# Patient Record
Sex: FEMALE | Race: BLACK OR AFRICAN AMERICAN | NOT HISPANIC OR LATINO | Employment: OTHER | URBAN - METROPOLITAN AREA
[De-identification: names, ages, dates, MRNs, and addresses within clinical notes are randomized per-mention and may not be internally consistent; named-entity substitution may affect disease eponyms.]

---

## 2020-08-24 ENCOUNTER — TELEPHONE (OUTPATIENT)
Dept: NEUROLOGY | Facility: CLINIC | Age: 45
End: 2020-08-24

## 2020-08-24 NOTE — TELEPHONE ENCOUNTER
I called pt to discuss an earlier visit in headache clinic. The only other opening would be a video visit this Thursday 8/27. She said she will keep her in person visit next week on 9/3. She feels waiting the extra week will be okay. She was given a prescription to try from ED. She will see if this helps.     Camelia COUGHLIN

## 2020-08-24 NOTE — TELEPHONE ENCOUNTER
[facility] Call Center    Phone Message    May a detailed message be left on voicemail: no     Reason for Call: Other: Pt is being referred by a Dr. Vallejo at [facility] for Headaches. Pt was discharged today. First available was 9/3/20 which was scheduled for right now, but protocols ask for a 1 week f/u. Please call Pt back.     Action Taken: Message routed to:  [facility]    Travel Screening: Not Applicable

## 2020-09-03 ENCOUNTER — OFFICE VISIT (OUTPATIENT)
Dept: NEUROLOGY | Facility: CLINIC | Age: 45
End: 2020-09-03
Payer: OTHER GOVERNMENT

## 2020-09-03 VITALS
SYSTOLIC BLOOD PRESSURE: 123 MMHG | RESPIRATION RATE: 16 BRPM | HEART RATE: 97 BPM | DIASTOLIC BLOOD PRESSURE: 82 MMHG | OXYGEN SATURATION: 99 %

## 2020-09-03 DIAGNOSIS — G44.86 CERVICOGENIC HEADACHE: ICD-10-CM

## 2020-09-03 DIAGNOSIS — G44.85 STABBING HEADACHE: ICD-10-CM

## 2020-09-03 DIAGNOSIS — R51.9 ACUTE INTRACTABLE HEADACHE, UNSPECIFIED HEADACHE TYPE: Primary | ICD-10-CM

## 2020-09-03 DIAGNOSIS — Z09 HOSPITAL DISCHARGE FOLLOW-UP: ICD-10-CM

## 2020-09-03 PROBLEM — E61.1 IRON DEFICIENCY: Status: ACTIVE | Noted: 2017-04-25

## 2020-09-03 PROBLEM — R06.09 DYSPNEA ON EXERTION: Status: ACTIVE | Noted: 2018-02-26

## 2020-09-03 PROBLEM — Z00.00 ROUTINE PHYSICAL EXAMINATION: Status: ACTIVE | Noted: 2017-04-25

## 2020-09-03 PROBLEM — E55.9 VITAMIN D DEFICIENCY: Status: ACTIVE | Noted: 2017-04-25

## 2020-09-03 PROBLEM — N92.1 MENORRHAGIA WITH IRREGULAR CYCLE: Status: ACTIVE | Noted: 2017-04-25

## 2020-09-03 RX ORDER — CETIRIZINE HYDROCHLORIDE 10 MG/1
10 TABLET ORAL DAILY
COMMUNITY
End: 2020-09-21

## 2020-09-03 ASSESSMENT — PAIN SCALES - GENERAL: PAINLEVEL: NO PAIN (0)

## 2020-09-03 NOTE — NURSING NOTE
Chief Complaint   Patient presents with     Hospital F/U     Headache     [facility] EMERGENCY DEPARTMENT F/U        Dequan Mullins, EMT

## 2020-09-03 NOTE — LETTER
9/3/2020      RE: Tori Steele  [address] [address]     Dear Colleague,    Thank you for referring your patient, Tori Steele, to the [facility] NEUROLOGY at [facility]. Please see a copy of my visit note below.    Re: Tori Steele      MRN# [id number]  YOB: 1975  Date of Visit: 9/3/2020    OUTPATIENT NEUROLOGY VISIT NOTE    Chief Complaint:  ED visit follow-up for headache    History of Present Illness  Tori Steele is a 44-year-old female presents to the [facility] headache clinic today for ED visit follow-up and additional headache evaluation.  ED visit 8/24/2020 and notes reviewed.  Patient was given prescription for prednisone and ibuprofen and was recommended icing of the area and referred to headache clinic.  Accompanied by her .   History of migraine headaches. No family history of headaches.   Usual migraine headaches when close to the cycle. More to the front and sharp pain and lasting a day and comes in short sharp pain. Pain 9/10 in intensity. Patient reports that usually takes ibuprofen. Denies light or noise sensitivity. No vision loss. Frequency once per month.   New symptoms -more than 24 hours and usually   Woke up at 2 am with a sharp pain left occipital area and a few seconds stab and stayed a few seconds and reoccurrence in 5 minutes and at least 20-30 attacks in 12 hours. No eye lacrimation, no eye redness, no nasal congestion. No warning. No other symptoms. No nausea or vomiting.    Patient reports that prednisone worked for a couple of days and than headache again but lesser intensity and frequency of attacks. Patient reported had a headache last night about 8:30 pm and took 2 ibuprofen and helped.   Last night stabbing changed in the location frontal area and lasting a few seconds.   Not on birth control  No clotting disorders.   Patient reports history of black out at the age of 21 and was evaluated and  presumed negative  Heavy menstrual cycle  Takes vitamins with Iron    Denies history of head or neck trauma, dizziness, vertigo, loss of consciousness, seizure, double vision, blurred vision, hearing difficulty, speech or swallowing difficulty, weakness or numbness in face, arms or legs, urinary or bowel incontinence, coordination problems or gait difficulty, fever or chills.  No chest pain or palpitation    Neurodiagnostic Testing  CT head none  MRI brain none    Past Medical History reviewed and verified with the patient  Sinus tachycardia  Past Surgical History reviewed and verified with the patient  Dilation and current age of uterus  Family History reviewed and verified with the patient  Social History     Tobacco Use     Smoking status: Not on file   Substance Use Topics     Alcohol use: Not on file    reviewed and verified with the patient   No Known Allergies     Current Outpatient Medications   Medication Sig Dispense Refill     cetirizine (ZYRTEC) 10 MG tablet Take 10 mg by mouth daily     reviewed and verified with the patient    Review of Systems:  A 12-point ROS including constitutional, eyes, ENT, respiratory, cardiovascular, gastroenterology, genitourinary, integumentary, musculoskeletal, neurology, hematology and psychiatric were all reviewed with the patient and completed at the [facility] nary and as mentioned in the HPI.     General Exam:   /82   Pulse 97   Resp 16   SpO2 99%   GEN: Awake, NAD; good eye contact, responses appropriately , healthy appearing. No pain now. Speech is fluent without dysarthriaMentation appears normal, affect normal/bright, judgement and insight intact, normal speech and appearance well-groomed.  HEENT: Head atraumatic/Normocephalic. Scalp normal. Pupils equally round, 4 mm, reactive to light and accommodation, sclera and conjunctiva normal. Fundoscopic examination reveals normal vessels no papilledema.   Neck: Easily moveable without  resistance  Heart: S1/S2 appreciated, tachycardiano m/r/g, no carotid bruits  Lungs:Lungs are clear to auscultation bilaterally, no wheezes or crackles.   Neurological Examination:  The patient is alert and oriented times four. Has good attention and concentration. Immediate and short term recall is 3/3. Speech is fluent without dysarthria.   Cranial nerves:  CN I deferred.   CN II: Intact and full visual fields to confrontation bilaterally.  CN III, IV, VI: EOM intact. There is no nystagmus. Has conjugated gaze. Intact direct and consensual pupillary light reflexes.   CN V: Intact and symmetrical to facial sensation in the V1 through V3 bilaterally.   CN VII: Intact and symmetrical eyebrow and lid raise and eyelid closure, smiles and frown.   CN VIII: Intact to finger rub bilaterally.   CN IX and X: The palates elevates symmetrical. The uvula is midline.   CN XII: The tongue protrudes midline with no atrophy or fasciculations.   Motor exam: The patient has a normal bulk and tone throughout. There is no atrophy, fasciculations, clonus, or abnormal movements appreciated.   Strength Exam:  5/5 strength at shoulder abduction, elbow flexion or extension, wrist flexion or extension, finger abduction, , hip flexion and extension, knee flexion and extension, and dorsiflexion and plantarflexion bilaterally.   Sensation is intact to light touch and pinprick throughout.  Reflexes are 2+ and symmetrical at biceps, triceps, brachioradialis, patellar, and Achilles.   Negative Babinski with downgoing toes bilaterally.   Coordination reveals finger-nose-finger with normal speed and accuracy.   Station and gait is normal. There is no ataxia. Can walk on the toes, heels, and tandem walk without difficulty. Has no drift and a negative Romberg. Azul's negative        Assessment and Plan:  Acute onset of paroxysmal stabbing pain in the left occipital area different from previous episodes. Was not associated with activity but  woke patient up at night.   Differentials are vascular, thrombosis, maligancy, cervicogenic  Plan:  Brain MRI/ MRA, MRV,   Follow up after-virtual visit       I discussed all my recommendation with Tori Steele. The patient verbalizes understanding and comfortable with the plan. The patient has our clinic phone number to call with any questions or concerns. All of the patient's questions were answered from the best of my current knowledge.     Thank you for letting me be a part of the treatment team for Tori Steele    Time spent with pt answering questions, discussing findings, counseling and coordinating care was more than 50% the appointment time, 50  minutes.     Again, thank you for allowing me to participate in the care of your patient.  Sincerely,    GENIE Mcnair, CNP  [facility]

## 2020-09-03 NOTE — PROGRESS NOTES
Re: Tori Steele      MRN# [id number]  YOB: 1975  Date of Visit: 9/3/2020    OUTPATIENT NEUROLOGY VISIT NOTE    Chief Complaint:  ED visit follow-up for headache    History of Present Illness  Tori Steele is a 44-year-old female presents to the [facility] headache clinic today for ED visit follow-up and additional headache evaluation.  ED visit 8/24/2020 and notes reviewed.  Patient was given prescription for prednisone and ibuprofen and was recommended icing of the area and referred to headache clinic.  Accompanied by her .   History of migraine headaches. No family history of headaches.   Usual migraine headaches when close to the cycle. More to the front and sharp pain and lasting a day and comes in short sharp pain. Pain 9/10 in intensity. Patient reports that usually takes ibuprofen. Denies light or noise sensitivity. No vision loss. Frequency once per month.   New symptoms -more than 24 hours and usually   Woke up at 2 am with a sharp pain left occipital area and a few seconds stab and stayed a few seconds and reoccurrence in 5 minutes and at least 20-30 attacks in 12 hours. No eye lacrimation, no eye redness, no nasal congestion. No warning. No other symptoms. No nausea or vomiting.    Patient reports that prednisone worked for a couple of days and than headache again but lesser intensity and frequency of attacks. Patient reported had a headache last night about 8:30 pm and took 2 ibuprofen and helped.   Last night stabbing changed in the location frontal area and lasting a few seconds.   Not on birth control  No clotting disorders.   Patient reports history of black out at the age of 21 and was evaluated and presumed negative  Heavy menstrual cycle  Takes vitamins with Iron    Denies history of head or neck trauma, dizziness, vertigo, loss of consciousness, seizure, double vision, blurred vision, hearing difficulty, speech or swallowing difficulty, weakness or numbness in face,  arms or legs, urinary or bowel incontinence, coordination problems or gait difficulty, fever or chills.  No chest pain or palpitation    Neurodiagnostic Testing  CT head none  MRI brain none      Past Medical History reviewed and verified with the patient  Sinus tachycardia  Past Surgical History reviewed and verified with the patient  Dilation and current age of uterus  Family History reviewed and verified with the patient  Social History     Tobacco Use     Smoking status: Not on file   Substance Use Topics     Alcohol use: Not on file    reviewed and verified with the patient   No Known Allergies     Current Outpatient Medications   Medication Sig Dispense Refill     cetirizine (ZYRTEC) 10 MG tablet Take 10 mg by mouth daily     reviewed and verified with the patient    Review of Systems:  A 12-point ROS including constitutional, eyes, ENT, respiratory, cardiovascular, gastroenterology, genitourinary, integumentary, musculoskeletal, neurology, hematology and psychiatric were all reviewed with the patient and completed at the [facility] and as mentioned in the HPI.     General Exam:   /82   Pulse 97   Resp 16   SpO2 99%   GEN: Awake, NAD; good eye contact, responses appropriately , healthy appearing. No pain now. Speech is fluent without dysarthriaMentation appears normal, affect normal/bright, judgement and insight intact, normal speech and appearance well-groomed.  HEENT: Head atraumatic/Normocephalic. Scalp normal. Pupils equally round, 4 mm, reactive to light and accommodation, sclera and conjunctiva normal. Fundoscopic examination reveals normal vessels no papilledema.   Neck: Easily moveable without resistance  Heart: S1/S2 appreciated, tachycardiano m/r/g, no carotid bruits  Lungs:Lungs are clear to auscultation bilaterally, no wheezes or crackles.   Neurological Examination:  The patient is alert and oriented times four. Has good attention and concentration. Immediate and  short term recall is 3/3. Speech is fluent without dysarthria.   Cranial nerves:  CN I deferred.   CN II: Intact and full visual fields to confrontation bilaterally.  CN III, IV, VI: EOM intact. There is no nystagmus. Has conjugated gaze. Intact direct and consensual pupillary light reflexes.   CN V: Intact and symmetrical to facial sensation in the V1 through V3 bilaterally.   CN VII: Intact and symmetrical eyebrow and lid raise and eyelid closure, smiles and frown.   CN VIII: Intact to finger rub bilaterally.   CN IX and X: The palates elevates symmetrical. The uvula is midline.   CN XII: The tongue protrudes midline with no atrophy or fasciculations.   Motor exam: The patient has a normal bulk and tone throughout. There is no atrophy, fasciculations, clonus, or abnormal movements appreciated.   Strength Exam:  5/5 strength at shoulder abduction, elbow flexion or extension, wrist flexion or extension, finger abduction, , hip flexion and extension, knee flexion and extension, and dorsiflexion and plantarflexion bilaterally.   Sensation is intact to light touch and pinprick throughout.  Reflexes are 2+ and symmetrical at biceps, triceps, brachioradialis, patellar, and Achilles.   Negative Babinski with downgoing toes bilaterally.   Coordination reveals finger-nose-finger with normal speed and accuracy.   Station and gait is normal. There is no ataxia. Can walk on the toes, heels, and tandem walk without difficulty. Has no drift and a negative Romberg. Azul's negative          Assessment and Plan:  Acute onset of paroxysmal stabbing pain in the left occipital area different from previous episodes. Was not associated with activity but woke patient up at night.   Differentials are vascular, thrombosis, maligancy, cervicogenic  Plan:  Brain MRI/ MRA, MRV,   Follow up after-virtual visit       I discussed all my recommendation with Tori Steele. The patient verbalizes understanding and comfortable with the  plan. The patient has our clinic phone number to call with any questions or concerns. All of the patient's questions were answered from the best of my current knowledge.     Thank you for letting me be a part of the treatment team for Tori Steele      Time spent with pt answering questions, discussing findings, counseling and coordinating care was more than 50% the appointment time, 50  minutes.         GENIE Mcnair, CNP  [facility]

## 2020-09-03 NOTE — LETTER
9/3/2020       RE: Tori Steele  [address] [address]     Dear Colleague,    Thank you for referring your patient, Tori Steele, to the [facility] NEUROLOGY at [facility]. Please see a copy of my visit note below.    Re: Tori Steele      MRN# [id number]  YOB: 1975  Date of Visit: 9/3/2020    OUTPATIENT NEUROLOGY VISIT NOTE    Chief Complaint:  ED visit follow-up for headache    History of Present Illness  Tori Steele is a 44-year-old female presents to the [facility] headache clinic today for ED visit follow-up and additional headache evaluation.  ED visit 8/24/2020 and notes reviewed.  Patient was given prescription for prednisone and ibuprofen and was recommended icing of the area and referred to headache clinic.  Accompanied by her .   History of migraine headaches. No family history of headaches.   Usual migraine headaches when close to the cycle. More to the front and sharp pain and lasting a day and comes in short sharp pain. Pain 9/10 in intensity. Patient reports that usually takes ibuprofen. Denies light or noise sensitivity. No vision loss. Frequency once per month.   New symptoms -more than 24 hours and usually   Woke up at 2 am with a sharp pain left occipital area and a few seconds stab and stayed a few seconds and reoccurrence in 5 minutes and at least 20-30 attacks in 12 hours. No eye lacrimation, no eye redness, no nasal congestion. No warning. No other symptoms. No nausea or vomiting.    Patient reports that prednisone worked for a couple of days and than headache again but lesser intensity and frequency of attacks. Patient reported had a headache last night about 8:30 pm and took 2 ibuprofen and helped.   Last night stabbing changed in the location frontal area and lasting a few seconds.   Not on birth control  No clotting disorders.   Patient reports history of black out at the age of 21 and was evaluated  and presumed negative  Heavy menstrual cycle  Takes vitamins with Iron    Denies history of head or neck trauma, dizziness, vertigo, loss of consciousness, seizure, double vision, blurred vision, hearing difficulty, speech or swallowing difficulty, weakness or numbness in face, arms or legs, urinary or bowel incontinence, coordination problems or gait difficulty, fever or chills.  No chest pain or palpitation    Neurodiagnostic Testing  CT head none  MRI brain none      Past Medical History reviewed and verified with the patient  Sinus tachycardia  Past Surgical History reviewed and verified with the patient  Dilation and current age of uterus  Family History reviewed and verified with the patient  Social History     Tobacco Use     Smoking status: Not on file   Substance Use Topics     Alcohol use: Not on file    reviewed and verified with the patient   No Known Allergies     Current Outpatient Medications   Medication Sig Dispense Refill     cetirizine (ZYRTEC) 10 MG tablet Take 10 mg by mouth daily     reviewed and verified with the patient    Review of Systems:  A 12-point ROS including constitutional, eyes, ENT, respiratory, cardiovascular, gastroenterology, genitourinary, integumentary, musculoskeletal, neurology, hematology and psychiatric were all reviewed with the patient and completed at the [facility] Question nary and as mentioned in the HPI.     General Exam:   /82   Pulse 97   Resp 16   SpO2 99%   GEN: Awake, NAD; good eye contact, responses appropriately , healthy appearing. No pain now. Speech is fluent without dysarthriaMentation appears normal, affect normal/bright, judgement and insight intact, normal speech and appearance well-groomed.  HEENT: Head atraumatic/Normocephalic. Scalp normal. Pupils equally round, 4 mm, reactive to light and accommodation, sclera and conjunctiva normal. Fundoscopic examination reveals normal vessels no papilledema.   Neck: Easily moveable without  resistance  Heart: S1/S2 appreciated, tachycardiano m/r/g, no carotid bruits  Lungs:Lungs are clear to auscultation bilaterally, no wheezes or crackles.   Neurological Examination:  The patient is alert and oriented times four. Has good attention and concentration. Immediate and short term recall is 3/3. Speech is fluent without dysarthria.   Cranial nerves:  CN I deferred.   CN II: Intact and full visual fields to confrontation bilaterally.  CN III, IV, VI: EOM intact. There is no nystagmus. Has conjugated gaze. Intact direct and consensual pupillary light reflexes.   CN V: Intact and symmetrical to facial sensation in the V1 through V3 bilaterally.   CN VII: Intact and symmetrical eyebrow and lid raise and eyelid closure, smiles and frown.   CN VIII: Intact to finger rub bilaterally.   CN IX and X: The palates elevates symmetrical. The uvula is midline.   CN XII: The tongue protrudes midline with no atrophy or fasciculations.   Motor exam: The patient has a normal bulk and tone throughout. There is no atrophy, fasciculations, clonus, or abnormal movements appreciated.   Strength Exam:  5/5 strength at shoulder abduction, elbow flexion or extension, wrist flexion or extension, finger abduction, , hip flexion and extension, knee flexion and extension, and dorsiflexion and plantarflexion bilaterally.   Sensation is intact to light touch and pinprick throughout.  Reflexes are 2+ and symmetrical at biceps, triceps, brachioradialis, patellar, and Achilles.   Negative Babinski with downgoing toes bilaterally.   Coordination reveals finger-nose-finger with normal speed and accuracy.   Station and gait is normal. There is no ataxia. Can walk on the toes, heels, and tandem walk without difficulty. Has no drift and a negative Romberg. Azul's negative          Assessment and Plan:  Acute onset of paroxysmal stabbing pain in the left occipital area different from previous episodes. Was not associated with activity but  woke patient up at night.   Differentials are vascular, thrombosis, maligancy, cervicogenic  Plan:  Brain MRI/ MRA, MRV,   Follow up after-virtual visit       I discussed all my recommendation with Tori Steele. The patient verbalizes understanding and comfortable with the plan. The patient has our clinic phone number to call with any questions or concerns. All of the patient's questions were answered from the best of my current knowledge.     Thank you for letting me be a part of the treatment team for Tori Steele      Time spent with pt answering questions, discussing findings, counseling and coordinating care was more than 50% the appointment time, 50  minutes.         GENIE Mcnair, CNP  [facility]      Again, thank you for allowing me to participate in the care of your patient.      Sincerely,    GENIE Foreman CNP

## 2020-09-18 ENCOUNTER — HOSPITAL ENCOUNTER (OUTPATIENT)
Dept: MRI IMAGING | Facility: CLINIC | Age: 45
End: 2020-09-18
Attending: NURSE PRACTITIONER
Payer: OTHER GOVERNMENT

## 2020-09-18 DIAGNOSIS — G44.85 STABBING HEADACHE: ICD-10-CM

## 2020-09-18 PROCEDURE — 70544 MR ANGIOGRAPHY HEAD W/O DYE: CPT

## 2020-09-18 PROCEDURE — 70546 MR ANGIOGRAPH HEAD W/O&W/DYE: CPT

## 2020-09-18 PROCEDURE — 25500064 ZZH RX 255 OP 636: Performed by: NURSE PRACTITIONER

## 2020-09-18 PROCEDURE — A9585 GADOBUTROL INJECTION: HCPCS | Performed by: NURSE PRACTITIONER

## 2020-09-18 PROCEDURE — 70553 MRI BRAIN STEM W/O & W/DYE: CPT

## 2020-09-18 RX ORDER — GADOBUTROL 604.72 MG/ML
7.5 INJECTION INTRAVENOUS ONCE
Status: COMPLETED | OUTPATIENT
Start: 2020-09-18 | End: 2020-09-18

## 2020-09-18 RX ADMIN — GADOBUTROL 6.8 ML: 604.72 INJECTION INTRAVENOUS at 14:50

## 2020-09-21 ENCOUNTER — VIRTUAL VISIT (OUTPATIENT)
Dept: NEUROLOGY | Facility: CLINIC | Age: 45
End: 2020-09-21
Payer: OTHER GOVERNMENT

## 2020-09-21 DIAGNOSIS — R51.9 FRONTAL HEADACHE: ICD-10-CM

## 2020-09-21 DIAGNOSIS — J34.1 MUCOUS RETENTION CYST OF MAXILLARY SINUS: Primary | ICD-10-CM

## 2020-09-21 NOTE — PROGRESS NOTES
"Tori Steele is a 45 year old female who is being evaluated via a billable video visit.      The patient has been notified of following:     \"This video visit will be conducted via a call between you and your physician/provider. We have found that certain health care needs can be provided without the need for an in-person physical exam.  This service lets us provide the care you need with a video conversation.  If a prescription is necessary we can send it directly to your pharmacy.  If lab work is needed we can place an order for that and you can then stop by our lab to have the test done at a later time.    Video visits are billed at different rates depending on your insurance coverage.  Please reach out to your insurance provider with any questions.    If during the course of the call the physician/provider feels a video visit is not appropriate, you will not be charged for this service.\"    Patient has given verbal consent for Video visit?YES  How would you like to obtain your AVS? MyChart        Video-Visit Details    Type of service:  Video Visit    Video Start Time: 1:57 PM    Video End Time:2:54 PM      Originating Location (pt. Location): Home    Distant Location (provider location):  [facility] NEUROLOGY     Platform used for Video Visit: JAMES Luciano    Reason for visit:  Headache and images follow-up. This visit was conducted via synchronous video visit due to the current COVID-19 crisis to reduce patient risk.  Verbal consent was obtained.     Interval history:  This is a 44-year-old female presents to the [facility] headache clinic today for ED visit follow-up and additional headache evaluation.  ED visit 8/24/2020 and notes reviewed.  Patient was given prescription for prednisone and ibuprofen and was recommended icing of the area and referred to headache clinic.  History of migraine headaches with usual migraine headache pattern close to her menstrual cycle.  Typical migraine frequency " once per month.  Patient reported that she developed new symptoms that lasted more than 24 hours.  Reports that she woke up at 2 AM with a sharp pain in the left occipital area with a few seconds stop with recurrence in 5 minutes at least 2030 attacks in 12 hours.  Note autonomic features were reported.  No visual loss.  No nausea or vomiting.  No weakness in arms or legs or facial weakness.  Patient went to the ER and was given prednisone.  During her initial visit at [facility] headache clinic it was recommended further work-up with imaging.    Reviewed brain MRI, brain MRI and brain MRV images with patient today.  Brain MRV dominant right-sided venous drainage no dural venous sinus thrombosis  Head MRA demonstrated no aneurysmal stenosis of the major intracranial arteries, no evidence of vasospasm, there was a finding of a 1 mm infundibulum in the origin of a small inferiorly directed right M2 branch.  A curbside review of brain MRA images by Dr Jeffers from neurosurgery -no further  follow up needed.   Brain MRI contrast-enhanced images demonstrated no abnormal intracranial enhancement.  Minimal leukoaraiosis in the upper limits of normal for age.  No mass lesion, mass effect or midline shift or any restricted diffusion.  Per images review the risks  right maxiallary sinus mucosal thickening and possible cyst.   Symptoms -patient reports continuous pressure over right maxillary sinus and has been waking up with swelling and pressure feeling. Some days are worse than other. Onset in July 2020  Patient reports a little drip from right nose and reports that never a runny nose for a long time. Reports that nasal spray offers some relief. Reports a nicholas roller helps.   Left occipital area pain has stopped and no intensity since than. Left occipital pain lasting for 2 days and woke patient up on 8/24/2020 and went to the ED.   Patient reports that headaches feeling tightness every day but not as intense and straight  across.   Patient reports that she is able to sleep with tightness but soreness.   No light or noise sensitivity. Tightness feeling about 6/10 on numeric pain scale. Reports that she is able to do think but able to push thru. No debilitating and able to do daily things.   Feels like always delt with headaches but never stopped    Patient reports that Claritin and ibuprofen seem helpful and every other day.     Right maxillary sinus retention cyst reported and fullness feeling and swelling under the eyes since July which made patient to pay more attention.     Tension vs migraine. Finding of maxillary sinus mucus retention cyst possibly symptomatic.  Patient reports pressure feeling and swelling over the right maxillary sinus since July 2020     Headache treatment plan discussed with the patient:  Sinuses and pressure feeling over right maxillary area since July and findings of right maxillary cyst -recommended a referral to ENT  Headache log for frequency and severity and ibuprofen use  Stay hydrated  Stress reduction, guided meditation  Vitamin B2 200-400 mg daily OTC  Call with any headache worsening or new symptoms or re-occurrence of occipital pain  Possible rebound headaches from ibuprofen use. Limit use to no more than 10-12 days per month of ibuprofen  If headaches were not improving -may consider a preventative treatment.   May try naproxen 2 tablets every 12 hours as needed. Limit use to no more than 14 days per month  Follow up virtually  in 1-2 months     Possible Infundibulum findings incidental.  Jana review with neurosurgery Dr Jeffers and trina amezcua follow up needed    PMH, allergies and current prescription medications reviewed    10 point ROS of systems including Constitutional, Eyes, Respiratory, Cardiovascular, Gastroenterology, Genitourinary, Integumentary, Muscularskeletal, Psychiatric were reviewed and no concerns reported today unless as mentioned in the interval  history    Exam  GENERAL: Healthy, alert and no distress,   EYES: Eyes grossly normal to inspection.    RESP: No audible wheeze, cough, or visible cyanosis.    SKIN: Visible skin clear. No significant rash, abnormal pigmentation or lesions.  NEURO: Cranial nerves grossly intact.  Mentation and speech appropriate for age.  PSYCH: Mentation appears normal, affect normal/bright, judgement and insight intact, normal speech and appearance well-groomed.      A/P:  See interval history for our discussion and plan    I discussed all my recommendations with Tori Steele who verbalizes understanding and comfortable with the plan.  All of patient's questions were answered from the best of my knowledge.  Patient is in agreement with the plan.     I spent a total of 50 minutes for telemedicine consult with the patient during today s virtual meeting. Over 50% of this time was spent counseling the patient and/or coordinating care    GENIE Swartz [facility]

## 2020-09-21 NOTE — PATIENT INSTRUCTIONS
Plan:  Sinuses and pressure feeling over right maxillary area since July and findings of right maxillary cyst -recommended a referral to ENT  Headache log for frequency and severity and ibuprofen use  Stay hydrated  Stress reduction, guided meditation  Vitamin B2 200-400 mg daily OTC  Call with any headache worsening or new symptoms or re-occurrence of occipital pain  Possible rebound headaches from ibuprofen use. Limit use to no more than 10-12 days per month of ibuprofen  If headaches were not improving -may consider a preventative treatment.   May try naproxen 2 tablets every 12 hours as needed. Limit use to no more than 14 days per month  Follow up virtually  in 1-2 months

## 2020-09-21 NOTE — LETTER
"9/21/2020     RE: Tori Steele  [address] [address]     Dear Colleague,    Thank you for referring your patient, Tori Steele, to the [facility] NEUROLOGY at [facility]. Please see a copy of my visit note below.    Tori Steele is a 45 year old female who is being evaluated via a billable video visit.      The patient has been notified of following:     \"This video visit will be conducted via a call between you and your physician/provider. We have found that certain health care needs can be provided without the need for an in-person physical exam.  This service lets us provide the care you need with a video conversation.  If a prescription is necessary we can send it directly to your pharmacy.  If lab work is needed we can place an order for that and you can then stop by our lab to have the test done at a later time.    Video visits are billed at different rates depending on your insurance coverage.  Please reach out to your insurance provider with any questions.    If during the course of the call the physician/provider feels a video visit is not appropriate, you will not be charged for this service.\"    Patient has given verbal consent for Video visit?YES  How would you like to obtain your AVS? MyChart        Video-Visit Details    Type of service:  Video Visit    Video Start Time: 1:57 PM    Video End Time:2:54 PM      Originating Location (pt. Location): Home    Distant Location (provider location):  [facility] NEUROLOGY     Platform used for Video Visit: JAMES Luciano    Reason for visit:  Headache and images follow-up. This visit was conducted via synchronous video visit due to the current COVID-19 crisis to reduce patient risk.  Verbal consent was obtained.     Interval history:  This is a 44-year-old female presents to the [facility] headache clinic today for ED visit follow-up and additional headache evaluation.  ED visit 8/24/2020 and " notes reviewed.  Patient was given prescription for prednisone and ibuprofen and was recommended icing of the area and referred to headache clinic.  History of migraine headaches with usual migraine headache pattern close to her menstrual cycle.  Typical migraine frequency once per month.  Patient reported that she developed new symptoms that lasted more than 24 hours.  Reports that she woke up at 2 AM with a sharp pain in the left occipital area with a few seconds stop with recurrence in 5 minutes at least 2030 attacks in 12 hours.  Note autonomic features were reported.  No visual loss.  No nausea or vomiting.  No weakness in arms or legs or facial weakness.  Patient went to the ER and was given prednisone.  During her initial visit at [facility] headache clinic it was recommended further work-up with imaging.    Reviewed brain MRI, brain MRI and brain MRV images with patient today.  Brain MRV dominant right-sided venous drainage no dural venous sinus thrombosis  Head MRA demonstrated no aneurysmal stenosis of the major intracranial arteries, no evidence of vasospasm, there was a finding of a 1 mm infundibulum in the origin of a small inferiorly directed right M2 branch.  A curbside review of brain MRA images by Dr Jeffers from neurosurgery -no further  follow up needed.   Brain MRI contrast-enhanced images demonstrated no abnormal intracranial enhancement.  Minimal leukoaraiosis in the upper limits of normal for age.  No mass lesion, mass effect or midline shift or any restricted diffusion.  Per images review the risks  right maxiallary sinus mucosal thickening and possible cyst.   Symptoms -patient reports continuous pressure over right maxillary sinus and has been waking up with swelling and pressure feeling. Some days are worse than other. Onset in July 2020  Patient reports a little drip from right nose and reports that never a runny nose for a long time. Reports that nasal spray offers some relief. Reports  a nicholas roller helps.   Left occipital area pain has stopped and no intensity since than. Left occipital pain lasting for 2 days and woke patient up on 8/24/2020 and went to the ED.   Patient reports that headaches feeling tightness every day but not as intense and straight across.   Patient reports that she is able to sleep with tightness but soreness.   No light or noise sensitivity. Tightness feeling about 6/10 on numeric pain scale. Reports that she is able to do think but able to push thru. No debilitating and able to do daily things.   Feels like always delt with headaches but never stopped    Patient reports that Claritin and ibuprofen seem helpful and every other day.     Right maxillary sinus retention cyst reported and fullness feeling and swelling under the eyes since July which made patient to pay more attention.     Tension vs migraine. Finding of maxillary sinus mucus retention cyst possibly symptomatic.  Patient reports pressure feeling and swelling over the right maxillary sinus since July 2020     Headache treatment plan discussed with the patient:  Sinuses and pressure feeling over right maxillary area since July and findings of right maxillary cyst -recommended a referral to ENT  Headache log for frequency and severity and ibuprofen use  Stay hydrated  Stress reduction, guided meditation  Vitamin B2 200-400 mg daily OTC  Call with any headache worsening or new symptoms or re-occurrence of occipital pain  Possible rebound headaches from ibuprofen use. Limit use to no more than 10-12 days per month of ibuprofen  If headaches were not improving -may consider a preventative treatment.   May try naproxen 2 tablets every 12 hours as needed. Limit use to no more than 14 days per month  Follow up virtually  in 1-2 months     Possible Infundibulum findings incidental.  Jana review with neurosurgery Dr Jeffers and trina amezcua follow up needed    PMH, allergies and current prescription medications  reviewed    10 point ROS of systems including Constitutional, Eyes, Respiratory, Cardiovascular, Gastroenterology, Genitourinary, Integumentary, Muscularskeletal, Psychiatric were reviewed and no concerns reported today unless as mentioned in the interval history    Exam  GENERAL: Healthy, alert and no distress,   EYES: Eyes grossly normal to inspection.    RESP: No audible wheeze, cough, or visible cyanosis.    SKIN: Visible skin clear. No significant rash, abnormal pigmentation or lesions.  NEURO: Cranial nerves grossly intact.  Mentation and speech appropriate for age.  PSYCH: Mentation appears normal, affect normal/bright, judgement and insight intact, normal speech and appearance well-groomed.      A/P:  See interval history for our discussion and plan    I discussed all my recommendations with Tori Steele who verbalizes understanding and comfortable with the plan.  All of patient's questions were answered from the best of my knowledge.  Patient is in agreement with the plan.     I spent a total of 50 minutes for telemedicine consult with the patient during today s virtual meeting. Over 50% of this time was spent counseling the patient and/or coordinating care    GENIE Swartz [facility] Headache [facility]

## 2020-09-22 ENCOUNTER — TELEPHONE (OUTPATIENT)
Dept: OTOLARYNGOLOGY | Facility: CLINIC | Age: 45
End: 2020-09-22

## 2020-09-22 NOTE — TELEPHONE ENCOUNTER
FUTURE VISIT INFORMATION      FUTURE VISIT INFORMATION:    Date: 10/2/2020    Time: 10AM    Location: [facility]  REFERRAL INFORMATION:    Referring provider:  Ally Sapp APRN CNP     Referring providers clinic:  [facility] Neurology     Reason for visit/diagnosis  Referred by Negro Sapp. Dx: Right Maxillary Sinus Possible mucus retention cyst +symptoms over right maxillary sinus. Appt per pt.    RECORDS REQUESTED FROM:       Clinic name Comments Records Status Imaging Status   [facility]  9/21/2020 note and referral from Ally Sapp APRN CNP  Epic    Imaging 9/18/2020 MR Brain  [facility] PACS

## 2020-09-22 NOTE — TELEPHONE ENCOUNTER
Called patient to schedule:    Appointment Type - [facility] NEW RHINOLOGY  Provider - Dr. Acevedo  Appointment Notes - Right maxillary sinus possible mucus retention cyst+symptoms over right maxillary sinus, ref'd by Ally Sapp CNP (Neuro)    LVM with scheduling instructions and the ENT call center number. Patient is welcome to schedule in next available slot of provider. Please waitlist if sooner availability is desired.

## 2020-10-01 DIAGNOSIS — J32.0 RIGHT MAXILLARY SINUSITIS: Primary | ICD-10-CM

## 2020-10-02 ENCOUNTER — PRE VISIT (OUTPATIENT)
Dept: OTOLARYNGOLOGY | Facility: CLINIC | Age: 45
End: 2020-10-02

## 2020-10-02 ENCOUNTER — OFFICE VISIT (OUTPATIENT)
Dept: OTOLARYNGOLOGY | Facility: CLINIC | Age: 45
End: 2020-10-02
Payer: OTHER GOVERNMENT

## 2020-10-02 VITALS
OXYGEN SATURATION: 97 % | BODY MASS INDEX: 28.53 KG/M2 | HEIGHT: 63 IN | TEMPERATURE: 98.3 F | HEART RATE: 103 BPM | WEIGHT: 161 LBS

## 2020-10-02 DIAGNOSIS — J34.1 CYST OF MAXILLARY SINUS: ICD-10-CM

## 2020-10-02 DIAGNOSIS — G47.30 SLEEP-DISORDERED BREATHING: Primary | ICD-10-CM

## 2020-10-02 DIAGNOSIS — G50.1 ATYPICAL FACIAL PAIN: ICD-10-CM

## 2020-10-02 DIAGNOSIS — J32.0 RIGHT MAXILLARY SINUSITIS: ICD-10-CM

## 2020-10-02 PROCEDURE — 99244 OFF/OP CNSLTJ NEW/EST MOD 40: CPT | Performed by: OTOLARYNGOLOGY

## 2020-10-02 ASSESSMENT — MIFFLIN-ST. JEOR: SCORE: 1354.42

## 2020-10-02 ASSESSMENT — PAIN SCALES - GENERAL: PAINLEVEL: NO PAIN (0)

## 2020-10-02 NOTE — LETTER
10/2/2020       RE: Tori Steele  [address] [address]     Dear Colleague,    Thank you for referring your patient, Tori Steele, to the [facility] [place] [facility]. Please see a copy of my visit note below.        [facility]  New Patient Visit      Encounter date: October 2, 2020    Referring Provider:   Ally Sapp, APRN CNP  [address] [id number], [place] [address]    Chief Complaint: Headaches, right maxillary sinus cyst    History of Present Illness: Tori Steele is a 45-year-old woman who I am seeing as a consult from Ally Sapp for right maxillary sinus cyst.  The patient has a longstanding issue with headaches localized to the right frontal, retro-orbital, and right cheek area.  She was undergoing a work-up with Ally which included an MRI that incidentally showed the presence of a right maxillary sinus retention cyst.  She is referred to me to discuss management.  She admits to some pressure over the right maxillary sinus.  She denies recurrent sinus infections.  She does have intermittent rhinitis, nasal drainage that resolves with topical nasal medication.  She reports a history of possible allergic rhinitis, however no known skin testing was performed today.  There is no prior history of sinus or nasal surgery.  She also does admit to headaches throughout the day.  She states that she does snore frequently and frequently is awoken when she is snoring and gasps for breaths.       Review of systems: A 14-point review of systems has been conducted and was negative for any notable symptoms, except as dictated in the history of present illness.     PMH: none significant  PSH: no prior sinonasal surgery    FH: noncontributory    Social History     Socioeconomic History     Marital status:      Spouse name: Not on file     Number of children: Not on  "file     Years of education: Not on file     Highest education level: Not on file   Occupational History     Not on file   Social Needs     Financial resource strain: Not on file     Food insecurity     Worry: Not on file     Inability: Not on file     Transportation needs     Medical: Not on file     Non-medical: Not on file   Tobacco Use     Smoking status: Not on file   Substance and Sexual Activity     Alcohol use: Not on file     Drug use: Not on file     Sexual activity: Not on file   Lifestyle     Physical activity     Days per week: Not on file     Minutes per session: Not on file     Stress: Not on file   Relationships     Social connections     Talks on phone: Not on file     Gets together: Not on file     Attends Hindu service: Not on file     Active member of club or organization: Not on file     Attends meetings of clubs or organizations: Not on file     Relationship status: Not on file     Intimate partner violence     Fear of current or ex partner: Not on file     Emotionally abused: Not on file     Physically abused: Not on file     Forced sexual activity: Not on file   Other Topics Concern     Not on file   Social History Narrative     Not on file        Physical Exam:  Vital signs: Pulse 103   Temp 98.3  F (36.8  C)   Ht 1.6 m (5' 3\")   Wt 73 kg (161 lb)   SpO2 97%   BMI 28.52 kg/m     General Appearance: No acute distress, appropriate demeanor, conversant  Eyes: moist conjunctivae; EOMI; pupils symmetric; visual acuity grossly intact; no proptosis  Head: normocephalic; overall symmetric appearance without deformity  Face: overall symmetric without deformity; HB I/VI  Ears: Normal appearance of external ear; external meatus normal in appearance; TMs intact without perforation bilaterally;   Nose: No external deformity; septum reliatvely midline; inferior turbinates without significant hypertrophy  Oral Cavity/oropharynx: Normal appearance of mucosa; tongue midline; no mass or lesions; " oropharynx without obvious mucosal abnormality  Neck: no palpable lymphadenopathy; thyroid without palpable nodules  Lungs: symmetric chest rise; no wheezing  CV: Good distal perfusion; normal heart rate  Extremities: No deformity  Neurologic Exam: Cranial nerves II-XII are grossly intact; no focal deficit      Laboratory Review:  n/a    Imaging Review:  MRI brain without and with contrast  MRA of the head without contrast  MRV of the head without and with contrast     Provided History:  Cerebral aneurysm, SAH, cerebral vasospasm eval;  Stabbing headache.  ICD-10: Stabbing headache     Comparison:  None available.       Technique:   Brain MRI:  Axial diffusion, FLAIR, T2-weighted, susceptibility, and  coronal T1-weighted images were obtained without intravenous contrast.  Following intravenous gadolinium-based contrast administration, axial  and coronal T1-weighted images were obtained.    Head MRA: 3D time-of-flight MRA of the [place] was performed  without intravenous contrast.  Head MRV: Noncontrast 2-D time-of-flight MRV of the brain was  performed. After the administration of intravenous contrast, 3-D  volumetric images of the brain were obtained.  Three-dimensional reconstructions of the head MRA and MRV were  created, which were reviewed by the radiologist.     Dose: 6.8 mL Gadavist IV     Findings:   Brain MRI: No restricted diffusion. No mass lesion, mass effect, or  midline shift. Minimal leukoaraiosis, at the upper limits of normal  for age. There is no intracranial hemorrhage on susceptibility images.  Ventricles are proportionate to the cerebral sulci.     Contrast-enhanced images of the brain demonstrate no abnormal  intracranial enhancement.     Head MRA demonstrates no aneurysm or stenosis of the major  intracranial arteries. 1 mm infundibulum at the origin of a small  inferiorly directed right M2 branch. Diminutive bilateral posterior  communicating arteries.     Brain MRV: Dominant  right-sided venous drainage. No dural venous sinus  thrombosis. Incidental arachnoid granulation at the lateral aspect of  the left transverse sinus.     Right maxillary sinus mucus retention cyst. Mastoid air cells are  relatively clear.                                                                      Impression:  1. No acute intracranial pathology.  2. Head MRA demonstrates no aneurysm or stenosis of the major  intracranial arteries. No evidence of vasospasm.  3. Head MRV demonstrates no dural venous sinus thrombosis.     BERNADETTE REESE MD    Pathology Review:  n/a    Assessment/Medical Decision Making/Plan:  Right maxillary sinus cyst   sleep disordered breathing  Chronic rhinitis  Atypical facial pain    Tori as a likely headache phenomenon or syndrome.  I reviewed her MRI with her and her  today which demonstrated the presence of a right maxillary sinus retention cyst.  In my opinion, this is not the cause underlying her pressure headaches.  These are often incidental findings on imaging obtained for other indications.  I do not recommend any surgery or further medical management for this maxillary retention cyst.  We did, however, discuss sleep disordered breathing and/or sleep apnea as a potential cause for her headaches, since she endorses a history of snoring with self awakening with reported apneic events.  She is interested in pursuing a sleep medicine referral for possible sleep study.  For her rhinitis, she is to use topical nasal medications including Flonase and/or nasal saline.  She may follow-up with me as needed.    David Acevedo MD    [facility]  Rhinology  Endoscopic Skull Base Surgery  [facility]  Department of Otolaryngology - Head & Neck Surgery

## 2020-10-02 NOTE — PROGRESS NOTES
[facility]                   New Patient Visit      Encounter date: October 2, 2020    Referring Provider:   Ally Sapp, APRN CNP  [id number]  [place] [address]    Chief Complaint: Headaches, right maxillary sinus cyst    History of Present Illness: Tori Steele is a 45-year-old woman who I am seeing as a consult from Ally Sapp for right maxillary sinus cyst.  The patient has a longstanding issue with headaches localized to the right frontal, retro-orbital, and right cheek area.  She was undergoing a work-up with Ally which included an MRI that incidentally showed the presence of a right maxillary sinus retention cyst.  She is referred to me to discuss management.  She admits to some pressure over the right maxillary sinus.  She denies recurrent sinus infections.  She does have intermittent rhinitis, nasal drainage that resolves with topical nasal medication.  She reports a history of possible allergic rhinitis, however no known skin testing was performed today.  There is no prior history of sinus or nasal surgery.  She also does admit to headaches throughout the day.  She states that she does snore frequently and frequently is awoken when she is snoring and gasps for breaths.       Review of systems: A 14-point review of systems has been conducted and was negative for any notable symptoms, except as dictated in the history of present illness.     PMH: none significant  PSH: no prior sinonasal surgery    FH: noncontributory    Social History     Socioeconomic History     Marital status:      Spouse name: Not on file     Number of children: Not on file     Years of education: Not on file     Highest education level: Not on file   Occupational History     Not on file   Social Needs     Financial resource strain: Not on file     Food insecurity     Worry: Not on file     Inability: Not on file     Transportation needs     Medical:  "Not on file     Non-medical: Not on file   Tobacco Use     Smoking status: Not on file   Substance and Sexual Activity     Alcohol use: Not on file     Drug use: Not on file     Sexual activity: Not on file   Lifestyle     Physical activity     Days per week: Not on file     Minutes per session: Not on file     Stress: Not on file   Relationships     Social connections     Talks on phone: Not on file     Gets together: Not on file     Attends [employer] service: Not on file     Active member of club or organization: Not on file     Attends meetings of clubs or organizations: Not on file     Relationship status: Not on file     Intimate partner violence     Fear of current or ex partner: Not on file     Emotionally abused: Not on file     Physically abused: Not on file     Forced sexual activity: Not on file   Other Topics Concern     Not on file   Social History Narrative     Not on file        Physical Exam:  Vital signs: Pulse 103   Temp 98.3  F (36.8  C)   Ht 1.6 m (5' 3\")   Wt 73 kg (161 lb)   SpO2 97%   BMI 28.52 kg/m     General Appearance: No acute distress, appropriate demeanor, conversant  Eyes: moist conjunctivae; EOMI; pupils symmetric; visual acuity grossly intact; no proptosis  Head: normocephalic; overall symmetric appearance without deformity  Face: overall symmetric without deformity; HB I/VI  Ears: Normal appearance of external ear; external meatus normal in appearance; TMs intact without perforation bilaterally;   Nose: No external deformity; septum reliatvely midline; inferior turbinates without significant hypertrophy  Oral Cavity/oropharynx: Normal appearance of mucosa; tongue midline; no mass or lesions; oropharynx without obvious mucosal abnormality  Neck: no palpable lymphadenopathy; thyroid without palpable nodules  Lungs: symmetric chest rise; no wheezing  CV: Good distal perfusion; normal heart rate  Extremities: No deformity  Neurologic Exam: Cranial nerves II-XII are grossly intact; " no focal deficit      Laboratory Review:  n/a    Imaging Review:  MRI brain without and with contrast  MRA of the head without contrast  MRV of the head without and with contrast     Provided History:  Cerebral aneurysm, SAH, cerebral vasospasm eval;  Stabbing headache.  ICD-10: Stabbing headache     Comparison:  None available.       Technique:   Brain MRI:  Axial diffusion, FLAIR, T2-weighted, susceptibility, and  coronal T1-weighted images were obtained without intravenous contrast.  Following intravenous gadolinium-based contrast administration, axial  and coronal T1-weighted images were obtained.    Head MRA: 3D time-of-flight MRA of the [place] was performed  without intravenous contrast.  Head MRV: Noncontrast 2-D time-of-flight MRV of the brain was  performed. After the administration of intravenous contrast, 3-D  volumetric images of the brain were obtained.  Three-dimensional reconstructions of the head MRA and MRV were  created, which were reviewed by the radiologist.     Dose: 6.8 mL Gadavist IV     Findings:   Brain MRI: No restricted diffusion. No mass lesion, mass effect, or  midline shift. Minimal leukoaraiosis, at the upper limits of normal  for age. There is no intracranial hemorrhage on susceptibility images.  Ventricles are proportionate to the cerebral sulci.     Contrast-enhanced images of the brain demonstrate no abnormal  intracranial enhancement.     Head MRA demonstrates no aneurysm or stenosis of the major  intracranial arteries. 1 mm infundibulum at the origin of a small  inferiorly directed right M2 branch. Diminutive bilateral posterior  communicating arteries.     Brain MRV: Dominant right-sided venous drainage. No dural venous sinus  thrombosis. Incidental arachnoid granulation at the lateral aspect of  the left transverse sinus.     Right maxillary sinus mucus retention cyst. Mastoid air cells are  relatively clear.                                                                       Impression:  1. No acute intracranial pathology.  2. Head MRA demonstrates no aneurysm or stenosis of the major  intracranial arteries. No evidence of vasospasm.  3. Head MRV demonstrates no dural venous sinus thrombosis.     BERNADETTE REESE MD    Pathology Review:  n/a    Assessment/Medical Decision Making/Plan:  Right maxillary sinus cyst   sleep disordered breathing  Chronic rhinitis  Atypical facial pain    Tori as a likely headache phenomenon or syndrome.  I reviewed her MRI with her and her  today which demonstrated the presence of a right maxillary sinus retention cyst.  In my opinion, this is not the cause underlying her pressure headaches.  These are often incidental findings on imaging obtained for other indications.  I do not recommend any surgery or further medical management for this maxillary retention cyst.  We did, however, discuss sleep disordered breathing and/or sleep apnea as a potential cause for her headaches, since she endorses a history of snoring with self awakening with reported apneic events.  She is interested in pursuing a sleep medicine referral for possible sleep study.  For her rhinitis, she is to use topical nasal medications including Flonase and/or nasal saline.  She may follow-up with me as needed.    David Acevedo MD    [facility]  Rhinology  Endoscopic Skull Base Surgery  [facility]  Department of Otolaryngology - Head & Neck Surgery

## 2020-10-02 NOTE — PATIENT INSTRUCTIONS
You were seen in the ENT clinic today with Dr. Acevedo    Recommendations for you:    -Sleep medicine referral          Please call our clinic for any questions, concerns, and/or worsening symptoms.      Clinic #[phone]       Option 1 for scheduling.    Thank you for allowing us to be apart of your care!    Julisa VALADEZ RNCC    If you need to reach me my direct line is: [phone]

## 2020-10-02 NOTE — NURSING NOTE
"Chief Complaint   Patient presents with     Consult     New rhinology       Pulse 103, temperature 98.3  F (36.8  C), height 1.6 m (5' 3\"), weight 73 kg (161 lb), SpO2 97 %.    Argelia Laura, EMT    "

## 2020-10-06 VITALS — HEIGHT: 63 IN | BODY MASS INDEX: 28.35 KG/M2 | WEIGHT: 160 LBS

## 2020-10-06 ASSESSMENT — MIFFLIN-ST. JEOR: SCORE: 1349.89

## 2020-10-06 NOTE — PATIENT INSTRUCTIONS
Your BMI is Body mass index is 28.34 kg/m .  Weight management is a personal decision.  If you are interested in exploring weight loss strategies, the following discussion covers the approaches that may be successful. Body mass index (BMI) is one way to tell whether you are at a healthy weight, overweight, or obese. It measures your weight in relation to your height.  A BMI of 18.5 to 24.9 is in the healthy range. A person with a BMI of 25 to 29.9 is considered overweight, and someone with a BMI of 30 or greater is considered obese. More than two-thirds of American adults are considered overweight or obese.  Being overweight or obese increases the risk for further weight gain. Excess weight may lead to heart disease and diabetes.  Creating and following plans for healthy eating and physical activity may help you improve your health.  Weight control is part of healthy lifestyle and includes exercise, emotional health, and healthy eating habits. Careful eating habits lifelong are the mainstay of weight control. Though there are significant health benefits from weight loss, long-term weight loss with diet alone may be very difficult to achieve- studies show long-term success with dietary management in less than 10% of people. Attaining a healthy weight may be especially difficult to achieve in those with severe obesity. In some cases, medications, devices and surgical management might be considered.  What can you do?  If you are overweight or obese and are interested in methods for weight loss, you should discuss this with your provider.     Consider reducing daily calorie intake by 500 calories.     Keep a food journal.     Avoiding skipping meals, consider cutting portions instead.    Diet combined with exercise helps maintain muscle while optimizing fat loss. Strength training is particularly important for building and maintaining muscle mass. Exercise helps reduce stress, increase energy, and improves fitness.  Increasing exercise without diet control, however, may not burn enough calories to loose weight.       Start walking three days a week 10-20 minutes at a time    Work towards walking thirty minutes five days a week     Eventually, increase the speed of your walking for 1-2 minutes at time    In addition, we recommend that you review healthy lifestyles and methods for weight loss available through the National Institutes of Health patient information sites:  http://win.niddk.nih.gov/publications/index.htm    And look into health and wellness programs that may be available through your health insurance provider, employer, local community center, or mikey club.    Weight management plan: Discussed healthy diet and exercise guidelines  Drive Safe... Drive Alive     Sleep health profoundly affects your health, mood, and your safety. 33% of the population (one in three of us) is not getting enough sleep and many have a sleep disorder. Not getting enough sleep or having an untreated / undertreated sleep condition may make us sleepy without even knowing it. In fact, our driving could be dramatically impaired due to our sleep health. As your provider, here are some things I would like you to know about driving:     Here are some warning signs for impairment and dangerous drowsy driving:              -Having been awake more than 16 hours               -Looking tired               -Eyelid drooping              -Head nodding (it could be too late at this point)              -Driving for more than 30 minutes     Some things you could do to make the driving safer if you are experiencing some drowsiness:              -Stop driving and rest              -Call for transportation              -Make sure your sleep disorder is adequately treated     Some things that have been shown NOT to work when experiencing drowsiness while driving:              -Turning on the radio              -Opening windows              -Eating any   distracting  /  entertaining  foods (e.g., sunflower seeds, candy, or any other)              -Talking on the phone      Your decision may not only impact your life, but also the life of others. Please, remember to drive safe for yourself and all of us.    Duarte Riddle MD

## 2020-10-06 NOTE — PROGRESS NOTES
"Tori Steele is a 45 year old female who is being evaluated via a billable video visit.      The patient has been notified of following:     \"This video visit will be conducted via a call between you and your physician/provider. We have found that certain health care needs can be provided without the need for an in-person physical exam.  This service lets us provide the care you need with a video conversation.  If a prescription is necessary we can send it directly to your pharmacy.  If lab work is needed we can place an order for that and you can then stop by our lab to have the test done at a later time.    Video visits are billed at different rates depending on your insurance coverage.  Please reach out to your insurance provider with any questions.    If during the course of the call the physician/provider feels a video visit is not appropriate, you will not be charged for this service.\"    Patient has given verbal consent for Video visit? Yes  How would you like to obtain your AVS? MyChart  If you are dropped from the video visit, the video invite should be resent to: Text to cell phone: [phone]   Will anyone else be joining your video visit? No        Video-Visit Details    Type of service:  Video Visit    Video Start Time: 10:05  Video End Time: 10:30    Additional 15 minutes on phone due to connectivity issues.     Originating Location (pt. Location): Home    Distant Location (provider location):  [facility]     Platform used for Video Visit: Dharmesh Riddle MD        "

## 2020-10-07 ENCOUNTER — VIRTUAL VISIT (OUTPATIENT)
Dept: SLEEP MEDICINE | Facility: CLINIC | Age: 45
End: 2020-10-07
Attending: OTOLARYNGOLOGY
Payer: OTHER GOVERNMENT

## 2020-10-07 DIAGNOSIS — G47.30 SLEEP-DISORDERED BREATHING: ICD-10-CM

## 2020-10-07 DIAGNOSIS — R29.818 SUSPECTED SLEEP APNEA: Primary | ICD-10-CM

## 2020-10-07 PROCEDURE — 99204 OFFICE O/P NEW MOD 45 MIN: CPT | Mod: 95 | Performed by: INTERNAL MEDICINE

## 2020-10-08 NOTE — PROGRESS NOTES
SLEEP MEDICINE VIRTUAL CLINIC NOTE   [facility]  Tori Steele 45 year old female  : 1975  MRN: [id number]      PRIMARY CARE PROVIDER: Kat Mcgee    Visit Date:   10/07/2020      REASON FOR VISIT:  Evaluation of sleep-related breathing disorders and difficulty initiating and maintaining sleep.      HISTORY OF PRESENT ILLNESS:  The patient is a very pleasant 45-year-old female with history of intractable headaches, vitamin D deficiency, iron deficiency, who is being evaluated for difficulty initiating and maintaining sleep, as well as concern for sleep-related breathing disorders.      The patient reports she has been experiencing significant headaches for a number of years; however, over the last 2 months, the frequency has increased quite significantly.  She was recently evaluated in the [facility] from where she was suggested to see ENT given a sinus cyst.  ENT referred her to our clinic for evaluation of sleep-related breathing disorders.      She describes her current routine as going to bed around 11:15 p.m.  It takes her about 45 minutes to fall asleep.  Prior to that, she usually used to go to bed around 10:00 p.m., but would not be able to fall asleep until 11:15, so that is why she pushed her bedtime forward.  When she falls asleep, she reports waking up once or twice through the night, either to use the bathroom  and it is generally easy for her to fall asleep.  However, about once a week she wakes up in the middle of the night and finds it difficult to return to sleep.  She reports ruminating thoughts as the primary reason for her inability to fall asleep or return to sleep when she wakes up in the middle of the night.  She finally wakes up around 7:15-7:30, either spontaneously or because her children wake her up.  She does not feel rested upon awakening.  She has significant sleep inertia.  She reports taking a nap about 3-4 times a week, lasting  for about 45 minutes or so.  This is usually around 2:00 p.m.  Her [place] Sleepiness Score is 7/24 with no chances of falling asleep while driving.  She follows the same schedule most days of the week.  She considers herself a morning person.      She denies any features of motor restlessness suggestive of restless legs syndrome.  She denies any frequent dreams, nightmares or dream enactment behavior.      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      She reports snoring, which is loud in intensity.  She has snort arousals, but no witnessed apneas.  She prefers to sleep on her side.  She has occasional GERD.  She does not wake up with a dry mouth often.  She does not have any difficulty breathing through her nose.      She denies any features of hypocretin deficiency including cataplexy, sleep paralysis or hypnagogic or hypnopompic hallucinations.      SOCIAL HISTORY:  The patient lives at home with her  and 2 children 11:00 and 13 years old.  She is a stay-at-home mom.  She denies smoking.  Denies any significant alcohol use.      FAMILY HISTORY:  She reports that her mother snores and also has hypothyroidism.  Her maternal grandmother had breast cancer.      PAST SURGICAL HISTORY:  None.      PAST MEDICAL HISTORY:  Vitamin D deficiency, iron deficiency, intractable headaches.      MEDICATIONS:  Multivitamins.      REVIEW OF SYSTEMS:  A complete 14-point review of systems was performed and found negative except as noted above.      PHYSICAL EXAMINATION:   GENERAL:  Weight 160 pounds, height 5 feet 3 inches.   HEENT:  Mallampati class IV airway with a large tongue and prominent peripheral scalloping.  No malocclusion noted.   Constitutional - looks well, in no apparent distress  Eyes - no redness or discharge  Respiratory -breathing appears comfortable.  No cough.  Skin - No appreciable discoloration or lesions (very limited exam)  Neurological  - No apparent tremors. Speech fluent and articlate  Psychiatric - no signs of delirium or anxiety     Exam limited to that easily identified on a virtual visit. The rest of a comprehensive physical examination is deferred due to PHE (public health emergency) video visit restrictions.       ASSESSMENT AND PLAN:   1.  Sleep disordered breathing.  The patient has high to intermediate pretest probability for sleep-related breathing disorders.  We reviewed in detail the pathophysiology of HUE as well as its various treatment options.  Given comorbid insomnia, the patient is not a good candidate for home sleep apnea test.  We will perform an in-lab polysomnography.  The patient will return to clinic after the PSG is completed to review the results and discuss treatment options.     2.  Sleep onset and maintenance insomnia, most likely etiology is psychophysiological insomnia.  We discussed the techniques of stimulus control and improving her sleep hygiene.  The patient will practice these and if the symptoms do not improve over next 1-2 months, we will consider referral to Sleep Psychology for cognitive behavior therapy for insomnia.      The patient was advised not to drive or operate heavy machinery if drowsy or sleepy.  She was counseled regarding the importance of maintaining a healthy weight.      I spent a total of 25 minutes with Tori LEXIS Steele during today's virtual sleep clinic virtual visit. Additional 10 minutes were spent in preparation for consult and care coordination.     Duarte Riddle MD   of Medicine  Pulmonary, Critical Care and Sleep Medicine  [facility]  Pager: [phone]                    D: 10/07/2020   T: 10/08/2020   MT:       Name:     TORI STEELE   MRN:      [id number]        Account:      [id number]   :      1975           Visit Date:   10/07/2020      Document: [id number]

## 2020-10-25 ENCOUNTER — THERAPY VISIT (OUTPATIENT)
Dept: SLEEP MEDICINE | Facility: CLINIC | Age: 45
End: 2020-10-25
Payer: OTHER GOVERNMENT

## 2020-10-25 DIAGNOSIS — R29.818 SUSPECTED SLEEP APNEA: ICD-10-CM

## 2020-10-25 PROCEDURE — 95810 POLYSOM 6/> YRS 4/> PARAM: CPT | Performed by: INTERNAL MEDICINE

## 2020-10-30 NOTE — PROCEDURES
" SLEEP STUDY INTERPRETATION  DIAGNOSTIC POLYSOMNOGRAPHY REPORT      Patient: TORI RILEY  YOB: 1975  Study Date: 10/25/2020  MRN: [id number]  Referring Provider: -  Ordering Provider: -    Indications for Polysomnography: The patient is a 45 year old Female who is 5' 3\" and weighs 160.0 lbs. Her BMI is 28.3, Stone sleepiness scale 7 and neck circumference is 33 cm. Relevant medical history includes intractable headaches, vitamin D deficiency, iron deficiency. A diagnostic polysomnogram was performed to evaluate for sleep apnea.    Polysomnogram Data: A full night polysomnogram recorded the standard physiologic parameters including EEG, EOG, EMG, ECG, nasal and oral airflow. Respiratory parameters of chest and abdominal movements were recorded with respiratory inductance plethysmography. Oxygen saturation was recorded by pulse oximetry. Hypopnea scoring rule used: 1B 4%.    Sleep Architecture: All sleep stages noted. Sleep efficiency was normal.   The total recording time of the polysomnogram was 464.0 minutes. The total sleep time was 420.5 minutes. Sleep latency was normal at 15.5 minutes without the use of a sleep aid. REM latency was 119.0 minutes. Arousal index was normal at 28.3 arousals per hour. Sleep efficiency was increased at 90.6%. Wake after sleep onset was 28.0 minutes. The patient spent 5.9% of total sleep time in Stage N1, 69.4% in Stage N2, 14.6% in Stage N3, and 10.0% in REM. Time in REM supine was 26.5 minutes.    Respiration: No sleep related breathing disorders noted (AHI 0.3, SpO2 sloan 90%). Moderately loud snoring noted intermittently through the night.     Events ? The polysomnogram revealed a presence of 0 obstructive, 1 central, and 0 mixed apneas resulting in an apnea index of 0.1 events per hour. There were 1 obstructive hypopneas and 0 central hypopneas resulting in an obstructive hypopnea index of 0.1 and central hypopnea index of 0 events per hour. The combined " apnea/hypopnea index was 0.3 events per hour (central apnea/hypopnea index was 0.1 events per hour). The REM AHI was 2.9 events per hour. The supine AHI was 0.3 events per hour. The RERA index was 0.3 events per hour.  The RDI was 0.6 events per hour.    Snoring - was reported as moderately loud and intermittent.    Respiratory rate and pattern - was notable for normal respiratory rate and pattern.    Sustained Sleep Associated Hypoventilation - Transcutaneous carbon dioxide monitoring was not used, however significant hypoventilation was not suggested by oximetry.    Sleep Associated Hypoxemia - (Greater than 5 minutes O2 sat at or below 88%) was not present. Baseline oxygen saturation was 97.6%. Lowest oxygen saturation was 90.4%. Time spent less than or equal to 88% was 0 minutes. Time spent less than or equal to 89% was 0 minutes.    Movement Activity: No movement abnormalities noted.     Periodic Limb Activity - There were 0 PLMs during the entire study.     REM EMG Activity - Excessive transient/sustained muscle activity was not present.    Nocturnal Behavior - Abnormal sleep related behaviors were not noted during/arising out of NREM / REM sleep.     Bruxism - None apparent.    Cardiac Summary: No cardiac abnormalities noted.   The average pulse rate was 86.1 bpm. The minimum pulse rate was 73.1 bpm while the maximum pulse rate was 118.2 bpm.  Arrhythmias were not noted.      Assessment:     No sleep related breathing disorders noted (AHI 0.3, SpO2 sloan 90%). Moderately loud snoring noted intermittently through the night.    All sleep stages noted. Sleep efficiency was normal.    No movement abnormalities noted.    No cardiac abnormalities noted.    Recommendations:    Advice regarding the risks of drowsy driving.    Suggest optimizing sleep schedule and avoiding sleep deprivation.      Diagnostic Codes:   Unspecified Sleep Disturbance G47.9    Electronically Signed By: Duarte Riddle MD (10/30/20)            Range(%) Time in range (min)   0.0 - 89.0 -   0.0 - 88.0 -         Stage Min(mm Hg) Max(mm Hg)   Wake - -   NREM(1+2+3) - -   REM - -       Range(mmHg) Time in range (min)   55.0 - 100.0 -   Excluded data <20.0 & >65.0 464.0

## 2020-11-02 LAB — SLPCOMP: NORMAL

## 2020-11-18 ENCOUNTER — VIRTUAL VISIT (OUTPATIENT)
Dept: SLEEP MEDICINE | Facility: CLINIC | Age: 45
End: 2020-11-18
Payer: OTHER GOVERNMENT

## 2020-11-18 VITALS — WEIGHT: 160 LBS | HEIGHT: 63 IN | BODY MASS INDEX: 28.35 KG/M2

## 2020-11-18 DIAGNOSIS — F51.04 PSYCHOPHYSIOLOGICAL INSOMNIA: Primary | ICD-10-CM

## 2020-11-18 PROCEDURE — 99214 OFFICE O/P EST MOD 30 MIN: CPT | Mod: 95 | Performed by: INTERNAL MEDICINE

## 2020-11-18 RX ORDER — HYDROXYZINE HYDROCHLORIDE 25 MG/1
TABLET, FILM COATED ORAL
COMMUNITY
Start: 2020-10-16 | End: 2022-01-28

## 2020-11-18 ASSESSMENT — MIFFLIN-ST. JEOR: SCORE: 1349.89

## 2020-11-18 NOTE — PROGRESS NOTES
"Tori Steele is a 45 year old female who is being evaluated via a billable video visit.      The patient has been notified of following:     \"This video visit will be conducted via a call between you and your physician/provider. We have found that certain health care needs can be provided without the need for an in-person physical exam.  This service lets us provide the care you need with a video conversation.  If a prescription is necessary we can send it directly to your pharmacy.  If lab work is needed we can place an order for that and you can then stop by our lab to have the test done at a later time.    Video visits are billed at different rates depending on your insurance coverage.  Please reach out to your insurance provider with any questions.    If during the course of the call the physician/provider feels a video visit is not appropriate, you will not be charged for this service.\"    Patient has given verbal consent for Video visit? Yes  How would you like to obtain your AVS? Mail a copy  If you are dropped from the video visit, the video invite should be resent to: Text to cell phone: [phone]  Will anyone else be joining your video visit? No      Yareli Rowell MA on 11/18/2020 at 11:03 AM      Video-Visit Details    Type of service:  Video Visit    Video Start Time: 12:31 PM  Video End Time: 12:45 PM    Originating Location (pt. Location): Home    Distant Location (provider location):  [facility]     Platform used for Video Visit: Dharmesh Riddle MD        "

## 2020-11-18 NOTE — PATIENT INSTRUCTIONS
Your BMI is Body mass index is 28.34 kg/m .  Weight management is a personal decision.  If you are interested in exploring weight loss strategies, the following discussion covers the approaches that may be successful. Body mass index (BMI) is one way to tell whether you are at a healthy weight, overweight, or obese. It measures your weight in relation to your height.  A BMI of 18.5 to 24.9 is in the healthy range. A person with a BMI of 25 to 29.9 is considered overweight, and someone with a BMI of 30 or greater is considered obese. More than two-thirds of American adults are considered overweight or obese.  Being overweight or obese increases the risk for further weight gain. Excess weight may lead to heart disease and diabetes.  Creating and following plans for healthy eating and physical activity may help you improve your health.  Weight control is part of healthy lifestyle and includes exercise, emotional health, and healthy eating habits. Careful eating habits lifelong are the mainstay of weight control. Though there are significant health benefits from weight loss, long-term weight loss with diet alone may be very difficult to achieve- studies show long-term success with dietary management in less than 10% of people. Attaining a healthy weight may be especially difficult to achieve in those with severe obesity. In some cases, medications, devices and surgical management might be considered.  What can you do?  If you are overweight or obese and are interested in methods for weight loss, you should discuss this with your provider.     Consider reducing daily calorie intake by 500 calories.     Keep a food journal.     Avoiding skipping meals, consider cutting portions instead.    Diet combined with exercise helps maintain muscle while optimizing fat loss. Strength training is particularly important for building and maintaining muscle mass. Exercise helps reduce stress, increase energy, and improves fitness.  Increasing exercise without diet control, however, may not burn enough calories to loose weight.       Start walking three days a week 10-20 minutes at a time    Work towards walking thirty minutes five days a week     Eventually, increase the speed of your walking for 1-2 minutes at time    In addition, we recommend that you review healthy lifestyles and methods for weight loss available through the National Institutes of Health patient information sites:  http://win.niddk.nih.gov/publications/index.htm    And look into health and wellness programs that may be available through your health insurance provider, employer, local community center, or mikey club.    Weight management plan: Patient was referred to their PCP to discuss a diet and exercise plan.    Drive Safe... Drive Alive     Sleep health profoundly affects your health, mood, and your safety. 33% of the population (one in three of us) is not getting enough sleep and many have a sleep disorder. Not getting enough sleep or having an untreated / undertreated sleep condition may make us sleepy without even knowing it. In fact, our driving could be dramatically impaired due to our sleep health. As your provider, here are some things I would like you to know about driving:     Here are some warning signs for impairment and dangerous drowsy driving:              -Having been awake more than 16 hours               -Looking tired               -Eyelid drooping              -Head nodding (it could be too late at this point)              -Driving for more than 30 minutes     Some things you could do to make the driving safer if you are experiencing some drowsiness:              -Stop driving and rest              -Call for transportation              -Make sure your sleep disorder is adequately treated     Some things that have been shown NOT to work when experiencing drowsiness while driving:              -Turning on the radio              -Opening windows               -Eating any  distracting  /  entertaining  foods (e.g., sunflower seeds, candy, or any other)              -Talking on the phone      Your decision may not only impact your life, but also the life of others. Please, remember to drive safe for yourself and all of us.    Duarte Riddle MD

## 2020-11-18 NOTE — PROGRESS NOTES
SLEEP MEDICINE VIRTUAL CLINIC NOTE   [facility]  Tori Steele 45 year old female  : 1975  MRN: [id number]      PRIMARY CARE PROVIDER: Kat Mcgee    Visit Date:   2020      REASON FOR VISIT:  Followup of recent overnight polysomnography.      HISTORY OF PRESENT ILLNESS:  The patient is a very pleasant 45-year-old female with history of intractable headaches, vitamin D deficiency, iron deficiency, who was initially seen in our clinic approximately 1 month ago for evaluation of difficulty initiating and maintaining sleep, as well as concern for sleep-related breathing disorder.  Today's virtual visit is to review the results of the PSG.      The patient reports that since our last clinic visit where she was suggested to practice stimulus control and improve her sleep hygiene, she has noticed significant improvement in her sleep initiation insomnia.  She reports going to bed between 10:15 and 10:30 p.m.  It takes her just a few minutes to fall asleep.  She reports waking up once at night, usually between 4:30 and 5:00 a.m. to use the bathroom and it is easy for her to return to sleep.  She wakes up spontaneously around 7:00 a.m.  She has noticed significant improvement in her nonrefreshing sleep.  She denies any excessive daytime sleepiness.  She also reports taking hydroxyzine, which has been prescribed as an antihistamine agent for conjunctival allergies.      Besides this, no significant changes have occurred to her sleep or health in general since she was last seen in our clinic.      ASSESSMENT AND PLAN:  The patient's polysomnography was reviewed with her.  This does not show any sleep-related breathing disorder.  Her apnea-hypopnea index was 0.3 events per hour with sloan oxygen saturation of 90%.  Moderately loud snoring was noted intermittently through the night, particularly in a supine position.  Her respiratory disturbance index was 0.6 events per  hour.  No movement or cardiac abnormalities were noted.  Her sleep efficiency was 90.6% with total sleep time of 420 minutes.  Sleep onset latency was 15 minutes.  All stages of sleep were noted.      Overall, the patient does not have any sleep-related breathing disorder.  She was quite reassured to hear the results.  As far as sleep onset and maintenance insomnia are concerned, these appear to be responding well to improvement in sleep hygiene and stimulus control.  The patient was suggested to continue practicing that in case the symptoms return.  We may consider referring her to Sleep Psychology for cognitive behavior therapy for insomnia.      The patient was suggested to continue to maintain a healthy weight and avoid driving if drowsy or sleepy.  She will return to clinic on an as-needed basis.         I spent a total of 14 minutes with Tori LEXIS Ivette during today's virtual sleep clinic virtual visit. Additional 10 minutes were spent in preparation for consult and care coordination.     Duarte Riddle MD   of Medicine  Pulmonary, Critical Care and Sleep Medicine  [facility]                      D: 2020   T: 2020   MT: JENIFER      Name:     TORI RILEY   MRN:      [id number]        Account:      [id number]   :      1975           Visit Date:   2020      Document: [id number]

## 2021-01-04 ENCOUNTER — HEALTH MAINTENANCE LETTER (OUTPATIENT)
Age: 46
End: 2021-01-04

## 2021-10-11 ENCOUNTER — HEALTH MAINTENANCE LETTER (OUTPATIENT)
Age: 46
End: 2021-10-11

## 2022-01-17 ENCOUNTER — TRANSFERRED RECORDS (OUTPATIENT)
Dept: HEALTH INFORMATION MANAGEMENT | Facility: CLINIC | Age: 47
End: 2022-01-17
Payer: OTHER GOVERNMENT

## 2022-01-24 ENCOUNTER — ANCILLARY PROCEDURE (OUTPATIENT)
Dept: RADIOLOGY | Facility: CLINIC | Age: 47
End: 2022-01-24
Payer: OTHER GOVERNMENT

## 2022-01-26 NOTE — TELEPHONE ENCOUNTER
RECORDS STATUS - BREAST    RECORDS REQUESTED FROM: [facility],    DATE REQUESTED:    NOTES DETAILS STATUS   OFFICE NOTE from referring provider CE - [facility] Russ   OFFICE NOTE from medical oncologist CE - [facility] 22   OFFICE NOTE from surgeon Scheduled  Dr. Kim Layton/ -    OFFICE NOTE from radiation oncologist     DISCHARGE SUMMARY from hospital     DISCHARGE REPORT from the ER     OPERATIVE REPORT     MEDICATION LIST     CLINICAL TRIAL TREATMENTS TO DATE     LABS     REQUEST BLOCKS FOR ALL BREAST CANCER PTS     PATHOLOGY REPORTS  (Tissue diagnosis, Stage, ER/KY percentage positive and intensity of staining, HER2 IHC, FISH, and all biopsies from breast and any distant metastasis)                 [facility], Report in CE, slides requested   [employer] Compare Trackin 22: [id number]   GENONOMIC TESTING     TYPE:   (Next Generation Sequencing, including Foundation One testing, and Oncotype score)     IMAGING (NEED IMAGES & REPORT)     CT SCANS     MRI PACS 22: [facility], Report in CE - [facility]   MAMMO PACS 22, 22: [facility]   ULTRASOUND PACS 22, 22: [facility]   PET     BONE SCAN     BRAIN MRI       Action    Action Taken 22  Spoke w/ pt - pt advised they had not been to [place] [facility]. Pt has an MRI scheduled today @ [facility] / [place] @ 8pm & a consultation w/ plastic surgeon, Dr. Kim Layton @ [facility]  & Genetic Counseling @ [facility]. Pt authorized CE pulling of their HP recs.  11:37 AM    22  Spoke glynn/ Brooke @ [facility] - MRI pushed/resolved    Spoke glynn/ Kehinde @ [facility] - Imaging resolved to PACS  11:16 AM

## 2022-01-28 ENCOUNTER — PATIENT OUTREACH (OUTPATIENT)
Dept: ONCOLOGY | Facility: CLINIC | Age: 47
End: 2022-01-28
Payer: OTHER GOVERNMENT

## 2022-01-28 ENCOUNTER — ONCOLOGY VISIT (OUTPATIENT)
Dept: ONCOLOGY | Facility: CLINIC | Age: 47
End: 2022-01-28
Attending: INTERNAL MEDICINE
Payer: OTHER GOVERNMENT

## 2022-01-28 ENCOUNTER — PRE VISIT (OUTPATIENT)
Dept: ONCOLOGY | Facility: CLINIC | Age: 47
End: 2022-01-28
Payer: OTHER GOVERNMENT

## 2022-01-28 VITALS
RESPIRATION RATE: 16 BRPM | DIASTOLIC BLOOD PRESSURE: 84 MMHG | WEIGHT: 150 LBS | HEART RATE: 106 BPM | TEMPERATURE: 98.4 F | BODY MASS INDEX: 25.61 KG/M2 | SYSTOLIC BLOOD PRESSURE: 138 MMHG | OXYGEN SATURATION: 100 % | HEIGHT: 64 IN

## 2022-01-28 DIAGNOSIS — Z17.0 MALIGNANT NEOPLASM OF CENTRAL PORTION OF RIGHT BREAST IN FEMALE, ESTROGEN RECEPTOR POSITIVE (H): Primary | ICD-10-CM

## 2022-01-28 DIAGNOSIS — C50.111 MALIGNANT NEOPLASM OF CENTRAL PORTION OF RIGHT BREAST IN FEMALE, ESTROGEN RECEPTOR POSITIVE (H): Primary | ICD-10-CM

## 2022-01-28 PROBLEM — C50.911 INVASIVE DUCTAL CARCINOMA OF RIGHT BREAST (H): Status: ACTIVE | Noted: 2022-01-28

## 2022-01-28 PROCEDURE — 99204 OFFICE O/P NEW MOD 45 MIN: CPT | Mod: GC | Performed by: INTERNAL MEDICINE

## 2022-01-28 PROCEDURE — G0463 HOSPITAL OUTPT CLINIC VISIT: HCPCS

## 2022-01-28 RX ORDER — MULTIVIT-MIN/FOLIC ACID/BIOTIN 400-400MCG
1 CAPSULE ORAL DAILY
COMMUNITY
Start: 2021-08-31 | End: 2022-02-21

## 2022-01-28 RX ORDER — FERROUS SULFATE 325(65) MG
325 TABLET ORAL
COMMUNITY
Start: 2022-01-24 | End: 2022-06-01

## 2022-01-28 ASSESSMENT — PAIN SCALES - GENERAL: PAINLEVEL: NO PAIN (0)

## 2022-01-28 ASSESSMENT — MIFFLIN-ST. JEOR: SCORE: 1308.78

## 2022-01-28 NOTE — PROGRESS NOTES
Met with patient and her  and gave business cards for Dr Lockett and writer to contact the Breast Center for assistance. Verified patient using our MyChart and not to use for symptom management, but to call Nurse Triage and is aware how to reach any time. Gave support brochures for [employer], [employer], [employer] and [employer]. Gave Social Workers telephone number for support as well.  Answered all patient and 's questions and verbalized understanding. Ebony Lucio RN, BSN.

## 2022-01-28 NOTE — PROGRESS NOTES
Mammaprint/Blueprint order faxed to [phone] on specimen [id number] as Dr Lockett recommended. Ebony Lucio RN, BSN Breast Center Nurse Coordinator

## 2022-01-28 NOTE — PROGRESS NOTES
Medical Oncology Consultation Note    Name: Tori Steele MRN: [id number]   : 1975   Date of Service: 2022       Reason for consultation: newly diagnosed right invasive ductal carcinoma of the upper-inner quadrant, [place] Grade 3, ER+/AL+/HER2-, cT2N0, clinical stage IIA.      History of Present Illness     Ms. Steele is a 46 year old pre-menopausal  female who is newly diagnosed with right invasive ductal carcinoma.     She had a normal screening mammogram in 2018 when she was 42. The interpretation was limited by the fact that her breasts were heterogeneously dense, which may obscure small masses. She had a bilateral screening mammogram with tomosynthesis on 22, which showed a 12:00 position mass at mid depth with associated calcifications. There was no suspicious finding in her left breast. Subsequent right diagnostic mammogram on 22 described an irregular mass, up to 3.2 cm in size, at 12:00 position, 1 cm from the nipple. Ultrasound on the same day demonstrated a corresponding irregular hypoechoic mass with internal vascularity. The right axilla was not assessed.     Biopsy taken from the mass came back as invasive ductal carcinoma, [place] grade 3, ER+/AL+/HER2- by IHC, Ki-57 of 25%. There was associated DCIS, nuclear grade 3, and suspicion of LVSI. She established care with Dr. Russ of Surgical Oncology at [facility]. She underwent axillary ultrasound on 22 and bilateral breast MRI on 22, which did not yield any additional findings.     On MRI, her biopsy-proven cancer measured 4.1 x 3.3 x 4.6 cm spanning from 12:00 to 1:00 position. These findings were consistent with cT2N0, clinical stage IIA. A referral was made for her to see us and discuss her systemic treatment options.    She presents today with her . She is doing well and has no pressing symptoms or concerns.    ROS: Denies fatigue, headache, visual, hearing or taste  "changes, dry mouth, mouth sores, trouble swallowing, N/V/D, constipation, abdominal pain, change in urination, chest pain, SOB, cough, numbness and tingling, edema, night sweats, fever, chills.     Past Medical History:     Otherwise negative    Past Surgical History:     None    Gynecological History:  , both vaginal deliveries. First full-term delivery at age 31.   Used oral contraceptive in the past, on and off for five years. No history of HRT.  Menarche at the age of 21.  She had FSH in 2021, which came back 33.8; she has regular menstruation. She has fibroids.    Medications:    Vitamin supplements    Allergies:    NKMA    Social History:  Tobacco: never  Alcohol: occasional  Employment: she is not working outside the home, she previously worked as a speech therapist  She has teenaged children: son and a daughter, she lives with her  in [place], [place]. They had previously lived in [place] but moved here for her 's job. They are from [place]. She is friends with a colleague's (Lm Bravo) daughter.    Family History:    Maternal grandmother was diagnosed with breast cancer at the age of 70; otherwise negative    Review of Systems   A 10-point review of systems was performed. Pertinent findings are noted in the HPI.    Physical Exam   ECOG Status: 0    VITALS: /84   Pulse 106   Temp 98.4  F (36.9  C) (Oral)   Resp 16   Ht 1.615 m (5' 3.58\")   Wt 68 kg (150 lb)   LMP 2022 (Exact Date)   SpO2 100%   Breastfeeding No   BMI 26.09 kg/m    General: Alert, oriented, Anxious appearing  Skin: No rashes or lesions on exposed skin  HEENT: NCAT, EOMI  Breasts: Deferred  Heart: WWP  Lungs: Breathing comfortably on RA  Neuro: Cranial nerves grossly normal; moving all extremities spontaneously without limitation; gaits normal      Imaging/Path/Labs   Imaging: reviewed    Path: reviewed    Labs: no laboratory studies; she had blood draw today for genetic testing for germline mutations; " and a request for MammaPrint was submitted    PROBLEMS:  1. Right sided IIA ER+ breast cancer.     Assessment    Ms. Steele is a 46 year old pre-menopausal female with newly diagnosed right invasive ductal carcinoma of the upper-inner quadrant, [place] Grade 3, ER+/DC+/HER2-, cT2N0, clinical stage IIA. She is in good health.     Plan   We reviewed the natural history of her disease, and discussed in general terms the different components of breast cancer care, including chemotherapy, surgery, radiation, and hormonal therapy.We discussed the benefits of neoadjuvant chemotherapy, if necessary.    We discussed the role of gene expression profiling to determine the benefit of chemotherapy plus hormone therapy versus hormone therapy alone.  We discussed our recommendation for her to undergo MammaPrint testing to determine the benefit in terms of oncological outcomes she may derive from neoadjuvant chemotherapy. We also discussed the added benefit of neoadjuvant chemotherapy in shrinking her disease with hope to convert her surgery to lumpectomy.    If she is interested, we can certainly enroll her on I SPY-2 clinical trial aiming to determine the best systemic agent to use for individual patient in the neoadjuvant settings. We told her that hopefully she would come back as a low risk MP tumor. If that's the case, she would be eligible for the neoadjuvant endocrine therapy arm of I-SPY2.    Ms. Steele shared with us her priority is to optimize her cancer treatment. However, she would like to take some time to think over her options while waiting for her MammaPrint result. We will see her for follow up for more detailed discussion with that information in hand.    Finally, we also explained the reason for blood work collection to conduct screening for germline mutation. This would not affect her care, but would guide the screening efforts in her loved ones in the future.    The patient was seen and discussed with my  attending, Dr. Lockett.    PLAN:  1. She will consider screening for I-SPY2.  2. If she agrees to be screened, we will get the screening labs.  3. MammaPrint request sent.  4. RTC in 3 weeks to discuss options.    Kinjal Ambriz MD  Resident, PGY-4  Department of Radiation Oncology  [facility]  Pager: [phone]    I've seen and examined the patient with Dr. Ambriz. I agree with his assessment and have edited this document.

## 2022-01-28 NOTE — NURSING NOTE
"Oncology Rooming Note    January 28, 2022 2:11 PM   Tori Steele is a 46 year old female who presents for:    Chief Complaint   Patient presents with     Oncology Clinic Visit     New; Breast Ca     Initial Vitals: /84   Pulse 106   Temp 98.4  F (36.9  C) (Oral)   Resp 16   Ht 1.615 m (5' 3.58\")   Wt 68 kg (150 lb)   LMP 01/23/2022 (Exact Date)   SpO2 100%   Breastfeeding No   BMI 26.09 kg/m   Estimated body mass index is 26.09 kg/m  as calculated from the following:    Height as of this encounter: 1.615 m (5' 3.58\").    Weight as of this encounter: 68 kg (150 lb). Body surface area is 1.75 meters squared.  No Pain (0) Comment: Data Unavailable   Patient's last menstrual period was 01/23/2022 (exact date).  Allergies reviewed: Yes  Medications reviewed: Yes    Medications: Medication refills not needed today.  Pharmacy name entered into [employer]: [id number] - [place], [place] - [address] AT [facility] [address]  [facility]    Clinical concerns: New pt consult.       Clarissa Mireles CMA              "

## 2022-01-28 NOTE — LETTER
2022         RE: Tori Steele  [address] [facility] [facility] [address]        Dear Colleague,    Thank you for referring your patient, Tori Steele, to the [facility]. Please see a copy of my visit note below.    Medical Oncology Consultation Note    Name: Tori Steele MRN: [id number]   : 1975   Date of Service: 2022       Reason for consultation: newly diagnosed right invasive ductal carcinoma of the upper-inner quadrant, [place] Grade 3, ER+/NJ+/HER2-, cT2N0, clinical stage IIA.      History of Present Illness     Ms. Steele is a 46 year old pre-menopausal  female who is newly diagnosed with right invasive ductal carcinoma.     She had a normal screening mammogram in 2018 when she was 42. The interpretation was limited by the fact that her breasts were heterogeneously dense, which may obscure small masses. She had a bilateral screening mammogram with tomosynthesis on 22, which showed a 12:00 position mass at mid depth with associated calcifications. There was no suspicious finding in her left breast. Subsequent right diagnostic mammogram on 22 described an irregular mass, up to 3.2 cm in size, at 12:00 position, 1 cm from the nipple. Ultrasound on the same day demonstrated a corresponding irregular hypoechoic mass with internal vascularity. The right axilla was not assessed.     Biopsy taken from the mass came back as invasive ductal carcinoma, Byron grade 3, ER+/NJ+/HER2- by IHC, Ki-57 of 25%. There was associated DCIS, nuclear grade 3, and suspicion of LVSI. She established care with Dr. Russ of Surgical Oncology at [facility]. She underwent axillary ultrasound on 22 and bilateral breast MRI on 22, which did not yield any additional findings.     On MRI, her biopsy-proven cancer measured 4.1 x 3.3 x 4.6 cm spanning from 12:00 to 1:00 position. These findings were consistent with cT2N0, clinical  "stage IIA. A referral was made for her to see us and discuss her systemic treatment options.    She presents today with her . She is doing well and has no pressing symptoms or concerns.    ROS: Denies fatigue, headache, visual, hearing or taste changes, dry mouth, mouth sores, trouble swallowing, N/V/D, constipation, abdominal pain, change in urination, chest pain, SOB, cough, numbness and tingling, edema, night sweats, fever, chills.     Past Medical History:     Otherwise negative    Past Surgical History:     None    Gynecological History:  , both vaginal deliveries. First full-term delivery at age 31.   Used oral contraceptive in the past, on and off for five years. No history of HRT.  Menarche at the age of 21.  She had FSH in 2021, which came back 33.8; she has regular menstruation. She has fibroids.    Medications:    Vitamin supplements    Allergies:    NKMA    Social History:  Tobacco: never  Alcohol: occasional  Employment: she is not working outside the home, she previously worked as a speech therapist  She has teenaged children: son and a daughter, she lives with her  in [place], [place]. They had previously lived in [place] but moved here for her 's job. They are from [place]. She is friends with a colleague's (Lm Bravo) daughter.    Family History:    Maternal grandmother was diagnosed with breast cancer at the age of 70; otherwise negative    Review of Systems   A 10-point review of systems was performed. Pertinent findings are noted in the HPI.    Physical Exam   ECOG Status: 0    VITALS: /84   Pulse 106   Temp 98.4  F (36.9  C) (Oral)   Resp 16   Ht 1.615 m (5' 3.58\")   Wt 68 kg (150 lb)   LMP 2022 (Exact Date)   SpO2 100%   Breastfeeding No   BMI 26.09 kg/m    General: Alert, oriented, Anxious appearing  Skin: No rashes or lesions on exposed skin  HEENT: NCAT, EOMI  Breasts: Deferred  Heart: WWP  Lungs: Breathing comfortably on RA  Neuro: Cranial " nerves grossly normal; moving all extremities spontaneously without limitation; gaits normal      Imaging/Path/Labs   Imaging: reviewed    Path: reviewed    Labs: no laboratory studies; she had blood draw today for genetic testing for germline mutations; and a request for MammaPrint was submitted    PROBLEMS:  1. Right sided IIA ER+ breast cancer.     Assessment    Ms. Steele is a 46 year old pre-menopausal female with newly diagnosed right invasive ductal carcinoma of the upper-inner quadrant, [place] Grade 3, ER+/AR+/HER2-, cT2N0, clinical stage IIA. She is in good health.     Plan   We reviewed the natural history of her disease, and discussed in general terms the different components of breast cancer care, including chemotherapy, surgery, radiation, and hormonal therapy.We discussed the benefits of neoadjuvant chemotherapy, if necessary.    We discussed the role of gene expression profiling to determine the benefit of chemotherapy plus hormone therapy versus hormone therapy alone.  We discussed our recommendation for her to undergo MammaPrint testing to determine the benefit in terms of oncological outcomes she may derive from neoadjuvant chemotherapy. We also discussed the added benefit of neoadjuvant chemotherapy in shrinking her disease with hope to convert her surgery to lumpectomy.    If she is interested, we can certainly enroll her on I SPY-2 clinical trial aiming to determine the best systemic agent to use for individual patient in the neoadjuvant settings. We told her that hopefully she would come back as a low risk MP tumor. If that's the case, she would be eligible for the neoadjuvant endocrine therapy arm of I-SPY2.    Ms. Steele shared with us her priority is to optimize her cancer treatment. However, she would like to take some time to think over her options while waiting for her MammaPrint result. We will see her for follow up for more detailed discussion with that information in  hand.    Finally, we also explained the reason for blood work collection to conduct screening for germline mutation. This would not affect her care, but would guide the screening efforts in her loved ones in the future.    The patient was seen and discussed with my attending, Dr. Lockett.    PLAN:  1. She will consider screening for I-SPY2.  2. If she agrees to be screened, we will get the screening labs.  3. MammaPrint request sent.  4. RTC in 3 weeks to discuss options.    Kinjal Ambriz MD  Resident, PGY-4  Department of Radiation Oncology  [facility]  Pager: [phone]    I've seen and examined the patient with Dr. Ambriz. I agree with his assessment and have edited this document.          Again, thank you for allowing me to participate in the care of your patient.      Sincerely,    Keegan Lockett MD

## 2022-01-30 ENCOUNTER — HEALTH MAINTENANCE LETTER (OUTPATIENT)
Age: 47
End: 2022-01-30

## 2022-01-31 ENCOUNTER — RESEARCH ENCOUNTER (OUTPATIENT)
Dept: ONCOLOGY | Facility: CLINIC | Age: 47
End: 2022-01-31
Payer: OTHER GOVERNMENT

## 2022-01-31 DIAGNOSIS — Z00.6 EXAMINATION OF PARTICIPANT OR CONTROL IN CLINICAL RESEARCH: Primary | ICD-10-CM

## 2022-01-31 DIAGNOSIS — Z17.0 MALIGNANT NEOPLASM OF CENTRAL PORTION OF RIGHT BREAST IN FEMALE, ESTROGEN RECEPTOR POSITIVE (H): ICD-10-CM

## 2022-01-31 DIAGNOSIS — C50.111 MALIGNANT NEOPLASM OF CENTRAL PORTION OF RIGHT BREAST IN FEMALE, ESTROGEN RECEPTOR POSITIVE (H): ICD-10-CM

## 2022-02-01 ENCOUNTER — RESEARCH ENCOUNTER (OUTPATIENT)
Dept: ONCOLOGY | Facility: CLINIC | Age: 47
End: 2022-02-01

## 2022-02-01 NOTE — NURSING NOTE
[id number]: Informed Consent Note     The consent form, including purpose, risks and benefits, was reviewed with Tori Steele, and all questions were answered before she signed the consent form. The patient understands that the study involves an active treatment phase as well as a post-treatment follow up phase.     Present during the discussion was Tori and myself. A copy of the signed form was provided to the patient. No procedures specific to this study were performed prior to the patient signing the consent form.    Consent Version Date: 10/07/2021  Consent obtained by: Bertha Augustin    Date: 2/01/22  HIPAA authorization signed?: yes  HIPAA authorization version date: 11/04/19    Race and ethnicity identification note     I discussed with Tori Steele that the National [employer] (NCI) has asked that information on race and ethnicity be obtained from NCI-sponsored studies' participants whenever possible and that the purpose for this request is to assist the NCI in evaluating whether persons of all races and ethnicity are given the opportunity to participate in new cancer treatment options. It was explained that the information will be kept in a confidential file in the [facility] Clinical Trials Office and will be sent to the NCI in a way in which she cannot be identified. It was also explained that providing this information is voluntary.    Tori Steele provided the following responses:    Ethnicity: non-hispanix  Race: Black or -american   Country of birth: [place]  Country of residence: [place]        Bertha Augustin RN, MS  Research   Phone: [phone]  Pager: [phone]        Form 505.00.01 (Version 3)     Effective date: 19JUN2020     Next Review Date: 19JUN2022

## 2022-02-03 ENCOUNTER — ANCILLARY PROCEDURE (OUTPATIENT)
Dept: MRI IMAGING | Facility: CLINIC | Age: 47
End: 2022-02-03
Attending: INTERNAL MEDICINE
Payer: OTHER GOVERNMENT

## 2022-02-03 ENCOUNTER — LAB (OUTPATIENT)
Dept: LAB | Facility: CLINIC | Age: 47
End: 2022-02-03
Payer: OTHER GOVERNMENT

## 2022-02-03 DIAGNOSIS — Z17.0 MALIGNANT NEOPLASM OF CENTRAL PORTION OF RIGHT BREAST IN FEMALE, ESTROGEN RECEPTOR POSITIVE (H): ICD-10-CM

## 2022-02-03 DIAGNOSIS — C50.111 MALIGNANT NEOPLASM OF CENTRAL PORTION OF RIGHT BREAST IN FEMALE, ESTROGEN RECEPTOR POSITIVE (H): ICD-10-CM

## 2022-02-03 DIAGNOSIS — Z00.6 EXAMINATION OF PARTICIPANT OR CONTROL IN CLINICAL RESEARCH: ICD-10-CM

## 2022-02-03 DIAGNOSIS — C50.911 INVASIVE DUCTAL CARCINOMA OF BREAST, FEMALE, RIGHT (H): Primary | ICD-10-CM

## 2022-02-03 PROCEDURE — 88321 CONSLTJ&REPRT SLD PREP ELSWR: CPT | Performed by: PATHOLOGY

## 2022-02-03 RX ORDER — GADOBUTROL 604.72 MG/ML
7.5 INJECTION INTRAVENOUS ONCE
Status: COMPLETED | OUTPATIENT
Start: 2022-02-03 | End: 2022-02-03

## 2022-02-03 RX ADMIN — GADOBUTROL 6.5 ML: 604.72 INJECTION INTRAVENOUS at 12:46

## 2022-02-04 ENCOUNTER — ANCILLARY PROCEDURE (OUTPATIENT)
Dept: CT IMAGING | Facility: CLINIC | Age: 47
End: 2022-02-04
Attending: INTERNAL MEDICINE
Payer: OTHER GOVERNMENT

## 2022-02-04 ENCOUNTER — LAB (OUTPATIENT)
Dept: LAB | Facility: CLINIC | Age: 47
End: 2022-02-04
Attending: INTERNAL MEDICINE
Payer: OTHER GOVERNMENT

## 2022-02-04 DIAGNOSIS — C50.111 MALIGNANT NEOPLASM OF CENTRAL PORTION OF RIGHT BREAST IN FEMALE, ESTROGEN RECEPTOR POSITIVE (H): ICD-10-CM

## 2022-02-04 DIAGNOSIS — Z17.0 MALIGNANT NEOPLASM OF CENTRAL PORTION OF RIGHT BREAST IN FEMALE, ESTROGEN RECEPTOR POSITIVE (H): ICD-10-CM

## 2022-02-04 DIAGNOSIS — Z00.6 EXAMINATION OF PARTICIPANT OR CONTROL IN CLINICAL RESEARCH: ICD-10-CM

## 2022-02-04 LAB
ALBUMIN SERPL-MCNC: 3.3 G/DL (ref 3.4–5)
ALBUMIN UR-MCNC: 30 MG/DL
ALP SERPL-CCNC: 69 U/L (ref 40–150)
ALT SERPL W P-5'-P-CCNC: 17 U/L (ref 0–50)
ANION GAP SERPL CALCULATED.3IONS-SCNC: 7 MMOL/L (ref 3–14)
APPEARANCE UR: ABNORMAL
APTT PPP: 31 SECONDS (ref 22–38)
AST SERPL W P-5'-P-CCNC: 13 U/L (ref 0–45)
ATRIAL RATE - MUSE: 93 BPM
BASOPHILS # BLD AUTO: 0 10E3/UL (ref 0–0.2)
BASOPHILS NFR BLD AUTO: 0 %
BILIRUB SERPL-MCNC: 0.6 MG/DL (ref 0.2–1.3)
BILIRUB UR QL STRIP: NEGATIVE
BUN SERPL-MCNC: 3 MG/DL (ref 7–30)
CALCIUM SERPL-MCNC: 8.6 MG/DL (ref 8.5–10.1)
CHLORIDE BLD-SCNC: 109 MMOL/L (ref 94–109)
CO2 SERPL-SCNC: 25 MMOL/L (ref 20–32)
COLOR UR AUTO: YELLOW
CREAT SERPL-MCNC: 0.7 MG/DL (ref 0.52–1.04)
DIASTOLIC BLOOD PRESSURE - MUSE: NORMAL MMHG
EOSINOPHIL # BLD AUTO: 0.2 10E3/UL (ref 0–0.7)
EOSINOPHIL NFR BLD AUTO: 4 %
ERYTHROCYTE [DISTWIDTH] IN BLOOD BY AUTOMATED COUNT: 13.3 % (ref 10–15)
GFR SERPL CREATININE-BSD FRML MDRD: >90 ML/MIN/1.73M2
GGT SERPL-CCNC: 12 U/L (ref 0–40)
GLUCOSE BLD-MCNC: 104 MG/DL (ref 70–99)
GLUCOSE UR STRIP-MCNC: NEGATIVE MG/DL
HBA1C MFR BLD: 5.6 % (ref 0–5.6)
HCG SERPL QL: NEGATIVE
HCT VFR BLD AUTO: 39.2 % (ref 35–47)
HGB BLD-MCNC: 12.5 G/DL (ref 11.7–15.7)
HGB UR QL STRIP: ABNORMAL
IMM GRANULOCYTES # BLD: 0 10E3/UL
IMM GRANULOCYTES NFR BLD: 0 %
INR PPP: 1.02 (ref 0.85–1.15)
INTERPRETATION ECG - MUSE: NORMAL
KETONES UR STRIP-MCNC: NEGATIVE MG/DL
LEUKOCYTE ESTERASE UR QL STRIP: ABNORMAL
LYMPHOCYTES # BLD AUTO: 1 10E3/UL (ref 0.8–5.3)
LYMPHOCYTES NFR BLD AUTO: 19 %
MAGNESIUM SERPL-MCNC: 1.8 MG/DL (ref 1.6–2.3)
MCH RBC QN AUTO: 29.1 PG (ref 26.5–33)
MCHC RBC AUTO-ENTMCNC: 31.9 G/DL (ref 31.5–36.5)
MCV RBC AUTO: 91 FL (ref 78–100)
MONOCYTES # BLD AUTO: 0.4 10E3/UL (ref 0–1.3)
MONOCYTES NFR BLD AUTO: 7 %
MUCOUS THREADS #/AREA URNS LPF: PRESENT /LPF
NEUTROPHILS # BLD AUTO: 3.7 10E3/UL (ref 1.6–8.3)
NEUTROPHILS NFR BLD AUTO: 70 %
NITRATE UR QL: NEGATIVE
NRBC # BLD AUTO: 0 10E3/UL
NRBC BLD AUTO-RTO: 0 /100
P AXIS - MUSE: 70 DEGREES
PH UR STRIP: 5 [PH] (ref 5–7)
PLATELET # BLD AUTO: 227 10E3/UL (ref 150–450)
POTASSIUM BLD-SCNC: 3.6 MMOL/L (ref 3.4–5.3)
PR INTERVAL - MUSE: 122 MS
PROT SERPL-MCNC: 7.5 G/DL (ref 6.8–8.8)
QRS DURATION - MUSE: 74 MS
QT - MUSE: 356 MS
QTC - MUSE: 442 MS
R AXIS - MUSE: 39 DEGREES
RBC # BLD AUTO: 4.3 10E6/UL (ref 3.8–5.2)
RBC URINE: 14 /HPF
SODIUM SERPL-SCNC: 141 MMOL/L (ref 133–144)
SP GR UR STRIP: 1.02 (ref 1–1.03)
SQUAMOUS EPITHELIAL: 6 /HPF
SYSTOLIC BLOOD PRESSURE - MUSE: NORMAL MMHG
T AXIS - MUSE: 39 DEGREES
T4 FREE SERPL-MCNC: 1.16 NG/DL (ref 0.76–1.46)
TSH SERPL DL<=0.005 MIU/L-ACNC: 1.96 MU/L (ref 0.4–4)
UROBILINOGEN UR STRIP-MCNC: NORMAL MG/DL
VENTRICULAR RATE- MUSE: 93 BPM
WBC # BLD AUTO: 5.4 10E3/UL (ref 4–11)
WBC URINE: 13 /HPF

## 2022-02-04 PROCEDURE — 83735 ASSAY OF MAGNESIUM: CPT

## 2022-02-04 PROCEDURE — 84703 CHORIONIC GONADOTROPIN ASSAY: CPT

## 2022-02-04 PROCEDURE — 82977 ASSAY OF GGT: CPT

## 2022-02-04 PROCEDURE — 85025 COMPLETE CBC W/AUTO DIFF WBC: CPT

## 2022-02-04 PROCEDURE — 80053 COMPREHEN METABOLIC PANEL: CPT

## 2022-02-04 PROCEDURE — 36415 COLL VENOUS BLD VENIPUNCTURE: CPT

## 2022-02-04 PROCEDURE — 83036 HEMOGLOBIN GLYCOSYLATED A1C: CPT

## 2022-02-04 PROCEDURE — 84439 ASSAY OF FREE THYROXINE: CPT

## 2022-02-04 PROCEDURE — 71260 CT THORAX DX C+: CPT | Mod: GC | Performed by: RADIOLOGY

## 2022-02-04 PROCEDURE — 74177 CT ABD & PELVIS W/CONTRAST: CPT | Mod: GC | Performed by: RADIOLOGY

## 2022-02-04 PROCEDURE — 93010 ELECTROCARDIOGRAM REPORT: CPT | Performed by: INTERNAL MEDICINE

## 2022-02-04 PROCEDURE — 85610 PROTHROMBIN TIME: CPT

## 2022-02-04 PROCEDURE — 85730 THROMBOPLASTIN TIME PARTIAL: CPT

## 2022-02-04 PROCEDURE — 84443 ASSAY THYROID STIM HORMONE: CPT

## 2022-02-04 PROCEDURE — 81001 URINALYSIS AUTO W/SCOPE: CPT

## 2022-02-04 RX ORDER — IOPAMIDOL 755 MG/ML
92 INJECTION, SOLUTION INTRAVASCULAR ONCE
Status: COMPLETED | OUTPATIENT
Start: 2022-02-04 | End: 2022-02-04

## 2022-02-04 RX ADMIN — IOPAMIDOL 92 ML: 755 INJECTION, SOLUTION INTRAVASCULAR at 06:58

## 2022-02-04 NOTE — NURSING NOTE
Chief Complaint   Patient presents with     Blood Draw     Labs drawn via PIV by RN in lab.        Deborah Fong RN

## 2022-02-07 ENCOUNTER — LAB (OUTPATIENT)
Dept: LAB | Facility: CLINIC | Age: 47
End: 2022-02-07
Attending: INTERNAL MEDICINE
Payer: OTHER GOVERNMENT

## 2022-02-07 ENCOUNTER — ANCILLARY PROCEDURE (OUTPATIENT)
Dept: MAMMOGRAPHY | Facility: CLINIC | Age: 47
End: 2022-02-07
Attending: INTERNAL MEDICINE
Payer: OTHER GOVERNMENT

## 2022-02-07 ENCOUNTER — HOSPITAL ENCOUNTER (OUTPATIENT)
Dept: CARDIOLOGY | Facility: CLINIC | Age: 47
Discharge: HOME | End: 2022-02-07
Attending: INTERNAL MEDICINE | Admitting: INTERNAL MEDICINE
Payer: OTHER GOVERNMENT

## 2022-02-07 ENCOUNTER — PATIENT OUTREACH (OUTPATIENT)
Dept: ONCOLOGY | Facility: CLINIC | Age: 47
End: 2022-02-07

## 2022-02-07 DIAGNOSIS — Z85.3 HISTORY OF BREAST CANCER IN FEMALE: ICD-10-CM

## 2022-02-07 DIAGNOSIS — Z00.6 EXAMINATION OF PARTICIPANT OR CONTROL IN CLINICAL RESEARCH: Primary | ICD-10-CM

## 2022-02-07 DIAGNOSIS — Z17.0 MALIGNANT NEOPLASM OF CENTRAL PORTION OF RIGHT BREAST IN FEMALE, ESTROGEN RECEPTOR POSITIVE (H): ICD-10-CM

## 2022-02-07 DIAGNOSIS — Z00.6 EXAMINATION OF PARTICIPANT OR CONTROL IN CLINICAL RESEARCH: ICD-10-CM

## 2022-02-07 DIAGNOSIS — C50.111 MALIGNANT NEOPLASM OF CENTRAL PORTION OF RIGHT BREAST IN FEMALE, ESTROGEN RECEPTOR POSITIVE (H): ICD-10-CM

## 2022-02-07 LAB
BI-PLANE LVEF ECHO: NORMAL
LVEF ECHO: NORMAL
MAGNESIUM SERPL-MCNC: 1.7 MG/DL (ref 1.8–2.6)

## 2022-02-07 PROCEDURE — 36415 COLL VENOUS BLD VENIPUNCTURE: CPT | Performed by: PATHOLOGY

## 2022-02-07 PROCEDURE — 99000 SPECIMEN HANDLING OFFICE-LAB: CPT | Performed by: PATHOLOGY

## 2022-02-07 PROCEDURE — 83735 ASSAY OF MAGNESIUM: CPT | Mod: 90 | Performed by: PATHOLOGY

## 2022-02-07 PROCEDURE — 93306 TTE W/DOPPLER COMPLETE: CPT

## 2022-02-07 PROCEDURE — 93306 TTE W/DOPPLER COMPLETE: CPT | Mod: 26 | Performed by: INTERNAL MEDICINE

## 2022-02-07 PROCEDURE — 19083 BX BREAST 1ST LESION US IMAG: CPT | Mod: RT | Performed by: STUDENT IN AN ORGANIZED HEALTH CARE EDUCATION/TRAINING PROGRAM

## 2022-02-07 NOTE — PROGRESS NOTES
Spoke to patient with high Mammaprint and port placement. Will order EMLA when port appointment is made. Will schedule time for chemotherapy after randomized in ISPY2.  Message sent to scheduling to get port placement scheduled after Pettit's Day as they family plans. Answered all patient's questions and verbalized understanding. Ebony Lucio RN, BSN.

## 2022-02-08 DIAGNOSIS — Z11.59 ENCOUNTER FOR SCREENING FOR OTHER VIRAL DISEASES: Primary | ICD-10-CM

## 2022-02-10 LAB
PATH REPORT.COMMENTS IMP SPEC: ABNORMAL
PATH REPORT.COMMENTS IMP SPEC: YES
PATH REPORT.FINAL DX SPEC: ABNORMAL
PATH REPORT.GROSS SPEC: ABNORMAL
PATH REPORT.MICROSCOPIC SPEC OTHER STN: ABNORMAL
PATH REPORT.RELEVANT HX SPEC: ABNORMAL
PATH REPORT.RELEVANT HX SPEC: ABNORMAL
PATH REPORT.SITE OF ORIGIN SPEC: ABNORMAL

## 2022-02-11 ENCOUNTER — APPOINTMENT (OUTPATIENT)
Dept: ONCOLOGY | Facility: CLINIC | Age: 47
End: 2022-02-11
Payer: OTHER GOVERNMENT

## 2022-02-11 DIAGNOSIS — Z17.0 MALIGNANT NEOPLASM OF CENTRAL PORTION OF RIGHT BREAST IN FEMALE, ESTROGEN RECEPTOR POSITIVE (H): Primary | ICD-10-CM

## 2022-02-11 DIAGNOSIS — C50.111 MALIGNANT NEOPLASM OF CENTRAL PORTION OF RIGHT BREAST IN FEMALE, ESTROGEN RECEPTOR POSITIVE (H): Primary | ICD-10-CM

## 2022-02-11 RX ORDER — LIDOCAINE/PRILOCAINE 2.5 %-2.5%
CREAM (GRAM) TOPICAL
Qty: 30 G | Refills: 1 | Status: SHIPPED | OUTPATIENT
Start: 2022-02-11 | End: 2023-04-03

## 2022-02-15 ENCOUNTER — LAB (OUTPATIENT)
Dept: LAB | Facility: CLINIC | Age: 47
End: 2022-02-15
Attending: INTERNAL MEDICINE
Payer: OTHER GOVERNMENT

## 2022-02-15 DIAGNOSIS — Z11.59 ENCOUNTER FOR SCREENING FOR OTHER VIRAL DISEASES: ICD-10-CM

## 2022-02-15 LAB — SARS-COV-2 RNA RESP QL NAA+PROBE: NEGATIVE

## 2022-02-15 PROCEDURE — U0005 INFEC AGEN DETEC AMPLI PROBE: HCPCS | Mod: 90 | Performed by: PATHOLOGY

## 2022-02-15 PROCEDURE — 99000 SPECIMEN HANDLING OFFICE-LAB: CPT | Performed by: PATHOLOGY

## 2022-02-15 PROCEDURE — U0003 INFECTIOUS AGENT DETECTION BY NUCLEIC ACID (DNA OR RNA); SEVERE ACUTE RESPIRATORY SYNDROME CORONAVIRUS 2 (SARS-COV-2) (CORONAVIRUS DISEASE [COVID-19]), AMPLIFIED PROBE TECHNIQUE, MAKING USE OF HIGH THROUGHPUT TECHNOLOGIES AS DESCRIBED BY CMS-2020-01-R: HCPCS | Mod: 90 | Performed by: PATHOLOGY

## 2022-02-17 ENCOUNTER — ANESTHESIA EVENT (OUTPATIENT)
Dept: SURGERY | Facility: AMBULATORY SURGERY CENTER | Age: 47
End: 2022-02-17
Payer: OTHER GOVERNMENT

## 2022-02-17 RX ORDER — FENTANYL CITRATE 50 UG/ML
25 INJECTION, SOLUTION INTRAMUSCULAR; INTRAVENOUS
Status: CANCELLED | OUTPATIENT
Start: 2022-02-17

## 2022-02-18 ENCOUNTER — PATIENT OUTREACH (OUTPATIENT)
Dept: ONCOLOGY | Facility: CLINIC | Age: 47
End: 2022-02-18

## 2022-02-18 ENCOUNTER — HOSPITAL ENCOUNTER (OUTPATIENT)
Facility: AMBULATORY SURGERY CENTER | Age: 47
End: 2022-02-18
Attending: PHYSICIAN ASSISTANT
Payer: OTHER GOVERNMENT

## 2022-02-18 ENCOUNTER — ONCOLOGY VISIT (OUTPATIENT)
Dept: ONCOLOGY | Facility: CLINIC | Age: 47
End: 2022-02-18
Attending: INTERNAL MEDICINE
Payer: OTHER GOVERNMENT

## 2022-02-18 ENCOUNTER — LAB (OUTPATIENT)
Dept: LAB | Facility: CLINIC | Age: 47
End: 2022-02-18
Attending: INTERNAL MEDICINE
Payer: OTHER GOVERNMENT

## 2022-02-18 ENCOUNTER — ANESTHESIA (OUTPATIENT)
Dept: SURGERY | Facility: AMBULATORY SURGERY CENTER | Age: 47
End: 2022-02-18
Payer: OTHER GOVERNMENT

## 2022-02-18 ENCOUNTER — ANCILLARY PROCEDURE (OUTPATIENT)
Dept: RADIOLOGY | Facility: AMBULATORY SURGERY CENTER | Age: 47
End: 2022-02-18
Attending: INTERNAL MEDICINE
Payer: OTHER GOVERNMENT

## 2022-02-18 VITALS
DIASTOLIC BLOOD PRESSURE: 78 MMHG | SYSTOLIC BLOOD PRESSURE: 125 MMHG | WEIGHT: 150 LBS | BODY MASS INDEX: 25.61 KG/M2 | HEIGHT: 64 IN | OXYGEN SATURATION: 100 % | RESPIRATION RATE: 14 BRPM | HEART RATE: 96 BPM | TEMPERATURE: 98.2 F

## 2022-02-18 VITALS
BODY MASS INDEX: 26.09 KG/M2 | TEMPERATURE: 98.4 F | OXYGEN SATURATION: 98 % | DIASTOLIC BLOOD PRESSURE: 69 MMHG | WEIGHT: 150 LBS | HEART RATE: 80 BPM | RESPIRATION RATE: 16 BRPM | SYSTOLIC BLOOD PRESSURE: 117 MMHG

## 2022-02-18 DIAGNOSIS — C50.911 INVASIVE DUCTAL CARCINOMA OF RIGHT BREAST (H): Primary | ICD-10-CM

## 2022-02-18 DIAGNOSIS — Z17.0 MALIGNANT NEOPLASM OF CENTRAL PORTION OF RIGHT BREAST IN FEMALE, ESTROGEN RECEPTOR POSITIVE (H): Primary | ICD-10-CM

## 2022-02-18 DIAGNOSIS — C50.111 MALIGNANT NEOPLASM OF CENTRAL PORTION OF RIGHT BREAST IN FEMALE, ESTROGEN RECEPTOR POSITIVE (H): Primary | ICD-10-CM

## 2022-02-18 DIAGNOSIS — C50.911 INVASIVE DUCTAL CARCINOMA OF RIGHT BREAST (H): ICD-10-CM

## 2022-02-18 DIAGNOSIS — Z17.0 MALIGNANT NEOPLASM OF CENTRAL PORTION OF RIGHT BREAST IN FEMALE, ESTROGEN RECEPTOR POSITIVE (H): ICD-10-CM

## 2022-02-18 DIAGNOSIS — C50.111 MALIGNANT NEOPLASM OF CENTRAL PORTION OF RIGHT BREAST IN FEMALE, ESTROGEN RECEPTOR POSITIVE (H): ICD-10-CM

## 2022-02-18 LAB
ALBUMIN SERPL-MCNC: 3.4 G/DL (ref 3.4–5)
ALBUMIN UR-MCNC: NEGATIVE MG/DL
ALP SERPL-CCNC: 73 U/L (ref 40–150)
ALT SERPL W P-5'-P-CCNC: 17 U/L (ref 0–50)
ANION GAP SERPL CALCULATED.3IONS-SCNC: 3 MMOL/L (ref 3–14)
APPEARANCE UR: CLEAR
AST SERPL W P-5'-P-CCNC: 16 U/L (ref 0–45)
BASOPHILS # BLD AUTO: 0 10E3/UL (ref 0–0.2)
BASOPHILS NFR BLD AUTO: 0 %
BILIRUB SERPL-MCNC: 0.4 MG/DL (ref 0.2–1.3)
BILIRUB UR QL STRIP: NEGATIVE
BUN SERPL-MCNC: 6 MG/DL (ref 7–30)
CALCIUM SERPL-MCNC: 8.8 MG/DL (ref 8.5–10.1)
CHLORIDE BLD-SCNC: 108 MMOL/L (ref 94–109)
CO2 SERPL-SCNC: 27 MMOL/L (ref 20–32)
COLOR UR AUTO: YELLOW
CREAT SERPL-MCNC: 0.66 MG/DL (ref 0.52–1.04)
EOSINOPHIL # BLD AUTO: 0.1 10E3/UL (ref 0–0.7)
EOSINOPHIL NFR BLD AUTO: 3 %
ERYTHROCYTE [DISTWIDTH] IN BLOOD BY AUTOMATED COUNT: 12.7 % (ref 10–15)
GFR SERPL CREATININE-BSD FRML MDRD: >90 ML/MIN/1.73M2
GLUCOSE BLD-MCNC: 111 MG/DL (ref 70–99)
GLUCOSE UR STRIP-MCNC: NEGATIVE MG/DL
HCG UR QL: NEGATIVE
HCT VFR BLD AUTO: 37.7 % (ref 35–47)
HGB BLD-MCNC: 11.6 G/DL (ref 11.7–15.7)
HGB UR QL STRIP: ABNORMAL
IMM GRANULOCYTES # BLD: 0 10E3/UL
IMM GRANULOCYTES NFR BLD: 0 %
INTERNAL QC OK POCT: NORMAL
KETONES UR STRIP-MCNC: NEGATIVE MG/DL
LEUKOCYTE ESTERASE UR QL STRIP: NEGATIVE
LYMPHOCYTES # BLD AUTO: 0.9 10E3/UL (ref 0.8–5.3)
LYMPHOCYTES NFR BLD AUTO: 19 %
MCH RBC QN AUTO: 28.4 PG (ref 26.5–33)
MCHC RBC AUTO-ENTMCNC: 30.8 G/DL (ref 31.5–36.5)
MCV RBC AUTO: 92 FL (ref 78–100)
MONOCYTES # BLD AUTO: 0.3 10E3/UL (ref 0–1.3)
MONOCYTES NFR BLD AUTO: 6 %
MUCOUS THREADS #/AREA URNS LPF: PRESENT /LPF
NEUTROPHILS # BLD AUTO: 3.2 10E3/UL (ref 1.6–8.3)
NEUTROPHILS NFR BLD AUTO: 72 %
NITRATE UR QL: NEGATIVE
NRBC # BLD AUTO: 0 10E3/UL
NRBC BLD AUTO-RTO: 0 /100
PH UR STRIP: 7 [PH] (ref 5–7)
PLATELET # BLD AUTO: 249 10E3/UL (ref 150–450)
POCT KIT EXPIRATION DATE: NORMAL
POCT KIT LOT NUMBER: NORMAL
POTASSIUM BLD-SCNC: 4 MMOL/L (ref 3.4–5.3)
PROT SERPL-MCNC: 7.6 G/DL (ref 6.8–8.8)
RBC # BLD AUTO: 4.09 10E6/UL (ref 3.8–5.2)
RBC URINE: 2 /HPF
SODIUM SERPL-SCNC: 138 MMOL/L (ref 133–144)
SP GR UR STRIP: 1.01 (ref 1–1.03)
SQUAMOUS EPITHELIAL: <1 /HPF
T4 FREE SERPL-MCNC: 1.01 NG/DL (ref 0.76–1.46)
TSH SERPL DL<=0.005 MIU/L-ACNC: 1.61 MU/L (ref 0.4–4)
UROBILINOGEN UR STRIP-MCNC: NORMAL MG/DL
WBC # BLD AUTO: 4.5 10E3/UL (ref 4–11)
WBC URINE: 0 /HPF

## 2022-02-18 PROCEDURE — 36561 INSERT TUNNELED CV CATH: CPT

## 2022-02-18 PROCEDURE — 77001 FLUOROGUIDE FOR VEIN DEVICE: CPT | Mod: 26 | Performed by: PHYSICIAN ASSISTANT

## 2022-02-18 PROCEDURE — 36561 INSERT TUNNELED CV CATH: CPT | Performed by: PHYSICIAN ASSISTANT

## 2022-02-18 PROCEDURE — 80053 COMPREHEN METABOLIC PANEL: CPT | Performed by: INTERNAL MEDICINE

## 2022-02-18 PROCEDURE — 84443 ASSAY THYROID STIM HORMONE: CPT | Performed by: INTERNAL MEDICINE

## 2022-02-18 PROCEDURE — 76937 US GUIDE VASCULAR ACCESS: CPT | Mod: 26 | Performed by: PHYSICIAN ASSISTANT

## 2022-02-18 PROCEDURE — 85025 COMPLETE CBC W/AUTO DIFF WBC: CPT | Performed by: INTERNAL MEDICINE

## 2022-02-18 PROCEDURE — 99214 OFFICE O/P EST MOD 30 MIN: CPT | Performed by: INTERNAL MEDICINE

## 2022-02-18 PROCEDURE — 84439 ASSAY OF FREE THYROXINE: CPT | Performed by: INTERNAL MEDICINE

## 2022-02-18 PROCEDURE — 36415 COLL VENOUS BLD VENIPUNCTURE: CPT | Performed by: INTERNAL MEDICINE

## 2022-02-18 PROCEDURE — G0463 HOSPITAL OUTPT CLINIC VISIT: HCPCS

## 2022-02-18 PROCEDURE — 81025 URINE PREGNANCY TEST: CPT | Performed by: PATHOLOGY

## 2022-02-18 PROCEDURE — 81001 URINALYSIS AUTO W/SCOPE: CPT | Performed by: INTERNAL MEDICINE

## 2022-02-18 DEVICE — CATH PORT POWERPORT CLEARVUE SLIM 8FR 5618000
Type: IMPLANTABLE DEVICE | Site: CHEST | Status: NON-FUNCTIONAL
Removed: 2023-04-11

## 2022-02-18 RX ORDER — HYDROMORPHONE HYDROCHLORIDE 1 MG/ML
0.2 INJECTION, SOLUTION INTRAMUSCULAR; INTRAVENOUS; SUBCUTANEOUS EVERY 5 MIN PRN
Status: DISCONTINUED | OUTPATIENT
Start: 2022-02-18 | End: 2022-02-19 | Stop reason: HOSPADM

## 2022-02-18 RX ORDER — HEPARIN SODIUM (PORCINE) LOCK FLUSH IV SOLN 100 UNIT/ML 100 UNIT/ML
5 SOLUTION INTRAVENOUS
Status: CANCELLED | OUTPATIENT
Start: 2022-02-21

## 2022-02-18 RX ORDER — HEPARIN SODIUM,PORCINE 10 UNIT/ML
5-10 VIAL (ML) INTRAVENOUS EVERY 24 HOURS
Status: CANCELLED | OUTPATIENT
Start: 2022-02-18

## 2022-02-18 RX ORDER — LIDOCAINE 40 MG/G
CREAM TOPICAL
Status: DISCONTINUED | OUTPATIENT
Start: 2022-02-18 | End: 2022-02-19 | Stop reason: HOSPADM

## 2022-02-18 RX ORDER — DEXAMETHASONE SODIUM PHOSPHATE 10 MG/ML
10 INJECTION, SOLUTION INTRAMUSCULAR; INTRAVENOUS
Status: CANCELLED
Start: 2022-02-21

## 2022-02-18 RX ORDER — METHYLPREDNISOLONE SODIUM SUCCINATE 125 MG/2ML
125 INJECTION, POWDER, LYOPHILIZED, FOR SOLUTION INTRAMUSCULAR; INTRAVENOUS
Status: CANCELLED
Start: 2022-02-21

## 2022-02-18 RX ORDER — HEPARIN SODIUM (PORCINE) LOCK FLUSH IV SOLN 100 UNIT/ML 100 UNIT/ML
5-10 SOLUTION INTRAVENOUS
Status: CANCELLED | OUTPATIENT
Start: 2022-02-18

## 2022-02-18 RX ORDER — ALBUTEROL SULFATE 90 UG/1
1-2 AEROSOL, METERED RESPIRATORY (INHALATION)
Status: CANCELLED
Start: 2022-02-21

## 2022-02-18 RX ORDER — LIDOCAINE HYDROCHLORIDE 20 MG/ML
INJECTION, SOLUTION INFILTRATION; PERINEURAL PRN
Status: DISCONTINUED | OUTPATIENT
Start: 2022-02-18 | End: 2022-02-18

## 2022-02-18 RX ORDER — FENTANYL CITRATE 50 UG/ML
25 INJECTION, SOLUTION INTRAMUSCULAR; INTRAVENOUS EVERY 5 MIN PRN
Status: DISCONTINUED | OUTPATIENT
Start: 2022-02-18 | End: 2022-02-19 | Stop reason: HOSPADM

## 2022-02-18 RX ORDER — ACETAMINOPHEN 325 MG/1
975 TABLET ORAL ONCE
Status: COMPLETED | OUTPATIENT
Start: 2022-02-18 | End: 2022-02-18

## 2022-02-18 RX ORDER — MEPERIDINE HYDROCHLORIDE 25 MG/ML
25 INJECTION INTRAMUSCULAR; INTRAVENOUS; SUBCUTANEOUS EVERY 30 MIN PRN
Status: CANCELLED | OUTPATIENT
Start: 2022-02-21

## 2022-02-18 RX ORDER — PROPOFOL 10 MG/ML
INJECTION, EMULSION INTRAVENOUS CONTINUOUS PRN
Status: DISCONTINUED | OUTPATIENT
Start: 2022-02-18 | End: 2022-02-18

## 2022-02-18 RX ORDER — ALBUTEROL SULFATE 0.83 MG/ML
2.5 SOLUTION RESPIRATORY (INHALATION)
Status: CANCELLED | OUTPATIENT
Start: 2022-02-21

## 2022-02-18 RX ORDER — DIPHENHYDRAMINE HYDROCHLORIDE 50 MG/ML
50 INJECTION INTRAMUSCULAR; INTRAVENOUS
Status: CANCELLED
Start: 2022-02-21

## 2022-02-18 RX ORDER — LORAZEPAM 2 MG/ML
0.5 INJECTION INTRAMUSCULAR EVERY 4 HOURS PRN
Status: CANCELLED | OUTPATIENT
Start: 2022-02-21

## 2022-02-18 RX ORDER — EPINEPHRINE 1 MG/ML
0.3 INJECTION, SOLUTION INTRAMUSCULAR; SUBCUTANEOUS EVERY 5 MIN PRN
Status: CANCELLED | OUTPATIENT
Start: 2022-02-21

## 2022-02-18 RX ORDER — ONDANSETRON 2 MG/ML
4 INJECTION INTRAMUSCULAR; INTRAVENOUS EVERY 30 MIN PRN
Status: DISCONTINUED | OUTPATIENT
Start: 2022-02-18 | End: 2022-02-19 | Stop reason: HOSPADM

## 2022-02-18 RX ORDER — ONDANSETRON 4 MG/1
4 TABLET, ORALLY DISINTEGRATING ORAL EVERY 30 MIN PRN
Status: DISCONTINUED | OUTPATIENT
Start: 2022-02-18 | End: 2022-02-19 | Stop reason: HOSPADM

## 2022-02-18 RX ORDER — SODIUM CHLORIDE, SODIUM LACTATE, POTASSIUM CHLORIDE, CALCIUM CHLORIDE 600; 310; 30; 20 MG/100ML; MG/100ML; MG/100ML; MG/100ML
INJECTION, SOLUTION INTRAVENOUS CONTINUOUS
Status: DISCONTINUED | OUTPATIENT
Start: 2022-02-18 | End: 2022-02-19 | Stop reason: HOSPADM

## 2022-02-18 RX ORDER — OXYCODONE HYDROCHLORIDE 5 MG/1
5 TABLET ORAL EVERY 4 HOURS PRN
Status: DISCONTINUED | OUTPATIENT
Start: 2022-02-18 | End: 2022-02-19 | Stop reason: HOSPADM

## 2022-02-18 RX ORDER — HEPARIN SODIUM,PORCINE 10 UNIT/ML
5-10 VIAL (ML) INTRAVENOUS
Status: CANCELLED | OUTPATIENT
Start: 2022-02-18

## 2022-02-18 RX ORDER — NALOXONE HYDROCHLORIDE 0.4 MG/ML
0.2 INJECTION, SOLUTION INTRAMUSCULAR; INTRAVENOUS; SUBCUTANEOUS
Status: CANCELLED | OUTPATIENT
Start: 2022-02-21

## 2022-02-18 RX ORDER — HEPARIN SODIUM,PORCINE 10 UNIT/ML
5 VIAL (ML) INTRAVENOUS
Status: CANCELLED | OUTPATIENT
Start: 2022-02-21

## 2022-02-18 RX ORDER — MEPERIDINE HYDROCHLORIDE 25 MG/ML
12.5 INJECTION INTRAMUSCULAR; INTRAVENOUS; SUBCUTANEOUS
Status: DISCONTINUED | OUTPATIENT
Start: 2022-02-18 | End: 2022-02-19 | Stop reason: HOSPADM

## 2022-02-18 RX ORDER — CEFAZOLIN SODIUM 2 G/50ML
2 SOLUTION INTRAVENOUS
Status: COMPLETED | OUTPATIENT
Start: 2022-02-18 | End: 2022-02-18

## 2022-02-18 RX ADMIN — LIDOCAINE HYDROCHLORIDE 50 MG: 20 INJECTION, SOLUTION INFILTRATION; PERINEURAL at 07:07

## 2022-02-18 RX ADMIN — CEFAZOLIN SODIUM 2 G: 2 SOLUTION INTRAVENOUS at 07:09

## 2022-02-18 RX ADMIN — SODIUM CHLORIDE, SODIUM LACTATE, POTASSIUM CHLORIDE, CALCIUM CHLORIDE: 600; 310; 30; 20 INJECTION, SOLUTION INTRAVENOUS at 06:36

## 2022-02-18 RX ADMIN — PROPOFOL 250 MCG/KG/MIN: 10 INJECTION, EMULSION INTRAVENOUS at 07:07

## 2022-02-18 RX ADMIN — ACETAMINOPHEN 975 MG: 325 TABLET ORAL at 06:35

## 2022-02-18 ASSESSMENT — PAIN SCALES - GENERAL: PAINLEVEL: NO PAIN (0)

## 2022-02-18 NOTE — NURSING NOTE
"Oncology Rooming Note    February 18, 2022 10:35 AM   Tori Steele is a 46 year old female who presents for:    Chief Complaint   Patient presents with     Blood Draw     Labs drawn via  by RN in lab. LOU lindsey.     Oncology Clinic Visit     Breast Cancer     Initial Vitals: /69   Pulse 80   Temp 98.4  F (36.9  C) (Oral)   Resp 16   Wt 68 kg (150 lb)   LMP 01/23/2022 (Exact Date)   SpO2 98%   BMI 26.09 kg/m   Estimated body mass index is 26.09 kg/m  as calculated from the following:    Height as of an earlier encounter on 2/18/22: 1.615 m (5' 3.58\").    Weight as of this encounter: 68 kg (150 lb). Body surface area is 1.75 meters squared.  No Pain (0) Comment: Data Unavailable   Patient's last menstrual period was 01/23/2022 (exact date).  Allergies reviewed: Yes  Medications reviewed: Yes    Medications: Medication refills not needed today.  Pharm[facility] into [id number] [address] AT [facility]    Clinical concerns: Wondering about the timeline of things        Wendy Hannon LPN            "

## 2022-02-18 NOTE — PROCEDURES
Interventional Radiology Brief Post Procedure Note    Procedure: IR Chest Port Placement    Proceduralist: Elliott Ramirez PA-C    Assistant: None    Time Out: Prior to the start of the procedure and with procedural staff participation, I verbally confirmed the patient s identity using two indicators, relevant allergies, that the procedure was appropriate and matched the consent or emergent situation, and that the correct equipment/implants were available. Immediately prior to starting the procedure I conducted the Time Out with the procedural staff and re-confirmed the patient s name, procedure, and site/side. (The Joint Commission universal protocol was followed.)  Yes        Sedation: Monitored Anesthesia Care (MAC) administered and documented by Anesthesia Care Provider    Findings: Completed image-guided placement of 8 Georgian 25 cm single lumen power-injectable central venous port via left IJ. Aspirates and flushes freely, heparin locked and is ready for immediate use.    Estimated Blood Loss: Minimal    Fluoroscopy Time:  0.6 minute(s)    SPECIMENS: None    Complications: 1. None     Condition: Stable    Plan: Follow-up per primary team.     Comments: See dictated procedure note for full details.    Elliott Ramirez PA-C

## 2022-02-18 NOTE — LETTER
2/18/2022         RE: Tori Steele  [address] [facility] [address]        Dear Colleague,    Thank you for referring your patient, Tori Steele, to the [facility]. Please see a copy of my visit note below.    DIAGNOSIS: Right-sided 5.5 cm ER positive ME positive HER-2 negative breast cancer with associated DCIS.  Her MammaPrint came back as high risk.  She consented for the I-SPY clinical trial and has been randomized to the oral paclitaxel, Encequidar, carboplatin and dostarlimab arm.     INTERVAL HISTORY: Ms. Steele returns after a several week interval. She has completed her screening for I-SPY. The tests showed.    MammaPrint - High Risk (-0.329). This score places her in the MP2 (ultra high risk) category. Blueprint was luminal B.     MRI-2/3/2022 This test showed that she had a 5.5 cm right-sided lesion which runs from 1230 anteriorly at 130 posteriorly.  It has the appearance of a large confluent non-mass-like enhancement.    CT - 2/4/2022 -there was no evidence of metastatic disease.  She has a 1.5 cm lesion in the left hepatic dome consistent with benign hemangioma.  She also has a very enlarged multifibroid uterus.    Laboratory work was largely unremarkable.  She did have a magnesium of 1.8 which is the lower limits of normal for our lab.  Her echocardiogram and EKGs were essentially unremarkable.    She has had extensive discussions about participation I spy.  Today we discussed the idea that patients are adaptively randomized.  It is uncertain why she received the regimen that she has been assigned but hopefully there could be some activity in her specific breast cancer subgroup.    ROS is otherwise unremarkable.    PHYSICAL EXAM: /69   Pulse 80   Temp 98.4  F (36.9  C) (Oral)   Resp 16   Wt 68 kg (150 lb)   LMP 01/23/2022 (Exact Date)   SpO2 98%   BMI 26.09 kg/m      GENERAL: She looked well, she is in no distress. She was not examined in  detail today.    PROBLEMS.    1. Right sided ER+, WV+, HER2-negative, MammaPrint high risk T=5.5cm, N=0 breast cancer  2. Premenopausal    IMPRESSION: The patient, her , and I discussed the work-up.  She understands that the MammaPrint classification puts her in a high risk category.  While we did not discuss the MammaPrint classification in any detail she is in the MammaPrint 2 (ulttra high risk) category.  Hopefully checkpoint inhibitors could be of value to her in this setting.    We also discussed the evaluations that will occur at 6 and 12 weeks.  The possibility that she could go to surgery after 12 weeks and skip the anthracycline regimen was discussed.    At this point she has not had a detailed discussion of surgery or radiation therapy.  We will do this.  I also do not see as of yet that she has had germline evaluation for BRCA evaluation.  We will see if this got scheduled.    PLAN:  1. Scheduled for chemotherapy next week  2. Refer to surgery (Kinjal or Alise) and radiation (Ventura).  3. Will check on genetic counseling appointment  4. Appointments made for first 12 weeks.  5. Contact us sooner as necessary.          Again, thank you for allowing me to participate in the care of your patient.      Sincerely,    Keegan Lockett MD

## 2022-02-18 NOTE — PROGRESS NOTES
DIAGNOSIS: Right-sided 5.5 cm ER positive DC positive HER-2 negative breast cancer with associated DCIS.  Her MammaPrint came back as high risk.  She consented for the I-SPY clinical trial and has been randomized to the oral paclitaxel, Encequidar, carboplatin and dostarlimab arm.     INTERVAL HISTORY: Ms. Steele returns after a several week interval. She has completed her screening for I-SPY. The tests showed.    MammaPrint - High Risk (-0.329). This score places her in the MP2 (ultra high risk) category. Blueprint was luminal B.     MRI-2/3/2022 This test showed that she had a 5.5 cm right-sided lesion which runs from 1230 anteriorly at 130 posteriorly.  It has the appearance of a large confluent non-mass-like enhancement.    CT - 2/4/2022 -there was no evidence of metastatic disease.  She has a 1.5 cm lesion in the left hepatic dome consistent with benign hemangioma.  She also has a very enlarged multifibroid uterus.    Laboratory work was largely unremarkable.  She did have a magnesium of 1.8 which is the lower limits of normal for our lab.  Her echocardiogram and EKGs were essentially unremarkable.    She has had extensive discussions about participation I spy.  Today we discussed the idea that patients are adaptively randomized.  It is uncertain why she received the regimen that she has been assigned but hopefully there could be some activity in her specific breast cancer subgroup.    ROS is otherwise unremarkable.    PHYSICAL EXAM: /69   Pulse 80   Temp 98.4  F (36.9  C) (Oral)   Resp 16   Wt 68 kg (150 lb)   LMP 01/23/2022 (Exact Date)   SpO2 98%   BMI 26.09 kg/m      GENERAL: She looked well, she is in no distress. She was not examined in detail today.    PROBLEMS.    1. Right sided ER+, DC+, HER2-negative, MammaPrint high risk T=5.5cm, N=0 breast cancer  2. Premenopausal    IMPRESSION: The patient, her , and I discussed the work-up.  She understands that the MammaPrint classification  puts her in a high risk category.  While we did not discuss the MammaPrint classification in any detail she is in the MammaPrint 2 (ulttra high risk) category.  Hopefully checkpoint inhibitors could be of value to her in this setting.    We also discussed the evaluations that will occur at 6 and 12 weeks.  The possibility that she could go to surgery after 12 weeks and skip the anthracycline regimen was discussed.    At this point she has not had a detailed discussion of surgery or radiation therapy.  We will do this.  I also do not see as of yet that she has had germline evaluation for BRCA evaluation.  We will see if this got scheduled.    PLAN:  1. Scheduled for chemotherapy next week  2. Refer to surgery (Kinjal or Alise) and radiation (Ventura).  3. Will check on genetic counseling appointment  4. Appointments made for first 12 weeks.  5. Contact us sooner as necessary.    Keegan Lockett MD

## 2022-02-18 NOTE — NURSING NOTE
Chief Complaint   Patient presents with     Blood Draw     Labs drawn via  by RN in lab. VS césar.     Young Moralez RN

## 2022-02-18 NOTE — PROGRESS NOTES
New Patient Oncology Nurse Navigator Note     Referring provider: Dr. Keegan Lockett     Referring Clinic/Organization: [facility]     Referred to (specialty:) Cancer Surgery     Requested provider (if applicable): Dr. Olivia Carrero or Dr. Gurpreet Layton     Date Referral Received: February 18, 2022     Evaluation for:  Breast cancer     Clinical History (per Nurse review of records provided):    Please see Dr. Lockett notes for details.   I-SPY    Records Location: See Bookmarked material     Records Needed:   Breast imaging for past 5 years  CT Chest 2/21/2018      Telephoned and left voice message for Tori requesting call back to assist in scheduling consult for cancer surgery as ordered by Dr. Lockett.  Hope to coordinate a visit with her currently scheduled appointments at .  Dr. Layton also has clinic in [place] and may be an option (in person or by video)  for Tori as she lives in [place].

## 2022-02-18 NOTE — ANESTHESIA POSTPROCEDURE EVALUATION
Patient: Tori Steele    Procedure: Procedure(s):  INSERTION, VASCULAR ACCESS PORT       Diagnosis:Malignant neoplasm of central portion of right breast in female, estrogen receptor positive (H) [C50.111, Z17.0]  Diagnosis Additional Information: No value filed.    Anesthesia Type:  MAC    Note:  Disposition: Outpatient   Postop Pain Control: Uneventful            Sign Out: Well controlled pain   PONV: No   Neuro/Psych: Uneventful            Sign Out: Acceptable/Baseline neuro status   Airway/Respiratory: Uneventful            Sign Out: Acceptable/Baseline resp. status   CV/Hemodynamics: Uneventful            Sign Out: Acceptable CV status; No obvious hypovolemia; No obvious fluid overload   Other NRE: NONE   DID A NON-ROUTINE EVENT OCCUR? No           Last vitals:  Vitals Value Taken Time   /78 02/18/22 0830   Temp 36.8  C (98.2  F) 02/18/22 0830   Pulse     Resp 14 02/18/22 0830   SpO2 100 % 02/18/22 0830       Electronically Signed By: Jesus Ac MD  February 18, 2022  9:11 AM

## 2022-02-18 NOTE — ANESTHESIA CARE TRANSFER NOTE
Patient: Tori Steele    Procedure: Procedure(s):  INSERTION, VASCULAR ACCESS PORT       Diagnosis: Malignant neoplasm of central portion of right breast in female, estrogen receptor positive (H) [C50.111, Z17.0]  Diagnosis Additional Information: No value filed.    Anesthesia Type:   No value filed.     Note:    Oropharynx: spontaneously breathing  Level of Consciousness: awake  Oxygen Supplementation: room air    Independent Airway: airway patency satisfactory and stable  Dentition: dentition unchanged  Vital Signs Stable: post-procedure vital signs reviewed and stable  Report to RN Given: handoff report given  Patient transferred to: Phase II  Comments: Resps easy and regular. Report to Phase II RN. See Phase II record for vital signs.   Handoff Report: Identifed the Patient, Identified the Reponsible Provider, Reviewed the pertinent medical history, Discussed the surgical course, Reviewed Intra-OP anesthesia mangement and issues during anesthesia, Set expectations for post-procedure period and Allowed opportunity for questions and acknowledgement of understanding      Vitals:  Vitals Value Taken Time   BP     Temp     Pulse     Resp     SpO2         Electronically Signed By: GENIE CERRATO CRNA  February 18, 2022  7:57 AM

## 2022-02-18 NOTE — DISCHARGE INSTRUCTIONS
A collaboration between [facility] and [facility]  Experts in minimally invasive, targeted treatments performed using imaging guidance    Venous Access Device,  Port Catheter or Tunneled or Non-Tunneled Central Line Placement    Today you had a procedure today to install a venous access device; either a tunneled central vein catheter or a subcutaneous port catheter.    After you go home:  - Drink plenty of fluids.  Generally 6-8 (8 ounce) glasses a day is recommended.  - Resume your regular diet unless otherwise ordered by a medical provider.  - Keep any applied tape/gauze dressings clean and dry.  Change tape/gauze dressings if they get wet or soiled.  - You may shower the following day after procedure, however cover and protect from moisture any tape/gauze dressings.  You may let water hit and run over dried skin glue, but do not scrub.  Pat the area dry after showering.  - Port placement incisions are closed with absorbable suture, meaning they do not need to be removed at a later date, and a topical skin adhesive (skin glue).  This glue will wear off in 7-14 days.  Do not remove before this time.  If 14 days have passed and residual glue is present, you may gently remove it.  - Do not apply gels, lotions, or ointments to the glue site for the first 10 days as this may cause the glue to prematurely soften and fail.  - Do not perform strenuous activities or lift greater than 10 pounds for the next three days.  - If there is bleeding or oozing from the procedure site, apply firm pressure to the area for 5-10 minutes.  If the bleeding continues seek medical advice at the numbers below.  - Mild procedure site discomfort can be treated with an ice pack and over-the-counter pain relievers.        For 24 hours after any sedation used:  - Relax and take it easy.  No strenuous activities.  - Do not drive or operate machines at home or at work.  - No alcohol  consumption.  - Do not make any important or legal decisions.    Call our Interventional Radiology (IR) service if:  - If you start bleeding from the procedure site.  If you do start to bleed from the site, lie down and hold some pressure on the site.  Our radiology provider can help you decide if you need to return to the hospital.  - If you have new or worsening pain related to the procedure.  - If you have concerning swelling at the procedure site.  - If you develop persistent nausea or vomiting.  - If you develop hives or a rash or any unexplained itching.  - If you have a fever of greater than 100.5  F and chills in the first 5 days after procedure.  - Any other concerns related to your procedure.      [facility]  Interventional Radiology (IR)  [address]  [address] [facility] [place] [address]    Contact Number:  [phone]  (IR control desk)  - Monday - Friday 8:00 am - 4:30 pm    After hours for urgent concerns:  [phone]  - After 4:30 pm Monday - Friday, Weekends and Holidays.   - Ask for Interventional Radiology on-call.  Someone is available 24 hours a day.  - [facility] toll free number:  [phone]        [facility]  Home Care Following Anesthesia  For 24 hours after surgery:  1. Get plenty of rest.  A responsible adult must stay with you for at least 24 hours after you leave the surgery center.  2. Do not drive or use heavy equipment.  If you have weakness or tingling, don't drive or use heavy equipment until this feeling goes away.   3. Do not drink alcohol.   4. Avoid strenuous or risky activities.  Ask for help when climbing stairs.  5. You may feel lightheaded.  IF so, sit for a few minutes before standing.  Have someone help you get up.   6. If you have nausea (feel sick to your stomach): Drink only clear liquids such as apple juice, ginger ale, broth or 7-Up.  Rest may also help.  Be sure to drink enough  fluids.  Move to a regular diet as you feel able.   7. You may have a slight fever.  Call the doctor if your fever is over 100 F (37.7 C) (taken under the tongue) or lasts longer than 24 hours.  8. You may have a dry mouth, a sore throat, muscle aches or trouble sleeping. These should go away after 24 hours.  9. Do not make important or legal decisions.   10. It is recommended to avoid smoking.               Tips for taking pain medications  To get the best pain relief possible, remember these points:    Take pain medications as directed, before pain becomes severe.    Pain medication can upset your stomach: taking it with food may help.    Constipation is a common side effect of pain medication. Drink plenty of  fluids.    Eat foods high in fiber. Take a stool softener if recommended by your doctor or pharmacist.    Do not drink alcohol, drive or operate machinery while taking pain medications.    Ask about other ways to control pain, such as with heat, ice or relaxation.    Tylenol/Acetaminophen Consumption  To help encourage the safe use of acetaminophen, the makers of TYLENOL  have lowered the maximum daily dose for single-ingredient Extra Strength TYLENOL  (acetaminophen) products sold in the [place] from 8 pills per day (4,000 mg) to 6 pills per day (3,000 mg). The dosing interval has also changed from 2 pills every 4-6 hours to 2 pills every 6 hours.    If you feel your pain relief is insufficient, you may take Tylenol/Acetaminophen in addition to your narcotic pain medication.     Be careful not to exceed 3,000 mg of Tylenol/Acetaminophen in a 24 hour period from all sources.    If you are taking extra strength Tylenol/acetaminophen (500 mg), the maximum dose is 6 tablets in 24 hours.    If you are taking regular strength acetaminophen (325 mg), the maximum dose is 9 tablets in 24 hours.    Call a doctor for any of the followin. Signs of infection (fever, growing tenderness at the surgery site, a large  amount of drainage or bleeding, severe pain, foul-smelling drainage, redness, swelling).  2. It has been over 8 to 10 hours since surgery and you are still not able to urinate (pass water).  3. Headache for over 24 hours.  4. Numbness, tingling or weakness the day after surgery (if you had spinal anesthesia).  5. Signs of Covid-19 infection (temperature over 100 degrees, shortness of breath, cough, loss of taste/smell, generalized body aches, persistent headache, chills, sore throat, nausea/vomiting/diarrhea)  Your doctor is:                         Or dial [phone] and ask for the resident on call for:  Interventional Radiology  For emergency care, call the:  [facility]:  [phone] (TTY for hearing impaired: [phone])

## 2022-02-21 ENCOUNTER — PATIENT OUTREACH (OUTPATIENT)
Dept: ONCOLOGY | Facility: CLINIC | Age: 47
End: 2022-02-21
Payer: OTHER GOVERNMENT

## 2022-02-21 ENCOUNTER — RESEARCH ENCOUNTER (OUTPATIENT)
Dept: ONCOLOGY | Facility: CLINIC | Age: 47
End: 2022-02-21
Payer: OTHER GOVERNMENT

## 2022-02-21 ENCOUNTER — INFUSION THERAPY VISIT (OUTPATIENT)
Dept: ONCOLOGY | Facility: CLINIC | Age: 47
End: 2022-02-21
Attending: INTERNAL MEDICINE
Payer: OTHER GOVERNMENT

## 2022-02-21 VITALS
TEMPERATURE: 98.2 F | RESPIRATION RATE: 16 BRPM | OXYGEN SATURATION: 100 % | DIASTOLIC BLOOD PRESSURE: 78 MMHG | HEART RATE: 96 BPM | SYSTOLIC BLOOD PRESSURE: 119 MMHG

## 2022-02-21 DIAGNOSIS — C50.911 INVASIVE DUCTAL CARCINOMA OF RIGHT BREAST (H): ICD-10-CM

## 2022-02-21 DIAGNOSIS — C50.911 INVASIVE DUCTAL CARCINOMA OF RIGHT BREAST (H): Primary | ICD-10-CM

## 2022-02-21 PROCEDURE — 250N000011 HC RX IP 250 OP 636: Performed by: INTERNAL MEDICINE

## 2022-02-21 PROCEDURE — 96413 CHEMO IV INFUSION 1 HR: CPT

## 2022-02-21 RX ORDER — ONDANSETRON 8 MG/1
8 TABLET, FILM COATED ORAL EVERY 8 HOURS PRN
Qty: 30 TABLET | Refills: 0 | Status: SHIPPED | OUTPATIENT
Start: 2022-02-21 | End: 2022-04-11

## 2022-02-21 RX ORDER — HEPARIN SODIUM (PORCINE) LOCK FLUSH IV SOLN 100 UNIT/ML 100 UNIT/ML
5 SOLUTION INTRAVENOUS
Status: DISCONTINUED | OUTPATIENT
Start: 2022-02-21 | End: 2022-02-21 | Stop reason: HOSPADM

## 2022-02-21 RX ORDER — ONDANSETRON 8 MG/1
8 TABLET, FILM COATED ORAL EVERY 8 HOURS PRN
Qty: 3 TABLET | Refills: 0 | Status: SHIPPED | OUTPATIENT
Start: 2022-02-21 | End: 2022-02-21

## 2022-02-21 RX ORDER — PROCHLORPERAZINE MALEATE 10 MG
10 TABLET ORAL EVERY 6 HOURS PRN
Qty: 30 TABLET | Refills: 2 | Status: SHIPPED | OUTPATIENT
Start: 2022-02-21 | End: 2022-07-08

## 2022-02-21 RX ADMIN — DEXTROSE MONOHYDRATE 250 ML: 50 INJECTION, SOLUTION INTRAVENOUS at 11:33

## 2022-02-21 RX ADMIN — Medication 5 ML: at 12:42

## 2022-02-21 NOTE — PROGRESS NOTES
Spoke to patient and her  with when to call with temperature >100.4, drink two to three quarts of water a day and walk daily. Gave telephone number the Nurse Triage telephone number and when to call.  Answered all patient's questions and verbalized understanding. Ebony Lucio RN, BSN.

## 2022-02-21 NOTE — PATIENT INSTRUCTIONS
[facility] Triage and after hours / weekends / holidays:  [phone]    Please call the triage or after hours line if you experience a temperature greater than or equal to 100.4, shaking chills, have uncontrolled nausea, vomiting and/or diarrhea, dizziness, shortness of breath, chest pain, bleeding, unexplained bruising, or if you have any other new/concerning symptoms, questions or concerns.      If you are having any concerning symptoms or wish to speak to a provider before your next infusion visit, please call your care coordinator or triage to notify them so we can adequately serve you.     If you need a refill on a narcotic prescription or other medication, please call before your infusion appointment.                 February 2022 Sunday Monday Tuesday Wednesday Thursday Friday Saturday             1     2     3    LAB MAIL IN  11:00 AM   (15 min.)   UU LAB MAIL IN   [facility]    MR BREAST BILATERAL WWO  11:30 AM   (90 min.)   [id number]   [facility] 4    LAB PERIPHERAL   6:15 AM   (15 min.)    [facility] LAB DRAW   [facility]    CT CHEST/ABDOMEN/PELVIS W   6:30 AM   (20 min.)   [id number]   [facility] [place]    ECG   7:40 AM   (15 min.)    EKG LAB   [facility] 5       6     7    ECHO COMPLETE   8:00 AM   (60 min.)   [id number]   [facility]    LAB   9:30 AM   (15 min.)    LAB   [facility]    US BREAST BX CORE NEEDLE RIGHT  10:00 AM   (60 min.)   [email or web address]   [facility] [place] [facility] [place] 8     9     10     11    TUMOR CONFERENCE   7:00 AM   (10 min.)   BREAST TUMOR CONFERENCE   [facility] Tumor Conference Virtual Scheduling 12       13     14     15    PRE-PROCEDURE COVID PCR   9:15 AM   (15 min.)    COVID LAB   [facility] 16      17     18    IR CHEST PORT PLACEMENT >5 YRS   5:30 AM   (60 min.)   [id number]   [facility] [place]    Outpatient Visit   5:39 AM   [facility]    INSERTION, VASCULAR ACCESS PORT   7:00 AM   Elliott Ramirez PA-C [facility] OR    LAB PERIPHERAL  10:00 AM   (15 min.)    [facility] LAB DRAW   [facility]    RETURN  10:45 AM   (30 min.)   Keegan Lockett MD   [facility] 19       20     21    ONC INFUSION 2 HR (120 MIN)  10:00 AM   (120 min.)    ONC INFUSION NURSE   [facility] 22     23     24     25     26       27     28    LAB PERIPHERAL  11:30 AM   (15 min.)   [facility] LAB DRAW   [facility]    RETURN  12:00 PM   (45 min.)   Milka Mota PA-C   [facility]                                      March 2022 Sunday Monday Tuesday Wednesday Thursday Friday Saturday             1     2     3     4     5       6     7    LAB PERIPHERAL   7:00 AM   (15 min.)    [facility] LAB DRAW   [facility]    RETURN   7:15 AM   (45 min.)   Milka Mota PA-C   [facility] 8    MR BREAST BILATERAL WWO  11:00 AM   (90 min.)   [id number]   [facility] 9     10     11    RETURN  10:15 AM   (30 min.)   Keegan Lockett MD   [facility] 12       13     14     15    LAB CENTRAL   9:00 AM   (15 min.)   [facility] DRAW   [facility]    ONC INFUSION 2 HR (120 MIN)   9:30 AM   (60 min.)    ONC INFUSION NURSE   [facility] 16     17     18     19       20     21    LAB CENTRAL   8:45 AM   (15 min.)    [facility] LAB DRAW   [facility]    RETURN   9:15 AM   (45 min.)   Milka Mota PA-C   [facility] 22     23      24     25     26       27     28    LAB CENTRAL   9:45 AM   (15 min.)    [facility] LAB DRAW   [facility]    RETURN  10:15 AM   (45 min.)   Milka Mota PA-C   [facility] 29     30     31                            Results for TORI RILEY (MRN [id number]) as of 2/21/2022 12:21   Ref. Range 2/18/2022 09:20   Sodium Latest Ref Range: 133 - 144 mmol/L 138   Potassium Latest Ref Range: 3.4 - 5.3 mmol/L 4.0   Chloride Latest Ref Range: 94 - 109 mmol/L 108   Carbon Dioxide Latest Ref Range: 20 - 32 mmol/L 27   Urea Nitrogen Latest Ref Range: 7 - 30 mg/dL 6 (L)   Creatinine Latest Ref Range: 0.52 - 1.04 mg/dL 0.66   GFR Estimate Latest Ref Range: >60 mL/min/1.73m2 >90   Calcium Latest Ref Range: 8.5 - 10.1 mg/dL 8.8   Anion Gap Latest Ref Range: 3 - 14 mmol/L 3   Albumin Latest Ref Range: 3.4 - 5.0 g/dL 3.4   Protein Total Latest Ref Range: 6.8 - 8.8 g/dL 7.6   Bilirubin Total Latest Ref Range: 0.2 - 1.3 mg/dL 0.4   Alkaline Phosphatase Latest Ref Range: 40 - 150 U/L 73   ALT Latest Ref Range: 0 - 50 U/L 17   AST Latest Ref Range: 0 - 45 U/L 16   T4 Free Latest Ref Range: 0.76 - 1.46 ng/dL 1.01   TSH Latest Ref Range: 0.40 - 4.00 mU/L 1.61   Glucose Latest Ref Range: 70 - 99 mg/dL 111 (H)   WBC Latest Ref Range: 4.0 - 11.0 10e3/uL 4.5   Hemoglobin Latest Ref Range: 11.7 - 15.7 g/dL 11.6 (L)   Hematocrit Latest Ref Range: 35.0 - 47.0 % 37.7   Platelet Count Latest Ref Range: 150 - 450 10e3/uL 249   RBC Count Latest Ref Range: 3.80 - 5.20 10e6/uL 4.09   MCV Latest Ref Range: 78 - 100 fL 92   MCH Latest Ref Range: 26.5 - 33.0 pg 28.4   MCHC Latest Ref Range: 31.5 - 36.5 g/dL 30.8 (L)   RDW Latest Ref Range: 10.0 - 15.0 % 12.7   % Neutrophils Latest Units: % 72   % Lymphocytes Latest Units: % 19   % Monocytes Latest Units: % 6   % Eosinophils Latest Units: % 3   % Basophils Latest Units: % 0   Absolute Basophils Latest Ref Range: 0.0 - 0.2 10e3/uL 0.0    Absolute Eosinophils Latest Ref Range: 0.0 - 0.7 10e3/uL 0.1   Absolute Immature Granulocytes Latest Ref Range: <=0.4 10e3/uL 0.0   Absolute Lymphocytes Latest Ref Range: 0.8 - 5.3 10e3/uL 0.9   Absolute Monocytes Latest Ref Range: 0.0 - 1.3 10e3/uL 0.3   % Immature Granulocytes Latest Units: % 0   Absolute Neutrophils Latest Ref Range: 1.6 - 8.3 10e3/uL 3.2   Absolute NRBCs Latest Units: 10e3/uL 0.0   NRBCs per 100 WBC Latest Ref Range: <1 /100 0

## 2022-02-21 NOTE — PROGRESS NOTES
Infusion Nursing Note:  Tori Steele presents today for Cycle 1 Day 1 Dostarlimab (IDS #3903).    Patient seen by provider today: No; saw Dr. Lockett 2/18    Note: Patient new to oncology infusion room and is receiving Dostarliamb for the first time. Pt oriented to infusion room, including chair, nutrition station and call light. New patient teaching done previously by MATTHEW Cabrales and Roman Santoyo RN. Pt received new pt folder prior to coming to infusion. Writer reinforced chemotherapy teaching/side effects and schedule.     Pt instructed to call care coordinator, triage (or MD on call if after hours/weekends) with chills/temp >=100.5, questions/concerns. Pt stated understanding of plan.     Patient presents to the infusion center today feeling nervous and anxious regarding new diagnosis and starting treatment. She denies any pain, fevers, chills, chest pain or shortness of breath.    Roman Santoyo RN ok to proceed with tx today, no need to repeat labs from 2/18. Monitor patient for 30 minutes post dostarlimab infusion for the first dose. No premeds needed.    Patient took PO Enceqidar at 1110.   Dostarlimab infused from 1137 to 1209; 106mL total volume infused via pump.  Patient took PO Paclitaxel at 1225.    Intravenous Access:  Implanted Port.    Treatment Conditions:  Results for TORI STEELE (MRN [id number]) as of 2/21/2022 10:26   Ref. Range 2/18/2022 09:20   Sodium Latest Ref Range: 133 - 144 mmol/L 138   Potassium Latest Ref Range: 3.4 - 5.3 mmol/L 4.0   Chloride Latest Ref Range: 94 - 109 mmol/L 108   Carbon Dioxide Latest Ref Range: 20 - 32 mmol/L 27   Urea Nitrogen Latest Ref Range: 7 - 30 mg/dL 6 (L)   Creatinine Latest Ref Range: 0.52 - 1.04 mg/dL 0.66   GFR Estimate Latest Ref Range: >60 mL/min/1.73m2 >90   Calcium Latest Ref Range: 8.5 - 10.1 mg/dL 8.8   Anion Gap Latest Ref Range: 3 - 14 mmol/L 3   Albumin Latest Ref Range: 3.4 - 5.0 g/dL 3.4   Protein Total Latest  Ref Range: 6.8 - 8.8 g/dL 7.6   Bilirubin Total Latest Ref Range: 0.2 - 1.3 mg/dL 0.4   Alkaline Phosphatase Latest Ref Range: 40 - 150 U/L 73   ALT Latest Ref Range: 0 - 50 U/L 17   AST Latest Ref Range: 0 - 45 U/L 16   T4 Free Latest Ref Range: 0.76 - 1.46 ng/dL 1.01   TSH Latest Ref Range: 0.40 - 4.00 mU/L 1.61   Glucose Latest Ref Range: 70 - 99 mg/dL 111 (H)   WBC Latest Ref Range: 4.0 - 11.0 10e3/uL 4.5   Hemoglobin Latest Ref Range: 11.7 - 15.7 g/dL 11.6 (L)   Hematocrit Latest Ref Range: 35.0 - 47.0 % 37.7   Platelet Count Latest Ref Range: 150 - 450 10e3/uL 249   RBC Count Latest Ref Range: 3.80 - 5.20 10e6/uL 4.09   MCV Latest Ref Range: 78 - 100 fL 92   MCH Latest Ref Range: 26.5 - 33.0 pg 28.4   MCHC Latest Ref Range: 31.5 - 36.5 g/dL 30.8 (L)   RDW Latest Ref Range: 10.0 - 15.0 % 12.7   % Neutrophils Latest Units: % 72   % Lymphocytes Latest Units: % 19   % Monocytes Latest Units: % 6   % Eosinophils Latest Units: % 3   % Basophils Latest Units: % 0   Absolute Basophils Latest Ref Range: 0.0 - 0.2 10e3/uL 0.0   Absolute Eosinophils Latest Ref Range: 0.0 - 0.7 10e3/uL 0.1   Absolute Immature Granulocytes Latest Ref Range: <=0.4 10e3/uL 0.0   Absolute Lymphocytes Latest Ref Range: 0.8 - 5.3 10e3/uL 0.9   Absolute Monocytes Latest Ref Range: 0.0 - 1.3 10e3/uL 0.3   % Immature Granulocytes Latest Units: % 0   Absolute Neutrophils Latest Ref Range: 1.6 - 8.3 10e3/uL 3.2   Absolute NRBCs Latest Units: 10e3/uL 0.0   NRBCs per 100 WBC Latest Ref Range: <1 /100 0   Results for TORI RILEY (MRN [id number]) as of 2/21/2022 10:26   Ref. Range 2/18/2022 09:26   Color Urine Latest Ref Range: Colorless, Straw, Light Yellow, Yellow  Yellow   Appearance Urine Latest Ref Range: Clear  Clear   Glucose Urine Latest Ref Range: Negative mg/dL Negative   Bilirubin Urine Latest Ref Range: Negative  Negative   Ketones Urine Latest Ref Range: Negative mg/dL Negative   Specific Gravity Urine Latest Ref Range: 1.003 - 1.035   1.013   pH Urine Latest Ref Range: 5.0 - 7.0  7.0   Protein Albumin Urine Latest Ref Range: Negative mg/dL Negative   Urobilinogen mg/dL Latest Ref Range: Normal, 2.0 mg/dL Normal   Nitrite Urine Latest Ref Range: Negative  Negative   Blood Urine Latest Ref Range: Negative  Small (A)   Leukocyte Esterase Urine Latest Ref Range: Negative  Negative   WBC Urine Latest Ref Range: <=5 /HPF 0   RBC Urine Latest Ref Range: <=2 /HPF 2   Squamous Epithelial /HPF Urine Latest Ref Range: <=1 /HPF <1   Mucus Urine Latest Ref Range: None Seen /LPF Present (A)     Results reviewed, labs MET treatment parameters, ok to proceed with treatment.    Post Infusion Assessment:  Patient tolerated infusion without incident.  Patient observed for 30 minutes post Dostarlimab per MATTHEW Watkins.  Blood return noted pre and post infusion.  No evidence of extravasations.  Access discontinued per protocol.     Discharge Plan:   Prescription refills given for Zofran and Compazine.  Discharge instructions reviewed with: Patient.  Patient and/or family verbalized understanding of discharge instructions and all questions answered.  Copy of AVS reviewed with patient and/or family.  Patient will return 2/28 for next appointment.  AVS to patient via [employer].  Patient will return 2/28 for next appointment.   Patient discharged in stable condition accompanied by: self.  Departure Mode: Ambulatory.      Sarina Tran RN

## 2022-02-21 NOTE — NURSING NOTE
[id number]: Study Visit Note   Subject name: Tori Steele     Visit: C1D1    Did the study visit occur within the appropriate window allowed by the protocol? yes    Since the last study visit, She has been doing well. She starts C1D1 of PO Paclitaxel/Encequdar/IV Dostarlimab. Patient was educated on Oral Taxol and Encequidar, including timing, low-fat meal, and fasting. New diary was given to Tori and we went through it together.     She had labs and a provider visit with Dr. Lockett on Friday, 2/18 to discuss treatment plan, and study drugs.     Touched base with jeffery Branch RN, regarding order of administration, as well as 30-min observation post Dostarlimab.      I have personally interviewed Tori Steele and reviewed her medical record for adverse events and concomitant medications and these have been recorded on the corresponding logs in Tori Steele's research file.     Tori Steele was given the opportunity to ask any trial related questions. Please see provider progress note for physical exam and other clinical information. Labs were reviewed - any significant lab values were addressed and reviewed.      [id number]: Medication Count/IDS Note      Tori Steele is enrolled on the trial number listed above. The patient presented for her C1D1 day visit.     IDS number: [id number]    Drug name: PO PACLITAXEL  Number of boxes dispensed: 1  Lot number: [id number]  Number of pills dispensed: 36    Drug name: PO ENCEQUIDAR    Number of boxes dispensed: 1  Lot number: [id number]  Number of pills dispensed: 3     Bertha Augustin RN, MS  Research   Phone: [phone]  Pager: [phone]

## 2022-02-24 NOTE — PROGRESS NOTES
Department of Radiation Oncology  [address] [address]  [phone]       Consultation Note    Name: Tori Steele MRN: [id number]   : 1975   Date of Service: Mar 2, 2022 Referring: Dr. Lockett      Reason for consultation: Discussion of radiation treatment options for management of stage IIB, cT3 N0 M0 invasive ductal carcinoma of the right breast, [place] Grade 3, ER+/IA+/HER2-    History of Present Illness   Ms. Steele is a  46 year old premenopausal female with a recent diagnosis of locally advanced RIGHT breast cancer. She was referred from medical oncology for discussion of radiation treatment options.    She had a bilateral screening mammogram with tomosynthesis on 22, which showed a 12:00 position mass at mid depth with associated calcifications, a change from previous mammogram in 2018. There was no suspicious finding in her left breast.A subsequent right diagnostic mammogram on 22 showed a persistent irregular mass. A complementary ultrasound was demonstrated a corresponding irregular hypoechoic mass with internal vascularity located at 12 o'clock position, 1 cm from the nipple measuring 2.5 x 1.5 x 1.7 cm in transverse and longitudinal views, and up to 3.2 cm in greatest oblique dimension (BI-RADS 5).    Ultrasound core guided biopsy was obtained on the same day with pathology revealing ([id number]) invasive ductal carcinoma, [place] grade 3, with associated DCIS, nuclear grade 3, cribriform and solid types with comedonecrosis. There was suspicious angiolymphatic invasion. Invasive component was ER positive (83%), IA positive (79%), HER-2 negative (IHC 1+), Ki-67 was 25%. The pathology was reviewed here at [facility] on 2/3/2022. Pathology ([id number]) was consistent with the primary reading except for a slightly lower Ki-67 proliferative index of 15%.    Ms. Steele established care with Dr. Russ of  Surgical Oncology at [facility]. Exam revealed a mobile mass about 3 cm in size. She underwent right axillary ultrasound on 1/24/22 that showed no evidence of malignancy. She then had a bilateral breast MRI on 1/26/2022 that showed the biopsy-proven cancer as primarily a non-mass enhancement measuring 4.1 x 3.3 x 4.6 cm centered at 12-1:00 middle depth. There were 2 adjacent low axillary/intramammary lymph nodes in the upper outer quadrant at 10 o'clock position posterior depth measuring 5 mm and 7 mm with no suspicious features. Incidentally seen was partially images exophytic mass arising from the right hepatic lobe. Additional imaging was recommended.     She was seen by Dr. Lockett at [facility] on 1/28/2022 at which time gene expression profiling and potential enrollment into I SPY-2 clinical trial was discussed. MammaPrint was sent and came back as ultra high risk (index: -0.329), blueprint was luminal B.    I-SPY MRI was obtained on 2/3/2022 which redemonstrated the non-mass enhancement area measuring 5.5 cm extending from 12:30 anteriorly to 1:30 posteriorly. No suspicious lesions in the left breast or bilateral axillary lymph nodes were detected.  CT Chest/Abdomen/Pelvis on 2/4/2022 showed no evidence of distant metastasis.  There was a stable 1.5 cm lesion in the left hepatic dome with imaging features most suggestive of a benign hemangioma, stable since 2019.    She ultimately decided to participate in I SPY-2 study.  She was randomized to Dostarlimab arm.  The patient was referred to us for discussion of radiation treatment options.  The patient is going to meet with Dr. Layton at surgical oncology (3/4/2022) and genetic counseling (4/5/2022) for further discussion.    On interview today, she was accompanied by her .  She was extremely anxious and could not hold her tears multiple times during our conversation. She stated that she has been tolerating her treatment well.  She  reported occasional nausea for which she takes Zofran 8 mg and dano daily with good control.  She also reported occasional episodes of diarrhea that are manageable without medication.    Past Medical History:  Past Medical History:   Diagnosis Date     Sinus tachycardia        Past Surgical History:  Past Surgical History:   Procedure Laterality Date     DILATION AND CURETTAGE       INSERT PORT VASCULAR ACCESS Left 2022    Procedure: INSERTION, VASCULAR ACCESS PORT;  Surgeon: Elliott Ramirez PA-C;  Location: [facility] OR     IR CHEST PORT PLACEMENT > 5 YRS OF AGE  2022     Chemotherapy History:  None     Radiation History:  None     Pregnant: No  Implanted Cardiac Devices: No    Medications:  Current Outpatient Medications   Medication     ferrous sulfate (FEROSUL) 325 (65 Fe) MG tablet     lidocaine-prilocaine (EMLA) 2.5-2.5 % external cream     ondansetron (ZOFRAN) 8 MG tablet     prochlorperazine (COMPAZINE) 10 MG tablet     riboflavin (VITAMIN  B2) 25 MG TABS     study - encequidar (IDS# 3903) 15 MG tablet     study - paclitaxel (IDS# 3903) 30 MG capsule     No current facility-administered medications for this visit.     Allergies:  NKA    Gynecological History:  , both vaginal deliveries. First full-term pregnancy at age 31.   Breast feeding x 6 months   Used oral contraceptive in the past, on and off for five years. No history of HRT.  Menarche at the age of 14.  She had FSH in 2021, which came back 33.8; she has regular menstruation. She has fibroids.     Social History:  Tobacco: Never  Alcohol: Yes, 1 drink per week.    with 2 children, age 14 (daughter) and age 12 (son). Lives in [place]. Originally from [place].   Homemaker, previously worked as a speech therapist.     Family History:  Maternal grandmother: breast cancer diagnosed in her 70s  No family history of ovarian cancer       Review of Systems   A 10-point review of systems was performed. Pertinent findings are noted  in the HPI.    Physical Exam   ECOG Status: 1    Vitals:  /61   Pulse 98   Wt 68.6 kg (151 lb 3.2 oz)   SpO2 100%   BMI 26.30 kg/m      PHYSICAL EXAMINATION:    Gen: Alert, in NAD  Eyes: EOMI, sclera anicteric, PERRL  HENT      Head: NC/AT     Ears: No external auricular lesions     Nose/sinus: No rhinorrhea or epistaxis     Pulm: Breathing comfortably on room air, no audible wheezes or ronchi  CV: Well-perfused, no cyanosis  Breast:  Deferred     Imaging/Path/Labs   Imaging: Reviewed      Path: Reviewed    Labs: Reviewed    Assessment and Plan     Ms. Steele is a 46 year old premenopausal female with stage IIB, cT3 N0 M0 invasive ductal carcinoma of the UIQ of the right breast, [place] Grade 3, ER+/OR+/HER2-. MammaPrint is ultra high risk (index: -0.329). She is currently enrolled into I-SPY 2 trial (Dostarlimab arm) and just began treatment.     She is going to meet with Dr. Layton this Friday for discussion of her surgical options (mastectomy versus lumpectomy). Assuming her genetic testing does not reveal any deleterious mutation, she may be a candidate for lumpectomy if she demonstrates a good response to neoadjuvant therapy. If a lumpectomy is performed, adjuvant radiation therapy to the right breast will be recommended to reduce local recurrence. The addition of the andrae field will depend on the pathology of her sentinel lymph node(s).      If she ends up with a mastectomy, the indication for radiation will depend on the size of the residual tumor, the margin status, and more importantly the status of her sentinel lymph node. If there is any tumor in the nodes, or evidence of previous involvement, PMRT will likely be recommended. If a unilateral mastectomy is performed, she is interested in reconstruction. I told her that given no initial andrae involvement, if she desires immediate reconstruction (hence a relatively smaller chance of requiring radiation therapy), it could be an reasonable  option.    We discussed broadly the rationale, logistics, alternatives and potential acute and chronic radiation related side effects. We will follow her progress, and review her surgical pathology. Depending on the type of surgery and pathologic finding, we will arrangement her follow up visit.     The patient and her  had many questions during our conversation that were answered to their satisfaction and verbalized understanding.     The patient was seen and discussed with staff, Dr. Whitehead.     Manuel Manning MD  PGY-5 Resident, Radiation Oncology  [facility]  Phone:[phone]  Pager:[phone]    I saw and examined the patient with the resident.  I have reviewed and edited the resident's note and agree with the plan of care.      I reviewed patient's chart, internal/external medical records, imaging studies (including actual images), labs and pathology reports.  I interviewed and counseled the patient face to face.      80 minutes were spent on the date of the encounter doing chart review, history and exam, documentation and further activities as noted above.           Katina Whitehead MD

## 2022-02-28 ENCOUNTER — RESEARCH ENCOUNTER (OUTPATIENT)
Dept: ONCOLOGY | Facility: CLINIC | Age: 47
End: 2022-02-28

## 2022-02-28 ENCOUNTER — LAB (OUTPATIENT)
Dept: LAB | Facility: CLINIC | Age: 47
End: 2022-02-28
Attending: PHYSICIAN ASSISTANT
Payer: OTHER GOVERNMENT

## 2022-02-28 ENCOUNTER — ONCOLOGY VISIT (OUTPATIENT)
Dept: ONCOLOGY | Facility: CLINIC | Age: 47
End: 2022-02-28
Attending: PHYSICIAN ASSISTANT
Payer: OTHER GOVERNMENT

## 2022-02-28 VITALS
BODY MASS INDEX: 26.59 KG/M2 | TEMPERATURE: 98 F | DIASTOLIC BLOOD PRESSURE: 77 MMHG | OXYGEN SATURATION: 100 % | WEIGHT: 152.9 LBS | SYSTOLIC BLOOD PRESSURE: 126 MMHG | RESPIRATION RATE: 16 BRPM | HEART RATE: 98 BPM

## 2022-02-28 DIAGNOSIS — D50.0 IRON DEFICIENCY ANEMIA DUE TO CHRONIC BLOOD LOSS: ICD-10-CM

## 2022-02-28 DIAGNOSIS — C50.911 INVASIVE DUCTAL CARCINOMA OF RIGHT BREAST (H): Primary | ICD-10-CM

## 2022-02-28 LAB
ALBUMIN SERPL-MCNC: 3.3 G/DL (ref 3.4–5)
ALP SERPL-CCNC: 76 U/L (ref 40–150)
ALT SERPL W P-5'-P-CCNC: 16 U/L (ref 0–50)
ANION GAP SERPL CALCULATED.3IONS-SCNC: 7 MMOL/L (ref 3–14)
AST SERPL W P-5'-P-CCNC: 14 U/L (ref 0–45)
BASOPHILS # BLD AUTO: 0 10E3/UL (ref 0–0.2)
BASOPHILS NFR BLD AUTO: 0 %
BILIRUB SERPL-MCNC: 0.6 MG/DL (ref 0.2–1.3)
BUN SERPL-MCNC: 9 MG/DL (ref 7–30)
CALCIUM SERPL-MCNC: 8.9 MG/DL (ref 8.5–10.1)
CHLORIDE BLD-SCNC: 106 MMOL/L (ref 94–109)
CO2 SERPL-SCNC: 27 MMOL/L (ref 20–32)
CREAT SERPL-MCNC: 0.64 MG/DL (ref 0.52–1.04)
EOSINOPHIL # BLD AUTO: 0.2 10E3/UL (ref 0–0.7)
EOSINOPHIL NFR BLD AUTO: 3 %
ERYTHROCYTE [DISTWIDTH] IN BLOOD BY AUTOMATED COUNT: 12.7 % (ref 10–15)
FERRITIN SERPL-MCNC: 14 NG/ML (ref 12–150)
GFR SERPL CREATININE-BSD FRML MDRD: >90 ML/MIN/1.73M2
GLUCOSE BLD-MCNC: 134 MG/DL (ref 70–99)
HCT VFR BLD AUTO: 34.4 % (ref 35–47)
HGB BLD-MCNC: 11 G/DL (ref 11.7–15.7)
IMM GRANULOCYTES # BLD: 0 10E3/UL
IMM GRANULOCYTES NFR BLD: 0 %
IRON SATN MFR SERPL: 10 % (ref 15–46)
IRON SERPL-MCNC: 39 UG/DL (ref 35–180)
LYMPHOCYTES # BLD AUTO: 0.9 10E3/UL (ref 0.8–5.3)
LYMPHOCYTES NFR BLD AUTO: 17 %
MCH RBC QN AUTO: 29 PG (ref 26.5–33)
MCHC RBC AUTO-ENTMCNC: 32 G/DL (ref 31.5–36.5)
MCV RBC AUTO: 91 FL (ref 78–100)
MONOCYTES # BLD AUTO: 0.2 10E3/UL (ref 0–1.3)
MONOCYTES NFR BLD AUTO: 4 %
NEUTROPHILS # BLD AUTO: 4 10E3/UL (ref 1.6–8.3)
NEUTROPHILS NFR BLD AUTO: 76 %
NRBC # BLD AUTO: 0 10E3/UL
NRBC BLD AUTO-RTO: 0 /100
PLATELET # BLD AUTO: 220 10E3/UL (ref 150–450)
POTASSIUM BLD-SCNC: 4.1 MMOL/L (ref 3.4–5.3)
PROT SERPL-MCNC: 7.5 G/DL (ref 6.8–8.8)
RBC # BLD AUTO: 3.79 10E6/UL (ref 3.8–5.2)
SODIUM SERPL-SCNC: 140 MMOL/L (ref 133–144)
TIBC SERPL-MCNC: 380 UG/DL (ref 240–430)
WBC # BLD AUTO: 5.4 10E3/UL (ref 4–11)

## 2022-02-28 PROCEDURE — 250N000011 HC RX IP 250 OP 636: Performed by: PHYSICIAN ASSISTANT

## 2022-02-28 PROCEDURE — 82728 ASSAY OF FERRITIN: CPT

## 2022-02-28 PROCEDURE — G0463 HOSPITAL OUTPT CLINIC VISIT: HCPCS

## 2022-02-28 PROCEDURE — 36591 DRAW BLOOD OFF VENOUS DEVICE: CPT | Performed by: PHYSICIAN ASSISTANT

## 2022-02-28 PROCEDURE — 99214 OFFICE O/P EST MOD 30 MIN: CPT | Performed by: PHYSICIAN ASSISTANT

## 2022-02-28 PROCEDURE — 83550 IRON BINDING TEST: CPT

## 2022-02-28 PROCEDURE — 80053 COMPREHEN METABOLIC PANEL: CPT | Performed by: PHYSICIAN ASSISTANT

## 2022-02-28 PROCEDURE — 85025 COMPLETE CBC W/AUTO DIFF WBC: CPT | Performed by: PHYSICIAN ASSISTANT

## 2022-02-28 RX ORDER — DEXAMETHASONE SODIUM PHOSPHATE 10 MG/ML
10 INJECTION, SOLUTION INTRAMUSCULAR; INTRAVENOUS
Status: CANCELLED
Start: 2022-02-28

## 2022-02-28 RX ORDER — ALBUTEROL SULFATE 0.83 MG/ML
2.5 SOLUTION RESPIRATORY (INHALATION)
Status: CANCELLED | OUTPATIENT
Start: 2022-02-28

## 2022-02-28 RX ORDER — EPINEPHRINE 1 MG/ML
0.3 INJECTION, SOLUTION INTRAMUSCULAR; SUBCUTANEOUS EVERY 5 MIN PRN
Status: CANCELLED | OUTPATIENT
Start: 2022-02-28

## 2022-02-28 RX ORDER — HEPARIN SODIUM,PORCINE 10 UNIT/ML
5 VIAL (ML) INTRAVENOUS
Status: CANCELLED | OUTPATIENT
Start: 2022-02-28

## 2022-02-28 RX ORDER — DIPHENHYDRAMINE HYDROCHLORIDE 50 MG/ML
50 INJECTION INTRAMUSCULAR; INTRAVENOUS
Status: CANCELLED
Start: 2022-02-28

## 2022-02-28 RX ORDER — LORAZEPAM 2 MG/ML
0.5 INJECTION INTRAMUSCULAR EVERY 4 HOURS PRN
Status: CANCELLED | OUTPATIENT
Start: 2022-02-28

## 2022-02-28 RX ORDER — METHYLPREDNISOLONE SODIUM SUCCINATE 125 MG/2ML
125 INJECTION, POWDER, LYOPHILIZED, FOR SOLUTION INTRAMUSCULAR; INTRAVENOUS
Status: CANCELLED
Start: 2022-02-28

## 2022-02-28 RX ORDER — ALBUTEROL SULFATE 90 UG/1
1-2 AEROSOL, METERED RESPIRATORY (INHALATION)
Status: CANCELLED
Start: 2022-02-28

## 2022-02-28 RX ORDER — HEPARIN SODIUM (PORCINE) LOCK FLUSH IV SOLN 100 UNIT/ML 100 UNIT/ML
5 SOLUTION INTRAVENOUS
Status: CANCELLED | OUTPATIENT
Start: 2022-02-28

## 2022-02-28 RX ORDER — NALOXONE HYDROCHLORIDE 0.4 MG/ML
0.2 INJECTION, SOLUTION INTRAMUSCULAR; INTRAVENOUS; SUBCUTANEOUS
Status: CANCELLED | OUTPATIENT
Start: 2022-02-28

## 2022-02-28 RX ORDER — HEPARIN SODIUM (PORCINE) LOCK FLUSH IV SOLN 100 UNIT/ML 100 UNIT/ML
500 SOLUTION INTRAVENOUS ONCE
Status: COMPLETED | OUTPATIENT
Start: 2022-02-28 | End: 2022-02-28

## 2022-02-28 RX ORDER — MEPERIDINE HYDROCHLORIDE 25 MG/ML
25 INJECTION INTRAMUSCULAR; INTRAVENOUS; SUBCUTANEOUS EVERY 30 MIN PRN
Status: CANCELLED | OUTPATIENT
Start: 2022-02-28

## 2022-02-28 RX ADMIN — Medication 500 UNITS: at 11:52

## 2022-02-28 ASSESSMENT — PAIN SCALES - GENERAL: PAINLEVEL: NO PAIN (0)

## 2022-02-28 NOTE — NURSING NOTE
"Chief Complaint   Patient presents with     Port Draw     Labs drawn via port by RN. Vitals taken.     Port accessed with 20G 3/4\" flat needle by RN, labs collected, line flushed with saline and heparin.  Vitals taken. Pt checked in for appointment(s).    Jamila Meyer, RN    "

## 2022-02-28 NOTE — NURSING NOTE
[id number]: Study Visit Note   Subject name: Tori Steele     Visit: C2D1    Did the study visit occur within the appropriate window allowed by the protocol? yes    Since the last study visit, She has been feeling generally well. She noted some fatigue starting Tuesday, as well as vaginal dryness starting Tuesday as well. Janette will be sending her home with some ideas of lubricants to use. Tori reported having some dry skin starting Monday of last week.      I have personally interviewed Tori Steele and reviewed her medical record for adverse events and concomitant medications and these have been recorded on the corresponding logs in Tori Steele's research file.     Tori Steele was given the opportunity to ask any trial related questions.  Please see provider progress note for physical exam and other clinical information. Labs were reviewed - any significant lab values were addressed and reviewed.      [id number]: Medication Count/IDS Note      Tori Steele is enrolled on the trial number listed above. The patient presented for her C2D1 day visit.   IDS number: 3903  Drug name: PO PACLITAXEL  Number of boxes returned: 1  Lot number: [id number]  Number of pills returned: 0      Number of boxes dispensed: 1  Lot number:[id number]  Number of pills dispensed: 36    Drug name: PO ENCEQUIDAR  Number of boxes returned: 1  Lot number: [id number]  Number of pills returned: 0      Number of boxes dispensed: 1  Lot number:[id number]  Number of pills dispensed: 3      Drug diary returned? yes    Are there any discrepancies between the amount of medication the patient was instructed to take and the amount recorded as taken in the patient s drug diary?  No    Are there any discrepancies between the amount of medication the patient has recorded as taken in the drug diary and the amount that would be expected to be returned based on the amount recorded as taken?  no       Bertha Augustin RN, MS  Research Study  Coordinator  Phone: [phone]  Pager: [phone]      Form 504.00.01 (Version 1)     Effective date: 01JUL2018     Next Review Date: 01JUL2020

## 2022-02-28 NOTE — LETTER
2/28/2022         RE: Tori Steele  [address] [address]           [facility]  [address] [address]  Phone: [phone]  Fax: [phone]                      Oncology/Hematology Visit Note  Feb 28, 2022    Reason for Visit: follow up of breast cancer    History of Present Illness: Tori Steele is a 46 year old female with breat cancer. Right-sided 5.5 cm ER positive MI positive HER-2 negative breast cancer with associated DCIS.  Her MammaPrint came back as high risk.  She consented for the I-SPY clinical trial and has been randomized to the oral paclitaxel, Encequidar and dostarlimab arm.     She started 2/21/22.     Interval History:  Tori is here with her  today for week 2 of treatment.    She tolerated her first week fairly well.  She did have some fatigue after her first infusion of immunotherapy.  She took Zofran daily on days 1 2 and 3 of her oral Taxol.  She had an episode of constipation on day 6 that resolved with fluids and diet.  She needed to nap a couple times this last week due to fatigue which is unusual for her.    She did try to get in a couple of walks specifically on Saturday and felt more fatigued on Sunday.    No fever sweats or chills.  No chest or breathing concerns.  No neuropathy.  Skin is dry.    She is noticed vaginal dryness as well.    Review of Systems:  Patient denies any of the following except if noted above: fevers, chills, difficulty with energy, vision or hearing changes, chest pain, dyspnea, abdominal pain, nausea, vomiting, diarrhea, constipation, urinary concerns, headaches, numbness, tingling, issues with sleep or mood. He/She also denies lumps, bumps, rashes or skin lesions, bleeding or bruising issues.    Current Outpatient Medications   Medication Sig Dispense Refill     ferrous sulfate (FEROSUL) 325 (65 Fe) MG tablet Take 325 mg by mouth       lidocaine-prilocaine (EMLA)  2.5-2.5 % external cream Apply to port site one hour prior to access may start using six days after port placement. 30 g 1     ondansetron (ZOFRAN) 8 MG tablet Take 1 tablet (8 mg) by mouth every 8 hours as needed for nausea 30 tablet 0     prochlorperazine (COMPAZINE) 10 MG tablet Take 1 tablet (10 mg) by mouth every 6 hours as needed (Nausea/Vomiting) 30 tablet 2     riboflavin (VITAMIN  B2) 25 MG TABS          Physical Examination: ECOG 1  General: The patient is a pleasant female in no acute distress.  /77 (BP Location: Right arm)   Pulse 98   Temp 98  F (36.7  C) (Oral)   Resp 16   Wt 69.4 kg (152 lb 14.4 oz)   SpO2 100%   BMI 26.59 kg/m    Wt Readings from Last 10 Encounters:   02/28/22 69.4 kg (152 lb 14.4 oz)   02/18/22 68 kg (150 lb)   02/18/22 68 kg (150 lb)   01/28/22 68 kg (150 lb)   11/18/20 72.6 kg (160 lb)   10/06/20 72.6 kg (160 lb)   10/02/20 73 kg (161 lb)     HEENT: EOMI, PERRL. Sclerae are anicteric. Oral mucosa is pink and moist with no lesions or thrush.   Lymph: Neck is supple with no lymphadenopathy in the cervical or supraclavicular areas.   Heart: Regular rate and rhythm.   Lungs: Clear to auscultation bilaterally.   Abdomen: Bowel sounds present, soft, nontender with no palpable hepatosplenomegaly or masses.   Extremities: No lower extremity edema noted bilaterally.   Neuro: Cranial nerves II through XII are grossly intact.  Skin: No rashes, petechiae, or bruising noted on exposed skin.  Breast: Right breast shows a mass at the 12 o'clock position 5 cm horizontally and 6 cm vertically    Laboratory Data:  Most Recent 3 CBC's:  Recent Labs   Lab Test 02/28/22  1201 02/18/22  0920 02/04/22  0634   WBC 5.4 4.5 5.4   HGB 11.0* 11.6* 12.5   MCV 91 92 91    249 227    Most Recent 3 BMP's:  Recent Labs   Lab Test 02/18/22  0920 02/04/22  0634 04/08/19  2129    141 139   POTASSIUM 4.0 3.6 3.6   CHLORIDE 108 109 106   CO2 27 25 26   BUN 6* 3* 10   CR 0.66 0.70 0.74    ANIONGAP 3 7 7   JENNIE 8.8 8.6 8.8   * 104* 126*    Most Recent 2 LFT's:  Recent Labs   Lab Test 02/18/22  0920 02/04/22  0634   AST 16 13   ALT 17 17   ALKPHOS 73 69   BILITOTAL 0.4 0.6    Most Recent TSH and T4:  Recent Labs   Lab Test 02/18/22  0920   TSH 1.61   T4 1.01     I reviewed the above labs today.      Assessment and Plan:  1. Right sided ER+, [place]+, HER2-negative, MammaPrint high risk T=5.5cm, N=0 breast cancer.  Tori tolerated her first week with mild nausea and fatigue.  Her labs are appropriate today to move forward with week 2.  We discussed the big picture plan today including the chemotherapy with oral paclitaxel, Encequidar and dostarlimab x12 weeks followed by AC.      She is meeting with the surgical team in the upcoming weeks to discuss surgery.  She is also scheduled to see genetics.  After surgery she may need radiation and then she will need adjuvant hormonal therapy.  We briefly discussed this timeline today.    2.  Anemia.  Patient's hemoglobin was 11.6 the day of starting treatment.  She has a history of iron deficiency anemia.  I did check her iron stores today which are low.  She states she has not been taking her oral iron but will resume this.    3.  Nausea patient responded well to oral Zofran on days 1 through 3 in addition to dano tabs.  She has a history of heartburn thus did take Tums multiple times but may need to get started on famotidine or omeprazole depending upon how she does with the upcoming weeks.    4.  Patient had noticed some vaginal dryness and states she had been told she was in perimenopause.  She was given some ideas for topical relief through MyChart.    5.  Coping well good supportive  discussed this today.    Janette Mota PA-C  [facility]  [address], [place] [id number] [phone]    40 minutes spent on the date of the encounter doing chart review, review of outside records, review of test results, interpretation  of tests, patient visit and documentation

## 2022-02-28 NOTE — NURSING NOTE
"Oncology Rooming Note    February 28, 2022 12:06 PM   Tori Steele is a 46 year old female who presents for:    Chief Complaint   Patient presents with     Port Draw     Labs drawn via port by RN. Vitals taken.     Oncology Clinic Visit     rtn for breast cancer     Initial Vitals: /77 (BP Location: Right arm)   Pulse 98   Temp 98  F (36.7  C) (Oral)   Resp 16   Wt 69.4 kg (152 lb 14.4 oz)   SpO2 100%   BMI 26.59 kg/m   Estimated body mass index is 26.59 kg/m  as calculated from the following:    Height as of 2/18/22: 1.615 m (5' 3.58\").    Weight as of this encounter: 69.4 kg (152 lb 14.4 oz). Body surface area is 1.76 meters squared.  No Pain (0) Comment: Data Unavailable   No LMP recorded.  Allergies reviewed: Yes  Medications reviewed: Yes    Medications: Medication refills not needed today.  Pharmacy name entered into [employer]: [id number] [address] AT [facility] [address]    Clinical concerns: none       Isamar Zhang, EMT            "

## 2022-02-28 NOTE — PROGRESS NOTES
[facility]  [address] [place] [address]  Phone: [phone]  Fax: [phone]                      Oncology/Hematology Visit Note  Feb 28, 2022    Reason for Visit: follow up of breast cancer    History of Present Illness: Tori Steele is a 46 year old female with breat cancer. Right-sided 5.5 cm ER positive HI positive HER-2 negative breast cancer with associated DCIS.  Her MammaPrint came back as high risk.  She consented for the I-SPY clinical trial and has been randomized to the oral paclitaxel, Encequidar and dostarlimab arm.     She started 2/21/22.     Interval History:  Tori is here with her  today for week 2 of treatment.    She tolerated her first week fairly well.  She did have some fatigue after her first infusion of immunotherapy.  She took Zofran daily on days 1 2 and 3 of her oral Taxol.  She had an episode of constipation on day 6 that resolved with fluids and diet.  She needed to nap a couple times this last week due to fatigue which is unusual for her.    She did try to get in a couple of walks specifically on Saturday and felt more fatigued on Sunday.    No fever sweats or chills.  No chest or breathing concerns.  No neuropathy.  Skin is dry.    She is noticed vaginal dryness as well.    Review of Systems:  Patient denies any of the following except if noted above: fevers, chills, difficulty with energy, vision or hearing changes, chest pain, dyspnea, abdominal pain, nausea, vomiting, diarrhea, constipation, urinary concerns, headaches, numbness, tingling, issues with sleep or mood. He/She also denies lumps, bumps, rashes or skin lesions, bleeding or bruising issues.    Current Outpatient Medications   Medication Sig Dispense Refill     ferrous sulfate (FEROSUL) 325 (65 Fe) MG tablet Take 325 mg by mouth       lidocaine-prilocaine (EMLA) 2.5-2.5 % external cream Apply to port site one hour prior to access may start using six  days after port placement. 30 g 1     ondansetron (ZOFRAN) 8 MG tablet Take 1 tablet (8 mg) by mouth every 8 hours as needed for nausea 30 tablet 0     prochlorperazine (COMPAZINE) 10 MG tablet Take 1 tablet (10 mg) by mouth every 6 hours as needed (Nausea/Vomiting) 30 tablet 2     riboflavin (VITAMIN  B2) 25 MG TABS          Physical Examination: ECOG 1  General: The patient is a pleasant female in no acute distress.  /77 (BP Location: Right arm)   Pulse 98   Temp 98  F (36.7  C) (Oral)   Resp 16   Wt 69.4 kg (152 lb 14.4 oz)   SpO2 100%   BMI 26.59 kg/m    Wt Readings from Last 10 Encounters:   02/28/22 69.4 kg (152 lb 14.4 oz)   02/18/22 68 kg (150 lb)   02/18/22 68 kg (150 lb)   01/28/22 68 kg (150 lb)   11/18/20 72.6 kg (160 lb)   10/06/20 72.6 kg (160 lb)   10/02/20 73 kg (161 lb)     HEENT: EOMI, PERRL. Sclerae are anicteric. Oral mucosa is pink and moist with no lesions or thrush.   Lymph: Neck is supple with no lymphadenopathy in the cervical or supraclavicular areas.   Heart: Regular rate and rhythm.   Lungs: Clear to auscultation bilaterally.   Abdomen: Bowel sounds present, soft, nontender with no palpable hepatosplenomegaly or masses.   Extremities: No lower extremity edema noted bilaterally.   Neuro: Cranial nerves II through XII are grossly intact.  Skin: No rashes, petechiae, or bruising noted on exposed skin.  Breast: Right breast shows a mass at the 12 o'clock position 5 cm horizontally and 6 cm vertically    Laboratory Data:  Most Recent 3 CBC's:  Recent Labs   Lab Test 02/28/22  1201 02/18/22  0920 02/04/22  0634   WBC 5.4 4.5 5.4   HGB 11.0* 11.6* 12.5   MCV 91 92 91    249 227    Most Recent 3 BMP's:  Recent Labs   Lab Test 02/18/22  0920 02/04/22  0634 04/08/19  2129    141 139   POTASSIUM 4.0 3.6 3.6   CHLORIDE 108 109 106   CO2 27 25 26   BUN 6* 3* 10   CR 0.66 0.70 0.74   ANIONGAP 3 7 7   JENNIE 8.8 8.6 8.8   * 104* 126*    Most Recent 2 LFT's:  Recent Labs    Lab Test 02/18/22  0920 02/04/22  0634   AST 16 13   ALT 17 17   ALKPHOS 73 69   BILITOTAL 0.4 0.6    Most Recent TSH and T4:  Recent Labs   Lab Test 02/18/22  0920   TSH 1.61   T4 1.01     I reviewed the above labs today.      Assessment and Plan:  1. Right sided ER+, [place]+, HER2-negative, MammaPrint high risk T=5.5cm, N=0 breast cancer.  Tori tolerated her first week with mild nausea and fatigue.  Her labs are appropriate today to move forward with week 2.  We discussed the big picture plan today including the chemotherapy with oral paclitaxel, Encequidar and dostarlimab x12 weeks followed by AC.      She is meeting with the surgical team in the upcoming weeks to discuss surgery.  She is also scheduled to see genetics.  After surgery she may need radiation and then she will need adjuvant hormonal therapy.  We briefly discussed this timeline today.    2.  Anemia.  Patient's hemoglobin was 11.6 the day of starting treatment.  She has a history of iron deficiency anemia.  I did check her iron stores today which are low.  She states she has not been taking her oral iron but will resume this.    3.  Nausea patient responded well to oral Zofran on days 1 through 3 in addition to dano tabs.  She has a history of heartburn thus did take Tums multiple times but may need to get started on famotidine or omeprazole depending upon how she does with the upcoming weeks.    4.  Patient had noticed some vaginal dryness and states she had been told she was in perimenopause.  She was given some ideas for topical relief through MyChart.    5.  Coping well good supportive  discussed this today.    Janette Mota PA-C  [facility]  [address], [place] [id number] [phone]    40 minutes spent on the date of the encounter doing chart review, review of outside records, review of test results, interpretation of tests, patient visit and documentation

## 2022-03-02 ENCOUNTER — OFFICE VISIT (OUTPATIENT)
Dept: RADIATION ONCOLOGY | Facility: CLINIC | Age: 47
End: 2022-03-02
Attending: INTERNAL MEDICINE
Payer: OTHER GOVERNMENT

## 2022-03-02 VITALS
WEIGHT: 151.2 LBS | DIASTOLIC BLOOD PRESSURE: 61 MMHG | SYSTOLIC BLOOD PRESSURE: 125 MMHG | BODY MASS INDEX: 26.3 KG/M2 | OXYGEN SATURATION: 100 % | HEART RATE: 98 BPM

## 2022-03-02 DIAGNOSIS — Z17.0 MALIGNANT NEOPLASM OF CENTRAL PORTION OF RIGHT BREAST IN FEMALE, ESTROGEN RECEPTOR POSITIVE (H): ICD-10-CM

## 2022-03-02 DIAGNOSIS — C50.111 MALIGNANT NEOPLASM OF CENTRAL PORTION OF RIGHT BREAST IN FEMALE, ESTROGEN RECEPTOR POSITIVE (H): ICD-10-CM

## 2022-03-02 PROCEDURE — G0463 HOSPITAL OUTPT CLINIC VISIT: HCPCS | Performed by: RADIOLOGY

## 2022-03-02 ASSESSMENT — ENCOUNTER SYMPTOMS
FALLS: 0
WEIGHT LOSS: 0
HEADACHES: 0
HEMATURIA: 0
NAUSEA: 1
BLOOD IN STOOL: 0
DIAPHORESIS: 0
FREQUENCY: 0
BACK PAIN: 0
CONSTIPATION: 0
FEVER: 0
DIZZINESS: 0
SORE THROAT: 0
COUGH: 0
DIARRHEA: 1
TINGLING: 1
DEPRESSION: 0
CHILLS: 0
SHORTNESS OF BREATH: 0
VOMITING: 0
BLURRED VISION: 0
INSOMNIA: 0
NERVOUS/ANXIOUS: 1
EYE PAIN: 0
DOUBLE VISION: 0
NECK PAIN: 0
SEIZURES: 0
BRUISES/BLEEDS EASILY: 0

## 2022-03-02 NOTE — LETTER
3/2/2022         RE: Tori Steele  [address] [facility] [place] [address]        Dear Colleague,    Thank you for referring your patient, Tori Steele, to the [facility] RADIATION ONCOLOGY. Please see a copy of my visit note below.       Department of Radiation Oncology  [facility]  [address] [place] [address]  [phone]       Consultation Note    Name: Tori Steele MRN: [id number]   : 1975   Date of Service: Mar 2, 2022 Referring: Dr. Lockett      Reason for consultation: Discussion of radiation treatment options for management of stage IIB, cT3 N0 M0 invasive ductal carcinoma of the right breast, [place] Grade 3, ER+/NM+/HER2-    History of Present Illness   Ms. Steele is a  46 year old premenopausal female with a recent diagnosis of locally advanced RIGHT breast cancer. She was referred from medical oncology for discussion of radiation treatment options.    She had a bilateral screening mammogram with tomosynthesis on 22, which showed a 12:00 position mass at mid depth with associated calcifications, a change from previous mammogram in 2018. There was no suspicious finding in her left breast.A subsequent right diagnostic mammogram on 22 showed a persistent irregular mass. A complementary ultrasound was demonstrated a corresponding irregular hypoechoic mass with internal vascularity located at 12 o'clock position, 1 cm from the nipple measuring 2.5 x 1.5 x 1.7 cm in transverse and longitudinal views, and up to 3.2 cm in greatest oblique dimension (BI-RADS 5).    Ultrasound core guided biopsy was obtained on the same day with pathology revealing ([id number]) invasive ductal carcinoma, [place] grade 3, with associated DCIS, nuclear grade 3, cribriform and solid types with comedonecrosis. There was suspicious angiolymphatic invasion. Invasive component was ER positive (83%), NM positive (79%), HER-2 negative (IHC 1+), Ki-67  was 25%. The pathology was reviewed here at [facility] on 2/3/2022. Pathology ([id number]) was consistent with the primary reading except for a slightly lower Ki-67 proliferative index of 15%.    Ms. Steele established care with Dr. Russ of Surgical Oncology at [facility]. Exam revealed a mobile mass about 3 cm in size. She underwent right axillary ultrasound on 1/24/22 that showed no evidence of malignancy. She then had a bilateral breast MRI on 1/26/2022 that showed the biopsy-proven cancer as primarily a non-mass enhancement measuring 4.1 x 3.3 x 4.6 cm centered at 12-1:00 middle depth. There were 2 adjacent low axillary/intramammary lymph nodes in the upper outer quadrant at 10 o'clock position posterior depth measuring 5 mm and 7 mm with no suspicious features. Incidentally seen was partially images exophytic mass arising from the right hepatic lobe. Additional imaging was recommended.     She was seen by Dr. Lockett at [facility] on 1/28/2022 at which time gene expression profiling and potential enrollment into I SPY-2 clinical trial was discussed. MammaPrint was sent and came back as ultra high risk (index: -0.329), blueprint was luminal B.    I-SPY MRI was obtained on 2/3/2022 which redemonstrated the non-mass enhancement area measuring 5.5 cm extending from 12:30 anteriorly to 1:30 posteriorly. No suspicious lesions in the left breast or bilateral axillary lymph nodes were detected.  CT Chest/Abdomen/Pelvis on 2/4/2022 showed no evidence of distant metastasis.  There was a stable 1.5 cm lesion in the left hepatic dome with imaging features most suggestive of a benign hemangioma, stable since 2019.    She ultimately decided to participate in I SPY-2 study.  She was randomized to Dostarlimab arm.  The patient was referred to us for discussion of radiation treatment options.  The patient is going to meet with Dr. Layton at surgical oncology (3/4/2022) and genetic counseling  (2022) for further discussion.    On interview today, she was accompanied by her .  She was extremely anxious and could not hold her tears multiple times during our conversation. She stated that she has been tolerating her treatment well.  She reported occasional nausea for which she takes Zofran 8 mg and dano daily with good control.  She also reported occasional episodes of diarrhea that are manageable without medication.    Past Medical History:  Past Medical History:   Diagnosis Date     Sinus tachycardia        Past Surgical History:  Past Surgical History:   Procedure Laterality Date     DILATION AND CURETTAGE       INSERT PORT VASCULAR ACCESS Left 2022    Procedure: INSERTION, VASCULAR ACCESS PORT;  Surgeon: Elliott Ramirez PA-C;  Location: [facility] OR     IR CHEST PORT PLACEMENT > 5 YRS OF AGE  2022     Chemotherapy History:  None     Radiation History:  None     Pregnant: No  Implanted Cardiac Devices: No    Medications:  Current Outpatient Medications   Medication     ferrous sulfate (FEROSUL) 325 (65 Fe) MG tablet     lidocaine-prilocaine (EMLA) 2.5-2.5 % external cream     ondansetron (ZOFRAN) 8 MG tablet     prochlorperazine (COMPAZINE) 10 MG tablet     riboflavin (VITAMIN  B2) 25 MG TABS     study - encequidar (IDS# 3903) 15 MG tablet     study - paclitaxel (IDS# 3903) 30 MG capsule     No current facility-administered medications for this visit.     Allergies:  NKA    Gynecological History:  , both vaginal deliveries. First full-term pregnancy at age 31.   Breast feeding x 6 months   Used oral contraceptive in the past, on and off for five years. No history of HRT.  Menarche at the age of 14.  She had FSH in 2021, which came back 33.8; she has regular menstruation. She has fibroids.     Social History:  Tobacco: Never  Alcohol: Yes, 1 drink per week.    with 2 children, age 14 (daughter) and age 12 (son). Lives in [place]. Originally from [place].   Homemaker,  previously worked as a speech therapist.     Family History:  Maternal grandmother: breast cancer diagnosed in her 70s  No family history of ovarian cancer       Review of Systems   A 10-point review of systems was performed. Pertinent findings are noted in the HPI.    Physical Exam   ECOG Status: 1    Vitals:  /61   Pulse 98   Wt 68.6 kg (151 lb 3.2 oz)   SpO2 100%   BMI 26.30 kg/m      PHYSICAL EXAMINATION:    Gen: Alert, in NAD  Eyes: EOMI, sclera anicteric, PERRL  HENT      Head: NC/AT     Ears: No external auricular lesions     Nose/sinus: No rhinorrhea or epistaxis     Pulm: Breathing comfortably on room air, no audible wheezes or ronchi  CV: Well-perfused, no cyanosis  Breast:  Deferred     Imaging/Path/Labs   Imaging: Reviewed      Path: Reviewed    Labs: Reviewed    Assessment and Plan     Ms. Steele is a 46 year old premenopausal female with stage IIB, cT3 N0 M0 invasive ductal carcinoma of the UIQ of the right breast, [place] Grade 3, ER+/MS+/HER2-. MammaPrint is ultra high risk (index: -0.329). She is currently enrolled into I-SPY 2 trial (Dostarlimab arm) and just began treatment.     She is going to meet with Dr. Layton this Friday for discussion of her surgical options (mastectomy versus lumpectomy). Assuming her genetic testing does not reveal any deleterious mutation, she may be a candidate for lumpectomy if she demonstrates a good response to neoadjuvant therapy. If a lumpectomy is performed, adjuvant radiation therapy to the right breast will be recommended to reduce local recurrence. The addition of the andrae field will depend on the pathology of her sentinel lymph node(s).      If she ends up with a mastectomy, the indication for radiation will depend on the size of the residual tumor, the margin status, and more importantly the status of her sentinel lymph node. If there is any tumor in the nodes, or evidence of previous involvement, PMRT will likely be recommended. If a unilateral  mastectomy is performed, she is interested in reconstruction. I told her that given no initial andrae involvement, if she desires immediate reconstruction (hence a relatively smaller chance of requiring radiation therapy), it could be an reasonable option.    We discussed broadly the rationale, logistics, alternatives and potential acute and chronic radiation related side effects. We will follow her progress, and review her surgical pathology. Depending on the type of surgery and pathologic finding, we will arrangement her follow up visit.     The patient and her  had many questions during our conversation that were answered to their satisfaction and verbalized understanding.     The patient was seen and discussed with staff, Dr. Whitehead.     Manuel Manning MD  PGY-5 Resident, Radiation Oncology  [facility]  Phone:[phone]  Pager:[phone]    I saw and examined the patient with the resident.  I have reviewed and edited the resident's note and agree with the plan of care.      I reviewed patient's chart, internal/external medical records, imaging studies (including actual images), labs and pathology reports.  I interviewed and counseled the patient face to face.      80 minutes were spent on the date of the encounter doing chart review, history and exam, documentation and further activities as noted above.           Katina Whitehead MD          HPI  INITIAL PATIENT ASSESSMENT    Diagnosis: Breast cancer    Prior radiation therapy: None    Prior chemotherapy: I SPY trial, oral paclitaxol, Encequidar and dostarlimab arm started.     Prior hormonal therapy:Used oral contraceptive in the past, on and off for five years. No history of HR.     Pain Eval:  Denies    Psychosocial  Living arrangements: Family  Fall Risk: independent   referral needs: Not needed    Advanced Directive: No  Implantable Cardiac Device? No    Onset of menarche: age 14  LMP: No LMP recorded.  Onset of  menopause: perimenopause  Abnormal vaginal bleeding/discharge: No  Are you pregnant? No, pregnancy test pre chemo  Reproductive note: 2 children    Nurse face-to-face time: Level 4:  15 min face to face time    Review of Systems   Constitutional: Positive for malaise/fatigue (r/t chemo). Negative for chills, diaphoresis, fever and weight loss.   HENT: Negative for ear pain, nosebleeds and sore throat.    Eyes: Negative for blurred vision, double vision and pain.   Respiratory: Negative for cough and shortness of breath.    Cardiovascular: Negative for chest pain and leg swelling.   Gastrointestinal: Positive for diarrhea (controlled) and nausea (coverd with meds and dano). Negative for blood in stool, constipation and vomiting.   Genitourinary: Negative for frequency, hematuria and urgency.   Musculoskeletal: Negative for back pain, falls, joint pain and neck pain.   Skin: Negative for rash.   Neurological: Positive for tingling (Bilateral hands and feet, spiratic in the last week. ). Negative for dizziness, seizures and headaches.   Endo/Heme/Allergies: Does not bruise/bleed easily.   Psychiatric/Behavioral: Negative for depression. The patient is nervous/anxious (with appointments). The patient does not have insomnia.              Again, thank you for allowing me to participate in the care of your patient.      Sincerely,    Katina Whitehead MD

## 2022-03-02 NOTE — PROGRESS NOTES
HPI  INITIAL PATIENT ASSESSMENT    Diagnosis: Breast cancer    Prior radiation therapy: None    Prior chemotherapy: I SPY trial, oral paclitaxol, Encequidar and dostarlimab arm started.     Prior hormonal therapy:Used oral contraceptive in the past, on and off for five years. No history of HR.     Pain Eval:  Denies    Psychosocial  Living arrangements: Family  Fall Risk: independent   referral needs: Not needed    Advanced Directive: No  Implantable Cardiac Device? No    Onset of menarche: age 14  LMP: No LMP recorded.  Onset of menopause: perimenopause  Abnormal vaginal bleeding/discharge: No  Are you pregnant? No, pregnancy test pre chemo  Reproductive note: 2 children    Nurse face-to-face time: Level 4:  15 min face to face time    Review of Systems   Constitutional: Positive for malaise/fatigue (r/t chemo). Negative for chills, diaphoresis, fever and weight loss.   HENT: Negative for ear pain, nosebleeds and sore throat.    Eyes: Negative for blurred vision, double vision and pain.   Respiratory: Negative for cough and shortness of breath.    Cardiovascular: Negative for chest pain and leg swelling.   Gastrointestinal: Positive for diarrhea (controlled) and nausea (coverd with meds and dano). Negative for blood in stool, constipation and vomiting.   Genitourinary: Negative for frequency, hematuria and urgency.   Musculoskeletal: Negative for back pain, falls, joint pain and neck pain.   Skin: Negative for rash.   Neurological: Positive for tingling (Bilateral hands and feet, spiratic in the last week. ). Negative for dizziness, seizures and headaches.   Endo/Heme/Allergies: Does not bruise/bleed easily.   Psychiatric/Behavioral: Negative for depression. The patient is nervous/anxious (with appointments). The patient does not have insomnia.

## 2022-03-02 NOTE — TELEPHONE ENCOUNTER
RECORDS STATUS - BREAST    [facility]   DATE REQUESTED: 3/2   NOTES DETAILS STATUS   OFFICE NOTE from referring provider [facility] Dr. Keegan Lockett   OFFICE NOTE from medical oncologist [facility] 2/18/22   OFFICE NOTE from surgeon [facility] 1/27/22   MEDICATION LIST [facility]    CLINICAL TRIAL TREATMENTS TO DATE [facility] 2/21/22   LABS     PATHOLOGY REPORTS  (Tissue diagnosis, Stage, ER/AL percentage positive and intensity of staining, HER2 IHC, FISH, and all biopsies from breast and any distant metastasis)                 [facility] 2/3/22: Path Consult   IMAGING (NEED IMAGES & REPORT)     CT SCANS PACS 2/7/22: [facility]   MRI PACS 2/3/22: [facility]    1/26/22: [facility]   MAMMO PACS 1/17/22 - 7/24/18: [facility]   ULTRASOUND PACS 2/7/22: [facility]    1/24/22 - 1/17/22: [facility]   BRAIN MRI PACS 9/18/20: [facility]

## 2022-03-04 ENCOUNTER — ONCOLOGY VISIT (OUTPATIENT)
Dept: ONCOLOGY | Facility: CLINIC | Age: 47
End: 2022-03-04
Attending: SURGERY
Payer: OTHER GOVERNMENT

## 2022-03-04 ENCOUNTER — PRE VISIT (OUTPATIENT)
Dept: ONCOLOGY | Facility: CLINIC | Age: 47
End: 2022-03-04
Payer: OTHER GOVERNMENT

## 2022-03-04 VITALS
HEIGHT: 64 IN | HEART RATE: 124 BPM | WEIGHT: 151 LBS | DIASTOLIC BLOOD PRESSURE: 63 MMHG | BODY MASS INDEX: 25.78 KG/M2 | OXYGEN SATURATION: 100 % | SYSTOLIC BLOOD PRESSURE: 117 MMHG

## 2022-03-04 DIAGNOSIS — C50.111 MALIGNANT NEOPLASM OF CENTRAL PORTION OF RIGHT BREAST IN FEMALE, ESTROGEN RECEPTOR POSITIVE (H): ICD-10-CM

## 2022-03-04 DIAGNOSIS — Z17.0 MALIGNANT NEOPLASM OF CENTRAL PORTION OF RIGHT BREAST IN FEMALE, ESTROGEN RECEPTOR POSITIVE (H): ICD-10-CM

## 2022-03-04 PROCEDURE — G0463 HOSPITAL OUTPT CLINIC VISIT: HCPCS

## 2022-03-04 PROCEDURE — 99204 OFFICE O/P NEW MOD 45 MIN: CPT | Performed by: SURGERY

## 2022-03-04 ASSESSMENT — PAIN SCALES - GENERAL: PAINLEVEL: NO PAIN (0)

## 2022-03-04 NOTE — LETTER
3/4/2022         RE: Tori Steele  [address] [facility] [address]        Dear Colleague,    Thank you for referring your patient, Tori Steele, to the [facility] BREAST [facility]. Please see a copy of my visit note below.    HISTORY OF PRESENT ILLNESS:  Tori Steele is a 46-year-old woman I was asked to see at the request of Dr. Kehinde Lockett for evaluation of a new right-sided breast cancer.  She underwent a screening mammogram, which revealed an abnormality in her right breast.  She eventually underwent a breast MRI, which demonstrated a 5.5 cm abnormality in her right breast.  A needle biopsy demonstrated a grade 3 invasive ductal cancer, estrogen-receptor positive, progesterone-receptor positive, HER-2 negative.  She had a MammaPrint test, which was high risk.  Her MRI showed no evidence of lymphadenopathy.  She had a CT scan of the chest, abdomen and pelvis, which was negative.  She is enrolled in the I-SPY 2 trial.  She has a genetic counseling appointment in April.  She has had no breast problems in the past.    PAST MEDICAL HISTORY:  Significant for no major medical problems.    FAMILY HISTORY:  Significant for a maternal grandmother with breast cancer.    PHYSICAL EXAMINATION:  She is a well-appearing woman in no apparent distress.  She has a port in her left chest.  She has otherwise no left breast masses or lymphadenopathy.  She does have a palpable mass in the right breast at the 12 o'clock position.  She has no lymphadenopathy.    IMPRESSION:  T2/T3N0M0 breast cancer.    PLAN:  We talked about the various treatment options, including a mastectomy with or without reconstruction versus a lumpectomy with radiation therapy.  I did recommend a sentinel lymph node biopsy.  She is going to get the chemotherapy and endocrine therapy. If she has a good response from neoadjuvant chemotherapy, then she could be a candidate for breast-conserving surgery.  I will see her later on  during her chemotherapy.    Total time 50 minutes, which included in-person visit, reviewing her imaging and coordinating her care.        Again, thank you for allowing me to participate in the care of your patient.      Sincerely,    Gurpreet Layton MD

## 2022-03-04 NOTE — PROGRESS NOTES
HISTORY OF PRESENT ILLNESS:  Tori Steele is a 46-year-old woman I was asked to see at the request of Dr. Kehinde Lockett for evaluation of a new right-sided breast cancer.  She underwent a screening mammogram, which revealed an abnormality in her right breast.  She eventually underwent a breast MRI, which demonstrated a 5.5 cm abnormality in her right breast.  A needle biopsy demonstrated a grade 3 invasive ductal cancer, estrogen-receptor positive, progesterone-receptor positive, HER-2 negative.  She had a MammaPrint test, which was high risk.  Her MRI showed no evidence of lymphadenopathy.  She had a CT scan of the chest, abdomen and pelvis, which was negative.  She is enrolled in the I-SPY 2 trial.  She has a genetic counseling appointment in April.  She has had no breast problems in the past.    PAST MEDICAL HISTORY:  Significant for no major medical problems.    FAMILY HISTORY:  Significant for a maternal grandmother with breast cancer.    PHYSICAL EXAMINATION:  She is a well-appearing woman in no apparent distress.  She has a port in her left chest.  She has otherwise no left breast masses or lymphadenopathy.  She does have a palpable mass in the right breast at the 12 o'clock position.  She has no lymphadenopathy.    IMPRESSION:  T2/T3N0M0 breast cancer.    PLAN:  We talked about the various treatment options, including a mastectomy with or without reconstruction versus a lumpectomy with radiation therapy.  I did recommend a sentinel lymph node biopsy.  She is going to get the chemotherapy and endocrine therapy. If she has a good response from neoadjuvant chemotherapy, then she could be a candidate for breast-conserving surgery.  I will see her later on during her chemotherapy.    Total time 50 minutes, which included in-person visit, reviewing her imaging and coordinating her care.

## 2022-03-04 NOTE — NURSING NOTE
"Oncology Rooming Note    March 4, 2022 9:35 AM   Tori Steele is a 46 year old female who presents for:    Chief Complaint   Patient presents with     Oncology Clinic Visit     breast cancer     Initial Vitals: /63   Pulse (!) 124   Ht 1.626 m (5' 4\")   Wt 68.5 kg (151 lb)   SpO2 100%   BMI 25.92 kg/m   Estimated body mass index is 25.92 kg/m  as calculated from the following:    Height as of this encounter: 1.626 m (5' 4\").    Weight as of this encounter: 68.5 kg (151 lb). Body surface area is 1.76 meters squared.  No Pain (0) Comment: Data Unavailable   No LMP recorded.  Allergies reviewed: Yes  Medications reviewed: Yes    Medications: Medication refills not needed today.  Pharmacy name entered into aWhere: [id number] - [place], [place] - [address] AT [facility]    Clinical concerns: none       Jojo Baca CMA            "

## 2022-03-07 ENCOUNTER — ONCOLOGY VISIT (OUTPATIENT)
Dept: ONCOLOGY | Facility: CLINIC | Age: 47
End: 2022-03-07
Attending: PHYSICIAN ASSISTANT
Payer: OTHER GOVERNMENT

## 2022-03-07 ENCOUNTER — LAB (OUTPATIENT)
Dept: LAB | Facility: CLINIC | Age: 47
End: 2022-03-07
Attending: PHYSICIAN ASSISTANT
Payer: OTHER GOVERNMENT

## 2022-03-07 ENCOUNTER — RESEARCH ENCOUNTER (OUTPATIENT)
Dept: ONCOLOGY | Facility: CLINIC | Age: 47
End: 2022-03-07

## 2022-03-07 VITALS
HEART RATE: 104 BPM | SYSTOLIC BLOOD PRESSURE: 116 MMHG | DIASTOLIC BLOOD PRESSURE: 74 MMHG | TEMPERATURE: 98.2 F | WEIGHT: 152.1 LBS | BODY MASS INDEX: 26.11 KG/M2 | OXYGEN SATURATION: 100 % | RESPIRATION RATE: 16 BRPM

## 2022-03-07 DIAGNOSIS — C50.911 INVASIVE DUCTAL CARCINOMA OF RIGHT BREAST (H): Primary | ICD-10-CM

## 2022-03-07 LAB
ALBUMIN SERPL-MCNC: 3.3 G/DL (ref 3.4–5)
ALP SERPL-CCNC: 71 U/L (ref 40–150)
ALT SERPL W P-5'-P-CCNC: 22 U/L (ref 0–50)
ANION GAP SERPL CALCULATED.3IONS-SCNC: 8 MMOL/L (ref 3–14)
AST SERPL W P-5'-P-CCNC: 14 U/L (ref 0–45)
BASOPHILS # BLD AUTO: 0 10E3/UL (ref 0–0.2)
BASOPHILS NFR BLD AUTO: 1 %
BILIRUB SERPL-MCNC: 0.6 MG/DL (ref 0.2–1.3)
BUN SERPL-MCNC: 8 MG/DL (ref 7–30)
CALCIUM SERPL-MCNC: 8.3 MG/DL (ref 8.5–10.1)
CHLORIDE BLD-SCNC: 107 MMOL/L (ref 94–109)
CO2 SERPL-SCNC: 26 MMOL/L (ref 20–32)
CREAT SERPL-MCNC: 0.62 MG/DL (ref 0.52–1.04)
EOSINOPHIL # BLD AUTO: 0.3 10E3/UL (ref 0–0.7)
EOSINOPHIL NFR BLD AUTO: 6 %
ERYTHROCYTE [DISTWIDTH] IN BLOOD BY AUTOMATED COUNT: 12.7 % (ref 10–15)
GFR SERPL CREATININE-BSD FRML MDRD: >90 ML/MIN/1.73M2
GLUCOSE BLD-MCNC: 125 MG/DL (ref 70–99)
HCT VFR BLD AUTO: 36.7 % (ref 35–47)
HGB BLD-MCNC: 11.6 G/DL (ref 11.7–15.7)
IMM GRANULOCYTES # BLD: 0 10E3/UL
IMM GRANULOCYTES NFR BLD: 0 %
LYMPHOCYTES # BLD AUTO: 0.9 10E3/UL (ref 0.8–5.3)
LYMPHOCYTES NFR BLD AUTO: 21 %
MCH RBC QN AUTO: 29.1 PG (ref 26.5–33)
MCHC RBC AUTO-ENTMCNC: 31.6 G/DL (ref 31.5–36.5)
MCV RBC AUTO: 92 FL (ref 78–100)
MONOCYTES # BLD AUTO: 0.2 10E3/UL (ref 0–1.3)
MONOCYTES NFR BLD AUTO: 4 %
NEUTROPHILS # BLD AUTO: 3 10E3/UL (ref 1.6–8.3)
NEUTROPHILS NFR BLD AUTO: 68 %
NRBC # BLD AUTO: 0 10E3/UL
NRBC BLD AUTO-RTO: 0 /100
PLATELET # BLD AUTO: 262 10E3/UL (ref 150–450)
POTASSIUM BLD-SCNC: 3.9 MMOL/L (ref 3.4–5.3)
PROT SERPL-MCNC: 7.5 G/DL (ref 6.8–8.8)
RBC # BLD AUTO: 3.99 10E6/UL (ref 3.8–5.2)
SODIUM SERPL-SCNC: 141 MMOL/L (ref 133–144)
WBC # BLD AUTO: 4.3 10E3/UL (ref 4–11)

## 2022-03-07 PROCEDURE — G0463 HOSPITAL OUTPT CLINIC VISIT: HCPCS

## 2022-03-07 PROCEDURE — 99215 OFFICE O/P EST HI 40 MIN: CPT | Performed by: PHYSICIAN ASSISTANT

## 2022-03-07 PROCEDURE — 80053 COMPREHEN METABOLIC PANEL: CPT | Performed by: PHYSICIAN ASSISTANT

## 2022-03-07 PROCEDURE — 85025 COMPLETE CBC W/AUTO DIFF WBC: CPT | Performed by: PHYSICIAN ASSISTANT

## 2022-03-07 PROCEDURE — 36415 COLL VENOUS BLD VENIPUNCTURE: CPT

## 2022-03-07 RX ORDER — HEPARIN SODIUM (PORCINE) LOCK FLUSH IV SOLN 100 UNIT/ML 100 UNIT/ML
5 SOLUTION INTRAVENOUS
Status: CANCELLED | OUTPATIENT
Start: 2022-03-07

## 2022-03-07 RX ORDER — HEPARIN SODIUM (PORCINE) LOCK FLUSH IV SOLN 100 UNIT/ML 100 UNIT/ML
5 SOLUTION INTRAVENOUS DAILY PRN
Status: DISCONTINUED | OUTPATIENT
Start: 2022-03-07 | End: 2022-03-07 | Stop reason: HOSPADM

## 2022-03-07 RX ORDER — MEPERIDINE HYDROCHLORIDE 25 MG/ML
25 INJECTION INTRAMUSCULAR; INTRAVENOUS; SUBCUTANEOUS EVERY 30 MIN PRN
Status: CANCELLED | OUTPATIENT
Start: 2022-03-07

## 2022-03-07 RX ORDER — LORAZEPAM 2 MG/ML
0.5 INJECTION INTRAMUSCULAR EVERY 4 HOURS PRN
Status: CANCELLED | OUTPATIENT
Start: 2022-03-07

## 2022-03-07 RX ORDER — NALOXONE HYDROCHLORIDE 0.4 MG/ML
0.2 INJECTION, SOLUTION INTRAMUSCULAR; INTRAVENOUS; SUBCUTANEOUS
Status: CANCELLED | OUTPATIENT
Start: 2022-03-07

## 2022-03-07 RX ORDER — METHYLPREDNISOLONE SODIUM SUCCINATE 125 MG/2ML
125 INJECTION, POWDER, LYOPHILIZED, FOR SOLUTION INTRAMUSCULAR; INTRAVENOUS
Status: CANCELLED
Start: 2022-03-07

## 2022-03-07 RX ORDER — HEPARIN SODIUM,PORCINE 10 UNIT/ML
5 VIAL (ML) INTRAVENOUS
Status: CANCELLED | OUTPATIENT
Start: 2022-03-07

## 2022-03-07 RX ORDER — ALBUTEROL SULFATE 90 UG/1
1-2 AEROSOL, METERED RESPIRATORY (INHALATION)
Status: CANCELLED
Start: 2022-03-07

## 2022-03-07 RX ORDER — ALBUTEROL SULFATE 0.83 MG/ML
2.5 SOLUTION RESPIRATORY (INHALATION)
Status: CANCELLED | OUTPATIENT
Start: 2022-03-07

## 2022-03-07 RX ORDER — EPINEPHRINE 1 MG/ML
0.3 INJECTION, SOLUTION INTRAMUSCULAR; SUBCUTANEOUS EVERY 5 MIN PRN
Status: CANCELLED | OUTPATIENT
Start: 2022-03-07

## 2022-03-07 RX ORDER — DEXAMETHASONE SODIUM PHOSPHATE 10 MG/ML
10 INJECTION, SOLUTION INTRAMUSCULAR; INTRAVENOUS
Status: CANCELLED
Start: 2022-03-07

## 2022-03-07 RX ORDER — DIPHENHYDRAMINE HYDROCHLORIDE 50 MG/ML
50 INJECTION INTRAMUSCULAR; INTRAVENOUS
Status: CANCELLED
Start: 2022-03-07

## 2022-03-07 ASSESSMENT — PAIN SCALES - GENERAL: PAINLEVEL: NO PAIN (0)

## 2022-03-07 NOTE — PROGRESS NOTES
[facility]INIC  [address] [facility] [address]  Phone: [phone]  Fax: [phone]                      Oncology/Hematology Visit Note  Mar 7, 2022    Reason for Visit: follow up of breast cancer    History of Present Illness: Tori Steele is a 46 year old female with breat cancer. Right-sided 5.5 cm ER positive AL positive HER-2 negative breast cancer with associated DCIS.  Her MammaPrint came back as high risk.  She consented for the I-SPY clinical trial and has been randomized to the oral paclitaxel, Encequidar and dostarlimab arm.     She started 2/21/22.     Interval History:  Tori is here alone for week 3 of treatment.  She had a busy week last week meeting with Dr Layton and Dr Whitehead- she felt in good hands with both, however learned she was Mamma print high- hadn't heard that before and that gave her anxiety.    She had loose stools last week - would develop abdominal cramps followed by a watery-mushy stool.  Eating a bland diet.  Had only crackers on Saturday.  No nausea, taking Zofran once in the AM days 1-3.  She had ~3 stools a day and this was fairly consistent the entire week.  She was unsure if her anxiety made this worse.     Tori did not have fatigue this week, she was glad about that.  She denies mucositis, gerd, major nausea.    No fever sweats or chills.  No chest or breathing concerns.  No neuropathy.  Skin is dry.    She is noticed vaginal dryness as well last week, not so noticeable this week.    Review of Systems:  Patient denies any of the following except if noted above: fevers, chills, difficulty with energy, vision or hearing changes, chest pain, dyspnea, abdominal pain, nausea, vomiting, diarrhea, constipation, urinary concerns, headaches, numbness, tingling, issues with sleep or mood. He/She also denies lumps, bumps, rashes or skin lesions, bleeding or bruising issues.    Current Outpatient Medications   Medication Sig Dispense  Refill     ferrous sulfate (FEROSUL) 325 (65 Fe) MG tablet Take 325 mg by mouth       lidocaine-prilocaine (EMLA) 2.5-2.5 % external cream Apply to port site one hour prior to access may start using six days after port placement. 30 g 1     ondansetron (ZOFRAN) 8 MG tablet Take 1 tablet (8 mg) by mouth every 8 hours as needed for nausea 30 tablet 0     prochlorperazine (COMPAZINE) 10 MG tablet Take 1 tablet (10 mg) by mouth every 6 hours as needed (Nausea/Vomiting) 30 tablet 2     riboflavin (VITAMIN  B2) 25 MG TABS          Physical Examination: ECOG 1  General: The patient is a pleasant female in no acute distress.  /74   Pulse 104   Temp 98.2  F (36.8  C) (Oral)   Resp 16   Wt 69 kg (152 lb 1.6 oz)   SpO2 100%   BMI 26.11 kg/m    Wt Readings from Last 10 Encounters:   03/07/22 69 kg (152 lb 1.6 oz)   03/04/22 68.5 kg (151 lb)   03/02/22 68.6 kg (151 lb 3.2 oz)   02/28/22 69.4 kg (152 lb 14.4 oz)   02/18/22 68 kg (150 lb)   02/18/22 68 kg (150 lb)   01/28/22 68 kg (150 lb)   11/18/20 72.6 kg (160 lb)   10/06/20 72.6 kg (160 lb)   10/02/20 73 kg (161 lb)     HEENT: EOMI, PERRL. Sclerae are anicteric. Oral mucosa is pink and moist with no lesions or thrush.   Lymph: Neck is supple with no lymphadenopathy in the cervical or supraclavicular areas.   Heart: Regular rate and rhythm.   Lungs: Clear to auscultation bilaterally.   Abdomen: Bowel sounds present, soft, nontender with no palpable hepatosplenomegaly or masses.   Extremities: No lower extremity edema noted bilaterally.   Neuro: Cranial nerves II through XII are grossly intact.  Skin: No rashes, petechiae, or bruising noted on exposed skin.  Breast: Right breast shows a mass at the 12 o'clock position (Baseline: 5 cm horizontally and 6 cm vertically) today improved 4.5 cm H x 5 cm V    Laboratory Data:  Most Recent 3 CBC's:  Recent Labs   Lab Test 03/07/22  0724 02/28/22  1201 02/18/22  0920   WBC 4.3 5.4 4.5   HGB 11.6* 11.0* 11.6*   MCV 92 91 92     220 249    Most Recent 3 BMP's:  Recent Labs   Lab Test 02/28/22  1201 02/18/22  0920 02/04/22  0634    138 141   POTASSIUM 4.1 4.0 3.6   CHLORIDE 106 108 109   CO2 27 27 25   BUN 9 6* 3*   CR 0.64 0.66 0.70   ANIONGAP 7 3 7   JENNIE 8.9 8.8 8.6   * 111* 104*    Most Recent 2 LFT's:  Recent Labs   Lab Test 02/28/22  1201 02/18/22  0920   AST 14 16   ALT 16 17   ALKPHOS 76 73   BILITOTAL 0.6 0.4    Most Recent TSH and T4:  Recent Labs   Lab Test 02/18/22  0920   TSH 1.61   T4 1.01     I reviewed the above labs today.      Assessment and Plan:  1. Right sided ER+, WA+, HER2-negative, MammaPrint high risk T=5.5cm, N=0 breast cancer.  Tori tolerated her second week with less fatigue and controlled nausea, but daily ~3 episodes of diarrhea.  Her labs are appropriate today to move forward with week 3.  We discussed the big picture plan today including the chemotherapy with oral paclitaxel, Encequidar and dostarlimab x12 weeks followed by AC.  At this time she is mentally preparing for a mastectomy, but would consider a lumpectomy if optimal tumor shrinkage. She met with Dr Layton and Dr Whitehead.      She is meeting with the surgical team in the upcoming weeks to discuss surgery.  She is also scheduled to see genetics in April.  After surgery she may need radiation and then she will need adjuvant hormonal therapy.  Tori had not previously heard that she was mammaprint high.  We discussed this today as well.     Breast tumor smaller today on exam.  She will have a MRI tomorrow and see Dr Lockett on Friday.     2.  Anemia.  Patient's hemoglobin was 11.6 the day of starting treatment.  She has a history of iron deficiency anemia.  Iron stores are low.  Will have her take oral iron M, W and F each week.      3.  GI:   Nausea patient responded well to oral Zofran on days 1 through 3 in addition to dano tabs.  Tums prn.  Diarrhea, discussed adding in soluble fiber or probiotics and can try small amounts of  imodium.  [place] diet has helped as well.     4.  Patient had noticed some vaginal dryness and states she had been told she was in perimenopause. Less of an issue this week    5.  Coping well good supportive .  Seeing a therapist as well. Discussed coping strategies with taking oral pills daily and hair loss.     Janette Mota PA-C  [facility]  [address] [place] [address]  [phone]    40 minutes spent on the date of the encounter doing chart review, review of outside records, review of test results, interpretation of tests, patient visit and documentation

## 2022-03-07 NOTE — LETTER
3/7/2022         RE: Tori Steele  [address] [address]           [facility]  [address] [facility] [address]  Phone: [phone]  Fax: [phone]                      Oncology/Hematology Visit Note  Mar 7, 2022    Reason for Visit: follow up of breast cancer    History of Present Illness: Tori Steele is a 46 year old female with breat cancer. Right-sided 5.5 cm ER positive [place] positive HER-2 negative breast cancer with associated DCIS.  Her MammaPrint came back as high risk.  She consented for the I-SPY clinical trial and has been randomized to the oral paclitaxel, Encequidar and dostarlimab arm.     She started 2/21/22.     Interval History:  Tori is here alone for week 3 of treatment.  She had a busy week last week meeting with Dr Layton and Dr Whitehead- she felt in good hands with both, however learned she was Mamma print high- hadn't heard that before and that gave her anxiety.    She had loose stools last week - would develop abdominal cramps followed by a watery-mushy stool.  Eating a bland diet.  Had only crackers on Saturday.  No nausea, taking Zofran once in the AM days 1-3.  She had ~3 stools a day and this was fairly consistent the entire week.  She was unsure if her anxiety made this worse.     Tori did not have fatigue this week, she was glad about that.  She denies mucositis, gerd, major nausea.    No fever sweats or chills.  No chest or breathing concerns.  No neuropathy.  Skin is dry.    She is noticed vaginal dryness as well last week, not so noticeable this week.    Review of Systems:  Patient denies any of the following except if noted above: fevers, chills, difficulty with energy, vision or hearing changes, chest pain, dyspnea, abdominal pain, nausea, vomiting, diarrhea, constipation, urinary concerns, headaches, numbness, tingling, issues with sleep or mood. He/She also denies lumps, bumps, rashes or skin lesions,  bleeding or bruising issues.    Current Outpatient Medications   Medication Sig Dispense Refill     ferrous sulfate (FEROSUL) 325 (65 Fe) MG tablet Take 325 mg by mouth       lidocaine-prilocaine (EMLA) 2.5-2.5 % external cream Apply to port site one hour prior to access may start using six days after port placement. 30 g 1     ondansetron (ZOFRAN) 8 MG tablet Take 1 tablet (8 mg) by mouth every 8 hours as needed for nausea 30 tablet 0     prochlorperazine (COMPAZINE) 10 MG tablet Take 1 tablet (10 mg) by mouth every 6 hours as needed (Nausea/Vomiting) 30 tablet 2     riboflavin (VITAMIN  B2) 25 MG TABS          Physical Examination: ECOG 1  General: The patient is a pleasant female in no acute distress.  /74   Pulse 104   Temp 98.2  F (36.8  C) (Oral)   Resp 16   Wt 69 kg (152 lb 1.6 oz)   SpO2 100%   BMI 26.11 kg/m    Wt Readings from Last 10 Encounters:   03/07/22 69 kg (152 lb 1.6 oz)   03/04/22 68.5 kg (151 lb)   03/02/22 68.6 kg (151 lb 3.2 oz)   02/28/22 69.4 kg (152 lb 14.4 oz)   02/18/22 68 kg (150 lb)   02/18/22 68 kg (150 lb)   01/28/22 68 kg (150 lb)   11/18/20 72.6 kg (160 lb)   10/06/20 72.6 kg (160 lb)   10/02/20 73 kg (161 lb)     HEENT: EOMI, PERRL. Sclerae are anicteric. Oral mucosa is pink and moist with no lesions or thrush.   Lymph: Neck is supple with no lymphadenopathy in the cervical or supraclavicular areas.   Heart: Regular rate and rhythm.   Lungs: Clear to auscultation bilaterally.   Abdomen: Bowel sounds present, soft, nontender with no palpable hepatosplenomegaly or masses.   Extremities: No lower extremity edema noted bilaterally.   Neuro: Cranial nerves II through XII are grossly intact.  Skin: No rashes, petechiae, or bruising noted on exposed skin.  Breast: Right breast shows a mass at the 12 o'clock position (Baseline: 5 cm horizontally and 6 cm vertically) today improved 4.5 cm H x 5 cm V    Laboratory Data:  Most Recent 3 CBC's:  Recent Labs   Lab Test 03/07/22  0724  02/28/22  1201 02/18/22  0920   WBC 4.3 5.4 4.5   HGB 11.6* 11.0* 11.6*   MCV 92 91 92    220 249    Most Recent 3 BMP's:  Recent Labs   Lab Test 02/28/22  1201 02/18/22  0920 02/04/22  0634    138 141   POTASSIUM 4.1 4.0 3.6   CHLORIDE 106 108 109   CO2 27 27 25   BUN 9 6* 3*   CR 0.64 0.66 0.70   ANIONGAP 7 3 7   JENNIE 8.9 8.8 8.6   * 111* 104*    Most Recent 2 LFT's:  Recent Labs   Lab Test 02/28/22  1201 02/18/22  0920   AST 14 16   ALT 16 17   ALKPHOS 76 73   BILITOTAL 0.6 0.4    Most Recent TSH and T4:  Recent Labs   Lab Test 02/18/22  0920   TSH 1.61   T4 1.01     I reviewed the above labs today.      Assessment and Plan:  1. Right sided ER+, WI+, HER2-negative, MammaPrint high risk T=5.5cm, N=0 breast cancer.  Tori tolerated her second week with less fatigue and controlled nausea, but daily ~3 episodes of diarrhea.  Her labs are appropriate today to move forward with week 3.  We discussed the big picture plan today including the chemotherapy with oral paclitaxel, Encequidar and dostarlimab x12 weeks followed by AC.  At this time she is mentally preparing for a mastectomy, but would consider a lumpectomy if optimal tumor shrinkage. She met with Dr Layton and Dr Whitehead.      She is meeting with the surgical team in the upcoming weeks to discuss surgery.  She is also scheduled to see genetics in April.  After surgery she may need radiation and then she will need adjuvant hormonal therapy.  Tori had not previously heard that she was mammaprint high.  We discussed this today as well.     Breast tumor smaller today on exam.  She will have a MRI tomorrow and see Dr Lockett on Friday.     2.  Anemia.  Patient's hemoglobin was 11.6 the day of starting treatment.  She has a history of iron deficiency anemia.  Iron stores are low.  Will have her take oral iron M, W and F each week.      3.  GI:   Nausea patient responded well to oral Zofran on days 1 through 3 in addition to dano tabs.  Tums  prn.  Diarrhea, discussed adding in soluble fiber or probiotics and can try small amounts of imodium.  [place] diet has helped as well.     4.  Patient had noticed some vaginal dryness and states she had been told she was in perimenopause. Less of an issue this week    5.  Coping well good supportive .  Seeing a therapist as well. Discussed coping strategies with taking oral pills daily and hair loss.     40 minutes spent on the date of the encounter doing chart review, review of outside records, review of test results, interpretation of tests, patient visit and documentation       Milka Mota PA-C

## 2022-03-07 NOTE — NURSING NOTE
"Oncology Rooming Note    March 7, 2022 7:34 AM   Tori Steele is a 46 year old female who presents for:    Chief Complaint   Patient presents with     Blood Draw     Labs drawn via vpt by RN in lab. VS taken     Oncology Clinic Visit     BREAST CANCER     Initial Vitals: /74   Pulse 104   Temp 98.2  F (36.8  C) (Oral)   Resp 16   Wt 69 kg (152 lb 1.6 oz)   SpO2 100%   BMI 26.11 kg/m   Estimated body mass index is 26.11 kg/m  as calculated from the following:    Height as of 3/4/22: 1.626 m (5' 4\").    Weight as of this encounter: 69 kg (152 lb 1.6 oz). Body surface area is 1.77 meters squared.  No Pain (0) Comment: Data Unavailable   No LMP recorded.  Allergies reviewed: Yes  Medications reviewed: Yes    Medications: Medication refills not needed today.  Pharmacy name entered into [employer]: [id number] [id number] [facility] [address] [facility]    Clinical concerns: Needs refill of study drug.      Nadiya Osei CMA            "

## 2022-03-07 NOTE — NURSING NOTE
[id number]: Study Visit Note   Subject name: Tori Steele     Visit: C3D1    Did the study visit occur within the appropriate window allowed by the protocol? yes      Since the last study visit, She has been doing well. She has been experiencing some loose stool and some taste changes. Advised by provider to reduce iron pills to MWF schedule and continue with zofran. Continues to have some vaginal dryness.  Patient expressed no other concerns.     I have personally interviewed Tori Steele and reviewed her medical record for adverse events and concomitant medications and these have been recorded on the corresponding logs in Tori Steele's research file.     Tori Steele was given the opportunity to ask any trial related questions.  Please see provider progress note for physical exam and other clinical information. Labs were reviewed - any significant lab values were addressed and reviewed.      [id number]: Medication Count/IDS Note      Tori Steele is enrolled on the trial number listed above. The patient presented for her C3D1 day visit.   IDS number: [id number]  Drug name: PO PACLITAXEL  Number of boxes returned: 1  Lot number: [id number]  Number of pills returned: 0      Number of boxes dispensed: 1  Lot number:[id number]  Number of pills dispensed: 36    Drug name: PO ENCEQUIDAR  Number of boxes returned: 1  Lot number: [id number]  Number of pills returned: 0      Number of boxes dispensed: 1  Lot number:[id number]  Number of pills dispensed: 3        Drug diary returned? yes    Are there any discrepancies between the amount of medication the patient was instructed to take and the amount recorded as taken in the patient s drug diary?  no    Are there any discrepancies between the amount of medication the patient has recorded as taken in the drug diary and the amount that would be expected to be returned based on the amount recorded as taken?  no    Tori Campbell, RN, BSN  Clinical Research Coordinator    P: [phone]  F: [phone]

## 2022-03-07 NOTE — NURSING NOTE
Chief Complaint   Patient presents with     Blood Draw     Labs drawn via vpt by RN in lab. VS taken     Labs collected from venipuncture by RN (per pt preference). Vitals taken. Checked in for next appointment.      Kimberly Jeffrey RN

## 2022-03-08 ENCOUNTER — ANCILLARY PROCEDURE (OUTPATIENT)
Dept: MRI IMAGING | Facility: CLINIC | Age: 47
End: 2022-03-08
Attending: INTERNAL MEDICINE
Payer: OTHER GOVERNMENT

## 2022-03-08 DIAGNOSIS — Z17.0 MALIGNANT NEOPLASM OF CENTRAL PORTION OF RIGHT BREAST IN FEMALE, ESTROGEN RECEPTOR POSITIVE (H): ICD-10-CM

## 2022-03-08 DIAGNOSIS — C50.111 MALIGNANT NEOPLASM OF CENTRAL PORTION OF RIGHT BREAST IN FEMALE, ESTROGEN RECEPTOR POSITIVE (H): ICD-10-CM

## 2022-03-08 RX ORDER — GADOBUTROL 604.72 MG/ML
7.5 INJECTION INTRAVENOUS ONCE
Status: COMPLETED | OUTPATIENT
Start: 2022-03-08 | End: 2022-03-08

## 2022-03-08 RX ADMIN — GADOBUTROL 6.9 ML: 604.72 INJECTION INTRAVENOUS at 11:44

## 2022-03-11 ENCOUNTER — ONCOLOGY VISIT (OUTPATIENT)
Dept: ONCOLOGY | Facility: CLINIC | Age: 47
End: 2022-03-11
Attending: INTERNAL MEDICINE
Payer: OTHER GOVERNMENT

## 2022-03-11 VITALS
DIASTOLIC BLOOD PRESSURE: 71 MMHG | TEMPERATURE: 98.9 F | WEIGHT: 148 LBS | OXYGEN SATURATION: 98 % | HEART RATE: 103 BPM | SYSTOLIC BLOOD PRESSURE: 116 MMHG | BODY MASS INDEX: 25.4 KG/M2

## 2022-03-11 DIAGNOSIS — C50.911 INVASIVE DUCTAL CARCINOMA OF RIGHT BREAST (H): Primary | ICD-10-CM

## 2022-03-11 PROCEDURE — 99214 OFFICE O/P EST MOD 30 MIN: CPT | Performed by: INTERNAL MEDICINE

## 2022-03-11 PROCEDURE — G0463 HOSPITAL OUTPT CLINIC VISIT: HCPCS

## 2022-03-11 RX ORDER — DIPHENHYDRAMINE HYDROCHLORIDE 50 MG/ML
50 INJECTION INTRAMUSCULAR; INTRAVENOUS
Status: CANCELLED
Start: 2022-03-14

## 2022-03-11 RX ORDER — HEPARIN SODIUM,PORCINE 10 UNIT/ML
5 VIAL (ML) INTRAVENOUS
Status: CANCELLED | OUTPATIENT
Start: 2022-03-14

## 2022-03-11 RX ORDER — HEPARIN SODIUM (PORCINE) LOCK FLUSH IV SOLN 100 UNIT/ML 100 UNIT/ML
5 SOLUTION INTRAVENOUS
Status: CANCELLED | OUTPATIENT
Start: 2022-03-14

## 2022-03-11 RX ORDER — ALBUTEROL SULFATE 90 UG/1
1-2 AEROSOL, METERED RESPIRATORY (INHALATION)
Status: CANCELLED
Start: 2022-03-14

## 2022-03-11 RX ORDER — ALBUTEROL SULFATE 0.83 MG/ML
2.5 SOLUTION RESPIRATORY (INHALATION)
Status: CANCELLED | OUTPATIENT
Start: 2022-03-14

## 2022-03-11 RX ORDER — NALOXONE HYDROCHLORIDE 0.4 MG/ML
0.2 INJECTION, SOLUTION INTRAMUSCULAR; INTRAVENOUS; SUBCUTANEOUS
Status: CANCELLED | OUTPATIENT
Start: 2022-03-14

## 2022-03-11 RX ORDER — PALONOSETRON 0.05 MG/ML
0.25 INJECTION, SOLUTION INTRAVENOUS ONCE
Status: CANCELLED
Start: 2022-03-14

## 2022-03-11 RX ORDER — METHYLPREDNISOLONE SODIUM SUCCINATE 125 MG/2ML
125 INJECTION, POWDER, LYOPHILIZED, FOR SOLUTION INTRAMUSCULAR; INTRAVENOUS
Status: CANCELLED
Start: 2022-03-14

## 2022-03-11 RX ORDER — LORAZEPAM 2 MG/ML
0.5 INJECTION INTRAMUSCULAR EVERY 4 HOURS PRN
Status: CANCELLED | OUTPATIENT
Start: 2022-03-14

## 2022-03-11 RX ORDER — EPINEPHRINE 1 MG/ML
0.3 INJECTION, SOLUTION INTRAMUSCULAR; SUBCUTANEOUS EVERY 5 MIN PRN
Status: CANCELLED | OUTPATIENT
Start: 2022-03-14

## 2022-03-11 RX ORDER — MEPERIDINE HYDROCHLORIDE 25 MG/ML
25 INJECTION INTRAMUSCULAR; INTRAVENOUS; SUBCUTANEOUS EVERY 30 MIN PRN
Status: CANCELLED | OUTPATIENT
Start: 2022-03-14

## 2022-03-11 RX ORDER — DEXAMETHASONE SODIUM PHOSPHATE 10 MG/ML
10 INJECTION, SOLUTION INTRAMUSCULAR; INTRAVENOUS
Status: CANCELLED
Start: 2022-03-14

## 2022-03-11 ASSESSMENT — PAIN SCALES - GENERAL: PAINLEVEL: NO PAIN (0)

## 2022-03-11 NOTE — PROGRESS NOTES
[facility]  [address] [facility] [address]  Phone: [phone]  Fax: [phone]                      Oncology/Hematology Visit Note  Mar 11, 2022    Reason for Visit: follow up of breast cancer    History of Present Illness: oTri Steele is a 46 year old female with breat cancer. Right-sided 5.5 cm ER positive [place] positive HER-2 negative breast cancer with associated DCIS.  Her MammaPrint came back as high risk.  She consented for the I-SPY clinical trial and has been randomized to the oral paclitaxel, Encequidar and dostarlimab arm.     She started 2/21/22. She has completed the first 3 weeks.    Interval History:  Tori is here with her  between week 3 and 4.  She has been tolerating her therapy fairly well.  As noted Ms. Mota addressed the issue of diarrhea.  She is taking her iron but only 3 times a week now and its better.    However she does have nausea.  She states it occasionally wakes her up from night and it takes 25 minutes for it to pass.  We discussed the fact that her insurance company would not cover Aloxi and this may be better for her..  While she is getting some relief from the Zofran.  We should see if we can get her Aloxi.    Otherwise she has actually felt fairly well.  She is not eating but she is trying to keep up her nutrition.  She says that nothing is particularly appealing and addition the taste is changed and notes a metallic taste in her mouth.    Today we went over her MRI.  The MRI shows that the distance is roughly the same however we went over the 3D aspects of it I think it is a little bit smaller.  This is an early MRI but certainly we need to get additional images at later time points and I am not concerned about her progress so far.      Plan as noted in Ms. Mota's dictation she had concerns that she was MammaPrint high.  I explained to her what that exactly meant in terms of her need for  chemotherapy.    Review of Systems:  Patient denies any of the following except if noted above: fevers, chills, difficulty with energy, vision or hearing changes, chest pain, dyspnea, abdominal pain, nausea, vomiting, diarrhea, constipation, urinary concerns, headaches, numbness, tingling, issues with sleep or mood. He/She also denies lumps, bumps, rashes or skin lesions, bleeding or bruising issues.    Current Outpatient Medications   Medication Sig Dispense Refill     ferrous sulfate (FEROSUL) 325 (65 Fe) MG tablet Take 325 mg by mouth       lidocaine-prilocaine (EMLA) 2.5-2.5 % external cream Apply to port site one hour prior to access may start using six days after port placement. 30 g 1     ondansetron (ZOFRAN) 8 MG tablet Take 1 tablet (8 mg) by mouth every 8 hours as needed for nausea 30 tablet 0     prochlorperazine (COMPAZINE) 10 MG tablet Take 1 tablet (10 mg) by mouth every 6 hours as needed (Nausea/Vomiting) 30 tablet 2     riboflavin (VITAMIN  B2) 25 MG TABS          Physical Examination: ECOG 1  General: The patient is a pleasant female in no acute distress.  /71   Pulse 103   Temp 98.9  F (37.2  C) (Oral)   Wt 67.1 kg (148 lb)   SpO2 98%   BMI 25.40 kg/m    Wt Readings from Last 10 Encounters:   03/11/22 67.1 kg (148 lb)   03/07/22 69 kg (152 lb 1.6 oz)   03/04/22 68.5 kg (151 lb)   03/02/22 68.6 kg (151 lb 3.2 oz)   02/28/22 69.4 kg (152 lb 14.4 oz)   02/18/22 68 kg (150 lb)   02/18/22 68 kg (150 lb)   01/28/22 68 kg (150 lb)   11/18/20 72.6 kg (160 lb)   10/06/20 72.6 kg (160 lb)     GENERAL: She looks well today. She was not examined in detail. As noted by Ms. Mota, she felt her breast lesion was clinically improved.     Laboratory Data:    Labs from last week show CBC and CMP are stable. She is iron deficient and is taking iron.    Problems    1. Right sided ER+, VT+, HER2-negative, MammaPrint high risk T=5.5cm, N=0 breast cancer. Randomized to oral  paclitaxel/encequidar/dostarlimab.  2. Premenopausal  3. Iron deficiency    Impression: The patient is tolerating her therapy very well.  We went over her imaging and went over the 2-dimensional versus three-dimensional changes.  I think it is changing in 3 dimensions.  She does have some lingering nausea.  As mentioned her Aloxi was denied by her insurance company.  However Zofran is not adequate and we will see if we can get the Aloxi covered.  In addition we talked a little bit about Pepcid and the nighttime nausea.  Even though she does not have any heartburn this could be what is occurring with her nighttime nausea.    Plan:  1. Continue therapy  2. See if we can switch anti-nausea to Aloxi  3. Try night time Pepcid  4. Contact us sooner as necessary  5. Patient has appointment with genetic counselor, but has panel drawn at [facility] already. I will explain to the counselor.    Keegan Lockett MD

## 2022-03-11 NOTE — NURSING NOTE
"Oncology Rooming Note    March 11, 2022 10:28 AM   Tori Steele is a 46 year old female who presents for:    Chief Complaint   Patient presents with     Oncology Clinic Visit     rtn for breast cancer     Initial Vitals: /71   Pulse 103   Temp 98.9  F (37.2  C) (Oral)   Wt 67.1 kg (148 lb)   SpO2 98%   BMI 25.40 kg/m   Estimated body mass index is 25.4 kg/m  as calculated from the following:    Height as of 3/4/22: 1.626 m (5' 4\").    Weight as of this encounter: 67.1 kg (148 lb). Body surface area is 1.74 meters squared.  No Pain (0) Comment: Data Unavailable   No LMP recorded.  Allergies reviewed: Yes  Medications reviewed: Yes    Medications: Medication refills not needed today.  Pharmacy name entered into [facility]: [facility] #[id number] [place], [place] - [address] AT [facility] [address]    Clinical concerns: none       Isamar Zhang, EMT            "

## 2022-03-11 NOTE — LETTER
3/11/2022         RE: Tori Steele  [address] [facility] [address]        Dear Colleague,    Thank you for referring your patient, Tori Steele, to the [facility]. Please see a copy of my visit note below.         [facility]  [address] [address]  Phone: [phone]  Fax: [phone]                      Oncology/Hematology Visit Note  Mar 11, 2022    Reason for Visit: follow up of breast cancer    History of Present Illness: Tori Steele is a 46 year old female with breat cancer. Right-sided 5.5 cm ER positive [place] positive HER-2 negative breast cancer with associated DCIS.  Her MammaPrint came back as high risk.  She consented for the I-SPY clinical trial and has been randomized to the oral paclitaxel, Encequidar and dostarlimab arm.     She started 2/21/22. She has completed the first 3 weeks.    Interval History:  Tori is here with her  between week 3 and 4.  She has been tolerating her therapy fairly well.  As noted Ms. Mota addressed the issue of diarrhea.  She is taking her iron but only 3 times a week now and its better.    However she does have nausea.  She states it occasionally wakes her up from night and it takes 25 minutes for it to pass.  We discussed the fact that her insurance company would not cover Aloxi and this may be better for her..  While she is getting some relief from the Zofran.  We should see if we can get her Aloxi.    Otherwise she has actually felt fairly well.  She is not eating but she is trying to keep up her nutrition.  She says that nothing is particularly appealing and addition the taste is changed and notes a metallic taste in her mouth.    Today we went over her MRI.  The MRI shows that the distance is roughly the same however we went over the 3D aspects of it I think it is a little bit smaller.  This is an early MRI but certainly we need to get additional  images at later time points and I am not concerned about her progress so far.      Plan as noted in Ms. Mota's dictation she had concerns that she was MammaPrint high.  I explained to her what that exactly meant in terms of her need for chemotherapy.    Review of Systems:  Patient denies any of the following except if noted above: fevers, chills, difficulty with energy, vision or hearing changes, chest pain, dyspnea, abdominal pain, nausea, vomiting, diarrhea, constipation, urinary concerns, headaches, numbness, tingling, issues with sleep or mood. He/She also denies lumps, bumps, rashes or skin lesions, bleeding or bruising issues.    Current Outpatient Medications   Medication Sig Dispense Refill     ferrous sulfate (FEROSUL) 325 (65 Fe) MG tablet Take 325 mg by mouth       lidocaine-prilocaine (EMLA) 2.5-2.5 % external cream Apply to port site one hour prior to access may start using six days after port placement. 30 g 1     ondansetron (ZOFRAN) 8 MG tablet Take 1 tablet (8 mg) by mouth every 8 hours as needed for nausea 30 tablet 0     prochlorperazine (COMPAZINE) 10 MG tablet Take 1 tablet (10 mg) by mouth every 6 hours as needed (Nausea/Vomiting) 30 tablet 2     riboflavin (VITAMIN  B2) 25 MG TABS          Physical Examination: ECOG 1  General: The patient is a pleasant female in no acute distress.  /71   Pulse 103   Temp 98.9  F (37.2  C) (Oral)   Wt 67.1 kg (148 lb)   SpO2 98%   BMI 25.40 kg/m    Wt Readings from Last 10 Encounters:   03/11/22 67.1 kg (148 lb)   03/07/22 69 kg (152 lb 1.6 oz)   03/04/22 68.5 kg (151 lb)   03/02/22 68.6 kg (151 lb 3.2 oz)   02/28/22 69.4 kg (152 lb 14.4 oz)   02/18/22 68 kg (150 lb)   02/18/22 68 kg (150 lb)   01/28/22 68 kg (150 lb)   11/18/20 72.6 kg (160 lb)   10/06/20 72.6 kg (160 lb)     GENERAL: She looks well today. She was not examined in detail. As noted by Ms. Svetlana, she felt her breast lesion was clinically improved.     Laboratory Data:    Labs  from last week show CBC and CMP are stable. She is iron deficient and is taking iron.    Problems    1. Right sided ER+, [place]+, HER2-negative, MammaPrint high risk T=5.5cm, N=0 breast cancer. Randomized to oral paclitaxel/encequidar/dostarlimab.  2. Premenopausal  3. Iron deficiency    Impression: The patient is tolerating her therapy very well.  We went over her imaging and went over the 2-dimensional versus three-dimensional changes.  I think it is changing in 3 dimensions.  She does have some lingering nausea.  As mentioned her Aloxi was denied by her insurance company.  However Zofran is not adequate and we will see if we can get the Aloxi covered.  In addition we talked a little bit about Pepcid and the nighttime nausea.  Even though she does not have any heartburn this could be what is occurring with her nighttime nausea.    Plan:  1. Continue therapy  2. See if we can switch anti-nausea to Aloxi  3. Try night time Pepcid  4. Contact us sooner as necessary  5. Patient has appointment with genetic counselor, but has panel drawn at [facility] already. I will explain to the counselor.        Again, thank you for allowing me to participate in the care of your patient.      Sincerely,    Keegan Lockett MD

## 2022-03-15 ENCOUNTER — RESEARCH ENCOUNTER (OUTPATIENT)
Dept: ONCOLOGY | Facility: CLINIC | Age: 47
End: 2022-03-15

## 2022-03-15 ENCOUNTER — INFUSION THERAPY VISIT (OUTPATIENT)
Dept: ONCOLOGY | Facility: CLINIC | Age: 47
End: 2022-03-15
Attending: PHYSICIAN ASSISTANT
Payer: OTHER GOVERNMENT

## 2022-03-15 ENCOUNTER — LAB (OUTPATIENT)
Dept: LAB | Facility: CLINIC | Age: 47
End: 2022-03-15
Attending: INTERNAL MEDICINE
Payer: OTHER GOVERNMENT

## 2022-03-15 VITALS
DIASTOLIC BLOOD PRESSURE: 81 MMHG | WEIGHT: 150.5 LBS | HEART RATE: 97 BPM | RESPIRATION RATE: 16 BRPM | TEMPERATURE: 98 F | OXYGEN SATURATION: 99 % | BODY MASS INDEX: 25.83 KG/M2 | SYSTOLIC BLOOD PRESSURE: 118 MMHG

## 2022-03-15 DIAGNOSIS — C50.911 INVASIVE DUCTAL CARCINOMA OF RIGHT BREAST (H): Primary | ICD-10-CM

## 2022-03-15 LAB
ALBUMIN SERPL-MCNC: 3.2 G/DL (ref 3.4–5)
ALBUMIN UR-MCNC: NEGATIVE MG/DL
ALP SERPL-CCNC: 72 U/L (ref 40–150)
ALT SERPL W P-5'-P-CCNC: 16 U/L (ref 0–50)
ANION GAP SERPL CALCULATED.3IONS-SCNC: 5 MMOL/L (ref 3–14)
APPEARANCE UR: ABNORMAL
AST SERPL W P-5'-P-CCNC: 14 U/L (ref 0–45)
BASOPHILS # BLD AUTO: 0 10E3/UL (ref 0–0.2)
BASOPHILS NFR BLD AUTO: 1 %
BILIRUB SERPL-MCNC: 0.6 MG/DL (ref 0.2–1.3)
BILIRUB UR QL STRIP: NEGATIVE
BUN SERPL-MCNC: 10 MG/DL (ref 7–30)
CALCIUM SERPL-MCNC: 8.7 MG/DL (ref 8.5–10.1)
CHLORIDE BLD-SCNC: 110 MMOL/L (ref 94–109)
CO2 SERPL-SCNC: 28 MMOL/L (ref 20–32)
COLOR UR AUTO: YELLOW
CREAT SERPL-MCNC: 0.61 MG/DL (ref 0.52–1.04)
EOSINOPHIL # BLD AUTO: 0.1 10E3/UL (ref 0–0.7)
EOSINOPHIL NFR BLD AUTO: 2 %
ERYTHROCYTE [DISTWIDTH] IN BLOOD BY AUTOMATED COUNT: 13.1 % (ref 10–15)
GFR SERPL CREATININE-BSD FRML MDRD: >90 ML/MIN/1.73M2
GLUCOSE BLD-MCNC: 100 MG/DL (ref 70–99)
GLUCOSE UR STRIP-MCNC: NEGATIVE MG/DL
HCT VFR BLD AUTO: 34.8 % (ref 35–47)
HGB BLD-MCNC: 11.1 G/DL (ref 11.7–15.7)
HGB UR QL STRIP: ABNORMAL
IMM GRANULOCYTES # BLD: 0 10E3/UL
IMM GRANULOCYTES NFR BLD: 0 %
KETONES UR STRIP-MCNC: 15 MG/DL
LEUKOCYTE ESTERASE UR QL STRIP: ABNORMAL
LYMPHOCYTES # BLD AUTO: 0.7 10E3/UL (ref 0.8–5.3)
LYMPHOCYTES NFR BLD AUTO: 20 %
MCH RBC QN AUTO: 29.2 PG (ref 26.5–33)
MCHC RBC AUTO-ENTMCNC: 31.9 G/DL (ref 31.5–36.5)
MCV RBC AUTO: 92 FL (ref 78–100)
MONOCYTES # BLD AUTO: 0.2 10E3/UL (ref 0–1.3)
MONOCYTES NFR BLD AUTO: 6 %
MUCOUS THREADS #/AREA URNS LPF: PRESENT /LPF
NEUTROPHILS # BLD AUTO: 2.5 10E3/UL (ref 1.6–8.3)
NEUTROPHILS NFR BLD AUTO: 71 %
NITRATE UR QL: NEGATIVE
NRBC # BLD AUTO: 0 10E3/UL
NRBC BLD AUTO-RTO: 0 /100
PH UR STRIP: 5 [PH] (ref 5–7)
PLATELET # BLD AUTO: 280 10E3/UL (ref 150–450)
POTASSIUM BLD-SCNC: 3.5 MMOL/L (ref 3.4–5.3)
PROT SERPL-MCNC: 7.2 G/DL (ref 6.8–8.8)
RBC # BLD AUTO: 3.8 10E6/UL (ref 3.8–5.2)
RBC URINE: 6 /HPF
SODIUM SERPL-SCNC: 143 MMOL/L (ref 133–144)
SP GR UR STRIP: 1.02 (ref 1–1.03)
SQUAMOUS EPITHELIAL: 4 /HPF
UROBILINOGEN UR STRIP-MCNC: NORMAL MG/DL
WBC # BLD AUTO: 3.6 10E3/UL (ref 4–11)
WBC URINE: 5 /HPF

## 2022-03-15 PROCEDURE — 80053 COMPREHEN METABOLIC PANEL: CPT | Performed by: INTERNAL MEDICINE

## 2022-03-15 PROCEDURE — 96365 THER/PROPH/DIAG IV INF INIT: CPT

## 2022-03-15 PROCEDURE — 250N000011 HC RX IP 250 OP 636: Performed by: INTERNAL MEDICINE

## 2022-03-15 PROCEDURE — 81001 URINALYSIS AUTO W/SCOPE: CPT | Performed by: INTERNAL MEDICINE

## 2022-03-15 PROCEDURE — 96361 HYDRATE IV INFUSION ADD-ON: CPT

## 2022-03-15 PROCEDURE — 85004 AUTOMATED DIFF WBC COUNT: CPT | Performed by: INTERNAL MEDICINE

## 2022-03-15 PROCEDURE — 258N000003 HC RX IP 258 OP 636: Performed by: INTERNAL MEDICINE

## 2022-03-15 PROCEDURE — 250N000011 HC RX IP 250 OP 636: Performed by: PHYSICIAN ASSISTANT

## 2022-03-15 RX ORDER — HEPARIN SODIUM (PORCINE) LOCK FLUSH IV SOLN 100 UNIT/ML 100 UNIT/ML
500 SOLUTION INTRAVENOUS ONCE
Status: COMPLETED | OUTPATIENT
Start: 2022-03-15 | End: 2022-03-15

## 2022-03-15 RX ADMIN — SODIUM CHLORIDE, PRESERVATIVE FREE 500 UNITS: 5 INJECTION INTRAVENOUS at 12:57

## 2022-03-15 RX ADMIN — SODIUM CHLORIDE 150 MG: 9 INJECTION, SOLUTION INTRAVENOUS at 12:29

## 2022-03-15 RX ADMIN — SODIUM CHLORIDE 1000 ML: 9 INJECTION, SOLUTION INTRAVENOUS at 11:12

## 2022-03-15 RX ADMIN — Medication 500 UNITS: at 09:25

## 2022-03-15 ASSESSMENT — PAIN SCALES - GENERAL: PAINLEVEL: NO PAIN (0)

## 2022-03-15 NOTE — PATIENT INSTRUCTIONS
[place] Triage and after hours / weekends / holidays:  [phone]    Please call the triage or after hours line if you experience a temperature greater than or equal to 100.4, shaking chills, have uncontrolled nausea, vomiting and/or diarrhea, dizziness, shortness of breath, chest pain, bleeding, unexplained bruising, or if you have any other new/concerning symptoms, questions or concerns.      If you are having any concerning symptoms or wish to speak to a provider before your next infusion visit, please call your care coordinator or triage to notify them so we can adequately serve you.     If you need a refill on a narcotic prescription or other medication, please call before your infusion appointment.           Please call us anytime with increased nausea or diarrhea.  Especially if you are having mor tan 4 episodes a day

## 2022-03-15 NOTE — NURSING NOTE
[id number]: Study Visit Note   Subject name: Tori Steele     Visit: C4D1    Did the study visit occur within the appropriate window allowed by the protocol? yes    Since the last study visit, She has been struggling with diarrhea. Since she saw Dr. Lockett on Friday, her diarrhea over the weekend increased significantly. Her baseline is normally every other day but has been having loose, watery stools approximately 5-6x per day. She wasn't able to leave the house this weekend due to her diarrhea and only had some relief with 4mg of imodium on Saturday night and Sunday morning. She did have some diarrhea today before coming into the clinic.     AE's: Gr II Diarrhea, start 3/11/22    It was decided that we should hold Dostarlimab this week and wait until her diarrhea has come down to a grade I before giving it to her again. No change to the PO Encequidar/Taxol.     I have personally interviewed Tori Steele and reviewed her medical record for adverse events and concomitant medications and these have been recorded on the corresponding logs in Tori Steele's research file.     Tori Steele was given the opportunity to ask any trial related questions.  Please see provider progress note for physical exam and other clinical information. Labs were reviewed - any significant lab values were addressed and reviewed.      [id number]: Medication Count/IDS Note      Tori Steele is enrolled on the trial number listed above. The patient presented for her C4D1 day visit.   IDS number: [id number]  Drug name: PO PACLITAXEL  Number of boxes returned: 1  Lot number: [id number]  Number of pills returned: 0      Number of boxes dispensed: 1  Lot number:[id number]  Number of pills dispensed: 36    Drug name: PO ENCEQUIDAR  Number of boxes returned: 1  Lot number: [id number]  Number of pills returned: 0      Number of boxes dispensed: 1  Lot number: [id number]  Number of pills dispensed: 3      Drug diary returned? yes    Are there  any discrepancies between the amount of medication the patient was instructed to take and the amount recorded as taken in the patient s drug diary?  no    Are there any discrepancies between the amount of medication the patient has recorded as taken in the drug diary and the amount that would be expected to be returned based on the amount recorded as taken?  no       Bertha Augustin RN, MS  Research   Phone: [phone]  Pager: [phone]      Form 504.00.01 (Version 1)     Effective date: 01JUL2018     Next Review Date: 01JUL2020

## 2022-03-15 NOTE — PROGRESS NOTES
"Infusion Nursing Note:  Tori Steele presents today for Cycle 4 Study-oral taxol and encequidar/IV study- dostarlimab.    Patient seen and examined in clinic on 3/11/22 by Dr Lockett    Note: Tori has been having quite a bit of nausea and diarrhea.    Diarrhea started last Monday on 3/7.  She spoke to Janette Mota that day.  Per Janette's note pt can start probiotics and use a \"small amount of imodium\"  After she spoke to Janette she started eating yogurt.  As the week went on the diarrhea increased Thursday, 3/10- Saturday, 3/12 the diarrhea was uncontrollable.  Everytime she sat down to urinate she had diarrhea and was unable to leave her house.  She had started taking zofran around the clock on Friday and therefore attributed the diarrhea to using the zofran.  This RN explained that zofran usually causes constipation and that diarrhea may be related to the study drugs. On Saturday and Sunday Tori took a \"couple of imodium\" throughout the day.  She had relief from Diarrhea on Sunday and on that day she stopped taking zofran.  On Monday am she had a normal stool but the diarrhea returned Monday evening.  At this time it was again constant.  She had more diarrhea this am and took two imodium pills which she states she needed to take just to be able to get out the door.   Pt denies N/V today    Rosmery Augustin research RN was called and notified af the above information.  She came to infusion to see pt.  She then spoke to Dr Lockett.  Plan to hold the IV study-dostarlimab today secondary to diarrhea.  However, Rosmery will proceed with oral study drugs today.  These drugs were delivered to pt while she was sitting in infusion today    Dr Lockett would like to give 1L NS and IV emend while in infusion.  Emend infused without incident    Intravenous Access:  Implanted Port.    Treatment Conditions:  Component      Latest Ref Rng & Units 3/15/2022   WBC      4.0 - 11.0 10e3/uL 3.6 (L)   RBC Count      3.80 - 5.20 10e6/uL 3.80 "   Hemoglobin      11.7 - 15.7 g/dL 11.1 (L)   Hematocrit      35.0 - 47.0 % 34.8 (L)   MCV      78 - 100 fL 92   MCH      26.5 - 33.0 pg 29.2   MCHC      31.5 - 36.5 g/dL 31.9   RDW      10.0 - 15.0 % 13.1   Platelet Count      150 - 450 10e3/uL 280   % Neutrophils      % 71   % Lymphocytes      % 20   % Monocytes      % 6   % Eosinophils      % 2   % Basophils      % 1   % Immature Granulocytes      % 0   NRBCs per 100 WBC      <1 /100 0   Absolute Neutrophils      1.6 - 8.3 10e3/uL 2.5   Absolute Lymphocytes      0.8 - 5.3 10e3/uL 0.7 (L)   Absolute Monocytes      0.0 - 1.3 10e3/uL 0.2   Absolute Eosinophils      0.0 - 0.7 10e3/uL 0.1   Absolute Basophils      0.0 - 0.2 10e3/uL 0.0   Absolute Immature Granulocytes      <=0.4 10e3/uL 0.0   Absolute NRBCs      10e3/uL 0.0   Sodium      133 - 144 mmol/L 143   Potassium      3.4 - 5.3 mmol/L 3.5   Chloride      94 - 109 mmol/L 110 (H)   Carbon Dioxide      20 - 32 mmol/L 28   Anion Gap      3 - 14 mmol/L 5   Urea Nitrogen      7 - 30 mg/dL 10   Creatinine      0.52 - 1.04 mg/dL 0.61   Calcium      8.5 - 10.1 mg/dL 8.7   Glucose      70 - 99 mg/dL 100 (H)   Alkaline Phosphatase      40 - 150 U/L 72   AST      0 - 45 U/L 14   ALT      0 - 50 U/L 16   Protein Total      6.8 - 8.8 g/dL 7.2   Albumin      3.4 - 5.0 g/dL 3.2 (L)   Bilirubin Total      0.2 - 1.3 mg/dL 0.6   GFR Estimate      >60 mL/min/1.73m2 >90         Post Infusion Assessment:  Patient tolerated infusion without incident.       Discharge Plan:   Patient declined prescription refills.  Copy of AVS reviewed with patient and/or family.  Patient will return 3/21/22 to see Janette in clinic.     Zee Portillo RN                       In addition to the H&P , patient tells me that in MAY HE HAD PENILE IMPLANT AND WAS TOLD AT THAT TIME THAT HE HAS S  some leaky valve and he needs to be followed up. He has an ex smoker and quit smoking 20 years ago: He denies any other pulmonary issues   Patient is a 62y old  Male who presents with a chief complaint of sob (17 Jun 2017 12:38)      HPI:  62 yr old  Male with PMH  of severe AS, moderate MR, sp recent penile implant awoke this am with chest pain and SOB. CP sharp, substernal ,  no radiation associated with SOB. Also with few days  of worsening dyspnea on exertion. No fever , HA, dizziness or any other associated symptoms (17 Jun 2017 12:38)      ?FOLLOWING PRESENT  [x ] Hx of PE/DVT, [x ] Hx COPD, [ x] Hx of Asthma, [x ] Hx of Hospitalization, [x ]  Hx of BiPAP/CPAP use, [x ] Hx of SHAZIA    Allergies    No Known Allergies    Intolerances        PAST MEDICAL & SURGICAL HISTORY:  Left bundle branch block (LBBB)  DM (diabetes mellitus)  HTN (hypertension)  History of penile implant      FAMILY HISTORY:  No pertinent family history in first degree relatives      Social History: [  ex] TOBACCO : quit 20 year ago                  [ x ] ETOH                                 [ x ] IVDA/DRUGS    REVIEW OF SYSTEMS      General:	x    Skin/Breast:x  	  Ophthalmologic:x  	  ENMT:	x    Respiratory and Thorax: sob  	  Cardiovascular:	x    Gastrointestinal:	x    Genitourinary:	x    Musculoskeletal:	x    Neurological:	x    Psychiatric:	x    Hematology/Lymphatics:	x    Endocrine:	x  x  Allergic/Immunologic:	x    MEDICATIONS  (STANDING):  insulin lispro (HumaLOG) corrective regimen sliding scale  SubCutaneous three times a day before meals  insulin lispro (HumaLOG) corrective regimen sliding scale  SubCutaneous at bedtime  dextrose 5%. 1000milliLiter(s) IV Continuous <Continuous>  dextrose 50% Injectable 12.5Gram(s) IV Push once  dextrose 50% Injectable 25Gram(s) IV Push once  dextrose 50% Injectable 25Gram(s) IV Push once  ibuprofen  Tablet 600milliGRAM(s) Oral every 8 hours  aspirin enteric coated 81milliGRAM(s) Oral daily  heparin  Injectable 5000Unit(s) SubCutaneous every 8 hours  ATENolol  Tablet 50milliGRAM(s) Oral two times a day    MEDICATIONS  (PRN):  dextrose Gel 1Dose(s) Oral once PRN Blood Glucose LESS THAN 70 milliGRAM(s)/deciliter  glucagon  Injectable 1milliGRAM(s) IntraMuscular once PRN Glucose LESS THAN 70 milligrams/deciliter       Vital Signs Last 24 Hrs  T(C): 36.6, Max: 36.8 (06-17 @ 19:54)  T(F): 97.9, Max: 98.3 (06-17 @ 19:54)  HR: 69 (66 - 78)  BP: 119/78 (118/61 - 147/71)  BP(mean): --  RR: 18 (18 - 20)  SpO2: 95% (95% - 99%)        I&O's Summary    I & Os for current day (as of 18 Jun 2017 14:59)  =============================================  IN: 600 ml / OUT: 0 ml / NET: 600 ml      Physical Exam:   GENERAL: NAD, well-groomed, well-developed  HEENT: ALTON/   Atraumatic, Normocephalic  ENMT: No tonsillar erythema, exudates, or enlargement; Moist mucous membranes, Good dentition, No lesions  NECK: Supple, No JVD, Normal thyroid  CHEST/LUNG: Clear to percussion bilaterally; No rales, rhonchi, wheezing, or rubs  CVS: Regular rate and rhythm; No murmurs, rubs, or gallops  GI: : Soft, Nontender, Nondistended; Bowel sounds present  NERVOUS SYSTEM:  Alert & Oriented X3, Good concentration; Motor Strength 5/5 B/L upper and lower extremities; DTRs 2+ intact and symmetric  EXTREMITIES:  2+ Peripheral Pulses, No clubbing, cyanosis, or edema  LYMPH: No lymphadenopathy noted  SKIN: No rashes or lesions  ENDOCRINOLOGY: No Thyromegaly  PSYCH: Appropriate    Labs:    CARDIAC MARKERS ( 18 Jun 2017 02:26 )  x     / <0.01 ng/mL / 49 U/L / x     / 1.5 ng/mL  CARDIAC MARKERS ( 17 Jun 2017 17:55 )  x     / <0.01 ng/mL / x     / x     / x      CARDIAC MARKERS ( 17 Jun 2017 04:37 )  x     / <0.01 ng/mL / x     / x     / x                                11.6   5.2   )-----------( 172      ( 18 Jun 2017 04:11 )             35.3                         12.3   7.4   )-----------( 193      ( 17 Jun 2017 04:37 )             36.4     06-18    142  |  104  |  15  ----------------------------<  111<H>  4.1   |  25  |  0.91  06-17    139  |  101  |  17  ----------------------------<  123<H>  4.8   |  23  |  1.10    Ca    9.1      18 Jun 2017 02:26  Ca    9.6      17 Jun 2017 04:37    TPro  7.8  /  Alb  4.4  /  TBili  0.2  /  DBili  x   /  AST  39  /  ALT  43  /  AlkPhos  48  06-17    CAPILLARY BLOOD GLUCOSE  117 (18 Jun 2017 11:58)  112 (18 Jun 2017 08:07)  145 (17 Jun 2017 21:15)  170 (17 Jun 2017 18:01)    LIVER FUNCTIONS - ( 17 Jun 2017 04:37 )  Alb: 4.4 g/dL / Pro: 7.8 g/dL / ALK PHOS: 48 U/L / ALT: 43 U/L RC / AST: 39 U/L / GGT: x           PT/INR - ( 17 Jun 2017 04:37 )   PT: 11.7 sec;   INR: 1.07 ratio         PTT - ( 17 Jun 2017 04:37 )  PTT:29.8 sec    D DImer    Cultures:                           Studies  Chest X-RAY  CT SCAN Chest   EXAM:  CHEST SINGLE AP OR PA                            PROCEDURE DATE:  06/17/2017            INTERPRETATION:  EXAMINATION: CHEST SINGLE AP OR PA    CLINICAL INDICATION: Chest Pain    TECHNIQUE: Single frontal view of the chest was obtained.    COMPARISON: None.    FINDINGS:     The cardiac silhouette is magnified on this frontal view. The aorta is   uncoiled. There is no pneumothorax. There are symmetric biapical   opacities which likely represent scarring. There are bilateral reticular   opacities, most pronounced in the lower lobes, nonspecific.    IMPRESSION:   Nonspecific reticular opacities, most pronounced in the lower lobes.  CT Abdomen  Venous Dopplers: LE:   Others

## 2022-03-21 ENCOUNTER — RESEARCH ENCOUNTER (OUTPATIENT)
Dept: ONCOLOGY | Facility: CLINIC | Age: 47
End: 2022-03-21

## 2022-03-21 ENCOUNTER — LAB (OUTPATIENT)
Dept: LAB | Facility: CLINIC | Age: 47
End: 2022-03-21
Attending: PHYSICIAN ASSISTANT
Payer: OTHER GOVERNMENT

## 2022-03-21 ENCOUNTER — ONCOLOGY VISIT (OUTPATIENT)
Dept: ONCOLOGY | Facility: CLINIC | Age: 47
End: 2022-03-21
Attending: PHYSICIAN ASSISTANT
Payer: OTHER GOVERNMENT

## 2022-03-21 VITALS
OXYGEN SATURATION: 98 % | TEMPERATURE: 97.6 F | BODY MASS INDEX: 26.02 KG/M2 | DIASTOLIC BLOOD PRESSURE: 80 MMHG | HEART RATE: 99 BPM | WEIGHT: 151.6 LBS | RESPIRATION RATE: 16 BRPM | SYSTOLIC BLOOD PRESSURE: 127 MMHG

## 2022-03-21 DIAGNOSIS — C50.911 INVASIVE DUCTAL CARCINOMA OF BREAST, FEMALE, RIGHT (H): ICD-10-CM

## 2022-03-21 DIAGNOSIS — C50.911 INVASIVE DUCTAL CARCINOMA OF RIGHT BREAST (H): Primary | ICD-10-CM

## 2022-03-21 DIAGNOSIS — R19.7 DIARRHEA, UNSPECIFIED TYPE: ICD-10-CM

## 2022-03-21 LAB
ALBUMIN SERPL-MCNC: 3.3 G/DL (ref 3.4–5)
ALP SERPL-CCNC: 79 U/L (ref 40–150)
ALT SERPL W P-5'-P-CCNC: 27 U/L (ref 0–50)
ANION GAP SERPL CALCULATED.3IONS-SCNC: 6 MMOL/L (ref 3–14)
AST SERPL W P-5'-P-CCNC: 18 U/L (ref 0–45)
BASOPHILS # BLD AUTO: 0 10E3/UL (ref 0–0.2)
BASOPHILS NFR BLD AUTO: 1 %
BILIRUB SERPL-MCNC: 0.3 MG/DL (ref 0.2–1.3)
BUN SERPL-MCNC: 9 MG/DL (ref 7–30)
CALCIUM SERPL-MCNC: 8.5 MG/DL (ref 8.5–10.1)
CHLORIDE BLD-SCNC: 108 MMOL/L (ref 94–109)
CO2 SERPL-SCNC: 28 MMOL/L (ref 20–32)
CREAT SERPL-MCNC: 0.63 MG/DL (ref 0.52–1.04)
EOSINOPHIL # BLD AUTO: 0.1 10E3/UL (ref 0–0.7)
EOSINOPHIL NFR BLD AUTO: 2 %
ERYTHROCYTE [DISTWIDTH] IN BLOOD BY AUTOMATED COUNT: 12.9 % (ref 10–15)
GFR SERPL CREATININE-BSD FRML MDRD: >90 ML/MIN/1.73M2
GLUCOSE BLD-MCNC: 92 MG/DL (ref 70–99)
HCT VFR BLD AUTO: 36.3 % (ref 35–47)
HGB BLD-MCNC: 11.4 G/DL (ref 11.7–15.7)
IMM GRANULOCYTES # BLD: 0 10E3/UL
IMM GRANULOCYTES NFR BLD: 0 %
LYMPHOCYTES # BLD AUTO: 0.9 10E3/UL (ref 0.8–5.3)
LYMPHOCYTES NFR BLD AUTO: 22 %
MCH RBC QN AUTO: 29.1 PG (ref 26.5–33)
MCHC RBC AUTO-ENTMCNC: 31.4 G/DL (ref 31.5–36.5)
MCV RBC AUTO: 93 FL (ref 78–100)
MONOCYTES # BLD AUTO: 0.4 10E3/UL (ref 0–1.3)
MONOCYTES NFR BLD AUTO: 11 %
NEUTROPHILS # BLD AUTO: 2.6 10E3/UL (ref 1.6–8.3)
NEUTROPHILS NFR BLD AUTO: 64 %
NRBC # BLD AUTO: 0 10E3/UL
NRBC BLD AUTO-RTO: 0 /100
PLATELET # BLD AUTO: 250 10E3/UL (ref 150–450)
POTASSIUM BLD-SCNC: 3.8 MMOL/L (ref 3.4–5.3)
PROT SERPL-MCNC: 7.4 G/DL (ref 6.8–8.8)
RBC # BLD AUTO: 3.92 10E6/UL (ref 3.8–5.2)
SODIUM SERPL-SCNC: 142 MMOL/L (ref 133–144)
WBC # BLD AUTO: 4 10E3/UL (ref 4–11)

## 2022-03-21 PROCEDURE — G0463 HOSPITAL OUTPT CLINIC VISIT: HCPCS

## 2022-03-21 PROCEDURE — 85014 HEMATOCRIT: CPT | Performed by: PHYSICIAN ASSISTANT

## 2022-03-21 PROCEDURE — 36415 COLL VENOUS BLD VENIPUNCTURE: CPT

## 2022-03-21 PROCEDURE — 99214 OFFICE O/P EST MOD 30 MIN: CPT | Performed by: PHYSICIAN ASSISTANT

## 2022-03-21 PROCEDURE — 80053 COMPREHEN METABOLIC PANEL: CPT | Performed by: PHYSICIAN ASSISTANT

## 2022-03-21 RX ORDER — NALOXONE HYDROCHLORIDE 0.4 MG/ML
0.2 INJECTION, SOLUTION INTRAMUSCULAR; INTRAVENOUS; SUBCUTANEOUS
Status: CANCELLED | OUTPATIENT
Start: 2022-03-21

## 2022-03-21 RX ORDER — ALBUTEROL SULFATE 90 UG/1
1-2 AEROSOL, METERED RESPIRATORY (INHALATION)
Status: CANCELLED
Start: 2022-03-21

## 2022-03-21 RX ORDER — HEPARIN SODIUM,PORCINE 10 UNIT/ML
5 VIAL (ML) INTRAVENOUS
Status: CANCELLED | OUTPATIENT
Start: 2022-03-21

## 2022-03-21 RX ORDER — HEPARIN SODIUM (PORCINE) LOCK FLUSH IV SOLN 100 UNIT/ML 100 UNIT/ML
5 SOLUTION INTRAVENOUS
Status: CANCELLED | OUTPATIENT
Start: 2022-03-21

## 2022-03-21 RX ORDER — ALBUTEROL SULFATE 0.83 MG/ML
2.5 SOLUTION RESPIRATORY (INHALATION)
Status: CANCELLED | OUTPATIENT
Start: 2022-03-21

## 2022-03-21 RX ORDER — MEPERIDINE HYDROCHLORIDE 25 MG/ML
25 INJECTION INTRAMUSCULAR; INTRAVENOUS; SUBCUTANEOUS EVERY 30 MIN PRN
Status: CANCELLED | OUTPATIENT
Start: 2022-03-21

## 2022-03-21 RX ORDER — DIPHENHYDRAMINE HYDROCHLORIDE 50 MG/ML
50 INJECTION INTRAMUSCULAR; INTRAVENOUS
Status: CANCELLED
Start: 2022-03-21

## 2022-03-21 RX ORDER — EPINEPHRINE 1 MG/ML
0.3 INJECTION, SOLUTION INTRAMUSCULAR; SUBCUTANEOUS EVERY 5 MIN PRN
Status: CANCELLED | OUTPATIENT
Start: 2022-03-21

## 2022-03-21 RX ORDER — DEXAMETHASONE SODIUM PHOSPHATE 10 MG/ML
10 INJECTION, SOLUTION INTRAMUSCULAR; INTRAVENOUS
Status: CANCELLED
Start: 2022-03-21

## 2022-03-21 RX ORDER — LORAZEPAM 2 MG/ML
0.5 INJECTION INTRAMUSCULAR EVERY 4 HOURS PRN
Status: CANCELLED | OUTPATIENT
Start: 2022-03-21

## 2022-03-21 RX ORDER — METHYLPREDNISOLONE SODIUM SUCCINATE 125 MG/2ML
125 INJECTION, POWDER, LYOPHILIZED, FOR SOLUTION INTRAMUSCULAR; INTRAVENOUS
Status: CANCELLED
Start: 2022-03-21

## 2022-03-21 ASSESSMENT — PAIN SCALES - GENERAL: PAINLEVEL: NO PAIN (0)

## 2022-03-21 NOTE — NURSING NOTE
[id number]: Study Visit Note   Subject name: Tori Steele     Visit: C5D1    Did the study visit occur within the appropriate window allowed by the protocol? yes    Since the last study visit, She has been doing much better. Her diarrhea has improved significantly and even had a normal BM yesterday. She does complain of dry eyes to which she has been utilizing some Refresh eye drops, and notes that her fatigue has increased but taking naps has helped and this has not limited her ADL's.     There was discussion on whether we would give her the C4 Dostarlimab this week or wait another week. After discussion with SHANE and Dr. Lockett, it was agreed that we would skip the C4 dose and wait until C7 for her next dose of immunotherapy.     I have personally interviewed Tori Steele and reviewed her medical record for adverse events and concomitant medications and these have been recorded on the corresponding logs in Tori Steele's research file.     Tori Steele was given the opportunity to ask any trial related questions.  Please see provider progress note for physical exam and other clinical information. Labs were reviewed - any significant lab values were addressed and reviewed.    Bertha Augustin RN, MS  Research   Phone: [phone]  Pager: [phone]        [id number]: Medication Count/IDS Note      Tori Steele is enrolled on the trial number listed above. The patient presented for her C5D1 day visit.   IDS number: [id number]  Drug name: PO PACLITAXEL  Number of boxes returned: 1  Lot number: [id number]  Number of pills returned: 0      Number of boxes dispensed: 1  Lot number:[id number]  Number of pills dispensed: 36    Drug name: PO ENCEQUIDAR  Number of boxes returned: 1  Lot number: [id number]  Number of pills returned: 0      Number of boxes dispensed: 1  Lot number:[id number]  Number of pills dispensed: 3      Drug diary returned? yes    Are there any discrepancies between the amount  of medication the patient was instructed to take and the amount recorded as taken in the patient s drug diary?  No    Are there any discrepancies between the amount of medication the patient has recorded as taken in the drug diary and the amount that would be expected to be returned based on the amount recorded as taken?  no      Bertha Augustin RN, MS  Research   Phone: [phone]  Pager: [phone]      Form 504.00.01 (Version 1)     Effective date: 01JUL2018     Next Review Date: 01JUL2020

## 2022-03-21 NOTE — LETTER
3/21/2022         RE: Tori Steele  [address] [facility] [address]        Oncology/Hematology Visit Note  Mar 21, 2022    Reason for Visit: follow up of breast cancer    History of Present Illness: Tori Steele is a 46 year old female with breat cancer. Right-sided 5.5 cm ER positive [place] positive HER-2 negative breast cancer with associated DCIS.  Her MammaPrint came back as high risk.  She consented for the I-SPY clinical trial and has been randomized to the oral paclitaxel, Encequidar and dostarlimab arm.     She started 2/21/22.     Interval History:  Tori is here today for week 5 of treatment with her .  Last week her IV immunotherapy dostarlimab was held due to diarrhea.    Tori states she had been having some mushy stools throughout the week and then over the weekend developed watery stools.  She was taking Imodium 1-2 a day which seemed to be keeping things in control, however when she came in for infusion on Tuesday she ended up having over 20 watery stools that day.  She took multiple Imodium's was given IV fluids and eventually the stools ceased that evening.  She had 1 watery stool Wednesday morning took more Imodium and then had no bowel movements Thursday Friday or Saturday of the weekend.  Yesterday she had a formed soft stool and has not taken Imodium since last Wednesday.    Continues to have a bland diet.  Took Pepcid x1 last week for GERD.  Takes Zofran 1-2 times daily during her oral Taxol days.  Diarrhea    Fatigue- napping 1 hour a day.  She feels her fatigue has been steadily getting worse each week on treatment.  Otherwise able to do all of her ADLs.  Went out to dinner stayed in a hotel last week while kids were on spring break.    Mood is in a good place.    No fever sweats or chills.  No chest or breathing concerns.  No neuropathy.  Skin is dry.  Eyes are dry using eyedrops.        Current Outpatient Medications   Medication Sig Dispense Refill     ferrous  sulfate (FEROSUL) 325 (65 Fe) MG tablet Take 325 mg by mouth       lidocaine-prilocaine (EMLA) 2.5-2.5 % external cream Apply to port site one hour prior to access may start using six days after port placement. 30 g 1     ondansetron (ZOFRAN) 8 MG tablet Take 1 tablet (8 mg) by mouth every 8 hours as needed for nausea 30 tablet 0     prochlorperazine (COMPAZINE) 10 MG tablet Take 1 tablet (10 mg) by mouth every 6 hours as needed (Nausea/Vomiting) 30 tablet 2     riboflavin (VITAMIN  B2) 25 MG TABS          Physical Examination: ECOG 1  General: The patient is a pleasant female in no acute distress.  /80   Pulse 99   Temp 97.6  F (36.4  C) (Oral)   Resp 16   Wt 68.8 kg (151 lb 9.6 oz)   SpO2 98%   BMI 26.02 kg/m    Wt Readings from Last 10 Encounters:   03/21/22 68.8 kg (151 lb 9.6 oz)   03/15/22 68.3 kg (150 lb 8 oz)   03/11/22 67.1 kg (148 lb)   03/07/22 69 kg (152 lb 1.6 oz)   03/04/22 68.5 kg (151 lb)   03/02/22 68.6 kg (151 lb 3.2 oz)   02/28/22 69.4 kg (152 lb 14.4 oz)   02/18/22 68 kg (150 lb)   02/18/22 68 kg (150 lb)   01/28/22 68 kg (150 lb)     HEENT: EOMI, PERRL. Sclerae are anicteric. Oral mucosa is pink and moist with no lesions or thrush.   Lymph: Neck is supple with no lymphadenopathy in the cervical or supraclavicular areas.   Heart: Regular rate and rhythm.   Lungs: Clear to auscultation bilaterally.   Abdomen: Bowel sounds present, soft, nontender with no palpable hepatosplenomegaly or masses.   Extremities: No lower extremity edema noted bilaterally.   Neuro: Cranial nerves II through XII are grossly intact.  Skin: No rashes, petechiae, or bruising noted on exposed skin.  Breast: Right breast shows a mass at the 12 o'clock position (Baseline: 5 cm horizontally and 6 cm vertically) today improved 4.0 cm H x 4.6 cm V    Laboratory Data:  Most Recent 3 CBC's:  Recent Labs   Lab Test 03/21/22  0904 03/15/22  0932 03/07/22  0724   WBC 4.0 3.6* 4.3   HGB 11.4* 11.1* 11.6*   MCV 93 92 92     280 262    Most Recent 3 BMP's:  Recent Labs   Lab Test 03/21/22  0904 03/15/22  0932 03/07/22  0724    143 141   POTASSIUM 3.8 3.5 3.9   CHLORIDE 108 110* 107   CO2 28 28 26   BUN 9 10 8   CR 0.63 0.61 0.62   ANIONGAP 6 5 8   JENNIE 8.5 8.7 8.3*   GLC 92 100* 125*    Most Recent 2 LFT's:  Recent Labs   Lab Test 03/21/22  0904 03/15/22  0932   AST 18 14   ALT 27 16   ALKPHOS 79 72   BILITOTAL 0.3 0.6    Most Recent TSH and T4:  Recent Labs   Lab Test 02/18/22  0920   TSH 1.61   T4 1.01     I reviewed the above labs today.      Assessment and Plan:  1. Right sided ER+, WV+, HER2-negative, MammaPrint high risk T=5.5cm, N=0 breast cancer.  Tori tolerated her 4th week with less fatigue with a major flare and diarrhea.  Initially it was a grade 1 and controlled with diarrhea however on 3/15 she had grade 2 diarrhea at least 20 stools a day.  It subsided next day or 2 and she has no longer requiring Imodium.  I would say she was back to baseline as of 3/20/2022.    Given she has just recovered from her acute diarrhea last week I would continue to hold the immunotherapy.  We will reach out to SHANE regarding restart.  Could consider on 3/28 or just wait for week 7.  Her labs are appropriate today to move forward with week 5 of oral Taxol.    Her plan includes chemotherapy with oral paclitaxel, Encequidar and dostarlimab x12 weeks followed by AC.  At this time she is mentally preparing for a mastectomy, but would consider a lumpectomy if optimal tumor shrinkage. She met with Dr Layton and Dr Whitehead.      She is also scheduled to see genetics in April.  After surgery she may need radiation and then she will need adjuvant hormonal therapy.     Breast tumor smaller today on exam.  Her week 4 MRI showed some improvement    2.  Anemia.  Patient's hemoglobin was 11.6 the day of starting treatment.  She has a history of iron deficiency anemia.  Iron stores are low.  Will have her take oral iron M, W and F each week.       3.  GI:   Nausea patient responded well to oral Zofran on days 1 through 3 in addition to dano tabs.  Tums prn.  Diarrhea, induced by Taxol on days 1-4, grade 1, controlled with Imodium  Diarrhea, induced by immunotherapy escalated last week 3/15 having over 20 stools.  Improved with holding immunotherapy, Imodium, time.  Back at baseline today.    4.  Patient had noticed some vaginal dryness and states she had been told she was in perimenopause. Less of an issue this week    5.  Coping well good supportive .  Seeing a therapist as well. Discussed coping strategies with taking oral pills daily.  She said some of the mindfulness techniques we discussed last week has been very helpful regarding taking her oral treatment.    35 minutes spent on the date of the encounter doing chart review, review of outside records, review of test results, interpretation of tests, patient visit and documentation          Milka Mota PA-C

## 2022-03-21 NOTE — PROGRESS NOTES
[facility]  [address] [facility] [address]  Phone: [phone]  Fax: [phone]                      Oncology/Hematology Visit Note  Mar 21, 2022    Reason for Visit: follow up of breast cancer    History of Present Illness: Tori Steele is a 46 year old female with breat cancer. Right-sided 5.5 cm ER positive CT positive HER-2 negative breast cancer with associated DCIS.  Her MammaPrint came back as high risk.  She consented for the I-SPY clinical trial and has been randomized to the oral paclitaxel, Encequidar and dostarlimab arm.     She started 2/21/22.     Interval History:  Tori is here today for week 5 of treatment with her .  Last week her IV immunotherapy dostarlimab was held due to diarrhea.    Tori states she had been having some mushy stools throughout the week and then over the weekend developed watery stools.  She was taking Imodium 1-2 a day which seemed to be keeping things in control, however when she came in for infusion on Tuesday she ended up having over 20 watery stools that day.  She took multiple Imodium's was given IV fluids and eventually the stools ceased that evening.  She had 1 watery stool Wednesday morning took more Imodium and then had no bowel movements Thursday Friday or Saturday of the weekend.  Yesterday she had a formed soft stool and has not taken Imodium since last Wednesday.    Continues to have a bland diet.  Took Pepcid x1 last week for GERD.  Takes Zofran 1-2 times daily during her oral Taxol days.  Diarrhea    Fatigue- napping 1 hour a day.  She feels her fatigue has been steadily getting worse each week on treatment.  Otherwise able to do all of her ADLs.  Went out to dinner stayed in a hotel last week while kids were on spring break.    Mood is in a good place.    No fever sweats or chills.  No chest or breathing concerns.  No neuropathy.  Skin is dry.  Eyes are dry using eyedrops.        Current  Outpatient Medications   Medication Sig Dispense Refill     ferrous sulfate (FEROSUL) 325 (65 Fe) MG tablet Take 325 mg by mouth       lidocaine-prilocaine (EMLA) 2.5-2.5 % external cream Apply to port site one hour prior to access may start using six days after port placement. 30 g 1     ondansetron (ZOFRAN) 8 MG tablet Take 1 tablet (8 mg) by mouth every 8 hours as needed for nausea 30 tablet 0     prochlorperazine (COMPAZINE) 10 MG tablet Take 1 tablet (10 mg) by mouth every 6 hours as needed (Nausea/Vomiting) 30 tablet 2     riboflavin (VITAMIN  B2) 25 MG TABS          Physical Examination: ECOG 1  General: The patient is a pleasant female in no acute distress.  /80   Pulse 99   Temp 97.6  F (36.4  C) (Oral)   Resp 16   Wt 68.8 kg (151 lb 9.6 oz)   SpO2 98%   BMI 26.02 kg/m    Wt Readings from Last 10 Encounters:   03/21/22 68.8 kg (151 lb 9.6 oz)   03/15/22 68.3 kg (150 lb 8 oz)   03/11/22 67.1 kg (148 lb)   03/07/22 69 kg (152 lb 1.6 oz)   03/04/22 68.5 kg (151 lb)   03/02/22 68.6 kg (151 lb 3.2 oz)   02/28/22 69.4 kg (152 lb 14.4 oz)   02/18/22 68 kg (150 lb)   02/18/22 68 kg (150 lb)   01/28/22 68 kg (150 lb)     HEENT: EOMI, PERRL. Sclerae are anicteric. Oral mucosa is pink and moist with no lesions or thrush.   Lymph: Neck is supple with no lymphadenopathy in the cervical or supraclavicular areas.   Heart: Regular rate and rhythm.   Lungs: Clear to auscultation bilaterally.   Abdomen: Bowel sounds present, soft, nontender with no palpable hepatosplenomegaly or masses.   Extremities: No lower extremity edema noted bilaterally.   Neuro: Cranial nerves II through XII are grossly intact.  Skin: No rashes, petechiae, or bruising noted on exposed skin.  Breast: Right breast shows a mass at the 12 o'clock position (Baseline: 5 cm horizontally and 6 cm vertically) today improved 4.0 cm H x 4.6 cm V    Laboratory Data:  Most Recent 3 CBC's:  Recent Labs   Lab Test 03/21/22  0904 03/15/22  0932  03/07/22  0724   WBC 4.0 3.6* 4.3   HGB 11.4* 11.1* 11.6*   MCV 93 92 92    280 262    Most Recent 3 BMP's:  Recent Labs   Lab Test 03/21/22  0904 03/15/22  0932 03/07/22  0724    143 141   POTASSIUM 3.8 3.5 3.9   CHLORIDE 108 110* 107   CO2 28 28 26   BUN 9 10 8   CR 0.63 0.61 0.62   ANIONGAP 6 5 8   JENNIE 8.5 8.7 8.3*   GLC 92 100* 125*    Most Recent 2 LFT's:  Recent Labs   Lab Test 03/21/22  0904 03/15/22  0932   AST 18 14   ALT 27 16   ALKPHOS 79 72   BILITOTAL 0.3 0.6    Most Recent TSH and T4:  Recent Labs   Lab Test 02/18/22  0920   TSH 1.61   T4 1.01     I reviewed the above labs today.      Assessment and Plan:  1. Right sided ER+, [place]+, HER2-negative, MammaPrint high risk T=5.5cm, N=0 breast cancer.  Tori tolerated her 4th week with less fatigue with a major flare and diarrhea.  Initially it was a grade 1 and controlled with diarrhea however on 3/15 she had grade 2 diarrhea at least 20 stools a day.  It subsided next day or 2 and she has no longer requiring Imodium.  I would say she was back to baseline as of 3/20/2022.    Given she has just recovered from her acute diarrhea last week I would continue to hold the immunotherapy.  We will reach out to RINA regarding restart.  Could consider on 3/28 or just wait for week 7.  Her labs are appropriate today to move forward with week 5 of oral Taxol.    Her plan includes chemotherapy with oral paclitaxel, Encequidar and dostarlimab x12 weeks followed by AC.  At this time she is mentally preparing for a mastectomy, but would consider a lumpectomy if optimal tumor shrinkage. She met with Dr Layton and Dr Whitehead.      She is also scheduled to see genetics in April.  After surgery she may need radiation and then she will need adjuvant hormonal therapy.     Breast tumor smaller today on exam.  Her week 4 MRI showed some improvement    2.  Anemia.  Patient's hemoglobin was 11.6 the day of starting treatment.  She has a history of iron deficiency anemia.   Iron stores are low.  Will have her take oral iron M, W and F each week.      3.  GI:   Nausea patient responded well to oral Zofran on days 1 through 3 in addition to dano tabs.  Tums prn.  Diarrhea, induced by Taxol on days 1-4, grade 1, controlled with Imodium  Diarrhea, induced by immunotherapy escalated last week 3/15 having over 20 stools.  Improved with holding immunotherapy, Imodium, time.  Back at baseline today.    4.  Patient had noticed some vaginal dryness and states she had been told she was in perimenopause. Less of an issue this week    5.  Coping well good supportive .  Seeing a therapist as well. Discussed coping strategies with taking oral pills daily.  She said some of the mindfulness techniques we discussed last week has been very helpful regarding taking her oral treatment.    Janette Mota PA-C  [facility]  [address] [place] [address]  [phone]    35 minutes spent on the date of the encounter doing chart review, review of outside records, review of test results, interpretation of tests, patient visit and documentation

## 2022-03-21 NOTE — NURSING NOTE
Chief Complaint   Patient presents with     Blood Draw     Labs drawn via  by RN in lab.  VS taken       Labs collected from venipuncture by RN. Vitals taken. Checked in for appointment(s).    Laura Lockhart RN

## 2022-03-21 NOTE — NURSING NOTE
"Oncology Rooming Note    March 21, 2022 9:17 AM   Tori Steele is a 46 year old female who presents for:    Chief Complaint   Patient presents with     Blood Draw     Labs drawn via  by RN in lab.  VS taken     Oncology Clinic Visit     Breast Cancer     Initial Vitals: /80   Pulse 99   Temp 97.6  F (36.4  C) (Oral)   Resp 16   Wt 68.8 kg (151 lb 9.6 oz)   SpO2 98%   BMI 26.02 kg/m   Estimated body mass index is 26.02 kg/m  as calculated from the following:    Height as of 3/4/22: 1.626 m (5' 4\").    Weight as of this encounter: 68.8 kg (151 lb 9.6 oz). Body surface area is 1.76 meters squared.  No Pain (0) Comment: Data Unavailable   No LMP recorded.  Allergies reviewed: Yes  Medications reviewed: Yes    Medications: MEDICATION REFILLS NEEDED TODAY. Provider was notified.I-SPY needs refill    Pharmacy name entered into Interactive Advisory Software: [id number] [place] [id number] [address] [facility]    Clinical concerns:    none      Lindsay Ceballos            "

## 2022-03-28 ENCOUNTER — RESEARCH ENCOUNTER (OUTPATIENT)
Dept: ONCOLOGY | Facility: CLINIC | Age: 47
End: 2022-03-28

## 2022-03-28 ENCOUNTER — LAB (OUTPATIENT)
Dept: LAB | Facility: CLINIC | Age: 47
End: 2022-03-28
Attending: PHYSICIAN ASSISTANT
Payer: OTHER GOVERNMENT

## 2022-03-28 ENCOUNTER — ANCILLARY PROCEDURE (OUTPATIENT)
Dept: MRI IMAGING | Facility: CLINIC | Age: 47
End: 2022-03-28
Attending: PHYSICIAN ASSISTANT

## 2022-03-28 ENCOUNTER — ONCOLOGY VISIT (OUTPATIENT)
Dept: ONCOLOGY | Facility: CLINIC | Age: 47
End: 2022-03-28
Attending: PHYSICIAN ASSISTANT
Payer: OTHER GOVERNMENT

## 2022-03-28 VITALS
HEART RATE: 101 BPM | TEMPERATURE: 98.1 F | OXYGEN SATURATION: 100 % | SYSTOLIC BLOOD PRESSURE: 128 MMHG | BODY MASS INDEX: 25.95 KG/M2 | RESPIRATION RATE: 16 BRPM | DIASTOLIC BLOOD PRESSURE: 74 MMHG | WEIGHT: 151.2 LBS

## 2022-03-28 DIAGNOSIS — D50.0 IRON DEFICIENCY ANEMIA DUE TO CHRONIC BLOOD LOSS: ICD-10-CM

## 2022-03-28 DIAGNOSIS — C50.911 INVASIVE DUCTAL CARCINOMA OF RIGHT BREAST (H): Primary | ICD-10-CM

## 2022-03-28 DIAGNOSIS — C50.911 INVASIVE DUCTAL CARCINOMA OF BREAST, FEMALE, RIGHT (H): ICD-10-CM

## 2022-03-28 LAB
ALBUMIN SERPL-MCNC: 3.6 G/DL (ref 3.4–5)
ALP SERPL-CCNC: 81 U/L (ref 40–150)
ALT SERPL W P-5'-P-CCNC: 25 U/L (ref 0–50)
ANION GAP SERPL CALCULATED.3IONS-SCNC: 5 MMOL/L (ref 3–14)
AST SERPL W P-5'-P-CCNC: 18 U/L (ref 0–45)
BASOPHILS # BLD AUTO: 0 10E3/UL (ref 0–0.2)
BASOPHILS NFR BLD AUTO: 1 %
BILIRUB SERPL-MCNC: 0.4 MG/DL (ref 0.2–1.3)
BUN SERPL-MCNC: 9 MG/DL (ref 7–30)
CALCIUM SERPL-MCNC: 8.9 MG/DL (ref 8.5–10.1)
CHLORIDE BLD-SCNC: 109 MMOL/L (ref 94–109)
CO2 SERPL-SCNC: 29 MMOL/L (ref 20–32)
CREAT SERPL-MCNC: 0.61 MG/DL (ref 0.52–1.04)
EOSINOPHIL # BLD AUTO: 0.2 10E3/UL (ref 0–0.7)
EOSINOPHIL NFR BLD AUTO: 3 %
ERYTHROCYTE [DISTWIDTH] IN BLOOD BY AUTOMATED COUNT: 13.1 % (ref 10–15)
GFR SERPL CREATININE-BSD FRML MDRD: >90 ML/MIN/1.73M2
GLUCOSE BLD-MCNC: 75 MG/DL (ref 70–99)
HCT VFR BLD AUTO: 40.4 % (ref 35–47)
HGB BLD-MCNC: 12.6 G/DL (ref 11.7–15.7)
IMM GRANULOCYTES # BLD: 0.1 10E3/UL
IMM GRANULOCYTES NFR BLD: 1 %
LYMPHOCYTES # BLD AUTO: 1 10E3/UL (ref 0.8–5.3)
LYMPHOCYTES NFR BLD AUTO: 16 %
MCH RBC QN AUTO: 29.3 PG (ref 26.5–33)
MCHC RBC AUTO-ENTMCNC: 31.2 G/DL (ref 31.5–36.5)
MCV RBC AUTO: 94 FL (ref 78–100)
MONOCYTES # BLD AUTO: 0.4 10E3/UL (ref 0–1.3)
MONOCYTES NFR BLD AUTO: 7 %
NEUTROPHILS # BLD AUTO: 4.6 10E3/UL (ref 1.6–8.3)
NEUTROPHILS NFR BLD AUTO: 72 %
NRBC # BLD AUTO: 0 10E3/UL
NRBC BLD AUTO-RTO: 0 /100
PLATELET # BLD AUTO: 259 10E3/UL (ref 150–450)
POTASSIUM BLD-SCNC: 3.7 MMOL/L (ref 3.4–5.3)
PROT SERPL-MCNC: 7.9 G/DL (ref 6.8–8.8)
RBC # BLD AUTO: 4.3 10E6/UL (ref 3.8–5.2)
SODIUM SERPL-SCNC: 143 MMOL/L (ref 133–144)
WBC # BLD AUTO: 6.3 10E3/UL (ref 4–11)

## 2022-03-28 PROCEDURE — 99214 OFFICE O/P EST MOD 30 MIN: CPT | Performed by: PHYSICIAN ASSISTANT

## 2022-03-28 PROCEDURE — G0463 HOSPITAL OUTPT CLINIC VISIT: HCPCS

## 2022-03-28 PROCEDURE — 80053 COMPREHEN METABOLIC PANEL: CPT | Performed by: PHYSICIAN ASSISTANT

## 2022-03-28 PROCEDURE — 85004 AUTOMATED DIFF WBC COUNT: CPT | Performed by: PHYSICIAN ASSISTANT

## 2022-03-28 PROCEDURE — 36415 COLL VENOUS BLD VENIPUNCTURE: CPT

## 2022-03-28 RX ORDER — ALBUTEROL SULFATE 90 UG/1
1-2 AEROSOL, METERED RESPIRATORY (INHALATION)
Status: CANCELLED
Start: 2022-03-28

## 2022-03-28 RX ORDER — HEPARIN SODIUM,PORCINE 10 UNIT/ML
5 VIAL (ML) INTRAVENOUS
Status: CANCELLED | OUTPATIENT
Start: 2022-03-28

## 2022-03-28 RX ORDER — EPINEPHRINE 1 MG/ML
0.3 INJECTION, SOLUTION INTRAMUSCULAR; SUBCUTANEOUS EVERY 5 MIN PRN
Status: CANCELLED | OUTPATIENT
Start: 2022-03-28

## 2022-03-28 RX ORDER — DIPHENHYDRAMINE HYDROCHLORIDE 50 MG/ML
50 INJECTION INTRAMUSCULAR; INTRAVENOUS
Status: CANCELLED
Start: 2022-03-28

## 2022-03-28 RX ORDER — ALBUTEROL SULFATE 0.83 MG/ML
2.5 SOLUTION RESPIRATORY (INHALATION)
Status: CANCELLED | OUTPATIENT
Start: 2022-03-28

## 2022-03-28 RX ORDER — LORAZEPAM 2 MG/ML
0.5 INJECTION INTRAMUSCULAR EVERY 4 HOURS PRN
Status: CANCELLED | OUTPATIENT
Start: 2022-03-28

## 2022-03-28 RX ORDER — GADOBUTROL 604.72 MG/ML
7.5 INJECTION INTRAVENOUS ONCE
Status: COMPLETED | OUTPATIENT
Start: 2022-03-28 | End: 2022-03-28

## 2022-03-28 RX ORDER — HEPARIN SODIUM (PORCINE) LOCK FLUSH IV SOLN 100 UNIT/ML 100 UNIT/ML
500 SOLUTION INTRAVENOUS ONCE
Status: DISCONTINUED | OUTPATIENT
Start: 2022-03-28 | End: 2022-03-28 | Stop reason: HOSPADM

## 2022-03-28 RX ORDER — DEXAMETHASONE SODIUM PHOSPHATE 10 MG/ML
10 INJECTION, SOLUTION INTRAMUSCULAR; INTRAVENOUS
Status: CANCELLED
Start: 2022-03-28

## 2022-03-28 RX ORDER — NALOXONE HYDROCHLORIDE 0.4 MG/ML
0.2 INJECTION, SOLUTION INTRAMUSCULAR; INTRAVENOUS; SUBCUTANEOUS
Status: CANCELLED | OUTPATIENT
Start: 2022-03-28

## 2022-03-28 RX ORDER — METHYLPREDNISOLONE SODIUM SUCCINATE 125 MG/2ML
125 INJECTION, POWDER, LYOPHILIZED, FOR SOLUTION INTRAMUSCULAR; INTRAVENOUS
Status: CANCELLED
Start: 2022-03-28

## 2022-03-28 RX ORDER — HEPARIN SODIUM (PORCINE) LOCK FLUSH IV SOLN 100 UNIT/ML 100 UNIT/ML
5 SOLUTION INTRAVENOUS
Status: CANCELLED | OUTPATIENT
Start: 2022-03-28

## 2022-03-28 RX ORDER — MEPERIDINE HYDROCHLORIDE 25 MG/ML
25 INJECTION INTRAMUSCULAR; INTRAVENOUS; SUBCUTANEOUS EVERY 30 MIN PRN
Status: CANCELLED | OUTPATIENT
Start: 2022-03-28

## 2022-03-28 RX ADMIN — GADOBUTROL 7.5 ML: 604.72 INJECTION INTRAVENOUS at 13:27

## 2022-03-28 ASSESSMENT — PAIN SCALES - GENERAL: PAINLEVEL: NO PAIN (0)

## 2022-03-28 NOTE — NURSING NOTE
Chief Complaint   Patient presents with     Blood Draw     Labs drawn via  by RN. Vitals taken.     Labs collected from venipuncture by RN. Vitals taken. Checked in for appointment(s).    Jamila Meyer RN

## 2022-03-28 NOTE — NURSING NOTE
[id number]: Study Visit Note   Subject name: Tori Steele     Visit: C6D1    Did the study visit occur within the appropriate window allowed by the protocol? yes    Since the last study visit, She has been doing okay. Tori has been experiencing more fatigue, requiring a nap just about every day, or going to bed early. The fatigue does not limit her ADL's. Tori did start to notice some hair loss las Wednesday, 3/23. Last, she is still experiencing some dry eyes. Utilizing lubricating drops    I have personally interviewed Tori Steele and reviewed her medical record for adverse events and concomitant medications and these have been recorded on the corresponding logs in Tori Steele's research file.     Tori Steele was given the opportunity to ask any trial related questions.  Please see provider progress note for physical exam and other clinical information. Labs were reviewed - any significant lab values were addressed and reviewed.    Bertha Augustin RN      [id number]: Medication Count/IDS Note      Tori Steele is enrolled on the trial number listed above. The patient presented for her C6D1 day visit.   IDS number: [id number]  Drug name: PO PACLITAXEL  Number of boxes returned: 1  Lot number:   Yellow: [id number]  Red: [id number]  Number of pills returned: 0      Number of boxes dispensed: 1  Lot number:   Yellow: [id number]  Red: [id number]  Number of pills dispensed: 36    Drug name: PO ENCEQUIDAR  Number of boxes returned: 1  Lot number: [id number]  Number of pills returned: 0      Number of boxes dispensed: 1  Lot number:[id number]  Number of pills dispensed: 3      Drug diary returned? yes    Are there any discrepancies between the amount of medication the patient was instructed to take and the amount recorded as taken in the patient s drug diary?  No    Are there any discrepancies between the amount of medication the patient has recorded as taken in the drug diary and the amount that would be  expected to be returned based on the amount recorded as taken?  no       Betrha Augustin RN, MS  Research   Phone: [phone]  Pager: [phone]      Form 504.00.01 (Version 1)     Effective date: 01JUL2018     Next Review Date: 01JUL2020

## 2022-03-28 NOTE — LETTER
3/28/2022         RE: Tori Steele  [address] [facility] [address] [facility] [address]           [facility]  [address] [facility] [address]  Phone: [phone]  Fax: [phone]                      Oncology/Hematology Visit Note  Mar 28, 2022    Reason for Visit: follow up of breast cancer    History of Present Illness: Tori Steele is a 46 year old female with breat cancer. Right-sided 5.5 cm ER positive [place] positive HER-2 negative breast cancer with associated DCIS.  Her MammaPrint came back as high risk.  She consented for the I-SPY clinical trial and has been randomized to the oral paclitaxel, Encequidar and dostarlimab arm.     She started 2/21/22.     Interval History:  Tori is here today for week 6 of treatment with her . 3/15/22, her IV immunotherapy dostarlimab was held due to diarrhea that recovered with Imodium and time.    Tori states this last week she has had formed stools and no diarrhea.  She does have some reduction in her taste but is still eating routinely.      Continues to have a bland diet.  Took Pepcid x1 last week for GERD.  Takes Zofran 1-2 times daily during her oral Taxol days.    Fatigue- napping 1 hour a day or going to bed early.  She feels her fatigue has been steadily getting worse each week on treatment, but with her napping or going to bed early, she is feeling functional and able to do all of her ADLs.      Hair had more thinning last week, she went to get a shorter hair cut.     Mood is in a good place. Good support.     No fever sweats or chills.  No chest or breathing concerns.  No neuropathy.  Skin is dry, no rash.  Eyes are dry using eyedrops.  Nose had dried blood in it, no active bleeding.        Current Outpatient Medications   Medication Sig Dispense Refill     ferrous sulfate (FEROSUL) 325 (65 Fe) MG tablet Take 325 mg by mouth       lidocaine-prilocaine (EMLA) 2.5-2.5 % external cream Apply to port site one  hour prior to access may start using six days after port placement. 30 g 1     ondansetron (ZOFRAN) 8 MG tablet Take 1 tablet (8 mg) by mouth every 8 hours as needed for nausea 30 tablet 0     prochlorperazine (COMPAZINE) 10 MG tablet Take 1 tablet (10 mg) by mouth every 6 hours as needed (Nausea/Vomiting) 30 tablet 2     riboflavin (VITAMIN  B2) 25 MG TABS          Physical Examination: ECOG 1  General: The patient is a pleasant female in no acute distress.  /74 (BP Location: Right arm)   Pulse 101   Temp 98.1  F (36.7  C) (Oral)   Resp 16   Wt 68.6 kg (151 lb 3.2 oz)   SpO2 100%   BMI 25.95 kg/m    Wt Readings from Last 10 Encounters:   03/28/22 68.6 kg (151 lb 3.2 oz)   03/21/22 68.8 kg (151 lb 9.6 oz)   03/15/22 68.3 kg (150 lb 8 oz)   03/11/22 67.1 kg (148 lb)   03/07/22 69 kg (152 lb 1.6 oz)   03/04/22 68.5 kg (151 lb)   03/02/22 68.6 kg (151 lb 3.2 oz)   02/28/22 69.4 kg (152 lb 14.4 oz)   02/18/22 68 kg (150 lb)   02/18/22 68 kg (150 lb)     HEENT: EOMI, PERRL. Sclerae are anicteric. Oral mucosa is pink and moist with no lesions or thrush.   Lymph: Neck is supple with no lymphadenopathy in the cervical or supraclavicular areas.   Heart: Regular rate and rhythm.   Lungs: Clear to auscultation bilaterally.   Abdomen: Bowel sounds present, soft, nontender with no palpable hepatosplenomegaly or masses.   Extremities: No lower extremity edema noted bilaterally.   Neuro: Cranial nerves II through XII are grossly intact.  Skin: No rashes, petechiae, or bruising noted on exposed skin.  Breast: Right breast shows a mass at the 12 o'clock position (Baseline: 5 cm horizontally and 6 cm vertically) today improved 3.5 cm H x 4.0 cm V    Laboratory Data:  Most Recent 3 CBC's:  Recent Labs   Lab Test 03/28/22  1020 03/21/22  0904 03/15/22  0932   WBC 6.3 4.0 3.6*   HGB 12.6 11.4* 11.1*   MCV 94 93 92    250 280    Most Recent 3 BMP's:  Recent Labs   Lab Test 03/28/22  1020 03/21/22  0904 03/15/22  0932  03/07/22  0724    142 143 141   POTASSIUM 3.7 3.8 3.5 3.9   CHLORIDE 109 108 110* 107   CO2  --  28 28 26   BUN  --  9 10 8   CR  --  0.63 0.61 0.62   ANIONGAP  --  6 5 8   JENNIE  --  8.5 8.7 8.3*   GLC  --  92 100* 125*    Most Recent 2 LFT's:  Recent Labs   Lab Test 03/21/22  0904 03/15/22  0932   AST 18 14   ALT 27 16   ALKPHOS 79 72   BILITOTAL 0.3 0.6    Most Recent TSH and T4:  Recent Labs   Lab Test 02/18/22  0920   TSH 1.61   T4 1.01     I reviewed the above labs today.    Assessment and Plan:  1. Right sided ER+, WV+, HER2-negative, MammaPrint high risk T=5.5cm, N=0 breast cancer.  Tori tolerated her 4th week with less fatigue with a major flare and diarrhea.  Initially it was a grade 1 and controlled with diarrhea however on 3/15 she had grade 2 diarrhea at least 20 stools a day.  It subsided next day or 2 and she has no longer requiring Imodium. She was back to baseline as of 3/20/2022.    We skipped her immunotherapy week 4 and will plan to resume week 7.  Her labs are appropriate today to move forward with week 6 of oral Taxol.  She is scheduled for a MRI today and will see Dr Lockett Friday to review.      Her plan includes chemotherapy with oral paclitaxel, Encequidar and dostarlimab x12 weeks followed by AC.  At this time she is mentally preparing for a mastectomy, but would consider a lumpectomy if optimal tumor shrinkage. She met with Dr Layton and Dr Whitehead.      She is also scheduled to see genetics in April.  After surgery she may need radiation and then she will need adjuvant hormonal therapy.     Breast tumor smaller today on exam.      2.  Anemia.  Patient's hemoglobin was 11.6 the day of starting treatment.  She has a history of iron deficiency anemia.  Iron stores are low.  Will have her take oral iron M, W and F each week.  Hgb improving.     3.  GI:   Nausea patient responded well to oral Zofran on days 1 through 3 in addition to dano tabs.  Tums prn.  Diarrhea, induced by  immunotherapy escalated last week 3/15 having over 20 stools.  Improved with holding immunotherapy, Imodium, time.  Back at baseline today.    4.  Patient had noticed some vaginal dryness and states she had been told she was in perimenopause. Less of an issue this week    5.  Coping well good supportive .  Seeing a therapist as well.     30 minutes spent on the date of the encounter doing chart review, review of outside records, review of test results, interpretation of tests, patient visit and documentation       Milka Mota PA-C

## 2022-03-28 NOTE — NURSING NOTE
"Oncology Rooming Note    March 28, 2022 10:29 AM   Tori Steele is a 46 year old female who presents for:    Chief Complaint   Patient presents with     Blood Draw     Labs drawn via  by RN. Vitals taken.     Oncology Clinic Visit     Rtn Breast CA     Initial Vitals: /74 (BP Location: Right arm)   Pulse 101   Temp 98.1  F (36.7  C) (Oral)   Resp 16   Wt 68.6 kg (151 lb 3.2 oz)   SpO2 100%   BMI 25.95 kg/m   Estimated body mass index is 25.95 kg/m  as calculated from the following:    Height as of 3/4/22: 1.626 m (5' 4\").    Weight as of this encounter: 68.6 kg (151 lb 3.2 oz). Body surface area is 1.76 meters squared.  No Pain (0) Comment: Data Unavailable   No LMP recorded.  Allergies reviewed: Yes  Medications reviewed: Yes    Medications: Medication refills not needed today.  Pharmacy name entered into [facility]: [id number] [address] AT [facility] [address]    Clinical concerns: None       Milagro Iverson            "

## 2022-03-28 NOTE — PROGRESS NOTES
[facility]  [address] [place] [address]  Phone: [phone]  Fax: [phone]                      Oncology/Hematology Visit Note  Mar 28, 2022    Reason for Visit: follow up of breast cancer    History of Present Illness: Tori Steele is a 46 year old female with breat cancer. Right-sided 5.5 cm ER positive [place] positive HER-2 negative breast cancer with associated DCIS.  Her MammaPrint came back as high risk.  She consented for the I-SPY clinical trial and has been randomized to the oral paclitaxel, Encequidar and dostarlimab arm.     She started 2/21/22.     Interval History:  Tori is here today for week 6 of treatment with her . 3/15/22, her IV immunotherapy dostarlimab was held due to diarrhea that recovered with Imodium and time.    Tori states this last week she has had formed stools and no diarrhea.  She does have some reduction in her taste but is still eating routinely.      Continues to have a bland diet.  Took Pepcid x1 last week for GERD.  Takes Zofran 1-2 times daily during her oral Taxol days.    Fatigue- napping 1 hour a day or going to bed early.  She feels her fatigue has been steadily getting worse each week on treatment, but with her napping or going to bed early, she is feeling functional and able to do all of her ADLs.      Hair had more thinning last week, she went to get a shorter hair cut.     Mood is in a good place. Good support.     No fever sweats or chills.  No chest or breathing concerns.  No neuropathy.  Skin is dry, no rash.  Eyes are dry using eyedrops.  Nose had dried blood in it, no active bleeding.        Current Outpatient Medications   Medication Sig Dispense Refill     ferrous sulfate (FEROSUL) 325 (65 Fe) MG tablet Take 325 mg by mouth       lidocaine-prilocaine (EMLA) 2.5-2.5 % external cream Apply to port site one hour prior to access may start using six days after port placement. 30 g 1     ondansetron  (ZOFRAN) 8 MG tablet Take 1 tablet (8 mg) by mouth every 8 hours as needed for nausea 30 tablet 0     prochlorperazine (COMPAZINE) 10 MG tablet Take 1 tablet (10 mg) by mouth every 6 hours as needed (Nausea/Vomiting) 30 tablet 2     riboflavin (VITAMIN  B2) 25 MG TABS          Physical Examination: ECOG 1  General: The patient is a pleasant female in no acute distress.  /74 (BP Location: Right arm)   Pulse 101   Temp 98.1  F (36.7  C) (Oral)   Resp 16   Wt 68.6 kg (151 lb 3.2 oz)   SpO2 100%   BMI 25.95 kg/m    Wt Readings from Last 10 Encounters:   03/28/22 68.6 kg (151 lb 3.2 oz)   03/21/22 68.8 kg (151 lb 9.6 oz)   03/15/22 68.3 kg (150 lb 8 oz)   03/11/22 67.1 kg (148 lb)   03/07/22 69 kg (152 lb 1.6 oz)   03/04/22 68.5 kg (151 lb)   03/02/22 68.6 kg (151 lb 3.2 oz)   02/28/22 69.4 kg (152 lb 14.4 oz)   02/18/22 68 kg (150 lb)   02/18/22 68 kg (150 lb)     HEENT: EOMI, PERRL. Sclerae are anicteric. Oral mucosa is pink and moist with no lesions or thrush.   Lymph: Neck is supple with no lymphadenopathy in the cervical or supraclavicular areas.   Heart: Regular rate and rhythm.   Lungs: Clear to auscultation bilaterally.   Abdomen: Bowel sounds present, soft, nontender with no palpable hepatosplenomegaly or masses.   Extremities: No lower extremity edema noted bilaterally.   Neuro: Cranial nerves II through XII are grossly intact.  Skin: No rashes, petechiae, or bruising noted on exposed skin.  Breast: Right breast shows a mass at the 12 o'clock position (Baseline: 5 cm horizontally and 6 cm vertically) today improved 3.5 cm H x 4.0 cm V    Laboratory Data:  Most Recent 3 CBC's:  Recent Labs   Lab Test 03/28/22  1020 03/21/22  0904 03/15/22  0932   WBC 6.3 4.0 3.6*   HGB 12.6 11.4* 11.1*   MCV 94 93 92    250 280    Most Recent 3 BMP's:  Recent Labs   Lab Test 03/28/22  1020 03/21/22  0904 03/15/22  0932 03/07/22  0724    142 143 141   POTASSIUM 3.7 3.8 3.5 3.9   CHLORIDE 109 108 110* 107    CO2  --  28 28 26   BUN  --  9 10 8   CR  --  0.63 0.61 0.62   ANIONGAP  --  6 5 8   JENNIE  --  8.5 8.7 8.3*   GLC  --  92 100* 125*    Most Recent 2 LFT's:  Recent Labs   Lab Test 03/21/22  0904 03/15/22  0932   AST 18 14   ALT 27 16   ALKPHOS 79 72   BILITOTAL 0.3 0.6    Most Recent TSH and T4:  Recent Labs   Lab Test 02/18/22  0920   TSH 1.61   T4 1.01     I reviewed the above labs today.      Assessment and Plan:  1. Right sided ER+, PA+, HER2-negative, MammaPrint high risk T=5.5cm, N=0 breast cancer.  Tori tolerated her 4th week with less fatigue with a major flare and diarrhea.  Initially it was a grade 1 and controlled with diarrhea however on 3/15 she had grade 2 diarrhea at least 20 stools a day.  It subsided next day or 2 and she has no longer requiring Imodium. She was back to baseline as of 3/20/2022.    We skipped her immunotherapy week 4 and will plan to resume week 7.  Her labs are appropriate today to move forward with week 6 of oral Taxol.  She is scheduled for a MRI today and will see Dr Lockett Friday to review.      Her plan includes chemotherapy with oral paclitaxel, Encequidar and dostarlimab x12 weeks followed by AC.  At this time she is mentally preparing for a mastectomy, but would consider a lumpectomy if optimal tumor shrinkage. She met with Dr Layton and Dr Whitehead.      She is also scheduled to see genetics in April.  After surgery she may need radiation and then she will need adjuvant hormonal therapy.     Breast tumor smaller today on exam.      2.  Anemia.  Patient's hemoglobin was 11.6 the day of starting treatment.  She has a history of iron deficiency anemia.  Iron stores are low.  Will have her take oral iron M, W and F each week.  Hgb improving.     3.  GI:   Nausea patient responded well to oral Zofran on days 1 through 3 in addition to dano tabs.  Tums prn.  Diarrhea, induced by immunotherapy escalated last week 3/15 having over 20 stools.  Improved with holding immunotherapy,  Imodium, time.  Back at baseline today.    4.  Patient had noticed some vaginal dryness and states she had been told she was in perimenopause. Less of an issue this week    5.  Coping well good supportive .  Seeing a therapist as well.     Janette Mota PA-C  [facility]  [address] [place], [place] [address] [phone]    30 minutes spent on the date of the encounter doing chart review, review of outside records, review of test results, interpretation of tests, patient visit and documentation

## 2022-04-01 ENCOUNTER — ONCOLOGY VISIT (OUTPATIENT)
Dept: ONCOLOGY | Facility: CLINIC | Age: 47
End: 2022-04-01
Attending: INTERNAL MEDICINE
Payer: OTHER GOVERNMENT

## 2022-04-01 VITALS
RESPIRATION RATE: 18 BRPM | WEIGHT: 151 LBS | BODY MASS INDEX: 25.92 KG/M2 | SYSTOLIC BLOOD PRESSURE: 129 MMHG | HEART RATE: 103 BPM | DIASTOLIC BLOOD PRESSURE: 67 MMHG | OXYGEN SATURATION: 100 % | TEMPERATURE: 98.4 F

## 2022-04-01 DIAGNOSIS — L65.9 ALOPECIA: ICD-10-CM

## 2022-04-01 DIAGNOSIS — C50.911 INVASIVE DUCTAL CARCINOMA OF RIGHT BREAST (H): Primary | ICD-10-CM

## 2022-04-01 PROCEDURE — 99215 OFFICE O/P EST HI 40 MIN: CPT | Performed by: INTERNAL MEDICINE

## 2022-04-01 PROCEDURE — G0463 HOSPITAL OUTPT CLINIC VISIT: HCPCS

## 2022-04-01 RX ORDER — EPINEPHRINE 1 MG/ML
0.3 INJECTION, SOLUTION INTRAMUSCULAR; SUBCUTANEOUS EVERY 5 MIN PRN
Status: CANCELLED | OUTPATIENT
Start: 2022-04-04

## 2022-04-01 RX ORDER — METHYLPREDNISOLONE SODIUM SUCCINATE 125 MG/2ML
125 INJECTION, POWDER, LYOPHILIZED, FOR SOLUTION INTRAMUSCULAR; INTRAVENOUS
Status: CANCELLED
Start: 2022-04-04

## 2022-04-01 RX ORDER — HEPARIN SODIUM,PORCINE 10 UNIT/ML
5 VIAL (ML) INTRAVENOUS
Status: CANCELLED | OUTPATIENT
Start: 2022-04-04

## 2022-04-01 RX ORDER — ALBUTEROL SULFATE 90 UG/1
1-2 AEROSOL, METERED RESPIRATORY (INHALATION)
Status: CANCELLED
Start: 2022-04-04

## 2022-04-01 RX ORDER — MEPERIDINE HYDROCHLORIDE 25 MG/ML
25 INJECTION INTRAMUSCULAR; INTRAVENOUS; SUBCUTANEOUS EVERY 30 MIN PRN
Status: CANCELLED | OUTPATIENT
Start: 2022-04-04

## 2022-04-01 RX ORDER — LORAZEPAM 2 MG/ML
0.5 INJECTION INTRAMUSCULAR EVERY 4 HOURS PRN
Status: CANCELLED | OUTPATIENT
Start: 2022-04-04

## 2022-04-01 RX ORDER — HEPARIN SODIUM (PORCINE) LOCK FLUSH IV SOLN 100 UNIT/ML 100 UNIT/ML
5 SOLUTION INTRAVENOUS
Status: CANCELLED | OUTPATIENT
Start: 2022-04-04

## 2022-04-01 RX ORDER — NALOXONE HYDROCHLORIDE 0.4 MG/ML
0.2 INJECTION, SOLUTION INTRAMUSCULAR; INTRAVENOUS; SUBCUTANEOUS
Status: CANCELLED | OUTPATIENT
Start: 2022-04-04

## 2022-04-01 RX ORDER — ALBUTEROL SULFATE 0.83 MG/ML
2.5 SOLUTION RESPIRATORY (INHALATION)
Status: CANCELLED | OUTPATIENT
Start: 2022-04-04

## 2022-04-01 RX ORDER — DEXAMETHASONE SODIUM PHOSPHATE 10 MG/ML
10 INJECTION, SOLUTION INTRAMUSCULAR; INTRAVENOUS
Status: CANCELLED
Start: 2022-04-04

## 2022-04-01 RX ORDER — DIPHENHYDRAMINE HYDROCHLORIDE 50 MG/ML
50 INJECTION INTRAMUSCULAR; INTRAVENOUS
Status: CANCELLED
Start: 2022-04-04

## 2022-04-01 ASSESSMENT — PAIN SCALES - GENERAL: PAINLEVEL: NO PAIN (0)

## 2022-04-01 NOTE — LETTER
"  4/1/2022     RE: Tori Steele  [address] [facility] [address]    Dear Colleague,    Thank you for referring your patient, Troi Steele, to the [facility] CANCER [facility]. Please see a copy of my visit note below.         [facility]  [address] [facility] [address]  Phone: [phone]  Fax: [phone]                      Oncology/Hematology Visit Note  Apr 1, 2022    Reason for Visit: follow up of breast cancer    History of Present Illness: Tori Steele is a 46 year old female with breat cancer. Right-sided 5.5 cm ER positive [place] positive HER-2 negative breast cancer with associated DCIS.  Her MammaPrint came back as high risk.  She consented for the I-SPY clinical trial and has been randomized to the oral paclitaxel, Encequidar and dostarlimab arm.     She started 2/21/22. She has completed the first 6 weeks.    Interval History:  Tori is here with her  between week 6 and 7.  As noted in Ms. Mota's charting the patient had significant diarrhea most likely due to her dostarlimab.  Because of this it was held on week 4 of her regimen.  She gradually recovered and as of her last cycle she had not had diarrhea.  In retrospect she is wondering if perhaps her oral paclitaxel and Encequidar are responsible for some level of her diarrhea.  She thinks is a little bit worse after she takes the pills.  She does have a little bit of nausea with her oral regimen but that is reasonably well controlled with her Zofran.    Today we went over her 6-week MRI.  Unfortunately she has had some response but it is not optimal and the recommendation from her \"return the results\" letter were to consider adding another drug.  Today we talked about adding carboplatin to the regimen.  We discussed changing her oral paclitaxel and Encequidar to IV paclitaxel.  For now we decided we will keep her oral regimen the same and add carboplatin.  If she " has recurrent diarrhea and/or nausea we will switch her to IV paclitaxel.  She is concerned about hair loss into her that is a side effect she would like to minimize.  We went over the fact that carboplatin with accelerated hair loss but we will try to maintain as much hair as possible by continuing her oral regimen.    She and her  asked several reasonable questions today.  In particular whether or not this would allow surgery at 12 weeks.  I explained to her how the MRI at 6 weeks predicts anticipated pathologic complete response at 12 points.  We went over her MRI and showed that there has been some level of response but by the algorithm used by RL ornelas predicts that she would have residual disease at 12 weeks.  She understands that the imaging response suggest that she is having a slower response than anticipated.    Otherwise she is doing reasonably well.  As mentioned she has nausea that is well controlled by the Zofran.  She has an appetite that is okay.  She is concerned about hair loss and she has cut some of her hair as it is beginning to come out.      Review of Systems:  Patient denies any of the following except if noted above: fevers, chills, difficulty with energy, vision or hearing changes, chest pain, dyspnea, abdominal pain, nausea, vomiting, diarrhea, constipation, urinary concerns, headaches, numbness, tingling, issues with sleep or mood. He/She also denies lumps, bumps, rashes or skin lesions, bleeding or bruising issues.    Current Outpatient Medications   Medication Sig Dispense Refill     ferrous sulfate (FEROSUL) 325 (65 Fe) MG tablet Take 325 mg by mouth       lidocaine-prilocaine (EMLA) 2.5-2.5 % external cream Apply to port site one hour prior to access may start using six days after port placement. 30 g 1     ondansetron (ZOFRAN) 8 MG tablet Take 1 tablet (8 mg) by mouth every 8 hours as needed for nausea 30 tablet 0     prochlorperazine (COMPAZINE) 10 MG tablet Take 1 tablet (10  mg) by mouth every 6 hours as needed (Nausea/Vomiting) 30 tablet 2     riboflavin (VITAMIN  B2) 25 MG TABS          Physical Examination: ECOG 1  General: The patient is a pleasant female in no acute distress.  /67   Pulse 103   Temp 98.4  F (36.9  C) (Oral)   Resp 18   Wt 68.5 kg (151 lb)   SpO2 100%   BMI 25.92 kg/m    Wt Readings from Last 10 Encounters:   04/01/22 68.5 kg (151 lb)   03/28/22 68.6 kg (151 lb 3.2 oz)   03/21/22 68.8 kg (151 lb 9.6 oz)   03/15/22 68.3 kg (150 lb 8 oz)   03/11/22 67.1 kg (148 lb)   03/07/22 69 kg (152 lb 1.6 oz)   03/04/22 68.5 kg (151 lb)   03/02/22 68.6 kg (151 lb 3.2 oz)   02/28/22 69.4 kg (152 lb 14.4 oz)   02/18/22 68 kg (150 lb)     GENERAL: She looks well today. She was not examined in detail.     Laboratory Data:    Labs from 3/28 are OK with H/H up to 12.6/40.      Problems    1. Right sided ER+, ID+, HER2-negative, MammaPrint high risk T=5.5cm, N=0 breast cancer. Randomized to oral paclitaxel/encequidar/dostarlimab.  2. Premenopausal  3. Iron deficiency    Impression: The patient and her  were disappointed by the response thus far.  I share their disappointment but I told her that hopefully adding other drugs might improve her response.  They had hoped to be done with therapy 12 weeks but I explained the idea that if she does not have an evident pathologic complete response at 12 weeks she will then go 1 to receive doxorubicin and cyclophosphamide that would take 8 weeks.  Hopefully she will have an improved response by adding carboplatin as identified below.    Plan:  1. Continue therapy with Encequidar,oral paclitaxel.  2. Re institute the dostarlimab.  3. Add carboplatin  4. If patient has significant diarrhea or nausea, we will switch her to IV paclitaxel.  5. Patient given Return of Results letter and we discussed this in detail.     Keegan Lockett MD    60 minutes spent today in chart review, patient visit, and documentation with [place] order  revision.    Again, thank you for allowing me to participate in the care of your patient.      Sincerely,    Keegan Lockett MD

## 2022-04-01 NOTE — PROGRESS NOTES
"[facility] CAN[facility]ER CLINIC  [address] [facility] [address]  Phone: [phone]  Fax: [phone]                      Oncology/Hematology Visit Note  Apr 1, 2022    Reason for Visit: follow up of breast cancer    History of Present Illness: Tori Steele is a 46 year old female with breat cancer. Right-sided 5.5 cm ER positive [place] positive HER-2 negative breast cancer with associated DCIS.  Her MammaPrint came back as high risk.  She consented for the I-SPY clinical trial and has been randomized to the oral paclitaxel, Encequidar and dostarlimab arm.     She started 2/21/22. She has completed the first 6 weeks.    Interval History:  Tori is here with her  between week 6 and 7.  As noted in Ms. Mota's charting the patient had significant diarrhea most likely due to her dostarlimab.  Because of this it was held on week 4 of her regimen.  She gradually recovered and as of her last cycle she had not had diarrhea.  In retrospect she is wondering if perhaps her oral paclitaxel and Encequidar are responsible for some level of her diarrhea.  She thinks is a little bit worse after she takes the pills.  She does have a little bit of nausea with her oral regimen but that is reasonably well controlled with her Zofran.    Today we went over her 6-week MRI.  Unfortunately she has had some response but it is not optimal and the recommendation from her \"return the results\" letter were to consider adding another drug.  Today we talked about adding carboplatin to the regimen.  We discussed changing her oral paclitaxel and Encequidar to IV paclitaxel.  For now we decided we will keep her oral regimen the same and add carboplatin.  If she has recurrent diarrhea and/or nausea we will switch her to IV paclitaxel.  She is concerned about hair loss into her that is a side effect she would like to minimize.  We went over the fact that carboplatin with accelerated hair loss but we will " try to maintain as much hair as possible by continuing her oral regimen.    She and her  asked several reasonable questions today.  In particular whether or not this would allow surgery at 12 weeks.  I explained to her how the MRI at 6 weeks predicts anticipated pathologic complete response at 12 points.  We went over her MRI and showed that there has been some level of response but by the algorithm used by I johnson predicts that she would have residual disease at 12 weeks.  She understands that the imaging response suggest that she is having a slower response than anticipated.    Otherwise she is doing reasonably well.  As mentioned she has nausea that is well controlled by the Zofran.  She has an appetite that is okay.  She is concerned about hair loss and she has cut some of her hair as it is beginning to come out.      Review of Systems:  Patient denies any of the following except if noted above: fevers, chills, difficulty with energy, vision or hearing changes, chest pain, dyspnea, abdominal pain, nausea, vomiting, diarrhea, constipation, urinary concerns, headaches, numbness, tingling, issues with sleep or mood. He/She also denies lumps, bumps, rashes or skin lesions, bleeding or bruising issues.    Current Outpatient Medications   Medication Sig Dispense Refill     ferrous sulfate (FEROSUL) 325 (65 Fe) MG tablet Take 325 mg by mouth       lidocaine-prilocaine (EMLA) 2.5-2.5 % external cream Apply to port site one hour prior to access may start using six days after port placement. 30 g 1     ondansetron (ZOFRAN) 8 MG tablet Take 1 tablet (8 mg) by mouth every 8 hours as needed for nausea 30 tablet 0     prochlorperazine (COMPAZINE) 10 MG tablet Take 1 tablet (10 mg) by mouth every 6 hours as needed (Nausea/Vomiting) 30 tablet 2     riboflavin (VITAMIN  B2) 25 MG TABS          Physical Examination: ECOG 1  General: The patient is a pleasant female in no acute distress.  /67   Pulse 103   Temp 98.4   F (36.9  C) (Oral)   Resp 18   Wt 68.5 kg (151 lb)   SpO2 100%   BMI 25.92 kg/m    Wt Readings from Last 10 Encounters:   04/01/22 68.5 kg (151 lb)   03/28/22 68.6 kg (151 lb 3.2 oz)   03/21/22 68.8 kg (151 lb 9.6 oz)   03/15/22 68.3 kg (150 lb 8 oz)   03/11/22 67.1 kg (148 lb)   03/07/22 69 kg (152 lb 1.6 oz)   03/04/22 68.5 kg (151 lb)   03/02/22 68.6 kg (151 lb 3.2 oz)   02/28/22 69.4 kg (152 lb 14.4 oz)   02/18/22 68 kg (150 lb)     GENERAL: She looks well today. She was not examined in detail.     Laboratory Data:    Labs from 3/28 are OK with H/H up to 12.6/40.      Problems    1. Right sided ER+, MS+, HER2-negative, MammaPrint high risk T=5.5cm, N=0 breast cancer. Randomized to oral paclitaxel/encequidar/dostarlimab.  2. Premenopausal  3. Iron deficiency    Impression: The patient and her  were disappointed by the response thus far.  I share their disappointment but I told her that hopefully adding other drugs might improve her response.  They had hoped to be done with therapy 12 weeks but I explained the idea that if she does not have an evident pathologic complete response at 12 weeks she will then go 1 to receive doxorubicin and cyclophosphamide that would take 8 weeks.  Hopefully she will have an improved response by adding carboplatin as identified below.    Plan:  1. Continue therapy with Encequidar,oral paclitaxel.  2. Re institute the dostarlimab.  3. Add carboplatin  4. If patient has significant diarrhea or nausea, we will switch her to IV paclitaxel.  5. Patient given Return of Results letter and we discussed this in detail.     Keegan Lockett MD    60 minutes spent today in chart review, patient visit, and documentation with [employer] order revision.

## 2022-04-01 NOTE — NURSING NOTE
"Oncology Rooming Note    April 1, 2022 10:36 AM   Tori Steele is a 46 year old female who presents for:    Chief Complaint   Patient presents with     Oncology Clinic Visit     Breast Cancer     Initial Vitals: /67   Pulse 103   Temp 98.4  F (36.9  C) (Oral)   Resp 18   Wt 68.5 kg (151 lb)   SpO2 100%   BMI 25.92 kg/m   Estimated body mass index is 25.92 kg/m  as calculated from the following:    Height as of 3/4/22: 1.626 m (5' 4\").    Weight as of this encounter: 68.5 kg (151 lb). Body surface area is 1.76 meters squared.  No Pain (0) Comment: Data Unavailable   No LMP recorded.  Allergies reviewed: Yes  Medications reviewed: Yes    Medications: Medication refills not needed today.  Pharmacy name entered into [facility]: [facility] #[id number] [place], [place] - [address] AT [facility] [address] [place]    Clinical concerns: None       Wendy Hannon LPN            "

## 2022-04-04 ENCOUNTER — INFUSION THERAPY VISIT (OUTPATIENT)
Dept: ONCOLOGY | Facility: CLINIC | Age: 47
End: 2022-04-04
Attending: INTERNAL MEDICINE
Payer: OTHER GOVERNMENT

## 2022-04-04 ENCOUNTER — RESEARCH ENCOUNTER (OUTPATIENT)
Dept: ONCOLOGY | Facility: CLINIC | Age: 47
End: 2022-04-04

## 2022-04-04 ENCOUNTER — LAB (OUTPATIENT)
Dept: LAB | Facility: CLINIC | Age: 47
End: 2022-04-04
Attending: INTERNAL MEDICINE
Payer: OTHER GOVERNMENT

## 2022-04-04 VITALS
OXYGEN SATURATION: 100 % | HEART RATE: 104 BPM | BODY MASS INDEX: 25.64 KG/M2 | WEIGHT: 149.4 LBS | SYSTOLIC BLOOD PRESSURE: 119 MMHG | TEMPERATURE: 98.5 F | RESPIRATION RATE: 16 BRPM | DIASTOLIC BLOOD PRESSURE: 76 MMHG

## 2022-04-04 DIAGNOSIS — C50.911 INVASIVE DUCTAL CARCINOMA OF RIGHT BREAST (H): Primary | ICD-10-CM

## 2022-04-04 LAB
ALBUMIN SERPL-MCNC: 3.3 G/DL (ref 3.4–5)
ALBUMIN UR-MCNC: 30 MG/DL
ALP SERPL-CCNC: 76 U/L (ref 40–150)
ALT SERPL W P-5'-P-CCNC: 25 U/L (ref 0–50)
ANION GAP SERPL CALCULATED.3IONS-SCNC: 6 MMOL/L (ref 3–14)
APPEARANCE UR: ABNORMAL
AST SERPL W P-5'-P-CCNC: 18 U/L (ref 0–45)
BASOPHILS # BLD AUTO: 0 10E3/UL (ref 0–0.2)
BASOPHILS NFR BLD AUTO: 0 %
BILIRUB SERPL-MCNC: 0.5 MG/DL (ref 0.2–1.3)
BILIRUB UR QL STRIP: NEGATIVE
BUN SERPL-MCNC: 8 MG/DL (ref 7–30)
CALCIUM SERPL-MCNC: 8.3 MG/DL (ref 8.5–10.1)
CHLORIDE BLD-SCNC: 111 MMOL/L (ref 94–109)
CO2 SERPL-SCNC: 25 MMOL/L (ref 20–32)
COLOR UR AUTO: YELLOW
CREAT SERPL-MCNC: 0.53 MG/DL (ref 0.52–1.04)
EOSINOPHIL # BLD AUTO: 0.3 10E3/UL (ref 0–0.7)
EOSINOPHIL NFR BLD AUTO: 6 %
ERYTHROCYTE [DISTWIDTH] IN BLOOD BY AUTOMATED COUNT: 12.9 % (ref 10–15)
GFR SERPL CREATININE-BSD FRML MDRD: >90 ML/MIN/1.73M2
GLUCOSE BLD-MCNC: 122 MG/DL (ref 70–99)
GLUCOSE UR STRIP-MCNC: NEGATIVE MG/DL
HCT VFR BLD AUTO: 36.4 % (ref 35–47)
HGB BLD-MCNC: 11.5 G/DL (ref 11.7–15.7)
HGB UR QL STRIP: ABNORMAL
IMM GRANULOCYTES # BLD: 0 10E3/UL
IMM GRANULOCYTES NFR BLD: 0 %
KETONES UR STRIP-MCNC: NEGATIVE MG/DL
LEUKOCYTE ESTERASE UR QL STRIP: ABNORMAL
LYMPHOCYTES # BLD AUTO: 0.8 10E3/UL (ref 0.8–5.3)
LYMPHOCYTES NFR BLD AUTO: 17 %
MCH RBC QN AUTO: 28.9 PG (ref 26.5–33)
MCHC RBC AUTO-ENTMCNC: 31.6 G/DL (ref 31.5–36.5)
MCV RBC AUTO: 92 FL (ref 78–100)
MONOCYTES # BLD AUTO: 0.2 10E3/UL (ref 0–1.3)
MONOCYTES NFR BLD AUTO: 5 %
MUCOUS THREADS #/AREA URNS LPF: PRESENT /LPF
NEUTROPHILS # BLD AUTO: 3.4 10E3/UL (ref 1.6–8.3)
NEUTROPHILS NFR BLD AUTO: 72 %
NITRATE UR QL: NEGATIVE
NRBC # BLD AUTO: 0 10E3/UL
NRBC BLD AUTO-RTO: 0 /100
PH UR STRIP: 5 [PH] (ref 5–7)
PLATELET # BLD AUTO: 191 10E3/UL (ref 150–450)
POTASSIUM BLD-SCNC: 3.9 MMOL/L (ref 3.4–5.3)
PROT SERPL-MCNC: 7.3 G/DL (ref 6.8–8.8)
RBC # BLD AUTO: 3.98 10E6/UL (ref 3.8–5.2)
RBC URINE: 14 /HPF
SODIUM SERPL-SCNC: 142 MMOL/L (ref 133–144)
SP GR UR STRIP: 1.03 (ref 1–1.03)
SQUAMOUS EPITHELIAL: 7 /HPF
T4 FREE SERPL-MCNC: 0.98 NG/DL (ref 0.76–1.46)
TSH SERPL DL<=0.005 MIU/L-ACNC: 1.69 MU/L (ref 0.4–4)
UROBILINOGEN UR STRIP-MCNC: 2 MG/DL
WBC # BLD AUTO: 4.8 10E3/UL (ref 4–11)
WBC URINE: 17 /HPF

## 2022-04-04 PROCEDURE — 81001 URINALYSIS AUTO W/SCOPE: CPT | Performed by: INTERNAL MEDICINE

## 2022-04-04 PROCEDURE — 250N000011 HC RX IP 250 OP 636: Performed by: INTERNAL MEDICINE

## 2022-04-04 PROCEDURE — 96417 CHEMO IV INFUS EACH ADDL SEQ: CPT

## 2022-04-04 PROCEDURE — 96413 CHEMO IV INFUSION 1 HR: CPT

## 2022-04-04 PROCEDURE — 80053 COMPREHEN METABOLIC PANEL: CPT | Performed by: INTERNAL MEDICINE

## 2022-04-04 PROCEDURE — 85025 COMPLETE CBC W/AUTO DIFF WBC: CPT | Performed by: INTERNAL MEDICINE

## 2022-04-04 PROCEDURE — 96367 TX/PROPH/DG ADDL SEQ IV INF: CPT

## 2022-04-04 PROCEDURE — 84439 ASSAY OF FREE THYROXINE: CPT | Performed by: INTERNAL MEDICINE

## 2022-04-04 PROCEDURE — 258N000003 HC RX IP 258 OP 636: Performed by: INTERNAL MEDICINE

## 2022-04-04 PROCEDURE — 84443 ASSAY THYROID STIM HORMONE: CPT | Performed by: INTERNAL MEDICINE

## 2022-04-04 RX ORDER — HEPARIN SODIUM (PORCINE) LOCK FLUSH IV SOLN 100 UNIT/ML 100 UNIT/ML
5 SOLUTION INTRAVENOUS
Status: DISCONTINUED | OUTPATIENT
Start: 2022-04-04 | End: 2022-04-04 | Stop reason: HOSPADM

## 2022-04-04 RX ADMIN — CARBOPLATIN 225 MG: 10 INJECTION, SOLUTION INTRAVENOUS at 14:41

## 2022-04-04 RX ADMIN — DEXTROSE MONOHYDRATE 250 ML: 5 INJECTION, SOLUTION INTRAVENOUS at 11:21

## 2022-04-04 RX ADMIN — Medication 5 ML: at 15:19

## 2022-04-04 RX ADMIN — SODIUM CHLORIDE 150 MG: 9 INJECTION, SOLUTION INTRAVENOUS at 11:59

## 2022-04-04 ASSESSMENT — PAIN SCALES - GENERAL: PAINLEVEL: NO PAIN (0)

## 2022-04-04 NOTE — PROGRESS NOTES
Infusion Nursing Note:  Tori Steele presents today for cycle 7 day 1 study oral encequidar (IDS# 3903), IV study dostarlimab (IDS# 3903), study oral taxol (IDS# 3903), IV carboplatin.    Patient seen by provider today: patient saw Dr. Lockett on 4/1/22   present during visit today: Not Applicable.    Note:  Patient is feeling well today. She denies any changes since she saw Dr. Lockett on 4/1/22, including fevers, chills, SOB, chest pain, nausea, diarrhea, and pain.    Carboplatin is new for the patient today. Patient confirms she has received written education. Reviewed education with patient including side effects and treatment schedule.    Per Rosmery research RN: ok to proceed today.    Study oral encequidar taken at 1248.  Study dostarlimab start time: 1249.  Study dostarlimab end time: 1322. Finished late due to overfill.  Patient started taking study oral taxol at 1352.  Carboplatin administered after patient completed taking oral taxol.    Intravenous Access:  Implanted Port.    Treatment Conditions:  Lab Results   Component Value Date    HGB 11.5 (L) 04/04/2022    WBC 4.8 04/04/2022    ANEUTAUTO 3.4 04/04/2022     04/04/2022      Lab Results   Component Value Date     04/04/2022    POTASSIUM 3.9 04/04/2022    MAG 1.7 (L) 02/07/2022    CR 0.53 04/04/2022    JENNIE 8.3 (L) 04/04/2022    BILITOTAL 0.5 04/04/2022    ALBUMIN 3.3 (L) 04/04/2022    ALT 25 04/04/2022    AST 18 04/04/2022     Results reviewed, labs MET treatment parameters, ok to proceed with treatment.      Post Infusion Assessment:  Patient tolerated infusion without incident.  Blood return noted pre and post infusion.  Site patent and intact, free from redness, edema or discomfort.  No evidence of extravasations.  Access discontinued per protocol.       Discharge Plan:   Patient declined prescription refills.  Discharge instructions reviewed with: Patient.  Patient and/or family verbalized understanding of discharge instructions  and all questions answered.  AVS to patient via [facility] [employer].  Patient will return 4/11 for next appointment. Patient on wait-list for infusion appointment on 4/11, patient aware she will be notified of infusion time later in the week.  Patient discharged in stable condition accompanied by: self.  Departure Mode: Ambulatory.      Angelita Morris RN

## 2022-04-04 NOTE — PROGRESS NOTES
Video-Visit Details    Type of service: Video Visit    Video Start Time: 2:45  Video End Time: 3:03    Originating Location (pt. Location): Home    Distant Location (provider location): [facility] CANCER [facility], Cancer Risk Management Program    Platform used for Video Visit: Dharmesh    4/5/2022    Referring Provider: Keegan Lockett MD    Presenting Information:   I met with Tori Steele today for her video genetic counseling visit through the Cancer Risk Management Program to discuss her personal history of breast cancer and negative genetic testing and family history of breast and esophageal cancer. She is here today to review this history and cancer screening recommendations.    Personal History:  Tori is a 46 year old female. She was diagnosed with invasive ductal carcinoma of the right breast (ER/NM+, HER2-) at age 46. She is currently enrolled in the I-SPY clinical trial. After completion, treatment may include surgery, radiation, and adjuvant hormonal therapy.     Tori met with genetic counseling at [facility] on 1/27/2022. [facility]'s Breast Cancer STAT panel (9 genes) with reflex to the Breast and Gynecologic Cancers Panel (21 genes) was ordered. No mutations were found in the 21 genes tested. She had a telephone genetic counseling appointment on 2/7/2022 to review these negative results.     In her hormonal-based medical history, she had her first menstrual period at age 14, her first child at age 31, and is premenopausal. Tori has her ovaries, fallopian tubes and uterus in place, and reports no ovarian cancer screening to date. Tori does report a history of uterine fibroids and heavy bleeding. An endometrial biopsy in November 2021 was normal. She reports no history of hormone replacement therapy. Tori reports oral contraceptive use for approximately 5 years. Tori has not had a colonoscopy. Tori denies any history of dermatological exams. She does not  regularly do any other cancer screening at this time. Tori reported no history of smoking.    Family History: (Please see scanned pedigree for detailed family history information)    Maternal grandmother was diagnosed with breast cancer in her 70's. She passed away at age 99 from COVID-19.     Maternal grandfather was diagnosed with esophageal cancer in his 60's. He passed away at age 70 from cancer. It was reported that he was a smoker.     There is no reported family history of cancer on her paternal side of the family. However, Tori had limited information about extended relatives.    Her maternal and paternal ethnicity is Black/. There is no known Ashkenazi Religion ancestry on either side of her family. There is no reported consanguinity.    Discussion:   We reviewed Tori's previous genetic testing results.   The [facility] Breast Cancer STAT panel (9 genes) with reflex to the Breast and Gynecologic Cancers Panel (21 genes) was ordered by [facility] on 1/27/2022. This testing was done because of Tori's personal and family history of breast cancer.   Tori is negative for mutations in the SUE, BARD1, BRCA1, BRCA2, BRIP1, CDH1, CHEK2, DICER1, EPCAM, MLH1, MSH2, MSH6, NF1, PALB2, PMS2, PTEN, RAD51C, RAD51D, SMARCA4, STK11, and TP53 genes. No mutations were found in any of the 21 genes analyzed. This test involved sequencing and deletion/duplication analysis of all genes with the exceptions of EPCAM (deletions/duplications only).  We discussed several different interpretations of this negative test result.    One explanation may be that there is a different gene or combination of genes and environment that are associated with the cancers in this family.  It is possible that her relatives diagnosed with cancer did have a mutation and she did not inherit it.    We discussed screening recommendations based on this negative test result, it is important for Tori and her  relatives to refer back to the family history for appropriate cancer screening.   Tori should continue to follow all breast cancer treatment and future follow-up recommendations as made by her oncology team.   Tori s close female relatives remain at increased risk for breast cancer given their family history. Breast cancer screening is generally recommended to begin approximately 10 years younger than the earliest age of breast cancer diagnosis in the family, or at age 40, whichever comes first. In this family, screening may begin at age 34. Breast screening options should be discussed with an individual's primary care provider and a genetic counselor, to determine at what age to begin screening, what screening is appropriate, and if additional screening (such as breast MRI) is necessary based on personal/family history factors.   Other population cancer screening options, such as those recommended by the American Cancer Society and the National Comprehensive Cancer Network (NCCN), are also appropriate for Tori and her family. These screening recommendations may change if there are changes to Tori's personal and/or family history of cancer.   Final screening recommendations should be made by each individual's primary care provider.  We reviewed the autosomal dominant inheritance. We discussed that Tori did not pass on an identifiable mutation in these genes to her children based on this test result.  Mutations in these genes do not skip generations.    We do not have an explanation for Tori's breast cancer. While no genetic changes were identified, Tori may still be at risk for certain cancers due to family history, environmental factors, or other genetic causes not identified by this test.  Because of that, it is important that she continue with cancer screening based on her personal and family history as discussed above.  Genetic testing is rapidly advancing, and new cancer susceptibility  genes will most likely be identified in the future.  Therefore, I encouraged Tori to contact me regularly or if there are changes in her personal or family history.  This may change how we assess her cancer risk, screening, and the testing we would offer.    Plan:  1) No additional genetic testing was ordered for Tori today.   2) Information regarding recommended screening, based upon the family history, was reviewed for Tori.  3) Tori plans to follow-up with Dr. Lockett.  4) Tori will contact me regularly and/or if the family or personal history changes. My contact information was provided.     Tori is 46 year old and is being evaluated via a billable video visit.    Time spent on video: 17 minutes    Marilee Allison MS, [facility]  Genetic Counseling Intern  (P): [phone]    Attestation: Patient seen, evaluated and discussed with the Genetic Counseling Intern. I have verified the content of the note, which accurately reflects my assessment of the patient and the plan of care.     Supervising Genetic Counselor  Dorina Jarvis MS, [facility]  Genetic Counselor  Ph: [phone]

## 2022-04-04 NOTE — PATIENT INSTRUCTIONS
[facility] Triage and after hours / weekends / holidays:  [phone]    Please call the triage or after hours line if you experience a temperature greater than or equal to 100.5, shaking chills, have uncontrolled nausea, vomiting and/or diarrhea, dizziness, shortness of breath, chest pain, bleeding, unexplained bruising, or if you have any other new/concerning symptoms, questions or concerns.      If you are having any concerning symptoms or wish to speak to a provider before your next infusion visit, please call your care coordinator or triage to notify them so we can adequately serve you.     If you need a refill on a narcotic prescription or other medication, please call before your infusion appointment.

## 2022-04-04 NOTE — NURSING NOTE
"[id number]: Study Visit Note   Subject name: Tori Steele     Visit: C7D1    Did the study visit occur within the appropriate window allowed by the protocol? yes    Since the last study visit, She has been doing okay. Her W6 breast MRI demonstrated a low response, and her RoR letter advised the addition of another drug. Dr. Lockett discussed the addition of Carboplatin to future cycles for the remaining 6 weeks.     Carboplatin was added to her future cycles, and will continue to receive Dostarlimab. Because of this addition, [facility] will consider her as an administrative \"non-PCR.\"     As far as side effects go, she had a good week overall. She had minimal nausea and fatigue and diarrhea has been much better (grade 1).       I have personally interviewed Tori Steele and reviewed her medical record for adverse events and concomitant medications and these have been recorded on the corresponding logs in Tori Steele's research file.     Tori Steele was given the opportunity to ask any trial related questions.  Please see provider progress note for physical exam and other clinical information. Labs were reviewed - any significant lab values were addressed and reviewed.      [id number]: Medication Count/IDS Note      Tori Steele is enrolled on the trial number listed above. The patient presented for her C7D1 day visit.   IDS number: 3903  Drug name: PO PACLITAXEL  Number of boxes returned: 1  Lot number:   Yellow: [id number]  Red: [id number]  Number of pills returned: 0      Number of boxes dispensed: 1  Lot number:  Yellow: [id number]  Red: [id number]  Number of pills dispensed: 36    Drug name: PO ENCEQUIDAR  Number of boxes returned: 1  Lot number: [id number]  Number of pills returned: 0      Number of boxes dispensed: 1  Lot number: [id number]  Number of pills dispensed: 3      Drug diary returned? yes    Are there any discrepancies between the amount of medication the patient was instructed to take and " the amount recorded as taken in the patient s drug diary?  No    Are there any discrepancies between the amount of medication the patient has recorded as taken in the drug diary and the amount that would be expected to be returned based on the amount recorded as taken?  no       Bertha Augustin RN, MS  Research   Phone: [phone]  Pager: [phone]      Form 504.00.01 (Version 1)     Effective date: 01JUL2018     Next Review Date: 01JUL2020

## 2022-04-05 ENCOUNTER — VIRTUAL VISIT (OUTPATIENT)
Dept: ONCOLOGY | Facility: CLINIC | Age: 47
End: 2022-04-05
Attending: INTERNAL MEDICINE
Payer: OTHER GOVERNMENT

## 2022-04-05 DIAGNOSIS — C50.111 MALIGNANT NEOPLASM OF CENTRAL PORTION OF RIGHT BREAST IN FEMALE, ESTROGEN RECEPTOR POSITIVE (H): Primary | ICD-10-CM

## 2022-04-05 DIAGNOSIS — Z80.0 FAMILY HISTORY OF ESOPHAGEAL CANCER: ICD-10-CM

## 2022-04-05 DIAGNOSIS — Z80.3 FAMILY HISTORY OF MALIGNANT NEOPLASM OF BREAST: ICD-10-CM

## 2022-04-05 DIAGNOSIS — Z17.0 MALIGNANT NEOPLASM OF CENTRAL PORTION OF RIGHT BREAST IN FEMALE, ESTROGEN RECEPTOR POSITIVE (H): Primary | ICD-10-CM

## 2022-04-05 PROCEDURE — 96040 HC GENETIC COUNSELING, EACH 30 MINUTES: CPT | Mod: GT | Performed by: GENETIC COUNSELOR, MS

## 2022-04-05 NOTE — PROGRESS NOTES
Tori is a 46 year old who is being evaluated via a billable video visit.      How would you like to obtain your AVS? MyChart  If the video visit is dropped, the invitation should be resent by: Text to cell phone: [phone]  Will anyone else be joining your video visit? No      Video Start Time: 2:45pm    Video-Visit Details    Type of service:  Video Visit    Video End Time:3:03pm    Originating Location (pt. Location): Home    Distant Location (provider location):  [facility] CANCER [facility]     Platform used for Video Visit: Modustri

## 2022-04-05 NOTE — LETTER
Cancer Risk Management  Program Locations    [facility] Cancer [facility]  [facility] Cancer Clinic  [facility] Cancer [facility] Cancer [facility]  Mailing Address  Cancer Risk Management Program  [address]  [place] [address]    New patient appointments  [phone]  April 5, 2022    Tori Steele  [address] [address]    Dear Tori,    It was a pleasure meeting with you on 4/5/2022. Here is a copy of the progress note from your recent genetic counseling visit to the Cancer Risk Management Program. If you have any additional questions, please feel free to call.    Referring Provider: Keegan Lockett MD    Presenting Information:   I met with Toribright Steele today for her video genetic counseling visit through the Cancer Risk Management Program to discuss her personal history of breast cancer and negative genetic testing and family history of breast and esophageal cancer. She is here today to review this history and cancer screening recommendations.    Personal History:  Tori is a 46 year old female. She was diagnosed with invasive ductal carcinoma of the right breast (ER/MO+, HER2-) at age 46. She is currently enrolled in the I-SPY clinical trial. After completion, treatment may include surgery, radiation, and adjuvant hormonal therapy.     Tori met with genetic counseling at [facility] on 1/27/2022. [facility]'s Breast Cancer STAT panel (9 genes) with reflex to the Breast and Gynecologic Cancers Panel (21 genes) was ordered. No mutations were found in the 21 genes tested. She had a telephone genetic counseling appointment on 2/7/2022 to review these negative results.     In her hormonal-based medical history, she had her first menstrual period at age 14, her first child at age 31, and is premenopausal. Tori has her ovaries, fallopian  tubes and uterus in place, and reports no ovarian cancer screening to date. Tori does report a history of uterine fibroids and heavy bleeding. An endometrial biopsy in November 2021 was normal. She reports no history of hormone replacement therapy. Tori reports oral contraceptive use for approximately 5 years. Tori has not had a colonoscopy. Tori denies any history of dermatological exams. She does not regularly do any other cancer screening at this time. Tori reported no history of smoking.    Family History: (Please see scanned pedigree for detailed family history information)    Maternal grandmother was diagnosed with breast cancer in her 70's. She passed away at age 99 from COVID-19.     Maternal grandfather was diagnosed with esophageal cancer in his 60's. He passed away at age 70 from cancer. It was reported that he was a smoker.     There is no reported family history of cancer on her paternal side of the family. However, Tori had limited information about extended relatives.    Her maternal and paternal ethnicity is Black/. There is no known Ashkenazi Anabaptist ancestry on either side of her family. There is no reported consanguinity.    Discussion:   We reviewed Tori's previous genetic testing results.   The [facility] Breast Cancer STAT panel (9 genes) with reflex to the Breast and Gynecologic Cancers Panel (21 genes) was ordered by [facility] on 1/27/2022. This testing was done because of Tori's personal and family history of breast cancer.   Tori is negative for mutations in the SUE, BARD1, BRCA1, BRCA2, BRIP1, CDH1, CHEK2, DICER1, EPCAM, MLH1, MSH2, MSH6, NF1, PALB2, PMS2, PTEN, RAD51C, RAD51D, SMARCA4, STK11, and TP53 genes. No mutations were found in any of the 21 genes analyzed. This test involved sequencing and deletion/duplication analysis of all genes with the exceptions of EPCAM (deletions/duplications only).  We discussed several  different interpretations of this negative test result.    One explanation may be that there is a different gene or combination of genes and environment that are associated with the cancers in this family.  It is possible that her relatives diagnosed with cancer did have a mutation and she did not inherit it.    We discussed screening recommendations based on this negative test result, it is important for Tori and her relatives to refer back to the family history for appropriate cancer screening.   Tori should continue to follow all breast cancer treatment and future follow-up recommendations as made by her oncology team.   Tori s close female relatives remain at increased risk for breast cancer given their family history. Breast cancer screening is generally recommended to begin approximately 10 years younger than the earliest age of breast cancer diagnosis in the family, or at age 40, whichever comes first. In this family, screening may begin at age 34. Breast screening options should be discussed with an individual's primary care provider and a genetic counselor, to determine at what age to begin screening, what screening is appropriate, and if additional screening (such as breast MRI) is necessary based on personal/family history factors.   Other population cancer screening options, such as those recommended by the American Cancer Society and the National Comprehensive Cancer Network (NCCN), are also appropriate for Tori and her family. These screening recommendations may change if there are changes to Tori's personal and/or family history of cancer.   Final screening recommendations should be made by each individual's primary care provider.  We reviewed the autosomal dominant inheritance. We discussed that Tori did not pass on an identifiable mutation in these genes to her children based on this test result.  Mutations in these genes do not skip generations.    We do not have an explanation  for Tori's breast cancer. While no genetic changes were identified, Tori may still be at risk for certain cancers due to family history, environmental factors, or other genetic causes not identified by this test.  Because of that, it is important that she continue with cancer screening based on her personal and family history as discussed above.  Genetic testing is rapidly advancing, and new cancer susceptibility genes will most likely be identified in the future.  Therefore, I encouraged Tori to contact me regularly or if there are changes in her personal or family history.  This may change how we assess her cancer risk, screening, and the testing we would offer.    Plan:  1) No additional genetic testing was ordered for Tori today.   2) Information regarding recommended screening, based upon the family history, was reviewed for Tori.  3) Tori plans to follow-up with Dr. Lockett.  4) Tori will contact me regularly and/or if the family or personal history changes. My contact information was provided.     Tori is 46 year old and is being evaluated via a billable video visit.    Time spent on video: 17 minutes    Marilee Allison MS, [facility] [place]  Genetic Counseling Intern  (P): [phone]    Attestation: Patient seen, evaluated and discussed with the Genetic Counseling Intern. I have verified the content of the note, which accurately reflects my assessment of the patient and the plan of care.     Supervising Genetic Counselor  Dorina Jarvis MS, [facility]  Genetic Counselor  Ph: [phone]

## 2022-04-11 ENCOUNTER — VIRTUAL VISIT (OUTPATIENT)
Dept: ONCOLOGY | Facility: CLINIC | Age: 47
End: 2022-04-11
Attending: PHYSICIAN ASSISTANT
Payer: OTHER GOVERNMENT

## 2022-04-11 ENCOUNTER — APPOINTMENT (OUTPATIENT)
Dept: LAB | Facility: CLINIC | Age: 47
End: 2022-04-11
Attending: PHYSICIAN ASSISTANT
Payer: OTHER GOVERNMENT

## 2022-04-11 ENCOUNTER — RESEARCH ENCOUNTER (OUTPATIENT)
Dept: ONCOLOGY | Facility: CLINIC | Age: 47
End: 2022-04-11

## 2022-04-11 VITALS
HEART RATE: 114 BPM | WEIGHT: 151.7 LBS | DIASTOLIC BLOOD PRESSURE: 79 MMHG | OXYGEN SATURATION: 98 % | BODY MASS INDEX: 26.04 KG/M2 | TEMPERATURE: 98.3 F | SYSTOLIC BLOOD PRESSURE: 117 MMHG | RESPIRATION RATE: 16 BRPM

## 2022-04-11 DIAGNOSIS — T45.1X5A CHEMOTHERAPY-INDUCED NEUTROPENIA (H): ICD-10-CM

## 2022-04-11 DIAGNOSIS — C50.911 INVASIVE DUCTAL CARCINOMA OF RIGHT BREAST (H): Primary | ICD-10-CM

## 2022-04-11 DIAGNOSIS — D70.1 CHEMOTHERAPY-INDUCED NEUTROPENIA (H): ICD-10-CM

## 2022-04-11 PROBLEM — D70.9 NEUTROPENIA (H): Status: ACTIVE | Noted: 2022-04-11

## 2022-04-11 LAB
ALBUMIN SERPL-MCNC: 3.3 G/DL (ref 3.4–5)
ALP SERPL-CCNC: 79 U/L (ref 40–150)
ALT SERPL W P-5'-P-CCNC: 23 U/L (ref 0–50)
ANION GAP SERPL CALCULATED.3IONS-SCNC: 7 MMOL/L (ref 3–14)
AST SERPL W P-5'-P-CCNC: 16 U/L (ref 0–45)
BASOPHILS # BLD MANUAL: 0 10E3/UL (ref 0–0.2)
BASOPHILS NFR BLD MANUAL: 0 %
BILIRUB SERPL-MCNC: 0.6 MG/DL (ref 0.2–1.3)
BUN SERPL-MCNC: 8 MG/DL (ref 7–30)
CALCIUM SERPL-MCNC: 9 MG/DL (ref 8.5–10.1)
CHLORIDE BLD-SCNC: 106 MMOL/L (ref 94–109)
CO2 SERPL-SCNC: 27 MMOL/L (ref 20–32)
CREAT SERPL-MCNC: 0.62 MG/DL (ref 0.52–1.04)
EOSINOPHIL # BLD MANUAL: 0.1 10E3/UL (ref 0–0.7)
EOSINOPHIL NFR BLD MANUAL: 6 %
ERYTHROCYTE [DISTWIDTH] IN BLOOD BY AUTOMATED COUNT: 12.7 % (ref 10–15)
GFR SERPL CREATININE-BSD FRML MDRD: >90 ML/MIN/1.73M2
GLUCOSE BLD-MCNC: 128 MG/DL (ref 70–99)
HCT VFR BLD AUTO: 34.9 % (ref 35–47)
HGB BLD-MCNC: 11.2 G/DL (ref 11.7–15.7)
LYMPHOCYTES # BLD MANUAL: 0.7 10E3/UL (ref 0.8–5.3)
LYMPHOCYTES NFR BLD MANUAL: 34 %
MCH RBC QN AUTO: 29.1 PG (ref 26.5–33)
MCHC RBC AUTO-ENTMCNC: 32.1 G/DL (ref 31.5–36.5)
MCV RBC AUTO: 91 FL (ref 78–100)
MONOCYTES # BLD MANUAL: 0 10E3/UL (ref 0–1.3)
MONOCYTES NFR BLD MANUAL: 1 %
NEUTROPHILS # BLD MANUAL: 1.2 10E3/UL (ref 1.6–8.3)
NEUTROPHILS NFR BLD MANUAL: 59 %
PLAT MORPH BLD: ABNORMAL
PLATELET # BLD AUTO: 220 10E3/UL (ref 150–450)
POTASSIUM BLD-SCNC: 3.7 MMOL/L (ref 3.4–5.3)
PROT SERPL-MCNC: 7.4 G/DL (ref 6.8–8.8)
RBC # BLD AUTO: 3.85 10E6/UL (ref 3.8–5.2)
RBC MORPH BLD: ABNORMAL
SODIUM SERPL-SCNC: 140 MMOL/L (ref 133–144)
WBC # BLD AUTO: 2 10E3/UL (ref 4–11)

## 2022-04-11 PROCEDURE — 99214 OFFICE O/P EST MOD 30 MIN: CPT | Mod: 95 | Performed by: PHYSICIAN ASSISTANT

## 2022-04-11 PROCEDURE — 258N000003 HC RX IP 258 OP 636: Performed by: PHYSICIAN ASSISTANT

## 2022-04-11 PROCEDURE — 85027 COMPLETE CBC AUTOMATED: CPT | Performed by: PHYSICIAN ASSISTANT

## 2022-04-11 PROCEDURE — 250N000011 HC RX IP 250 OP 636: Performed by: PHYSICIAN ASSISTANT

## 2022-04-11 PROCEDURE — 96367 TX/PROPH/DG ADDL SEQ IV INF: CPT

## 2022-04-11 PROCEDURE — 80053 COMPREHEN METABOLIC PANEL: CPT | Performed by: PHYSICIAN ASSISTANT

## 2022-04-11 PROCEDURE — 250N000011 HC RX IP 250 OP 636: Mod: GT | Performed by: PHYSICIAN ASSISTANT

## 2022-04-11 PROCEDURE — 96413 CHEMO IV INFUSION 1 HR: CPT

## 2022-04-11 RX ORDER — HEPARIN SODIUM (PORCINE) LOCK FLUSH IV SOLN 100 UNIT/ML 100 UNIT/ML
5 SOLUTION INTRAVENOUS
Status: CANCELLED | OUTPATIENT
Start: 2022-04-11

## 2022-04-11 RX ORDER — ALBUTEROL SULFATE 90 UG/1
1-2 AEROSOL, METERED RESPIRATORY (INHALATION)
Status: CANCELLED
Start: 2022-04-11

## 2022-04-11 RX ORDER — EPINEPHRINE 1 MG/ML
0.3 INJECTION, SOLUTION INTRAMUSCULAR; SUBCUTANEOUS EVERY 5 MIN PRN
Status: CANCELLED | OUTPATIENT
Start: 2022-04-11

## 2022-04-11 RX ORDER — ALBUTEROL SULFATE 0.83 MG/ML
2.5 SOLUTION RESPIRATORY (INHALATION)
Status: CANCELLED | OUTPATIENT
Start: 2022-04-11

## 2022-04-11 RX ORDER — ONDANSETRON 8 MG/1
8 TABLET, FILM COATED ORAL EVERY 8 HOURS PRN
Qty: 3 TABLET | Refills: 0 | Status: SHIPPED | OUTPATIENT
Start: 2022-04-11 | End: 2022-05-02

## 2022-04-11 RX ORDER — DIPHENHYDRAMINE HYDROCHLORIDE 50 MG/ML
50 INJECTION INTRAMUSCULAR; INTRAVENOUS
Status: CANCELLED
Start: 2022-04-11

## 2022-04-11 RX ORDER — METHYLPREDNISOLONE SODIUM SUCCINATE 125 MG/2ML
125 INJECTION, POWDER, LYOPHILIZED, FOR SOLUTION INTRAMUSCULAR; INTRAVENOUS
Status: CANCELLED
Start: 2022-04-11

## 2022-04-11 RX ORDER — HEPARIN SODIUM (PORCINE) LOCK FLUSH IV SOLN 100 UNIT/ML 100 UNIT/ML
500 SOLUTION INTRAVENOUS ONCE
Status: COMPLETED | OUTPATIENT
Start: 2022-04-11 | End: 2022-04-11

## 2022-04-11 RX ORDER — ONDANSETRON 8 MG/1
8 TABLET, FILM COATED ORAL EVERY 8 HOURS PRN
Qty: 30 TABLET | Refills: 0 | Status: SHIPPED | OUTPATIENT
Start: 2022-04-11 | End: 2022-06-01

## 2022-04-11 RX ORDER — HEPARIN SODIUM,PORCINE 10 UNIT/ML
5 VIAL (ML) INTRAVENOUS
Status: CANCELLED | OUTPATIENT
Start: 2022-04-11

## 2022-04-11 RX ORDER — NALOXONE HYDROCHLORIDE 0.4 MG/ML
0.2 INJECTION, SOLUTION INTRAMUSCULAR; INTRAVENOUS; SUBCUTANEOUS
Status: CANCELLED | OUTPATIENT
Start: 2022-04-11

## 2022-04-11 RX ORDER — LORAZEPAM 2 MG/ML
0.5 INJECTION INTRAMUSCULAR EVERY 4 HOURS PRN
Status: CANCELLED | OUTPATIENT
Start: 2022-04-11

## 2022-04-11 RX ORDER — MEPERIDINE HYDROCHLORIDE 25 MG/ML
25 INJECTION INTRAMUSCULAR; INTRAVENOUS; SUBCUTANEOUS EVERY 30 MIN PRN
Status: CANCELLED | OUTPATIENT
Start: 2022-04-11

## 2022-04-11 RX ORDER — HEPARIN SODIUM (PORCINE) LOCK FLUSH IV SOLN 100 UNIT/ML 100 UNIT/ML
5 SOLUTION INTRAVENOUS
Status: DISCONTINUED | OUTPATIENT
Start: 2022-04-11 | End: 2022-04-11 | Stop reason: HOSPADM

## 2022-04-11 RX ORDER — DEXAMETHASONE SODIUM PHOSPHATE 10 MG/ML
10 INJECTION, SOLUTION INTRAMUSCULAR; INTRAVENOUS
Status: CANCELLED
Start: 2022-04-11

## 2022-04-11 RX ADMIN — SODIUM CHLORIDE 150 MG: 9 INJECTION, SOLUTION INTRAVENOUS at 13:52

## 2022-04-11 RX ADMIN — Medication 500 UNITS: at 11:19

## 2022-04-11 RX ADMIN — Medication 5 ML: at 15:14

## 2022-04-11 RX ADMIN — SODIUM CHLORIDE 250 ML: 9 INJECTION, SOLUTION INTRAVENOUS at 13:19

## 2022-04-11 ASSESSMENT — PAIN SCALES - GENERAL: PAINLEVEL: NO PAIN (0)

## 2022-04-11 NOTE — PROGRESS NOTES
Infusion Nursing Note:  Tori Steele presents today for Cycle 8 Day 1 Study-Encequidar (IDS 3903), Study-Paclitaxel (IDS 3903), Study-Carboplatin (IDS 3903)   Patient seen by provider today: Yes: JESSY Maria    Note:   Study Encequidar taken at 1300  Study Paclitaxel taken at 1400  Pt completely finished taking Paclitaxel prior to starting Carboplatin.    Carboplatin Start: 1444  Carboplatin Stop: 1514    Pt fasted for two hours post oral Paclitaxel per orders.    Due to ANC being low at 1.2 today, Janette would like pt to get labs and possible Neupogen on Friday 4/15. This was requested for scheduling to make, but not done at time of discharge. Pt aware to call tomorrow if she does not see this appt.     Intravenous Access:  Implanted Port.    Treatment Conditions:  Lab Results   Component Value Date    HGB 11.2 (L) 04/11/2022    WBC 2.0 (L) 04/11/2022    ANEU 1.2 (L) 04/11/2022    ANEUTAUTO 3.4 04/04/2022     04/11/2022      Lab Results   Component Value Date     04/11/2022    POTASSIUM 3.7 04/11/2022    MAG 1.7 (L) 02/07/2022    CR 0.62 04/11/2022    JENNIE 9.0 04/11/2022    BILITOTAL 0.6 04/11/2022    ALBUMIN 3.3 (L) 04/11/2022    ALT 23 04/11/2022    AST 16 04/11/2022     Results reviewed, labs MET treatment parameters, ok to proceed with treatment.    Post Infusion Assessment:  Patient tolerated infusion without incident.  Blood return noted pre and post infusion.  Site patent and intact, free from redness, edema or discomfort.  No evidence of extravasations. Access discontinued per protocol.     Discharge Plan:   Prescription refills given for Zofran.  AVS to patient via Fididel[employer].  Patient will return 4/19 for next appointment.   Patient discharged in stable condition accompanied by: self.  Departure Mode: Ambulatory.    Kat Alas RN

## 2022-04-11 NOTE — PROGRESS NOTES
Tori is a 46 year old who is being evaluated via a billable video visit.      How would you like to obtain your AVS? MyChart  If the video visit is dropped, the invitation should be resent by: Text to cell phone: [phone]  Will anyone else be joining your video visit? No   Yes, spouse Summer will be there with her for the video visit.  Megan Escalonapafloyd     Video Start Time: 11:35  Video-Visit Details    Type of service:  Video Visit    Video End Time:12:05    Originating Location (pt. Location):[facility] [place]    Distant Location (provider location):  [facility]     Platform used for Video Visit: Instant Information    [facility]  [address] [address]  Phone: [phone]  Fax: [phone]                        Oncology/Hematology Visit Note  Apr 11, 2022       Reason for Visit: follow up of breast cancer     History of Present Illness: Tori Steele is a 46 year old female with breat cancer. Right-sided 5.5 cm ER positive MO positive HER-2 negative breast cancer with associated DCIS.  Her MammaPrint came back as high risk.  She consented for the I-SPY clinical trial and has been randomized to the oral paclitaxel, Encequidar and dostarlimab arm.      2/21/22 started trial with oral paclitaxel, Encequidar and dostarlimab.    3/15/22  her IV immunotherapy dostarlimab was held due to diarrhea that recovered with Imodium and time  4/4/22   Carbo was added in weekly due in hopes for more significant reduction in cancer     Interval History:  Tori is here today for week 8 of treatment with her . Last week she started weekly Carbo in addition to her oral taxol/encequidor.  She noticed her hair starting falling out more significantly.      She had more nausea - right after the Carbo and for about 3 days.  She put herself on every 8 hour Zofran and had good control, without emesis.      She did have some loose stools on 4/4 given she was  started back on dostarlimib, but did not persist, no further diarrhea and no need for imodium.     Tori was having taste changes that are now constant- things are tasting like metal and she is eating smaller snacks every couple hours instead of meals.  Picking healthy options, weight is stable. Hydrating well.          Fatigue- napping 1 hour a day or going to bed early.  Had one day last week on Thursday of more moderate fatigue, then had a good weekend.      Had a difficult time coping with adding in new chemo, we discussed this today.      No fever sweats or chills.  No chest or breathing concerns.  No neuropathy.  Skin is dry, no rash.  Eyes are dry using eyedrops.  Nose had dried blood in it, no active bleeding.          Current Outpatient Prescriptions          Current Outpatient Medications   Medication Sig Dispense Refill     ferrous sulfate (FEROSUL) 325 (65 Fe) MG tablet Take 325 mg by mouth         lidocaine-prilocaine (EMLA) 2.5-2.5 % external cream Apply to port site one hour prior to access may start using six days after port placement. 30 g 1     ondansetron (ZOFRAN) 8 MG tablet Take 1 tablet (8 mg) by mouth every 8 hours as needed for nausea 30 tablet 0     prochlorperazine (COMPAZINE) 10 MG tablet Take 1 tablet (10 mg) by mouth every 6 hours as needed (Nausea/Vomiting) 30 tablet 2     riboflavin (VITAMIN  B2) 25 MG TABS                  Physical Examination: ECOG 1  /79   Pulse 114   Temp 98.3  F (36.8  C) (Oral)   Resp 16   Wt 68.8 kg (151 lb 11.2 oz)   SpO2 98%   BMI 26.04 kg/m    Wt Readings from Last 4 Encounters:   04/11/22 68.8 kg (151 lb 11.2 oz)   04/04/22 67.8 kg (149 lb 6.4 oz)   04/01/22 68.5 kg (151 lb)   03/28/22 68.6 kg (151 lb 3.2 oz)     Video physical exam  General: Patient appears well in no acute distress.   Skin: No visualized rash or lesions on visualized skin  Eyes: EOMI, no erythema, sclera icterus or discharge noted  Resp: Appears to be breathing comfortably  without accessory muscle usage, speaking in full sentences, no cough  MSK: Appears to have normal range of motion based on visualized movements  Neurologic: No apparent tremors, facial movements symmetric  Psych: affect bright alert and oriented    The rest of a comprehensive physical examination is deferred due to PHE (public health emergency) video restrictions      Laboratory Data:   03/28/22 10:20 04/04/22 10:18 04/11/22 11:18   WBC 6.3 4.8 2.0 (L)   Hemoglobin 12.6 11.5 (L) 11.2 (L)   Hematocrit 40.4 36.4 34.9 (L)   Platelet Count 259 191 220   RBC Count 4.30 3.98 3.85   MCV 94 92 91   MCH 29.3 28.9 29.1   MCHC 31.2 (L) 31.6 32.1   RDW 13.1 12.9 12.7   % Neutrophils 72 72 59   % Lymphocytes 16 17 34   % Monocytes 7 5 1   % Eosinophils 3 6 6   % Basophils 1 0 0   Absolute Basophils 0.0 0.0    Absolute Basophils   0.0   Absolute Neutrophil   1.2 (L)        04/11/22 11:18   Sodium 140   Potassium 3.7   Chloride 106   Carbon Dioxide 27   Urea Nitrogen 8   Creatinine 0.62   GFR Estimate >90 [1]   Calcium 9.0   Anion Gap 7   Albumin 3.3 (L)   Protein Total 7.4   Bilirubin Total 0.6   Alkaline Phosphatase 79   ALT 23   AST 16     I reviewed the above labs today.        Assessment and Plan:  1. Right sided ER+, AK+, HER2-negative, MammaPrint high risk T=5.5cm, N=0 breast cancer.  Tori had her 3/15/22 Dostarlimab held due to diarrhea, resumed 4/4.  Her 6 week MRI showed improvement but with suggestion of adding in an additional agent.  Carbo added in weekly starting 4/4 . Fatigue, nausea and leukopenia noted.     ANC 1.2 today --ok to proceed with chemo, but can consider g-csf and will bring her back on 4/15 for a CBC and possible neupogen.      Her plan includes chemotherapy now includes weekly Carbo (added in 4/4),  oral paclitaxel, Encequidar and IV dostarlimab (q3w) x12 weeks followed by AC.  At this time she is mentally preparing for a mastectomy, but would consider a lumpectomy if optimal tumor shrinkage. She  met with Dr Layton and Dr Whitehead.       She is also scheduled to see genetics in April.  After surgery she may need radiation and then she will need adjuvant hormonal therapy.      Continue weekly follow up     2.  Anemia.  Patient's hemoglobin was 11.6 the day of starting treatment.  She has a history of iron deficiency anemia.  Iron stores are low.  Will have her take oral iron M, W and F each week.  Hgb improved and now will likely reduce with carbo.       3.  GI:   Nausea getting weekly Emend.  Will add in oral Zofran q8 hours days 1-4, then taper off on weekend  Diarrhea, induced by immunotherapy escalated last week 3/15 having over 20 stools.  Improved with holding immunotherapy, Imodium, time.  Small flare after 4/4 immunotherapy, but at baseline today. Not needing imodium     4.  Coping well good supportive .  Seeing a therapist as well.      Janette Mota PA-C  [facility]  [address], [place] [address] [phone]     35 minutes spent on the date of the encounter doing chart review, review of outside records, review of test results, interpretation of tests, patient visit and documentation

## 2022-04-11 NOTE — NURSING NOTE
[id number]: Study Visit Note   Subject name: Tori Steele     Visit: C8D1    Did the study visit occur within the appropriate window allowed by the protocol? yes    Since the last study visit, She has been doing fair. She reports struggling with nausea since her last infusion. In addition, she has noticed that her taste perception has changed and food doesn't sound appetizing.   She also reports that she has experienced quite a bit of fatigue over the last week.   Tori has also noticed changes in her nail beds as well.      I have personally interviewed Tori Steele and reviewed her medical record for adverse events and concomitant medications and these have been recorded on the corresponding logs in Tori Steele's research file.     Tori Steele was given the opportunity to ask any trial related questions.  Please see provider progress note for physical exam and other clinical information. Labs were reviewed - any significant lab values were addressed and reviewed.    CHARISMA Babb, RN  CRC-RN,   Pager: [phone]        [id number]: Medication Count/IDS Note      Tori Steele is enrolled on the trial number listed above. The patient presented for her C8D1 day visit.   IDS number: 3903  Drug name: Paclitaxel  Number of boxes returned: 1  Lot number(s): [id number]  Number of pills returned: 0      Number of boxes dispensed:1  Lot number(s):  Number of pills dispensed: 36    Drug diary returned? yes    Are there any discrepancies between the amount of medication the patient was instructed to take and the amount recorded as taken in the patient s drug diary?  No    Are there any discrepancies between the amount of medication the patient has recorded as taken in the drug diary and the amount that would be expected to be returned based on the amount recorded as taken?  no      Miriam Lantigua RN    Form 504.00.01 (Version 1)     Effective date: 01JUL2018     Next Review Date:  01JUL2020            [id number]: Medication Count/IDS Note      Tori Steele is enrolled on the trial number listed above. The patient presented for her C8D1 day visit.   IDS number: [id number]  Drug name: Encequidar  Number of boxes returned: 1  Lot number(s): [id number]  Number of pills returned: 0      Number of boxes dispensed:1  Lot number(s):  Number of pills dispensed: 3    Drug diary returned? yes    Are there any discrepancies between the amount of medication the patient was instructed to take and the amount recorded as taken in the patient s drug diary?  No    Are there any discrepancies between the amount of medication the patient has recorded as taken in the drug diary and the amount that would be expected to be returned based on the amount recorded as taken?  no    Miriam Lantigua RN    Form 504.00.01 (Version 1)     Effective date: 01JUL2018     Next Review Date: 01JUL2020

## 2022-04-11 NOTE — NURSING NOTE
"Chief Complaint   Patient presents with     RECHECK     Port Draw     Labs drawn via port by RN in lab.  VS taken       Port accessed with 20 gauge 3/4\" Power needle by RN, labs collected, line flushed with saline and heparin.  Vitals taken. Pt checked in for appointment(s).    Laura Lockhart RN    "

## 2022-04-13 ENCOUNTER — VIRTUAL VISIT (OUTPATIENT)
Dept: PALLIATIVE CARE | Facility: CLINIC | Age: 47
End: 2022-04-13
Attending: INTERNAL MEDICINE
Payer: OTHER GOVERNMENT

## 2022-04-13 DIAGNOSIS — F43.21 ADJUSTMENT DISORDER WITH DEPRESSED MOOD: Primary | ICD-10-CM

## 2022-04-13 DIAGNOSIS — C50.911 INVASIVE DUCTAL CARCINOMA OF RIGHT BREAST (H): ICD-10-CM

## 2022-04-13 DIAGNOSIS — L65.9 ALOPECIA: ICD-10-CM

## 2022-04-13 PROCEDURE — 90834 PSYTX W PT 45 MINUTES: CPT | Mod: 95 | Performed by: SOCIAL WORKER

## 2022-04-13 NOTE — LETTER
4/13/2022       RE: Tori Steele  [address] [address]     Dear Colleague,    Thank you [facility] your [facility] Steele[facility]. Please see a copy of my visit note below.    Palliative Care - Counseling Services      Initial Assessment    Referred by: Dr. Keegan Lockett    Presenting Issues: Coping with illness    Preferred Name: Tori    Contributions to this assessment include: patient, medical chart    Assessment:   Tori Steele is a 46 year old female with breast cancer, seen today for psychotherapy, initial assessment via video visit. In addition they meet the criteria for Adjustment Disorder with Depressed Mood. They present with symptoms and behaviors including feeling sad, tearful, hopeless and a lack of pleasure in activities previously enjoyed. These symptoms became present after receiving serious diagnosis and appear to be a heightened response to this stressor. This has resulted in significant impairment to their social functioning.    It is medically necessary that this client participate in outpatient psychotherapy services at this time. If their current mental health symptoms go untreated it is likely that depressed mood will persist. My recommendations for services for this client include CBT and interpersonal therapy. The client's prognosis from a mental health perspective is excellent.    Coping: Tori expressed she has sought therapy to support her in coping with recent diagnosis. She is aware of the grief/loss she is experiencing in light of cancer diagnosis and starting treatment. She has excellent support in her , mom and friends. 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 Treatment has also changed how she spends time  "with her children. She expressed she is not sad all the time but struggles with the \"little reminders\" of having cancer, for example seeing herself in the mirror as she lost her hair, not being able to be close to her son when he has a cold.     She also is focused on supporting her children. Her daughter goes to therapy for anxiety. Provided age/developmentally appropriate information regarding supporting children through cancer diagnosis of parent and validated her approach.    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 Being treated with oral chemotherapy. Several weeks in to treatment, another chemo was added which is harsher, caused her to lose her hair and have a lower white count. She struggles with the addition of another medication and worries that the treatment won't work.     Symptom burden: Fatigue     Medications:       " Any current concerns? no      Taking as prescribed currently? yes     Advance Care Planning:       Has Health Care Directive: no        Health Care Agent(s): No health care directive:  Surrogate  health care decision maker is per legal succession       Has POLST: no       Provided education regarding ACP: no     Mental Health History:      History of depression or anxiety: no        Past treatment: no       Other mental health diagnosis or symptoms: no        Family history of mental health concerns: none reported    Current symptoms:      Anhedonia: no      Hopeless or down mood: yes      Sleep problems (too much or too little): no      Fatigue: yes      Appetite (too much or too little): no      Excessively negative self perception: no      Trouble concentrating: yes      Motor slowness: no      Current and/or recent thoughts of suicide: no      Nervous, on edge: yes      Excessive/difficult to manage worry: no           Recent areas of worry/fear/concern:       Tense, hard to relax: no      Feeling restless, hard to sit still: no      Irritable: no      Feeling of dread/ afraid that something awful may happen: no        Duration of symptoms: since diagnosis      Impacts on daily life: depressed mood    Trauma screen:       Nightmares: no      Intrusive thoughts: no      Avoidance of triggers: no      Feeling constantly on guard, watchful, or easily startled: no      Feeling numb/detached from others, activities, or surroundings: no    Suicide screen:      Past thoughts of suicide: no      Past attempts: no      Protective factors:       Safety plan:     Safety Screen:      Concern of being harmed or controlled by others: no      Concern of harm in future: no      Worried will harm someone else in future: no    CD History:       Active alcohol use: no Amount per week:       Current concerns about alcohol or drug use: no      Past concerns and/or CD treatment: no      Intervention: Initial palliative care  counseling/clinical social work evaluation was conducted. Palliative care counseling interventions available were discussed, including counseling related to serious illness, behavioral interventions for symptom management, consultation regarding goals of care/health care directive/POLST, and other interventions specific to the patient's situation or concerns.     Resource needs/Referrals: None    Follow-up: 4/20    DSM5 Diagnoses:   Adjustment Disorders  309.0 (F43.21) With depressed mood    Jessica Goldberg, LICSW   Palliative Care    Ph: [phone]     Tori is a 46 year old who is being evaluated via a billable video visit.      How would you like to obtain your AVS? MyChart  If the video visit is dropped, the invitation should be resent by: Text to cell phone: [phone]   Will anyone else be joining your video visit? No    Video Start Time: 9:57 am  Video-Visit Details    Type of service:  Video Visit    Video End Time:10:44 am    Originating Location (pt. Location): Home    Distant Location (provider location):  [facility]     Platform used for Video Visit: Dharmesh Garcia        Again, thank you for allowing me to participate in the care of your patient.      Sincerely,    Jessica P Goldberg, LICSW

## 2022-04-13 NOTE — PROGRESS NOTES
"Palliative Care - Counseling Services      Initial Assessment    Referred by: Dr. Keegan Lockett    Presenting Issues: Coping with illness    Preferred Name: Tori    Contributions to this assessment include: patient, medical chart    Assessment:   Tori Steele is a 46 year old female with breast cancer, seen today for psychotherapy, initial assessment via video visit. In addition they meet the criteria for Adjustment Disorder with Depressed Mood. They present with symptoms and behaviors including feeling sad, tearful, hopeless and a lack of pleasure in activities previously enjoyed. These symptoms became present after receiving serious diagnosis and appear to be a heightened response to this stressor. This has resulted in significant impairment to their social functioning.    It is medically necessary that this client participate in outpatient psychotherapy services at this time. If their current mental health symptoms go untreated it is likely that depressed mood will persist. My recommendations for services for this client include CBT and interpersonal therapy. The client's prognosis from a mental health perspective is excellent.    Coping: Tori expressed she has sought therapy to support her in coping with recent diagnosis. She is aware of the grief/loss she is experiencing in light of cancer diagnosis and starting treatment. She has excellent support in her , mom and friends. 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 Treatment has also changed how she spends time with her children. She expressed she is not sad all the time but struggles with the \"little reminders\" of having cancer, for example seeing herself in the mirror as she lost her hair, not being able to be close to her son when he has a cold.     She also is focused on supporting her children. Her daughter " goes to therapy for anxiety. Provided age/developmentally appropriate information regarding supporting children through cancer diagnosis of parent and validated her approach.    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 Being treated with oral chemotherapy. Several weeks in to treatment, another chemo was added which is harsher, caused her to lose her hair and have a lower white count. She struggles with the addition of another medication and worries that the treatment won't work.     Symptom burden: Fatigue     Medications:       Any current concerns? no      Taking as prescribed currently? yes     Advance Care Planning:       Has Health Care Directive: no        Health Care Agent(s): No health care directive:  Surrogate  health care decision maker is per legal succession       Has POLST: no       Provided education regarding ACP: no      Mental Health History:      History of depression or anxiety: no        Past treatment: no       Other mental health diagnosis or symptoms: no        Family history of mental health concerns: none reported    Current symptoms:      Anhedonia: no      Hopeless or down mood: yes      Sleep problems (too much or too little): no      Fatigue: yes      Appetite (too much or too little): no      Excessively negative self perception: no      Trouble concentrating: yes      Motor slowness: no      Current and/or recent thoughts of suicide: no      Nervous, on edge: yes      Excessive/difficult to manage worry: no           Recent areas of worry/fear/concern:       Tense, hard to relax: no      Feeling restless, hard to sit still: no      Irritable: no      Feeling of dread/ afraid that something awful may happen: no        Duration of symptoms: since diagnosis      Impacts on daily life: depressed mood    Trauma screen:       Nightmares: no      Intrusive thoughts: no      Avoidance of triggers: no      Feeling constantly on guard, watchful, or easily startled: no      Feeling numb/detached from others, activities, or surroundings: no    Suicide screen:      Past thoughts of suicide: no      Past attempts: no      Protective factors:       Safety plan:     Safety Screen:      Concern of being harmed or controlled by others: no      Concern of harm in future: no      Worried will harm someone else in future: no    CD History:       Active alcohol use: no Amount per week:       Current concerns about alcohol or drug use: no      Past concerns and/or CD treatment: no      Intervention: Initial palliative care counseling/clinical social work evaluation was conducted. Palliative care counseling interventions available were discussed, including counseling related to serious illness, behavioral interventions for symptom management, consultation regarding goals of care/health care directive/POLST, and other interventions  specific to the patient's situation or concerns.     Resource needs/Referrals: None    Follow-up: 4/20    DSM5 Diagnoses:   Adjustment Disorders  309.0 (F43.21) With depressed mood    Jessica Goldberg, [facility]   Palliative Care    Ph: [phone]     Tori is a 46 year old who is being evaluated via a billable video visit.      How would you like to obtain your AVS? MyChart  If the video visit is dropped, the invitation should be resent by: Text to cell phone: [phone]   Will anyone else be joining your video visit? No    Video Start Time: 9:57 am  Video-Visit Details    Type of service:  Video Visit    Video End Time:10:44 am    Originating Location (pt. Location): Home    Distant Location (provider location):  [facility]     Platform used for Video Visit: Dharmesh Garcia

## 2022-04-13 NOTE — LETTER
Date:May 13, 2022      Provider requested that no letter be sent. Do not send.       [facility]

## 2022-04-13 NOTE — NURSING NOTE
Patient confirms medications and allergies are accurate via patients echeck in completion, and or denies any changes since last reviewed/verified.     Daniel Garcia, Virtual Facilitator

## 2022-04-15 ENCOUNTER — INFUSION THERAPY VISIT (OUTPATIENT)
Dept: ONCOLOGY | Facility: CLINIC | Age: 47
End: 2022-04-15
Attending: PHYSICIAN ASSISTANT
Payer: OTHER GOVERNMENT

## 2022-04-15 ENCOUNTER — APPOINTMENT (OUTPATIENT)
Dept: LAB | Facility: CLINIC | Age: 47
End: 2022-04-15
Attending: PHYSICIAN ASSISTANT
Payer: OTHER GOVERNMENT

## 2022-04-15 VITALS
SYSTOLIC BLOOD PRESSURE: 134 MMHG | BODY MASS INDEX: 25.37 KG/M2 | WEIGHT: 147.8 LBS | DIASTOLIC BLOOD PRESSURE: 67 MMHG | RESPIRATION RATE: 16 BRPM | HEART RATE: 117 BPM | TEMPERATURE: 99.2 F | OXYGEN SATURATION: 92 %

## 2022-04-15 DIAGNOSIS — C50.911 INVASIVE DUCTAL CARCINOMA OF RIGHT BREAST (H): Primary | ICD-10-CM

## 2022-04-15 DIAGNOSIS — C50.911 INVASIVE DUCTAL CARCINOMA OF RIGHT BREAST (H): ICD-10-CM

## 2022-04-15 LAB
BASOPHILS # BLD AUTO: 0 10E3/UL (ref 0–0.2)
BASOPHILS NFR BLD AUTO: 1 %
EOSINOPHIL # BLD AUTO: 0 10E3/UL (ref 0–0.7)
EOSINOPHIL NFR BLD AUTO: 2 %
ERYTHROCYTE [DISTWIDTH] IN BLOOD BY AUTOMATED COUNT: 12.9 % (ref 10–15)
HCT VFR BLD AUTO: 37.8 % (ref 35–47)
HGB BLD-MCNC: 11.9 G/DL (ref 11.7–15.7)
IMM GRANULOCYTES # BLD: 0 10E3/UL
IMM GRANULOCYTES NFR BLD: 0 %
LYMPHOCYTES # BLD AUTO: 0.7 10E3/UL (ref 0.8–5.3)
LYMPHOCYTES NFR BLD AUTO: 32 %
MCH RBC QN AUTO: 28.8 PG (ref 26.5–33)
MCHC RBC AUTO-ENTMCNC: 31.5 G/DL (ref 31.5–36.5)
MCV RBC AUTO: 92 FL (ref 78–100)
MONOCYTES # BLD AUTO: 0.2 10E3/UL (ref 0–1.3)
MONOCYTES NFR BLD AUTO: 9 %
NEUTROPHILS # BLD AUTO: 1.2 10E3/UL (ref 1.6–8.3)
NEUTROPHILS NFR BLD AUTO: 56 %
NRBC # BLD AUTO: 0 10E3/UL
NRBC BLD AUTO-RTO: 0 /100
PLATELET # BLD AUTO: 279 10E3/UL (ref 150–450)
RBC # BLD AUTO: 4.13 10E6/UL (ref 3.8–5.2)
WBC # BLD AUTO: 2 10E3/UL (ref 4–11)

## 2022-04-15 PROCEDURE — 85025 COMPLETE CBC W/AUTO DIFF WBC: CPT

## 2022-04-15 PROCEDURE — 36591 DRAW BLOOD OFF VENOUS DEVICE: CPT

## 2022-04-15 PROCEDURE — 36415 COLL VENOUS BLD VENIPUNCTURE: CPT

## 2022-04-15 RX ORDER — EPINEPHRINE 1 MG/ML
0.3 INJECTION, SOLUTION INTRAMUSCULAR; SUBCUTANEOUS EVERY 5 MIN PRN
Status: CANCELLED | OUTPATIENT
Start: 2022-04-18

## 2022-04-15 RX ORDER — DEXAMETHASONE SODIUM PHOSPHATE 10 MG/ML
10 INJECTION, SOLUTION INTRAMUSCULAR; INTRAVENOUS
Status: CANCELLED
Start: 2022-04-18

## 2022-04-15 RX ORDER — LORAZEPAM 2 MG/ML
0.5 INJECTION INTRAMUSCULAR EVERY 4 HOURS PRN
Status: CANCELLED | OUTPATIENT
Start: 2022-04-18

## 2022-04-15 RX ORDER — MEPERIDINE HYDROCHLORIDE 25 MG/ML
25 INJECTION INTRAMUSCULAR; INTRAVENOUS; SUBCUTANEOUS EVERY 30 MIN PRN
Status: CANCELLED | OUTPATIENT
Start: 2022-04-18

## 2022-04-15 RX ORDER — HEPARIN SODIUM,PORCINE 10 UNIT/ML
5 VIAL (ML) INTRAVENOUS
Status: CANCELLED | OUTPATIENT
Start: 2022-04-18

## 2022-04-15 RX ORDER — ALBUTEROL SULFATE 0.83 MG/ML
2.5 SOLUTION RESPIRATORY (INHALATION)
Status: CANCELLED | OUTPATIENT
Start: 2022-04-18

## 2022-04-15 RX ORDER — HEPARIN SODIUM (PORCINE) LOCK FLUSH IV SOLN 100 UNIT/ML 100 UNIT/ML
5 SOLUTION INTRAVENOUS
Status: CANCELLED | OUTPATIENT
Start: 2022-04-18

## 2022-04-15 RX ORDER — ALBUTEROL SULFATE 90 UG/1
1-2 AEROSOL, METERED RESPIRATORY (INHALATION)
Status: CANCELLED
Start: 2022-04-18

## 2022-04-15 RX ORDER — NALOXONE HYDROCHLORIDE 0.4 MG/ML
0.2 INJECTION, SOLUTION INTRAMUSCULAR; INTRAVENOUS; SUBCUTANEOUS
Status: CANCELLED | OUTPATIENT
Start: 2022-04-18

## 2022-04-15 RX ORDER — DIPHENHYDRAMINE HYDROCHLORIDE 50 MG/ML
50 INJECTION INTRAMUSCULAR; INTRAVENOUS
Status: CANCELLED
Start: 2022-04-18

## 2022-04-15 RX ORDER — METHYLPREDNISOLONE SODIUM SUCCINATE 125 MG/2ML
125 INJECTION, POWDER, LYOPHILIZED, FOR SOLUTION INTRAMUSCULAR; INTRAVENOUS
Status: CANCELLED
Start: 2022-04-18

## 2022-04-15 ASSESSMENT — PAIN SCALES - GENERAL: PAINLEVEL: NO PAIN (0)

## 2022-04-15 NOTE — PROGRESS NOTES
Infusion Nursing Note:  Tori Steele presents today for possible Neupogen injection - did not meet parameters.    Patient seen by provider today: No   present during visit today: Not Applicable.    Note: Pt assessed upon arrival to infusion suite. Denies fever, chills, cough, SOB or other signs/symptoms of infection. No other issues or concerns.      Treatment Conditions:  Lab Results   Component Value Date    HGB 11.9 04/15/2022    WBC 2.0 (L) 04/15/2022    ANEU 1.2 (L) 04/11/2022    ANEUTAUTO 1.2 (L) 04/15/2022     04/15/2022      Results reviewed, labs did NOT meet treatment parameters: ANC 1.2 today.    Discharge Plan:   Patient declined prescription refills.  AVS to patient via [facility].  Patient will return 4/19/22 for next appointment.   Patient discharged in stable condition accompanied by: self.  Departure Mode: Ambulatory.      Kirsten John RN

## 2022-04-19 ENCOUNTER — VIRTUAL VISIT (OUTPATIENT)
Dept: ONCOLOGY | Facility: CLINIC | Age: 47
End: 2022-04-19
Attending: PHYSICIAN ASSISTANT
Payer: OTHER GOVERNMENT

## 2022-04-19 ENCOUNTER — RESEARCH ENCOUNTER (OUTPATIENT)
Dept: ONCOLOGY | Facility: CLINIC | Age: 47
End: 2022-04-19

## 2022-04-19 ENCOUNTER — LAB (OUTPATIENT)
Dept: LAB | Facility: CLINIC | Age: 47
End: 2022-04-19
Attending: PHYSICIAN ASSISTANT
Payer: OTHER GOVERNMENT

## 2022-04-19 VITALS
DIASTOLIC BLOOD PRESSURE: 75 MMHG | HEART RATE: 110 BPM | SYSTOLIC BLOOD PRESSURE: 130 MMHG | WEIGHT: 151.4 LBS | OXYGEN SATURATION: 96 % | BODY MASS INDEX: 25.99 KG/M2 | TEMPERATURE: 98.8 F | RESPIRATION RATE: 16 BRPM

## 2022-04-19 DIAGNOSIS — C50.911 INVASIVE DUCTAL CARCINOMA OF RIGHT BREAST (H): Primary | ICD-10-CM

## 2022-04-19 DIAGNOSIS — R19.7 DIARRHEA, UNSPECIFIED TYPE: ICD-10-CM

## 2022-04-19 LAB
ALBUMIN SERPL-MCNC: 3.1 G/DL (ref 3.4–5)
ALP SERPL-CCNC: 79 U/L (ref 40–150)
ALT SERPL W P-5'-P-CCNC: 23 U/L (ref 0–50)
ANION GAP SERPL CALCULATED.3IONS-SCNC: 7 MMOL/L (ref 3–14)
AST SERPL W P-5'-P-CCNC: 15 U/L (ref 0–45)
BASOPHILS # BLD AUTO: 0 10E3/UL (ref 0–0.2)
BASOPHILS NFR BLD AUTO: 1 %
BILIRUB SERPL-MCNC: 0.2 MG/DL (ref 0.2–1.3)
BUN SERPL-MCNC: 9 MG/DL (ref 7–30)
CALCIUM SERPL-MCNC: 8.8 MG/DL (ref 8.5–10.1)
CHLORIDE BLD-SCNC: 109 MMOL/L (ref 94–109)
CO2 SERPL-SCNC: 26 MMOL/L (ref 20–32)
CREAT SERPL-MCNC: 0.55 MG/DL (ref 0.52–1.04)
EOSINOPHIL # BLD AUTO: 0 10E3/UL (ref 0–0.7)
EOSINOPHIL NFR BLD AUTO: 1 %
ERYTHROCYTE [DISTWIDTH] IN BLOOD BY AUTOMATED COUNT: 13.5 % (ref 10–15)
GFR SERPL CREATININE-BSD FRML MDRD: >90 ML/MIN/1.73M2
GLUCOSE BLD-MCNC: 102 MG/DL (ref 70–99)
HCT VFR BLD AUTO: 34 % (ref 35–47)
HGB BLD-MCNC: 10.8 G/DL (ref 11.7–15.7)
IMM GRANULOCYTES # BLD: 0 10E3/UL
IMM GRANULOCYTES NFR BLD: 0 %
LYMPHOCYTES # BLD AUTO: 0.6 10E3/UL (ref 0.8–5.3)
LYMPHOCYTES NFR BLD AUTO: 20 %
MCH RBC QN AUTO: 29.1 PG (ref 26.5–33)
MCHC RBC AUTO-ENTMCNC: 31.8 G/DL (ref 31.5–36.5)
MCV RBC AUTO: 92 FL (ref 78–100)
MONOCYTES # BLD AUTO: 0.4 10E3/UL (ref 0–1.3)
MONOCYTES NFR BLD AUTO: 11 %
NEUTROPHILS # BLD AUTO: 2.2 10E3/UL (ref 1.6–8.3)
NEUTROPHILS NFR BLD AUTO: 67 %
NRBC # BLD AUTO: 0 10E3/UL
NRBC BLD AUTO-RTO: 0 /100
PLATELET # BLD AUTO: 277 10E3/UL (ref 150–450)
POTASSIUM BLD-SCNC: 3.8 MMOL/L (ref 3.4–5.3)
PROT SERPL-MCNC: 7.1 G/DL (ref 6.8–8.8)
RBC # BLD AUTO: 3.71 10E6/UL (ref 3.8–5.2)
SODIUM SERPL-SCNC: 142 MMOL/L (ref 133–144)
WBC # BLD AUTO: 3.2 10E3/UL (ref 4–11)

## 2022-04-19 PROCEDURE — 96367 TX/PROPH/DG ADDL SEQ IV INF: CPT

## 2022-04-19 PROCEDURE — 250N000011 HC RX IP 250 OP 636: Performed by: INTERNAL MEDICINE

## 2022-04-19 PROCEDURE — 80053 COMPREHEN METABOLIC PANEL: CPT | Performed by: INTERNAL MEDICINE

## 2022-04-19 PROCEDURE — 96413 CHEMO IV INFUSION 1 HR: CPT

## 2022-04-19 PROCEDURE — 99214 OFFICE O/P EST MOD 30 MIN: CPT | Mod: 95 | Performed by: PHYSICIAN ASSISTANT

## 2022-04-19 PROCEDURE — 250N000011 HC RX IP 250 OP 636: Performed by: PHYSICIAN ASSISTANT

## 2022-04-19 PROCEDURE — 85025 COMPLETE CBC W/AUTO DIFF WBC: CPT | Performed by: INTERNAL MEDICINE

## 2022-04-19 PROCEDURE — 258N000003 HC RX IP 258 OP 636: Performed by: INTERNAL MEDICINE

## 2022-04-19 RX ORDER — HEPARIN SODIUM (PORCINE) LOCK FLUSH IV SOLN 100 UNIT/ML 100 UNIT/ML
5 SOLUTION INTRAVENOUS
Status: DISCONTINUED | OUTPATIENT
Start: 2022-04-19 | End: 2022-04-19 | Stop reason: HOSPADM

## 2022-04-19 RX ORDER — HEPARIN SODIUM (PORCINE) LOCK FLUSH IV SOLN 100 UNIT/ML 100 UNIT/ML
500 SOLUTION INTRAVENOUS ONCE
Status: COMPLETED | OUTPATIENT
Start: 2022-04-19 | End: 2022-04-19

## 2022-04-19 RX ORDER — ONDANSETRON 8 MG/1
8 TABLET, FILM COATED ORAL EVERY 8 HOURS PRN
Qty: 3 TABLET | Refills: 0 | Status: SHIPPED | OUTPATIENT
Start: 2022-04-19 | End: 2022-05-02

## 2022-04-19 RX ADMIN — Medication 500 UNITS: at 09:48

## 2022-04-19 RX ADMIN — Medication 5 ML: at 13:45

## 2022-04-19 RX ADMIN — SODIUM CHLORIDE 250 ML: 9 INJECTION, SOLUTION INTRAVENOUS at 12:02

## 2022-04-19 RX ADMIN — SODIUM CHLORIDE 150 MG: 9 INJECTION, SOLUTION INTRAVENOUS at 12:02

## 2022-04-19 ASSESSMENT — PAIN SCALES - GENERAL: PAINLEVEL: NO PAIN (0)

## 2022-04-19 NOTE — NURSING NOTE
[id number]: Informed Consent Note     The consent form, including purpose, risks and benefits, was reviewed with Tori Steele, and all questions were answered before she signed the consent form. The patient understands that the study involves an active treatment phase as well as a post-treatment follow up phase.     Present during the discussion was Tori. A copy of the signed form was provided to the patient. No procedures specific to this study were performed prior to the patient signing the consent form.    Consent Version Date: 03/01/22  Consent obtained by: Bertha Augustin    Date: 4/19/22    Bertha Augustin RN    Form 503.03.01 (Version 2)     Effective date: 01AUG2018     Next Review Date: 01AUG2020 [id number]: Study Visit Note   Subject name: Tori Steele     Visit: C9D1    Did the study visit occur within the appropriate window allowed by the protocol? yes    Since the last study visit, She has been doing well. Tori had less fatigue this week with some residual nausea. Hot flashes are becoming more frequent. She has some intermittent nausea and has lost all taste. She is eating and drinking okay though and reports some intermittent diarrhea still.      I have personally interviewed Tori Steele and reviewed her medical record for adverse events and concomitant medications and these have been recorded on the corresponding logs in Tori Steele's research file.     Tori Steele was given the opportunity to ask any trial related questions.  Please see provider progress note for physical exam and other clinical information. Labs were reviewed - any significant lab values were addressed and reviewed.    Bertha Augustin RN      [id number]: Medication Count/IDS Note      Tori Steele is enrolled on the trial number listed above. The patient presented for her C9D1 day visit.   IDS number: 3903  Drug name: PO PACLITAXEL  Number of boxes returned: 1  Lot number:    Red:[id number]  Yellow: [id number]  Number of pills returned: 0      Number of boxes dispensed: 1  Lot number:  Red:[id number]  Yellow: [id number]  Number of pills dispensed: 36    Drug name: PO ENCEQUIDAR  Number of boxes returned: 1  Lot number: [id number]  Number of pills returned: 0      Number of boxes dispensed: 1  Lot number:[id number]  Number of pills dispensed: 3      Drug diary returned? yes    Are there any discrepancies between the amount of medication the patient was instructed to take and the amount recorded as taken in the patient s drug diary?  No    Are there any discrepancies between the amount of medication the patient has recorded as taken in the drug diary and the amount that would be expected to be returned based on the amount recorded as taken?  no       Bertha Augustin RN, MS  Research   Phone: [phone]  Pager: [phone]      Form 504.00.01 (Version 1)     Effective date: 01JUL2018     Next Review Date: 01JUL2020

## 2022-04-19 NOTE — NURSING NOTE
"Chief Complaint   Patient presents with     Chemotherapy     Cycle 9 Day 1 study Carboplatin (IDS # 3903).       Port Draw     Labs drawn via port by RN. Vitals taken.     Port accessed with 20G 3/4\" flat needle by RN, labs collected, line flushed with saline and heparin.  Vitals taken. Pt checked in for appointment(s).    Jamila Pineda RN    "

## 2022-04-19 NOTE — PROGRESS NOTES
Tori is a 46 year old who is being evaluated via a billable video visit.      How would you like to obtain your AVS? MyChart  If the video visit is dropped, the invitation should be resent by: Send to e-mail at: [email or web address]  Will anyone else be joining your video visit? No      Ratna Adams VF    Video Start Time:10:05  Video-Visit Details    Type of service:  Video Visit    Video End Time:10:32    [place] [facility] [place] location[place]  [facility]     Platform used for Video Visit: [facility]    Oncology/Hematology Visit Note  Apr 19, 2022          Reason for Visit: follow up of breast cancer     History of Present Illness: Tori Steele is a 46 year old female with breat cancer. Right-sided 5.5 cm ER positive [place] positive HER-2 negative breast cancer with associated DCIS.  Her MammaPrint came back as high risk.  She consented for the I-SPY clinical trial and has been randomized to the oral paclitaxel, Encequidar and dostarlimab arm.      2/21/22 started trial with oral paclitaxel, Encequidar and dostarlimab.    3/15/22  her IV immunotherapy dostarlimab was held due to diarrhea that recovered with Imodium and time  4/4/22   Carbo was added in weekly due in hopes for more significant reduction in cancer     Interval History:  Tori is here today for week 9 of treatment. This was her second weekly Carbo in addition to her oral taxol/encequidor.  In comparison to last week- this week went much smoother.  She had much less fatigue compared to getting the dostarlimab.  She actually had a really nice Easter, had normal energy levels and her mom was here, which she really enjoyed.     She has noticed more hair loss with the addition of the carbo.  More dry mouth.  Darkness on a few nails.  No neuropathy or painful nails.      She is using Zofran for MISA and had good control, without emesis.       She continues to have looser stools  on her Taxol days,  no further diarrhea and no need for imodium.     Tori was having taste changes that are now constant- things are tasting like metal and she is eating smaller snacks every couple hours instead of meals.  Picking healthy options, weight is stable. Working on better hydration.          Had a difficult time coping with adding in new chemo, however feeling much better emotionally after our discussion last week.      No fever sweats or chills.  No chest or breathing concerns. Skin is dry, no rash.  Eyes are dry using eyedrops.  Nose had dried blood in it, no active bleeding.          Current Outpatient Prescriptions               Current Outpatient Medications   Medication Sig Dispense Refill     ferrous sulfate (FEROSUL) 325 (65 Fe) MG tablet Take 325 mg by mouth         lidocaine-prilocaine (EMLA) 2.5-2.5 % external cream Apply to port site one hour prior to access may start using six days after port placement. 30 g 1     ondansetron (ZOFRAN) 8 MG tablet Take 1 tablet (8 mg) by mouth every 8 hours as needed for nausea 30 tablet 0     prochlorperazine (COMPAZINE) 10 MG tablet Take 1 tablet (10 mg) by mouth every 6 hours as needed (Nausea/Vomiting) 30 tablet 2     riboflavin (VITAMIN  B2) 25 MG TABS                  Physical Examination: ECOG 1  Vitals 4/19/2022   Systolic 130   Diastolic 75   Pulse 110   Respiration 16   Temp 98.8   O2 96   Pain Score 0   Weight 151 lb 6.4 oz       Wt Readings from Last 4 Encounters:   04/15/22 67 kg (147 lb 12.8 oz)   04/11/22 68.8 kg (151 lb 11.2 oz)   04/04/22 67.8 kg (149 lb 6.4 oz)   04/01/22 68.5 kg (151 lb)       Video physical exam  General: Patient appears well in no acute distress.   Skin: No visualized rash or lesions on visualized skin  Eyes: EOMI, no erythema, sclera icterus or discharge noted  Resp: Appears to be breathing comfortably without accessory muscle usage, speaking in full sentences, no cough  MSK: Appears to have normal range of motion  based on visualized movements  Neurologic: No apparent tremors, facial movements symmetric  Psych: affect bright alert and oriented     The rest of a comprehensive physical examination is deferred due to PHE (public health emergency) video restrictions        Laboratory Data:   04/15/22 12:49 04/19/22 09:57   WBC 2.0 (L) 3.2 (L)   Hemoglobin 11.9 10.8 (L)   Hematocrit 37.8 34.0 (L)   Platelet Count 279 277   RBC Count 4.13 3.71 (L)   MCV 92 92   MCH 28.8 29.1   MCHC 31.5 31.8   RDW 12.9 13.5   % Neutrophils 56 67   % Lymphocytes 32 20   % Monocytes 9 11   % Eosinophils 2 1   % Basophils 1 1   Absolute Basophils 0.0 0.0   Absolute Eosinophils 0.0 0.0   Absolute Immature Granulocytes 0.0 0.0   Absolute Lymphocytes 0.7 (L) 0.6 (L)   Absolute Monocytes 0.2 0.4   % Immature Granulocytes 0 0   Absolute Neutrophils 1.2 (L) 2.2      04/19/22 09:57   Sodium 142   Potassium 3.8   Chloride 109   Carbon Dioxide 26   Urea Nitrogen 9   Creatinine 0.55   GFR Estimate >90 [1]   Calcium 8.8   Anion Gap 7   Albumin 3.1 (L)   Protein Total 7.1   Bilirubin Total 0.2   Alkaline Phosphatase 79   ALT 23   AST 15        I reviewed the above labs today.        Assessment and Plan:  1. Right sided ER+, TX+, HER2-negative, MammaPrint high risk T=5.5cm, N=0 breast cancer.  Tori had her 3/15/22 Dostarlimab held due to diarrhea, resumed 4/4.  Her 6 week MRI showed improvement but with suggestion of adding in an additional agent.  Carbo added in weekly starting 4/4 . Fatigue, nausea and leukopenia noted.      ANC 2.2 today, great news will proceed with Week 9.  No need for extra labs this week.  Fatigue much better this week and nausea well controlled.       Her plan includes chemotherapy now includes weekly Carbo (added in 4/4) with IV Emend as pre-medications,  oral paclitaxel, Encequidar and IV dostarlimab (q3w) x12 weeks followed by AC.  At this time she is mentally preparing for a mastectomy, but would consider a lumpectomy if possible.  She met with Dr Layton and Dr Whitehead.       She is also scheduled to see genetics in April.  After surgery she may need radiation and then she will need adjuvant hormonal therapy.      Continue weekly follow up     2.  Anemia.  Patient's hemoglobin was 11.6 the day of starting treatment.  She has a history of iron deficiency anemia.  Iron stores are low.  Will have her take oral iron M, W and F each week.  Hgb improved and now reducing with carbo.       3.  GI:   Nausea getting weekly Emend.  Will add in oral Zofran q8 hours days 1-4, then taper off on weekend  Diarrhea, induced by immunotherapy escalated last week 3/15 having over 20 stools.  Improved with holding immunotherapy, Imodium, time.  Small flare after 4/4 immunotherapy, but at baseline today. Not needing imodium     4.  Coping well good supportive .  Seeing a therapist as well.      Janette Mota PA-C  [facility]  [address] [place] [address]  [phone]     35 minutes spent on the date of the encounter doing chart review, review of outside records, review of test results, interpretation of tests, patient visit and documentation

## 2022-04-19 NOTE — PROGRESS NOTES
Infusion Nursing Note:  Tori Steele presents today for Cycle 9 Day 1 study Encequidar- IDS #3903, study Paclitaxel- IDS #3903 and study Carboplatin- IDS # 3903 (dose #3).    Patient seen by provider today: Yes: Janette Mota PA-C    Note: Patient presents to the infusion center today after her provider appt.     Bertha Augustin Research RN ok to proceed with tx today.     Patient took PO Enceqidar at 1122.   Patient starting taking PO Paclitaxel at 1123 and took the last pill at 1310 due to nausea.  Carboplatin infused from 1314 to 1342; under 30 minutes due to under filled bag; 283 mL total bag volume infused via pump.    Intravenous Access:  Implanted Port.    Treatment Conditions:   Latest Reference Range & Units 04/19/22 09:57   Sodium 133 - 144 mmol/L 142   Potassium 3.4 - 5.3 mmol/L 3.8   Chloride 94 - 109 mmol/L 109   Carbon Dioxide 20 - 32 mmol/L 26   Urea Nitrogen 7 - 30 mg/dL 9   Creatinine 0.52 - 1.04 mg/dL 0.55   GFR Estimate >60 mL/min/1.73m2 >90 [1]   Calcium 8.5 - 10.1 mg/dL 8.8   Anion Gap 3 - 14 mmol/L 7   Albumin 3.4 - 5.0 g/dL 3.1 (L)   Protein Total 6.8 - 8.8 g/dL 7.1   Bilirubin Total 0.2 - 1.3 mg/dL 0.2   Alkaline Phosphatase 40 - 150 U/L 79   ALT 0 - 50 U/L 23   AST 0 - 45 U/L 15   Glucose 70 - 99 mg/dL 102 (H)   WBC 4.0 - 11.0 10e3/uL 3.2 (L)   Hemoglobin 11.7 - 15.7 g/dL 10.8 (L)   Hematocrit 35.0 - 47.0 % 34.0 (L)   Platelet Count 150 - 450 10e3/uL 277   RBC Count 3.80 - 5.20 10e6/uL 3.71 (L)   MCV 78 - 100 fL 92   MCH 26.5 - 33.0 pg 29.1   MCHC 31.5 - 36.5 g/dL 31.8   RDW 10.0 - 15.0 % 13.5   % Neutrophils % 67   % Lymphocytes % 20   % Monocytes % 11   % Eosinophils % 1   % Basophils % 1   Absolute Basophils 0.0 - 0.2 10e3/uL 0.0   Absolute Eosinophils 0.0 - 0.7 10e3/uL 0.0   Absolute Immature Granulocytes <=0.4 10e3/uL 0.0   Absolute Lymphocytes 0.8 - 5.3 10e3/uL 0.6 (L)   Absolute Monocytes 0.0 - 1.3 10e3/uL 0.4   % Immature Granulocytes % 0   Absolute Neutrophils 1.6 - 8.3  10e3/uL 2.2   Absolute NRBCs 10e3/uL 0.0   NRBCs per 100 WBC <1 /100 0     Results reviewed, labs MET treatment parameters, ok to proceed with treatment.    Post Infusion Assessment:  Patient tolerated infusion without incident.  Blood return noted pre and post infusion.  No evidence of extravasations.  Access discontinued per protocol.     Discharge Plan:   Patient declined prescription refills.  Discharge instructions reviewed with: Patient.  Patient and/or family verbalized understanding of discharge instructions and all questions answered.  AVS to patient via [employer].  Patient will return 4/25 for next appointment.   Patient discharged in stable condition accompanied by: self.  Departure Mode: Ambulatory.      Sarina Tran RN

## 2022-04-19 NOTE — PATIENT INSTRUCTIONS
[facility] Triage and after hours / weekends / holidays:  [phone]    Please call the triage or after hours line if you experience a temperature greater than or equal to 100.4, shaking chills, have uncontrolled nausea, vomiting and/or diarrhea, dizziness, shortness of breath, chest pain, bleeding, unexplained bruising, or if you have any other new/concerning symptoms, questions or concerns.      If you are having any concerning symptoms or wish to speak to a provider before your next infusion visit, please call your care coordinator or triage to notify them so we can adequately serve you.     If you need a refill on a narcotic prescription or other medication, please call before your infusion appointment.                April 2022 Sunday Monday Tuesday Wednesday Thursday Friday Saturday                            1    RETURN  10:15 AM   (30 min.)   Keegan Lockett MD   [facility] 2       3     4    LAB CENTRAL   9:15 AM   (15 min.)   [facility] LAB DRAW   [facility]    ONC INFUSION 2 HR (120 MIN)  10:00 AM   (120 min.)   [facility]ION NURSE   [facility] 5    VIDEO VISIT NEW   2:30 PM   (75 min.)   Elana Jarvis GC   [facility] 6     7     8     9       10     11    LAB CENTRAL  10:45 AM   (15 min.)   [facility]   [facility]    VIDEO VISIT RETURN  11:15 AM   (45 min.)   Milka Mota PA-C   [facility]    ONC INFUSION 2 HR (120 MIN)  12:30 PM   (120 min.)   [facility] ON[facility] INFUSION NURSE   [facility] 12     13    VIDEO VISIT NEW   9:45 AM   (60 min.)   Goldberg, Jessica P, LICSW   [facility] 14     15    LAB CENTRAL  12:30 PM   (15 min.)   [facility]RAW   [facility]    ONC INFUSION 0.5 HR (30 MIN)   1:00 PM   (30 min.)   [facility] ONC INFUSION  NURSE   [facility] 16       17     18     19    LAB CENTRAL   9:00 AM   (15 min.)   [facility] DRAW   [facility]    VIDEO VISIT RETURN   9:45 AM   (45 min.)   Milka Mota PA-C   [facility]    ONC INFUSION 2 HR (120 MIN)  11:00 AM   (120 min.)    ONC INFUSION NURSE   [facility] 20     21     22     23       24     25    LAB CENTRAL  10:30 AM   (15 min.)    [facility] DRAW   [facility]    RETURN  11:15 AM   (45 min.)   Leela Hills PA-C   [facility]    ONC INFUSION 2 HR (120 MIN)   1:00 PM   (120 min.)    ONC INFUSION NURSE   [facility] 26     27     28     29     30                   May 2022      Luis F Monday Tuesday Wednesday Thursday Friday Saturday   1     2    LAB CENTRAL   7:45 AM   (15 min.)   [facility] DRAW   [facility]    RETURN   8:15 AM   (45 min.)   Milka Mota PA-C   [facility]    ONC INFUSION 2 HR (120 MIN)  10:00 AM   (120 min.)    ONC INFUSION NURSE   [facility] 3     4     5     6     7       8     9    LAB CENTRAL   7:45 AM   (15 min.)   [facility]RAW   [facility]    RETURN   8:15 AM   (45 min.)   Milka Mota PA-C   [facility]    ONC INFUSION 2 HR (120 MIN)  10:00 AM   (120 min.)    ONC INFUSION NURSE   [facility]    MR BREAST BILATERAL WWO   1:30 PM   (60 min.)   [id number]   [facility] 10    US BREAST BX CORE NEEDLE RIGHT   9:30 AM   (60 min.)   [id number]   [facility] 11     12     13    RETURN   9:45 AM   (30 min.)   Keegan Lockett MD   [facility] 14       15     16      17    LAB CENTRAL   8:15 AM   (15 min.)   [facility] DRAW   [facility]    ONC INFUSION 2 HR (120 MIN)   9:00 AM   (120 min.)    ONC INFUSION NURSE   [facility] 18     19     20     21       22     23     24     25     26     27     28       29     30     31    LAB CENTRAL   9:15 AM   (15 min.)   [facility]RAW   [facility]    VIDEO VISIT RETURN   9:45 AM   (45 min.)   Milka Mota PA-C   [facility]    ONC INFUSION 2 HR (120 MIN)  11:00 AM   (120 min.)    ONC INFUSION NURSE   [facility]                                        Recent Results (from the past 24 hour(s))   Comprehensive metabolic panel    Collection Time: 04/19/22  9:57 AM   Result Value Ref Range    Sodium 142 133 - 144 mmol/L    Potassium 3.8 3.4 - 5.3 mmol/L    Chloride 109 94 - 109 mmol/L    Carbon Dioxide (CO2) 26 20 - 32 mmol/L    Anion Gap 7 3 - 14 mmol/L    Urea Nitrogen 9 7 - 30 mg/dL    Creatinine 0.55 0.52 - 1.04 mg/dL    Calcium 8.8 8.5 - 10.1 mg/dL    Glucose 102 (H) 70 - 99 mg/dL    Alkaline Phosphatase 79 40 - 150 U/L    AST 15 0 - 45 U/L    ALT 23 0 - 50 U/L    Protein Total 7.1 6.8 - 8.8 g/dL    Albumin 3.1 (L) 3.4 - 5.0 g/dL    Bilirubin Total 0.2 0.2 - 1.3 mg/dL    GFR Estimate >90 >60 mL/min/1.73m2   CBC with platelets and differential    Collection Time: 04/19/22  9:57 AM   Result Value Ref Range    WBC Count 3.2 (L) 4.0 - 11.0 10e3/uL    RBC Count 3.71 (L) 3.80 - 5.20 10e6/uL    Hemoglobin 10.8 (L) 11.7 - 15.7 g/dL    Hematocrit 34.0 (L) 35.0 - 47.0 %    MCV 92 78 - 100 fL    MCH 29.1 26.5 - 33.0 pg    MCHC 31.8 31.5 - 36.5 g/dL    RDW 13.5 10.0 - 15.0 %    Platelet Count 277 150 - 450 10e3/uL    % Neutrophils 67 %    % Lymphocytes 20 %    % Monocytes 11 %    % Eosinophils 1 %    % Basophils 1 %    % Immature Granulocytes 0 %    NRBCs per 100 WBC 0 <1 /100    Absolute  Neutrophils 2.2 1.6 - 8.3 10e3/uL    Absolute Lymphocytes 0.6 (L) 0.8 - 5.3 10e3/uL    Absolute Monocytes 0.4 0.0 - 1.3 10e3/uL    Absolute Eosinophils 0.0 0.0 - 0.7 10e3/uL    Absolute Basophils 0.0 0.0 - 0.2 10e3/uL    Absolute Immature Granulocytes 0.0 <=0.4 10e3/uL    Absolute NRBCs 0.0 10e3/uL

## 2022-04-20 ENCOUNTER — VIRTUAL VISIT (OUTPATIENT)
Dept: PALLIATIVE CARE | Facility: CLINIC | Age: 47
End: 2022-04-20
Attending: INTERNAL MEDICINE
Payer: OTHER GOVERNMENT

## 2022-04-20 DIAGNOSIS — F43.21 ADJUSTMENT DISORDER WITH DEPRESSED MOOD: Primary | ICD-10-CM

## 2022-04-20 PROCEDURE — 90834 PSYTX W PT 45 MINUTES: CPT | Mod: 95 | Performed by: SOCIAL WORKER

## 2022-04-20 NOTE — PROGRESS NOTES
Palliative Care - Counseling Services    Return Appointment    Tori Steele is a 46 year old year old female with breast cancer, seen today via video visit for a return psychotherapy appointment to address adjustment disorder with depressed mood as it relates to coping with illness and treatment.    Mental Status Exam:(List all that apply)      Appearance: Appropriate      Eye Contact: Good       Orientation: Yes, x4      Mood: Depressed      Affect: Appropriate      Thought Content: Clear         Thought Form: Logical      Psychomotor Behavior: Normal    Visit themes: Coping with the unknown    Adjustment to Illness: Feeling exhausted. Learning to manage the changing nature of cancer treatment and lack of control over schedule. Had an infusion yesterday instead of Monday. This has made her week different and she worries that she won't be feeling better for the weekend. She is doing more activities at home, rather than going out to avoid exposure to illness.    Mental Health (thoughts, feelings, actions, coping, and symptoms):   Explored coping around lack of control, unknown and uncertainty of cancer treatment. 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 Her upcoming MRI is scheduled to be around her kids' birthday. Worries about what she will miss out on with kids or what changes might stick with them. (example of son scared of getting too close when he was sick). We reinforced hat her children have been resilient through covid, can do hard things and will shift to a new normal.  She also worries that doing a stronger chemo might make her look more sick, which she anticipates will be difficult for her and she won't be able to stay as positive.     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 Finds it difficult at times to accept help. Notices she sometimes downplays how sick or tired she is feeling. Expressed that if she says it out  "loud, it means she is actually sick. Validated and normalized her experience and emotions while reframing that noticing and taking care of her symptoms is her main job right now. Tori shared she attended a virtual meet up of [facility] women with breast cancer. During the meeting some survivors said \"things don't go back to normal\". This created more anticipatory stress of what she might have to also deal with. Refocused that she is working towards long-term goal of getting better and the \"new normal\" may be temporary.     Body-Mind Skills Education: Discussed use of guided meditation when helpful for sleep    Relationships: Excellent support from her family.    End of Life and Advance Care Planning:     Main therapeutic interventions provided this session include:  Provide psychoeducation, Provide behavioral intervention training and Facilitate processing of thoughts and feelings    Plan:  Will return for psychotherapy with palliative care focus 5/4    Treatment Plan    Client's Name: Tori Steele   YOB: 1975     Date: 04/20/22     Initial DSM5 Diagnoses:   Adjustment Disorders  309.0 (F43.21) With depressed mood      Will review plan within 90 days    Referral / Collaboration:  Referral to another professional/service is not indicated at this time.    Anticipated number of sessions to complete episode of care:  10, will continue to assess         Treatment Goal(s)  Date Goal Dates  Reviewed Status   April 20, 2022    Goal 1:  Client will effectively address adjustment disorder.      New   April 20, 2022    Objective #1A (Client Action)  Patient will Communicate effectively with children in family re serious illness.    Intervention(s)  Therapist will Provide psychoeducation .    New   April 20, 2022    Objective #1B  Patient will Communicate effectively with family/friends re needs.    Intervention(s)  Therapist will Provide psychoeducation and Facilitate processing of thoughts and feelings.    " New   April 20, 2022    Objective #1C  Patient will Identify effective coping strategies.    Intervention(s)  Therapist will Facilitate processing of thoughts and feelings.    New       Date Goal Dates  Reviewed Status   April 20, 2022    Goal 2:  Client will Experience reduction in depressed mood.    New   April 20, 2022    Objective #2A (Client Action)  Patient will Increase understanding of loss and grief.    Intervention(s) (Therapist Action)  Therapist will Provide psychoeducation and Facilitate processing of thoughts and feelings.    New   April 20, 2022    Objective #2B  Patient will Develop strategies for coping with grief/loss.    Intervention(s)  Therapist will Provide psychoeducation and Facilitate processing of thoughts and feelings.    New   April 20, 2022      Objective #2C  Patient will Participate in activities that provide comfort and/or pleasure.    Intervention(s)  Therapist will Facilitate processing of thoughts and feelings.    New       Client has contributed regarding goals and concerns, but has not reviewed the treatment plan. Plan to review at future session.    Jessica Goldberg, [facility]  Palliative Care    Ph: [phone]     Tori is a 46 year old who is being evaluated via a billable video visit.      How would you like to obtain your AVS? MyChart  If the video visit is dropped, the invitation should be resent by: Text to cell phone: [phone]   Will anyone else be joining your video visit? No    Video Start Time: 10 am  Video-Visit Details    Type of service:  Video Visit    Video End Time:10:38 am    Originating Location (pt. Location): Home    Distant Location (provider location):  [facility]     Platform used for Video Visit: Dharmesh Garcia

## 2022-04-20 NOTE — LETTER
4/20/2022       RE: Tori Steele  [address] [address]     Dear Colleague,    Thank you for referring your [facility] Steele[facility]. Please see a copy of my visit note below.    Palliative Care - Counseling Services    Return Appointment    Tori tSeele is a 46 year old year old female with breast cancer, seen today via video visit for a return psychotherapy appointment to address adjustment disorder with depressed mood as it relates to coping with illness and treatment.    Mental Status Exam:(List all that apply)      Appearance: Appropriate      Eye Contact: Good       Orientation: Yes, x4      Mood: Depressed      Affect: Appropriate      Thought Content: Clear         Thought Form: Logical      Psychomotor Behavior: Normal    Visit themes: Coping with the unknown    Adjustment to Illness: Feeling exhausted. Learning to manage the changing nature of cancer treatment and lack of control over schedule. Had an infusion yesterday instead of Monday. This has made her week different and she worries that she won't be feeling better for the weekend. She is doing more activities at home, rather than going out to avoid exposure to illness.    Mental Health (thoughts, feelings, actions, coping, and symptoms):   Explored coping around lack of control, unknown and uncertainty of cancer treatment. 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 Her upcoming MRI is scheduled to be around her kids' birthday. Worries about what she will miss out on with kids or what changes might stick with them. (example of son scared of getting too close when he was sick). We reinforced hat her children have been resilient through covid, can do hard things and will shift to a new normal.  She also worries that doing a stronger chemo might make her look more sick,  "which she anticipates will be difficult for her and she won't be able to stay as positive.     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 Finds it difficult at times to accept help. Notices she sometimes downplays how sick or tired she is feeling. Expressed that if she says it out loud, it means she is actually sick. Validated and normalized her experience and emotions while reframing that noticing and taking care of her symptoms is her main job right now. Tori shared she attended a virtual meet up of [facility] women with breast cancer. During the meeting some survivors said \"things don't go back to normal\". This created more anticipatory stress of what she might have to also deal with. Refocused that she is working towards long-term goal of getting better and the \"new normal\" may be temporary.     Body-Mind Skills Education: Discussed use of guided meditation when helpful for sleep    Relationships: Excellent support from her family.    End of Life and Advance Care Planning:     Main therapeutic interventions provided this session include:  Provide psychoeducation, Provide behavioral intervention training and Facilitate processing of thoughts and feelings    Plan:  Will return for psychotherapy with palliative care focus 5/4    Treatment Plan    Client's Name: Tori Steele   YOB: 1975     Date: 04/20/22     Initial DSM5 Diagnoses:   Adjustment Disorders  309.0 (F43.21) With depressed mood      Will review plan within 90 days    Referral / Collaboration:  Referral to another professional/service is not indicated at this time.    Anticipated number of sessions to complete episode of care:  10, will continue to assess         Treatment Goal(s)  Date Goal Dates  Reviewed Status   April 20, 2022    Goal 1:  Client will effectively address adjustment disorder.      New   April 20, 2022    Objective #1A (Client Action)  Patient will Communicate effectively with children in family re " serious illness.    Intervention(s)  Therapist will Provide psychoeducation .    New   April 20, 2022    Objective #1B  Patient will Communicate effectively with family/friends re needs.    Intervention(s)  Therapist will Provide psychoeducation and Facilitate processing of thoughts and feelings.    New   April 20, 2022    Objective #1C  Patient will Identify effective coping strategies.    Intervention(s)  Therapist will Facilitate processing of thoughts and feelings.    New       Date Goal Dates  Reviewed Status   April 20, 2022    Goal 2:  Client will Experience reduction in depressed mood.    New   April 20, 2022    Objective #2A (Client Action)  Patient will Increase understanding of loss and grief.    Intervention(s) (Therapist Action)  Therapist will Provide psychoeducation and Facilitate processing of thoughts and feelings.    New   April 20, 2022    Objective #2B  Patient will Develop strategies for coping with grief/loss.    Intervention(s)  Therapist will Provide psychoeducation and Facilitate processing of thoughts and feelings.    New   April 20, 2022      Objective #2C  Patient will Participate in activities that provide comfort and/or pleasure.    Intervention(s)  Therapist will Facilitate processing of thoughts and feelings.    New       Client has contributed regarding goals and concerns, but has not reviewed the treatment plan. Plan to review at future session.    Jessica Goldberg, [facility]  Palliative Care    Ph: [phone]     Tori is a 46 year old who is being evaluated via a billable video visit.      How would you like to obtain your AVS? MyChart  If the video visit is dropped, the invitation should be resent by: Text to cell phone: [phone]   Will anyone else be joining your video visit? No    Video Start Time: 10 am  Video-Visit Details    Type of service:  Video Visit    Video End Time:10:38 am    Originating Location (pt. Location): Home    Distant Location (provider  location):  [facility]     Platform used for Video Visit: Dharmesh Garcia        Again, thank you for allowing me to participate in the care of your patient.      Sincerely,    Jessica P Goldberg, ISASW

## 2022-04-20 NOTE — LETTER
Date:May 18, 2022      Provider requested that no letter be sent. Do not send.       [facility]

## 2022-04-25 ENCOUNTER — ONCOLOGY VISIT (OUTPATIENT)
Dept: ONCOLOGY | Facility: CLINIC | Age: 47
End: 2022-04-25
Attending: PHYSICIAN ASSISTANT
Payer: OTHER GOVERNMENT

## 2022-04-25 ENCOUNTER — LAB (OUTPATIENT)
Dept: LAB | Facility: CLINIC | Age: 47
End: 2022-04-25
Attending: PHYSICIAN ASSISTANT
Payer: OTHER GOVERNMENT

## 2022-04-25 VITALS
DIASTOLIC BLOOD PRESSURE: 75 MMHG | TEMPERATURE: 98.4 F | RESPIRATION RATE: 16 BRPM | OXYGEN SATURATION: 100 % | WEIGHT: 149.2 LBS | SYSTOLIC BLOOD PRESSURE: 123 MMHG | BODY MASS INDEX: 25.61 KG/M2 | HEART RATE: 114 BPM

## 2022-04-25 DIAGNOSIS — R05.9 COUGH: ICD-10-CM

## 2022-04-25 DIAGNOSIS — C50.911 INVASIVE DUCTAL CARCINOMA OF RIGHT BREAST (H): Primary | ICD-10-CM

## 2022-04-25 LAB
ALBUMIN SERPL-MCNC: 3.3 G/DL (ref 3.4–5)
ALBUMIN UR-MCNC: NEGATIVE MG/DL
ALP SERPL-CCNC: 86 U/L (ref 40–150)
ALT SERPL W P-5'-P-CCNC: 21 U/L (ref 0–50)
ANION GAP SERPL CALCULATED.3IONS-SCNC: 8 MMOL/L (ref 3–14)
APPEARANCE UR: CLEAR
AST SERPL W P-5'-P-CCNC: 15 U/L (ref 0–45)
BASOPHILS # BLD AUTO: 0 10E3/UL (ref 0–0.2)
BASOPHILS NFR BLD AUTO: 0 %
BILIRUB SERPL-MCNC: 0.4 MG/DL (ref 0.2–1.3)
BILIRUB UR QL STRIP: NEGATIVE
BUN SERPL-MCNC: 9 MG/DL (ref 7–30)
CALCIUM SERPL-MCNC: 9.1 MG/DL (ref 8.5–10.1)
CHLORIDE BLD-SCNC: 104 MMOL/L (ref 94–109)
CO2 SERPL-SCNC: 28 MMOL/L (ref 20–32)
COLOR UR AUTO: YELLOW
CREAT SERPL-MCNC: 0.64 MG/DL (ref 0.52–1.04)
EOSINOPHIL # BLD AUTO: 0 10E3/UL (ref 0–0.7)
EOSINOPHIL NFR BLD AUTO: 1 %
ERYTHROCYTE [DISTWIDTH] IN BLOOD BY AUTOMATED COUNT: 13.9 % (ref 10–15)
GFR SERPL CREATININE-BSD FRML MDRD: >90 ML/MIN/1.73M2
GLUCOSE BLD-MCNC: 127 MG/DL (ref 70–99)
GLUCOSE UR STRIP-MCNC: NEGATIVE MG/DL
HCT VFR BLD AUTO: 34.9 % (ref 35–47)
HGB BLD-MCNC: 11.3 G/DL (ref 11.7–15.7)
HGB UR QL STRIP: ABNORMAL
IMM GRANULOCYTES # BLD: 0 10E3/UL
IMM GRANULOCYTES NFR BLD: 0 %
KETONES UR STRIP-MCNC: NEGATIVE MG/DL
LEUKOCYTE ESTERASE UR QL STRIP: ABNORMAL
LYMPHOCYTES # BLD AUTO: 1 10E3/UL (ref 0.8–5.3)
LYMPHOCYTES NFR BLD AUTO: 22 %
MCH RBC QN AUTO: 29.7 PG (ref 26.5–33)
MCHC RBC AUTO-ENTMCNC: 32.4 G/DL (ref 31.5–36.5)
MCV RBC AUTO: 92 FL (ref 78–100)
MONOCYTES # BLD AUTO: 0.3 10E3/UL (ref 0–1.3)
MONOCYTES NFR BLD AUTO: 6 %
NEUTROPHILS # BLD AUTO: 3.1 10E3/UL (ref 1.6–8.3)
NEUTROPHILS NFR BLD AUTO: 71 %
NITRATE UR QL: NEGATIVE
NRBC # BLD AUTO: 0 10E3/UL
NRBC BLD AUTO-RTO: 0 /100
PH UR STRIP: 6 [PH] (ref 5–7)
PLATELET # BLD AUTO: 293 10E3/UL (ref 150–450)
POTASSIUM BLD-SCNC: 3.6 MMOL/L (ref 3.4–5.3)
PROT SERPL-MCNC: 7.4 G/DL (ref 6.8–8.8)
RBC # BLD AUTO: 3.8 10E6/UL (ref 3.8–5.2)
RBC URINE: 2 /HPF
SODIUM SERPL-SCNC: 140 MMOL/L (ref 133–144)
SP GR UR STRIP: 1.01 (ref 1–1.03)
SQUAMOUS EPITHELIAL: 2 /HPF
UROBILINOGEN UR STRIP-MCNC: NORMAL MG/DL
WBC # BLD AUTO: 4.5 10E3/UL (ref 4–11)
WBC URINE: 2 /HPF

## 2022-04-25 PROCEDURE — 96367 TX/PROPH/DG ADDL SEQ IV INF: CPT

## 2022-04-25 PROCEDURE — 96417 CHEMO IV INFUS EACH ADDL SEQ: CPT

## 2022-04-25 PROCEDURE — 258N000003 HC RX IP 258 OP 636: Performed by: PHYSICIAN ASSISTANT

## 2022-04-25 PROCEDURE — 96413 CHEMO IV INFUSION 1 HR: CPT

## 2022-04-25 PROCEDURE — 250N000011 HC RX IP 250 OP 636: Performed by: PHYSICIAN ASSISTANT

## 2022-04-25 PROCEDURE — G0463 HOSPITAL OUTPT CLINIC VISIT: HCPCS

## 2022-04-25 PROCEDURE — 99214 OFFICE O/P EST MOD 30 MIN: CPT | Performed by: PHYSICIAN ASSISTANT

## 2022-04-25 PROCEDURE — U0005 INFEC AGEN DETEC AMPLI PROBE: HCPCS

## 2022-04-25 PROCEDURE — 81001 URINALYSIS AUTO W/SCOPE: CPT | Performed by: PHYSICIAN ASSISTANT

## 2022-04-25 PROCEDURE — 85025 COMPLETE CBC W/AUTO DIFF WBC: CPT | Performed by: PHYSICIAN ASSISTANT

## 2022-04-25 PROCEDURE — 80053 COMPREHEN METABOLIC PANEL: CPT | Performed by: PHYSICIAN ASSISTANT

## 2022-04-25 RX ORDER — HEPARIN SODIUM (PORCINE) LOCK FLUSH IV SOLN 100 UNIT/ML 100 UNIT/ML
5 SOLUTION INTRAVENOUS
Status: DISCONTINUED | OUTPATIENT
Start: 2022-04-25 | End: 2022-04-25 | Stop reason: HOSPADM

## 2022-04-25 RX ORDER — DIPHENHYDRAMINE HYDROCHLORIDE 50 MG/ML
50 INJECTION INTRAMUSCULAR; INTRAVENOUS
Status: CANCELLED
Start: 2022-04-25

## 2022-04-25 RX ORDER — MEPERIDINE HYDROCHLORIDE 25 MG/ML
25 INJECTION INTRAMUSCULAR; INTRAVENOUS; SUBCUTANEOUS EVERY 30 MIN PRN
Status: CANCELLED | OUTPATIENT
Start: 2022-04-25

## 2022-04-25 RX ORDER — ALBUTEROL SULFATE 90 UG/1
1-2 AEROSOL, METERED RESPIRATORY (INHALATION)
Status: CANCELLED
Start: 2022-04-25

## 2022-04-25 RX ORDER — HEPARIN SODIUM (PORCINE) LOCK FLUSH IV SOLN 100 UNIT/ML 100 UNIT/ML
5 SOLUTION INTRAVENOUS
Status: CANCELLED | OUTPATIENT
Start: 2022-04-25

## 2022-04-25 RX ORDER — DEXAMETHASONE SODIUM PHOSPHATE 10 MG/ML
10 INJECTION, SOLUTION INTRAMUSCULAR; INTRAVENOUS
Status: CANCELLED
Start: 2022-04-25

## 2022-04-25 RX ORDER — NALOXONE HYDROCHLORIDE 0.4 MG/ML
0.2 INJECTION, SOLUTION INTRAMUSCULAR; INTRAVENOUS; SUBCUTANEOUS
Status: CANCELLED | OUTPATIENT
Start: 2022-04-25

## 2022-04-25 RX ORDER — ALBUTEROL SULFATE 0.83 MG/ML
2.5 SOLUTION RESPIRATORY (INHALATION)
Status: CANCELLED | OUTPATIENT
Start: 2022-04-25

## 2022-04-25 RX ORDER — EPINEPHRINE 1 MG/ML
0.3 INJECTION, SOLUTION INTRAMUSCULAR; SUBCUTANEOUS EVERY 5 MIN PRN
Status: CANCELLED | OUTPATIENT
Start: 2022-04-25

## 2022-04-25 RX ORDER — HEPARIN SODIUM,PORCINE 10 UNIT/ML
5 VIAL (ML) INTRAVENOUS
Status: CANCELLED | OUTPATIENT
Start: 2022-04-25

## 2022-04-25 RX ORDER — METHYLPREDNISOLONE SODIUM SUCCINATE 125 MG/2ML
125 INJECTION, POWDER, LYOPHILIZED, FOR SOLUTION INTRAMUSCULAR; INTRAVENOUS
Status: CANCELLED
Start: 2022-04-25

## 2022-04-25 RX ORDER — ONDANSETRON 8 MG/1
8 TABLET, FILM COATED ORAL EVERY 8 HOURS PRN
Qty: 3 TABLET | Refills: 0 | Status: SHIPPED | OUTPATIENT
Start: 2022-04-25 | End: 2022-05-02

## 2022-04-25 RX ORDER — LORAZEPAM 2 MG/ML
0.5 INJECTION INTRAMUSCULAR EVERY 4 HOURS PRN
Status: CANCELLED | OUTPATIENT
Start: 2022-04-25

## 2022-04-25 RX ADMIN — DEXTROSE MONOHYDRATE 250 ML: 50 INJECTION, SOLUTION INTRAVENOUS at 13:17

## 2022-04-25 RX ADMIN — SODIUM CHLORIDE 150 MG: 9 INJECTION, SOLUTION INTRAVENOUS at 13:17

## 2022-04-25 RX ADMIN — Medication 5 ML: at 16:00

## 2022-04-25 RX ADMIN — Medication 5 ML: at 11:25

## 2022-04-25 RX ADMIN — SODIUM CHLORIDE 250 ML: 9 INJECTION, SOLUTION INTRAVENOUS at 15:15

## 2022-04-25 ASSESSMENT — PAIN SCALES - GENERAL: PAINLEVEL: NO PAIN (0)

## 2022-04-25 NOTE — NURSING NOTE
"Oncology Rooming Note    April 25, 2022 11:31 AM   Tori Steele is a 46 year old female who presents for:    Chief Complaint   Patient presents with     Port Draw     Port accessed, labs drawn, heparin locked, vitals taken, checked into next appt     Oncology Clinic Visit     Rtn for invasive ductal carcinoma of right breast     Initial Vitals: /75 (BP Location: Left arm, Patient Position: Sitting, Cuff Size: Adult Regular)   Pulse 114   Temp 98.4  F (36.9  C) (Oral)   Resp 16   Wt 67.7 kg (149 lb 3.2 oz)   SpO2 100%   BMI 25.61 kg/m   Estimated body mass index is 25.61 kg/m  as calculated from the following:    Height as of 3/4/22: 1.626 m (5' 4\").    Weight as of this encounter: 67.7 kg (149 lb 3.2 oz). Body surface area is 1.75 meters squared.  No Pain (0) Comment: Data Unavailable   No LMP recorded.  Allergies reviewed: Yes  Medications reviewed: Yes    Medications: Medication refills not needed today.  Pharmacy name entered into [facility]: [id number] [place], [place] - [address] AT [facility]    Clinical concerns: Pt has a cough that started about a week ago, has not improved or worsened. Has occurred on and off over that period with no known triggers. Would like to ask about that and see if anything can or should be done. PA was notified.      Isamar Zhang, EMT            "

## 2022-04-25 NOTE — PROGRESS NOTES
Oncology/Hematology Visit Note  Apr 19, 2022           Reason for Visit: follow up of breast cancer     History of Present Illness: Tori Steele is a 46 year old female with breat cancer. Right-sided 5.5 cm ER positive IN positive HER-2 negative breast cancer with associated DCIS.  Her MammaPrint came back as high risk.  She consented for the I-SPY clinical trial and has been randomized to the oral paclitaxel, Encequidar and dostarlimab arm.      2/21/22 started trial with oral paclitaxel, Encequidar and dostarlimab.    3/15/22  her IV immunotherapy dostarlimab was held due to diarrhea that recovered with Imodium and time  4/4/22   Carbo was added in weekly due in hopes for more significant reduction in cancer     Interval History:  Tori is here today for week 10 of treatment.  Seen in person with her .  With the addition of the carboplatin, she does feel that she is more tired.  This lasted for several days, and then she starts to feel more like herself.  She does need to take more naps and levels first 3 days.  She also has diarrhea for a few days when she takes her oral medications, and she takes Imodium as needed.  She does note a dry cough over the last week.  It she also has a runny nose.  No shortness of breath or wheezing.  No chest pain.  No palpitations. No swelling or pain in her legs.  She has not had any fevers, body aches, chills.  The cough has not been getting progressively worse over the week.  No known sick contacts.  No history of asthma.  She does note that she has seasonal allergies.    She does not feel any pain over her mass, does not examine it regularly.    No headaches or double or blurry vision.    In terms of nausea, she continues to use the zofran which she says helps. She did have one episode of vomiting with this cycle of chemo. Her weight has been stable     No numbness or tingling of fingers or toes     Patient seen in conjunction with study RN    ROS:  Patient  denies the following: fevers, body aches, chills, headaches, vision changes, dizziness, chest pain, shortness of breath, nausea, vomiting, diarrhea, constipation, abdominal pain, rashes, bruising/bleeding, mouth sores, swelling or pain in the legs.        Current Outpatient Prescriptions               Current Outpatient Medications   Medication Sig Dispense Refill     ferrous sulfate (FEROSUL) 325 (65 Fe) MG tablet Take 325 mg by mouth         lidocaine-prilocaine (EMLA) 2.5-2.5 % external cream Apply to port site one hour prior to access may start using six days after port placement. 30 g 1     ondansetron (ZOFRAN) 8 MG tablet Take 1 tablet (8 mg) by mouth every 8 hours as needed for nausea 30 tablet 0     prochlorperazine (COMPAZINE) 10 MG tablet Take 1 tablet (10 mg) by mouth every 6 hours as needed (Nausea/Vomiting) 30 tablet 2     riboflavin (VITAMIN  B2) 25 MG TABS                  Physical Examination: ECOG 1  Vitals 2022   Systolic 130   Diastolic 75   Pulse 110   Respiration 16   Temp 98.8   O2 96   Pain Score 0   Weight 151 lb 6.4 oz             Wt Readings from Last 4 Encounters:   04/15/22 67 kg (147 lb 12.8 oz)   22 68.8 kg (151 lb 11.2 oz)   22 67.8 kg (149 lb 6.4 oz)   22 68.5 kg (151 lb)         Video physical exam  /75 (BP Location: Left arm, Patient Position: Sitting, Cuff Size: Adult Regular)   Pulse 114   Temp 98.4  F (36.9  C) (Oral)   Resp 16   Wt 67.7 kg (149 lb 3.2 oz)   SpO2 100%   BMI 25.61 kg/m      Wt Readings from Last 4 Encounters:   22 67.7 kg (149 lb 3.2 oz)   22 68.7 kg (151 lb 6.4 oz)   04/15/22 67 kg (147 lb 12.8 oz)   22 68.8 kg (151 lb 11.2 oz)         ECO  General: Resting comfortably in no acute distress  Head: Normocephalic, atraumatic   EENT: No scleral icteris, EOMS intact. Mucous membranes are moist, no mucositis or oral lesions  Lymph: No palpable cervical, supraclavicular, or axillary lymphadenopathy   Heart:  Regular rate and rhythm, no murmurs  Lung: Normal respiratory effort. Clear to auscultation bilaterally. No wheezes, rhonchi, or crackles. No coughing throughout entire visit   Abdomen: +Bowel Sounds, soft nontender to palpation. No rebound, guarding or rigidity. No palpable masses  Neuro: AAO x3. Gait is steady. Moving all extremities   Extremities: No lower extremity edema  Skin: Warm, dry, intact. No rashes, no suspicious lesions. No petechia or bruising noted  Breast: Right breast, 12 o'clock, mass is 3.5cm vertically x 3.5cm horizontally. No overlying skin changes.        Laboratory Data:  Most Recent 3 CBC's:Recent Labs   Lab Test 04/25/22  1125 04/19/22  0957 04/15/22  1249   WBC 4.5 3.2* 2.0*   HGB 11.3* 10.8* 11.9   MCV 92 92 92    277 279    Most Recent 3 BMP's:  Recent Labs   Lab Test 04/25/22  1125 04/19/22  0957 04/11/22  1118    142 140   POTASSIUM 3.6 3.8 3.7   CHLORIDE 104 109 106   CO2 28 26 27   BUN 9 9 8   CR 0.64 0.55 0.62   ANIONGAP 8 7 7   JENNIE 9.1 8.8 9.0   * 102* 128*    Most Recent 2 LFT's:  Recent Labs   Lab Test 04/25/22  1125 04/19/22  0957   AST 15 15   ALT 21 23   ALKPHOS 86 79   BILITOTAL 0.4 0.2      I reviewed the above labs today.          Assessment and Plan:  1. Right sided ER+, MD+, HER2-negative, MammaPrint high risk T=5.5cm, N=0 breast cancer.  Tori had her 3/15/22 Dostarlimab held due to diarrhea, resumed 4/4.  Her 6 week MRI showed improvement but with suggestion of adding in an additional agent.  Carbo added in weekly starting 4/4 . Fatigue, nausea and leukopenia noted.        Labs and exam are appropriate to proceed with week 10 today. This will include dostralimab today.     Her plan includes chemotherapy now includes weekly Carbo (added in 4/4) with IV Emend as pre-medications,  oral paclitaxel, Encequidar and IV dostarlimab (q3w) x12 weeks followed by AC.  At this time she is mentally preparing for a mastectomy, but would consider a lumpectomy if  possible. She met with Dr Layton and Dr Whitehead.       She is also scheduled to see genetics in April.  After surgery she may need radiation and then she will need adjuvant hormonal therapy.      Continue weekly follow up     2.  Anemia.  Patient's hemoglobin was 11.6 the day of starting treatment.  She has a history of iron deficiency anemia.  Iron stores are low.  Will have her take oral iron M, W and F each week.  Hgb had improved but then reduced again with carbo. Today 11.3, continue to monitor      3.  GI:   Nausea getting weekly Emend.  Continue oral Zofran q8 hours days 1-4, then taper off on weekend    Diarrhea, induced by immunotherapy escalated last week 3/15 having over 20 stools.  Improved with holding immunotherapy, Imodium, time.  Small flare after 4/4 immunotherapy. Stable today. Will monitor closely with dostarlimab today      4.  Coping well good supportive .  Seeing a therapist as well.    5. Cough  - Dry occasional cough with associated runny nose. Lungs CTA, normal oxygen sats, normal WBC, no concerning findings. Will add COVID swab today   - Continue to monitor       Patient will call in the interim with any questions or concerns. They voice understanding and agree with the above plan.       Leela Hills PA-C

## 2022-04-25 NOTE — PROGRESS NOTES
Infusion Nursing Note:  Tori Steele presents today for Cycle 10 Day 1 study encequidar, study paclitaxel, study dostarlimab and study carboplatin (IDS #3903).  Patient seen by provider today: Yes: JESSY Medina   present during visit today: Not Applicable.    Note: Pt presents to infusion feeling well. She has a dry cough today - denies SOB, fevers, or other signs of infection. COVID swab sent per Leela Hills. She otherwise offers no new concerns following her provider appointment today.    Per Rosmery, Research RN, administration order as follows:    Oral encequidor taken at: 1311  Followed by 1 hour fast.    Dostarlimab start time: 1411  Dostarlimab end time: 1442    Oral paclitaxel taken at: 1412  Finished taking paclitaxel tablets at: 1459.  Followed by 2 hour fast.    Carboplatin start time: 1515  Carboplatin end time: 1545      Intravenous Access:  Implanted Port.    Treatment Conditions:  Lab Results   Component Value Date    HGB 11.3 (L) 04/25/2022    WBC 4.5 04/25/2022    ANEU 1.2 (L) 04/11/2022    ANEUTAUTO 3.1 04/25/2022     04/25/2022      Lab Results   Component Value Date     04/25/2022    POTASSIUM 3.6 04/25/2022    MAG 1.7 (L) 02/07/2022    CR 0.64 04/25/2022    JENNIE 9.1 04/25/2022    BILITOTAL 0.4 04/25/2022    ALBUMIN 3.3 (L) 04/25/2022    ALT 21 04/25/2022    AST 15 04/25/2022     Results reviewed, labs MET treatment parameters, ok to proceed with treatment.      Post Infusion Assessment:  Patient tolerated infusion without incident.  Blood return noted pre and post infusion.  Site patent and intact, free from redness, edema or discomfort.  No evidence of extravasations.  Access discontinued per protocol.       Discharge Plan:   Prescription refills given for Zofran.  Discharge instructions reviewed with: Patient.  Patient and/or family verbalized understanding of discharge instructions and all questions answered.  AVS to patient via [facility].  Patient will return  5/2/22 for next appointment.   Patient discharged in stable condition accompanied by: self.  Departure Mode: Ambulatory.      Haydee Carrasquillo RN

## 2022-04-25 NOTE — NURSING NOTE
Chief Complaint   Patient presents with     Port Draw     Port accessed, labs drawn, heparin locked, vitals taken, checked into next appt     There were no vitals taken for this visit.  Fam Hardin RN on 4/25/2022 at 11:15 AM

## 2022-04-26 LAB — SARS-COV-2 RNA RESP QL NAA+PROBE: NEGATIVE

## 2022-05-02 ENCOUNTER — LAB (OUTPATIENT)
Dept: LAB | Facility: CLINIC | Age: 47
End: 2022-05-02
Attending: PHYSICIAN ASSISTANT
Payer: OTHER GOVERNMENT

## 2022-05-02 ENCOUNTER — RESEARCH ENCOUNTER (OUTPATIENT)
Dept: ONCOLOGY | Facility: CLINIC | Age: 47
End: 2022-05-02

## 2022-05-02 ENCOUNTER — INFUSION THERAPY VISIT (OUTPATIENT)
Dept: ONCOLOGY | Facility: CLINIC | Age: 47
End: 2022-05-02
Attending: PHYSICIAN ASSISTANT
Payer: OTHER GOVERNMENT

## 2022-05-02 VITALS
BODY MASS INDEX: 25.34 KG/M2 | WEIGHT: 147.6 LBS | DIASTOLIC BLOOD PRESSURE: 74 MMHG | RESPIRATION RATE: 16 BRPM | TEMPERATURE: 98.4 F | SYSTOLIC BLOOD PRESSURE: 127 MMHG | OXYGEN SATURATION: 98 % | HEART RATE: 110 BPM

## 2022-05-02 DIAGNOSIS — C50.911 INVASIVE DUCTAL CARCINOMA OF RIGHT BREAST (H): Primary | ICD-10-CM

## 2022-05-02 LAB
ALBUMIN SERPL-MCNC: 3.3 G/DL (ref 3.4–5)
ALP SERPL-CCNC: 78 U/L (ref 40–150)
ALT SERPL W P-5'-P-CCNC: 20 U/L (ref 0–50)
ANION GAP SERPL CALCULATED.3IONS-SCNC: 9 MMOL/L (ref 3–14)
AST SERPL W P-5'-P-CCNC: 17 U/L (ref 0–45)
BASOPHILS # BLD AUTO: 0 10E3/UL (ref 0–0.2)
BASOPHILS NFR BLD AUTO: 1 %
BILIRUB SERPL-MCNC: 0.4 MG/DL (ref 0.2–1.3)
BUN SERPL-MCNC: 8 MG/DL (ref 7–30)
CALCIUM SERPL-MCNC: 9.2 MG/DL (ref 8.5–10.1)
CHLORIDE BLD-SCNC: 106 MMOL/L (ref 94–109)
CO2 SERPL-SCNC: 26 MMOL/L (ref 20–32)
CREAT SERPL-MCNC: 0.61 MG/DL (ref 0.52–1.04)
EOSINOPHIL # BLD AUTO: 0.1 10E3/UL (ref 0–0.7)
EOSINOPHIL NFR BLD AUTO: 1 %
ERYTHROCYTE [DISTWIDTH] IN BLOOD BY AUTOMATED COUNT: 14.1 % (ref 10–15)
GFR SERPL CREATININE-BSD FRML MDRD: >90 ML/MIN/1.73M2
GLUCOSE BLD-MCNC: 103 MG/DL (ref 70–99)
HCT VFR BLD AUTO: 35.1 % (ref 35–47)
HGB BLD-MCNC: 11.3 G/DL (ref 11.7–15.7)
IMM GRANULOCYTES # BLD: 0 10E3/UL
IMM GRANULOCYTES NFR BLD: 0 %
LYMPHOCYTES # BLD AUTO: 0.8 10E3/UL (ref 0.8–5.3)
LYMPHOCYTES NFR BLD AUTO: 21 %
MCH RBC QN AUTO: 30 PG (ref 26.5–33)
MCHC RBC AUTO-ENTMCNC: 32.2 G/DL (ref 31.5–36.5)
MCV RBC AUTO: 93 FL (ref 78–100)
MONOCYTES # BLD AUTO: 0.2 10E3/UL (ref 0–1.3)
MONOCYTES NFR BLD AUTO: 6 %
NEUTROPHILS # BLD AUTO: 2.7 10E3/UL (ref 1.6–8.3)
NEUTROPHILS NFR BLD AUTO: 71 %
NRBC # BLD AUTO: 0 10E3/UL
NRBC BLD AUTO-RTO: 0 /100
PLATELET # BLD AUTO: 242 10E3/UL (ref 150–450)
POTASSIUM BLD-SCNC: 3.3 MMOL/L (ref 3.4–5.3)
PROT SERPL-MCNC: 7.2 G/DL (ref 6.8–8.8)
RBC # BLD AUTO: 3.77 10E6/UL (ref 3.8–5.2)
SODIUM SERPL-SCNC: 141 MMOL/L (ref 133–144)
WBC # BLD AUTO: 3.8 10E3/UL (ref 4–11)

## 2022-05-02 PROCEDURE — 258N000003 HC RX IP 258 OP 636: Mod: 59 | Performed by: PHYSICIAN ASSISTANT

## 2022-05-02 PROCEDURE — 250N000013 HC RX MED GY IP 250 OP 250 PS 637: Performed by: PHYSICIAN ASSISTANT

## 2022-05-02 PROCEDURE — 96367 TX/PROPH/DG ADDL SEQ IV INF: CPT

## 2022-05-02 PROCEDURE — 250N000011 HC RX IP 250 OP 636: Performed by: PHYSICIAN ASSISTANT

## 2022-05-02 PROCEDURE — G0463 HOSPITAL OUTPT CLINIC VISIT: HCPCS

## 2022-05-02 PROCEDURE — 80053 COMPREHEN METABOLIC PANEL: CPT | Performed by: PHYSICIAN ASSISTANT

## 2022-05-02 PROCEDURE — 85004 AUTOMATED DIFF WBC COUNT: CPT | Performed by: PHYSICIAN ASSISTANT

## 2022-05-02 PROCEDURE — 99214 OFFICE O/P EST MOD 30 MIN: CPT | Performed by: PHYSICIAN ASSISTANT

## 2022-05-02 PROCEDURE — 96413 CHEMO IV INFUSION 1 HR: CPT

## 2022-05-02 RX ORDER — HEPARIN SODIUM,PORCINE 10 UNIT/ML
5 VIAL (ML) INTRAVENOUS
Status: CANCELLED | OUTPATIENT
Start: 2022-05-02

## 2022-05-02 RX ORDER — ALBUTEROL SULFATE 90 UG/1
1-2 AEROSOL, METERED RESPIRATORY (INHALATION)
Status: CANCELLED
Start: 2022-05-02

## 2022-05-02 RX ORDER — NALOXONE HYDROCHLORIDE 0.4 MG/ML
0.2 INJECTION, SOLUTION INTRAMUSCULAR; INTRAVENOUS; SUBCUTANEOUS
Status: CANCELLED | OUTPATIENT
Start: 2022-05-02

## 2022-05-02 RX ORDER — ALBUTEROL SULFATE 0.83 MG/ML
2.5 SOLUTION RESPIRATORY (INHALATION)
Status: CANCELLED | OUTPATIENT
Start: 2022-05-02

## 2022-05-02 RX ORDER — DIPHENHYDRAMINE HYDROCHLORIDE 50 MG/ML
50 INJECTION INTRAMUSCULAR; INTRAVENOUS
Status: CANCELLED
Start: 2022-05-02

## 2022-05-02 RX ORDER — DEXAMETHASONE SODIUM PHOSPHATE 10 MG/ML
10 INJECTION, SOLUTION INTRAMUSCULAR; INTRAVENOUS
Status: CANCELLED
Start: 2022-05-02

## 2022-05-02 RX ORDER — LORAZEPAM 2 MG/ML
0.5 INJECTION INTRAMUSCULAR EVERY 4 HOURS PRN
Status: CANCELLED | OUTPATIENT
Start: 2022-05-02

## 2022-05-02 RX ORDER — POTASSIUM CHLORIDE 1500 MG/1
40 TABLET, EXTENDED RELEASE ORAL ONCE
Status: COMPLETED | OUTPATIENT
Start: 2022-05-02 | End: 2022-05-02

## 2022-05-02 RX ORDER — METHYLPREDNISOLONE SODIUM SUCCINATE 125 MG/2ML
125 INJECTION, POWDER, LYOPHILIZED, FOR SOLUTION INTRAMUSCULAR; INTRAVENOUS
Status: CANCELLED
Start: 2022-05-02

## 2022-05-02 RX ORDER — ONDANSETRON 8 MG/1
8 TABLET, FILM COATED ORAL EVERY 8 HOURS PRN
Qty: 3 TABLET | Refills: 0 | Status: SHIPPED | OUTPATIENT
Start: 2022-05-02 | End: 2022-06-01

## 2022-05-02 RX ORDER — HEPARIN SODIUM (PORCINE) LOCK FLUSH IV SOLN 100 UNIT/ML 100 UNIT/ML
5 SOLUTION INTRAVENOUS
Status: CANCELLED | OUTPATIENT
Start: 2022-05-02

## 2022-05-02 RX ORDER — EPINEPHRINE 1 MG/ML
0.3 INJECTION, SOLUTION INTRAMUSCULAR; SUBCUTANEOUS EVERY 5 MIN PRN
Status: CANCELLED | OUTPATIENT
Start: 2022-05-02

## 2022-05-02 RX ORDER — HEPARIN SODIUM (PORCINE) LOCK FLUSH IV SOLN 100 UNIT/ML 100 UNIT/ML
5 SOLUTION INTRAVENOUS
Status: DISCONTINUED | OUTPATIENT
Start: 2022-05-02 | End: 2022-05-02 | Stop reason: HOSPADM

## 2022-05-02 RX ORDER — MEPERIDINE HYDROCHLORIDE 25 MG/ML
25 INJECTION INTRAMUSCULAR; INTRAVENOUS; SUBCUTANEOUS EVERY 30 MIN PRN
Status: CANCELLED | OUTPATIENT
Start: 2022-05-02

## 2022-05-02 RX ORDER — HEPARIN SODIUM (PORCINE) LOCK FLUSH IV SOLN 100 UNIT/ML 100 UNIT/ML
500 SOLUTION INTRAVENOUS ONCE
Status: COMPLETED | OUTPATIENT
Start: 2022-05-02 | End: 2022-05-02

## 2022-05-02 RX ADMIN — Medication 500 UNITS: at 08:12

## 2022-05-02 RX ADMIN — Medication 5 ML: at 12:21

## 2022-05-02 RX ADMIN — SODIUM CHLORIDE 150 MG: 9 INJECTION, SOLUTION INTRAVENOUS at 10:49

## 2022-05-02 RX ADMIN — SODIUM CHLORIDE 250 ML: 9 INJECTION, SOLUTION INTRAVENOUS at 10:48

## 2022-05-02 RX ADMIN — POTASSIUM CHLORIDE 40 MEQ: 20 TABLET, EXTENDED RELEASE ORAL at 10:51

## 2022-05-02 ASSESSMENT — PAIN SCALES - GENERAL: PAINLEVEL: NO PAIN (0)

## 2022-05-02 NOTE — NURSING NOTE
"Chief Complaint   Patient presents with     Oncology Clinic Visit     Invasive ductal carcinoma of right breast      Port Draw     Labs drawn via port by RN. Vitals taken.     Port accessed with 20G 3/4\" flat needle by RN, labs collected, line flushed with saline and heparin.  Vitals taken. Pt checked in for appointment(s).    Jamila Pineda RN    "

## 2022-05-02 NOTE — NURSING NOTE
[id number]: Study Visit Note   Subject name: Tori Steele     Visit: C11D1    Did the study visit occur within the appropriate window allowed by the protocol? yes    Since the last study visit, She has been doing well. She still has a lingering cough, COVID negative. She still continues to have taste changes (grade 1), nail changes (Grade 1), fatigue (grade 1), diarrhea intermittent.     I have personally interviewed Tori Steele and reviewed her medical record for adverse events and concomitant medications and these have been recorded on the corresponding logs in Tori Steele's research file.     Tori Steele was given the opportunity to ask any trial related questions.  Please see provider progress note for physical exam and other clinical information. Labs were reviewed - any significant lab values were addressed and reviewed.    Bertha Augustin RN      [id number]: Medication Count/IDS Note      Tori Steele is enrolled on the trial number listed above. The patient presented for her C11D1 day visit.   IDS number: [id number]  Drug name: PO PACLITAXEL  Number of boxes returned: 1  Lot number:   Red: [id number]  Yellow: [id number]  Number of pills returned: 0      Number of boxes dispensed: 1  Lot number:  Red: [id number]  Yellow: [id number]  Number of pills dispensed: 36    Drug name: PO ENCEQUIDAR  Number of boxes returned: 1  Lot number: [id number]  Number of pills returned: 0      Number of boxes dispensed: 1  Lot number: [id number]  Number of pills dispensed: 4      Drug diary returned? yes    Are there any discrepancies between the amount of medication the patient was instructed to take and the amount recorded as taken in the patient s drug diary?  No    Are there any discrepancies between the amount of medication the patient has recorded as taken in the drug diary and the amount that would be expected to be returned based on the amount recorded as taken?  no      Bertha Augustin RN, MS  Research Study  Coordinator  Phone: [phone]  Pager: [phone]      Form 504.00.01 (Version 1)     Effective date: 01JUL2018     Next Review Date: 01JUL2020

## 2022-05-02 NOTE — NURSING NOTE
"Oncology Rooming Note    May 2, 2022 8:33 AM   Tori Steele is a 46 year old female who presents for:    Chief Complaint   Patient presents with     Oncology Clinic Visit     Invasive ductal carcinoma of right breast      Port Draw     Labs drawn via port by RN. Vitals taken.     Initial Vitals: /74 (BP Location: Right arm)   Pulse 110   Temp 98.4  F (36.9  C) (Oral)   Resp 16   Wt 67 kg (147 lb 9.6 oz)   LMP  (LMP Unknown)   SpO2 98%   BMI 25.34 kg/m   Estimated body mass index is 25.34 kg/m  as calculated from the following:    Height as of 3/4/22: 1.626 m (5' 4\").    Weight as of this encounter: 67 kg (147 lb 9.6 oz). Body surface area is 1.74 meters squared.  No Pain (0) Comment: Data Unavailable   No LMP recorded (lmp unknown). (Menstrual status: Chemotherapy).  Allergies reviewed: Yes  Medications reviewed: Yes    Medications: Medication refills not needed today.  Pharmacy name entered into [facility]:    [id number] - [place], [place] - [address] AT [facility] [place], [place] - [address] [place] [address] [phone]    Clinical concerns: None     Dedrick Rowe            "

## 2022-05-02 NOTE — PROGRESS NOTES
Oncology/Hematology Visit Note  Apr 19, 2022           Reason for Visit: follow up of breast cancer     History of Present Illness: Tori Steele is a 46 year old female with breat cancer. Right-sided 5.5 cm ER positive AR positive HER-2 negative breast cancer with associated DCIS.  Her MammaPrint came back as high risk.  She consented for the I-SPY clinical trial and has been randomized to the oral paclitaxel, Encequidar and dostarlimab arm.      2/21/22 started trial with oral paclitaxel, Encequidar and dostarlimab.    3/15/22  her IV immunotherapy dostarlimab was held due to diarrhea that recovered with Imodium and time  4/4/22   Carbo was added in weekly due in hopes for more significant reduction in cancer     Interval History:  Tori is here today for week 11 of treatment.  Seen in person with her .  With the addition of the carboplatin, she does feel that she is more tired. She is now napping daily.      She continues to have softer/loose stools on her taxol days.  This improves later in the week. No watery diarrhea.     She does note a dry cough over the last week, last week she had PND, this has improved.  She still has the cough but its getting better. Covid was negative.     No headaches or double or blurry vision.    In terms of nausea, she continues to use the zofran which she says helps. She did have one episode of vomiting with this cycle of chemo. Her weight has been stable     No numbness or tingling of fingers or toes. No rash or itching.     Patient seen in conjunction with study RN    ROS:  Patient denies the following: fevers, body aches, chills, headaches, vision changes, dizziness, chest pain, shortness of breath, nausea, vomiting, diarrhea, constipation, abdominal pain, rashes, bruising/bleeding, mouth sores, swelling or pain in the legs.        Current Outpatient Prescriptions               Current Outpatient Medications   Medication Sig Dispense Refill     ferrous sulfate  (FEROSUL) 325 (65 Fe) MG tablet Take 325 mg by mouth         lidocaine-prilocaine (EMLA) 2.5-2.5 % external cream Apply to port site one hour prior to access may start using six days after port placement. 30 g 1     ondansetron (ZOFRAN) 8 MG tablet Take 1 tablet (8 mg) by mouth every 8 hours as needed for nausea 30 tablet 0     prochlorperazine (COMPAZINE) 10 MG tablet Take 1 tablet (10 mg) by mouth every 6 hours as needed (Nausea/Vomiting) 30 tablet 2     riboflavin (VITAMIN  B2) 25 MG TABS                  Physical Examination: ECOG 1    /74 (BP Location: Right arm)   Pulse 110   Temp 98.4  F (36.9  C) (Oral)   Resp 16   Wt 67 kg (147 lb 9.6 oz)   LMP  (LMP Unknown)   SpO2 98%   BMI 25.34 kg/m      Wt Readings from Last 4 Encounters:   22 67 kg (147 lb 9.6 oz)   22 67.7 kg (149 lb 3.2 oz)   22 68.7 kg (151 lb 6.4 oz)   04/15/22 67 kg (147 lb 12.8 oz)         ECO  General: Resting comfortably in no acute distress  Head: Normocephalic, atraumatic   EENT: No scleral icteris, EOMS intact. Mucous membranes are moist, no mucositis or oral lesions  Lymph: No palpable cervical, supraclavicular, or axillary lymphadenopathy   Heart: Regular rate and rhythm, no murmurs  Lung: Normal respiratory effort. Clear to auscultation bilaterally. No wheezes, rhonchi, or crackles. No coughing throughout entire visit   Abdomen: +Bowel Sounds, soft nontender to palpation. No rebound, guarding or rigidity. No palpable masses  Neuro: AAO x3. Gait is steady. Moving all extremities   Extremities: No lower extremity edema  Skin: Warm, dry, intact. No rashes, no suspicious lesions. No petechia or bruising noted  Breast: Right breast, 12 o'clock, mass is 3.0 cm vertically x 3.0cm horizontally. No overlying skin changes.        Laboratory Data:  Most Recent 3 CBC's:  Recent Labs   Lab Test 22  0821 22  1125 22  0957   WBC 3.8* 4.5 3.2*   HGB 11.3* 11.3* 10.8*   MCV 93 92 92    293 277     Most Recent 3 BMP's:  Recent Labs   Lab Test 04/25/22  1125 04/19/22  0957 04/11/22  1118    142 140   POTASSIUM 3.6 3.8 3.7   CHLORIDE 104 109 106   CO2 28 26 27   BUN 9 9 8   CR 0.64 0.55 0.62   ANIONGAP 8 7 7   JENNIE 9.1 8.8 9.0   * 102* 128*    Most Recent 2 LFT's:  Recent Labs   Lab Test 04/25/22  1125 04/19/22  0957   AST 15 15   ALT 21 23   ALKPHOS 86 79   BILITOTAL 0.4 0.2      I reviewed the above labs today.          Assessment and Plan:  1. Right sided ER+, NJ+, HER2-negative, MammaPrint high risk T=5.5cm, N=0 breast cancer.  Tori had her 3/15/22 Dostarlimab held due to diarrhea, resumed 4/4.  Her 6 week MRI showed improvement but with suggestion of adding in an additional agent.  Carbo added in weekly starting 4/4/22. Fatigue, nausea and leukopenia noted.      Labs and exam are appropriate to proceed with week 11 today.      Her plan includes chemotherapy now includes weekly Carbo (added in 4/4/22) with IV Emend as pre-medications,  oral paclitaxel, Encequidar and IV dostarlimab (q3w) x12 weeks followed by AC.  At this time she is mentally preparing for a mastectomy, but would consider a lumpectomy if possible. She met with Dr Layton and Dr Whitehead.       She is also scheduled to see genetics in April.  After surgery she may need radiation and then she will need adjuvant hormonal therapy.      Continue weekly follow up     2.  Anemia.  Patient's hemoglobin was 11.6 the day of starting treatment.  She has a history of iron deficiency anemia.  Iron stores are low.  Will have her take oral iron M, W and F each week.  Hgb had improved but then reduced again with carbo. Today 11.3, continue to monitor      3.  GI:   Nausea getting weekly Emend.  Continue oral Zofran q8 hours days 1-4, then taper off on weekend    Diarrhea, induced by immunotherapy escalated last week 3/15 having over 20 stools.  Improved with holding immunotherapy, Imodium, time.  Small flare after 4/4 immunotherapy. Stable  today.      4.  Coping well good supportive .  Seeing a therapist as well.    5. Cough  - Dry occasional cough with associated PND.  Covid was negative, she is improving.       Patient will call in the interim with any questions or concerns. They voice understanding and agree with the above plan.       Janette Mota PA-C

## 2022-05-02 NOTE — PATIENT INSTRUCTIONS
[facility] Triage and after hours / weekends / holidays:  [phone]    Please call the triage or after hours line if you experience a temperature greater than or equal to 100.4, shaking chills, have uncontrolled nausea, vomiting and/or diarrhea, dizziness, shortness of breath, chest pain, bleeding, unexplained bruising, or if you have any other new/concerning symptoms, questions or concerns.      If you are having any concerning symptoms or wish to speak to a provider before your next infusion visit, please call your care coordinator or triage to notify them so we can adequately serve you.     If you need a refill on a narcotic prescription or other medication, please call before your infusion appointment.                May 2022      Luis F Monday Tuesday Wednesday Thursday Friday Saturday   1     2    LAB CENTRAL   7:45 AM   (15 min.)   [facility]   [facility]    RETURN   8:15 AM   (45 min.)   Milka Mota PA-C   [facility]    ONC INFUSION 2 HR (120 MIN)  10:00 AM   (120 min.)    ONC INFUSION NURSE   [facility] 3     4    VIDEO VISIT RETURN  10:45 AM   (60 min.)   Goldberg, Jessica P, LICSW   [facility] 5     6     7       8     9    LAB CENTRAL   7:45 AM   (15 min.)   [facility]   [facility]    RETURN   8:15 AM   (45 min.)   Milka Mota PA-C   [facility]    ONC INFUSION 2 HR (120 MIN)  10:00 AM   (120 min.)    ONC INFUSION NURSE   [facility]    MR BREAST BILATERAL WWO   1:30 PM   (60 min.)   [id number]   [facility] 10    US BREAST BX CORE NEEDLE RIGHT   9:30 AM   (60 min.)   [id number]   [facility] 11     12     13    RETURN   9:45 AM   (30 min.)   Keegan Lockett MD   [facility] 14        15     16     17    LAB CENTRAL   8:15 AM   (15 min.)   [facility]   [facility]    ONC INFUSION 2 HR (120 MIN)   9:00 AM   (120 min.)   UC ONC INFUSION NURSE   [facility] 18     19     20     21       22     23     24     25     26     27     28       29     30     31    LAB CENTRAL   9:15 AM   (15 min.)   [facility]   [facility]    VIDEO VISIT RETURN   9:45 AM   (45 min.)   Milka Mota PA-C   [facility]    ONC INFUSION 2 HR (120 MIN)  11:00 AM   (120 min.)   UC ONC INFUSION NURSE   [facility]                                   June 2022 Sunday Monday Tuesday Wednesday Thursday Friday Saturday                  1     2     3     4       5     6     7     8     9     10     11       12     13    LAB CENTRAL   9:45 AM   (15 min.)   [facility]   [facility]    RETURN  10:15 AM   (45 min.)   Milka Mota PA-C   [facility]    ONC INFUSION 2 HR (120 MIN)  12:00 PM   (120 min.)   UC ONC INFUSION NURSE   [facility] 14     15     16     17     18       19     20     21     22     23     24    RETURN  10:45 AM   (30 min.)   Keegan Lockett MD   [facility] 25       26     27    LAB CENTRAL   9:15 AM   (15 min.)   [facility]   [facility]    ONC INFUSION 2 HR (120 MIN)  10:00 AM   (120 min.)   UC ONC INFUSION NURSE   [facility] 28     29     30                                  Recent Results (from the past 24 hour(s))   Comprehensive metabolic panel    Collection Time: 05/02/22  8:21 AM   Result Value Ref Range    Sodium 141 133 - 144 mmol/L    Potassium 3.3 (L) 3.4 - 5.3 mmol/L    Chloride 106 94 - 109 mmol/L    Carbon Dioxide (CO2) 26 20 - 32 mmol/L    Anion Gap  9 3 - 14 mmol/L    Urea Nitrogen 8 7 - 30 mg/dL    Creatinine 0.61 0.52 - 1.04 mg/dL    Calcium 9.2 8.5 - 10.1 mg/dL    Glucose 103 (H) 70 - 99 mg/dL    Alkaline Phosphatase 78 40 - 150 U/L    AST 17 0 - 45 U/L    ALT 20 0 - 50 U/L    Protein Total 7.2 6.8 - 8.8 g/dL    Albumin 3.3 (L) 3.4 - 5.0 g/dL    Bilirubin Total 0.4 0.2 - 1.3 mg/dL    GFR Estimate >90 >60 mL/min/1.73m2   CBC with platelets and differential    Collection Time: 05/02/22  8:21 AM   Result Value Ref Range    WBC Count 3.8 (L) 4.0 - 11.0 10e3/uL    RBC Count 3.77 (L) 3.80 - 5.20 10e6/uL    Hemoglobin 11.3 (L) 11.7 - 15.7 g/dL    Hematocrit 35.1 35.0 - 47.0 %    MCV 93 78 - 100 fL    MCH 30.0 26.5 - 33.0 pg    MCHC 32.2 31.5 - 36.5 g/dL    RDW 14.1 10.0 - 15.0 %    Platelet Count 242 150 - 450 10e3/uL    % Neutrophils 71 %    % Lymphocytes 21 %    % Monocytes 6 %    % Eosinophils 1 %    % Basophils 1 %    % Immature Granulocytes 0 %    NRBCs per 100 WBC 0 <1 /100    Absolute Neutrophils 2.7 1.6 - 8.3 10e3/uL    Absolute Lymphocytes 0.8 0.8 - 5.3 10e3/uL    Absolute Monocytes 0.2 0.0 - 1.3 10e3/uL    Absolute Eosinophils 0.1 0.0 - 0.7 10e3/uL    Absolute Basophils 0.0 0.0 - 0.2 10e3/uL    Absolute Immature Granulocytes 0.0 <=0.4 10e3/uL    Absolute NRBCs 0.0 10e3/uL

## 2022-05-02 NOTE — PROGRESS NOTES
Infusion Nursing Note:  Tori Steele presents today for Cycle 11 Day 1 study Encequidar- IDS #3903, study Paclitaxel- IDS #3903 and study Carboplatin- IDS # 3903 (dose #5).    Patient seen by provider today: Yes: Milka Mota PA-C    Note: Patient presents to the infusion center today after her provider appt.     Per Bertha [employer] RN ok to proceed with tx today.    TORB Milka Mota PA-C/Sarina Tran RN 5/2/22 0924  -replace K per protocol    Patient took PO Encequidar at 1017.  Patient starting taking PO Paclitaxel at 1117 and finished taking them at 1150.  Carboplatin infused from 1150 to 1218; under 30 minutes due to under filled bag; 275 mL total bag volume infused via pump.    Intravenous Access:  Implanted Port.    Treatment Conditions:   Latest Reference Range & Units 05/02/22 08:21   Sodium 133 - 144 mmol/L 141   Potassium 3.4 - 5.3 mmol/L 3.3 (L)   Chloride 94 - 109 mmol/L 106   Carbon Dioxide 20 - 32 mmol/L 26   Urea Nitrogen 7 - 30 mg/dL 8   Creatinine 0.52 - 1.04 mg/dL 0.61   GFR Estimate >60 mL/min/1.73m2 >90 [1]   Calcium 8.5 - 10.1 mg/dL 9.2   Anion Gap 3 - 14 mmol/L 9   Albumin 3.4 - 5.0 g/dL 3.3 (L)   Protein Total 6.8 - 8.8 g/dL 7.2   Bilirubin Total 0.2 - 1.3 mg/dL 0.4   Alkaline Phosphatase 40 - 150 U/L 78   ALT 0 - 50 U/L 20   AST 0 - 45 U/L 17   Glucose 70 - 99 mg/dL 103 (H)   WBC 4.0 - 11.0 10e3/uL 3.8 (L)   Hemoglobin 11.7 - 15.7 g/dL 11.3 (L)   Hematocrit 35.0 - 47.0 % 35.1   Platelet Count 150 - 450 10e3/uL 242   RBC Count 3.80 - 5.20 10e6/uL 3.77 (L)   MCV 78 - 100 fL 93   MCH 26.5 - 33.0 pg 30.0   MCHC 31.5 - 36.5 g/dL 32.2   RDW 10.0 - 15.0 % 14.1   % Neutrophils % 71   % Lymphocytes % 21   % Monocytes % 6   % Eosinophils % 1   % Basophils % 1   Absolute Basophils 0.0 - 0.2 10e3/uL 0.0   Absolute Eosinophils 0.0 - 0.7 10e3/uL 0.1   Absolute Immature Granulocytes <=0.4 10e3/uL 0.0   Absolute Lymphocytes 0.8 - 5.3 10e3/uL 0.8   Absolute Monocytes 0.0 - 1.3  10e3/uL 0.2   % Immature Granulocytes % 0   Absolute Neutrophils 1.6 - 8.3 10e3/uL 2.7   Absolute NRBCs 10e3/uL 0.0   NRBCs per 100 WBC <1 /100 0     Results reviewed, labs MET treatment parameters, ok to proceed with treatment.    Post Infusion Assessment:  Patient tolerated infusion without incident.  Blood return noted pre and post infusion.  No evidence of extravasations.  Access discontinued per protocol.     Discharge Plan:   Prescription refills given for Zofran.  Discharge instructions reviewed with: Patient.  Patient and/or family verbalized understanding of discharge instructions and all questions answered.  AVS to patient via [employer].  Patient will return 5/9 for next appointment.   Patient discharged in stable condition accompanied by: self.  Departure Mode: Ambulatory.      Sarina Tran RN

## 2022-05-09 ENCOUNTER — INFUSION THERAPY VISIT (OUTPATIENT)
Dept: ONCOLOGY | Facility: CLINIC | Age: 47
End: 2022-05-09
Attending: PHYSICIAN ASSISTANT
Payer: OTHER GOVERNMENT

## 2022-05-09 ENCOUNTER — ANCILLARY PROCEDURE (OUTPATIENT)
Dept: MRI IMAGING | Facility: CLINIC | Age: 47
End: 2022-05-09
Attending: INTERNAL MEDICINE
Payer: OTHER GOVERNMENT

## 2022-05-09 ENCOUNTER — RESEARCH ENCOUNTER (OUTPATIENT)
Dept: ONCOLOGY | Facility: CLINIC | Age: 47
End: 2022-05-09

## 2022-05-09 ENCOUNTER — APPOINTMENT (OUTPATIENT)
Dept: LAB | Facility: CLINIC | Age: 47
End: 2022-05-09
Attending: PHYSICIAN ASSISTANT
Payer: OTHER GOVERNMENT

## 2022-05-09 VITALS
RESPIRATION RATE: 16 BRPM | BODY MASS INDEX: 24.53 KG/M2 | OXYGEN SATURATION: 100 % | SYSTOLIC BLOOD PRESSURE: 119 MMHG | WEIGHT: 142.9 LBS | TEMPERATURE: 98.4 F | HEART RATE: 115 BPM | DIASTOLIC BLOOD PRESSURE: 73 MMHG

## 2022-05-09 DIAGNOSIS — C50.911 INVASIVE DUCTAL CARCINOMA OF RIGHT BREAST (H): Primary | ICD-10-CM

## 2022-05-09 DIAGNOSIS — C50.911 INVASIVE DUCTAL CARCINOMA OF RIGHT BREAST (H): ICD-10-CM

## 2022-05-09 LAB
ALBUMIN SERPL-MCNC: 3.3 G/DL (ref 3.4–5)
ALP SERPL-CCNC: 72 U/L (ref 40–150)
ALT SERPL W P-5'-P-CCNC: 20 U/L (ref 0–50)
ANION GAP SERPL CALCULATED.3IONS-SCNC: 7 MMOL/L (ref 3–14)
AST SERPL W P-5'-P-CCNC: 18 U/L (ref 0–45)
BASOPHILS # BLD AUTO: 0 10E3/UL (ref 0–0.2)
BASOPHILS NFR BLD AUTO: 2 %
BILIRUB SERPL-MCNC: 0.4 MG/DL (ref 0.2–1.3)
BUN SERPL-MCNC: 7 MG/DL (ref 7–30)
CALCIUM SERPL-MCNC: 9 MG/DL (ref 8.5–10.1)
CHLORIDE BLD-SCNC: 108 MMOL/L (ref 94–109)
CO2 SERPL-SCNC: 26 MMOL/L (ref 20–32)
CREAT SERPL-MCNC: 0.64 MG/DL (ref 0.52–1.04)
EOSINOPHIL # BLD AUTO: 0 10E3/UL (ref 0–0.7)
EOSINOPHIL NFR BLD AUTO: 2 %
ERYTHROCYTE [DISTWIDTH] IN BLOOD BY AUTOMATED COUNT: 14.5 % (ref 10–15)
GFR SERPL CREATININE-BSD FRML MDRD: >90 ML/MIN/1.73M2
GLUCOSE BLD-MCNC: 104 MG/DL (ref 70–99)
HCT VFR BLD AUTO: 32.2 % (ref 35–47)
HGB BLD-MCNC: 10.7 G/DL (ref 11.7–15.7)
IMM GRANULOCYTES # BLD: 0 10E3/UL
IMM GRANULOCYTES NFR BLD: 0 %
LYMPHOCYTES # BLD AUTO: 0.7 10E3/UL (ref 0.8–5.3)
LYMPHOCYTES NFR BLD AUTO: 27 %
MCH RBC QN AUTO: 30.6 PG (ref 26.5–33)
MCHC RBC AUTO-ENTMCNC: 33.2 G/DL (ref 31.5–36.5)
MCV RBC AUTO: 92 FL (ref 78–100)
MONOCYTES # BLD AUTO: 0.1 10E3/UL (ref 0–1.3)
MONOCYTES NFR BLD AUTO: 4 %
NEUTROPHILS # BLD AUTO: 1.6 10E3/UL (ref 1.6–8.3)
NEUTROPHILS NFR BLD AUTO: 65 %
NRBC # BLD AUTO: 0 10E3/UL
NRBC BLD AUTO-RTO: 0 /100
PLATELET # BLD AUTO: 207 10E3/UL (ref 150–450)
POTASSIUM BLD-SCNC: 3.5 MMOL/L (ref 3.4–5.3)
PROT SERPL-MCNC: 7.4 G/DL (ref 6.8–8.8)
RBC # BLD AUTO: 3.5 10E6/UL (ref 3.8–5.2)
SODIUM SERPL-SCNC: 141 MMOL/L (ref 133–144)
WBC # BLD AUTO: 2.5 10E3/UL (ref 4–11)

## 2022-05-09 PROCEDURE — 96413 CHEMO IV INFUSION 1 HR: CPT

## 2022-05-09 PROCEDURE — 250N000011 HC RX IP 250 OP 636: Performed by: PHYSICIAN ASSISTANT

## 2022-05-09 PROCEDURE — 96367 TX/PROPH/DG ADDL SEQ IV INF: CPT

## 2022-05-09 PROCEDURE — 96375 TX/PRO/DX INJ NEW DRUG ADDON: CPT

## 2022-05-09 PROCEDURE — 85025 COMPLETE CBC W/AUTO DIFF WBC: CPT | Performed by: PHYSICIAN ASSISTANT

## 2022-05-09 PROCEDURE — G0463 HOSPITAL OUTPT CLINIC VISIT: HCPCS

## 2022-05-09 PROCEDURE — 80053 COMPREHEN METABOLIC PANEL: CPT | Performed by: PHYSICIAN ASSISTANT

## 2022-05-09 PROCEDURE — 258N000003 HC RX IP 258 OP 636: Performed by: PHYSICIAN ASSISTANT

## 2022-05-09 PROCEDURE — 99215 OFFICE O/P EST HI 40 MIN: CPT | Performed by: PHYSICIAN ASSISTANT

## 2022-05-09 RX ORDER — HEPARIN SODIUM (PORCINE) LOCK FLUSH IV SOLN 100 UNIT/ML 100 UNIT/ML
5 SOLUTION INTRAVENOUS
Status: CANCELLED | OUTPATIENT
Start: 2022-05-09

## 2022-05-09 RX ORDER — DIPHENHYDRAMINE HYDROCHLORIDE 50 MG/ML
50 INJECTION INTRAMUSCULAR; INTRAVENOUS
Status: CANCELLED
Start: 2022-05-09

## 2022-05-09 RX ORDER — NALOXONE HYDROCHLORIDE 0.4 MG/ML
0.2 INJECTION, SOLUTION INTRAMUSCULAR; INTRAVENOUS; SUBCUTANEOUS
Status: CANCELLED | OUTPATIENT
Start: 2022-05-09

## 2022-05-09 RX ORDER — MEPERIDINE HYDROCHLORIDE 25 MG/ML
25 INJECTION INTRAMUSCULAR; INTRAVENOUS; SUBCUTANEOUS EVERY 30 MIN PRN
Status: CANCELLED | OUTPATIENT
Start: 2022-05-09

## 2022-05-09 RX ORDER — ALBUTEROL SULFATE 0.83 MG/ML
2.5 SOLUTION RESPIRATORY (INHALATION)
Status: CANCELLED | OUTPATIENT
Start: 2022-05-09

## 2022-05-09 RX ORDER — HEPARIN SODIUM (PORCINE) LOCK FLUSH IV SOLN 100 UNIT/ML 100 UNIT/ML
5 SOLUTION INTRAVENOUS ONCE
Status: COMPLETED | OUTPATIENT
Start: 2022-05-09 | End: 2022-05-09

## 2022-05-09 RX ORDER — DEXAMETHASONE SODIUM PHOSPHATE 10 MG/ML
10 INJECTION, SOLUTION INTRAMUSCULAR; INTRAVENOUS
Status: CANCELLED
Start: 2022-05-09

## 2022-05-09 RX ORDER — HEPARIN SODIUM,PORCINE 10 UNIT/ML
5 VIAL (ML) INTRAVENOUS
Status: CANCELLED | OUTPATIENT
Start: 2022-05-09

## 2022-05-09 RX ORDER — METHYLPREDNISOLONE SODIUM SUCCINATE 125 MG/2ML
125 INJECTION, POWDER, LYOPHILIZED, FOR SOLUTION INTRAMUSCULAR; INTRAVENOUS
Status: CANCELLED
Start: 2022-05-09

## 2022-05-09 RX ORDER — GADOBUTROL 604.72 MG/ML
7.5 INJECTION INTRAVENOUS ONCE
Status: COMPLETED | OUTPATIENT
Start: 2022-05-09 | End: 2022-05-09

## 2022-05-09 RX ORDER — HEPARIN SODIUM (PORCINE) LOCK FLUSH IV SOLN 100 UNIT/ML 100 UNIT/ML
5 SOLUTION INTRAVENOUS
Status: DISCONTINUED | OUTPATIENT
Start: 2022-05-09 | End: 2022-05-09 | Stop reason: HOSPADM

## 2022-05-09 RX ORDER — EPINEPHRINE 1 MG/ML
0.3 INJECTION, SOLUTION INTRAMUSCULAR; SUBCUTANEOUS EVERY 5 MIN PRN
Status: CANCELLED | OUTPATIENT
Start: 2022-05-09

## 2022-05-09 RX ORDER — LORAZEPAM 2 MG/ML
0.5 INJECTION INTRAMUSCULAR EVERY 4 HOURS PRN
Status: DISCONTINUED | OUTPATIENT
Start: 2022-05-09 | End: 2022-05-09 | Stop reason: HOSPADM

## 2022-05-09 RX ORDER — ALBUTEROL SULFATE 90 UG/1
1-2 AEROSOL, METERED RESPIRATORY (INHALATION)
Status: CANCELLED
Start: 2022-05-09

## 2022-05-09 RX ORDER — LORAZEPAM 2 MG/ML
0.5 INJECTION INTRAMUSCULAR EVERY 4 HOURS PRN
Status: CANCELLED | OUTPATIENT
Start: 2022-05-09

## 2022-05-09 RX ADMIN — LORAZEPAM 0.5 MG: 2 INJECTION INTRAMUSCULAR; INTRAVENOUS at 12:27

## 2022-05-09 RX ADMIN — FOSAPREPITANT 150 MG: 150 INJECTION, POWDER, LYOPHILIZED, FOR SOLUTION INTRAVENOUS at 11:32

## 2022-05-09 RX ADMIN — Medication 5 ML: at 14:26

## 2022-05-09 RX ADMIN — Medication 5 ML: at 08:20

## 2022-05-09 RX ADMIN — SODIUM CHLORIDE 250 ML: 9 INJECTION, SOLUTION INTRAVENOUS at 11:32

## 2022-05-09 RX ADMIN — GADOBUTROL 6.5 ML: 604.72 INJECTION INTRAVENOUS at 15:55

## 2022-05-09 ASSESSMENT — PAIN SCALES - GENERAL: PAINLEVEL: NO PAIN (0)

## 2022-05-09 NOTE — PROGRESS NOTES
Infusion Nursing Note:  Tori Steele presents today for cycle 12 day 1 study PO encequidar (IDS#3903), study PO taxol (IDS#3903), study carboplatin (#6) (IDS#3903).    Patient seen by provider today: Yes: Milka Mota PA-C   present during visit today: Not Applicable.    Note:   Per Miriam addison RN ok to proceed, Janette signed treatment orders.    Patient has no questions or concerns after her appointment with Milka Mota.    PO encequidar taken at 1126.   Patient vomited encequidar just after taking, patient saw entire pill. Milka Mota notified, Miriam addison RN notified.  Per Janette - try IV ativan prior to PO taxol.    Per Miriam addison RN: no re-dose necessary for encequidar    IV ativan given at 1230.    PO taxol started taking at 1245, finished taking at 1348.  Carboplatin start time: 1355. End time 1426.    Intravenous Access:  Implanted Port.    Treatment Conditions:  Lab Results   Component Value Date    HGB 10.7 (L) 05/09/2022    WBC 2.5 (L) 05/09/2022    ANEU 1.2 (L) 04/11/2022    ANEUTAUTO 1.6 05/09/2022     05/09/2022      Lab Results   Component Value Date     05/09/2022    POTASSIUM 3.5 05/09/2022    MAG 1.7 (L) 02/07/2022    CR 0.64 05/09/2022    JENNIE 9.0 05/09/2022    BILITOTAL 0.4 05/09/2022    ALBUMIN 3.3 (L) 05/09/2022    ALT 20 05/09/2022    AST 18 05/09/2022     Results reviewed, labs MET treatment parameters, ok to proceed with treatment.      Post Infusion Assessment:  Patient tolerated infusion without incident.  Blood return noted pre and post infusion.  Site patent and intact, free from redness, edema or discomfort.  No evidence of extravasations.  Access discontinued per protocol.       Discharge Plan:   Patient declined prescription refills.  Discharge instructions reviewed with: Patient.  Patient and/or family verbalized understanding of discharge instructions and all questions answered.  AVS to patient via [employer].  Patient will return  5/17 for next appointment.   Patient discharged in stable condition accompanied by: self.  Departure Mode: Ambulatory.      Angelita Morris RN

## 2022-05-09 NOTE — NURSING NOTE
"Chief Complaint   Patient presents with     Port Draw     Labs drawn via port by RN. VS taken.     Port accessed with 20g 3/4\" gripper needle and labs drawn by rn.  Port flushed with NS and heparin.  Pt tolerated well.  VS taken.  Pt checked in for next appt.    Elliott Angulo RN  "

## 2022-05-09 NOTE — NURSING NOTE
[id number]: Study Visit Note   Subject name: Tori Steele     Visit: C12D1    Did the study visit occur within the appropriate window allowed by the protocol? yes    Since the last study visit, She has been doing fair. She struggled with n/v and diarrhea on occasion this past week. Diarrhea started on 4/25 and lasted through the rest of the week. She vomited on Tuesday 4/26 and Wednesday 4/27. She said that she would try taking her zofran about an hour before her pills this week and try some other nonpharmacologic measures to reduce nausea.     I have personally interviewed Tori Steele and reviewed her medical record for adverse events and concomitant medications and these have been recorded on the corresponding logs in Tori Steele's research file.     Tori Steele was given the opportunity to ask any trial related questions.  Please see provider progress note for physical exam and other clinical information. Labs were reviewed - any significant lab values were addressed and reviewed.      CHARISMA Babb, RN  CRC-RN,   Pager: [phone]      [id number]: Medication Count/IDS Note      Tori Steele is enrolled on the trial number listed above. The patient presented for her C12D1 day visit.   IDS number: 3903  Drug name: Encequidar  Number of boxes returned: 3  Lot number(s): [id number]  Number of pills returned: 0      Number of boxes dispensed:3  Lot number(s):  Number of pills dispensed: 3    Drug diary returned? yes    Are there any discrepancies between the amount of medication the patient was instructed to take and the amount recorded as taken in the patient s drug diary?  No    Are there any discrepancies between the amount of medication the patient has recorded as taken in the drug diary and the amount that would be expected to be returned based on the amount recorded as taken?  no    Miriam Lantigua RN    Form 504.00.01 (Version 1)     Effective date: 01JUL2018     Next Review  Date: 01JUL2020 [id number]: Medication Count/IDS Note      Tori Steele is enrolled on the trial number listed above. The patient presented for her C12D1 day visit.   IDS number: 3903  Drug name: Paclitaxel  Number of boxes returned: 3  Lot number(s): [id number]  Number of pills returned:   5      Number of boxes dispensed:3  Lot number(s):  Number of pills dispensed: 36    Drug diary returned? yes    Are there any discrepancies between the amount of medication the patient was instructed to take and the amount recorded as taken in the patient s drug diary?  yes    If yes, provide amount and reason for the discrepancy.  Pt had emesis on 4/26 and 4/27 with pills remaining.  Are there any discrepancies between the amount of medication the patient has recorded as taken in the drug diary and the amount that would be expected to be returned based on the amount recorded as taken?  no    Miriam Lantigua RN    Form 504.00.01 (Version 1)     Effective date: 01JUL2018     Next Review Date: 01JUL2020

## 2022-05-09 NOTE — PATIENT INSTRUCTIONS
Nausea Regimen with AC    You will receive IV Aloxi, Dexamethasone, and Emend as premedications before chemo. These are long-acting and will last in your body for 3 days. You cannot take Zofran while IV Aloxi is in your body. So no zofran days 1-3.    Evening of chemo:  Ativan 1-2 tablets before bed    Day 2: (day after chemo)  Compazine when you wake up then take every 6 hours during the day  Dexamethasone 2 tablets with breakfast  Ativan at bedtime    Day 3:  Same as day 2    Day 4:  Take Zofran when you wake up then every 8  Dexamethasone 2 tablets with breakfast (last day of this)  Compazine as needed for nausea during the day  Ativan at bedtime    Day 5:  Zofran when you wake up then every 8 hours during the day  Ativan at bedtime  Compazine as needed for nausea during the day    For Neulasta bone pain prevention:  Take Claritin (loratadine is an alternate name) 10 mg the evening prior to the shot (same evening as chemo), the evening of the shot, and the evening following the shot.     Constipation  Miralax daily or Senna-s    Heartburn  Pepcid twice a day   OK for prilosec    Fatigue  Severe days 3-7     Call if fever is 100.4 or higher    3L or more of hydration daily first few days, then OK to cut back to 2L daily

## 2022-05-09 NOTE — PATIENT INSTRUCTIONS
[facility] Triage and after hours / weekends / holidays:  [phone]    Please call the triage or after hours line if you experience a temperature greater than or equal to 100.5, shaking chills, have uncontrolled nausea, vomiting and/or diarrhea, dizziness, shortness of breath, chest pain, bleeding, unexplained bruising, or if you have any other new/concerning symptoms, questions or concerns.      If you are having any concerning symptoms or wish to speak to a provider before your next infusion visit, please call your care coordinator or triage to notify them so we can adequately serve you.     If you need a refill on a narcotic prescription or other medication, please call before your infusion appointment.        May 2022      Luis F Monday Tuesday Wednesday Thursday Friday Saturday   1     2    LAB CENTRAL   7:45 AM   (15 min.)   [facility] DRAW   [facility]    RETURN   8:15 AM   (45 min.)   Milka Mota PA-C   [facility]    ONC INFUSION 2 HR (120 MIN)  10:00 AM   (120 min.)    ONC INFUSION NURSE   [facility] 3     4     5     6     7       8     9    LAB CENTRAL   7:45 AM   (15 min.)   [facility] DRAW   [facility]    RETURN   8:15 AM   (45 min.)   Milka Mota PA-C   [facility]    ONC INFUSION 2 HR (120 MIN)  10:00 AM   (120 min.)    ONC INFUSION NURSE   [facility]    MR BREAST BILATERAL WWO   1:30 PM   (60 min.)   [id number]   [facility] 10    US BREAST BX CORE NEEDLE RIGHT   9:30 AM   (60 min.)   [id number]   [facility] [place] 11     12     13    RETURN   9:45 AM   (30 min.)   Keegan Lockett MD   [facility] 14       15     16     17    LAB CENTRAL   8:15 AM   (15 min.)   [facility] DRAW   [facility]    ONC INFUSION 2 HR (120 MIN)   9:00 AM   (120 min.)   UC ONC INFUSION NURSE   [facility] 18     19     20     21       22     23     24     25     26     27     28       29     30     31    LAB CENTRAL   9:15 AM   (15 min.)    [facility]   [facility]    VIDEO VISIT RETURN   9:45 AM   (45 min.)   Milka Mota PA-C   [facility]    ONC INFUSION 2 HR (120 MIN)  11:00 AM   (120 min.)   UC ONC INFUSION NURSE   [facility]                                   June 2022 Sunday Monday Tuesday Wednesday Thursday Friday Saturday                  1     2     3     4       5     6     7     8     9     10     11       12     13    LAB CENTRAL   9:45 AM   (15 min.)   [facility]   [facility]    RETURN  10:15 AM   (45 min.)   Milka Mota PA-C   [facility]    ONC INFUSION 2 HR (120 MIN)  12:00 PM   (120 min.)   UC ONC INFUSION NURSE   [facility] 14     15     16     17     18       19     20     21     22     23     24    RETURN  10:45 AM   (30 min.)   Keegan Lockett MD   [facility] 25       26     27    LAB CENTRAL   9:15 AM   (15 min.)    [facility]   [facility]    ONC INFUSION 2 HR (120 MIN)  10:00 AM   (120 min.)    ONC INFUSION NURSE   [facility] 28     29     30                                  Recent Results (from the past 24 hour(s))   Comprehensive metabolic panel    Collection Time: 05/09/22  8:27 AM   Result Value Ref Range    Sodium 141 133 - 144 mmol/L    Potassium 3.5 3.4 - 5.3 mmol/L    Chloride 108 94 - 109 mmol/L    Carbon Dioxide (CO2) 26 20 - 32 mmol/L    Anion Gap 7 3 - 14 mmol/L    Urea Nitrogen 7 7 - 30 mg/dL    Creatinine 0.64 0.52 - 1.04 mg/dL    Calcium 9.0 8.5 - 10.1 mg/dL     Glucose 104 (H) 70 - 99 mg/dL    Alkaline Phosphatase 72 40 - 150 U/L    AST 18 0 - 45 U/L    ALT 20 0 - 50 U/L    Protein Total 7.4 6.8 - 8.8 g/dL    Albumin 3.3 (L) 3.4 - 5.0 g/dL    Bilirubin Total 0.4 0.2 - 1.3 mg/dL    GFR Estimate >90 >60 mL/min/1.73m2   CBC with platelets and differential    Collection Time: 05/09/22  8:27 AM   Result Value Ref Range    WBC Count 2.5 (L) 4.0 - 11.0 10e3/uL    RBC Count 3.50 (L) 3.80 - 5.20 10e6/uL    Hemoglobin 10.7 (L) 11.7 - 15.7 g/dL    Hematocrit 32.2 (L) 35.0 - 47.0 %    MCV 92 78 - 100 fL    MCH 30.6 26.5 - 33.0 pg    MCHC 33.2 31.5 - 36.5 g/dL    RDW 14.5 10.0 - 15.0 %    Platelet Count 207 150 - 450 10e3/uL    % Neutrophils 65 %    % Lymphocytes 27 %    % Monocytes 4 %    % Eosinophils 2 %    % Basophils 2 %    % Immature Granulocytes 0 %    NRBCs per 100 WBC 0 <1 /100    Absolute Neutrophils 1.6 1.6 - 8.3 10e3/uL    Absolute Lymphocytes 0.7 (L) 0.8 - 5.3 10e3/uL    Absolute Monocytes 0.1 0.0 - 1.3 10e3/uL    Absolute Eosinophils 0.0 0.0 - 0.7 10e3/uL    Absolute Basophils 0.0 0.0 - 0.2 10e3/uL    Absolute Immature Granulocytes 0.0 <=0.4 10e3/uL    Absolute NRBCs 0.0 10e3/uL

## 2022-05-09 NOTE — NURSING NOTE
"Oncology Rooming Note    May 9, 2022 8:42 AM   Tori Steele is a 46 year old female who presents for:    Chief Complaint   Patient presents with     Port Draw     Labs drawn via port by RN. VS taken.     Oncology Clinic Visit     Invasive ductal carcinoma of right breast (H) (Primary Dx)      Initial Vitals: /73   Pulse 115   Temp 98.4  F (36.9  C) (Oral)   Resp 16   Wt 64.8 kg (142 lb 14.4 oz)   LMP  (LMP Unknown)   SpO2 100%   BMI 24.53 kg/m   Estimated body mass index is 24.53 kg/m  as calculated from the following:    Height as of 3/4/22: 1.626 m (5' 4\").    Weight as of this encounter: 64.8 kg (142 lb 14.4 oz). Body surface area is 1.71 meters squared.  No Pain (0) Comment: Data Unavailable   No LMP recorded (lmp unknown). (Menstrual status: Chemotherapy).  Allergies reviewed: Yes  Medications reviewed: Yes    Medications: Medication refills not needed today.  Pharmacy name entered into [facility]:    [id number] [place], [place] - [address] [facility] [place] [place] - [phone]    Clinical concerns: No new concerns.       Vicki Madrid CMA            "

## 2022-05-09 NOTE — PROGRESS NOTES
Oncology/Hematology Visit Note  May 9, 2022    Reason for Visit: follow up of breast cancer     History of Present Illness: Tori Steele is a 46 year old female with breat cancer. Right-sided 5.5 cm ER positive AL positive HER-2 negative breast cancer with associated DCIS.  Her MammaPrint came back as high risk.  She consented for the I-SPY clinical trial and has been randomized to the oral paclitaxel, Encequidar and dostarlimab arm.      2/21/22 started trial with oral paclitaxel, Encequidar and dostarlimab.    3/15/22  her IV immunotherapy dostarlimab was held due to diarrhea that recovered with Imodium and time  4/4/22   Carbo was added in weekly due in hopes for more significant reduction in cancer     Interval History:  Tori is here today for week 12 of treatment.  Seen in person with her .  Tori states that last week she had substantial gag reflex and multiple nausea/vomiting. Tori states that the episodes are triggered by her oral taxol at home. She feels as though at least 90% of her nausea/vomiting is anxiety provoked due to the number of oral pills on her regimen at home. Tori has tried multiple behavioral and environmental changes in efforts to combat the psychological component.     Unfortunately, she continued to have several episodes of vomiting last week. For nausea, she continues to use zofran. Tori also endorses diarrhea and loose stools days with taxol and manages with immodium. Continues to have steady fatigue, taking a daily nap. Tries to stay awake and be present for her two children.     Continues note a dry cough over the last week, last week she had PND, this has improved.  Cough continues to improve. No chest pain or shortness of breath.     No headaches or double or blurry vision. No dizziness.     Weight is down ~5 pounds today. Has not been able to keep food down due to nausea. Continues to hydrate well at home.     No numbness or tingling of fingers or toes.  No rash or itching.     Tori and her  inquire about the next steps of her treatment plan and likely AC as a next step.     Patient seen in conjunction with study RN, Miriam.     ROS:  Patient denies the following: fevers, body aches, chills, headaches, vision changes, dizziness, chest pain, shortness of breath, nausea, vomiting, diarrhea, constipation, abdominal pain, rashes, bruising/bleeding, mouth sores, swelling or pain in the legs.        Current Outpatient Prescriptions               Current Outpatient Medications   Medication Sig Dispense Refill     ferrous sulfate (FEROSUL) 325 (65 Fe) MG tablet Take 325 mg by mouth         lidocaine-prilocaine (EMLA) 2.5-2.5 % external cream Apply to port site one hour prior to access may start using six days after port placement. 30 g 1     ondansetron (ZOFRAN) 8 MG tablet Take 1 tablet (8 mg) by mouth every 8 hours as needed for nausea 30 tablet 0     prochlorperazine (COMPAZINE) 10 MG tablet Take 1 tablet (10 mg) by mouth every 6 hours as needed (Nausea/Vomiting) 30 tablet 2     riboflavin (VITAMIN  B2) 25 MG TABS                  Physical Examination: ECOG 1    /73   Pulse 115   Temp 98.4  F (36.9  C) (Oral)   Resp 16   Wt 64.8 kg (142 lb 14.4 oz)   LMP  (LMP Unknown)   SpO2 100%   BMI 24.53 kg/m      Wt Readings from Last 4 Encounters:   22 64.8 kg (142 lb 14.4 oz)   22 67 kg (147 lb 9.6 oz)   22 67.7 kg (149 lb 3.2 oz)   22 68.7 kg (151 lb 6.4 oz)         ECO  General: Sitting comfortably in no acute distress  Head: Normocephalic, atraumatic   EENT: No scleral icteris, EOMS intact. Mucous membranes are moist, no mucositis or oral lesions  Lymph: No palpable cervical, supraclavicular, or axillary lymphadenopathy   Heart: Regular rate and rhythm, no murmurs  Lung: Normal respiratory effort. Clear to auscultation bilaterally. No wheezes, rhonchi, or crackles. No coughing throughout entire visit   Abdomen: +Bowel  Sounds, soft nontender to palpation. No rebound, guarding or rigidity. No palpable masses  Neuro: AAO x3. Gait is steady. Moving all extremities   Extremities: No lower extremity edema  Skin: Warm, dry, intact. No rashes, no suspicious lesions. No petechia or bruising noted  Breast: Right breast, 12 o'clock, mass is 2.5 cm vertically x 2.5 cm horizontally. No overlying skin changes.        Laboratory Data:  Most Recent 3 CBC's:  Recent Labs   Lab Test 05/09/22  0827 05/02/22 0821 04/25/22  1125   WBC 2.5* 3.8* 4.5   HGB 10.7* 11.3* 11.3*   MCV 92 93 92    242 293    Most Recent 3 BMP's:  Recent Labs   Lab Test 05/09/22 0827 05/02/22 0821 04/25/22  1125    141 140   POTASSIUM 3.5 3.3* 3.6   CHLORIDE 108 106 104   CO2 26 26 28   BUN 7 8 9   CR 0.64 0.61 0.64   ANIONGAP 7 9 8   JENNIE 9.0 9.2 9.1   * 103* 127*    Most Recent 2 LFT's:  Recent Labs   Lab Test 05/09/22 0827 05/02/22 0821   AST 18 17   ALT 20 20   ALKPHOS 72 78   BILITOTAL 0.4 0.4      I reviewed the above labs today.       Assessment and Plan:  1. Right sided ER+, NM+, HER2-negative, MammaPrint high risk T=5.5cm, N=0 breast cancer.  Tori had her 3/15/22 Dostarlimab held due to diarrhea, resumed 4/4.  Her 6 week MRI showed improvement but with suggestion of adding in an additional agent.  Carbo added in weekly starting 4/4/22. Fatigue, nausea and leukopenia noted.      Labs and exam are appropriate to proceed with week 12 today.      Her plan includes chemotherapy now includes weekly Carbo (added in 4/4/22) with IV Emend as pre-medications,  oral paclitaxel, Encequidar and IV dostarlimab (q3w) x12 weeks followed by AC.      - Tori has vomited several times this past week. It is unclear how much of her pacitaxel she was able to complete at home for this cycle. Anxiety is a major trigger. Discussed moving her pacitaxel box out of sight and trying distraction techniques. Trial of tart drinks and accupressure bracelet.     -  Completed in depth AC teaching today, answered all questions pertaining.      - She is also scheduled to see genetics in April.  After surgery she may need radiation and then she will need adjuvant hormonal therapy. Reviewed potential next steps with Tori and her .      - Continue weekly follow up     2.  Anemia.  Patient's hemoglobin was 11.6 the day of starting treatment.  She has a history of iron deficiency anemia.  Iron stores are low.  Will have her take oral iron M, W and F each week.  Hgb had improved but then reduced again with carbo. Today 10.7, continue to monitor      3.  GI:   Nausea getting weekly Emend.  Continue oral Zofran q8 hours days 1-4, then taper off on weekend. Recommend trying to take Zofran ~1 hour prior to oral chemo.     Diarrhea, mildly increased over this past week. Remains hydration status. Continue with immodium as needed.     4.  Anxiety  Increased anxiety with upcoming scans and biopsy. Significantly impacting her ability to take her oral pacitaxel.     Recommend ativan to be taken in the morning prior to oral chemo. Discussed side effects. Tori has been hesitant potentially fatigue inducing medications. She is in agreement to trial ativan to finish week 12 of her cycle.     5. Cough  - Dry occasional cough with associated PND. Continues to improve.      Patient will call in the interim with any questions or concerns. They voice understanding and agree with the above plan.       Tiffanie Peterson PA-C    75 minutes spent on the date of the encounter doing chart review, review of test results, interpretation of tests, patient visit, documentation and discussion with family       Addendum     Per infusion, patient vomited with encequidar while in the infusion room. Will not proceed with another dose at this time.      I saw patient and performed the ROS and exam myself.  The assessment and plan were mutually discussed and this note was edited to reflect my findings.  Janette  Svetlana MEZA

## 2022-05-10 ENCOUNTER — ANCILLARY PROCEDURE (OUTPATIENT)
Dept: MAMMOGRAPHY | Facility: CLINIC | Age: 47
End: 2022-05-10
Attending: INTERNAL MEDICINE
Payer: OTHER GOVERNMENT

## 2022-05-10 DIAGNOSIS — Z17.0 MALIGNANT NEOPLASM OF CENTRAL PORTION OF RIGHT BREAST IN FEMALE, ESTROGEN RECEPTOR POSITIVE (H): ICD-10-CM

## 2022-05-10 DIAGNOSIS — C50.111 MALIGNANT NEOPLASM OF CENTRAL PORTION OF RIGHT BREAST IN FEMALE, ESTROGEN RECEPTOR POSITIVE (H): ICD-10-CM

## 2022-05-10 PROCEDURE — 19083 BX BREAST 1ST LESION US IMAG: CPT | Mod: RT | Performed by: RADIOLOGY

## 2022-05-10 PROCEDURE — 88305 TISSUE EXAM BY PATHOLOGIST: CPT | Mod: TC | Performed by: INTERNAL MEDICINE

## 2022-05-10 PROCEDURE — 88305 TISSUE EXAM BY PATHOLOGIST: CPT | Mod: 26 | Performed by: PATHOLOGY

## 2022-05-11 ENCOUNTER — TELEPHONE (OUTPATIENT)
Dept: MAMMOGRAPHY | Facility: CLINIC | Age: 47
End: 2022-05-11
Payer: OTHER GOVERNMENT

## 2022-05-11 LAB
PATH REPORT.COMMENTS IMP SPEC: NORMAL
PATH REPORT.FINAL DX SPEC: NORMAL
PATH REPORT.GROSS SPEC: NORMAL
PATH REPORT.MICROSCOPIC SPEC OTHER STN: NORMAL
PATH REPORT.RELEVANT HX SPEC: NORMAL
PHOTO IMAGE: NORMAL

## 2022-05-11 NOTE — TELEPHONE ENCOUNTER
Spoke to patient regarding Right breast biopsy done on 5/10/22 with finding of Invasive breast carcinoma . Notified patient that the Radiologist recommendation is Continued oncologic consultation. Patient verbalized understanding and all questions and concerns answered to patients satisfaction.

## 2022-05-13 ENCOUNTER — ONCOLOGY VISIT (OUTPATIENT)
Dept: ONCOLOGY | Facility: CLINIC | Age: 47
End: 2022-05-13
Attending: INTERNAL MEDICINE
Payer: OTHER GOVERNMENT

## 2022-05-13 VITALS
RESPIRATION RATE: 16 BRPM | OXYGEN SATURATION: 100 % | SYSTOLIC BLOOD PRESSURE: 116 MMHG | WEIGHT: 141.7 LBS | HEART RATE: 117 BPM | BODY MASS INDEX: 24.32 KG/M2 | TEMPERATURE: 98.4 F | DIASTOLIC BLOOD PRESSURE: 79 MMHG

## 2022-05-13 DIAGNOSIS — C50.911 INVASIVE DUCTAL CARCINOMA OF RIGHT BREAST (H): Primary | ICD-10-CM

## 2022-05-13 PROCEDURE — 99214 OFFICE O/P EST MOD 30 MIN: CPT | Performed by: INTERNAL MEDICINE

## 2022-05-13 PROCEDURE — G0463 HOSPITAL OUTPT CLINIC VISIT: HCPCS

## 2022-05-13 RX ORDER — DIPHENHYDRAMINE HYDROCHLORIDE 50 MG/ML
50 INJECTION INTRAMUSCULAR; INTRAVENOUS
Status: CANCELLED
Start: 2022-05-16

## 2022-05-13 RX ORDER — LORAZEPAM 2 MG/ML
0.5 INJECTION INTRAMUSCULAR EVERY 4 HOURS PRN
Status: CANCELLED | OUTPATIENT
Start: 2022-05-16

## 2022-05-13 RX ORDER — NALOXONE HYDROCHLORIDE 0.4 MG/ML
0.2 INJECTION, SOLUTION INTRAMUSCULAR; INTRAVENOUS; SUBCUTANEOUS
Status: CANCELLED | OUTPATIENT
Start: 2022-05-16

## 2022-05-13 RX ORDER — EPINEPHRINE 1 MG/ML
0.3 INJECTION, SOLUTION INTRAMUSCULAR; SUBCUTANEOUS EVERY 5 MIN PRN
Status: CANCELLED | OUTPATIENT
Start: 2022-05-16

## 2022-05-13 RX ORDER — HEPARIN SODIUM (PORCINE) LOCK FLUSH IV SOLN 100 UNIT/ML 100 UNIT/ML
5 SOLUTION INTRAVENOUS
Status: CANCELLED | OUTPATIENT
Start: 2022-05-16

## 2022-05-13 RX ORDER — METHYLPREDNISOLONE SODIUM SUCCINATE 125 MG/2ML
125 INJECTION, POWDER, LYOPHILIZED, FOR SOLUTION INTRAMUSCULAR; INTRAVENOUS
Status: CANCELLED
Start: 2022-05-16

## 2022-05-13 RX ORDER — MEPERIDINE HYDROCHLORIDE 25 MG/ML
25 INJECTION INTRAMUSCULAR; INTRAVENOUS; SUBCUTANEOUS EVERY 30 MIN PRN
Status: CANCELLED | OUTPATIENT
Start: 2022-05-16

## 2022-05-13 RX ORDER — HEPARIN SODIUM,PORCINE 10 UNIT/ML
5 VIAL (ML) INTRAVENOUS
Status: CANCELLED | OUTPATIENT
Start: 2022-05-16

## 2022-05-13 RX ORDER — DOXORUBICIN HYDROCHLORIDE 2 MG/ML
60 INJECTION, SOLUTION INTRAVENOUS ONCE
Status: CANCELLED | OUTPATIENT
Start: 2022-05-16

## 2022-05-13 RX ORDER — ALBUTEROL SULFATE 90 UG/1
1-2 AEROSOL, METERED RESPIRATORY (INHALATION)
Status: CANCELLED
Start: 2022-05-16

## 2022-05-13 RX ORDER — ALBUTEROL SULFATE 0.83 MG/ML
2.5 SOLUTION RESPIRATORY (INHALATION)
Status: CANCELLED | OUTPATIENT
Start: 2022-05-16

## 2022-05-13 ASSESSMENT — PAIN SCALES - GENERAL: PAINLEVEL: NO PAIN (0)

## 2022-05-13 NOTE — LETTER
5/13/2022         RE: Tori Steele  [address] [facility] [address]        Dear Colleague,    Thank you for referring your patient, Tori Steele, to the [facility]. Please see a copy of my visit note below.    EMESIS WORSE WITH PILLS  LOST WEIGHT  HUNGRY       Oncology/Hematology Visit Note  May 13, 2022    Reason for Visit: follow up of breast cancer     History of Present Illness: Tori Steele is a 46 year old female with breat cancer. Right-sided 5.5 cm ER positive PA positive HER-2 negative breast cancer with associated DCIS.  Her MammaPrint came back as high risk.  She consented for the I-SPY clinical trial and has been randomized to the oral paclitaxel, Encequidar and dostarlimab arm.      2/21/22 started trial with oral paclitaxel, Encequidar and dostarlimab.    3/15/22  her IV immunotherapy dostarlimab was held due to diarrhea that recovered with Imodium and time  4/4/22   Carbo was added in weekly due in hopes for more significant reduction in cancer  5/9/22 - Completed first 12 weeks of I-SPY2 - oral paclitaxel, Encequidar, dostarlimab, with Carbo added last 6 cycles     Interval History:  Tori is here after her 12 week treatment.  As noted by Ms. Mota's last note she has significant nausea and vomiting with her Encequidar and oral paclitaxel.  She thinks this is related to the number of pills and has been made much worse with the addition of carboplatin.  She states she is hungry and her stomach is growling but she cannot find the right food that is not associated with nausea.  However both she and her  state that over the last day or so things have gotten better and the further she gets from her treatment the better it is.  We talked about what she thinks the cause of this is and it is probably the number of pills that she has to take.  I expressed to her that this is a concern we all have and have brought this up with the  .    She had her week 12 evaluations and unfortunately both her MRI and biopsy demonstrates that she has not achieved a pathologic complete response.  We will proceed to the AC portion of the regimen.  She is aware that this was the likely possibility.  Today we went over her MRI scans.    Otherwise she feels reasonably well.  She is having a birthday party for her daughter this weekend and hopes to feel well enough to be able to eat.  Of note the visit her mother came in from [place] to help with this.    No headaches or double or blurry vision. No dizziness. She has lost weight    ROS:  Patient denies the following: fevers, body aches, chills, headaches, vision changes, dizziness, chest pain, shortness of breath, nausea, vomiting, diarrhea, constipation, abdominal pain, rashes, bruising/bleeding, mouth sores, swelling or pain in the legs.        Current Outpatient Prescriptions               Current Outpatient Medications   Medication Sig Dispense Refill     ferrous sulfate (FEROSUL) 325 (65 Fe) MG tablet Take 325 mg by mouth         lidocaine-prilocaine (EMLA) 2.5-2.5 % external cream Apply to port site one hour prior to access may start using six days after port placement. 30 g 1     ondansetron (ZOFRAN) 8 MG tablet Take 1 tablet (8 mg) by mouth every 8 hours as needed for nausea 30 tablet 0     prochlorperazine (COMPAZINE) 10 MG tablet Take 1 tablet (10 mg) by mouth every 6 hours as needed (Nausea/Vomiting) 30 tablet 2     riboflavin (VITAMIN  B2) 25 MG TABS                  Physical Examination: ECOG 1    /79   Pulse 117   Temp 98.4  F (36.9  C) (Oral)   Resp 16   Wt 64.3 kg (141 lb 11.2 oz)   LMP  (LMP Unknown)   SpO2 100%   BMI 24.32 kg/m      Wt Readings from Last 4 Encounters:   22 64.3 kg (141 lb 11.2 oz)   22 64.8 kg (142 lb 14.4 oz)   22 67 kg (147 lb 9.6 oz)   22 67.7 kg (149 lb 3.2 oz)         ECO  General: Sitting comfortably in no acute  distress.  Head: Normocephalic, atraumatic   EENT: No scleral icteris, EOMS intact. Mucous membranes are moist, no mucositis or oral lesions  Lymph: No palpable cervical, supraclavicular, or axillary lymphadenopathy   Heart: Regular rate and rhythm, no murmurs  Lung: Normal respiratory effort. Clear to auscultation bilaterally. No wheezes, rhonchi, or crackles. No coughing throughout entire visit   Breast: There is some firmness in her right upper mid quadrant, but we both think this is improved. It is about 2.5 cm in maximal dimension  Abdomen: +Bowel Sounds, soft nontender to palpation. No rebound, guarding or rigidity. No palpable masses  Neuro: AAO x3. Gait is steady. Moving all extremities   Extremities: No lower extremity edema  Skin: Warm, dry, intact. No rashes, no suspicious lesions. No petechia or bruising noted     Laboratory Data:  Most Recent 3 CBC's:  Recent Labs   Lab Test 05/09/22  0827 05/02/22  0821 04/25/22  1125   WBC 2.5* 3.8* 4.5   HGB 10.7* 11.3* 11.3*   MCV 92 93 92    242 293    Most Recent 3 BMP's:  Recent Labs   Lab Test 05/09/22  0827 05/02/22  0821 04/25/22  1125    141 140   POTASSIUM 3.5 3.3* 3.6   CHLORIDE 108 106 104   CO2 26 26 28   BUN 7 8 9   CR 0.64 0.61 0.64   ANIONGAP 7 9 8   JENNIE 9.0 9.2 9.1   * 103* 127*    Most Recent 2 LFT's:  Recent Labs   Lab Test 05/09/22  0827 05/02/22 0821   AST 18 17   ALT 20 20   ALKPHOS 72 78   BILITOTAL 0.4 0.4      I reviewed the above labs today.    Problems:  high risk T=5.5cm, N=0 breast cancer. Randomized to oral paclitaxel/encequidar/dostarlimab.  2. Premenopausal  3. Iron deficiency     Assessment:  The patient has had more difficulty with her chemotherapy regimen over the past 6 weeks.  We discussed the fact that if she was still not feeling well next week we could delay the start of her AC.  Hopefully that will not be the case but I saw her today with Ms. Lantigua and the patient understands that if she is interested in  delaying week to contact her soon.    I also returned her results letter informing her that she should proceed with the AC.  While we are all disappointed it is clear that her tumor is minimally shrinking in size and there is no evidence of progression.  Hopefully she will continue to have a good response with AC.    PLAN:  1.start AC next week   2.we will continue to follow her closely through this regimen       Keegan Lockett MD

## 2022-05-13 NOTE — PROGRESS NOTES
EMESIS WORSE WITH PILLS  LOST WEIGHT  HUNGRY       Oncology/Hematology Visit Note  May 13, 2022    Reason for Visit: follow up of breast cancer     History of Present Illness: Tori Steele is a 46 year old female with breat cancer. Right-sided 5.5 cm ER positive ME positive HER-2 negative breast cancer with associated DCIS.  Her MammaPrint came back as high risk.  She consented for the I-SPY clinical trial and has been randomized to the oral paclitaxel, Encequidar and dostarlimab arm.      2/21/22 started trial with oral paclitaxel, Encequidar and dostarlimab.    3/15/22  her IV immunotherapy dostarlimab was held due to diarrhea that recovered with Imodium and time  4/4/22   Carbo was added in weekly due in hopes for more significant reduction in cancer  5/9/22 - Completed first 12 weeks of I-SPY2 - oral paclitaxel, Encequidar, dostarlimab, with Carbo added last 6 cycles     Interval History:  Tori is here after her 12 week treatment.  As noted by Ms. Mota's last note she has significant nausea and vomiting with her Encequidar and oral paclitaxel.  She thinks this is related to the number of pills and has been made much worse with the addition of carboplatin.  She states she is hungry and her stomach is growling but she cannot find the right food that is not associated with nausea.  However both she and her  state that over the last day or so things have gotten better and the further she gets from her treatment the better it is.  We talked about what she thinks the cause of this is and it is probably the number of pills that she has to take.  I expressed to her that this is a concern we all have and have brought this up with the .    She had her week 12 evaluations and unfortunately both her MRI and biopsy demonstrates that she has not achieved a pathologic complete response.  We will proceed to the AC portion of the regimen.  She is aware that this was the likely possibility.  Today  we went over her MRI scans.    Otherwise she feels reasonably well.  She is having a birthday party for her daughter this weekend and hopes to feel well enough to be able to eat.  Of note the visit her mother came in from [place] to help with this.    No headaches or double or blurry vision. No dizziness. She has lost weight    ROS:  Patient denies the following: fevers, body aches, chills, headaches, vision changes, dizziness, chest pain, shortness of breath, nausea, vomiting, diarrhea, constipation, abdominal pain, rashes, bruising/bleeding, mouth sores, swelling or pain in the legs.        Current Outpatient Prescriptions               Current Outpatient Medications   Medication Sig Dispense Refill     ferrous sulfate (FEROSUL) 325 (65 Fe) MG tablet Take 325 mg by mouth         lidocaine-prilocaine (EMLA) 2.5-2.5 % external cream Apply to port site one hour prior to access may start using six days after port placement. 30 g 1     ondansetron (ZOFRAN) 8 MG tablet Take 1 tablet (8 mg) by mouth every 8 hours as needed for nausea 30 tablet 0     prochlorperazine (COMPAZINE) 10 MG tablet Take 1 tablet (10 mg) by mouth every 6 hours as needed (Nausea/Vomiting) 30 tablet 2     riboflavin (VITAMIN  B2) 25 MG TABS                  Physical Examination: ECOG 1    /79   Pulse 117   Temp 98.4  F (36.9  C) (Oral)   Resp 16   Wt 64.3 kg (141 lb 11.2 oz)   LMP  (LMP Unknown)   SpO2 100%   BMI 24.32 kg/m      Wt Readings from Last 4 Encounters:   22 64.3 kg (141 lb 11.2 oz)   22 64.8 kg (142 lb 14.4 oz)   22 67 kg (147 lb 9.6 oz)   22 67.7 kg (149 lb 3.2 oz)         ECO  General: Sitting comfortably in no acute distress.  Head: Normocephalic, atraumatic   EENT: No scleral icteris, EOMS intact. Mucous membranes are moist, no mucositis or oral lesions  Lymph: No palpable cervical, supraclavicular, or axillary lymphadenopathy   Heart: Regular rate and rhythm, no murmurs  Lung: Normal  respiratory effort. Clear to auscultation bilaterally. No wheezes, rhonchi, or crackles. No coughing throughout entire visit   Breast: There is some firmness in her right upper mid quadrant, but we both think this is improved. It is about 2.5 cm in maximal dimension  Abdomen: +Bowel Sounds, soft nontender to palpation. No rebound, guarding or rigidity. No palpable masses  Neuro: AAO x3. Gait is steady. Moving all extremities   Extremities: No lower extremity edema  Skin: Warm, dry, intact. No rashes, no suspicious lesions. No petechia or bruising noted     Laboratory Data:  Most Recent 3 CBC's:  Recent Labs   Lab Test 05/09/22 0827 05/02/22 0821 04/25/22  1125   WBC 2.5* 3.8* 4.5   HGB 10.7* 11.3* 11.3*   MCV 92 93 92    242 293    Most Recent 3 BMP's:  Recent Labs   Lab Test 05/09/22 0827 05/02/22 0821 04/25/22  1125    141 140   POTASSIUM 3.5 3.3* 3.6   CHLORIDE 108 106 104   CO2 26 26 28   BUN 7 8 9   CR 0.64 0.61 0.64   ANIONGAP 7 9 8   JENNIE 9.0 9.2 9.1   * 103* 127*    Most Recent 2 LFT's:  Recent Labs   Lab Test 05/09/22 0827 05/02/22 0821   AST 18 17   ALT 20 20   ALKPHOS 72 78   BILITOTAL 0.4 0.4      I reviewed the above labs today.    Problems:  high risk T=5.5cm, N=0 breast cancer. Randomized to oral paclitaxel/encequidar/dostarlimab.  2. Premenopausal  3. Iron deficiency     Assessment:  The patient has had more difficulty with her chemotherapy regimen over the past 6 weeks.  We discussed the fact that if she was still not feeling well next week we could delay the start of her AC.  Hopefully that will not be the case but I saw her today with Ms. Lantigua and the patient understands that if she is interested in delaying week to contact her soon.    I also returned her results letter informing her that she should proceed with the AC.  While we are all disappointed it is clear that her tumor is minimally shrinking in size and there is no evidence of progression.  Hopefully she will  continue to have a good response with AC.    PLAN:  1.start AC next week   2.we will continue to follow her closely through this regimen     Keegan Lockett MD

## 2022-05-13 NOTE — NURSING NOTE
"Oncology Rooming Note    May 13, 2022 10:12 AM   Tori Steele is a 46 year old female who presents for:    Chief Complaint   Patient presents with     Oncology Clinic Visit     Pt is here for a rtn for Breast Cancer     Initial Vitals: Blood Pressure 116/79   Pulse 117   Temperature 98.4  F (36.9  C) (Oral)   Respiration 16   Weight 64.3 kg (141 lb 11.2 oz)   Last Menstrual Period  (LMP Unknown)   Oxygen Saturation 100%   Body Mass Index 24.32 kg/m   Estimated body mass index is 24.32 kg/m  as calculated from the following:    Height as of 3/4/22: 1.626 m (5' 4\").    Weight as of this encounter: 64.3 kg (141 lb 11.2 oz). Body surface area is 1.7 meters squared.  No Pain (0) Comment: Data Unavailable   No LMP recorded (lmp unknown). (Menstrual status: Chemotherapy).  Allergies reviewed: Yes  Medications reviewed: Yes    Medications: Medication refills not needed today.  Pharmacy name entered into [facility]:    [id number] - [place], [place] - [address] AT [facility] [address]  [facility] [place], [place] - [address] [facility] [phone]    Clinical concerns: none       Veda Rasmussen MA            "

## 2022-05-17 ENCOUNTER — INFUSION THERAPY VISIT (OUTPATIENT)
Dept: ONCOLOGY | Facility: CLINIC | Age: 47
End: 2022-05-17
Attending: INTERNAL MEDICINE
Payer: OTHER GOVERNMENT

## 2022-05-17 ENCOUNTER — RESEARCH ENCOUNTER (OUTPATIENT)
Dept: ONCOLOGY | Facility: CLINIC | Age: 47
End: 2022-05-17

## 2022-05-17 ENCOUNTER — APPOINTMENT (OUTPATIENT)
Dept: LAB | Facility: CLINIC | Age: 47
End: 2022-05-17
Attending: INTERNAL MEDICINE
Payer: OTHER GOVERNMENT

## 2022-05-17 VITALS
WEIGHT: 140.5 LBS | SYSTOLIC BLOOD PRESSURE: 112 MMHG | OXYGEN SATURATION: 98 % | RESPIRATION RATE: 14 BRPM | HEART RATE: 110 BPM | DIASTOLIC BLOOD PRESSURE: 75 MMHG | BODY MASS INDEX: 24.12 KG/M2 | TEMPERATURE: 98.5 F

## 2022-05-17 DIAGNOSIS — C50.911 INVASIVE DUCTAL CARCINOMA OF RIGHT BREAST (H): Primary | ICD-10-CM

## 2022-05-17 DIAGNOSIS — C50.111 MALIGNANT NEOPLASM OF CENTRAL PORTION OF RIGHT BREAST IN FEMALE, ESTROGEN RECEPTOR POSITIVE (H): Primary | ICD-10-CM

## 2022-05-17 DIAGNOSIS — Z17.0 MALIGNANT NEOPLASM OF CENTRAL PORTION OF RIGHT BREAST IN FEMALE, ESTROGEN RECEPTOR POSITIVE (H): Primary | ICD-10-CM

## 2022-05-17 LAB
ALBUMIN SERPL-MCNC: 3.2 G/DL (ref 3.4–5)
ALP SERPL-CCNC: 67 U/L (ref 40–150)
ALT SERPL W P-5'-P-CCNC: 21 U/L (ref 0–50)
ANION GAP SERPL CALCULATED.3IONS-SCNC: 8 MMOL/L (ref 3–14)
AST SERPL W P-5'-P-CCNC: 16 U/L (ref 0–45)
BASOPHILS # BLD AUTO: 0 10E3/UL (ref 0–0.2)
BASOPHILS NFR BLD AUTO: 1 %
BILIRUB SERPL-MCNC: 0.3 MG/DL (ref 0.2–1.3)
BUN SERPL-MCNC: 7 MG/DL (ref 7–30)
CALCIUM SERPL-MCNC: 8.8 MG/DL (ref 8.5–10.1)
CHLORIDE BLD-SCNC: 108 MMOL/L (ref 94–109)
CO2 SERPL-SCNC: 26 MMOL/L (ref 20–32)
CREAT SERPL-MCNC: 0.65 MG/DL (ref 0.52–1.04)
EOSINOPHIL # BLD AUTO: 0.1 10E3/UL (ref 0–0.7)
EOSINOPHIL NFR BLD AUTO: 4 %
ERYTHROCYTE [DISTWIDTH] IN BLOOD BY AUTOMATED COUNT: 15.9 % (ref 10–15)
GFR SERPL CREATININE-BSD FRML MDRD: >90 ML/MIN/1.73M2
GLUCOSE BLD-MCNC: 104 MG/DL (ref 70–99)
HCT VFR BLD AUTO: 33.2 % (ref 35–47)
HGB BLD-MCNC: 10.8 G/DL (ref 11.7–15.7)
IMM GRANULOCYTES # BLD: 0 10E3/UL
IMM GRANULOCYTES NFR BLD: 0 %
LYMPHOCYTES # BLD AUTO: 0.6 10E3/UL (ref 0.8–5.3)
LYMPHOCYTES NFR BLD AUTO: 25 %
MCH RBC QN AUTO: 30.9 PG (ref 26.5–33)
MCHC RBC AUTO-ENTMCNC: 32.5 G/DL (ref 31.5–36.5)
MCV RBC AUTO: 95 FL (ref 78–100)
MONOCYTES # BLD AUTO: 0.4 10E3/UL (ref 0–1.3)
MONOCYTES NFR BLD AUTO: 17 %
NEUTROPHILS # BLD AUTO: 1.3 10E3/UL (ref 1.6–8.3)
NEUTROPHILS NFR BLD AUTO: 53 %
NRBC # BLD AUTO: 0 10E3/UL
NRBC BLD AUTO-RTO: 0 /100
PLATELET # BLD AUTO: 211 10E3/UL (ref 150–450)
POTASSIUM BLD-SCNC: 3.5 MMOL/L (ref 3.4–5.3)
PROT SERPL-MCNC: 7.5 G/DL (ref 6.8–8.8)
RBC # BLD AUTO: 3.49 10E6/UL (ref 3.8–5.2)
SODIUM SERPL-SCNC: 142 MMOL/L (ref 133–144)
T4 FREE SERPL-MCNC: 1.1 NG/DL (ref 0.76–1.46)
TSH SERPL DL<=0.005 MIU/L-ACNC: 1.19 MU/L (ref 0.4–4)
WBC # BLD AUTO: 2.5 10E3/UL (ref 4–11)

## 2022-05-17 PROCEDURE — 84443 ASSAY THYROID STIM HORMONE: CPT | Performed by: INTERNAL MEDICINE

## 2022-05-17 PROCEDURE — 80053 COMPREHEN METABOLIC PANEL: CPT | Performed by: INTERNAL MEDICINE

## 2022-05-17 PROCEDURE — 84439 ASSAY OF FREE THYROXINE: CPT | Performed by: INTERNAL MEDICINE

## 2022-05-17 PROCEDURE — 258N000003 HC RX IP 258 OP 636: Performed by: INTERNAL MEDICINE

## 2022-05-17 PROCEDURE — 250N000011 HC RX IP 250 OP 636: Performed by: INTERNAL MEDICINE

## 2022-05-17 PROCEDURE — 96413 CHEMO IV INFUSION 1 HR: CPT

## 2022-05-17 PROCEDURE — 96372 THER/PROPH/DIAG INJ SC/IM: CPT | Performed by: INTERNAL MEDICINE

## 2022-05-17 PROCEDURE — 99001 SPECIMEN HANDLING PT-LAB: CPT | Performed by: INTERNAL MEDICINE

## 2022-05-17 PROCEDURE — 85025 COMPLETE CBC W/AUTO DIFF WBC: CPT | Performed by: INTERNAL MEDICINE

## 2022-05-17 PROCEDURE — 96411 CHEMO IV PUSH ADDL DRUG: CPT

## 2022-05-17 PROCEDURE — 96377 APPLICATON ON-BODY INJECTOR: CPT | Mod: 59

## 2022-05-17 PROCEDURE — 96367 TX/PROPH/DG ADDL SEQ IV INF: CPT

## 2022-05-17 RX ORDER — DOXORUBICIN HYDROCHLORIDE 2 MG/ML
60 INJECTION, SOLUTION INTRAVENOUS ONCE
Status: COMPLETED | OUTPATIENT
Start: 2022-05-17 | End: 2022-05-17

## 2022-05-17 RX ORDER — LORAZEPAM 1 MG/1
1 TABLET ORAL EVERY 6 HOURS PRN
Qty: 30 TABLET | Refills: 0 | Status: SHIPPED | OUTPATIENT
Start: 2022-05-17 | End: 2022-07-08

## 2022-05-17 RX ORDER — HEPARIN SODIUM (PORCINE) LOCK FLUSH IV SOLN 100 UNIT/ML 100 UNIT/ML
5 SOLUTION INTRAVENOUS DAILY PRN
Status: DISCONTINUED | OUTPATIENT
Start: 2022-05-17 | End: 2022-05-17 | Stop reason: HOSPADM

## 2022-05-17 RX ORDER — DEXAMETHASONE 4 MG/1
8 TABLET ORAL DAILY
Qty: 6 TABLET | Refills: 3 | COMMUNITY
Start: 2022-05-17 | End: 2022-06-08

## 2022-05-17 RX ORDER — ONDANSETRON 8 MG/1
8 TABLET, FILM COATED ORAL EVERY 8 HOURS PRN
Qty: 30 TABLET | Refills: 2 | Status: SHIPPED | OUTPATIENT
Start: 2022-05-17 | End: 2022-07-08

## 2022-05-17 RX ADMIN — Medication 5 ML: at 11:37

## 2022-05-17 RX ADMIN — SODIUM CHLORIDE 250 ML: 9 INJECTION, SOLUTION INTRAVENOUS at 10:12

## 2022-05-17 RX ADMIN — CYCLOPHOSPHAMIDE 1000 MG: 1 INJECTION, POWDER, FOR SOLUTION INTRAVENOUS; ORAL at 10:57

## 2022-05-17 RX ADMIN — Medication 5 ML: at 09:05

## 2022-05-17 RX ADMIN — PEGFILGRASTIM 6 MG: KIT SUBCUTANEOUS at 11:27

## 2022-05-17 RX ADMIN — DEXAMETHASONE SODIUM PHOSPHATE: 10 INJECTION, SOLUTION INTRAMUSCULAR; INTRAVENOUS at 10:12

## 2022-05-17 RX ADMIN — DOXORUBICIN HYDROCHLORIDE 110 MG: 2 INJECTION, SOLUTION INTRAVENOUS at 10:43

## 2022-05-17 ASSESSMENT — PAIN SCALES - GENERAL: PAINLEVEL: NO PAIN (0)

## 2022-05-17 NOTE — PATIENT INSTRUCTIONS
Contact Numbers  [facility]: [phone] (for symptom and scheduling needs)    Please call the [facility] Triage line if you experience a temperature greater than or equal to 100.4, shaking chills, have uncontrolled nausea, vomiting and/or diarrhea, dizziness, shortness of breath, chest pain, bleeding, unexplained bruising, or if you have any other new/concerning symptoms, questions or concerns.     If you are having any concerning symptoms or wish to speak to a provider before your next infusion visit, please call your care coordinator or triage to notify them so we can adequately serve you.     If you need a refill on a narcotic prescription or other medication, please call triage before your infusion appointment.                             Lab Results:  Recent Results (from the past 12 hour(s))   Comprehensive metabolic panel    Collection Time: 05/17/22  9:12 AM   Result Value Ref Range    Sodium 142 133 - 144 mmol/L    Potassium 3.5 3.4 - 5.3 mmol/L    Chloride 108 94 - 109 mmol/L    Carbon Dioxide (CO2) 26 20 - 32 mmol/L    Anion Gap 8 3 - 14 mmol/L    Urea Nitrogen 7 7 - 30 mg/dL    Creatinine 0.65 0.52 - 1.04 mg/dL    Calcium 8.8 8.5 - 10.1 mg/dL    Glucose 104 (H) 70 - 99 mg/dL    Alkaline Phosphatase 67 40 - 150 U/L    AST 16 0 - 45 U/L    ALT 21 0 - 50 U/L    Protein Total 7.5 6.8 - 8.8 g/dL    Albumin 3.2 (L) 3.4 - 5.0 g/dL    Bilirubin Total 0.3 0.2 - 1.3 mg/dL    GFR Estimate >90 >60 mL/min/1.73m2   TSH    Collection Time: 05/17/22  9:12 AM   Result Value Ref Range    TSH 1.19 0.40 - 4.00 mU/L   T4 free    Collection Time: 05/17/22  9:12 AM   Result Value Ref Range    Free T4 1.10 0.76 - 1.46 ng/dL   CBC with platelets and differential    Collection Time: 05/17/22  9:12 AM   Result Value Ref Range    WBC Count 2.5 (L) 4.0 - 11.0 10e3/uL    RBC Count 3.49 (L) 3.80 - 5.20 10e6/uL    Hemoglobin 10.8 (L) 11.7 - 15.7 g/dL    Hematocrit 33.2 (L) 35.0 - 47.0 %    MCV 95 78 - 100 fL    MCH 30.9 26.5 - 33.0  pg    MCHC 32.5 31.5 - 36.5 g/dL    RDW 15.9 (H) 10.0 - 15.0 %    Platelet Count 211 150 - 450 10e3/uL    % Neutrophils 53 %    % Lymphocytes 25 %    % Monocytes 17 %    % Eosinophils 4 %    % Basophils 1 %    % Immature Granulocytes 0 %    NRBCs per 100 WBC 0 <1 /100    Absolute Neutrophils 1.3 (L) 1.6 - 8.3 10e3/uL    Absolute Lymphocytes 0.6 (L) 0.8 - 5.3 10e3/uL    Absolute Monocytes 0.4 0.0 - 1.3 10e3/uL    Absolute Eosinophils 0.1 0.0 - 0.7 10e3/uL    Absolute Basophils 0.0 0.0 - 0.2 10e3/uL    Absolute Immature Granulocytes 0.0 <=0.4 10e3/uL    Absolute NRBCs 0.0 10e3/uL

## 2022-05-17 NOTE — NURSING NOTE
Chief Complaint   Patient presents with     Port Draw     Labs drawn via port by RN in lab. VS taken.      Labs drawn via Port accessed using 20g flat needle. Line flushed and Heparin locked. Vital signs taken. Infusion RN notified of  .  Checked into next appointment.     Deborah Fong RN

## 2022-05-17 NOTE — PROGRESS NOTES
Infusion Nursing Note:  Tori Steele presents today for C13D1 Adriamycin, Cyclophosphamide, and Neulasta Onpro (1st dose AC).    Patient seen by provider: Yes: Dr. Lockett 5/13   present during visit today: Not Applicable.    Note: Tori presents to infusion today feeling nervous to start the AC portion of her regimen. She states her nausea/vomiting and diarrhea has improved since her last visit with Dr. Lockett, however she still struggles taking pills at home. Dr. Lockett was informed and added PO ativan to her home list. She was educated on when to take her oral dexamethasone following chemo today. She denies any fevers, chills, dyspnea, pain, or other concerns.      Intravenous Access:  Implanted Port.    Treatment Conditions:  Lab Results   Component Value Date    HGB 10.8 (L) 05/17/2022    WBC 2.5 (L) 05/17/2022    ANEU 1.2 (L) 04/11/2022    ANEUTAUTO 1.3 (L) 05/17/2022     05/17/2022      Lab Results   Component Value Date     05/17/2022    POTASSIUM 3.5 05/17/2022    MAG 1.7 (L) 02/07/2022    CR 0.65 05/17/2022    JENNIE 8.8 05/17/2022    BILITOTAL 0.3 05/17/2022    ALBUMIN 3.2 (L) 05/17/2022    ALT 21 05/17/2022    AST 16 05/17/2022     Results reviewed, labs MET treatment parameters, ok to proceed with treatment.  ECHO/MUGA completed 2/7/22  EF 55-60%.      Post Infusion Assessment:  Patient tolerated infusion without incident.  Blood return noted pre and post infusion.  Blood return noted during Adriamycin administration per protocol.  Site patent and intact, free from redness, edema or discomfort.  No evidence of extravasations.  Access discontinued per protocol.     Neulasta Onpro On-Body injector applied to Left Arm at 1130.  Writer discussed Neulasta injection would start tomorrow 5/18 at 2:30, approximately 27 hours after application applied today.  Written and Verbal instruction reviewed with patient.  Pt instructed when the dose delivery starts, it will take about 45 minutes to  complete.  Pt aware Neulasta Onpro On-Body should have green flashing light and to call triage or on-call MD if injector flashes red or appears to be leaking. Pt aware to keep Onpro On-Body Neulasta 4 inches away from electrical equipment and to avoid showering 4 hours prior to injection.     Discharge Plan:   Prescription refills given for Zofran, Dexamethasone, and Ativan.  Discharge instructions reviewed with: Patient.  Patient and/or family verbalized understanding of discharge instructions and all questions answered.  Copy of AVS reviewed with patient and/or family.  Patient will return 5/31 for next appointment.  Patient discharged in stable condition accompanied by: self.  Departure Mode: Ambulatory.      Wilma Serrano RN

## 2022-05-17 NOTE — NURSING NOTE
[id number]: Study Visit Note   Subject name: Tori Steele     Visit: C13D1    Did the study visit occur within the appropriate window allowed by the protocol? yes    Since the last study visit, She has been doing well.     I have personally interviewed Tori Steele and reviewed her medical record for adverse events and concomitant medications and these have been recorded on the corresponding logs in Tori Steele's research file.     Tori Steele was given the opportunity to ask any trial related questions.  Please see provider progress note from 5/13 for physical exam and other clinical information. Labs were reviewed - any significant lab values were addressed and reviewed.    CHARISMA Babb, RN  CRC-RN,   Pager: [phone]      [id number]: Medication Count/IDS Note      Tori Steele is enrolled on the trial number listed above. The patient presented for her C1 day visit.   IDS number: [id number]  Drug name: Encequidar  Number of boxes returned: 1  Lot number(s): [id number]  Number of pills returned: 0      Drug diary returned? yes    Are there any discrepancies between the amount of medication the patient was instructed to take and the amount recorded as taken in the patient s drug diary?  no    Are there any discrepancies between the amount of medication the patient has recorded as taken in the drug diary and the amount that would be expected to be returned based on the amount recorded as taken?  no    Miriam Lantigua RN    Form 504.00.01 (Version 1)     Effective date: 01JUL2018     Next Review Date: 01JUL2020        [id number]: Medication Count/IDS Note      Tori Steele is enrolled on the trial number listed above. The patient presented for her C13D1 day visit.   IDS number: [id number]  Drug name: Paclitaxel  Number of boxes returned: 1  Lot number(s): [id number]  Number of pills returned: 0      Drug diary returned? yes    Are there any discrepancies between the amount of medication the  patient was instructed to take and the amount recorded as taken in the patient s drug diary?  no  Are there any discrepancies between the amount of medication the patient has recorded as taken in the drug diary and the amount that would be expected to be returned based on the amount recorded as taken?  no    Miriam Lantigua RN    Form 504.00.01 (Version 1)     Effective date: 01JUL2018     Next Review Date: 01JUL2020

## 2022-05-24 ENCOUNTER — APPOINTMENT (OUTPATIENT)
Dept: CT IMAGING | Facility: CLINIC | Age: 47
End: 2022-05-24
Attending: EMERGENCY MEDICINE
Payer: OTHER GOVERNMENT

## 2022-05-24 ENCOUNTER — PATIENT OUTREACH (OUTPATIENT)
Dept: ONCOLOGY | Facility: CLINIC | Age: 47
End: 2022-05-24

## 2022-05-24 ENCOUNTER — HOSPITAL ENCOUNTER (EMERGENCY)
Facility: CLINIC | Age: 47
Discharge: HOME | End: 2022-05-24
Attending: EMERGENCY MEDICINE | Admitting: EMERGENCY MEDICINE
Payer: OTHER GOVERNMENT

## 2022-05-24 VITALS
WEIGHT: 145 LBS | BODY MASS INDEX: 25.69 KG/M2 | RESPIRATION RATE: 19 BRPM | HEART RATE: 110 BPM | OXYGEN SATURATION: 100 % | SYSTOLIC BLOOD PRESSURE: 94 MMHG | DIASTOLIC BLOOD PRESSURE: 60 MMHG | HEIGHT: 63 IN | TEMPERATURE: 98.5 F

## 2022-05-24 DIAGNOSIS — M54.9 BACK PAIN, UNSPECIFIED BACK LOCATION, UNSPECIFIED BACK PAIN LATERALITY, UNSPECIFIED CHRONICITY: ICD-10-CM

## 2022-05-24 DIAGNOSIS — D61.810 ANTINEOPLASTIC CHEMOTHERAPY INDUCED PANCYTOPENIA (H): ICD-10-CM

## 2022-05-24 DIAGNOSIS — T45.1X5A ANTINEOPLASTIC CHEMOTHERAPY INDUCED PANCYTOPENIA (H): ICD-10-CM

## 2022-05-24 DIAGNOSIS — D70.9 NEUTROPENIA, UNSPECIFIED TYPE (H): ICD-10-CM

## 2022-05-24 LAB
ALBUMIN SERPL-MCNC: 3.2 G/DL (ref 3.5–5)
ALP SERPL-CCNC: 67 U/L (ref 45–120)
ALT SERPL W P-5'-P-CCNC: 22 U/L (ref 0–45)
ANION GAP SERPL CALCULATED.3IONS-SCNC: 10 MMOL/L (ref 5–18)
AST SERPL W P-5'-P-CCNC: 15 U/L (ref 0–40)
ATRIAL RATE - MUSE: 111 BPM
BILIRUB DIRECT SERPL-MCNC: 0.3 MG/DL
BILIRUB SERPL-MCNC: 0.9 MG/DL (ref 0–1)
BUN SERPL-MCNC: 8 MG/DL (ref 8–22)
CALCIUM SERPL-MCNC: 8.9 MG/DL (ref 8.5–10.5)
CHLORIDE BLD-SCNC: 104 MMOL/L (ref 98–107)
CO2 SERPL-SCNC: 23 MMOL/L (ref 22–31)
CREAT SERPL-MCNC: 0.58 MG/DL (ref 0.6–1.1)
DIASTOLIC BLOOD PRESSURE - MUSE: NORMAL MMHG
ERYTHROCYTE [DISTWIDTH] IN BLOOD BY AUTOMATED COUNT: 15.2 % (ref 10–15)
GFR SERPL CREATININE-BSD FRML MDRD: >90 ML/MIN/1.73M2
GLUCOSE BLD-MCNC: 124 MG/DL (ref 70–125)
HCT VFR BLD AUTO: 29.1 % (ref 35–47)
HGB BLD-MCNC: 9.7 G/DL (ref 11.7–15.7)
INR PPP: 1.09 (ref 0.85–1.15)
INTERPRETATION ECG - MUSE: NORMAL
LIPASE SERPL-CCNC: 57 U/L (ref 0–52)
MCH RBC QN AUTO: 30.6 PG (ref 26.5–33)
MCHC RBC AUTO-ENTMCNC: 33.3 G/DL (ref 31.5–36.5)
MCV RBC AUTO: 92 FL (ref 78–100)
P AXIS - MUSE: 47 DEGREES
PLATELET # BLD AUTO: 65 10E3/UL (ref 150–450)
POTASSIUM BLD-SCNC: 4 MMOL/L (ref 3.5–5)
PR INTERVAL - MUSE: 114 MS
PROT SERPL-MCNC: 6.7 G/DL (ref 6–8)
QRS DURATION - MUSE: 70 MS
QT - MUSE: 336 MS
QTC - MUSE: 456 MS
R AXIS - MUSE: 44 DEGREES
RBC # BLD AUTO: 3.17 10E6/UL (ref 3.8–5.2)
SODIUM SERPL-SCNC: 137 MMOL/L (ref 136–145)
SYSTOLIC BLOOD PRESSURE - MUSE: NORMAL MMHG
T AXIS - MUSE: 31 DEGREES
VENTRICULAR RATE- MUSE: 111 BPM
WBC # BLD AUTO: 0.2 10E3/UL (ref 4–11)

## 2022-05-24 PROCEDURE — 82248 BILIRUBIN DIRECT: CPT | Performed by: EMERGENCY MEDICINE

## 2022-05-24 PROCEDURE — 83690 ASSAY OF LIPASE: CPT | Performed by: EMERGENCY MEDICINE

## 2022-05-24 PROCEDURE — 93005 ELECTROCARDIOGRAM TRACING: CPT | Performed by: EMERGENCY MEDICINE

## 2022-05-24 PROCEDURE — 250N000011 HC RX IP 250 OP 636: Performed by: EMERGENCY MEDICINE

## 2022-05-24 PROCEDURE — 258N000003 HC RX IP 258 OP 636: Performed by: EMERGENCY MEDICINE

## 2022-05-24 PROCEDURE — 85014 HEMATOCRIT: CPT | Performed by: EMERGENCY MEDICINE

## 2022-05-24 PROCEDURE — 71275 CT ANGIOGRAPHY CHEST: CPT

## 2022-05-24 PROCEDURE — 96375 TX/PRO/DX INJ NEW DRUG ADDON: CPT

## 2022-05-24 PROCEDURE — 36415 COLL VENOUS BLD VENIPUNCTURE: CPT | Performed by: EMERGENCY MEDICINE

## 2022-05-24 PROCEDURE — 80053 COMPREHEN METABOLIC PANEL: CPT | Performed by: EMERGENCY MEDICINE

## 2022-05-24 PROCEDURE — 96374 THER/PROPH/DIAG INJ IV PUSH: CPT | Mod: 59

## 2022-05-24 PROCEDURE — 85610 PROTHROMBIN TIME: CPT | Performed by: EMERGENCY MEDICINE

## 2022-05-24 PROCEDURE — 99285 EMERGENCY DEPT VISIT HI MDM: CPT | Mod: 25

## 2022-05-24 PROCEDURE — 96361 HYDRATE IV INFUSION ADD-ON: CPT

## 2022-05-24 RX ORDER — ONDANSETRON 2 MG/ML
4 INJECTION INTRAMUSCULAR; INTRAVENOUS ONCE
Status: COMPLETED | OUTPATIENT
Start: 2022-05-24 | End: 2022-05-24

## 2022-05-24 RX ORDER — MORPHINE SULFATE 4 MG/ML
4 INJECTION, SOLUTION INTRAMUSCULAR; INTRAVENOUS ONCE
Status: COMPLETED | OUTPATIENT
Start: 2022-05-24 | End: 2022-05-24

## 2022-05-24 RX ORDER — HYDROCODONE BITARTRATE AND ACETAMINOPHEN 5; 325 MG/1; MG/1
1 TABLET ORAL EVERY 6 HOURS PRN
Qty: 10 TABLET | Refills: 0 | Status: SHIPPED | OUTPATIENT
Start: 2022-05-24 | End: 2022-05-27

## 2022-05-24 RX ORDER — HEPARIN SODIUM (PORCINE) LOCK FLUSH IV SOLN 100 UNIT/ML 100 UNIT/ML
100 SOLUTION INTRAVENOUS ONCE
Status: COMPLETED | OUTPATIENT
Start: 2022-05-24 | End: 2022-05-24

## 2022-05-24 RX ORDER — IOPAMIDOL 755 MG/ML
100 INJECTION, SOLUTION INTRAVASCULAR ONCE
Status: COMPLETED | OUTPATIENT
Start: 2022-05-24 | End: 2022-05-24

## 2022-05-24 RX ADMIN — IOPAMIDOL 100 ML: 755 INJECTION, SOLUTION INTRAVENOUS at 05:39

## 2022-05-24 RX ADMIN — MORPHINE SULFATE 4 MG: 4 INJECTION, SOLUTION INTRAMUSCULAR; INTRAVENOUS at 03:52

## 2022-05-24 RX ADMIN — SODIUM CHLORIDE 1000 ML: 9 INJECTION, SOLUTION INTRAVENOUS at 03:52

## 2022-05-24 RX ADMIN — HEPARIN SODIUM (PORCINE) LOCK FLUSH IV SOLN 100 UNIT/ML 100 UNITS: 100 SOLUTION at 06:53

## 2022-05-24 RX ADMIN — ONDANSETRON 4 MG: 2 INJECTION INTRAMUSCULAR; INTRAVENOUS at 03:52

## 2022-05-24 ASSESSMENT — ENCOUNTER SYMPTOMS
SHORTNESS OF BREATH: 0
VOMITING: 0
BACK PAIN: 1
NAUSEA: 0
CHILLS: 0
FEVER: 0

## 2022-05-24 NOTE — PROGRESS NOTES
Spoke to patient after ED visit this AM for upper back pain. Labs reviewed by Janette Mota that are as expected to be low as they are due to chemotherapy. Instructed to continue Claritin as the label reads around the Neulasta, eat small frequent meals to include protein and drink 2-3 quarts of water a day. Instructed patient to check her temperature with symptoms and to call in to Nurse Triage to assist with symptoms.  Answered all patient's questions and verbalized understanding. Ebony Lucio RN, BSN.

## 2022-05-24 NOTE — ED PROVIDER NOTES
EMERGENCY DEPARTMENT ENCOUNTER      NAME: Tori Steele  AGE: 46 year old female  YOB: 1975  MRN: [id number]  EVALUATION DATE & TIME: 5/24/2022  2:37 AM    PCP: Kat Mcgee    ED PROVIDER: Ava Putnam M.D.      Chief Complaint   Patient presents with     Back Pain         FINAL IMPRESSION:  1. Antineoplastic chemotherapy induced pancytopenia (H)    2. Neutropenia, unspecified type (H)        MEDICAL DECISION MAKING:    Pertinent Labs & Imaging studies reviewed. (See chart for details)  ED Course as of 05/24/22 0552   Tue May 24, 2022   0251 Afebrile.  Vital signs here with tachycardia, saturating appropriately on room air, blood pressure 99/65 which seems to be about patient's baseline.  Coming in with pain in her back.  This is pain in her upper thoracic back that started a few days ago.  Pain is getting worse over the past day.  Says that the pain gets significantly worse if she does things like take a deep breath or takes any drinks of water.  No pain in her abdomen.  No nausea or vomiting.  Currently on chemotherapy, has been getting treatments, last treatment was 1 week ago.  She does not report any anterior chest discomfort.  No shortness of breath with this.  No fevers or chills.  No falls or recent trauma.    Physical exam for patient here appears mildly uncomfortable.  She does not have any midline tenderness to palpation however reports paraspinal tenderness in tenderness across her upper to mid thoracic region T2-T5 and paraspinally in this region.  She reports a sense of tightness with occasional sharp pain.  She says occasionally it feels like it does come down towards her bilateral flanks.  No tenderness on palpation of these areas.  No CVA tenderness.  Lung sounds clear to auscultation bilaterally though inspiration is halted secondary to discomfort.  Otherwise her exam is pertinent only for tachycardia but otherwise unremarkable.    I did speak with patient regarding  her tachycardia, she does believe that she is dehydrated, she states that she always has a high heart rate and generally it is in the 1 teens at baseline.  Certainly with active treatment for breast cancer concern for possible PE.  Will plan for EKG, labs, CT scan chest, pain medication, fluids.   0259 Patient's EKG here shows sinus tachycardia at a rate of 111.  There is no ST elevations or depressions.  Normal axis.  No signs of right heart strain.  Intervals are intact.  QTC is 456, QRS 70, .  As compared with previous EKG from April 4, 2022 no significant change noted.   0259 Patient's heart rate has come down significantly on its own since she has been here.  We will continue to monitor closely.   0454 Patient's labs back here with pancytopenia with neutropenia, decreasing platelets as well as her red cells.   0541 I did go back and speak with patient regarding her pancytopenia that she has here.  Discussed that I am very concerned especially given that she has had a full drop in all cell lines.  This is indicative of generally bone marrow failure.  She has not had any fevers, her blood pressures been fine.  Her heart rate came down with fluids and it is in the low 100s which she states it is always in this range.  She states that this time she does not want to stay in the hospital, she is research participant, she states she will call her oncologist in a few hours when the office opens to discuss this.  She did state that she was told that her white blood cell count would likely plummet after her last oncology treatment and she is taking Neupogen.  I did discuss that she has exceedingly low white blood cell count and that she has had a full drop in all lines which does make me very concerned.  She states she understands this concern and she will bring it up with her oncologist.  She does understand that they may tell her to come in and get admitted to the hospital but at this time patient does not want  to be admitted solely for this pancytopenia that she is having.   0551 Patient will be signed out to dayshift pending follow-up of CT scan.  Disposition as per CT scan.           Critical care: 0 minutes excluding separately billable procedures.  Includes bedside management, time reviewing test results, review of records, discussing the case with staff, documenting the medical record and time spent with family members (or surrogate decision makers) discussing specific treatment issues.          ED COURSE:  2:39 AM I met with the patient to gather history and to perform my initial exam. I discussed the plan for care while in the Emergency Department. PPE: N95 mask, surgical  mask, eye shield, and gloves.   5:35 AM Reassessed and discussed lab results with patient.   6:00 AM Signed patient out to incoming day shift provider pending CT scan results.       The importance of close follow up was discussed. We reviewed warning signs and symptoms, and I instructed Ms. Steele to return to the emergency department immediately if she develops any new or worsening symptoms. I provided additional verbal discharge instructions. Ms. Steele expressed understanding and agreement with this plan of care, her questions were answered, and she was discharged in stable condition.     MEDICATIONS GIVEN IN THE EMERGENCY:  Medications   0.9% sodium chloride BOLUS (0 mLs Intravenous Stopped 5/24/22 0519)   morphine (PF) injection 4 mg (4 mg Intravenous Given 5/24/22 0352)   ondansetron (ZOFRAN) injection 4 mg (4 mg Intravenous Given 5/24/22 0352)   iopamidol (ISOVUE-370) solution 100 mL (100 mLs Intravenous Given 5/24/22 0539)       NEW PRESCRIPTIONS STARTED AT TODAY'S ER VISIT:  New Prescriptions    No medications on file          =================================================================    HPI  Patient information was obtained from: Patient    Use of : N/A      Tori LEXIS Steele is a 46 year old female who presents with  "worsening back pain that began on Sunday (2 days ago). Denies shortness of breath. Pain is constant, non-radiating, and described to be as a \"tightness\". Pain worsens with deep breaths, eating, and drinking. She has tried using a heating pad with temporary relief for pain. Denies trauma or falls. Denies history of blood clots. She is currently undergoing chemotherapy, last treatment was last Monday (8 days ago).  Patient denies fever, chills, nausea, vomiting, or any additional complaints at this time.     REVIEW OF SYSTEMS   Review of Systems   Constitutional: Negative for chills and fever.   Respiratory: Negative for shortness of breath.    Gastrointestinal: Negative for nausea and vomiting.   Musculoskeletal: Positive for back pain.   All other systems reviewed and are negative.        PAST MEDICAL HISTORY:  Past Medical History:   Diagnosis Date     Breast cancer (H) 01/2022     Sinus tachycardia        PAST SURGICAL HISTORY:  Past Surgical History:   Procedure Laterality Date     DILATION AND CURETTAGE       INSERT PORT VASCULAR ACCESS Left 2/18/2022    Procedure: INSERTION, VASCULAR ACCESS PORT;  Surgeon: Elliott Ramirez PA-C;  Location: [facility] OR     IR CHEST PORT PLACEMENT > 5 YRS OF AGE  2/18/2022       CURRENT MEDICATIONS:    No current facility-administered medications for this encounter.    Current Outpatient Medications:      dexamethasone (DECADRON) 4 MG tablet, Take 2 tablets (8 mg) by mouth daily Start on Day 2., Disp: 6 tablet, Rfl: 3     ferrous sulfate (FEROSUL) 325 (65 Fe) MG tablet, Take 325 mg by mouth, Disp: , Rfl:      lidocaine-prilocaine (EMLA) 2.5-2.5 % external cream, Apply to port site one hour prior to access may start using six days after port placement. (Patient not taking: Reported on 5/9/2022), Disp: 30 g, Rfl: 1     LORazepam (ATIVAN) 1 MG tablet, Take 1 tablet (1 mg) by mouth every 6 hours as needed for nausea, Disp: 30 tablet, Rfl: 0     ondansetron (ZOFRAN) 8 MG tablet, Take 1 " "tablet (8 mg) by mouth every 8 hours as needed for nausea (vomiting), Disp: 30 tablet, Rfl: 2     ondansetron (ZOFRAN) 8 MG tablet, Take 1 tablet (8 mg) by mouth every 8 hours as needed for nausea (Patient not taking: Reported on 5/9/2022), Disp: 3 tablet, Rfl: 0     ondansetron (ZOFRAN) 8 MG tablet, Take 1 tablet (8 mg) by mouth every 8 hours as needed for nausea, Disp: 30 tablet, Rfl: 0     prochlorperazine (COMPAZINE) 10 MG tablet, Take 1 tablet (10 mg) by mouth every 6 hours as needed (Nausea/Vomiting), Disp: 30 tablet, Rfl: 2     riboflavin (VITAMIN  B2) 25 MG TABS, , Disp: , Rfl:     ALLERGIES:  No Known Allergies    FAMILY HISTORY:  Family History   Problem Relation Age of Onset     Breast Cancer Maternal Grandmother         Dx in her 70's       SOCIAL HISTORY:   Social History     Socioeconomic History     Marital status:      Spouse name: Summer     Number of children: 2   Tobacco Use     Smoking status: Never Smoker     Smokeless tobacco: Never Used   Substance and Sexual Activity     Alcohol use: Yes     Comment: Rare     Drug use: Never     Social Determinants of Health     Intimate Partner Violence: Not At Risk     Fear of Current or Ex-Partner: No     Emotionally Abused: No     Physically Abused: No     Sexually Abused: No       PHYSICAL EXAM:    Vitals: /65   Pulse 107   Temp 98.5  F (36.9  C) (Oral)   Resp 18   Ht 1.6 m (5' 3\")   Wt 65.8 kg (145 lb)   LMP  (LMP Unknown)   SpO2 100%   BMI 25.69 kg/m     General:. Alert and interactive, mildly uncomfortably appearing.  HENT: Oropharynx without erythema or exudates. MMM.  TMs clear bilaterally.  Eyes: Pupils mid-sized and equally reactive.   Neck: Full AROM.  No midline tenderness to palpation.  Cardiovascular: Tachycardic rate and rhythm. Peripheral pulses 2+ bilaterally.  Chest/Pulmonary: Normal work of breathing. Lung sounds clear and equal throughout, no wheezes or crackles. inspiration is halted secondary to discomfort. No " chest wall tenderness or deformities.  Abdomen: Soft, nondistended. Nontender without guarding or rebound.  Back/Spine: No CVA or midline tenderness. No midline tenderness to palpation, however reports paraspinal tenderness in tenderness across her upper to mid thoracic region T2-T5 and paraspinally in this region.  She reports a sense of tightness with occasional sharp pain.  She says occasionally it feels like it does come down towards her bilateral flanks.  No tenderness on palpation of these areas.    Extremities: Normal ROM of all major joints. No lower extremity edema.   Skin: Warm and dry. Normal skin color.   Neuro: Speech clear. CNs grossly intact. Moves all extremities appropriately. Strength and sensation grossly intact to all extremities.   Psych: Normal affect/mood, cooperative, memory appropriate.     LAB:  All pertinent labs reviewed and interpreted.  Labs Ordered and Resulted from Time of ED Arrival to Time of ED Departure   CBC WITH PLATELETS - Abnormal       Result Value    WBC Count 0.2 (*)     RBC Count 3.17 (*)     Hemoglobin 9.7 (*)     Hematocrit 29.1 (*)     MCV 92      MCH 30.6      MCHC 33.3      RDW 15.2 (*)     Platelet Count 65 (*)    BASIC METABOLIC PANEL - Abnormal    Sodium 137      Potassium 4.0      Chloride 104      Carbon Dioxide (CO2) 23      Anion Gap 10      Urea Nitrogen 8      Creatinine 0.58 (*)     Calcium 8.9      Glucose 124      GFR Estimate >90     HEPATIC FUNCTION PANEL - Abnormal    Bilirubin Total 0.9      Bilirubin Direct 0.3      Protein Total 6.7      Albumin 3.2 (*)     Alkaline Phosphatase 67      AST 15      ALT 22     LIPASE - Abnormal    Lipase 57 (*)    INR - Normal    INR 1.09         RADIOLOGY:  CT Chest Pulmonary Embolism w Contrast    (Results Pending)       EKG:  See MDM  I have independently reviewed and interpreted the EKG(s) documented above.         I, Raúl Griffiths, am serving as a scribe to document services personally performed by Dr. Ava Putnam   based on my observation and the provider's statements to me. I, Ava Putnam MD attest that Raúl Griffiths is acting in a scribe capacity, has observed my performance of the services and has documented them in accordance with my direction.      Ava Putnam M.D.  Emergency Medicine  [facility] EMERGENCY ROOM  [address] [facility] [address]  [phone]  Dept: [phone]     Ashley Putnam MD  05/24/22 8032

## 2022-05-24 NOTE — ED PROVIDER NOTES
eMERGENCY dEPARTMENT PROGRESS NOTE      Patient was signed out to me by Dr. Putnam at 6:12 AM.      46-year-old female presenting to emergency department with back pain.  At time of turnover CT imaging was pending.  Patient was noted to be tachycardic while in the emergency department prior to turnover.  Plan for disposition based on CT imaging.      LABS  Pertinent lab results reviewed in chart.  Results for orders placed or performed during the hospital encounter of 05/24/22   CT Chest Pulmonary Embolism w Contrast    Impression    IMPRESSION:  1.  There is no pulmonary embolus, aortic aneurysm or dissection.  2.  Moderate-sized hiatal hernia.              CBC (+ platelets, no diff)   Result Value Ref Range    WBC Count 0.2 (LL) 4.0 - 11.0 10e3/uL    RBC Count 3.17 (L) 3.80 - 5.20 10e6/uL    Hemoglobin 9.7 (L) 11.7 - 15.7 g/dL    Hematocrit 29.1 (L) 35.0 - 47.0 %    MCV 92 78 - 100 fL    MCH 30.6 26.5 - 33.0 pg    MCHC 33.3 31.5 - 36.5 g/dL    RDW 15.2 (H) 10.0 - 15.0 %    Platelet Count 65 (L) 150 - 450 10e3/uL   Basic metabolic panel   Result Value Ref Range    Sodium 137 136 - 145 mmol/L    Potassium 4.0 3.5 - 5.0 mmol/L    Chloride 104 98 - 107 mmol/L    Carbon Dioxide (CO2) 23 22 - 31 mmol/L    Anion Gap 10 5 - 18 mmol/L    Urea Nitrogen 8 8 - 22 mg/dL    Creatinine 0.58 (L) 0.60 - 1.10 mg/dL    Calcium 8.9 8.5 - 10.5 mg/dL    Glucose 124 70 - 125 mg/dL    GFR Estimate >90 >60 mL/min/1.73m2   Hepatic function panel   Result Value Ref Range    Bilirubin Total 0.9 0.0 - 1.0 mg/dL    Bilirubin Direct 0.3 <=0.5 mg/dL    Protein Total 6.7 6.0 - 8.0 g/dL    Albumin 3.2 (L) 3.5 - 5.0 g/dL    Alkaline Phosphatase 67 45 - 120 U/L    AST 15 0 - 40 U/L    ALT 22 0 - 45 U/L   Result Value Ref Range    Lipase 57 (H) 0 - 52 U/L   Result Value Ref Range    INR 1.09 0.85 - 1.15   ECG 12-LEAD WITH MUSE (LHE)   Result Value Ref Range    Systolic Blood Pressure  mmHg    Diastolic Blood Pressure  mmHg    Ventricular Rate 111 BPM     Atrial Rate 111 BPM    DC Interval 114 ms    QRS Duration 70 ms     ms    QTc 456 ms    P Axis 47 degrees    R AXIS 44 degrees    T Axis 31 degrees    Interpretation ECG       Sinus tachycardia  Nonspecific T wave abnormality  Abnormal ECG  When compared with ECG of 04-FEB-2022 07:23,  Nonspecific T wave abnormality now evident in Anterolateral leads  Confirmed by SEE ED PROVIDER NOTE FOR, ECG INTERPRETATION ([id number]),  RENAN WITT ([id number]) on 5/24/2022 4:42:59 AM         ED COURSE & MEDICAL DECISION MAKING    Pertinent Labs and Imagaing reviewed (see chart for details)    46 year old female turned over to me by Dr. Putnam for follow-up of CT imaging.  Dr. Putnam did discuss with the patient the recommendation for admission especially with her tachycardia and her significant drop in her blood cell lines.  However the patient is refusing.  I did discuss this again with the patient and she is still requesting discharge to home.  CT imaging today does not show any evidence of acute pathology that could easily explain her symptoms.  Therefore at this time do plan to discharge home with outpatient follow-up with her oncologist.  I encouraged her to return to the emergency department if she was to change her mind about admission or her symptoms were to worsen.  Return precautions discussed.      At the conclusion of the encounter I discussed  the results of all of the tests and the disposition.   The questions were answered.  The patient or family acknowledged understanding and was agreeable with the care plan.       FINAL IMPRESSION        1. Antineoplastic chemotherapy induced pancytopenia (H)    2. Neutropenia, unspecified type (H)           Rafiq Cardenas,   05/24/22 0616

## 2022-05-24 NOTE — ED TRIAGE NOTES
Patient reports mid back pain that started a few days ago. Pain is present all the time, but seems to be worse with a deep breath. Pain feels like is radiates to the epigastric region      Triage Assessment     Row Name 05/24/22 0137       Triage Assessment (Adult)    Airway WDL WDL       Respiratory WDL    Respiratory WDL WDL       Skin Circulation/Temperature WDL    Skin Circulation/Temperature WDL WDL       Cardiac WDL    Cardiac WDL WDL       Peripheral/Neurovascular WDL    Peripheral Neurovascular WDL WDL       Cognitive/Neuro/Behavioral WDL    Cognitive/Neuro/Behavioral WDL WDL

## 2022-05-26 ENCOUNTER — NURSE TRIAGE (OUTPATIENT)
Dept: NURSING | Facility: CLINIC | Age: 47
End: 2022-05-26
Payer: OTHER GOVERNMENT

## 2022-05-26 NOTE — TELEPHONE ENCOUNTER
Triage Call    Patient calling to report  She has had the dry heaves for 2 days she is also throwing  up mucus.  She has been able to keep fluids but no food for 2 days .  Zofran not working because it comes right back up not able to keep down.      Per Protocol call PCP in 24 hours.  Care advice given.  Verbalizes understanding and agrees with plan.  Routing to PCP    Gail Delcid RN   [facility] Nurse Advisor  6:27 AM 5/26/2022    COVID 19 Nurse Triage Plan/Patient Instructions    Please be aware that novel coronavirus (COVID-19) may be circulating in the community. If you develop symptoms such as fever, cough, or SOB or if you have concerns about the presence of another infection including coronavirus (COVID-19), please contact your health care provider or visit [email or web address].     Disposition/Instructions    Home care recommended. Follow home care protocol based instructions.    Thank you for taking steps to prevent the spread of this virus.  o Limit your contact with others.  o Wear a simple mask to cover your cough.  o Wash your hands well and often.    Resources    [facility]: About COVID-19: [email or web address]    CDC: What to Do If You're Sick: www.cdc.gov/coronavirus/2019-ncov/about/steps-when-sick.html    CDC: Ending Home Isolation: www.cdc.gov/coronavirus/2019-ncov/hcp/disposition-in-home-patients.html     CDC: Caring for Someone: www.cdc.gov/coronavirus/2019-ncov/if-you-are-sick/care-for-someone.html     [facility]: Interim Guidance for Hospital Discharge to Home: www.health.[facility]/diseases/coronavirus/hcp/hospdischarge.pdf    [facility] clinical trials (COVID-19 research studies): clinicalaffairs.[facility]-clinical-trials     Below are the COVID-19 hotlines at the [facility] ([facility]). Interpreters are available.   o For health questions: Call 986-295-1722 or 1-165.400.1986 (7 a.m. to 7 p.m.)  o For questions about schools and  "childcare: Call 905-871-3937 or 1-745.768.7784 (7 a.m. to 7 p.m.)     Reason for Disposition    [1] MILD or MODERATE vomiting  (e.g., 1 - 5 times/day) AND [2] present > 48 hours (2 days)    Additional Information    Negative: Shock suspected (e.g., cold/pale/clammy skin, too weak to stand, low BP, rapid pulse)    Negative: Difficult to awaken or acting confused (e.g., disoriented, slurred speech)    Negative: Sounds like a life-threatening emergency to the triager    Negative: Chest pain    Negative: Vomiting occurs only while coughing    Negative: Constipation is main symptom    Negative: Diarrhea is main symptom    Negative: [1] Vomiting AND [2] contains red blood or black (\"coffee ground\") material  (Exception: few red streaks in vomit that only happened once)    Negative: [1] Vomiting AND [2] recent head injury (within last 3 days)    Negative: [1] Vomiting AND [2] recent abdominal injury (within last 3 days)    Negative: [1] Vomiting AND [2] insulin-dependent diabetes (Type I) AND [3] glucose > 400 mg/dl (22 mmol/l)    Negative: [1] Neutropenia known or suspected (e.g., recent cancer chemotherapy) AND [2] fever > 100.4 F (38.0 C)    Negative: [1] Neutropenia known or suspected (e.g., recent cancer chemotherapy) AND [2] vomiting    Negative: SEVERE vomiting (e.g., 6 or more times / day)    Negative: [1] MODERATE vomiting (e.g., 3 - 5 times/day) AND [2] age > 60    Negative: [1] Vomiting AND [2] severe headache (e.g., excruciating) (Exception: same as previous migraines)    Negative: [1] Drinking very little AND [2] dehydration suspected (e.g., no urine > 12 hours, very dry mouth, very lightheaded)    Negative: Weight loss > 5 lbs (2.3 kg) in one week (or 5 pounds under target weight)    Negative: Patient sounds very sick or weak to the triager    Negative: [1] Vomiting AND [2] abdomen looks much more swollen than usual    Negative: [1] Vomiting AND [2] contains bile (green color)    Negative: [1] Vomiting AND " [2] high-risk adult (e.g., brain tumor, V-P shunt, dialysis)    Negative: [1] Constant abdominal pain AND [2] present > 2 hours    Negative: [1] Fever > 101 F (38.3 C) AND [2] age > 60    Negative: [1] Fever > 100.0 F (37.8 C) AND [2] bedridden (e.g., nursing home patient, CVA, chronic illness, recovering from surgery)    Negative: [1] Fever > 101 F (38.3 C) AND [2] co-morbidity risk factor for serious infection (e.g., COPD, heart failure, liver failure, renal failure with dialysis )    Negative: [1] Fever > 100.0 F (37.8 C) AND [2] diabetes mellitus or weak immune system (e.g., HIV positive, cancer chemo, splenectomy, organ transplant, chronic steroids)    Negative: Taking any of the following medications: digoxin (Lanoxin), lithium, theophylline, phenytoin (Dilantin)    Negative: Fever present > 3 days (72 hours)    Protocols used: CANCER - NAUSEA AND VOMITING-A-AH

## 2022-05-27 ENCOUNTER — INFUSION THERAPY VISIT (OUTPATIENT)
Dept: ONCOLOGY | Facility: CLINIC | Age: 47
End: 2022-05-27
Payer: OTHER GOVERNMENT

## 2022-05-27 ENCOUNTER — MYC MEDICAL ADVICE (OUTPATIENT)
Dept: ONCOLOGY | Facility: CLINIC | Age: 47
End: 2022-05-27
Payer: OTHER GOVERNMENT

## 2022-05-27 VITALS
SYSTOLIC BLOOD PRESSURE: 119 MMHG | RESPIRATION RATE: 18 BRPM | DIASTOLIC BLOOD PRESSURE: 79 MMHG | HEART RATE: 127 BPM | OXYGEN SATURATION: 97 % | TEMPERATURE: 99.1 F

## 2022-05-27 DIAGNOSIS — T45.1X5A CHEMOTHERAPY-INDUCED NEUTROPENIA (H): Primary | ICD-10-CM

## 2022-05-27 DIAGNOSIS — D70.1 CHEMOTHERAPY-INDUCED NEUTROPENIA (H): Primary | ICD-10-CM

## 2022-05-27 DIAGNOSIS — C50.911 INVASIVE DUCTAL CARCINOMA OF RIGHT BREAST (H): Primary | ICD-10-CM

## 2022-05-27 PROCEDURE — 96365 THER/PROPH/DIAG IV INF INIT: CPT

## 2022-05-27 PROCEDURE — 250N000011 HC RX IP 250 OP 636: Performed by: NURSE PRACTITIONER

## 2022-05-27 PROCEDURE — 96375 TX/PRO/DX INJ NEW DRUG ADDON: CPT

## 2022-05-27 PROCEDURE — 250N000011 HC RX IP 250 OP 636: Performed by: INTERNAL MEDICINE

## 2022-05-27 PROCEDURE — 96361 HYDRATE IV INFUSION ADD-ON: CPT

## 2022-05-27 PROCEDURE — 258N000003 HC RX IP 258 OP 636: Performed by: NURSE PRACTITIONER

## 2022-05-27 RX ORDER — HEPARIN SODIUM,PORCINE 10 UNIT/ML
5 VIAL (ML) INTRAVENOUS
Status: CANCELLED | OUTPATIENT
Start: 2022-05-31

## 2022-05-27 RX ORDER — METHYLPREDNISOLONE SODIUM SUCCINATE 125 MG/2ML
125 INJECTION, POWDER, LYOPHILIZED, FOR SOLUTION INTRAMUSCULAR; INTRAVENOUS
Status: CANCELLED
Start: 2022-05-31

## 2022-05-27 RX ORDER — ONDANSETRON 2 MG/ML
8 INJECTION INTRAMUSCULAR; INTRAVENOUS EVERY 6 HOURS PRN
Status: CANCELLED
Start: 2022-05-27

## 2022-05-27 RX ORDER — HEPARIN SODIUM (PORCINE) LOCK FLUSH IV SOLN 100 UNIT/ML 100 UNIT/ML
5 SOLUTION INTRAVENOUS
Status: CANCELLED | OUTPATIENT
Start: 2022-05-31

## 2022-05-27 RX ORDER — LORAZEPAM 2 MG/ML
0.5 INJECTION INTRAMUSCULAR EVERY 4 HOURS PRN
Status: CANCELLED | OUTPATIENT
Start: 2022-05-31

## 2022-05-27 RX ORDER — MEPERIDINE HYDROCHLORIDE 25 MG/ML
25 INJECTION INTRAMUSCULAR; INTRAVENOUS; SUBCUTANEOUS EVERY 30 MIN PRN
Status: CANCELLED | OUTPATIENT
Start: 2022-05-31

## 2022-05-27 RX ORDER — ALBUTEROL SULFATE 90 UG/1
1-2 AEROSOL, METERED RESPIRATORY (INHALATION)
Status: CANCELLED
Start: 2022-05-31

## 2022-05-27 RX ORDER — DIPHENHYDRAMINE HYDROCHLORIDE 50 MG/ML
50 INJECTION INTRAMUSCULAR; INTRAVENOUS
Status: CANCELLED
Start: 2022-05-31

## 2022-05-27 RX ORDER — EPINEPHRINE 1 MG/ML
0.3 INJECTION, SOLUTION INTRAMUSCULAR; SUBCUTANEOUS EVERY 5 MIN PRN
Status: CANCELLED | OUTPATIENT
Start: 2022-05-31

## 2022-05-27 RX ORDER — ALBUTEROL SULFATE 0.83 MG/ML
2.5 SOLUTION RESPIRATORY (INHALATION)
Status: CANCELLED | OUTPATIENT
Start: 2022-05-31

## 2022-05-27 RX ORDER — PALONOSETRON 0.05 MG/ML
0.25 INJECTION, SOLUTION INTRAVENOUS ONCE
Status: CANCELLED
Start: 2022-05-31

## 2022-05-27 RX ORDER — HEPARIN SODIUM (PORCINE) LOCK FLUSH IV SOLN 100 UNIT/ML 100 UNIT/ML
5 SOLUTION INTRAVENOUS ONCE
Status: COMPLETED | OUTPATIENT
Start: 2022-05-27 | End: 2022-05-27

## 2022-05-27 RX ORDER — NALOXONE HYDROCHLORIDE 0.4 MG/ML
0.2 INJECTION, SOLUTION INTRAMUSCULAR; INTRAVENOUS; SUBCUTANEOUS
Status: CANCELLED | OUTPATIENT
Start: 2022-05-31

## 2022-05-27 RX ORDER — DOXORUBICIN HYDROCHLORIDE 2 MG/ML
60 INJECTION, SOLUTION INTRAVENOUS ONCE
Status: CANCELLED | OUTPATIENT
Start: 2022-05-31

## 2022-05-27 RX ORDER — OLANZAPINE 5 MG/1
5 TABLET, ORALLY DISINTEGRATING ORAL AT BEDTIME
Status: CANCELLED
Start: 2022-05-31

## 2022-05-27 RX ADMIN — PROCHLORPERAZINE EDISYLATE 10 MG: 5 INJECTION INTRAMUSCULAR; INTRAVENOUS at 12:03

## 2022-05-27 RX ADMIN — Medication 5 ML: at 13:51

## 2022-05-27 RX ADMIN — DEXAMETHASONE SODIUM PHOSPHATE: 10 INJECTION, SOLUTION INTRAMUSCULAR; INTRAVENOUS at 12:21

## 2022-05-27 RX ADMIN — SODIUM CHLORIDE 1000 ML: 9 INJECTION, SOLUTION INTRAVENOUS at 11:55

## 2022-05-27 NOTE — PATIENT INSTRUCTIONS
Contact Numbers  [facility]: [phone] (for symptom and scheduling needs)    Please call the [facility] Triage line if you experience a temperature greater than or equal to 100.4, shaking chills, have uncontrolled nausea, vomiting and/or diarrhea, dizziness, shortness of breath, chest pain, bleeding, unexplained bruising, or if you have any other new/concerning symptoms, questions or concerns.     If you are having any concerning symptoms or wish to speak to a provider before your next infusion visit, please call your care coordinator or triage to notify them so we can adequately serve you.     If you need a refill on a narcotic prescription or other medication, please call triage before your infusion appointment.           May 2022      Luis F Monday Tuesday Wednesday Thursday Friday Saturday   1     2    LAB CENTRAL   7:45 AM   (15 min.)   [facility] LAB DRAW   [facility]    RETURN   8:15 AM   (45 min.)   Milka Mota PA-C   [facility]    ONC INFUSION 2 HR (120 MIN)  10:00 AM   (120 min.)    ONC INFUSION NURSE   [facility] 3     4     5     6     7       8     9    LAB CENTRAL   7:45 AM   (15 min.)   [facility] LAB DRAW   [facility]    RETURN   8:15 AM   (45 min.)   Milka Mota PA-C   [facility]    ONC INFUSION 2 HR (120 MIN)  10:00 AM   (120 min.)    ONC INFUSION NURSE   [facility]    MR BREAST BILATERAL WWO   1:30 PM   (60 min.)   [id number]   [facility] 10    US BREAST BX CORE NEEDLE RIGHT   9:30 AM   (60 min.)   [id number]   [facility] [place] 11     12     13    RETURN   9:45 AM   (30 min.)   Keegan Lockett MD   [facility] 14       15     16     17    LAB CENTRAL   8:15 AM   (15 min.)   [facility] LAB DRAW   [facility]    ONC INFUSION 2 HR (120 MIN)   9:00 AM   (120 min.)   UC ONC INFUSION NURSE   [facility] 18     19     20     21       22     23     24    Admission   2:37 AM   [facility] Emergency Room   (Discharge: 5/24/2022)    CT CHEST PULMONARY EMBOLISM W   4:55 AM   (20 min.)   [facility] CT 2   M [facility] CT 25     26     27    ONC INFUSION 2 HR (120 MIN)  11:30 AM   (120 min.)   UC ONC INFUSION NURSE   [facility] 28       29     30     31    LAB CENTRAL   9:15 AM   (15 min.)   [facility] DRAW   [facility]    RETURN   9:45 AM   (45 min.)   Milka Mota PA-C   [facility]    ONC INFUSION 2 HR (120 MIN)  11:00 AM   (120 min.)   UC ONC INFUSION NURSE   [facility]                                   June 2022 Sunday Monday Tuesday Wednesday Thursday Friday Saturday                  1     2     3     4       5     6     7     8     9     10     11       12     13    LAB CENTRAL   9:45 AM   (15 min.)   [facility] DRAW   [facility]    RETURN  10:15 AM   (45 min.)   Milka Mota PA-C   [facility]    ONC INFUSION 2 HR (120 MIN)  12:00 PM   (120 min.)   UC ONC INFUSION NURSE   [facility] 14     15     16     17    RETURN   9:45 AM   (20 min.)   Gurpreet Layton MD   [facility] 18       19     20     21     22     23     24    RETURN  10:45 AM   (30 min.)   Keegan Lockett MD   [facility] 25       26     27    LAB CENTRAL   9:15 AM   (15 min.)   [facility] DRAW   [facility]    ONC INFUSION 2 HR (120 MIN)  10:00 AM   (120 min.)   UC ONC INFUSION NURSE   [facility] 28     29     30                                  Lab Results:  No results found for this or any previous visit (from the past 12 hour(s)).

## 2022-05-27 NOTE — PROGRESS NOTES
Infusion Nursing Note:  Tori Steele presents today for IV Fluids and Antiemetics.    Patient seen by provider today: No   present during visit today: Not Applicable.    Note: Patient presents to infusion today not feeling well. Reports ongoing nausea and dry heaving over the past few days. Has not been able to keep many solid foods down. Trying to push fluids at home. Diarrhea that started yesterday, has only had 2 loose stools today. Has not yet taken any imodium but will do so when she gets home. States that she last took oral zofran this morning at 1020. HR elevated at 139 upon arrival, Deborah Alcala NP aware.    Dr. Lockett came over to talk with patient while in infusion.    1L IV fluids, emend/dexamethasone, and IV compazine given. Patient states she is feeling much better post infusion. HR still elevated in 120's upon completion. Patient states her heart rate usually runs high. She would like to be discharged as she has to go  her son from school.    TORB @ 1151 Deborah Alcala NP/ Tereza Lazo RN:  - OK to discharge patient home    Intravenous Access:  Implanted Port.    Treatment Conditions:  Not Applicable.    Post Infusion Assessment:  Patient tolerated infusion without incident.  Blood return noted pre and post infusion.  Site patent and intact, free from redness, edema or discomfort.  No evidence of extravasations.  Access discontinued per protocol.     Discharge Plan:   Prescription refills given for Zofran.  Discharge instructions reviewed with: Patient.  Patient and/or family verbalized understanding of discharge instructions and all questions answered.  AVS to patient via [employer].  Patient will return 5/31 for next appointment.   Patient discharged in stable condition accompanied by: self.  Departure Mode: Ambulatory.      Tereza Lazo RN

## 2022-05-27 NOTE — TELEPHONE ENCOUNTER
Yesterday would get mucous feeling in the throat and would then throw up.   Vomiting 2-3 times per day with lots of mucous/   Has tried taking Zofran prior to eating and after eating and will still vomit.   Is able to drink water and pedialyte.   Has tried drinkable yogurt and almond milk but still has episodes of vomiting and diarrhea after attempting different drinkable food sources.   No fever.   Not taking Zofran this week as she does not feel nauseated.   Explained that if takes Zofran prior to eating that can help keep her from vomiting. Asked her to try taking Zofran prior to meals to see if this will help her keep her food down.     Diarrhea today so far has had 3 loose stools, Yesterday was 2 loose stools, diarrhea started yesterday later in the day.   No blood in stools.   Has not taken anything for the diarrhea.   Try imodium 2 tablets after the first loose stool and the 1 tablet after each subsequent loose stool, up to 8 tablets per day.     Feels like she has tried Zofran in the past and it has not worked for her but she is willing to try it again.    8:11 paged Leela Hills    8:20 Leela Hills called back should do Zofran, and can also take compazine and lorazepam for Nausea   Do imodium for Diarrhea. Push Fluids     8:30  Call placed to Tori and relayed that she should   Do Zofran scheduled add in compazine if continues to have vomiting, take compazine about 3 hours after Zofran, if both of these do not help can try Lorazepam this will make you drowsy so do not drive.   Take imodium for diarrhea as previously discussed.   Push fluids.

## 2022-05-30 NOTE — PROGRESS NOTES
Oncology/Hematology Visit Note  May 31, 2022    Reason for Visit: follow up of breast cancer     History of Present Illness: Tori Steele is a 46 year old female with breat cancer. Right-sided 5.5 cm ER positive FL positive HER-2 negative breast cancer with associated DCIS.  Her MammaPrint came back as high risk.  She consented for the I-SPY clinical trial and has been randomized to the oral paclitaxel, Encequidar and dostarlimab arm.      2/21/22 started trial with oral paclitaxel, Encequidar and dostarlimab.    3/15/22  her IV immunotherapy dostarlimab was held due to diarrhea that recovered with Imodium and time  4/4/22   Carbo was added in weekly due in hopes for more significant reduction in cancer  5/17/22 AC start     Interval History:  Tori is here today for Cycle 2 of AC.     Cycle 1 was complicated by ED visit for severe back pain.  CT PE rule out was negative.  CBC showed pancytopenia at the time, but no infectious complications occurred.      She also had prolonged nausea and gagging and needed to come in for IVF on Friday 5/27.  She was also having diarrhea.  The second week was harder for her with nausea/gagging and some emesis.  By last Friday she ended up calling into triage with concerns of dehydration weight loss of 10 pounds and inability to eat.  She came in for IV fluids and received IV Dex and Emend and stated she left feeling significantly better.    Of note Saturday she woke up and had a very stuffy and runny nose.  No fevers or cough.  She did have a significant amount of improved energy compared to the prior week after the fluids.  She was able to run to target and get things done around the house.    She is struggling being able to eat.  She has very poor taste was able to get and only a few bites of food at a time and questions management of this.    No headaches or double or blurry vision. No dizziness.     No numbness or tingling of fingers or toes. No rash or itching.      Patient seen in conjunction with study RN, Miriam.     ROS:  Patient denies the following: fevers, body aches, chills, headaches, vision changes, dizziness, chest pain, shortness of breath, nausea, vomiting, diarrhea, constipation, abdominal pain, rashes, bruising/bleeding, mouth sores, swelling or pain in the legs.        Current Outpatient Prescriptions               Current Outpatient Medications   Medication Sig Dispense Refill     ferrous sulfate (FEROSUL) 325 (65 Fe) MG tablet Take 325 mg by mouth         lidocaine-prilocaine (EMLA) 2.5-2.5 % external cream Apply to port site one hour prior to access may start using six days after port placement. 30 g 1     ondansetron (ZOFRAN) 8 MG tablet Take 1 tablet (8 mg) by mouth every 8 hours as needed for nausea 30 tablet 0     prochlorperazine (COMPAZINE) 10 MG tablet Take 1 tablet (10 mg) by mouth every 6 hours as needed (Nausea/Vomiting) 30 tablet 2     riboflavin (VITAMIN  B2) 25 MG TABS                  Physical Examination: ECOG 1    LMP  (LMP Unknown)     Wt Readings from Last 4 Encounters:   22 65.8 kg (145 lb)   22 63.7 kg (140 lb 8 oz)   22 64.3 kg (141 lb 11.2 oz)   22 64.8 kg (142 lb 14.4 oz)         ECO  General: Sitting comfortably in no acute distress.  Appears tired  Head: Normocephalic, atraumatic   EENT: No scleral icteris, EOMS intact. Mucous membranes are moist, no mucositis or oral lesions  Lymph: No palpable cervical, supraclavicular, or axillary lymphadenopathy   Heart: Regular rate and rhythm, no murmurs  Lung: Normal respiratory effort. Clear to auscultation bilaterally. No wheezes, rhonchi, or crackles. No coughing throughout entire visit   Abdomen: +Bowel Sounds, soft nontender to palpation. No rebound, guarding or rigidity. No palpable masses  Neuro: AAO x3. Gait is steady. Moving all extremities   Extremities: No lower extremity edema  Skin: Warm, dry, intact. No rashes, no suspicious lesions. No petechia  or bruising noted  Breast: Right breast, 12 o'clock, mass is really challenging to palpate today.  There is a discrete ridge in the area where her tumor was but no palpable three-dimensional mass       Laboratory Data:  Most Recent 3 CBC's:       05/24/22 03:50 05/31/22 09:41   Sodium 137 139   Potassium 4.0 2.8 (L)   Chloride 104 104   Carbon Dioxide 23 28   Urea Nitrogen 8 7   Creatinine 0.58 (L) 0.39 (L)   GFR Estimate >90 [1] >90 [2]   Calcium 8.9 9.2   Anion Gap 10 7   Albumin 3.2 (L) 2.7 (L)   Protein Total 6.7 6.9   Bilirubin Total 0.9 0.3   Alkaline Phosphatase 67 62   ALT 22 22   AST 15 15   Bilirubin Direct 0.3    Lipase 57 (H)    Glucose 124 128 (H)   WBC 0.2 (LL) (C) [3] 4.9   Hemoglobin 9.7 (L) 8.6 (L)   Hematocrit 29.1 (L) 26.7 (L)   Platelet Count 65 (L) 385   RBC Count 3.17 (L) 2.79 (L)   MCV 92 96   MCH 30.6 30.8   MCHC 33.3 32.2   RDW 15.2 (H) 15.6 (H)   % Neutrophils  14   % Lymphocytes  78   % Monocytes  3   % Eosinophils  0   % Basophils  1   % Myelocytes  4   Absolute Basophils  0.0   Absolute Neutrophil  0.7 (L)     I reviewed the above labs today.       Assessment and Plan:  1. Right sided ER+, TN+, HER2-negative, MammaPrint high risk T=5.5cm, N=0 breast cancer.  Tori had her 3/15/22 Dostarlimab held due to diarrhea, resumed 4/4.  Her 6 week MRI showed improvement but with suggestion of adding in an additional agent.      Her plan includes chemotherapy now includes weekly Carbo (added in 4/4/22) with IV Emend as pre-medications,  oral paclitaxel, Encequidar and IV dostarlimab (q3w) x12 weeks followed by AC.      - Tori had a challenging for cycle of AC.  She had severe spinal pain from her Neulasta which led her to the ER.  She had nausea and emesis in her second week with anorexia.  The nausea significantly improved with fluids and antiemetics.  She still struggling with anorexia.  She also has a URI today that started on Saturday.  Due to concerns of possible COVID she was swabbed  today we decided to delay her AC.    -Given ongoing dehydration concerns and potassium of 2.8 she was given a liter of saline today with potassium supplementation     2.  Anemia.  Patient's hemoglobin was 8.6 the day of starting treatment.  She has a history of iron deficiency anemia.  Iron stores are low.  Will have her take oral iron M, W and F each week.       3.  GI:   Discussed plan for cycle 2 will include Zyprexa in the evenings starting 2 days before AC, Dex on days 2 through 4, Compazine scheduled on days 2 through 3, Zofran started 3 times daily on day 4 through day 8/9.  We will schedule her for IV fluids and antiemetics on day 6 and 8.           75 minutes spent on the date of the encounter doing chart review, review of test results, interpretation of tests, patient visit, documentation and discussion with family     Patient's COVID test came back positive.  She is currently not taking any medications except for Zofran.  The Dex and Zyprexa will be started once her cycle 2 of AC started.  She is Paxil that was sent to the pharmacy given she is on day 3-4.     Janette Mota PA-C

## 2022-05-31 ENCOUNTER — RESEARCH ENCOUNTER (OUTPATIENT)
Dept: ONCOLOGY | Facility: CLINIC | Age: 47
End: 2022-05-31

## 2022-05-31 ENCOUNTER — INFUSION THERAPY VISIT (OUTPATIENT)
Dept: ONCOLOGY | Facility: CLINIC | Age: 47
End: 2022-05-31
Attending: INTERNAL MEDICINE
Payer: OTHER GOVERNMENT

## 2022-05-31 ENCOUNTER — ONCOLOGY VISIT (OUTPATIENT)
Dept: ONCOLOGY | Facility: CLINIC | Age: 47
End: 2022-05-31
Attending: PHYSICIAN ASSISTANT
Payer: OTHER GOVERNMENT

## 2022-05-31 ENCOUNTER — TELEPHONE (OUTPATIENT)
Dept: NURSING | Facility: CLINIC | Age: 47
End: 2022-05-31

## 2022-05-31 ENCOUNTER — LAB (OUTPATIENT)
Dept: LAB | Facility: CLINIC | Age: 47
End: 2022-05-31
Attending: PHYSICIAN ASSISTANT
Payer: OTHER GOVERNMENT

## 2022-05-31 VITALS
OXYGEN SATURATION: 98 % | HEART RATE: 130 BPM | SYSTOLIC BLOOD PRESSURE: 115 MMHG | BODY MASS INDEX: 24.14 KG/M2 | DIASTOLIC BLOOD PRESSURE: 65 MMHG | RESPIRATION RATE: 16 BRPM | WEIGHT: 136.3 LBS

## 2022-05-31 DIAGNOSIS — R09.89 RUNNY NOSE: ICD-10-CM

## 2022-05-31 DIAGNOSIS — D70.1 CHEMOTHERAPY-INDUCED NEUTROPENIA (H): ICD-10-CM

## 2022-05-31 DIAGNOSIS — U07.1 INFECTION DUE TO 2019 NOVEL CORONAVIRUS: ICD-10-CM

## 2022-05-31 DIAGNOSIS — T45.1X5A CHEMOTHERAPY-INDUCED NEUTROPENIA (H): ICD-10-CM

## 2022-05-31 DIAGNOSIS — C50.911 INVASIVE DUCTAL CARCINOMA OF RIGHT BREAST (H): Primary | ICD-10-CM

## 2022-05-31 DIAGNOSIS — R11.2 NON-INTRACTABLE VOMITING WITH NAUSEA, UNSPECIFIED VOMITING TYPE: Primary | ICD-10-CM

## 2022-05-31 LAB
ALBUMIN SERPL-MCNC: 2.7 G/DL (ref 3.4–5)
ALP SERPL-CCNC: 62 U/L (ref 40–150)
ALT SERPL W P-5'-P-CCNC: 22 U/L (ref 0–50)
ANION GAP SERPL CALCULATED.3IONS-SCNC: 7 MMOL/L (ref 3–14)
AST SERPL W P-5'-P-CCNC: 15 U/L (ref 0–45)
BASOPHILS # BLD MANUAL: 0 10E3/UL (ref 0–0.2)
BASOPHILS NFR BLD MANUAL: 1 %
BILIRUB SERPL-MCNC: 0.3 MG/DL (ref 0.2–1.3)
BUN SERPL-MCNC: 7 MG/DL (ref 7–30)
CALCIUM SERPL-MCNC: 9.2 MG/DL (ref 8.5–10.1)
CHLORIDE BLD-SCNC: 104 MMOL/L (ref 94–109)
CO2 SERPL-SCNC: 28 MMOL/L (ref 20–32)
CREAT SERPL-MCNC: 0.39 MG/DL (ref 0.52–1.04)
EOSINOPHIL # BLD MANUAL: 0 10E3/UL (ref 0–0.7)
EOSINOPHIL NFR BLD MANUAL: 0 %
ERYTHROCYTE [DISTWIDTH] IN BLOOD BY AUTOMATED COUNT: 15.6 % (ref 10–15)
FRAGMENTS BLD QL SMEAR: SLIGHT
GFR SERPL CREATININE-BSD FRML MDRD: >90 ML/MIN/1.73M2
GLUCOSE BLD-MCNC: 128 MG/DL (ref 70–99)
HCT VFR BLD AUTO: 26.7 % (ref 35–47)
HGB BLD-MCNC: 8.6 G/DL (ref 11.7–15.7)
LYMPHOCYTES # BLD MANUAL: 3.8 10E3/UL (ref 0.8–5.3)
LYMPHOCYTES NFR BLD MANUAL: 78 %
MCH RBC QN AUTO: 30.8 PG (ref 26.5–33)
MCHC RBC AUTO-ENTMCNC: 32.2 G/DL (ref 31.5–36.5)
MCV RBC AUTO: 96 FL (ref 78–100)
MONOCYTES # BLD MANUAL: 0.1 10E3/UL (ref 0–1.3)
MONOCYTES NFR BLD MANUAL: 3 %
MYELOCYTES # BLD MANUAL: 0.2 10E3/UL
MYELOCYTES NFR BLD MANUAL: 4 %
NEUTROPHILS # BLD MANUAL: 0.7 10E3/UL (ref 1.6–8.3)
NEUTROPHILS NFR BLD MANUAL: 14 %
PLAT MORPH BLD: ABNORMAL
PLATELET # BLD AUTO: 385 10E3/UL (ref 150–450)
POTASSIUM BLD-SCNC: 2.8 MMOL/L (ref 3.4–5.3)
PROT SERPL-MCNC: 6.9 G/DL (ref 6.8–8.8)
RBC # BLD AUTO: 2.79 10E6/UL (ref 3.8–5.2)
RBC MORPH BLD: ABNORMAL
SARS-COV-2 RNA RESP QL NAA+PROBE: POSITIVE
SODIUM SERPL-SCNC: 139 MMOL/L (ref 133–144)
WBC # BLD AUTO: 4.9 10E3/UL (ref 4–11)

## 2022-05-31 PROCEDURE — U0005 INFEC AGEN DETEC AMPLI PROBE: HCPCS

## 2022-05-31 PROCEDURE — 36591 DRAW BLOOD OFF VENOUS DEVICE: CPT | Performed by: PHYSICIAN ASSISTANT

## 2022-05-31 PROCEDURE — 99215 OFFICE O/P EST HI 40 MIN: CPT | Performed by: PHYSICIAN ASSISTANT

## 2022-05-31 PROCEDURE — 250N000013 HC RX MED GY IP 250 OP 250 PS 637: Performed by: PHYSICIAN ASSISTANT

## 2022-05-31 PROCEDURE — G0463 HOSPITAL OUTPT CLINIC VISIT: HCPCS

## 2022-05-31 PROCEDURE — 96366 THER/PROPH/DIAG IV INF ADDON: CPT

## 2022-05-31 PROCEDURE — 99417 PROLNG OP E/M EACH 15 MIN: CPT | Performed by: PHYSICIAN ASSISTANT

## 2022-05-31 PROCEDURE — 250N000011 HC RX IP 250 OP 636: Performed by: PHYSICIAN ASSISTANT

## 2022-05-31 PROCEDURE — 250N000011 HC RX IP 250 OP 636: Performed by: INTERNAL MEDICINE

## 2022-05-31 PROCEDURE — 80053 COMPREHEN METABOLIC PANEL: CPT | Performed by: PHYSICIAN ASSISTANT

## 2022-05-31 PROCEDURE — 258N000003 HC RX IP 258 OP 636: Performed by: NURSE PRACTITIONER

## 2022-05-31 PROCEDURE — 96365 THER/PROPH/DIAG IV INF INIT: CPT

## 2022-05-31 PROCEDURE — 250N000013 HC RX MED GY IP 250 OP 250 PS 637: Performed by: INTERNAL MEDICINE

## 2022-05-31 PROCEDURE — 258N000003 HC RX IP 258 OP 636: Performed by: INTERNAL MEDICINE

## 2022-05-31 PROCEDURE — 85027 COMPLETE CBC AUTOMATED: CPT | Performed by: PHYSICIAN ASSISTANT

## 2022-05-31 PROCEDURE — 85007 BL SMEAR W/DIFF WBC COUNT: CPT | Performed by: PHYSICIAN ASSISTANT

## 2022-05-31 RX ORDER — POTASSIUM CHLORIDE 1.5 G/1.58G
40 POWDER, FOR SOLUTION ORAL ONCE
Status: COMPLETED | OUTPATIENT
Start: 2022-05-31 | End: 2022-05-31

## 2022-05-31 RX ORDER — HEPARIN SODIUM (PORCINE) LOCK FLUSH IV SOLN 100 UNIT/ML 100 UNIT/ML
5 SOLUTION INTRAVENOUS
Status: DISCONTINUED | OUTPATIENT
Start: 2022-05-31 | End: 2022-05-31 | Stop reason: HOSPADM

## 2022-05-31 RX ORDER — ONDANSETRON 2 MG/ML
8 INJECTION INTRAMUSCULAR; INTRAVENOUS EVERY 6 HOURS PRN
Status: CANCELLED
Start: 2022-05-31

## 2022-05-31 RX ORDER — OLANZAPINE 5 MG/1
TABLET ORAL
Qty: 30 TABLET | Refills: 1 | Status: SHIPPED | OUTPATIENT
Start: 2022-05-31 | End: 2022-07-27

## 2022-05-31 RX ORDER — HEPARIN SODIUM (PORCINE) LOCK FLUSH IV SOLN 100 UNIT/ML 100 UNIT/ML
5 SOLUTION INTRAVENOUS
Status: COMPLETED | OUTPATIENT
Start: 2022-05-31 | End: 2022-05-31

## 2022-05-31 RX ORDER — POTASSIUM CHLORIDE 1500 MG/1
20 TABLET, EXTENDED RELEASE ORAL ONCE
Status: COMPLETED | OUTPATIENT
Start: 2022-05-31 | End: 2022-05-31

## 2022-05-31 RX ADMIN — POTASSIUM CHLORIDE 30 MEQ: 149 INJECTION, SOLUTION, CONCENTRATE INTRAVENOUS at 12:09

## 2022-05-31 RX ADMIN — POTASSIUM CHLORIDE 20 MEQ: 1.5 POWDER, FOR SOLUTION ORAL at 12:05

## 2022-05-31 RX ADMIN — POTASSIUM CHLORIDE 20 MEQ: 1500 TABLET, EXTENDED RELEASE ORAL at 13:24

## 2022-05-31 RX ADMIN — Medication 5 ML: at 14:05

## 2022-05-31 RX ADMIN — Medication 5 ML: at 09:36

## 2022-05-31 RX ADMIN — SODIUM CHLORIDE 1000 ML: 9 INJECTION, SOLUTION INTRAVENOUS at 11:43

## 2022-05-31 ASSESSMENT — PAIN SCALES - GENERAL: PAINLEVEL: NO PAIN (0)

## 2022-05-31 NOTE — NURSING NOTE
"Oncology Rooming Note    May 31, 2022 9:49 AM   Tori Steele is a 46 year old female who presents for:    Chief Complaint   Patient presents with     Port Draw     Labs drawn from port by rn.  VS taken.     Oncology Clinic Visit     Breast cancer     Initial Vitals: /65 (BP Location: Right arm, Patient Position: Sitting, Cuff Size: Adult Regular)   Pulse (!) 130   Resp 16   Wt 61.8 kg (136 lb 4.8 oz)   LMP  (LMP Unknown)   SpO2 98%   BMI 24.14 kg/m   Estimated body mass index is 24.14 kg/m  as calculated from the following:    Height as of 5/24/22: 1.6 m (5' 3\").    Weight as of this encounter: 61.8 kg (136 lb 4.8 oz). Body surface area is 1.66 meters squared.  No Pain (0) Comment: Data Unavailable   No LMP recorded (lmp unknown). (Menstrual status: Chemotherapy).  Allergies reviewed: Yes  Medications reviewed: Yes    Medications: MEDICATION REFILLS NEEDED TODAY. Provider was notified. Pt would like Compazine refilled.    Pharmacy name entered into [facility]:    [id number] - [place], [place] - [address] AT [facility] [address]  [facility]  [facility] [place], [place] - [address] [address] [facility] [facility] [phone]    Clinical concerns: none       Lindsay Ceballos"

## 2022-05-31 NOTE — PROGRESS NOTES
Infusion Nursing Note:  Tori Steele presents today for IVF, potassium replacement.    Patient seen by provider today: Yes: JESSY Maria   present during visit today: Not Applicable.    Note: TORB: JESSY Owen/Miriam Conroy RN.  -delaying chemo today due to feeling run down from first cycle  -give 1 liter NS  -replace potassium per protocol, ok to give some oral and some IV  -Swab for COVID-19 due to runny nose    Potassium replaced with 30 IV and then tried oral potassium packets for 40 meq but patient was only able to get down a little of the first 20 meq packet, stated it made her stomach turn. Pt took KDUR 20 meq instead of the last potassium packet without incident.     Intravenous Access:  Implanted Port.    Treatment Conditions:  Lab Results   Component Value Date    HGB 8.6 (L) 05/31/2022    WBC 4.9 05/31/2022    ANEU 0.7 (L) 05/31/2022    ANEUTAUTO 1.3 (L) 05/17/2022     05/31/2022      Lab Results   Component Value Date     05/31/2022    POTASSIUM 2.8 (L) 05/31/2022    MAG 1.7 (L) 02/07/2022    CR 0.39 (L) 05/31/2022    JENNIE 9.2 05/31/2022    BILITOTAL 0.3 05/31/2022    ALBUMIN 2.7 (L) 05/31/2022    ALT 22 05/31/2022    AST 15 05/31/2022         Post Infusion Assessment:  Patient tolerated infusion without incident.  Blood return noted pre and post infusion.  Site patent and intact, free from redness, edema or discomfort.  No evidence of extravasations.  Access discontinued per protocol.       Discharge Plan:   Prescription refills given for zyprexa, compazine.  Discharge instructions reviewed with: Patient.  Patient and/or family verbalized understanding of discharge instructions and all questions answered.  AVS to patient via [employer].  Patient will return later this week for AC- pt added on to wait list for next appointment.   Patient discharged in stable condition accompanied by: self.  Departure Mode: Ambulatory.      Miriam Conroy RN

## 2022-05-31 NOTE — NURSING NOTE
[id number]: Study Visit Note   Subject name: Tori Steele     Visit: C14D1 (delaying treatment this week)    Did the study visit occur within the appropriate window allowed by the protocol? yes    Since the last study visit, She has been doing fair. She has upper respiratory symptoms today and will be tested for covid and receive additional IVF and antiemetics today. Today's treatment will be delayed in light of these things.     I have personally interviewed Tori Steele and reviewed her medical record for adverse events and concomitant medications and these have been recorded on the corresponding logs in Tori Steele's research file.     Tori Steele was given the opportunity to ask any trial related questions.  Please see provider progress note for physical exam and other clinical information. Labs were reviewed - any significant lab values were addressed and reviewed.    CHARISMA Babb, RN  CRC-RN,   Pager: [phone]

## 2022-05-31 NOTE — PATIENT INSTRUCTIONS
[facility] Triage and after hours / weekends / holidays:  [phone]    Please call the triage or after hours line if you experience a temperature greater than or equal to 100.4, shaking chills, have uncontrolled nausea, vomiting and/or diarrhea, dizziness, shortness of breath, chest pain, bleeding, unexplained bruising, or if you have any other new/concerning symptoms, questions or concerns.      If you are having any concerning symptoms or wish to speak to a provider before your next infusion visit, please call your care coordinator or triage to notify them so we can adequately serve you.     If you need a refill on a narcotic prescription or other medication, please call before your infusion appointment.                May 2022      Luis F Monday Tuesday Wednesday Thursday Friday Saturday   1     2    LAB CENTRAL   7:45 AM   (15 min.)   [facility] LAB DRAW   [facility]    RETURN   8:15 AM   (45 min.)   Milka Mota PA-C   [facility]    ONC INFUSION 2 HR (120 MIN)  10:00 AM   (120 min.)    ONC INFUSION NURSE   [facility] 3     4     5     6     7       8     9    LAB CENTRAL   7:45 AM   (15 min.)   [facility] LAB DRAW   [facility]    RETURN   8:15 AM   (45 min.)   Milka Mota PA-C   [facility]    ONC INFUSION 2 HR (120 MIN)  10:00 AM   (120 min.)    ONC INFUSION NURSE   [facility]    MR BREAST BILATERAL WWO   1:30 PM   (60 min.)   [id number]   [facility] 10    US BREAST BX CORE NEEDLE RIGHT   9:30 AM   (60 min.)   [id number]   [facility] 11     12     13    RETURN   9:45 AM   (30 min.)   Keegan Lockett MD   [facility] 14       15     16     17    LAB CENTRAL   8:15 AM   (15 min.)   [facility] LAB DRAW   [facility]    ONC INFUSION 2 HR (120 MIN)   9:00 AM   (120 min.)   [facility] ONC INFUSION NURSE   [facility] 18     19     20     21       22     23     24    Admission   2:37 AM   [facility] Emergency Room   (Discharge: 5/24/2022)    CT CHEST PULMONARY EMBOLISM W   4:55 AM   (20 min.)   [facility] CT 2   M [facility] CT 25     26     27    ONC INFUSION 2 HR (120 MIN)  11:30 AM   (120 min.)   [facility] ONC INFUSION NURSE   [facility] 28       29     30     31    LAB CENTRAL   9:15 AM   (15 min.)   [facility] DRAW   [facility]    RETURN   9:45 AM   (45 min.)   Milka Mota PA-C   [facility]    ONC INFUSION 2 HR (120 MIN)  11:00 AM   (120 min.)   [facility] ONC INFUSION NURSE   [facility]                                   June 2022 Sunday Monday Tuesday Wednesday Thursday Friday Saturday                  1     2     3     4       5     6     7     8     9     10     11       12     13    LAB CENTRAL   9:45 AM   (15 min.)   [facility] DRAW   [facility]    RETURN  10:15 AM   (45 min.)   Milka Mota PA-C   [facility]    ONC INFUSION 2 HR (120 MIN)  12:00 PM   (120 min.)   [facility] ONC INFUSION NURSE   [facility] 14     15     16     17    RETURN   9:45 AM   (20 min.)   Gurpreet Layton MD   [facility] 18       19     20     21     22     23     24    RETURN  10:45 AM   (30 min.)   Keegan Lockett MD   [facility] 25       26     27    LAB CENTRAL   9:15 AM   (15 min.)   [facility] DRAW   [facility]    ONC INFUSION 2 HR (120 MIN)  10:00 AM   (120 min.)   [facility] ONC INFUSION NURSE   [facility] 28     29     30                                  Lab Results:  Recent Results (from the past 12 hour(s))   Comprehensive metabolic panel    Collection Time: 05/31/22  9:41 AM   Result Value Ref Range    Sodium 139 133 - 144 mmol/L    Potassium 2.8 (L) 3.4 - 5.3 mmol/L    Chloride 104 94 - 109 mmol/L    Carbon Dioxide (CO2) 28 20 - 32 mmol/L    Anion Gap 7 3 - 14 mmol/L    Urea Nitrogen 7 7 - 30 mg/dL    Creatinine 0.39 (L) 0.52 - 1.04 mg/dL    Calcium 9.2 8.5 - 10.1 mg/dL    Glucose 128 (H) 70 - 99 mg/dL    Alkaline Phosphatase 62 40 - 150 U/L    AST 15 0 - 45 U/L    ALT 22 0 - 50 U/L    Protein Total 6.9 6.8 - 8.8 g/dL    Albumin 2.7 (L) 3.4 - 5.0 g/dL    Bilirubin Total 0.3 0.2 - 1.3 mg/dL    GFR Estimate >90 >60 mL/min/1.73m2   CBC with platelets and differential    Collection Time: 05/31/22  9:41 AM   Result Value Ref Range    WBC Count 4.9 4.0 - 11.0 10e3/uL    RBC Count 2.79 (L) 3.80 - 5.20 10e6/uL    Hemoglobin 8.6 (L) 11.7 - 15.7 g/dL    Hematocrit 26.7 (L) 35.0 - 47.0 %    MCV 96 78 - 100 fL    MCH 30.8 26.5 - 33.0 pg    MCHC 32.2 31.5 - 36.5 g/dL    RDW 15.6 (H) 10.0 - 15.0 %    Platelet Count 385 150 - 450 10e3/uL   Manual Differential    Collection Time: 05/31/22  9:41 AM   Result Value Ref Range    % Neutrophils 14 %    % Lymphocytes 78 %    % Monocytes 3 %    % Eosinophils 0 %    % Basophils 1 %    % Myelocytes 4 %    Absolute Neutrophils 0.7 (L) 1.6 - 8.3 10e3/uL    Absolute Lymphocytes 3.8 0.8 - 5.3 10e3/uL    Absolute Monocytes 0.1 0.0 - 1.3 10e3/uL    Absolute Eosinophils 0.0 0.0 - 0.7 10e3/uL    Absolute Basophils 0.0 0.0 - 0.2 10e3/uL    Absolute Myelocytes 0.2 (H) <=0.0 10e3/uL    RBC Morphology Confirmed RBC Indices     Platelet Assessment  Automated Count Confirmed. Platelet morphology is normal.     Automated Count Confirmed. Platelet morphology is normal.    RBC Fragments Slight (A) None Seen

## 2022-05-31 NOTE — TELEPHONE ENCOUNTER
Coronavirus (COVID-19) Notification    Caller Name (Patient, parent, daughter/son, grandparent, etc)  patient    Reason for call  Notify of Positive Coronavirus (COVID-19) lab results, assess symptoms,  review [facility] recommendations    Lab Result    Lab test:  2019-nCoV rRt-PCR or SARS-CoV-2 PCR    Oropharyngeal AND/OR nasopharyngeal swabs is POSITIVE for 2019-nCoV RNA/SARS-COV-2 PCR (COVID-19 virus)      Gather patient reported symptoms   Assessment   Current Symptoms at time of phone call, reported by patient: (if no symptoms, document: No symptoms] Runny nose   Date of symptom(s) onset (if applicable) 5/27/22     If at time of call, Patients symptoms have worsened, the Patient should contact 911 or have someone drive them to Emergency Dept promptly:      If Patient calling 911, inform 911 personal that you have tested positive for the Coronavirus (COVID-19).  Place mask on and await 911 to arrive.    If Emergency Dept, If possible, please have another adult drive you to the Emergency Dept but you need to wear mask when in contact with other people.      Treatment Options:   Patient classified as COVID treatment eligible by Epic high risk algorithm: Yes  Is the patient symptomatic at the time of result notification? Yes. Was the onset of symptoms within the last 5 days? Yes.   There are now oral medications available for the treatment of COVID-19.  Taking one of these medications within the first five days of symptoms (when people may not yet feel severely ill) has been shown to make people feel better, prevent them from getting sicker, and preventing hospitalization and death.   Does the patient agree to have a visit with a provider to discuss treatment options? No.  Reason patient declined:  Not that sick and don't think I will get worse (save for people who possibly need it more)      Review information with Patient    Your result was positive. This means you have COVID-19 (coronavirus).    How can I  protect others?    These guidelines are for isolating before returning to work, school or .    If you DO have symptoms    Stay home and away from others     For at least 5 days after your symptoms started, AND    You are fever free for 24 hours (with no medicine that reduces fever), AND    Your other symptoms are better    Wear a mask for 10 full days anytime you are around others    If you DON'T have symptoms    Stay home and away from others for at least 5 days after your positive test    Wear a mask for 10 full days anytime you are around others    There may be different guidelines for healthcare facilities.  Please check with the specific sites before arriving.    If you have been told by a doctor that you were severely ill with COVID-19 or are immunocompromised, you should isolate for at least 10 days.    You should not go back to work until you meet the guidelines above for ending your home isolation. You don't need to be retested for COVID-19 before going back to work--studies show that you won't spread the virus if it's been at least 10 days since your symptoms started (or 20 days, if you have a weak immune system).    Employers, schools, and daycares: This is an official notice for this person's medical guidelines for returning in-person.  They must meet the above guidelines before going back to work, school or  in person.    You will receive a positive COVID-19 letter via [employer] or the mail soon with additional self-care information (exception, no letters will be sent to presurgical/preprocedure patients).    Would you like me to review some of that information with you now?  No    If you were tested for an upcoming procedure, please contact your provider for next steps.    Kristel Sharma

## 2022-05-31 NOTE — NURSING NOTE
"Chief Complaint   Patient presents with     Port Draw     Labs drawn from port by rn.  VS taken.     Port accessed with 20 gauge 3/4\" gripper needle and labs drawn by rn.  Port flushed with NS and heparin.  Pt tolerated well.  VS taken.  Pt checked in for next appt.    Thuy Palafox RN      "

## 2022-05-31 NOTE — LETTER
5/31/2022         RE: Tori Steele  [address] [address]        Dear Colleague,    Thank you for referring your patient, Tori Steele, to the [facility]. Please see a copy of my visit note below.       Oncology/Hematology Visit Note  May 31, 2022    Reason for Visit: follow up of breast cancer     History of Present Illness: Tori Steele is a 46 year old female with breat cancer. Right-sided 5.5 cm ER positive MI positive HER-2 negative breast cancer with associated DCIS.  Her MammaPrint came back as high risk.  She consented for the I-SPY clinical trial and has been randomized to the oral paclitaxel, Encequidar and dostarlimab arm.      2/21/22 started trial with oral paclitaxel, Encequidar and dostarlimab.    3/15/22  her IV immunotherapy dostarlimab was held due to diarrhea that recovered with Imodium and time  4/4/22   Carbo was added in weekly due in hopes for more significant reduction in cancer  5/17/22 AC start     Interval History:  Tori is here today for Cycle 2 of AC.     Cycle 1 was complicated by ED visit for severe back pain.  CT PE rule out was negative.  CBC showed pancytopenia at the time, but no infectious complications occurred.      She also had prolonged nausea and gagging and needed to come in for IVF on Friday 5/27.  She was also having diarrhea.  The second week was harder for her with nausea/gagging and some emesis.  By last Friday she ended up calling into triage with concerns of dehydration weight loss of 10 pounds and inability to eat.  She came in for IV fluids and received IV Dex and Emend and stated she left feeling significantly better.    Of note Saturday she woke up and had a very stuffy and runny nose.  No fevers or cough.  She did have a significant amount of improved energy compared to the prior week after the fluids.  She was able to run to [employer] and get things done around the house.    She is struggling being  able to eat.  She has very poor taste was able to get and only a few bites of food at a time and questions management of this.    No headaches or double or blurry vision. No dizziness.     No numbness or tingling of fingers or toes. No rash or itching.     Patient seen in conjunction with study RN, Miriam.     ROS:  Patient denies the following: fevers, body aches, chills, headaches, vision changes, dizziness, chest pain, shortness of breath, nausea, vomiting, diarrhea, constipation, abdominal pain, rashes, bruising/bleeding, mouth sores, swelling or pain in the legs.        Current Outpatient Prescriptions               Current Outpatient Medications   Medication Sig Dispense Refill     ferrous sulfate (FEROSUL) 325 (65 Fe) MG tablet Take 325 mg by mouth         lidocaine-prilocaine (EMLA) 2.5-2.5 % external cream Apply to port site one hour prior to access may start using six days after port placement. 30 g 1     ondansetron (ZOFRAN) 8 MG tablet Take 1 tablet (8 mg) by mouth every 8 hours as needed for nausea 30 tablet 0     prochlorperazine (COMPAZINE) 10 MG tablet Take 1 tablet (10 mg) by mouth every 6 hours as needed (Nausea/Vomiting) 30 tablet 2     riboflavin (VITAMIN  B2) 25 MG TABS                  Physical Examination: ECOG 1    LMP  (LMP Unknown)     Wt Readings from Last 4 Encounters:   22 65.8 kg (145 lb)   22 63.7 kg (140 lb 8 oz)   22 64.3 kg (141 lb 11.2 oz)   22 64.8 kg (142 lb 14.4 oz)         ECO  General: Sitting comfortably in no acute distress.  Appears tired  Head: Normocephalic, atraumatic   EENT: No scleral icteris, EOMS intact. Mucous membranes are moist, no mucositis or oral lesions  Lymph: No palpable cervical, supraclavicular, or axillary lymphadenopathy   Heart: Regular rate and rhythm, no murmurs  Lung: Normal respiratory effort. Clear to auscultation bilaterally. No wheezes, rhonchi, or crackles. No coughing throughout entire visit   Abdomen: +Bowel  Sounds, soft nontender to palpation. No rebound, guarding or rigidity. No palpable masses  Neuro: AAO x3. Gait is steady. Moving all extremities   Extremities: No lower extremity edema  Skin: Warm, dry, intact. No rashes, no suspicious lesions. No petechia or bruising noted  Breast: Right breast, 12 o'clock, mass is really challenging to palpate today.  There is a discrete ridge in the area where her tumor was but no palpable three-dimensional mass       Laboratory Data:  Most Recent 3 CBC's:       05/24/22 03:50 05/31/22 09:41   Sodium 137 139   Potassium 4.0 2.8 (L)   Chloride 104 104   Carbon Dioxide 23 28   Urea Nitrogen 8 7   Creatinine 0.58 (L) 0.39 (L)   GFR Estimate >90 [1] >90 [2]   Calcium 8.9 9.2   Anion Gap 10 7   Albumin 3.2 (L) 2.7 (L)   Protein Total 6.7 6.9   Bilirubin Total 0.9 0.3   Alkaline Phosphatase 67 62   ALT 22 22   AST 15 15   Bilirubin Direct 0.3    Lipase 57 (H)    Glucose 124 128 (H)   WBC 0.2 (LL) (C) [3] 4.9   Hemoglobin 9.7 (L) 8.6 (L)   Hematocrit 29.1 (L) 26.7 (L)   Platelet Count 65 (L) 385   RBC Count 3.17 (L) 2.79 (L)   MCV 92 96   MCH 30.6 30.8   MCHC 33.3 32.2   RDW 15.2 (H) 15.6 (H)   % Neutrophils  14   % Lymphocytes  78   % Monocytes  3   % Eosinophils  0   % Basophils  1   % Myelocytes  4   Absolute Basophils  0.0   Absolute Neutrophil  0.7 (L)     I reviewed the above labs today.       Assessment and Plan:  1. Right sided ER+, DC+, HER2-negative, MammaPrint high risk T=5.5cm, N=0 breast cancer.  Tori had her 3/15/22 Dostarlimab held due to diarrhea, resumed 4/4.  Her 6 week MRI showed improvement but with suggestion of adding in an additional agent.      Her plan includes chemotherapy now includes weekly Carbo (added in 4/4/22) with IV Emend as pre-medications,  oral paclitaxel, Encequidar and IV dostarlimab (q3w) x12 weeks followed by TREMAYNE Valle had a challenging for cycle of AC.  She had severe spinal pain from her Neulasta which led her to the ER.  She had  nausea and emesis in her second week with anorexia.  The nausea significantly improved with fluids and antiemetics.  She still struggling with anorexia.  She also has a URI today that started on Saturday.  Due to concerns of possible COVID she was swabbed today we decided to delay her AC.    -Given ongoing dehydration concerns and potassium of 2.8 she was given a liter of saline today with potassium supplementation     2.  Anemia.  Patient's hemoglobin was 8.6 the day of starting treatment.  She has a history of iron deficiency anemia.  Iron stores are low.  Will have her take oral iron M, W and F each week.       3.  GI:   Discussed plan for cycle 2 will include Zyprexa in the evenings starting 2 days before AC, Dex on days 2 through 4, Compazine scheduled on days 2 through 3, Zofran started 3 times daily on day 4 through day 8/9.  We will schedule her for IV fluids and antiemetics on day 6 and 8.       75 minutes spent on the date of the encounter doing chart review, review of test results, interpretation of tests, patient visit, documentation and discussion with family     Patient's COVID test came back positive.  She is currently not taking any medications except for Zofran.  The Dex and Zyprexa will be started once her cycle 2 of AC started.  She is Paxil that was sent to the pharmacy given she is on day 3-4.     Janette Mota PA-C

## 2022-06-01 ENCOUNTER — MYC MEDICAL ADVICE (OUTPATIENT)
Dept: ONCOLOGY | Facility: CLINIC | Age: 47
End: 2022-06-01
Payer: OTHER GOVERNMENT

## 2022-06-01 NOTE — TELEPHONE ENCOUNTER
Nurse Triage SBAR      Situation: positive covid     Background: 46 year old female with breat cancer. Right-sided 5.5 cm ER positive [place] positive HER-2 negative breast cancer with associated DCIS    Assessment:   Writer called pt per provider request. She tested positive with covid yesterday. She states symptoms started Friday. She had runny nose since then. She denies fever, body ache, fatigue, cough, new onset of loss of taste or smell. Denies HA,  GI symptoms or decrease in urination.   Denies SOB or CP.   She has been using humidifier and drinking water consistently, reports good appetite.         Recommendation:   Advised pt to start prescription prescribed by provider today.   Prescription sent to pharmacy,   Continue with fluid intake ~ 64oz/day   Continue social distancing, wash hands and disinfect frequently.  Report worsening of symptoms to triage line.    advised pt to call 911/go to ED if start having:   Trouble breathing  Persistent pain or pressure in the chest  New confusion  Inability to wake or stay awake  Pale, gray, or blue-colored skin, lips, or nail beds, depending on skin tone    Pt voiced understanding and agreement with plan.       Routed to provider and Paged to provider Janette Mota PA-C

## 2022-06-03 ENCOUNTER — PATIENT OUTREACH (OUTPATIENT)
Dept: ONCOLOGY | Facility: CLINIC | Age: 47
End: 2022-06-03
Payer: OTHER GOVERNMENT

## 2022-06-03 NOTE — PROGRESS NOTES
"Spoke to patient with COVID + on 5/31/2022 feeling better today and went on a walk with her . Taste has returned and eating \"a lot of chicken\" and tuna. Drinking plenty of water and continues to take Zofran Q 8 hours. Not constipated and instructed to call if becomes constipated before a problem starts knows to call Nurse Triage. Asked about C2 AC as is scheduled next 6/13/2022 if that will be C2 AC. Will send message to Janette Mota to clarify next chemotherapy cycle.  Answered all patient's questions and verbalized understanding. bEony Lucio RN, BSN.   "

## 2022-06-07 ENCOUNTER — PATIENT OUTREACH (OUTPATIENT)
Dept: ONCOLOGY | Facility: CLINIC | Age: 47
End: 2022-06-07
Payer: OTHER GOVERNMENT

## 2022-06-07 NOTE — PROGRESS NOTES
Spoke to patient with Janette Mota's recommendations for AC this week and sent message to scheduling to be put on a wait list for AC. Will keep 6/13/2022 for IVF. Patient feeling much better with taste and smell coming back. Has nasal congestion with clear drainage and has remained afebrile. Eating and drinking fluids w/o difficulty.  Answered all patient's questions and verbalized understanding. Ebony Lucio RN, BSN.

## 2022-06-08 DIAGNOSIS — C50.911 INVASIVE DUCTAL CARCINOMA OF RIGHT BREAST (H): Primary | ICD-10-CM

## 2022-06-08 RX ORDER — DIPHENHYDRAMINE HYDROCHLORIDE 50 MG/ML
50 INJECTION INTRAMUSCULAR; INTRAVENOUS
Status: CANCELLED
Start: 2022-06-10

## 2022-06-08 RX ORDER — EPINEPHRINE 1 MG/ML
0.3 INJECTION, SOLUTION INTRAMUSCULAR; SUBCUTANEOUS EVERY 5 MIN PRN
Status: CANCELLED | OUTPATIENT
Start: 2022-06-10

## 2022-06-08 RX ORDER — ALBUTEROL SULFATE 90 UG/1
1-2 AEROSOL, METERED RESPIRATORY (INHALATION)
Status: CANCELLED
Start: 2022-06-10

## 2022-06-08 RX ORDER — METHYLPREDNISOLONE SODIUM SUCCINATE 125 MG/2ML
125 INJECTION, POWDER, LYOPHILIZED, FOR SOLUTION INTRAMUSCULAR; INTRAVENOUS
Status: CANCELLED
Start: 2022-06-10

## 2022-06-08 RX ORDER — NALOXONE HYDROCHLORIDE 0.4 MG/ML
0.2 INJECTION, SOLUTION INTRAMUSCULAR; INTRAVENOUS; SUBCUTANEOUS
Status: CANCELLED | OUTPATIENT
Start: 2022-06-10

## 2022-06-08 RX ORDER — DEXAMETHASONE 4 MG/1
8 TABLET ORAL DAILY
Qty: 6 TABLET | Refills: 3 | Status: SHIPPED | OUTPATIENT
Start: 2022-06-08 | End: 2022-07-27

## 2022-06-08 RX ORDER — PALONOSETRON 0.05 MG/ML
0.25 INJECTION, SOLUTION INTRAVENOUS ONCE
Status: CANCELLED
Start: 2022-06-10 | End: 2022-06-10

## 2022-06-08 RX ORDER — ALBUTEROL SULFATE 0.83 MG/ML
2.5 SOLUTION RESPIRATORY (INHALATION)
Status: CANCELLED | OUTPATIENT
Start: 2022-06-10

## 2022-06-08 RX ORDER — MEPERIDINE HYDROCHLORIDE 25 MG/ML
25 INJECTION INTRAMUSCULAR; INTRAVENOUS; SUBCUTANEOUS EVERY 30 MIN PRN
Status: CANCELLED | OUTPATIENT
Start: 2022-06-10

## 2022-06-08 RX ORDER — LORAZEPAM 2 MG/ML
0.5 INJECTION INTRAMUSCULAR EVERY 4 HOURS PRN
Status: CANCELLED | OUTPATIENT
Start: 2022-06-10

## 2022-06-08 RX ORDER — DOXORUBICIN HYDROCHLORIDE 2 MG/ML
60 INJECTION, SOLUTION INTRAVENOUS ONCE
Status: CANCELLED | OUTPATIENT
Start: 2022-06-10

## 2022-06-08 RX ORDER — HEPARIN SODIUM,PORCINE 10 UNIT/ML
5 VIAL (ML) INTRAVENOUS
Status: CANCELLED | OUTPATIENT
Start: 2022-06-10

## 2022-06-08 RX ORDER — HEPARIN SODIUM (PORCINE) LOCK FLUSH IV SOLN 100 UNIT/ML 100 UNIT/ML
5 SOLUTION INTRAVENOUS
Status: CANCELLED | OUTPATIENT
Start: 2022-06-10

## 2022-06-10 ENCOUNTER — RESEARCH ENCOUNTER (OUTPATIENT)
Dept: ONCOLOGY | Facility: CLINIC | Age: 47
End: 2022-06-10

## 2022-06-10 ENCOUNTER — INFUSION THERAPY VISIT (OUTPATIENT)
Dept: ONCOLOGY | Facility: CLINIC | Age: 47
End: 2022-06-10
Attending: PHYSICIAN ASSISTANT
Payer: OTHER GOVERNMENT

## 2022-06-10 ENCOUNTER — APPOINTMENT (OUTPATIENT)
Dept: LAB | Facility: CLINIC | Age: 47
End: 2022-06-10
Attending: PHYSICIAN ASSISTANT
Payer: OTHER GOVERNMENT

## 2022-06-10 VITALS
HEART RATE: 127 BPM | DIASTOLIC BLOOD PRESSURE: 70 MMHG | RESPIRATION RATE: 16 BRPM | SYSTOLIC BLOOD PRESSURE: 104 MMHG | TEMPERATURE: 98.8 F | WEIGHT: 136.4 LBS | BODY MASS INDEX: 24.16 KG/M2 | OXYGEN SATURATION: 100 %

## 2022-06-10 DIAGNOSIS — C50.911 INVASIVE DUCTAL CARCINOMA OF RIGHT BREAST (H): Primary | ICD-10-CM

## 2022-06-10 LAB
ALBUMIN SERPL-MCNC: 3 G/DL (ref 3.4–5)
ALP SERPL-CCNC: 67 U/L (ref 40–150)
ALT SERPL W P-5'-P-CCNC: 22 U/L (ref 0–50)
ANION GAP SERPL CALCULATED.3IONS-SCNC: 5 MMOL/L (ref 3–14)
AST SERPL W P-5'-P-CCNC: 15 U/L (ref 0–45)
BASOPHILS # BLD AUTO: 0 10E3/UL (ref 0–0.2)
BASOPHILS NFR BLD AUTO: 1 %
BILIRUB SERPL-MCNC: 0.3 MG/DL (ref 0.2–1.3)
BUN SERPL-MCNC: 10 MG/DL (ref 7–30)
CALCIUM SERPL-MCNC: 8.8 MG/DL (ref 8.5–10.1)
CHLORIDE BLD-SCNC: 108 MMOL/L (ref 94–109)
CO2 SERPL-SCNC: 29 MMOL/L (ref 20–32)
CREAT SERPL-MCNC: 0.56 MG/DL (ref 0.52–1.04)
EOSINOPHIL # BLD AUTO: 0 10E3/UL (ref 0–0.7)
EOSINOPHIL NFR BLD AUTO: 0 %
ERYTHROCYTE [DISTWIDTH] IN BLOOD BY AUTOMATED COUNT: 17.4 % (ref 10–15)
GFR SERPL CREATININE-BSD FRML MDRD: >90 ML/MIN/1.73M2
GLUCOSE BLD-MCNC: 132 MG/DL (ref 70–99)
HCT VFR BLD AUTO: 28.5 % (ref 35–47)
HGB BLD-MCNC: 9.1 G/DL (ref 11.7–15.7)
IMM GRANULOCYTES # BLD: 0 10E3/UL
IMM GRANULOCYTES NFR BLD: 0 %
LYMPHOCYTES # BLD AUTO: 0.8 10E3/UL (ref 0.8–5.3)
LYMPHOCYTES NFR BLD AUTO: 17 %
MCH RBC QN AUTO: 31.6 PG (ref 26.5–33)
MCHC RBC AUTO-ENTMCNC: 31.9 G/DL (ref 31.5–36.5)
MCV RBC AUTO: 99 FL (ref 78–100)
MONOCYTES # BLD AUTO: 0.6 10E3/UL (ref 0–1.3)
MONOCYTES NFR BLD AUTO: 12 %
NEUTROPHILS # BLD AUTO: 3.5 10E3/UL (ref 1.6–8.3)
NEUTROPHILS NFR BLD AUTO: 70 %
NRBC # BLD AUTO: 0 10E3/UL
NRBC BLD AUTO-RTO: 0 /100
PLATELET # BLD AUTO: 457 10E3/UL (ref 150–450)
POTASSIUM BLD-SCNC: 3.5 MMOL/L (ref 3.4–5.3)
PROT SERPL-MCNC: 6.9 G/DL (ref 6.8–8.8)
RBC # BLD AUTO: 2.88 10E6/UL (ref 3.8–5.2)
SODIUM SERPL-SCNC: 142 MMOL/L (ref 133–144)
WBC # BLD AUTO: 4.9 10E3/UL (ref 4–11)

## 2022-06-10 PROCEDURE — 85025 COMPLETE CBC W/AUTO DIFF WBC: CPT | Performed by: PHYSICIAN ASSISTANT

## 2022-06-10 PROCEDURE — 250N000011 HC RX IP 250 OP 636: Performed by: PHYSICIAN ASSISTANT

## 2022-06-10 PROCEDURE — 96377 APPLICATON ON-BODY INJECTOR: CPT | Mod: 59

## 2022-06-10 PROCEDURE — 80053 COMPREHEN METABOLIC PANEL: CPT | Performed by: PHYSICIAN ASSISTANT

## 2022-06-10 PROCEDURE — 96411 CHEMO IV PUSH ADDL DRUG: CPT

## 2022-06-10 PROCEDURE — 96375 TX/PRO/DX INJ NEW DRUG ADDON: CPT

## 2022-06-10 PROCEDURE — 96372 THER/PROPH/DIAG INJ SC/IM: CPT | Performed by: PHYSICIAN ASSISTANT

## 2022-06-10 PROCEDURE — 96367 TX/PROPH/DG ADDL SEQ IV INF: CPT

## 2022-06-10 PROCEDURE — 96413 CHEMO IV INFUSION 1 HR: CPT

## 2022-06-10 PROCEDURE — 250N000011 HC RX IP 250 OP 636: Performed by: INTERNAL MEDICINE

## 2022-06-10 PROCEDURE — 258N000003 HC RX IP 258 OP 636: Performed by: PHYSICIAN ASSISTANT

## 2022-06-10 RX ORDER — HEPARIN SODIUM (PORCINE) LOCK FLUSH IV SOLN 100 UNIT/ML 100 UNIT/ML
5 SOLUTION INTRAVENOUS ONCE
Status: COMPLETED | OUTPATIENT
Start: 2022-06-10 | End: 2022-06-10

## 2022-06-10 RX ORDER — HEPARIN SODIUM (PORCINE) LOCK FLUSH IV SOLN 100 UNIT/ML 100 UNIT/ML
5 SOLUTION INTRAVENOUS
Status: DISCONTINUED | OUTPATIENT
Start: 2022-06-10 | End: 2022-06-10 | Stop reason: HOSPADM

## 2022-06-10 RX ORDER — PALONOSETRON 0.05 MG/ML
0.25 INJECTION, SOLUTION INTRAVENOUS ONCE
Status: COMPLETED | OUTPATIENT
Start: 2022-06-10 | End: 2022-06-10

## 2022-06-10 RX ORDER — DOXORUBICIN HYDROCHLORIDE 2 MG/ML
60 INJECTION, SOLUTION INTRAVENOUS ONCE
Status: COMPLETED | OUTPATIENT
Start: 2022-06-10 | End: 2022-06-10

## 2022-06-10 RX ADMIN — SODIUM CHLORIDE 250 ML: 9 INJECTION, SOLUTION INTRAVENOUS at 13:26

## 2022-06-10 RX ADMIN — DOXORUBICIN HYDROCHLORIDE 110 MG: 2 INJECTION, SOLUTION INTRAVENOUS at 13:59

## 2022-06-10 RX ADMIN — PEGFILGRASTIM 6 MG: KIT SUBCUTANEOUS at 14:48

## 2022-06-10 RX ADMIN — DEXAMETHASONE SODIUM PHOSPHATE: 10 INJECTION, SOLUTION INTRAMUSCULAR; INTRAVENOUS at 13:29

## 2022-06-10 RX ADMIN — PALONOSETRON HYDROCHLORIDE 0.25 MG: 0.25 INJECTION INTRAVENOUS at 13:26

## 2022-06-10 RX ADMIN — CYCLOPHOSPHAMIDE 1000 MG: 1 INJECTION, POWDER, FOR SOLUTION INTRAVENOUS; ORAL at 14:13

## 2022-06-10 RX ADMIN — Medication 5 ML: at 14:46

## 2022-06-10 RX ADMIN — SODIUM CHLORIDE, PRESERVATIVE FREE 5 ML: 5 INJECTION INTRAVENOUS at 12:30

## 2022-06-10 NOTE — PATIENT INSTRUCTIONS
Contact Numbers  [facility]: [phone] (for symptom and scheduling needs)    Please call the [facility] Triage line if you experience a temperature greater than or equal to 100.4, shaking chills, have uncontrolled nausea, vomiting and/or diarrhea, dizziness, shortness of breath, chest pain, bleeding, unexplained bruising, or if you have any other new/concerning symptoms, questions or concerns.     If you are having any concerning symptoms or wish to speak to a provider before your next infusion visit, please call your care coordinator or triage to notify them so we can adequately serve you.     If you need a refill on a narcotic prescription or other medication, please call triage before your infusion appointment.           June 2022 Sunday Monday Tuesday Wednesday Thursday Friday Saturday                  1     2     3     4       5     6     7     8     9     10    LAB CENTRAL  11:45 AM   (15 min.)   [facility] LAB DRAW   [facility]    ONC INFUSION 2 HR (120 MIN)  12:30 PM   (120 min.)    ONC INFUSION NURSE   [facility] 11       12     13    LAB CENTRAL   9:45 AM   (15 min.)   [facility] LAB DRAW   [facility]    RETURN  10:15 AM   (45 min.)   Milka Mota PA-C   [facility]    ONC INFUSION 2 HR (120 MIN)  12:00 PM   (120 min.)    ONC INFUSION NURSE   [facility] 14     15     16     17    RETURN   9:45 AM   (20 min.)   Gurpreet Layton MD   [facility]    ONC INFUSION 2 HR (120 MIN)   2:00 PM   (120 min.)    ONC INFUSION NURSE   [facility] 18       19     20     21     22     23     24    RETURN  10:45 AM   (30 min.)   Keegan Lockett MD   [facility]    ONC INFUSION 2 HR (120 MIN)   1:00 PM   (120 min.)    ONC INFUSION NURSE   [facility]  Clinic 25       26     27    LAB CENTRAL   9:15 AM   (15 min.)   [facility] LAB DRAW   [facility]    ONC INFUSION 2 HR (120 MIN)  10:00 AM   (120 min.)    ONC INFUSION NURSE   [facility] 28 29 30 July 2022 Sunday Monday Tuesday Wednesday Thursday Friday Saturday                            1     2       3     4     5     6     7     8     9       10     11     12     13     14     15     16       17     18     19     20     21     22     23       24     25     26     27     28     29     30       31                                                     Lab Results:  Recent Results (from the past 12 hour(s))   Comprehensive metabolic panel    Collection Time: 06/10/22 12:05 PM   Result Value Ref Range    Sodium 142 133 - 144 mmol/L    Potassium 3.5 3.4 - 5.3 mmol/L    Chloride 108 94 - 109 mmol/L    Carbon Dioxide (CO2) 29 20 - 32 mmol/L    Anion Gap 5 3 - 14 mmol/L    Urea Nitrogen 10 7 - 30 mg/dL    Creatinine 0.56 0.52 - 1.04 mg/dL    Calcium 8.8 8.5 - 10.1 mg/dL    Glucose 132 (H) 70 - 99 mg/dL    Alkaline Phosphatase 67 40 - 150 U/L    AST 15 0 - 45 U/L    ALT 22 0 - 50 U/L    Protein Total 6.9 6.8 - 8.8 g/dL    Albumin 3.0 (L) 3.4 - 5.0 g/dL    Bilirubin Total 0.3 0.2 - 1.3 mg/dL    GFR Estimate >90 >60 mL/min/1.73m2   CBC with platelets and differential    Collection Time: 06/10/22 12:05 PM   Result Value Ref Range    WBC Count 4.9 4.0 - 11.0 10e3/uL    RBC Count 2.88 (L) 3.80 - 5.20 10e6/uL    Hemoglobin 9.1 (L) 11.7 - 15.7 g/dL    Hematocrit 28.5 (L) 35.0 - 47.0 %    MCV 99 78 - 100 fL    MCH 31.6 26.5 - 33.0 pg    MCHC 31.9 31.5 - 36.5 g/dL    RDW 17.4 (H) 10.0 - 15.0 %    Platelet Count 457 (H) 150 - 450 10e3/uL    % Neutrophils 70 %    % Lymphocytes 17 %    % Monocytes 12 %    % Eosinophils 0 %    % Basophils 1 %    % Immature Granulocytes 0 %    NRBCs per 100 WBC 0 <1 /100    Absolute Neutrophils 3.5 1.6 -  8.3 10e3/uL    Absolute Lymphocytes 0.8 0.8 - 5.3 10e3/uL    Absolute Monocytes 0.6 0.0 - 1.3 10e3/uL    Absolute Eosinophils 0.0 0.0 - 0.7 10e3/uL    Absolute Basophils 0.0 0.0 - 0.2 10e3/uL    Absolute Immature Granulocytes 0.0 <=0.4 10e3/uL    Absolute NRBCs 0.0 10e3/uL

## 2022-06-10 NOTE — NURSING NOTE
Chief Complaint   Patient presents with     Port Draw     Labs drawn by RN via port, vitals taken.     Port accessed with 20 gauge 3/4 inch power-port needle by RN, labs collected, line flushed with saline and heparin.  Vitals taken. Pt checked in for appointment(s).    Yue Argueta RN

## 2022-06-10 NOTE — PROGRESS NOTES
Infusion Nursing Note:  Tori Steele presents today for Cycle 14 Day 1 Adriamycin, Cytoxan and Neualsta OnPro.    Patient seen by provider today: No   present during visit today: Not Applicable.    Note: Patient presents to infusion today doing well. Denies any recent fevers, chills, shortness of breath, chest pains or cough. Nausea has improved as well as eating and drinking. Feels she is recovering well from Covid. Heart rate elevated in the 120s, patient states this is normal for her and that her care team is aware. Offers no new concerns today.     OK to proceed with treatment today per [employer] RN.     Intravenous Access:  Implanted Port.    Treatment Conditions:  Lab Results   Component Value Date    HGB 9.1 (L) 06/10/2022    WBC 4.9 06/10/2022    ANEU 0.7 (L) 05/31/2022    ANEUTAUTO 3.5 06/10/2022     (H) 06/10/2022      Lab Results   Component Value Date     06/10/2022    POTASSIUM 3.5 06/10/2022    MAG 1.7 (L) 02/07/2022    CR 0.56 06/10/2022    JENNIE 8.8 06/10/2022    BILITOTAL 0.3 06/10/2022    ALBUMIN 3.0 (L) 06/10/2022    ALT 22 06/10/2022    AST 15 06/10/2022     Results reviewed, labs MET treatment parameters, ok to proceed with treatment.  ECHO/MUGA completed 2/7/2022 EF 55-60%.    Post Infusion Assessment:  Patient tolerated infusion without incident.  Blood return noted pre and post infusion.  Blood return noted during Adriamycin administration every 2-3 cc.  Site patent and intact, free from redness, edema or discomfort.  No evidence of extravasations.  Access discontinued per protocol.     Neulasta Onpro On-Body injector applied to back of Left Arm at 1450.  Writer discussed Neulasta injection would start tomorrow 6/11 at 1750, approximately 27 hours after application applied today.  Written and Verbal instruction reviewed with patient.  Pt instructed when the dose delivery starts, it will take about 45 minutes to complete.  Pt aware Neulasta Onpro On-Body  should have green flashing light and to call triage or on-call MD if injector flashes red or appears to be leaking. Pt aware to keep Onpro On-Body Neulasta 4 inches away from electrical equipment and to avoid showering 4 hours prior to injection.   Neulasta Onpro Lot number: See MAR    Discharge Plan:   Prescription refills given for Dexamethasone.  Discharge instructions reviewed with: Patient.  Patient and/or family verbalized understanding of discharge instructions and all questions answered.  AVS to patient via [employer].  Patient will return Monday 6/13 for next appointment with JESSY Olsen and IVF.   Patient discharged in stable condition accompanied by: self.  Departure Mode: Ambulatory.      Tereza Lazo RN

## 2022-06-10 NOTE — NURSING NOTE
[id number]: Study Visit Note   Subject name: Tori Steele     Visit: C14D1    Did the study visit occur within the appropriate window allowed by the protocol? no    If no, why? Pt was delayed at last visit to recover from side effects and was also positive for covid.    Since the last study visit, She has been doing well. She reports that her appetite and nausea improved and she was able to eat this week. She reports only a runny nose and congestion in terms of covid symptoms.      I have personally interviewed Tori Steele and reviewed her medical record for adverse events and concomitant medications and these have been recorded on the corresponding logs in Tori Steele's research file.     Tori Steele was given the opportunity to ask any trial related questions.  Labs were reviewed - any significant lab values were addressed and reviewed.    CHARISMA Babb, RN  CRC-RN,   Pager: [phone]

## 2022-06-13 ENCOUNTER — APPOINTMENT (OUTPATIENT)
Dept: LAB | Facility: CLINIC | Age: 47
End: 2022-06-13
Attending: PHYSICIAN ASSISTANT
Payer: OTHER GOVERNMENT

## 2022-06-13 ENCOUNTER — INFUSION THERAPY VISIT (OUTPATIENT)
Dept: ONCOLOGY | Facility: CLINIC | Age: 47
End: 2022-06-13
Attending: PHYSICIAN ASSISTANT
Payer: OTHER GOVERNMENT

## 2022-06-13 VITALS — SYSTOLIC BLOOD PRESSURE: 105 MMHG | DIASTOLIC BLOOD PRESSURE: 69 MMHG

## 2022-06-13 VITALS
OXYGEN SATURATION: 98 % | DIASTOLIC BLOOD PRESSURE: 58 MMHG | SYSTOLIC BLOOD PRESSURE: 96 MMHG | HEART RATE: 127 BPM | TEMPERATURE: 98.5 F | RESPIRATION RATE: 16 BRPM

## 2022-06-13 DIAGNOSIS — T45.1X5A CHEMOTHERAPY-INDUCED NEUTROPENIA (H): Primary | ICD-10-CM

## 2022-06-13 DIAGNOSIS — C50.911 INVASIVE DUCTAL CARCINOMA OF RIGHT BREAST (H): ICD-10-CM

## 2022-06-13 DIAGNOSIS — D70.1 CHEMOTHERAPY-INDUCED NEUTROPENIA (H): Primary | ICD-10-CM

## 2022-06-13 DIAGNOSIS — C50.911 INVASIVE DUCTAL CARCINOMA OF RIGHT BREAST (H): Primary | ICD-10-CM

## 2022-06-13 LAB
ALBUMIN SERPL-MCNC: 2.8 G/DL (ref 3.4–5)
ALP SERPL-CCNC: 70 U/L (ref 40–150)
ALT SERPL W P-5'-P-CCNC: 19 U/L (ref 0–50)
ANION GAP SERPL CALCULATED.3IONS-SCNC: 11 MMOL/L (ref 3–14)
AST SERPL W P-5'-P-CCNC: 12 U/L (ref 0–45)
BASOPHILS # BLD MANUAL: 0 10E3/UL (ref 0–0.2)
BASOPHILS NFR BLD MANUAL: 0 %
BILIRUB SERPL-MCNC: 0.4 MG/DL (ref 0.2–1.3)
BUN SERPL-MCNC: 13 MG/DL (ref 7–30)
CALCIUM SERPL-MCNC: 8.8 MG/DL (ref 8.5–10.1)
CHLORIDE BLD-SCNC: 105 MMOL/L (ref 94–109)
CO2 SERPL-SCNC: 27 MMOL/L (ref 20–32)
CREAT SERPL-MCNC: 0.54 MG/DL (ref 0.52–1.04)
EOSINOPHIL # BLD MANUAL: 0.2 10E3/UL (ref 0–0.7)
EOSINOPHIL NFR BLD MANUAL: 1 %
ERYTHROCYTE [DISTWIDTH] IN BLOOD BY AUTOMATED COUNT: 17.8 % (ref 10–15)
GFR SERPL CREATININE-BSD FRML MDRD: >90 ML/MIN/1.73M2
GLUCOSE BLD-MCNC: 100 MG/DL (ref 70–99)
HCT VFR BLD AUTO: 29.6 % (ref 35–47)
HGB BLD-MCNC: 9.3 G/DL (ref 11.7–15.7)
LYMPHOCYTES # BLD MANUAL: 2.1 10E3/UL (ref 0.8–5.3)
LYMPHOCYTES NFR BLD MANUAL: 11 %
MCH RBC QN AUTO: 31.7 PG (ref 26.5–33)
MCHC RBC AUTO-ENTMCNC: 31.4 G/DL (ref 31.5–36.5)
MCV RBC AUTO: 101 FL (ref 78–100)
MONOCYTES # BLD MANUAL: 0.4 10E3/UL (ref 0–1.3)
MONOCYTES NFR BLD MANUAL: 2 %
NEUTROPHILS # BLD MANUAL: 16.5 10E3/UL (ref 1.6–8.3)
NEUTROPHILS NFR BLD MANUAL: 86 %
PLAT MORPH BLD: ABNORMAL
PLATELET # BLD AUTO: 350 10E3/UL (ref 150–450)
POTASSIUM BLD-SCNC: 3.3 MMOL/L (ref 3.4–5.3)
PROT SERPL-MCNC: 6.5 G/DL (ref 6.8–8.8)
RBC # BLD AUTO: 2.93 10E6/UL (ref 3.8–5.2)
RBC MORPH BLD: ABNORMAL
SODIUM SERPL-SCNC: 143 MMOL/L (ref 133–144)
WBC # BLD AUTO: 19.2 10E3/UL (ref 4–11)

## 2022-06-13 PROCEDURE — 82040 ASSAY OF SERUM ALBUMIN: CPT | Performed by: PHYSICIAN ASSISTANT

## 2022-06-13 PROCEDURE — 250N000011 HC RX IP 250 OP 636: Performed by: NURSE PRACTITIONER

## 2022-06-13 PROCEDURE — G0463 HOSPITAL OUTPT CLINIC VISIT: HCPCS

## 2022-06-13 PROCEDURE — 99214 OFFICE O/P EST MOD 30 MIN: CPT | Performed by: PHYSICIAN ASSISTANT

## 2022-06-13 PROCEDURE — 258N000003 HC RX IP 258 OP 636: Performed by: NURSE PRACTITIONER

## 2022-06-13 PROCEDURE — 80053 COMPREHEN METABOLIC PANEL: CPT | Performed by: PHYSICIAN ASSISTANT

## 2022-06-13 PROCEDURE — 258N000003 HC RX IP 258 OP 636: Performed by: PHYSICIAN ASSISTANT

## 2022-06-13 PROCEDURE — 250N000011 HC RX IP 250 OP 636: Performed by: PHYSICIAN ASSISTANT

## 2022-06-13 PROCEDURE — 85027 COMPLETE CBC AUTOMATED: CPT | Performed by: PHYSICIAN ASSISTANT

## 2022-06-13 PROCEDURE — 85007 BL SMEAR W/DIFF WBC COUNT: CPT | Performed by: PHYSICIAN ASSISTANT

## 2022-06-13 PROCEDURE — 36591 DRAW BLOOD OFF VENOUS DEVICE: CPT | Performed by: PHYSICIAN ASSISTANT

## 2022-06-13 RX ORDER — ONDANSETRON 2 MG/ML
8 INJECTION INTRAMUSCULAR; INTRAVENOUS EVERY 6 HOURS PRN
Status: DISCONTINUED | OUTPATIENT
Start: 2022-06-13 | End: 2022-06-13 | Stop reason: HOSPADM

## 2022-06-13 RX ORDER — ONDANSETRON 2 MG/ML
8 INJECTION INTRAMUSCULAR; INTRAVENOUS EVERY 6 HOURS PRN
Status: CANCELLED
Start: 2022-06-13

## 2022-06-13 RX ADMIN — DEXAMETHASONE SODIUM PHOSPHATE: 10 INJECTION, SOLUTION INTRAMUSCULAR; INTRAVENOUS at 12:51

## 2022-06-13 RX ADMIN — SODIUM CHLORIDE 1000 ML: 9 INJECTION, SOLUTION INTRAVENOUS at 12:50

## 2022-06-13 RX ADMIN — ONDANSETRON 8 MG: 2 INJECTION INTRAMUSCULAR; INTRAVENOUS at 12:50

## 2022-06-13 ASSESSMENT — PAIN SCALES - GENERAL: PAINLEVEL: NO PAIN (0)

## 2022-06-13 NOTE — NURSING NOTE
"Oncology Rooming Note    June 13, 2022 10:25 AM   Tori Steele is a 46 year old female who presents for:    Chief Complaint   Patient presents with     Oncology Clinic Visit     Hx breast ca     Initial Vitals: BP 96/58   Pulse (!) 127   Temp 98.5  F (36.9  C) (Oral)   Resp 16   LMP  (LMP Unknown)   SpO2 98%  Estimated body mass index is 24.16 kg/m  as calculated from the following:    Height as of 5/24/22: 1.6 m (5' 3\").    Weight as of 6/10/22: 61.9 kg (136 lb 6.4 oz). There is no height or weight on file to calculate BSA.  No Pain (0) Comment: Data Unavailable   No LMP recorded (lmp unknown). (Menstrual status: Chemotherapy).  Allergies reviewed: Yes  Medications reviewed: Yes    Medications: Medication refills not needed today.  Pharmacy name entered into [facility]:    [id number] [place], [place] - [address] [facility] [address]  [facility] [place], [place] - [phone]    Clinical concerns: , Janette Notified.    Marilee Henriquez, RN            "

## 2022-06-13 NOTE — PROGRESS NOTES
Infusion Nursing Note:  Tori Steele presents today for IVF and antiemetics.    Patient seen by provider today: Yes: JESSY Maria   present during visit today: Not Applicable.    Note: Pt presents to infusion feeling ok. She is fatigued and intermittently nauseated following AC, however, Cycle 2 has been much improved. Her COVID-19 symptoms have resolved. She offers no new concerns following her provider appointment.    TORB JESSY Olsen/Haydee Carrasquillo RN on 6/13/22 at 1215:  - give 1L IVF, Emend/Dex and Zofran today  - K+ 3.3, no need to replace today, too hard to tolerate K+ pill or packet. Pt will work on potassium rich foods and is eating well  - pt to return Friday for additional IVF    Intravenous Access:  Implanted Port.    Treatment Conditions:  Lab Results   Component Value Date    HGB 9.3 (L) 06/13/2022    WBC 19.2 (H) 06/13/2022    ANEU 16.5 (H) 06/13/2022    ANEUTAUTO 3.5 06/10/2022     06/13/2022      Lab Results   Component Value Date     06/13/2022    POTASSIUM 3.3 (L) 06/13/2022    MAG 1.7 (L) 02/07/2022    CR 0.54 06/13/2022    JENNIE 8.8 06/13/2022    BILITOTAL 0.4 06/13/2022    ALBUMIN 2.8 (L) 06/13/2022    ALT 19 06/13/2022    AST 12 06/13/2022       Post Infusion Assessment:  Patient tolerated infusion without incident.  Blood return noted pre and post infusion.  Site patent and intact, free from redness, edema or discomfort.  No evidence of extravasations.  Access discontinued per protocol.     Discharge Plan:   Patient declined prescription refills.  Discharge instructions reviewed with: Patient.  Patient and/or family verbalized understanding of discharge instructions and all questions answered.  AVS to patient via [employer].  Patient will return 6/17/22 for next appointment.   Patient discharged in stable condition accompanied by: self.  Departure Mode: Ambulatory.      Haydee Carrasquillo RN

## 2022-06-13 NOTE — PATIENT INSTRUCTIONS
[facility] Triage and after hours / weekends / holidays:  [phone]    Please call the triage or after hours line if you experience a temperature greater than or equal to 100.4, shaking chills, have uncontrolled nausea, vomiting and/or diarrhea, dizziness, shortness of breath, chest pain, bleeding, unexplained bruising, or if you have any other new/concerning symptoms, questions or concerns.      If you are having any concerning symptoms or wish to speak to a provider before your next infusion visit, please call your care coordinator or triage to notify them so we can adequately serve you.     If you need a refill on a narcotic prescription or other medication, please call before your infusion appointment.                June 2022 Sunday Monday Tuesday Wednesday Thursday Friday Saturday                  1     2     3     4       5     6     7     8     9     10    LAB CENTRAL  11:45 AM   (15 min.)   [facility] DRAW   [facility]    ONC INFUSION 2 HR (120 MIN)  12:30 PM   (120 min.)    ONC INFUSION NURSE   [facility] 11       12     13    LAB CENTRAL   9:45 AM   (15 min.)   [facility] DRAW   [facility]    RETURN  10:15 AM   (45 min.)   Milka Mota PA-C   [facility]    ONC INFUSION 2 HR (120 MIN)  12:00 PM   (120 min.)    ONC INFUSION NURSE   [facility] 14     15     16     17    RETURN   9:45 AM   (20 min.)   Gurpreet Layton MD   [facility]    ONC INFUSION 2 HR (120 MIN)   2:00 PM   (120 min.)    ONC INFUSION NURSE   [facility] 18       19     20     21     22     23     24    RETURN  10:45 AM   (30 min.)   Keegan Lockett MD   [facility]    ONC INFUSION 2 HR (120 MIN)   1:00 PM   (120 min.)    ONC INFUSION NURSE   [facility]  25       26     27    LAB CENTRAL   9:15 AM   (15 min.)    [facility]   [facility]    ONC INFUSION 2 HR (120 MIN)  10:00 AM   (120 min.)   [facility] ONC INFUSION NURSE   [facility] 28 29 30 July 2022 Sunday Monday Tuesday Wednesday Thursday Friday Saturday                            1     2       3     4     5     6     7     8    LAB CENTRAL   8:15 AM   (15 min.)   [facility] DRAW   [facility]    RETURN   8:45 AM   (30 min.)   Keegan Lockett MD   [facility]    ONC INFUSION 2 HR (120 MIN)  10:00 AM   (120 min.)    ONC INFUSION NURSE   [facility] 9       10     11     12     13     14     15     16       17     18     19     20     21     22     23       24     25     26     27     28     29     30       31                                                     Lab Results:  Recent Results (from the past 12 hour(s))   Comprehensive metabolic panel    Collection Time: 06/13/22 10:24 AM   Result Value Ref Range    Sodium 143 133 - 144 mmol/L    Potassium 3.3 (L) 3.4 - 5.3 mmol/L    Chloride 105 94 - 109 mmol/L    Carbon Dioxide (CO2) 27 20 - 32 mmol/L    Anion Gap 11 3 - 14 mmol/L    Urea Nitrogen 13 7 - 30 mg/dL    Creatinine 0.54 0.52 - 1.04 mg/dL    Calcium 8.8 8.5 - 10.1 mg/dL    Glucose 100 (H) 70 - 99 mg/dL    Alkaline Phosphatase 70 40 - 150 U/L    AST 12 0 - 45 U/L    ALT 19 0 - 50 U/L    Protein Total 6.5 (L) 6.8 - 8.8 g/dL    Albumin 2.8 (L) 3.4 - 5.0 g/dL    Bilirubin Total 0.4 0.2 - 1.3 mg/dL    GFR Estimate >90 >60 mL/min/1.73m2   CBC with platelets and differential    Collection Time: 06/13/22 10:24 AM   Result Value Ref Range    WBC Count 19.2 (H) 4.0 - 11.0 10e3/uL    RBC Count 2.93 (L) 3.80 - 5.20 10e6/uL    Hemoglobin 9.3 (L) 11.7 - 15.7 g/dL    Hematocrit 29.6 (L) 35.0 - 47.0 %     (H) 78 - 100 fL    MCH  31.7 26.5 - 33.0 pg    MCHC 31.4 (L) 31.5 - 36.5 g/dL    RDW 17.8 (H) 10.0 - 15.0 %    Platelet Count 350 150 - 450 10e3/uL   Manual Differential    Collection Time: 06/13/22 10:24 AM   Result Value Ref Range    % Neutrophils 86 %    % Lymphocytes 11 %    % Monocytes 2 %    % Eosinophils 1 %    % Basophils 0 %    Absolute Neutrophils 16.5 (H) 1.6 - 8.3 10e3/uL    Absolute Lymphocytes 2.1 0.8 - 5.3 10e3/uL    Absolute Monocytes 0.4 0.0 - 1.3 10e3/uL    Absolute Eosinophils 0.2 0.0 - 0.7 10e3/uL    Absolute Basophils 0.0 0.0 - 0.2 10e3/uL    RBC Morphology Confirmed RBC Indices     Platelet Assessment  Automated Count Confirmed. Platelet morphology is normal.     Automated Count Confirmed. Platelet morphology is normal.

## 2022-06-13 NOTE — PROGRESS NOTES
Oncology/Hematology Visit Note  Jun 13, 2022    Reason for Visit: follow up of breast cancer     History of Present Illness: Tori Steele is a 46 year old female with breat cancer. Right-sided 5.5 cm ER positive DE positive HER-2 negative breast cancer with associated DCIS.  Her MammaPrint came back as high risk.  She consented for the I-SPY clinical trial and has been randomized to the oral paclitaxel, Encequidar and dostarlimab arm.      2/21/22 started trial with oral paclitaxel, Encequidar and dostarlimab.    3/15/22  her IV immunotherapy dostarlimab was held due to diarrhea that recovered with Imodium and time  4/4/22   Carbo was added in weekly due in hopes for more significant reduction in cancer  5/17/22 AC start     Interval History:  Tori is here today for Cycle 2 of AC follow-up.     Cycle 1 was complicated by ED visit for severe back pain.  CT PE rule out was negative.  CBC showed pancytopenia at the time, but no infectious complications occurred.  She also had prolonged nausea and gagging and needed to come in for IVF on Friday 5/27.  She was also having diarrhea.  She then developed loss of taste and smell and an upper respiratory infection which was diagnosed positive for COVID.  She did start Paxlovid which she took 6/1 through 6/6.  Last week she started to feel significantly better her taste and smell returned she has been eating 3 times daily.  She was able to get off all of her antiemetics and felt reasonably well.    6/10 she started cycle 2 of AC.  She followed her antiemetics schedule over the weekend and felt fairly well.  She does continue to have moderate levels of fatigue.  She did start her Claritin before cycle 2 thus has had significantly less bone pain from her Neulasta.  She did have an episode of emesis this morning.  Bowels have been fairly consistent every other day.    No headaches or double or blurry vision. No dizziness.     No numbness or tingling of fingers or toes.  No rash or itching.       ROS:  Patient denies the following: fevers, body aches, chills, headaches, vision changes, dizziness, chest pain, shortness of breath, nausea, vomiting, diarrhea, constipation, abdominal pain, rashes, bruising/bleeding, mouth sores, swelling or pain in the legs.        Current Outpatient Prescriptions               Current Outpatient Medications   Medication Sig Dispense Refill     ferrous sulfate (FEROSUL) 325 (65 Fe) MG tablet Take 325 mg by mouth         lidocaine-prilocaine (EMLA) 2.5-2.5 % external cream Apply to port site one hour prior to access may start using six days after port placement. 30 g 1     ondansetron (ZOFRAN) 8 MG tablet Take 1 tablet (8 mg) by mouth every 8 hours as needed for nausea 30 tablet 0     prochlorperazine (COMPAZINE) 10 MG tablet Take 1 tablet (10 mg) by mouth every 6 hours as needed (Nausea/Vomiting) 30 tablet 2     riboflavin (VITAMIN  B2) 25 MG TABS                  Physical Examination: ECOG 1    BP 96/58   Pulse (!) 127   Temp 98.5  F (36.9  C) (Oral)   Resp 16   LMP  (LMP Unknown)   SpO2 98%     Wt Readings from Last 4 Encounters:   06/10/22 61.9 kg (136 lb 6.4 oz)   22 61.8 kg (136 lb 4.8 oz)   22 65.8 kg (145 lb)   22 63.7 kg (140 lb 8 oz)         ECO  General: Sitting comfortably in no acute distress.  Appears tired  Head: Normocephalic, atraumatic   EENT: No scleral icteris, EOMS intact. Mucous membranes are moist, no mucositis or oral lesions  Lymph: No palpable cervical, supraclavicular, or axillary lymphadenopathy   Heart: Regular rate and rhythm, no murmurs  Lung: Normal respiratory effort. Clear to auscultation bilaterally. No wheezes, rhonchi, or crackles. No coughing throughout entire visit   Abdomen: +Bowel Sounds, soft nontender to palpation. No rebound, guarding or rigidity. No palpable masses  Neuro: AAO x3. Gait is steady. Moving all extremities   Extremities: No lower extremity edema  Skin: Warm, dry, intact.  No rashes, no suspicious lesions. No petechia or bruising noted  Breast: Right breast, 12 o'clock, mass is really challenging to palpate today.  There is a discrete ridge in the area where her tumor was, likely 1 cm area of fullness       Laboratory Data:   06/10/22 12:05 06/13/22 10:24   Sodium 142 143   Potassium 3.5 3.3 (L)   Chloride 108 105   Carbon Dioxide 29 27   Urea Nitrogen 10 13   Creatinine 0.56 0.54   GFR Estimate >90 [1] >90 [2]   Calcium 8.8 8.8   Anion Gap 5 11   Albumin 3.0 (L) 2.8 (L)   Protein Total 6.9 6.5 (L)   Bilirubin Total 0.3 0.4   Alkaline Phosphatase 67 70   ALT 22 19   AST 15 12   Glucose 132 (H) 100 (H)   WBC 4.9 19.2 (H)   Hemoglobin 9.1 (L) 9.3 (L)   Hematocrit 28.5 (L) 29.6 (L)   Platelet Count 457 (H) 350   RBC Count 2.88 (L) 2.93 (L)   MCV 99 101 (H)       I reviewed the above labs today.       Assessment and Plan:  1. Right sided ER+, DC+, HER2-negative, MammaPrint high risk T=5.5cm, N=0 breast cancer.  Tori had her 3/15/22 Dostarlimab held due to diarrhea, resumed 4/4.  Her 6 week MRI showed improvement but with suggestion of adding in an additional agent.      Her plan includes chemotherapy now includes weekly Carbo (added in 4/4/22) with IV Emend as pre-medications,  oral paclitaxel, Encequidar and IV dostarlimab (q3w) x12 weeks followed by AC.   AC #1 was 5/17 and second cycle was delayed due to COVID and was given on 6/10.  Tori's first cycle was complicated by hyperemesis, dehydration, and COVID.  Thus far cycle 2 has gone much smoother.  We will give her a liter of saline today in addition to antiemetic support.  She is set up to come back on Friday again for fluids.  We went through the schedule she is set to receive all 4 cycles of AC.  She will start to plan her surgery which will be early August.  We will continue to follow her closely per protocol.     2.  Anemia.  Patient's hemoglobin was 9.3 and stable.  She has a history of iron deficiency anemia.  Iron stores  are low.  Will have her take oral iron M, W and F each week.       3.  GI:   -Chemo induced nausea: Continue on Zyprexa at night, Dex on days 2 through 4, Zofran or Compazine during the day and Ativan at night.  Today we will give her IV and Emend, Dex, and Zofran.  -If needed for constipation but has been stooling every other day  -Using Pepcid AC and Mylanta for acid reflux    4.  COVID, completed Paxlovid 6/5.  Taste and smell have returned no sequela.    5. Bone pains, secondary to neulasta, significantly better on claritin, encouraged APAP as well.          30 minutes spent on the date of the encounter doing chart review, review of test results, interpretation of tests, patient visit, documentation and discussion with family       Janette Mota PA-C

## 2022-06-17 ENCOUNTER — ONCOLOGY VISIT (OUTPATIENT)
Dept: ONCOLOGY | Facility: CLINIC | Age: 47
End: 2022-06-17
Attending: SURGERY
Payer: OTHER GOVERNMENT

## 2022-06-17 VITALS
TEMPERATURE: 99.1 F | OXYGEN SATURATION: 100 % | HEART RATE: 150 BPM | DIASTOLIC BLOOD PRESSURE: 66 MMHG | SYSTOLIC BLOOD PRESSURE: 94 MMHG

## 2022-06-17 DIAGNOSIS — C50.911 INVASIVE DUCTAL CARCINOMA OF RIGHT BREAST (H): Primary | ICD-10-CM

## 2022-06-17 PROCEDURE — G0463 HOSPITAL OUTPT CLINIC VISIT: HCPCS

## 2022-06-17 PROCEDURE — 99214 OFFICE O/P EST MOD 30 MIN: CPT | Performed by: SURGERY

## 2022-06-17 ASSESSMENT — PAIN SCALES - GENERAL: PAINLEVEL: NO PAIN (0)

## 2022-06-17 NOTE — PROGRESS NOTES
Tori Steele is here for a followup visit.  She was diagnosed with a 5.5 cm, grade 3, ER positive, MI positive, HER2 negative breast cancer.  She had a MammaPrint that was positive.  She has been treated on the I-Spy II clinical trial.  She has had genetic testing, which was negative.  She has been doing well.  She has now started Adriamycin and Cytoxan.  She had her last breast MRI on 05/09/2022.  This demonstrated the mass was now 3.4 cm in size.  She has not had any palpable or radiographically suspicious lymph nodes.    Today we talked about treatment options.  She is a candidate for breast conserving surgery.  We talked about that if she had breast conserving surgery radiation therapy would be recommended.  She asked about a single mastectomy and a double mastectomy.  I told her there would be no cancer benefits, but if she had a double mastectomy, she would not require any future breast surveillance.  I talked to her about the increased risks of early and late complications with bilateral mastectomies with immediate reconstruction.  I will talk to her at the end of her chemotherapy to get her final decisions about how to proceed.    Total time 30 minutes, which included reviewing her imaging and in person visit.

## 2022-06-17 NOTE — NURSING NOTE
"Oncology Rooming Note    June 17, 2022 10:08 AM   Tori Steele is a 46 year old female who presents for:    Chief Complaint   Patient presents with     Oncology Clinic Visit     Rtn for breast cancer     Initial Vitals: BP 94/66 (BP Location: Right arm, Patient Position: Sitting, Cuff Size: Adult Regular)   Pulse (!) 150   Temp 99.1  F (37.3  C) (Oral)   LMP  (LMP Unknown)   SpO2 100%  Estimated body mass index is 24.16 kg/m  as calculated from the following:    Height as of 5/24/22: 1.6 m (5' 3\").    Weight as of 6/10/22: 61.9 kg (136 lb 6.4 oz). There is no height or weight on file to calculate BSA.  No Pain (0) Comment: Data Unavailable   No LMP recorded (lmp unknown). (Menstrual status: Chemotherapy).  Allergies reviewed: Yes  Medications reviewed: Yes    Medications: MEDICATION REFILLS NEEDED TODAY. Provider was notified.     [facility]  1st floor pharmacy    Pharmacy name entered into [facility]:    [facility] #[id number] - [place], [place] - [address] AT [facility] OF [address] [facility] [place], [place] - [phone]    Clinical concerns: none       Isamar Zhang, EMT              "

## 2022-06-17 NOTE — LETTER
6/17/2022         RE: Tori Steele  [address] [address]        Dear Colleague,    Thank you for referring your patient, Tori Steele, to the [facility] BREAST [facility]. Please see a copy of my visit note below.    Tori Steele is here for a followup visit.  She was diagnosed with a 5.5 cm, grade 3, ER positive, CT positive, HER2 negative breast cancer.  She had a MammaPrint that was positive.  She has been treated on the I-Spy II clinical trial.  She has had genetic testing, which was negative.  She has been doing well.  She has now started Adriamycin and Cytoxan.  She had her last breast MRI on 05/09/2022.  This demonstrated the mass was now 3.4 cm in size.  She has not had any palpable or radiographically suspicious lymph nodes.    Today we talked about treatment options.  She is a candidate for breast conserving surgery.  We talked about that if she had breast conserving surgery radiation therapy would be recommended.  She asked about a single mastectomy and a double mastectomy.  I told her there would be no cancer benefits, but if she had a double mastectomy, she would not require any future breast surveillance.  I talked to her about the increased risks of early and late complications with bilateral mastectomies with immediate reconstruction.  I will talk to her at the end of her chemotherapy to get her final decisions about how to proceed.    Total time 30 minutes, which included reviewing her imaging and in person visit.        Again, thank you for allowing me to participate in the care of your patient.      Sincerely,    Gurpreet Layton MD

## 2022-06-24 ENCOUNTER — ONCOLOGY VISIT (OUTPATIENT)
Dept: ONCOLOGY | Facility: CLINIC | Age: 47
End: 2022-06-24
Attending: INTERNAL MEDICINE
Payer: OTHER GOVERNMENT

## 2022-06-24 ENCOUNTER — TELEPHONE (OUTPATIENT)
Dept: NURSING | Facility: CLINIC | Age: 47
End: 2022-06-24

## 2022-06-24 ENCOUNTER — TELEPHONE (OUTPATIENT)
Dept: ONCOLOGY | Facility: CLINIC | Age: 47
End: 2022-06-24

## 2022-06-24 ENCOUNTER — NURSE TRIAGE (OUTPATIENT)
Dept: NURSING | Facility: CLINIC | Age: 47
End: 2022-06-24

## 2022-06-24 ENCOUNTER — RESEARCH ENCOUNTER (OUTPATIENT)
Dept: ONCOLOGY | Facility: CLINIC | Age: 47
End: 2022-06-24

## 2022-06-24 ENCOUNTER — APPOINTMENT (OUTPATIENT)
Dept: LAB | Facility: CLINIC | Age: 47
End: 2022-06-24
Attending: INTERNAL MEDICINE
Payer: OTHER GOVERNMENT

## 2022-06-24 VITALS
BODY MASS INDEX: 23.9 KG/M2 | TEMPERATURE: 99.8 F | HEART RATE: 123 BPM | WEIGHT: 134.9 LBS | RESPIRATION RATE: 16 BRPM | OXYGEN SATURATION: 99 %

## 2022-06-24 DIAGNOSIS — C50.911 INVASIVE DUCTAL CARCINOMA OF RIGHT BREAST (H): Primary | ICD-10-CM

## 2022-06-24 DIAGNOSIS — R11.2 NON-INTRACTABLE VOMITING WITH NAUSEA, UNSPECIFIED VOMITING TYPE: Primary | ICD-10-CM

## 2022-06-24 LAB
ALBUMIN SERPL-MCNC: 2.9 G/DL (ref 3.4–5)
ALP SERPL-CCNC: 80 U/L (ref 40–150)
ALT SERPL W P-5'-P-CCNC: 17 U/L (ref 0–50)
ANION GAP SERPL CALCULATED.3IONS-SCNC: 10 MMOL/L (ref 3–14)
AST SERPL W P-5'-P-CCNC: 13 U/L (ref 0–45)
BASOPHILS # BLD AUTO: 0 10E3/UL (ref 0–0.2)
BASOPHILS NFR BLD AUTO: 0 %
BILIRUB SERPL-MCNC: 0.2 MG/DL (ref 0.2–1.3)
BUN SERPL-MCNC: 5 MG/DL (ref 7–30)
CALCIUM SERPL-MCNC: 8.6 MG/DL (ref 8.5–10.1)
CHLORIDE BLD-SCNC: 107 MMOL/L (ref 94–109)
CO2 SERPL-SCNC: 26 MMOL/L (ref 20–32)
CORTIS SERPL-MCNC: 11.1 UG/DL
CREAT SERPL-MCNC: 0.59 MG/DL (ref 0.52–1.04)
EOSINOPHIL # BLD AUTO: 0 10E3/UL (ref 0–0.7)
EOSINOPHIL NFR BLD AUTO: 0 %
ERYTHROCYTE [DISTWIDTH] IN BLOOD BY AUTOMATED COUNT: 17 % (ref 10–15)
GFR SERPL CREATININE-BSD FRML MDRD: >90 ML/MIN/1.73M2
GLUCOSE BLD-MCNC: 131 MG/DL (ref 70–99)
HCT VFR BLD AUTO: 28.3 % (ref 35–47)
HGB BLD-MCNC: 8.9 G/DL (ref 11.7–15.7)
IMM GRANULOCYTES # BLD: 0.2 10E3/UL
IMM GRANULOCYTES NFR BLD: 3 %
LYMPHOCYTES # BLD AUTO: 0.8 10E3/UL (ref 0.8–5.3)
LYMPHOCYTES NFR BLD AUTO: 11 %
MCH RBC QN AUTO: 32.4 PG (ref 26.5–33)
MCHC RBC AUTO-ENTMCNC: 31.4 G/DL (ref 31.5–36.5)
MCV RBC AUTO: 103 FL (ref 78–100)
MONOCYTES # BLD AUTO: 0.6 10E3/UL (ref 0–1.3)
MONOCYTES NFR BLD AUTO: 8 %
NEUTROPHILS # BLD AUTO: 5.7 10E3/UL (ref 1.6–8.3)
NEUTROPHILS NFR BLD AUTO: 78 %
NRBC # BLD AUTO: 0 10E3/UL
NRBC BLD AUTO-RTO: 0 /100
PLATELET # BLD AUTO: 246 10E3/UL (ref 150–450)
POTASSIUM BLD-SCNC: 3.6 MMOL/L (ref 3.4–5.3)
PROT SERPL-MCNC: 6.6 G/DL (ref 6.8–8.8)
RBC # BLD AUTO: 2.75 10E6/UL (ref 3.8–5.2)
SODIUM SERPL-SCNC: 143 MMOL/L (ref 133–144)
T4 FREE SERPL-MCNC: 0.96 NG/DL (ref 0.76–1.46)
TSH SERPL DL<=0.005 MIU/L-ACNC: 1.66 MU/L (ref 0.4–4)
WBC # BLD AUTO: 7.4 10E3/UL (ref 4–11)

## 2022-06-24 PROCEDURE — 258N000003 HC RX IP 258 OP 636: Performed by: INTERNAL MEDICINE

## 2022-06-24 PROCEDURE — 96367 TX/PROPH/DG ADDL SEQ IV INF: CPT

## 2022-06-24 PROCEDURE — 96411 CHEMO IV PUSH ADDL DRUG: CPT

## 2022-06-24 PROCEDURE — 84439 ASSAY OF FREE THYROXINE: CPT | Performed by: INTERNAL MEDICINE

## 2022-06-24 PROCEDURE — 250N000011 HC RX IP 250 OP 636: Performed by: INTERNAL MEDICINE

## 2022-06-24 PROCEDURE — 84443 ASSAY THYROID STIM HORMONE: CPT | Performed by: INTERNAL MEDICINE

## 2022-06-24 PROCEDURE — 82533 TOTAL CORTISOL: CPT | Performed by: INTERNAL MEDICINE

## 2022-06-24 PROCEDURE — 96372 THER/PROPH/DIAG INJ SC/IM: CPT | Performed by: INTERNAL MEDICINE

## 2022-06-24 PROCEDURE — 96377 APPLICATON ON-BODY INJECTOR: CPT | Mod: 59

## 2022-06-24 PROCEDURE — 96375 TX/PRO/DX INJ NEW DRUG ADDON: CPT

## 2022-06-24 PROCEDURE — 85025 COMPLETE CBC W/AUTO DIFF WBC: CPT | Performed by: INTERNAL MEDICINE

## 2022-06-24 PROCEDURE — G0463 HOSPITAL OUTPT CLINIC VISIT: HCPCS

## 2022-06-24 PROCEDURE — 80053 COMPREHEN METABOLIC PANEL: CPT | Performed by: INTERNAL MEDICINE

## 2022-06-24 PROCEDURE — 96413 CHEMO IV INFUSION 1 HR: CPT

## 2022-06-24 PROCEDURE — 99214 OFFICE O/P EST MOD 30 MIN: CPT | Performed by: INTERNAL MEDICINE

## 2022-06-24 RX ORDER — LORAZEPAM 2 MG/ML
0.5 INJECTION INTRAMUSCULAR EVERY 4 HOURS PRN
Status: CANCELLED | OUTPATIENT
Start: 2022-06-24

## 2022-06-24 RX ORDER — ALBUTEROL SULFATE 0.83 MG/ML
2.5 SOLUTION RESPIRATORY (INHALATION)
Status: CANCELLED | OUTPATIENT
Start: 2022-06-24

## 2022-06-24 RX ORDER — DOXORUBICIN HYDROCHLORIDE 2 MG/ML
100 INJECTION, SOLUTION INTRAVENOUS ONCE
Status: COMPLETED | OUTPATIENT
Start: 2022-06-24 | End: 2022-06-24

## 2022-06-24 RX ORDER — EPINEPHRINE 1 MG/ML
0.3 INJECTION, SOLUTION INTRAMUSCULAR; SUBCUTANEOUS EVERY 5 MIN PRN
Status: CANCELLED | OUTPATIENT
Start: 2022-06-24

## 2022-06-24 RX ORDER — PALONOSETRON 0.05 MG/ML
0.25 INJECTION, SOLUTION INTRAVENOUS ONCE
Status: CANCELLED
Start: 2022-06-24 | End: 2022-06-24

## 2022-06-24 RX ORDER — MEPERIDINE HYDROCHLORIDE 25 MG/ML
25 INJECTION INTRAMUSCULAR; INTRAVENOUS; SUBCUTANEOUS EVERY 30 MIN PRN
Status: CANCELLED | OUTPATIENT
Start: 2022-06-24

## 2022-06-24 RX ORDER — HEPARIN SODIUM (PORCINE) LOCK FLUSH IV SOLN 100 UNIT/ML 100 UNIT/ML
5 SOLUTION INTRAVENOUS
Status: CANCELLED | OUTPATIENT
Start: 2022-06-24

## 2022-06-24 RX ORDER — DEXAMETHASONE 4 MG/1
8 TABLET ORAL
Qty: 6 TABLET | Refills: 0 | Status: SHIPPED | OUTPATIENT
Start: 2022-06-24 | End: 2022-07-27

## 2022-06-24 RX ORDER — METHYLPREDNISOLONE SODIUM SUCCINATE 125 MG/2ML
125 INJECTION, POWDER, LYOPHILIZED, FOR SOLUTION INTRAMUSCULAR; INTRAVENOUS
Status: CANCELLED
Start: 2022-06-24

## 2022-06-24 RX ORDER — HEPARIN SODIUM,PORCINE 10 UNIT/ML
5 VIAL (ML) INTRAVENOUS
Status: CANCELLED | OUTPATIENT
Start: 2022-06-24

## 2022-06-24 RX ORDER — ALBUTEROL SULFATE 90 UG/1
1-2 AEROSOL, METERED RESPIRATORY (INHALATION)
Status: CANCELLED
Start: 2022-06-24

## 2022-06-24 RX ORDER — HEPARIN SODIUM (PORCINE) LOCK FLUSH IV SOLN 100 UNIT/ML 100 UNIT/ML
5 SOLUTION INTRAVENOUS
Status: DISCONTINUED | OUTPATIENT
Start: 2022-06-24 | End: 2022-06-24 | Stop reason: HOSPADM

## 2022-06-24 RX ORDER — PALONOSETRON 0.05 MG/ML
0.25 INJECTION, SOLUTION INTRAVENOUS ONCE
Status: COMPLETED | OUTPATIENT
Start: 2022-06-24 | End: 2022-06-24

## 2022-06-24 RX ORDER — DIPHENHYDRAMINE HYDROCHLORIDE 50 MG/ML
50 INJECTION INTRAMUSCULAR; INTRAVENOUS
Status: CANCELLED
Start: 2022-06-24

## 2022-06-24 RX ORDER — DOXORUBICIN HYDROCHLORIDE 2 MG/ML
60 INJECTION, SOLUTION INTRAVENOUS ONCE
Status: CANCELLED | OUTPATIENT
Start: 2022-06-24

## 2022-06-24 RX ORDER — HEPARIN SODIUM (PORCINE) LOCK FLUSH IV SOLN 100 UNIT/ML 100 UNIT/ML
5 SOLUTION INTRAVENOUS ONCE
Status: COMPLETED | OUTPATIENT
Start: 2022-06-24 | End: 2022-06-24

## 2022-06-24 RX ORDER — NALOXONE HYDROCHLORIDE 0.4 MG/ML
0.2 INJECTION, SOLUTION INTRAMUSCULAR; INTRAVENOUS; SUBCUTANEOUS
Status: CANCELLED | OUTPATIENT
Start: 2022-06-24

## 2022-06-24 RX ADMIN — DOXORUBICIN HYDROCHLORIDE 100 MG: 2 INJECTION, SOLUTION INTRAVENOUS at 13:48

## 2022-06-24 RX ADMIN — PEGFILGRASTIM 6 MG: KIT SUBCUTANEOUS at 14:10

## 2022-06-24 RX ADMIN — CYCLOPHOSPHAMIDE 1000 MG: 1 INJECTION, POWDER, FOR SOLUTION INTRAVENOUS; ORAL at 14:06

## 2022-06-24 RX ADMIN — DEXAMETHASONE SODIUM PHOSPHATE: 10 INJECTION, SOLUTION INTRAMUSCULAR; INTRAVENOUS at 13:11

## 2022-06-24 RX ADMIN — SODIUM CHLORIDE 250 ML: 9 INJECTION, SOLUTION INTRAVENOUS at 13:05

## 2022-06-24 RX ADMIN — SODIUM CHLORIDE, PRESERVATIVE FREE 5 ML: 5 INJECTION INTRAVENOUS at 11:00

## 2022-06-24 RX ADMIN — PALONOSETRON HYDROCHLORIDE 0.25 MG: 0.25 INJECTION INTRAVENOUS at 13:08

## 2022-06-24 RX ADMIN — Medication 5 ML: at 14:42

## 2022-06-24 ASSESSMENT — PAIN SCALES - GENERAL: PAINLEVEL: NO PAIN (0)

## 2022-06-24 NOTE — LETTER
6/24/2022         RE: Tori Steele  [address] [address]        Dear Colleague,    Thank you for referring your patient, Tori Steele, to the [facility]. Please see a copy of my visit note below.       Oncology/Hematology Visit Note  Jun 24, 2022    Reason for Visit: follow up of breast cancer     History of Present Illness: Tori Steele is a 46 year old female with breat cancer. Right-sided 5.5 cm ER positive UT positive HER-2 negative breast cancer with associated DCIS.  Her MammaPrint came back as high risk.  She consented for the I-SPY clinical trial and has been randomized to the oral paclitaxel, Encequidar and dostarlimab arm.      2/21/22 started trial with oral paclitaxel, Encequidar and dostarlimab.    3/15/22  her IV immunotherapy dostarlimab was held due to diarrhea that recovered with Imodium and time  4/4/22   Carbo was added in weekly due in hopes for more significant reduction in cancer  5/17/22 AC start     Interval History:  Tori is here today for Cycle 3, D1.     As detailed by Ms. Mota's previous note the patient did much better during cycle 2 of AC.  She still has some back pain which is attributed to the Neulasta but as she states of cycle 1 was a score of 20 this was a score of 2.  She thinks her appetite is a little bit better and her weight has been stable.  She has recovered from her COVID infection.  She is not having much diarrhea and has done mostly fairly well during her last cycle.    She does note some hiccups after her infusion and for several days afterwards.  They seem to resolve on their own and she does not attribute any of her medications to either causing it or relieving it.  No headaches or double or blurry vision. No dizziness.     No numbness or tingling of fingers or toes. No rash or itching.       ROS:  Patient denies the following: fevers, body aches, chills, headaches, vision changes, dizziness, chest  pain, shortness of breath, nausea, vomiting, diarrhea, constipation, abdominal pain, rashes, bruising/bleeding, mouth sores, swelling or pain in the legs.        Current Outpatient Prescriptions               Current Outpatient Medications   Medication Sig Dispense Refill     ferrous sulfate (FEROSUL) 325 (65 Fe) MG tablet Take 325 mg by mouth         lidocaine-prilocaine (EMLA) 2.5-2.5 % external cream Apply to port site one hour prior to access may start using six days after port placement. 30 g 1     ondansetron (ZOFRAN) 8 MG tablet Take 1 tablet (8 mg) by mouth every 8 hours as needed for nausea 30 tablet 0     prochlorperazine (COMPAZINE) 10 MG tablet Take 1 tablet (10 mg) by mouth every 6 hours as needed (Nausea/Vomiting) 30 tablet 2     riboflavin (VITAMIN  B2) 25 MG TABS                  Physical Examination: ECOG 1    Pulse (!) 123   Temp 99.8  F (37.7  C)   Resp 16   Wt 61.2 kg (134 lb 14.4 oz)   LMP  (LMP Unknown)   SpO2 99%   BMI 23.90 kg/m        Wt Readings from Last 4 Encounters:   22 61.2 kg (134 lb 14.4 oz)   06/10/22 61.9 kg (136 lb 6.4 oz)   22 61.8 kg (136 lb 4.8 oz)   22 65.8 kg (145 lb)         ECO  General: Sitting comfortably in no acute distress.  She appears brighter than in the past and she was not examined in detail.mor was, likely 1 cm area of fullness       Laboratory Data:     Latest Reference Range & Units 22 10:24 22 10:57   Sodium 133 - 144 mmol/L 143 143   Potassium 3.4 - 5.3 mmol/L 3.3 (L) 3.6   Chloride 94 - 109 mmol/L 105 107   Carbon Dioxide 20 - 32 mmol/L 27 26   Urea Nitrogen 7 - 30 mg/dL 13 5 (L)   Creatinine 0.52 - 1.04 mg/dL 0.54 0.59   GFR Estimate >60 mL/min/1.73m2 >90 >90   Calcium 8.5 - 10.1 mg/dL 8.8 8.6   Anion Gap 3 - 14 mmol/L 11 10   Albumin 3.4 - 5.0 g/dL 2.8 (L) 2.9 (L)   Protein Total 6.8 - 8.8 g/dL 6.5 (L) 6.6 (L)   Alkaline Phosphatase 40 - 150 U/L 70 80   ALT 0 - 50 U/L 19 17   AST 0 - 45 U/L 12 13   Bilirubin Total  0.2 - 1.3 mg/dL 0.4 0.2   T4 Free 0.76 - 1.46 ng/dL  0.96   TSH 0.40 - 4.00 mU/L  1.66   Glucose 70 - 99 mg/dL 100 (H) 131 (H)   WBC 4.0 - 11.0 10e3/uL 19.2 (H) 7.4   Hemoglobin 11.7 - 15.7 g/dL 9.3 (L) 8.9 (L)   Hematocrit 35.0 - 47.0 % 29.6 (L) 28.3 (L)   Platelet Count 150 - 450 10e3/uL 350 246   RBC Count 3.80 - 5.20 10e6/uL 2.93 (L) 2.75 (L)   MCV 78 - 100 fL 101 (H) 103 (H)   MCH 26.5 - 33.0 pg 31.7 32.4   MCHC 31.5 - 36.5 g/dL 31.4 (L) 31.4 (L)   RDW 10.0 - 15.0 % 17.8 (H) 17.0 (H)   % Neutrophils % 86 78   % Lymphocytes % 11 11   % Monocytes % 2 8   % Eosinophils % 1 0   % Basophils % 0 0   Absolute Basophils 0.0 - 0.2 10e3/uL  0.0   (L): Data is abnormally low  (H): Data is abnormally high    I reviewed the above labs today.       Assessment and Plan:  1. Right sided ER+, TN+, HER2-negative, MammaPrint high risk T=5.5cm, N=0 breast cancer.  Tori had her 3/15/22 Dostarlimab held due to diarrhea, resumed 4/4.  Her 6 week MRI showed improvement but with suggestion of adding in an additional agent.      Her plan includes chemotherapy now includes weekly Carbo (added in 4/4/22) with IV Emend as pre-medications,  oral paclitaxel, Encequidar and IV dostarlimab (q3w) x12 weeks followed by AC.   AC #1 was 5/17 and second cycle was delayed due to COVID and was given on 6/10.  Tori's first cycle was complicated by hyperemesis, dehydration, and COVID.  Thus far cycle 2 has gone much smoother.  We will give her a liter of saline today in addition to antiemetic support.  She is set up to come back on Friday again for fluids.  We went through the schedule she is set to receive all 4 cycles of AC.  She will start to plan her surgery which will be early August.  We will continue to follow her closely per protocol.     2.  Anemia.  Patient's hemoglobin was 8.9 and a little lower.  She has a history of iron deficiency anemia.  Iron stores are low.  Will have her take oral iron M, W and F each week. We also discussed  dietary sources of iron.       3.  GI:   -Chemo induced nausea: Continue on Zyprexa at night, Dex on days 2 through 4, Zofran or Compazine during the day and Ativan at night.  Today we will give her IV and Emend, Dex, and Zofran.  -If needed for constipation but has been stooling every other day  -Using Pepcid AC and Mylanta for acid reflux    4. Bone pains, secondary to neulasta, significantly better on claritin, encouraged APAP as well.          30 minutes spent on the date of the encounter doing chart review, review of test results, interpretation of tests, patient visit, documentation and discussion with family       Keegan Lockett MD

## 2022-06-24 NOTE — NURSING NOTE
"Oncology Rooming Note    June 24, 2022 11:30 AM   Tori Steele is a 46 year old female who presents for:    Chief Complaint   Patient presents with     Port Draw     Labs drawn by RN via port, vitals taken.     Oncology Clinic Visit     Invasive ductal carcinoma of right breast (H)     Initial Vitals: Pulse (!) 123   Temp 99.8  F (37.7  C)   Resp 16   Wt 61.2 kg (134 lb 14.4 oz)   LMP  (LMP Unknown)   SpO2 99%   BMI 23.90 kg/m   Estimated body mass index is 23.9 kg/m  as calculated from the following:    Height as of 5/24/22: 1.6 m (5' 3\").    Weight as of this encounter: 61.2 kg (134 lb 14.4 oz). Body surface area is 1.65 meters squared.  No Pain (0) Comment: Data Unavailable   No LMP recorded (lmp unknown). (Menstrual status: Chemotherapy).  Allergies reviewed: Yes  Medications reviewed: Yes    Medications: MEDICATION REFILLS NEEDED TODAY. Provider was notified.  Pharmacy name entered into [facility]:    [facility] #[id number] - [place], [place] - [address] AT [facility] [address] [facility] [place], [place] - [address] [place] [address] [phone]    Clinical concerns: Please refill nausea medication.        Ashley Izquierdo LPN June 24, 2022 11:30 AM              "

## 2022-06-24 NOTE — PROGRESS NOTES
Oncology/Hematology Visit Note  Jun 24, 2022    Reason for Visit: follow up of breast cancer     History of Present Illness: Tori Steele is a 46 year old female with breat cancer. Right-sided 5.5 cm ER positive UT positive HER-2 negative breast cancer with associated DCIS.  Her MammaPrint came back as high risk.  She consented for the I-SPY clinical trial and has been randomized to the oral paclitaxel, Encequidar and dostarlimab arm.      2/21/22 started trial with oral paclitaxel, Encequidar and dostarlimab.    3/15/22  her IV immunotherapy dostarlimab was held due to diarrhea that recovered with Imodium and time  4/4/22   Carbo was added in weekly due in hopes for more significant reduction in cancer  5/17/22 AC start     Interval History:  Tori is here today for Cycle 3, D1.     As detailed by Ms. Mota's previous note the patient did much better during cycle 2 of AC.  She still has some back pain which is attributed to the Neulasta but as she states of cycle 1 was a score of 20 this was a score of 2.  She thinks her appetite is a little bit better and her weight has been stable.  She has recovered from her COVID infection.  She is not having much diarrhea and has done mostly fairly well during her last cycle.    She does note some hiccups after her infusion and for several days afterwards.  They seem to resolve on their own and she does not attribute any of her medications to either causing it or relieving it.  No headaches or double or blurry vision. No dizziness.     No numbness or tingling of fingers or toes. No rash or itching.       ROS:  Patient denies the following: fevers, body aches, chills, headaches, vision changes, dizziness, chest pain, shortness of breath, nausea, vomiting, diarrhea, constipation, abdominal pain, rashes, bruising/bleeding, mouth sores, swelling or pain in the legs.        Current Outpatient Prescriptions               Current Outpatient Medications   Medication Sig  Dispense Refill     ferrous sulfate (FEROSUL) 325 (65 Fe) MG tablet Take 325 mg by mouth         lidocaine-prilocaine (EMLA) 2.5-2.5 % external cream Apply to port site one hour prior to access may start using six days after port placement. 30 g 1     ondansetron (ZOFRAN) 8 MG tablet Take 1 tablet (8 mg) by mouth every 8 hours as needed for nausea 30 tablet 0     prochlorperazine (COMPAZINE) 10 MG tablet Take 1 tablet (10 mg) by mouth every 6 hours as needed (Nausea/Vomiting) 30 tablet 2     riboflavin (VITAMIN  B2) 25 MG TABS                  Physical Examination: ECOG 1    Pulse (!) 123   Temp 99.8  F (37.7  C)   Resp 16   Wt 61.2 kg (134 lb 14.4 oz)   LMP  (LMP Unknown)   SpO2 99%   BMI 23.90 kg/m        Wt Readings from Last 4 Encounters:   22 61.2 kg (134 lb 14.4 oz)   06/10/22 61.9 kg (136 lb 6.4 oz)   22 61.8 kg (136 lb 4.8 oz)   22 65.8 kg (145 lb)         ECO  General: Sitting comfortably in no acute distress.  She appears brighter than in the past and she was not examined in detail.mor was, likely 1 cm area of fullness       Laboratory Data:     Latest Reference Range & Units 22 10:24 22 10:57   Sodium 133 - 144 mmol/L 143 143   Potassium 3.4 - 5.3 mmol/L 3.3 (L) 3.6   Chloride 94 - 109 mmol/L 105 107   Carbon Dioxide 20 - 32 mmol/L 27 26   Urea Nitrogen 7 - 30 mg/dL 13 5 (L)   Creatinine 0.52 - 1.04 mg/dL 0.54 0.59   GFR Estimate >60 mL/min/1.73m2 >90 >90   Calcium 8.5 - 10.1 mg/dL 8.8 8.6   Anion Gap 3 - 14 mmol/L 11 10   Albumin 3.4 - 5.0 g/dL 2.8 (L) 2.9 (L)   Protein Total 6.8 - 8.8 g/dL 6.5 (L) 6.6 (L)   Alkaline Phosphatase 40 - 150 U/L 70 80   ALT 0 - 50 U/L 19 17   AST 0 - 45 U/L 12 13   Bilirubin Total 0.2 - 1.3 mg/dL 0.4 0.2   T4 Free 0.76 - 1.46 ng/dL  0.96   TSH 0.40 - 4.00 mU/L  1.66   Glucose 70 - 99 mg/dL 100 (H) 131 (H)   WBC 4.0 - 11.0 10e3/uL 19.2 (H) 7.4   Hemoglobin 11.7 - 15.7 g/dL 9.3 (L) 8.9 (L)   Hematocrit 35.0 - 47.0 % 29.6 (L) 28.3 (L)    Platelet Count 150 - 450 10e3/uL 350 246   RBC Count 3.80 - 5.20 10e6/uL 2.93 (L) 2.75 (L)   MCV 78 - 100 fL 101 (H) 103 (H)   MCH 26.5 - 33.0 pg 31.7 32.4   MCHC 31.5 - 36.5 g/dL 31.4 (L) 31.4 (L)   RDW 10.0 - 15.0 % 17.8 (H) 17.0 (H)   % Neutrophils % 86 78   % Lymphocytes % 11 11   % Monocytes % 2 8   % Eosinophils % 1 0   % Basophils % 0 0   Absolute Basophils 0.0 - 0.2 10e3/uL  0.0   (L): Data is abnormally low  (H): Data is abnormally high    I reviewed the above labs today.       Assessment and Plan:  1. Right sided ER+, IL+, HER2-negative, MammaPrint high risk T=5.5cm, N=0 breast cancer.  Tori had her 3/15/22 Dostarlimab held due to diarrhea, resumed 4/4.  Her 6 week MRI showed improvement but with suggestion of adding in an additional agent.      Her plan includes chemotherapy now includes weekly Carbo (added in 4/4/22) with IV Emend as pre-medications,  oral paclitaxel, Encequidar and IV dostarlimab (q3w) x12 weeks followed by AC.   AC #1 was 5/17 and second cycle was delayed due to COVID and was given on 6/10.  Tori's first cycle was complicated by hyperemesis, dehydration, and COVID.  Thus far cycle 2 has gone much smoother.  We will give her a liter of saline today in addition to antiemetic support.  She is set up to come back on Friday again for fluids.  We went through the schedule she is set to receive all 4 cycles of AC.  She will start to plan her surgery which will be early August.  We will continue to follow her closely per protocol.     2.  Anemia.  Patient's hemoglobin was 8.9 and a little lower.  She has a history of iron deficiency anemia.  Iron stores are low.  Will have her take oral iron M, W and F each week. We also discussed dietary sources of iron.       3.  GI:   -Chemo induced nausea: Continue on Zyprexa at night, Dex on days 2 through 4, Zofran or Compazine during the day and Ativan at night.  Today we will give her IV and Emend, Dex, and Zofran.  -If needed for constipation  but has been stooling every other day  -Using Pepcid AC and Mylanta for acid reflux    4. Bone pains, secondary to neulasta, significantly better on claritin, encouraged APAP as well.          30 minutes spent on the date of the encounter doing chart review, review of test results, interpretation of tests, patient visit, documentation and discussion with family       Keegan Lockett MD

## 2022-06-24 NOTE — PATIENT INSTRUCTIONS
[facility] Main Line: [phone]    Call triage nurse with chills and/or temperature greater than or equal to 100.4, uncontrolled nausea/vomiting, diarrhea, constipation, dizziness, shortness of breath, chest pain, bleeding, unexplained bruising, or any new/concerning symptoms, questions/concerns.   If you are having any concerning symptoms or wish to speak to a provider before your next infusion visit, please call your care coordinator or triage to notify them so we can adequately serve you.   Nurse Triage line:  [phone]    If after hours, weekends, or holidays, call main hospital  and ask for Oncology doctor on call @ [phone]     June 2022 Sunday Monday Tuesday Wednesday Thursday Friday Saturday                  1     2     3     4       5     6     7     8     9     10    LAB CENTRAL  11:45 AM   (15 min.)   [facility]   [facility]    ONC INFUSION 2 HR (120 MIN)  12:30 PM   (120 min.)    ONC INFUSION NURSE   [facility] 11       12     13    LAB CENTRAL   9:45 AM   (15 min.)    [facility]   [facility]    RETURN  10:15 AM   (45 min.)   Milka Mota PA-C   [facility]    ONC INFUSION 2 HR (120 MIN)  12:00 PM   (120 min.)    ONC INFUSION NURSE   [facility] 14     15     16     17    RETURN   9:45 AM   (20 min.)   Gurpreet Layton MD   [facility] 18       19     20     21     22     23     24    LAB CENTRAL  10:30 AM   (15 min.)    [facility]   [facility]    RETURN  10:45 AM   (30 min.)   Keegan Lockett MD   [facility]    ONC INFUSION 2 HR (120 MIN)   1:00 PM   (120 min.)    ONC INFUSION NURSE   [facility] 25       26     27    ONC INFUSION 2 HR (120 MIN)  10:00 AM   (120 min.)   [facility]  INFUSION NURSE   [facility] 28 29 30 July 2022 Sunday Monday Tuesday Wednesday Thursday Friday Saturday                            1     2       3     4     5     6     7     8    LAB CENTRAL   8:15 AM   (15 min.)    [facility]   [facility]    RETURN   8:45 AM   (30 min.)   Keegan Lockett MD   [facility]    ONC INFUSION 2 HR (120 MIN)  10:00 AM   (120 min.)    ONC INFUSION NURSE   [facility] 9       10     11     12     13     14     15    VIDEO VISIT RETURN  10:05 AM   (20 min.)   Gurpreet Layton MD   [facility] 16       17     18     19     20     21     22     23       24     25     26     27     28     29     30       31                                                     Lab Results:  Recent Results (from the past 12 hour(s))   Comprehensive metabolic panel    Collection Time: 06/24/22 10:57 AM   Result Value Ref Range    Sodium 143 133 - 144 mmol/L    Potassium 3.6 3.4 - 5.3 mmol/L    Chloride 107 94 - 109 mmol/L    Carbon Dioxide (CO2) 26 20 - 32 mmol/L    Anion Gap 10 3 - 14 mmol/L    Urea Nitrogen 5 (L) 7 - 30 mg/dL    Creatinine 0.59 0.52 - 1.04 mg/dL    Calcium 8.6 8.5 - 10.1 mg/dL    Glucose 131 (H) 70 - 99 mg/dL    Alkaline Phosphatase 80 40 - 150 U/L    AST 13 0 - 45 U/L    ALT 17 0 - 50 U/L    Protein Total 6.6 (L) 6.8 - 8.8 g/dL    Albumin 2.9 (L) 3.4 - 5.0 g/dL    Bilirubin Total 0.2 0.2 - 1.3 mg/dL    GFR Estimate >90 >60 mL/min/1.73m2   TSH    Collection Time: 06/24/22 10:57 AM   Result Value Ref Range    TSH 1.66 0.40 - 4.00 mU/L   T4 free    Collection Time: 06/24/22 10:57 AM   Result Value Ref Range    Free T4 0.96 0.76 - 1.46 ng/dL   CBC with platelets and differential    Collection Time: 06/24/22 10:57 AM   Result Value Ref Range    WBC Count 7.4 4.0 - 11.0 10e3/uL    RBC Count 2.75 (L) 3.80 -  5.20 10e6/uL    Hemoglobin 8.9 (L) 11.7 - 15.7 g/dL    Hematocrit 28.3 (L) 35.0 - 47.0 %     (H) 78 - 100 fL    MCH 32.4 26.5 - 33.0 pg    MCHC 31.4 (L) 31.5 - 36.5 g/dL    RDW 17.0 (H) 10.0 - 15.0 %    Platelet Count 246 150 - 450 10e3/uL    % Neutrophils 78 %    % Lymphocytes 11 %    % Monocytes 8 %    % Eosinophils 0 %    % Basophils 0 %    % Immature Granulocytes 3 %    NRBCs per 100 WBC 0 <1 /100    Absolute Neutrophils 5.7 1.6 - 8.3 10e3/uL    Absolute Lymphocytes 0.8 0.8 - 5.3 10e3/uL    Absolute Monocytes 0.6 0.0 - 1.3 10e3/uL    Absolute Eosinophils 0.0 0.0 - 0.7 10e3/uL    Absolute Basophils 0.0 0.0 - 0.2 10e3/uL    Absolute Immature Granulocytes 0.2 <=0.4 10e3/uL    Absolute NRBCs 0.0 10e3/uL

## 2022-06-24 NOTE — NURSING NOTE
[id number]: Study Visit Note   Subject name: Tori Steele     Visit: C15D1    Did the study visit occur within the appropriate window allowed by the protocol? yes    Since the last study visit, She has been doing well.     I have personally interviewed Tori Steele and reviewed her medical record for adverse events and concomitant medications and these have been recorded on the corresponding logs in Tori Steele's research file.     Tori Steele was given the opportunity to ask any trial related questions.  Please see provider progress note for physical exam and other clinical information. Labs were reviewed - any significant lab values were addressed and reviewed.    ADEN BabbN, RN  CRC-RN,   Pager: [phone]

## 2022-06-24 NOTE — NURSING NOTE
Chief Complaint   Patient presents with     Port Draw     Labs drawn by RN via port, vitals taken.     Port accessed with 20 gauge 3/4 inch gripper needle by RN, labs collected, line flushed with saline and heparin.  Vitals taken. Pt checked in for appointment(s).    Yue Argueta RN

## 2022-06-24 NOTE — TELEPHONE ENCOUNTER
Nurse Triage SBAR    Is this a 2nd Level Triage? YES, LICENSED PRACTITIONER REVIEW IS REQUIRED    Situation:   Pt calling in to nurse triage this evening after leaving a few hours ago post C15D1 Adriamycin/Cytoxan infusion and realizing that a medication (Decadron)  that she normally takes post infusion on days 2 ,3 and 4 is missing from her take home medications.      Background:   Missing needed medication post infusion     Assessment:   Pt will need dexamethasone (DECADRON) 4 MG tablet called in to local pharmacy  In order to take as directed starting tomorrow 6/25/22 with breakfast    Protocol Recommended Disposition:   No disposition on file.    Recommendation:   Pt will need dexamethasone (DECADRON) 4 MG tablet called in to local pharmacy [facility] #[id number] on [address] in [place],    Paged to provider    Does the patient meet one of the following criteria for ADS visit consideration? 16+ years old, with an [facility] PCP     TIP  Providers, please consider if this condition is appropriate for management at one of our Acute and Diagnostic Services sites.     If patient is a good candidate, please use dotphrase <dot>triageresponse and select Refer to ADS to document.

## 2022-06-24 NOTE — PROGRESS NOTES
Infusion Nursing Note:  Tori Steele presents today for C15D1 Adriamycin/Cytoxan.    Patient seen by provider today: Yes: Dr Lockett   present during visit today: Not Applicable.    Note: patient reported to clinic today with no new complaints or concerns after seeing provider.    Intravenous Access:  Labs drawn without difficulty.  Implanted Port.  By lab staff.    Treatment Conditions:  Lab Results   Component Value Date    HGB 8.9 (L) 06/24/2022    WBC 7.4 06/24/2022    ANEU 16.5 (H) 06/13/2022    ANEUTAUTO 5.7 06/24/2022     06/24/2022      Lab Results   Component Value Date     06/24/2022    POTASSIUM 3.6 06/24/2022    MAG 1.7 (L) 02/07/2022    CR 0.59 06/24/2022    JENNIE 8.6 06/24/2022    BILITOTAL 0.2 06/24/2022    ALBUMIN 2.9 (L) 06/24/2022    ALT 17 06/24/2022    AST 13 06/24/2022     Results reviewed, labs MET treatment parameters, ok to proceed with treatment.  ECHO/MUGA completed 2/7/22  EF 55-60%.    Post Infusion Assessment:  Patient tolerated infusion without incident.  Blood return noted pre and post infusion.  Blood return noted during administration every 2 ml Adriamycin.  Site patent and intact, free from redness, edema or discomfort.  No evidence of extravasations.  Access discontinued per protocol.     Stopped Adriamycin at 1358    Discharge Plan:   Prescription refills given for Compazine.  Discharge instructions reviewed with: Patient.  Patient and/or family verbalized understanding of discharge instructions and all questions answered.  AVS to patient via [employer].  Patient will return 6/27/22 IVF and 7/8/22 infusion for next appointment.   Patient discharged in stable condition accompanied by: self.  Departure Mode: Ambulatory.  Face to Face time: 10 minutes.    Neulasta Onpro On-Body injector applied to back of right arm at 1410.  Writer discussed Neulasta injection would start tomorrow 6/25/22 at 1710, approximately 27 hours after application applied today.  Written and  Verbal instruction reviewed with patient.  Pt instructed when the dose delivery starts, it will take about 45 minutes to complete.  Pt aware Neulasta Onpro On-Body should have green flashing light and to call triage or on-call MD if injector flashes red or appears to be leaking. Pt aware to keep Onpro On-Body Neulasta 4 inches away from electrical equipment and to avoid showering 4 hours prior to injection.   Neulasta Onpro Lot number: [id number]    Fam Hardin RN

## 2022-06-25 NOTE — TELEPHONE ENCOUNTER
After hours call:   Patient received chemotherapy AC (adrimycin and cytoxan) today and was supposed to be taking oral decadron at home from day 2 through day 4. She has not received the prescription for the same.     - I will send oral dex 4 mg tablets (take 2 tablets daily) on day 2 , 3 and 4.  Prescription sent to peng Horton MD  Hematology/Oncology/Transplant Fellow   [facility]   Pgr :

## 2022-06-25 NOTE — TELEPHONE ENCOUNTER
Provider Recommendation Follow Up:   Reached patient/caregiver. Informed of provider's recommendations. Patient verbalized understanding and agrees with the plan.

## 2022-06-25 NOTE — TELEPHONE ENCOUNTER
"Pt calling in to nurse triage this evening after leaving a few hours ago post C15D1 Adriamycin/Cytoxan infusion and realizing that a medication (Decadron)  that she normally takes post infusion on days 2 ,3 and 4 is missing from her take home medications.   Pt will need dexamethasone (DECADRON) 4 MG tablet called in to local pharmacy [id number] on [address] in [place],  In order to take as directed starting tomorrow 6/25/22 with breakfast   Paged to Dr. Kehinde Lockett at 6:59 pm  And repaged to Dr. Lockett at 7:14 pm  And paged again at 7:47 pm- then called to answering service at 7:57 who paged Dr. Gutierres.  RN spoke with Dr. Gutierres who will be sending in this Rx for the Pt tonight. RN will call Pt. to let her know    Elo Madsen RN, MN, PHN on 6/24/2022 at 6:59 PM  [facility]  RN utilized sound nursing judgement based on facility triage protocols during this encounter.        Reason for Disposition    [1] Request for URGENT new prescription or refill of \"essential\" medication (i.e., likelihood of harm to patient if not taken) AND [2] triager unable to fill per unit policy    Additional Information    Negative: Drug overdose and triager unable to answer question    Negative: Caller requesting information unrelated to medicine    Negative: Caller requesting a prescription for Strep throat and has a positive culture result    Negative: Rash while taking a medication or within 3 days of stopping it    Negative: Immunization reaction suspected    Negative: [1] Asthma and [2] having symptoms of asthma (cough, wheezing, etc.)    Negative: [1] Influenza symptoms AND [2] anti-viral med prescription request, such as Tamiflu    Negative: [1] Symptom of illness (e.g., headache, abdominal pain, earache, vomiting) AND [2] more than mild    Negative: MORE THAN A DOUBLE DOSE of a prescription or over-the-counter (OTC) drug    Negative: [1] DOUBLE DOSE (an extra dose or lesser amount) of over-the-counter (OTC) " "drug AND [2] any symptoms (e.g., dizziness, nausea, pain, sleepiness)    Negative: [1] DOUBLE DOSE (an extra dose or lesser amount) of prescription drug AND [2] any symptoms (e.g., dizziness, nausea, pain, sleepiness)    Negative: Took another person's prescription drug    Negative: [1] DOUBLE DOSE (an extra dose or lesser amount) of prescription drug AND [2] NO symptoms (Exception: a double dose of antibiotics)    Negative: Diabetes drug error or overdose (e.g., took wrong type of insulin or took extra dose)    Answer Assessment - Initial Assessment Questions  1.   NAME of MEDICATION: \"What medicine are you calling about?\"      Dexamethasone ( DECADRON) 4 mg tablets  2.   QUESTION: \"What is your question?\"      Missing medication post infusion   3.   PRESCRIBING HCP: \"Who prescribed it?\" Reason: if prescribed by specialist, call should be referred to that group.      Dr. Lockett  4. SYMPTOMS: \"Do you have any symptoms?\"      N/A  5. SEVERITY: If symptoms are present, ask \"Are they mild, moderate or severe?\"      N/A    Protocols used: MEDICATION QUESTION CALL-A-AH    "

## 2022-06-27 ENCOUNTER — PATIENT OUTREACH (OUTPATIENT)
Dept: ONCOLOGY | Facility: CLINIC | Age: 47
End: 2022-06-27

## 2022-06-27 ENCOUNTER — INFUSION THERAPY VISIT (OUTPATIENT)
Dept: ONCOLOGY | Facility: CLINIC | Age: 47
End: 2022-06-27
Attending: INTERNAL MEDICINE
Payer: OTHER GOVERNMENT

## 2022-06-27 VITALS
TEMPERATURE: 98.6 F | OXYGEN SATURATION: 96 % | RESPIRATION RATE: 16 BRPM | HEART RATE: 121 BPM | SYSTOLIC BLOOD PRESSURE: 113 MMHG | DIASTOLIC BLOOD PRESSURE: 56 MMHG

## 2022-06-27 DIAGNOSIS — T45.1X5A CHEMOTHERAPY-INDUCED NEUTROPENIA (H): Primary | ICD-10-CM

## 2022-06-27 DIAGNOSIS — D70.1 CHEMOTHERAPY-INDUCED NEUTROPENIA (H): Primary | ICD-10-CM

## 2022-06-27 PROCEDURE — 250N000011 HC RX IP 250 OP 636: Performed by: NURSE PRACTITIONER

## 2022-06-27 PROCEDURE — 96375 TX/PRO/DX INJ NEW DRUG ADDON: CPT

## 2022-06-27 PROCEDURE — 258N000003 HC RX IP 258 OP 636: Performed by: PHYSICIAN ASSISTANT

## 2022-06-27 PROCEDURE — 258N000003 HC RX IP 258 OP 636: Performed by: NURSE PRACTITIONER

## 2022-06-27 PROCEDURE — 96365 THER/PROPH/DIAG IV INF INIT: CPT

## 2022-06-27 PROCEDURE — 250N000011 HC RX IP 250 OP 636: Performed by: PHYSICIAN ASSISTANT

## 2022-06-27 PROCEDURE — 96361 HYDRATE IV INFUSION ADD-ON: CPT

## 2022-06-27 RX ORDER — ONDANSETRON 2 MG/ML
8 INJECTION INTRAMUSCULAR; INTRAVENOUS EVERY 6 HOURS PRN
Status: CANCELLED
Start: 2022-06-27

## 2022-06-27 RX ORDER — ONDANSETRON 2 MG/ML
8 INJECTION INTRAMUSCULAR; INTRAVENOUS EVERY 6 HOURS PRN
Status: DISCONTINUED | OUTPATIENT
Start: 2022-06-27 | End: 2022-06-27 | Stop reason: HOSPADM

## 2022-06-27 RX ADMIN — DEXAMETHASONE SODIUM PHOSPHATE: 10 INJECTION, SOLUTION INTRAMUSCULAR; INTRAVENOUS at 11:08

## 2022-06-27 RX ADMIN — ONDANSETRON 8 MG: 2 INJECTION INTRAMUSCULAR; INTRAVENOUS at 11:04

## 2022-06-27 RX ADMIN — SODIUM CHLORIDE 1000 ML: 9 INJECTION, SOLUTION INTRAVENOUS at 10:54

## 2022-06-27 ASSESSMENT — PAIN SCALES - GENERAL: PAINLEVEL: NO PAIN (0)

## 2022-06-27 NOTE — PROGRESS NOTES
Spoke to patient post C3 AC and had IV fluids this AM doing very well. Patient is eating and drinking w/o difficulty and able to do daily chores. Last AC scheduled in two weeks and will contact patient post C4 AC.  Answered all patient's questions and verbalized understanding. Ebony Lucio RN, BSN.

## 2022-06-27 NOTE — PROGRESS NOTES
Infusion Nursing Note:  Tori Steele presents today for IV fluids and antiemetics.    Patient seen by provider today: No   present during visit today: Not Applicable.    Note:   Patient has been a little nauseous at home, she has been taking her antiemetics with some relief. She has not had any emeses. She reports she has been able to maintain her food and fluid intake.    She denies fevers, chills, SOB, chest pain, diarrhea, and pain.    Intravenous Access:  Implanted Port.    Treatment Conditions:  Not Applicable.    Post Infusion Assessment:  Patient tolerated infusion without incident.  Blood return noted pre and post infusion.  Site patent and intact, free from redness, edema or discomfort.  No evidence of extravasations.  Access discontinued per protocol.     Discharge Plan:   Patient declined prescription refills.  Discharge instructions reviewed with: Patient.  Patient and/or family verbalized understanding of discharge instructions and all questions answered.  AVS to patient via [employer].  Patient will return 7/8 for next appointment.   Patient discharged in stable condition accompanied by: self.  Departure Mode: Ambulatory.      Angelita Morris RN

## 2022-06-27 NOTE — PATIENT INSTRUCTIONS
[facility] Triage and after hours / weekends / holidays:  [phone]    Please call the triage or after hours line if you experience a temperature greater than or equal to 100.5, shaking chills, have uncontrolled nausea, vomiting and/or diarrhea, dizziness, shortness of breath, chest pain, bleeding, unexplained bruising, or if you have any other new/concerning symptoms, questions or concerns.      If you are having any concerning symptoms or wish to speak to a provider before your next infusion visit, please call your care coordinator or triage to notify them so we can adequately serve you.     If you need a refill on a narcotic prescription or other medication, please call before your infusion appointment.      June 2022 Sunday Monday Tuesday Wednesday Thursday Friday Saturday                  1     2     3     4       5     6     7     8     9     10    LAB CENTRAL  11:45 AM   (15 min.)   [facility] DRAW   [facility]    ONC INFUSION 2 HR (120 MIN)  12:30 PM   (120 min.)    ONC INFUSION NURSE   [facility] 11       12     13    LAB CENTRAL   9:45 AM   (15 min.)   [facility] DRAW   [facility]    RETURN  10:15 AM   (45 min.)   Milka Mota PA-C   [facility]    ONC INFUSION 2 HR (120 MIN)  12:00 PM   (120 min.)    ONC INFUSION NURSE   [facility] 14     15     16     17    RETURN   9:45 AM   (20 min.)   Gurpreet Layton MD   [facility] 18       19     20     21     22     23     24    LAB CENTRAL  10:30 AM   (15 min.)   [facility] DRAW   [facility]    RETURN  10:45 AM   (30 min.)   Keegan Lockett MD   [facility]    ONC INFUSION 2 HR (120 MIN)   1:00 PM   (120 min.)    ONC INFUSION NURSE   [facility] 25       26     27    ONC  INFUSION 2 HR (120 MIN)  10:00 AM   (120 min.)    ONC INFUSION NURSE   [facility] 28 29 30 July 2022 Sunday Monday Tuesday Wednesday Thursday Friday Saturday                            1     2       3     4     5     6     7     8    LAB CENTRAL   8:15 AM   (15 min.)   [facility] LAB DRAW   [facility]    RETURN   8:45 AM   (30 min.)   Keegan Lockett MD   [facility]    ONC INFUSION 2 HR (120 MIN)  10:00 AM   (120 min.)    ONC INFUSION NURSE   [facility] 9       10     11     12     13     14     15    VIDEO VISIT RETURN  10:05 AM   (20 min.)   Gurpreet Layton MD   [facility] 16       17     18     19     20     21     22     23       24     25     26     27     28     29     30       31

## 2022-07-08 ENCOUNTER — INFUSION THERAPY VISIT (OUTPATIENT)
Dept: ONCOLOGY | Facility: CLINIC | Age: 47
End: 2022-07-08
Attending: INTERNAL MEDICINE
Payer: OTHER GOVERNMENT

## 2022-07-08 ENCOUNTER — APPOINTMENT (OUTPATIENT)
Dept: LAB | Facility: CLINIC | Age: 47
End: 2022-07-08
Attending: INTERNAL MEDICINE
Payer: OTHER GOVERNMENT

## 2022-07-08 VITALS
DIASTOLIC BLOOD PRESSURE: 73 MMHG | OXYGEN SATURATION: 100 % | HEART RATE: 121 BPM | SYSTOLIC BLOOD PRESSURE: 103 MMHG | TEMPERATURE: 98.3 F | WEIGHT: 131.8 LBS | BODY MASS INDEX: 23.35 KG/M2 | RESPIRATION RATE: 16 BRPM

## 2022-07-08 DIAGNOSIS — C50.111 MALIGNANT NEOPLASM OF CENTRAL PORTION OF RIGHT BREAST IN FEMALE, ESTROGEN RECEPTOR POSITIVE (H): ICD-10-CM

## 2022-07-08 DIAGNOSIS — C50.911 INVASIVE DUCTAL CARCINOMA OF RIGHT BREAST (H): Primary | ICD-10-CM

## 2022-07-08 DIAGNOSIS — Z17.0 MALIGNANT NEOPLASM OF CENTRAL PORTION OF RIGHT BREAST IN FEMALE, ESTROGEN RECEPTOR POSITIVE (H): ICD-10-CM

## 2022-07-08 LAB
ALBUMIN SERPL-MCNC: 2.9 G/DL (ref 3.4–5)
ALP SERPL-CCNC: 84 U/L (ref 40–150)
ALT SERPL W P-5'-P-CCNC: 15 U/L (ref 0–50)
ANION GAP SERPL CALCULATED.3IONS-SCNC: 7 MMOL/L (ref 3–14)
AST SERPL W P-5'-P-CCNC: 15 U/L (ref 0–45)
BASOPHILS # BLD AUTO: 0 10E3/UL (ref 0–0.2)
BASOPHILS NFR BLD AUTO: 0 %
BILIRUB SERPL-MCNC: 0.3 MG/DL (ref 0.2–1.3)
BUN SERPL-MCNC: 5 MG/DL (ref 7–30)
CALCIUM SERPL-MCNC: 8.9 MG/DL (ref 8.5–10.1)
CHLORIDE BLD-SCNC: 110 MMOL/L (ref 94–109)
CO2 SERPL-SCNC: 25 MMOL/L (ref 20–32)
CREAT SERPL-MCNC: 0.65 MG/DL (ref 0.52–1.04)
EOSINOPHIL # BLD AUTO: 0 10E3/UL (ref 0–0.7)
EOSINOPHIL NFR BLD AUTO: 0 %
ERYTHROCYTE [DISTWIDTH] IN BLOOD BY AUTOMATED COUNT: 16.8 % (ref 10–15)
GFR SERPL CREATININE-BSD FRML MDRD: >90 ML/MIN/1.73M2
GLUCOSE BLD-MCNC: 104 MG/DL (ref 70–99)
HCT VFR BLD AUTO: 27.3 % (ref 35–47)
HGB BLD-MCNC: 8.6 G/DL (ref 11.7–15.7)
IMM GRANULOCYTES # BLD: 0.2 10E3/UL
IMM GRANULOCYTES NFR BLD: 2 %
LYMPHOCYTES # BLD AUTO: 0.7 10E3/UL (ref 0.8–5.3)
LYMPHOCYTES NFR BLD AUTO: 8 %
MCH RBC QN AUTO: 32.8 PG (ref 26.5–33)
MCHC RBC AUTO-ENTMCNC: 31.5 G/DL (ref 31.5–36.5)
MCV RBC AUTO: 104 FL (ref 78–100)
MONOCYTES # BLD AUTO: 0.7 10E3/UL (ref 0–1.3)
MONOCYTES NFR BLD AUTO: 9 %
NEUTROPHILS # BLD AUTO: 6.5 10E3/UL (ref 1.6–8.3)
NEUTROPHILS NFR BLD AUTO: 81 %
NRBC # BLD AUTO: 0 10E3/UL
NRBC BLD AUTO-RTO: 0 /100
PLATELET # BLD AUTO: 327 10E3/UL (ref 150–450)
POTASSIUM BLD-SCNC: 3.8 MMOL/L (ref 3.4–5.3)
PROT SERPL-MCNC: 6.6 G/DL (ref 6.8–8.8)
RBC # BLD AUTO: 2.62 10E6/UL (ref 3.8–5.2)
SODIUM SERPL-SCNC: 142 MMOL/L (ref 133–144)
WBC # BLD AUTO: 8.1 10E3/UL (ref 4–11)

## 2022-07-08 PROCEDURE — 85025 COMPLETE CBC W/AUTO DIFF WBC: CPT | Performed by: INTERNAL MEDICINE

## 2022-07-08 PROCEDURE — 96413 CHEMO IV INFUSION 1 HR: CPT

## 2022-07-08 PROCEDURE — 96411 CHEMO IV PUSH ADDL DRUG: CPT

## 2022-07-08 PROCEDURE — 250N000011 HC RX IP 250 OP 636: Performed by: INTERNAL MEDICINE

## 2022-07-08 PROCEDURE — 96367 TX/PROPH/DG ADDL SEQ IV INF: CPT

## 2022-07-08 PROCEDURE — 80053 COMPREHEN METABOLIC PANEL: CPT | Performed by: INTERNAL MEDICINE

## 2022-07-08 PROCEDURE — 258N000003 HC RX IP 258 OP 636: Performed by: INTERNAL MEDICINE

## 2022-07-08 PROCEDURE — 96372 THER/PROPH/DIAG INJ SC/IM: CPT | Performed by: INTERNAL MEDICINE

## 2022-07-08 PROCEDURE — 96377 APPLICATON ON-BODY INJECTOR: CPT | Mod: 59

## 2022-07-08 PROCEDURE — 99214 OFFICE O/P EST MOD 30 MIN: CPT | Performed by: INTERNAL MEDICINE

## 2022-07-08 PROCEDURE — G0463 HOSPITAL OUTPT CLINIC VISIT: HCPCS

## 2022-07-08 PROCEDURE — 96375 TX/PRO/DX INJ NEW DRUG ADDON: CPT

## 2022-07-08 RX ORDER — HEPARIN SODIUM (PORCINE) LOCK FLUSH IV SOLN 100 UNIT/ML 100 UNIT/ML
5 SOLUTION INTRAVENOUS
Status: DISCONTINUED | OUTPATIENT
Start: 2022-07-08 | End: 2022-07-08 | Stop reason: HOSPADM

## 2022-07-08 RX ORDER — NALOXONE HYDROCHLORIDE 0.4 MG/ML
0.2 INJECTION, SOLUTION INTRAMUSCULAR; INTRAVENOUS; SUBCUTANEOUS
Status: CANCELLED | OUTPATIENT
Start: 2022-07-08

## 2022-07-08 RX ORDER — ALBUTEROL SULFATE 90 UG/1
1-2 AEROSOL, METERED RESPIRATORY (INHALATION)
Status: CANCELLED
Start: 2022-07-08

## 2022-07-08 RX ORDER — DOXORUBICIN HYDROCHLORIDE 2 MG/ML
60 INJECTION, SOLUTION INTRAVENOUS ONCE
Status: COMPLETED | OUTPATIENT
Start: 2022-07-08 | End: 2022-07-08

## 2022-07-08 RX ORDER — HEPARIN SODIUM,PORCINE 10 UNIT/ML
5 VIAL (ML) INTRAVENOUS
Status: CANCELLED | OUTPATIENT
Start: 2022-07-08

## 2022-07-08 RX ORDER — PALONOSETRON 0.05 MG/ML
0.25 INJECTION, SOLUTION INTRAVENOUS ONCE
Status: COMPLETED | OUTPATIENT
Start: 2022-07-08 | End: 2022-07-08

## 2022-07-08 RX ORDER — ONDANSETRON 8 MG/1
8 TABLET, FILM COATED ORAL EVERY 8 HOURS PRN
Qty: 30 TABLET | Refills: 2 | Status: SHIPPED | OUTPATIENT
Start: 2022-07-08 | End: 2022-08-23

## 2022-07-08 RX ORDER — DIPHENHYDRAMINE HYDROCHLORIDE 50 MG/ML
50 INJECTION INTRAMUSCULAR; INTRAVENOUS
Status: CANCELLED
Start: 2022-07-08

## 2022-07-08 RX ORDER — EPINEPHRINE 1 MG/ML
0.3 INJECTION, SOLUTION INTRAMUSCULAR; SUBCUTANEOUS EVERY 5 MIN PRN
Status: CANCELLED | OUTPATIENT
Start: 2022-07-08

## 2022-07-08 RX ORDER — METHYLPREDNISOLONE SODIUM SUCCINATE 125 MG/2ML
125 INJECTION, POWDER, LYOPHILIZED, FOR SOLUTION INTRAMUSCULAR; INTRAVENOUS
Status: CANCELLED
Start: 2022-07-08

## 2022-07-08 RX ORDER — MEPERIDINE HYDROCHLORIDE 25 MG/ML
25 INJECTION INTRAMUSCULAR; INTRAVENOUS; SUBCUTANEOUS EVERY 30 MIN PRN
Status: CANCELLED | OUTPATIENT
Start: 2022-07-08

## 2022-07-08 RX ORDER — LORAZEPAM 2 MG/ML
0.5 INJECTION INTRAMUSCULAR EVERY 4 HOURS PRN
Status: CANCELLED | OUTPATIENT
Start: 2022-07-08

## 2022-07-08 RX ORDER — ALBUTEROL SULFATE 0.83 MG/ML
2.5 SOLUTION RESPIRATORY (INHALATION)
Status: CANCELLED | OUTPATIENT
Start: 2022-07-08

## 2022-07-08 RX ORDER — DOXORUBICIN HYDROCHLORIDE 2 MG/ML
60 INJECTION, SOLUTION INTRAVENOUS ONCE
Status: CANCELLED | OUTPATIENT
Start: 2022-07-08

## 2022-07-08 RX ORDER — HEPARIN SODIUM (PORCINE) LOCK FLUSH IV SOLN 100 UNIT/ML 100 UNIT/ML
5 SOLUTION INTRAVENOUS
Status: CANCELLED | OUTPATIENT
Start: 2022-07-08

## 2022-07-08 RX ORDER — LORAZEPAM 1 MG/1
1 TABLET ORAL EVERY 6 HOURS PRN
Qty: 30 TABLET | Refills: 0 | Status: SHIPPED | OUTPATIENT
Start: 2022-07-08 | End: 2022-10-03

## 2022-07-08 RX ORDER — HEPARIN SODIUM (PORCINE) LOCK FLUSH IV SOLN 100 UNIT/ML 100 UNIT/ML
5 SOLUTION INTRAVENOUS ONCE
Status: COMPLETED | OUTPATIENT
Start: 2022-07-08 | End: 2022-07-08

## 2022-07-08 RX ORDER — PROCHLORPERAZINE MALEATE 10 MG
10 TABLET ORAL EVERY 6 HOURS PRN
Qty: 30 TABLET | Refills: 2 | Status: SHIPPED | OUTPATIENT
Start: 2022-07-08 | End: 2022-08-23

## 2022-07-08 RX ORDER — PALONOSETRON 0.05 MG/ML
0.25 INJECTION, SOLUTION INTRAVENOUS ONCE
Status: CANCELLED
Start: 2022-07-08 | End: 2022-07-08

## 2022-07-08 RX ADMIN — PALONOSETRON HYDROCHLORIDE 0.25 MG: 0.25 INJECTION INTRAVENOUS at 09:58

## 2022-07-08 RX ADMIN — Medication 5 ML: at 08:29

## 2022-07-08 RX ADMIN — CYCLOPHOSPHAMIDE 1000 MG: 1 INJECTION, POWDER, FOR SOLUTION INTRAVENOUS; ORAL at 11:16

## 2022-07-08 RX ADMIN — DOXORUBICIN HYDROCHLORIDE 110 MG: 2 INJECTION, SOLUTION INTRAVENOUS at 10:56

## 2022-07-08 RX ADMIN — PEGFILGRASTIM 6 MG: KIT SUBCUTANEOUS at 11:21

## 2022-07-08 RX ADMIN — DEXAMETHASONE SODIUM PHOSPHATE: 10 INJECTION, SOLUTION INTRAMUSCULAR; INTRAVENOUS at 10:04

## 2022-07-08 RX ADMIN — Medication 5 ML: at 11:49

## 2022-07-08 RX ADMIN — SODIUM CHLORIDE 250 ML: 9 INJECTION, SOLUTION INTRAVENOUS at 09:57

## 2022-07-08 ASSESSMENT — PAIN SCALES - GENERAL: PAINLEVEL: NO PAIN (0)

## 2022-07-08 NOTE — PATIENT INSTRUCTIONS
Contact Numbers  [facility]: [phone] (for symptom and scheduling needs)    Please call the [facility] Triage line if you experience a temperature greater than or equal to 100.4, shaking chills, have uncontrolled nausea, vomiting and/or diarrhea, dizziness, shortness of breath, chest pain, bleeding, unexplained bruising, or if you have any other new/concerning symptoms, questions or concerns.     If you are having any concerning symptoms or wish to speak to a provider before your next infusion visit, please call your care coordinator or triage to notify them so we can adequately serve you.     If you need a refill on a narcotic prescription or other medication, please call triage before your infusion appointment.      Neulasta On Pro:  Start time:  2:45 pm  End time: 3:45 pm    Keep dry after 1:45 pm    If Neulasta On Pro malfunctions (red light instead of green, leaking, beeping, etc.) or gets bumped/falls off before it administers the medication, please call our Nurse Triage line: [phone] and SAVE the device to bring in with you at your next visit.  You may also take over the counter loratadine (Claritin) 10 mg once daily for 6 days starting this evening to help prevent or alleviate bone pain.       July 2022 Sunday Monday Tuesday Wednesday Thursday Friday Saturday                            1     2       3     4     5     6     7     8    LAB CENTRAL   8:15 AM   (15 min.)   [facility] LAB DRAW   [facility]    RETURN   8:45 AM   (30 min.)   Keegan Lockett MD   [facility]    ONC INFUSION 2 HR (120 MIN)  10:00 AM   (120 min.)    ONC INFUSION NURSE   [facility] 9       10     11     12     13     14     15    VIDEO VISIT RETURN  10:05 AM   (20 min.)   Gurpreet Layton MD   [facility] 16       17     18     19     20     21     22     23       24     25     26     27     28     29      30 31 August 2022 Sunday Monday Tuesday Wednesday Thursday Friday Saturday        1     2     3     4     5     6       7     8     9     10     11     12     13       14     15     16     17     18     19     20       21     22     23     24     25     26     27       28     29     30     31                                      Lab Results:  Recent Results (from the past 12 hour(s))   Comprehensive metabolic panel    Collection Time: 07/08/22  8:35 AM   Result Value Ref Range    Sodium 142 133 - 144 mmol/L    Potassium 3.8 3.4 - 5.3 mmol/L    Chloride 110 (H) 94 - 109 mmol/L    Carbon Dioxide (CO2) 25 20 - 32 mmol/L    Anion Gap 7 3 - 14 mmol/L    Urea Nitrogen 5 (L) 7 - 30 mg/dL    Creatinine 0.65 0.52 - 1.04 mg/dL    Calcium 8.9 8.5 - 10.1 mg/dL    Glucose 104 (H) 70 - 99 mg/dL    Alkaline Phosphatase 84 40 - 150 U/L    AST 15 0 - 45 U/L    ALT 15 0 - 50 U/L    Protein Total 6.6 (L) 6.8 - 8.8 g/dL    Albumin 2.9 (L) 3.4 - 5.0 g/dL    Bilirubin Total 0.3 0.2 - 1.3 mg/dL    GFR Estimate >90 >60 mL/min/1.73m2   CBC with platelets and differential    Collection Time: 07/08/22  8:35 AM   Result Value Ref Range    WBC Count 8.1 4.0 - 11.0 10e3/uL    RBC Count 2.62 (L) 3.80 - 5.20 10e6/uL    Hemoglobin 8.6 (L) 11.7 - 15.7 g/dL    Hematocrit 27.3 (L) 35.0 - 47.0 %     (H) 78 - 100 fL    MCH 32.8 26.5 - 33.0 pg    MCHC 31.5 31.5 - 36.5 g/dL    RDW 16.8 (H) 10.0 - 15.0 %    Platelet Count 327 150 - 450 10e3/uL    % Neutrophils 81 %    % Lymphocytes 8 %    % Monocytes 9 %    % Eosinophils 0 %    % Basophils 0 %    % Immature Granulocytes 2 %    NRBCs per 100 WBC 0 <1 /100    Absolute Neutrophils 6.5 1.6 - 8.3 10e3/uL    Absolute Lymphocytes 0.7 (L) 0.8 - 5.3 10e3/uL    Absolute Monocytes 0.7 0.0 - 1.3 10e3/uL    Absolute Eosinophils 0.0 0.0 - 0.7 10e3/uL    Absolute Basophils 0.0 0.0 - 0.2 10e3/uL    Absolute Immature Granulocytes 0.2 <=0.4 10e3/uL     Absolute NRBCs 0.0 10e3/uL

## 2022-07-08 NOTE — LETTER
7/8/2022         RE: Tori Steele  [address] [facility] [place] [address]        Dear Colleague,    Thank you for referring your patient, Tori Steele, to the [facility]. Please see a copy of my visit note below.      Oncology/Hematology Visit Note  Jul 8, 2022    Reason for Visit: follow up of breast cancer     History of Present Illness: Tori Steele is a 46 year old female with breat cancer. Right-sided 5.5 cm ER positive [place] positive HER-2 negative breast cancer with associated DCIS.  Her MammaPrint came back as high risk.  She consented for the I-SPY clinical trial and has been randomized to the oral paclitaxel, Encequidar and dostarlimab arm.      2/21/22 started trial with oral paclitaxel, Encequidar and dostarlimab.    3/15/22  her IV immunotherapy dostarlimab was held due to diarrhea that recovered with Imodium and time  4/4/22   Carbo was added in weekly due in hopes for more significant reduction in cancer  5/17/22 AC start     Interval History:  Tori is here today for Cycle 4, D1.     Since her last visit she continues to improve.  She states that her nausea is still intermittent and it was better than it was before.  She has had no vomiting this has essentially resolved.  She continues to have fatigue but she is improved.  She states that she takes naps but it is really not limiting compared to previous treatment.    She has less of bone pain essentially none now to the Neulasta.  She went on some Facebook chats about AC and was concerned that the fourth cycle for many patients is the worst cycle.  We went over the fact that her first cycle was pretty bad and that we adjusted her regimen.  In particular the bone pain should not be an issue now because of her marrow suppression over the course of the chemotherapy.    Otherwise she is done very well.  She is not having diarrhea.  She has no cough shortness of breath.  Both she and her  say that her  appetite is better and she is able to eat more normally.  She still states she has a metallic taste in her mouth but it is not limiting.    ROS:  Patient denies the following: fevers, body aches, chills, headaches, vision changes, dizziness, chest pain, shortness of breath, nausea, vomiting, diarrhea, constipation, abdominal pain, rashes, bruising/bleeding, mouth sores, swelling or pain in the legs.        Current Outpatient Prescriptions               Current Outpatient Medications   Medication Sig Dispense Refill     ferrous sulfate (FEROSUL) 325 (65 Fe) MG tablet Take 325 mg by mouth         lidocaine-prilocaine (EMLA) 2.5-2.5 % external cream Apply to port site one hour prior to access may start using six days after port placement. 30 g 1     ondansetron (ZOFRAN) 8 MG tablet Take 1 tablet (8 mg) by mouth every 8 hours as needed for nausea 30 tablet 0     prochlorperazine (COMPAZINE) 10 MG tablet Take 1 tablet (10 mg) by mouth every 6 hours as needed (Nausea/Vomiting) 30 tablet 2     riboflavin (VITAMIN  B2) 25 MG TABS                  Physical Examination: ECOG 1    /73   Pulse (!) 121   Temp 98.3  F (36.8  C) (Oral)   Resp 16   Wt 59.8 kg (131 lb 12.8 oz)   SpO2 100%   BMI 23.35 kg/m        Wt Readings from Last 4 Encounters:   22 59.8 kg (131 lb 12.8 oz)   22 61.2 kg (134 lb 14.4 oz)   06/10/22 61.9 kg (136 lb 6.4 oz)   22 61.8 kg (136 lb 4.8 oz)         ECO  General: Sitting comfortably in no acute distress. I did not examine her in detail       Laboratory Data:     Latest Reference Range & Units 22 10:57 22 08:35   Sodium 133 - 144 mmol/L 143 142   Potassium 3.4 - 5.3 mmol/L 3.6 3.8   Chloride 94 - 109 mmol/L 107 110 (H)   Carbon Dioxide 20 - 32 mmol/L 26 25   Urea Nitrogen 7 - 30 mg/dL 5 (L) 5 (L)   Creatinine 0.52 - 1.04 mg/dL 0.59 0.65   GFR Estimate >60 mL/min/1.73m2 >90 >90   Calcium 8.5 - 10.1 mg/dL 8.6 8.9   Anion Gap 3 - 14 mmol/L 10 7   Albumin 3.4 - 5.0  g/dL 2.9 (L) 2.9 (L)   Protein Total 6.8 - 8.8 g/dL 6.6 (L) 6.6 (L)   Alkaline Phosphatase 40 - 150 U/L 80 84   ALT 0 - 50 U/L 17 15   AST 0 - 45 U/L 13 15   Bilirubin Total 0.2 - 1.3 mg/dL 0.2 0.3   Cortisol Serum   ug/dL 11.1    T4 Free 0.76 - 1.46 ng/dL 0.96    TSH 0.40 - 4.00 mU/L 1.66    Glucose 70 - 99 mg/dL 131 (H) 104 (H)   WBC 4.0 - 11.0 10e3/uL 7.4 8.1   Hemoglobin 11.7 - 15.7 g/dL 8.9 (L) 8.6 (L)   Hematocrit 35.0 - 47.0 % 28.3 (L) 27.3 (L)   Platelet Count 150 - 450 10e3/uL 246 327   RBC Count 3.80 - 5.20 10e6/uL 2.75 (L) 2.62 (L)   MCV 78 - 100 fL 103 (H) 104 (H)   MCH 26.5 - 33.0 pg 32.4 32.8   MCHC 31.5 - 36.5 g/dL 31.4 (L) 31.5   RDW 10.0 - 15.0 % 17.0 (H) 16.8 (H)   % Neutrophils % 78 81   % Lymphocytes % 11 8   % Monocytes % 8 9   % Eosinophils % 0 0   % Basophils % 0 0   Absolute Basophils 0.0 - 0.2 10e3/uL 0.0 0.0   (H): Data is abnormally high  (L): Data is abnormally low    I reviewed the above labs today. All chemistry labs essentially normal and Cl, alb, tot prot  is not of clinical significance. She is more anemic and we discussed this.        Assessment and Plan:  1. Right sided ER+, OK+, HER2-negative, MammaPrint high risk T=5.5cm, N=0 breast cancer.  Tori had her 3/15/22 Dostarlimab held due to diarrhea, resumed 4/4.  Her 6 week MRI showed improvement but with suggestion of adding in an additional agent.      12 week MRI also better, but required to go on to AC     2.  Anemia.  Patient's hemoglobin was 8.6. Hopefully will correct toward normal after AC   3.  GI: All areas better, only mild nausea now    4. Bone pains, secondary to neulasta, not an issue now.      We will contact Dr. Layton about surgical scheduling.  We have put in orders for MRI before surgery       30 minutes spent on the date of the encounter doing chart review, review of test results, interpretation of tests, patient visit, documentation and discussion with family       Keegan Lockett MD

## 2022-07-08 NOTE — PROGRESS NOTES
Oncology/Hematology Visit Note  Jul 8, 2022    Reason for Visit: follow up of breast cancer     History of Present Illness: Tori Steele is a 46 year old female with breat cancer. Right-sided 5.5 cm ER positive DE positive HER-2 negative breast cancer with associated DCIS.  Her MammaPrint came back as high risk.  She consented for the I-SPY clinical trial and has been randomized to the oral paclitaxel, Encequidar and dostarlimab arm.      2/21/22 started trial with oral paclitaxel, Encequidar and dostarlimab.    3/15/22  her IV immunotherapy dostarlimab was held due to diarrhea that recovered with Imodium and time  4/4/22   Carbo was added in weekly due in hopes for more significant reduction in cancer  5/17/22 AC start     Interval History:  Tori is here today for Cycle 4, D1.     Since her last visit she continues to improve.  She states that her nausea is still intermittent and it was better than it was before.  She has had no vomiting this has essentially resolved.  She continues to have fatigue but she is improved.  She states that she takes naps but it is really not limiting compared to previous treatment.    She has less of bone pain essentially none now to the Neulasta.  She went on some Facebook chats about AC and was concerned that the fourth cycle for many patients is the worst cycle.  We went over the fact that her first cycle was pretty bad and that we adjusted her regimen.  In particular the bone pain should not be an issue now because of her marrow suppression over the course of the chemotherapy.    Otherwise she is done very well.  She is not having diarrhea.  She has no cough shortness of breath.  Both she and her  say that her appetite is better and she is able to eat more normally.  She still states she has a metallic taste in her mouth but it is not limiting.    ROS:  Patient denies the following: fevers, body aches, chills, headaches, vision changes, dizziness, chest pain,  shortness of breath, nausea, vomiting, diarrhea, constipation, abdominal pain, rashes, bruising/bleeding, mouth sores, swelling or pain in the legs.        Current Outpatient Prescriptions               Current Outpatient Medications   Medication Sig Dispense Refill     ferrous sulfate (FEROSUL) 325 (65 Fe) MG tablet Take 325 mg by mouth         lidocaine-prilocaine (EMLA) 2.5-2.5 % external cream Apply to port site one hour prior to access may start using six days after port placement. 30 g 1     ondansetron (ZOFRAN) 8 MG tablet Take 1 tablet (8 mg) by mouth every 8 hours as needed for nausea 30 tablet 0     prochlorperazine (COMPAZINE) 10 MG tablet Take 1 tablet (10 mg) by mouth every 6 hours as needed (Nausea/Vomiting) 30 tablet 2     riboflavin (VITAMIN  B2) 25 MG TABS                  Physical Examination: ECOG 1    /73   Pulse (!) 121   Temp 98.3  F (36.8  C) (Oral)   Resp 16   Wt 59.8 kg (131 lb 12.8 oz)   SpO2 100%   BMI 23.35 kg/m        Wt Readings from Last 4 Encounters:   22 59.8 kg (131 lb 12.8 oz)   22 61.2 kg (134 lb 14.4 oz)   06/10/22 61.9 kg (136 lb 6.4 oz)   22 61.8 kg (136 lb 4.8 oz)         ECO  General: Sitting comfortably in no acute distress. I did not examine her in detail       Laboratory Data:     Latest Reference Range & Units 22 10:57 22 08:35   Sodium 133 - 144 mmol/L 143 142   Potassium 3.4 - 5.3 mmol/L 3.6 3.8   Chloride 94 - 109 mmol/L 107 110 (H)   Carbon Dioxide 20 - 32 mmol/L 26 25   Urea Nitrogen 7 - 30 mg/dL 5 (L) 5 (L)   Creatinine 0.52 - 1.04 mg/dL 0.59 0.65   GFR Estimate >60 mL/min/1.73m2 >90 >90   Calcium 8.5 - 10.1 mg/dL 8.6 8.9   Anion Gap 3 - 14 mmol/L 10 7   Albumin 3.4 - 5.0 g/dL 2.9 (L) 2.9 (L)   Protein Total 6.8 - 8.8 g/dL 6.6 (L) 6.6 (L)   Alkaline Phosphatase 40 - 150 U/L 80 84   ALT 0 - 50 U/L 17 15   AST 0 - 45 U/L 13 15   Bilirubin Total 0.2 - 1.3 mg/dL 0.2 0.3   Cortisol Serum   ug/dL 11.1    T4 Free 0.76 - 1.46  ng/dL 0.96    TSH 0.40 - 4.00 mU/L 1.66    Glucose 70 - 99 mg/dL 131 (H) 104 (H)   WBC 4.0 - 11.0 10e3/uL 7.4 8.1   Hemoglobin 11.7 - 15.7 g/dL 8.9 (L) 8.6 (L)   Hematocrit 35.0 - 47.0 % 28.3 (L) 27.3 (L)   Platelet Count 150 - 450 10e3/uL 246 327   RBC Count 3.80 - 5.20 10e6/uL 2.75 (L) 2.62 (L)   MCV 78 - 100 fL 103 (H) 104 (H)   MCH 26.5 - 33.0 pg 32.4 32.8   MCHC 31.5 - 36.5 g/dL 31.4 (L) 31.5   RDW 10.0 - 15.0 % 17.0 (H) 16.8 (H)   % Neutrophils % 78 81   % Lymphocytes % 11 8   % Monocytes % 8 9   % Eosinophils % 0 0   % Basophils % 0 0   Absolute Basophils 0.0 - 0.2 10e3/uL 0.0 0.0   (H): Data is abnormally high  (L): Data is abnormally low    I reviewed the above labs today. All chemistry labs essentially normal and Cl, alb, tot prot  is not of clinical significance. She is more anemic and we discussed this.        Assessment and Plan:  1. Right sided ER+, PA+, HER2-negative, MammaPrint high risk T=5.5cm, N=0 breast cancer.  Tori had her 3/15/22 Dostarlimab held due to diarrhea, resumed 4/4.  Her 6 week MRI showed improvement but with suggestion of adding in an additional agent.      12 week MRI also better, but required to go on to AC     2.  Anemia.  Patient's hemoglobin was 8.6. Hopefully will correct toward normal after AC   3.  GI: All areas better, only mild nausea now    4. Bone pains, secondary to neulasta, not an issue now.      We will contact Dr. Layton about surgical scheduling.  We have put in orders for MRI before surgery       30 minutes spent on the date of the encounter doing chart review, review of test results, interpretation of tests, patient visit, documentation and discussion with family       Keegan Lockett MD

## 2022-07-08 NOTE — NURSING NOTE
"Oncology Rooming Note    July 8, 2022 9:03 AM   Tori Steele is a 46 year old female who presents for:    Chief Complaint   Patient presents with     Port Draw     Labs drawn via port by RN in lab. VS taken.     Oncology Clinic Visit     Breast Cancer     Initial Vitals: /73   Pulse (!) 121   Temp 98.3  F (36.8  C) (Oral)   Resp 16   Wt 59.8 kg (131 lb 12.8 oz)   SpO2 100%   BMI 23.35 kg/m   Estimated body mass index is 23.35 kg/m  as calculated from the following:    Height as of 5/24/22: 1.6 m (5' 3\").    Weight as of this encounter: 59.8 kg (131 lb 12.8 oz). Body surface area is 1.63 meters squared.  No Pain (0) Comment: Data Unavailable   No LMP recorded. (Menstrual status: Chemotherapy).  Allergies reviewed: Yes  Medications reviewed: Yes    Medications: MEDICATION REFILLS NEEDED TODAY. Provider was notified.     Pt needs a refill of LORAZEPAM.    Pharmacy name entered into [facility]:    [id number] - [place], [place] - [address] AT [facility] OF [address] [facility] [place], [place] - [address] [place] [address] [phone]    Clinical concerns: None      Dedrick Rowe            "

## 2022-07-08 NOTE — NURSING NOTE
Chief Complaint   Patient presents with     Port Draw     Labs drawn via port by RN in lab. VS taken.     Port accessed with 20g gripper needle by RN, labs collected, line flushed with saline and heparin.  Vitals taken. Pt checked in for appointment(s).    Nani BENJAMIN RN PHN BSN  BMT/Oncology Lab

## 2022-07-08 NOTE — PROGRESS NOTES
Infusion Nursing Note:  Tori Steele presents today for Day 1 Cycle 16 Adriamycin Cytoxan.    Patient seen by provider : Yes: Dr. Lockett  Date of visit: 7/8/22      present during visit today: Not Applicable.    Note: Tori arrives to infusion today doing well. No concerns or changes after visit with Dr. Lockett. Reviewed oral dexamethasone instructions and requested to refill today.     Intravenous Access:  Implanted Port.    Treatment Conditions:  Lab Results   Component Value Date    HGB 8.6 (L) 07/08/2022    WBC 8.1 07/08/2022    ANEU 16.5 (H) 06/13/2022    ANEUTAUTO 6.5 07/08/2022     07/08/2022      Lab Results   Component Value Date     07/08/2022    POTASSIUM 3.8 07/08/2022    MAG 1.7 (L) 02/07/2022    CR 0.65 07/08/2022    JENNIE 8.9 07/08/2022    BILITOTAL 0.3 07/08/2022    ALBUMIN 2.9 (L) 07/08/2022    ALT 15 07/08/2022    AST 15 07/08/2022     Results reviewed, labs MET treatment parameters, ok to proceed with treatment.  ECHO/MUGA completed 2/7  EF 55-60%.    Post Infusion Assessment:  Patient tolerated infusion without incident.  Blood return noted pre and post infusion.  Blood return noted every 2-3 ml during doxorubicin administration through free flowing NS line.   Access discontinued per protocol.   Neulasta Onpro On-Body injector applied to left arm at 1145.  Writer discussed Neulasta injection would start tomorrow 7/9 at 1445, approximately 27 hours after application applied today.  Written and Verbal instruction reviewed with patient.  Pt instructed when the dose delivery starts, it will take about 45 minutes to complete.  Pt aware Neulasta Onpro On-Body should have green flashing light and to call triage or on-call MD if injector flashes red or appears to be leaking. Pt aware to keep Onpro On-Body Neulasta 4 inches away from electrical equipment and to avoid showering 4 hours prior to injection.   Neulasta Onpro Lot number: see MAR  Doxorubicin stopped:  1108    Discharge  Plan:   Prescription refills given for dexamethasone, zofran, compazine and ativan.  AVS to patient via [employer].  Patient will return 7/15 for next appointment.   Patient discharged in stable condition accompanied by: self.  Departure Mode: Ambulatory.    Ruthie Ramon RN

## 2022-07-11 ENCOUNTER — PATIENT OUTREACH (OUTPATIENT)
Dept: ONCOLOGY | Facility: CLINIC | Age: 47
End: 2022-07-11

## 2022-07-11 ENCOUNTER — INFUSION THERAPY VISIT (OUTPATIENT)
Dept: TRANSPLANT | Facility: CLINIC | Age: 47
End: 2022-07-11
Attending: INTERNAL MEDICINE
Payer: OTHER GOVERNMENT

## 2022-07-11 VITALS
OXYGEN SATURATION: 98 % | RESPIRATION RATE: 16 BRPM | SYSTOLIC BLOOD PRESSURE: 99 MMHG | DIASTOLIC BLOOD PRESSURE: 58 MMHG | HEART RATE: 113 BPM | TEMPERATURE: 98.3 F

## 2022-07-11 DIAGNOSIS — D70.1 CHEMOTHERAPY-INDUCED NEUTROPENIA (H): Primary | ICD-10-CM

## 2022-07-11 DIAGNOSIS — T45.1X5A CHEMOTHERAPY-INDUCED NEUTROPENIA (H): Primary | ICD-10-CM

## 2022-07-11 PROCEDURE — 250N000011 HC RX IP 250 OP 636: Performed by: NURSE PRACTITIONER

## 2022-07-11 PROCEDURE — 250N000011 HC RX IP 250 OP 636: Performed by: INTERNAL MEDICINE

## 2022-07-11 PROCEDURE — 96361 HYDRATE IV INFUSION ADD-ON: CPT

## 2022-07-11 PROCEDURE — 96374 THER/PROPH/DIAG INJ IV PUSH: CPT

## 2022-07-11 PROCEDURE — 258N000003 HC RX IP 258 OP 636: Performed by: NURSE PRACTITIONER

## 2022-07-11 RX ORDER — ONDANSETRON 2 MG/ML
8 INJECTION INTRAMUSCULAR; INTRAVENOUS EVERY 6 HOURS PRN
Status: CANCELLED
Start: 2022-07-11

## 2022-07-11 RX ORDER — ONDANSETRON 2 MG/ML
8 INJECTION INTRAMUSCULAR; INTRAVENOUS EVERY 6 HOURS PRN
Status: DISCONTINUED | OUTPATIENT
Start: 2022-07-11 | End: 2022-07-11 | Stop reason: HOSPADM

## 2022-07-11 RX ORDER — HEPARIN SODIUM (PORCINE) LOCK FLUSH IV SOLN 100 UNIT/ML 100 UNIT/ML
5 SOLUTION INTRAVENOUS ONCE
Status: COMPLETED | OUTPATIENT
Start: 2022-07-11 | End: 2022-07-11

## 2022-07-11 RX ADMIN — ONDANSETRON 8 MG: 2 INJECTION INTRAMUSCULAR; INTRAVENOUS at 08:24

## 2022-07-11 RX ADMIN — SODIUM CHLORIDE 1000 ML: 9 INJECTION, SOLUTION INTRAVENOUS at 08:24

## 2022-07-11 RX ADMIN — SODIUM CHLORIDE, PRESERVATIVE FREE 5 ML: 5 INJECTION INTRAVENOUS at 09:53

## 2022-07-11 ASSESSMENT — PAIN SCALES - GENERAL: PAINLEVEL: NO PAIN (0)

## 2022-07-11 NOTE — PROGRESS NOTES
Infusion Nursing Note:  Tori J Steele presents today for scheduled infusion.    Patient seen by provider today: No   present during visit today: Not Applicable.    Note: Patient received 1 L NS bolus and zofran IV for nausea with some relief. Patient declined emend infusion.    Intravenous Access:  Implanted Port.    Treatment Conditions:  Results reviewed, labs MET treatment parameters, ok to proceed with treatment.    Post Infusion Assessment:  Patient tolerated infusion without incident.     Discharge Plan:   Patient and/or family verbalized understanding of discharge instructions and all questions answered.      Jessica Henderson RN

## 2022-07-11 NOTE — PROGRESS NOTES
Spoke to patient post C4 AC on 7/11/2022 and just had gotten home from having IV fluids this AM. Patient slightly improved from IV fluids and working to push PO fluids and eat a breakfast sandwich. Will eat small frequent meals today and is planning on taking a nap after her breakfast. Has a virtual appointment with Dr Layton for next steps with surgery and waiting on scheduling her Breast MRI. Patient will inform writer when surgery is to schedule her with Dr Maravilla two weeks after surgery.  Answered all patient's questions and verbalized understanding. Ebony Lucio RN, BSN.

## 2022-07-11 NOTE — LETTER
7/11/2022         RE: Tori Steele  [address] [address]        Dear Colleague,    Thank you for referring your patient, Tori Steele, to the [facility] [place]. Please see a copy of my visit note below.    Infusion Nursing Note:  Tori Steele presents today for scheduled infusion.    Patient seen by provider today: No   present during visit today: Not Applicable.    Note: Patient received 1 L NS bolus and zofran IV for nausea with some relief. Patient declined emend infusion.    Intravenous Access:  Implanted Port.    Treatment Conditions:  Results reviewed, labs MET treatment parameters, ok to proceed with treatment.    Post Infusion Assessment:  Patient tolerated infusion without incident.     Discharge Plan:   Patient and/or family verbalized understanding of discharge instructions and all questions answered.      Jessica Henderson RN                          Again, thank you for allowing me to participate in the care of your patient.        Sincerely,        [facility]

## 2022-07-11 NOTE — NURSING NOTE
"Oncology Rooming Note    July 11, 2022 9:12 AM   Tori Steele is a 46 year old female who presents for:    Chief Complaint   Patient presents with     Infusion     Hx breast CA here for scheduled infusion     Initial Vitals: BP 99/58   Pulse 113   Temp 98.3  F (36.8  C)   Resp 16   SpO2 98%  Estimated body mass index is 23.35 kg/m  as calculated from the following:    Height as of 5/24/22: 1.6 m (5' 3\").    Weight as of 7/8/22: 59.8 kg (131 lb 12.8 oz). There is no height or weight on file to calculate BSA.  No Pain (0) Comment: Data Unavailable   No LMP recorded. (Menstrual status: Chemotherapy).  Allergies reviewed: Yes  Medications reviewed: Yes    Medications: Medication refills not needed today.  Pharmacy name entered into [facility]:    [facility] #[id number] [place], [place] - [address] AT [facility] OF [address] [facility] [place], [place] - [address] [facility] [address]    Clinical concerns: N/A      Jessica Henderson RN              "

## 2022-07-15 ENCOUNTER — VIRTUAL VISIT (OUTPATIENT)
Dept: ONCOLOGY | Facility: CLINIC | Age: 47
End: 2022-07-15
Attending: SURGERY
Payer: OTHER GOVERNMENT

## 2022-07-15 DIAGNOSIS — C50.911 INVASIVE DUCTAL CARCINOMA OF RIGHT BREAST (H): Primary | ICD-10-CM

## 2022-07-15 PROCEDURE — 99213 OFFICE O/P EST LOW 20 MIN: CPT | Mod: 95 | Performed by: SURGERY

## 2022-07-15 NOTE — PROGRESS NOTES
VIDEO VISIT    Today I had a video appointment with Tori Steele to discuss management of her ER positive, HER2 negative breast cancer.  I saw her last about a month ago.  She initially had a tumor that was 5.5 cm.  On her last MRI, it was 3.4 cm.  She has completed Adriamycin, Cytoxan chemotherapy and has another breast MRI scheduled next week.  Today we talked about treatment options.  Her preference is to undergo breast conserving surgery.  I think that is reasonable.  We will tentatively schedule her for a seed localized lumpectomy and a sentinel lymph node biopsy.  However, if her MRI looks substantially worse next week, then we may change that plan.  I told her that is unlikely.  I talked to her about the risks and complications of surgery and the need for radiation therapy.  We will tentatively schedule her for surgery in about 3-4 weeks.        Start of video was 10:24.  End of video was 10:36.    Gurpreet Layton MD    cc:  Keegan Lockett MD  [address]  [place]  [address]

## 2022-07-18 ENCOUNTER — PATIENT OUTREACH (OUTPATIENT)
Dept: ONCOLOGY | Facility: CLINIC | Age: 47
End: 2022-07-18

## 2022-07-18 NOTE — PROGRESS NOTES
RN CARE COORDINATION  Surgical Oncology    Spoke with Tori following her visit with Dr. Layton on 7/15/2022. Introduced self and explained role of RNCC. Reviewed plan for RIGHT Lumpectomy with tag placement and SLN biopsy at the [facility].  Discussed pre-op requirements including H&P and Covid test. Tori prefers a PAC video visit and will complete a home Covid test.     Shower instructions, written hand-out and bottle of surgical scrub will be mailed to Tori. Will send additional pre-op information via [employer].     Informed patient they should get a call within the next three business days from our OR  with surgery date, H&P date and date of post-op visit with their surgeon. Writer answered all questions and concerns to the best of her ability and provided her contact information. Reviewed use of [employer] as alternative way to contact team.  Encouraged patient to contact with questions.         CHARISMA Dickinson, RN  RN Care Coordinator  [facility]  Surgical Onolcogy      Approximately 10 minutes was spent in discussion with patient.

## 2022-07-19 ENCOUNTER — TELEPHONE (OUTPATIENT)
Dept: ONCOLOGY | Facility: CLINIC | Age: 47
End: 2022-07-19

## 2022-07-19 ENCOUNTER — PRE VISIT (OUTPATIENT)
Dept: SURGERY | Facility: CLINIC | Age: 47
End: 2022-07-19

## 2022-07-19 NOTE — TELEPHONE ENCOUNTER
Surgery is scheduled with Dr. Gurpreet Layton on 08/08/2022 at [facility].    Scheduled per case request orders.    H&P to be completed by PAC     COVID-19 test:     Home rapid test to be completed within 2 days of procedure, patient to bring picture of results. Confirmed with patient.     Post-op: 08/22/2022, in person visit    Patient will receive a phone call from pre-admission nurses 1-2 days prior to surgery with arrival and start time.      I spoke with the patient and was able to confirm the scheduled information. No further action needed at this time.    The surgery packet was sent via US mail and via [employer] to be delivered to OR for injection      Eduard Krause on 7/19/2022 at 12:35 PM

## 2022-07-19 NOTE — TELEPHONE ENCOUNTER
FUTURE VISIT INFORMATION      SURGERY INFORMATION:    Date: 22    Location: [facility] or    Surgeon:  Gurpreet Layton MD    Anesthesia Type:  general    Procedure: RIGHT SEED LOCALIZED BREAST LUMPECTOMY  WITH SENTINEL LYMPH NODE BIOPSY    Consult: virtual visit 7/15/22    RECORDS REQUESTED FROM:       Primary Care Provider: Kat Mcgee MD  - Aisha    Most recent EKG+ Tracin22    Most recent ECHO: 22    Most recent PFT's: 18- Aisha    Most recent Sleep Study: 10/25/20

## 2022-07-21 ENCOUNTER — TELEPHONE (OUTPATIENT)
Dept: ONCOLOGY | Facility: CLINIC | Age: 47
End: 2022-07-21

## 2022-07-21 ENCOUNTER — ANCILLARY PROCEDURE (OUTPATIENT)
Dept: MRI IMAGING | Facility: CLINIC | Age: 47
End: 2022-07-21
Attending: INTERNAL MEDICINE

## 2022-07-21 DIAGNOSIS — Z17.0 MALIGNANT NEOPLASM OF CENTRAL PORTION OF RIGHT BREAST IN FEMALE, ESTROGEN RECEPTOR POSITIVE (H): ICD-10-CM

## 2022-07-21 DIAGNOSIS — C50.111 MALIGNANT NEOPLASM OF CENTRAL PORTION OF RIGHT BREAST IN FEMALE, ESTROGEN RECEPTOR POSITIVE (H): ICD-10-CM

## 2022-07-21 RX ORDER — GADOBUTROL 604.72 MG/ML
0.1 INJECTION INTRAVENOUS ONCE
Status: COMPLETED | OUTPATIENT
Start: 2022-07-21 | End: 2022-07-21

## 2022-07-21 RX ADMIN — GADOBUTROL 5.98 ML: 604.72 INJECTION INTRAVENOUS at 09:39

## 2022-07-21 NOTE — PROGRESS NOTES
CLINICAL NUTRITION SERVICES- ONCOLOGY DISTRESS SCREENING     Identified Concern and Score From Distress Screening: Patient requested to speak with a Dietitian on the Oncology Distress Screening tool.     Date of Distress Screenin/15     Findings: Pt reports that her appetite has been slowly coming back, but she continues to not feel very hungry. She has a protein smoothie and smaller meals. Pt is concerned about her weight loss.     Wt Readings from Last 10 Encounters:   22 59.8 kg (131 lb 12.8 oz)   22 61.2 kg (134 lb 14.4 oz)   06/10/22 61.9 kg (136 lb 6.4 oz)   22 61.8 kg (136 lb 4.8 oz)   22 65.8 kg (145 lb)   22 63.7 kg (140 lb 8 oz)   22 64.3 kg (141 lb 11.2 oz)   22 64.8 kg (142 lb 14.4 oz)   22 67 kg (147 lb 9.6 oz)   22 67.7 kg (149 lb 3.2 oz)      Intervention: Discussed current PO intake, small frequent meals, and ways to increase calories and protein at meals and snacks.      Education Provided: as above  Handout: High-Calorie, High-Protein Diet from [facility] (sent via email)      Follow-up Required: PRN, provided RD oncology phone number     Maida Garcia RD, LD  [phone]

## 2022-07-27 ENCOUNTER — ANESTHESIA EVENT (OUTPATIENT)
Dept: SURGERY | Facility: AMBULATORY SURGERY CENTER | Age: 47
End: 2022-07-27
Payer: OTHER GOVERNMENT

## 2022-07-27 ENCOUNTER — VIRTUAL VISIT (OUTPATIENT)
Dept: SURGERY | Facility: CLINIC | Age: 47
End: 2022-07-27
Payer: OTHER GOVERNMENT

## 2022-07-27 DIAGNOSIS — Z01.818 PRE-OP EVALUATION: Primary | ICD-10-CM

## 2022-07-27 PROCEDURE — 99203 OFFICE O/P NEW LOW 30 MIN: CPT | Mod: 95 | Performed by: PHYSICIAN ASSISTANT

## 2022-07-27 ASSESSMENT — LIFESTYLE VARIABLES: TOBACCO_USE: 0

## 2022-07-27 ASSESSMENT — PAIN SCALES - GENERAL: PAINLEVEL: NO PAIN (0)

## 2022-07-27 NOTE — H&P
Pre-Operative H & P     CC:  Preoperative exam to assess for increased cardiopulmonary risk while undergoing surgery and anesthesia.    Date of Encounter: 7/27/2022  Primary Care Physician:  Kat Mcgee     Reason for visit:   Encounter Diagnosis   Name Primary?     Pre-op evaluation Yes       HPI  Tori Steele is a 46 year old female who presents for pre-operative H & P in preparation for  Procedure Information     Case: [id number] Date/Time: 08/08/22 0925    Procedure: RIGHT SEED LOCALIZED BREAST LUMPECTOMY  WITH SENTINEL LYMPH NODE BIOPSY (Right Breast)    Anesthesia type: General    Diagnosis: Invasive ductal carcinoma of right breast (H) [C50.911]    Pre-op diagnosis: Invasive ductal carcinoma of right breast (H) [C50.911]    Location: [place] [address] / [facility]    Providers: Gurpreet Layton MD          Patient is being evaluated for comorbid conditions of anemia    Ms. Steele has a history of ER positive, HER2 negative breast cancer. She has completed Adriamycin and cytoxin therapy. She was recently seen by Dr. Layton and is now scheduled for the above procedure with plan for postoperative radiation.     History is obtained from the patient and chart review    Hx of abnormal bleeding or anti-platelet use: denies    Menstrual history: No LMP recorded. (Menstrual status: Chemotherapy).     Past Medical History  Past Medical History:   Diagnosis Date     Breast cancer (H) 01/2022     Sinus tachycardia        Past Surgical History  Past Surgical History:   Procedure Laterality Date     DILATION AND CURETTAGE       INSERT PORT VASCULAR ACCESS Left 2/18/2022    Procedure: INSERTION, VASCULAR ACCESS PORT;  Surgeon: Elliott Ramirez PA-C;  [place] [facility]      CHEST PORT PLACEMENT > 5 YRS OF AGE  2/18/2022       Prior to Admission Medications  Current Outpatient Medications   Medication Sig Dispense Refill     LORazepam (ATIVAN) 1 MG tablet Take 1  tablet (1 mg) by mouth every 6 hours as needed for nausea 30 tablet 0     lidocaine-prilocaine (EMLA) 2.5-2.5 % external cream Apply to port site one hour prior to access may start using six days after port placement. 30 g 1     ondansetron (ZOFRAN) 8 MG tablet Take 1 tablet (8 mg) by mouth every 8 hours as needed for nausea (vomiting) (Patient not taking: Reported on 7/27/2022) 30 tablet 2     prochlorperazine (COMPAZINE) 10 MG tablet Take 1 tablet (10 mg) by mouth every 6 hours as needed (Nausea/Vomiting) (Patient not taking: Reported on 7/27/2022) 30 tablet 2     riboflavin (VITAMIN  B2) 25 MG TABS  (Patient not taking: No sig reported)         Allergies  No Known Allergies    Social History  Social History     Socioeconomic History     Marital status:      Spouse name: Summer     Number of children: 2     Years of education: Not on file     Highest education level: Not on file   Occupational History     Not on file   Tobacco Use     Smoking status: Never Smoker     Smokeless tobacco: Never Used   Substance and Sexual Activity     Alcohol use: Yes     Comment: Rare     Drug use: Never     Sexual activity: Not on file   Other Topics Concern     Not on file   Social History Narrative     Not on file     Social Determinants of Health     Financial Resource Strain: Not on file   Food Insecurity: Not on file   Transportation Needs: Not on file   Physical Activity: Not on file   Stress: Not on file   Social Connections: Not on file   Intimate Partner Violence: Not At Risk     Fear of Current or Ex-Partner: No     Emotionally Abused: No     Physically Abused: No     Sexually Abused: No   Housing Stability: Not on file       Family History  Family History   Problem Relation Age of Onset     Breast Cancer Maternal Grandmother         Dx in her 70's     Anesthesia Reaction No family hx of      Deep Vein Thrombosis (DVT) No family hx of        Review of Systems  The complete review of systems is negative other than  noted in the HPI or here.   Anesthesia Evaluation   Pt has had prior anesthetic.     No history of anesthetic complications       ROS/MED HX  ENT/Pulmonary:  - neg pulmonary ROS  (-) tobacco use   Neurologic:  - neg neurologic ROS     Cardiovascular:     (+) -----Previous cardiac testing   Echo: Date: 2/2022 Results:  Interpretation Summary  Normal left and right ventricular size, thickness, global, and regional  systolic function. Biplane LVEF is 56%. Global peak LV longitudinal strain is  averaged at -20.1%. This is within reported normal limits (normal <-18%).  No significant valvular abnormalities are noted.  Estimated pulmonary artery systolic pressure is normal.  No pericardial effusion.  Previous study not available for comparison.  Stress Test: Date: Results:    ECG Reviewed: Date: 4/2019 Results:  Sinus tachycardia  Cath: Date: Results:   (-) taking anticoagulants/antiplatelets   METS/Exercise Tolerance: >4 METS Comment: Walking at sculpture garden, etc. Has 3 flights of stairs in her home and takes those regularly without issue   Hematologic:     (+) anemia,  (-) history of blood clots and history of blood transfusion   Musculoskeletal:  - neg musculoskeletal ROS     GI/Hepatic:  - neg GI/hepatic ROS  (-) GERD   Renal/Genitourinary:  - neg Renal ROS     Endo:  - neg endo ROS  (-) chronic steroid usage   Psychiatric/Substance Use:  - neg psychiatric ROS     Infectious Disease:  - neg infectious disease ROS     Malignancy:   (+) Malignancy, History of Breast.Breast CA Active status post Chemo.        Other:            Virtual visit -  No vitals were obtained    Physical Exam  Constitutional: Awake, alert, cooperative, no apparent distress, and appears stated age.  HENT: Normocephalic  Respiratory: non labored breathing   Neurologic: Awake, alert, oriented to name, place and time.   Neuropsychiatric: Calm, cooperative. Normal affect.      Prior Labs/Diagnostic Studies   All labs and imaging personally  reviewed     EKG/ stress test - if available please see in ROS above   Echo result w/o MOPS: Interpretation SummaryNormal left and right ventricular size, thickness, global, and regionalsystolic function. Biplane LVEF is 56%. Global peak LV longitudinal strain isaveraged at -20.1%. This is within reported normal limits (normal <-18%).No significant valvular abnormalities are noted.Estimated pulmonary artery systolic pressure is normal.No pericardial effusion.Previous study not available for comparison.      No flowsheet data found.      The patient's records and results personally reviewed by this provider.     Outside records reviewed from: Care Everywhere      Assessment      Tori Steele is a 46 year old female seen as a PAC referral for risk assessment and optimization for anesthesia.    Plan/Recommendations  Pt will be optimized for the proposed procedure.  See below for details on the assessment, risk, and preoperative recommendations    NEUROLOGY  - No history of TIA, CVA or seizure  -Post Op delirium risk factors:  No risk identified    ENT  - No current airway concerns.  Will need to be reassessed day of surgery.  Mallampati: Unable to assess  TM: Unable to assess    CARDIAC  - No history of CAD, Hypertension and Afib   -denies cardiac history or symptoms  -previous cardiac testing above  - METS (Metabolic Equivalents)  Patient performs 4 or more METS exercise without symptoms            Total Score: 0      RCRI-Very low risk: Class 1 0.4% complication rate            Total Score: 0        PULMONARY  HUE Low Risk            Total Score: 1    HUE: Often tired      - Denies asthma or inhaler use  - Tobacco History      History   Smoking Status     Never Smoker   Smokeless Tobacco     Never Used       GI  PONV High Risk  Total Score: 3           1 AN PONV: Pt is Female    1 AN PONV: Patient is not a current smoker    1 AN PONV: Intended Post Op Opioids        /RENAL  - Baseline Creatinine   "WNL    ENDOCRINE  {Add pt reported vitals then use <dot>vitalsptreported to pull into your note Link to Pt Reported Vitals Flowsheet :[id number]}  - BMI: Estimated body mass index is 23.35 kg/m  as calculated from the following:    Height as of 5/24/22: 1.6 m (5' 3\").    Weight as of 7/8/22: 59.8 kg (131 lb 12.8 oz).  Healthy Weight (BMI 18.5-24.9)  - No history of Diabetes Mellitus    HEME  VTE Medium Risk 1.8%            Total Score: 5    VTE: Current cancer      - No history of abnormal bleeding or antiplatelet use.  -hgb 8.6, stable. Anemia in the setting of chemotherapy. Patient denies previous blood transfusions or iron infusions    The patient is optimized for their procedure. AVS with information on surgery time/arrival time, meds and NPO status given by nursing staff. No further diagnostic testing indicated.    Please refer to the physical examination documented by the anesthesiologist in the anesthesia record on the day of surgery.    Video-Visit Details    Type of service:  Video Visit    Patient verbally consented to video service today: YES    Video Start Time: 0829  Video End Time (time video stopped): 0840    Originating Location (pt. Location): Home    Distant Location (provider location): [facility]    Mode of Communication:  Video Conference via [employer]  On the day of service:     Prep time: 10 minutes  Visit time: 11 minutes  Documentation time: 6 minutes  ------------------------------------------  Total time: 27 minutes      Erika Phelps PA-C  Preoperative Assessment Center  [facility]  Clinic and Surgery Center  Phone: [phone]  Fax: [phone]  "

## 2022-07-27 NOTE — H&P (VIEW-ONLY)
Pre-Operative H & P     CC:  Preoperative exam to assess for increased cardiopulmonary risk while undergoing surgery and anesthesia.    Date of Encounter: 7/27/2022  Primary Care Physician:  Kat Mcgee     Reason for visit:   Encounter Diagnosis   Name Primary?     Pre-op evaluation Yes       HPI  Tori Steele is a 46 year old female who presents for pre-operative H & P in preparation for  Procedure Information     Case: [id number] Date/Time: 08/08/22 0925    Procedure: RIGHT SEED LOCALIZED BREAST LUMPECTOMY  WITH SENTINEL LYMPH NODE BIOPSY (Right Breast)    Anesthesia type: General    Diagnosis: Invasive ductal carcinoma of right breast (H) [C50.911]    Pre-op diagnosis: Invasive ductal carcinoma of right breast (H) [C50.911]    Location: [place] [address] / [facility]    Providers: Gurpreet Layton MD          Patient is being evaluated for comorbid conditions of anemia    Ms. Steele has a history of ER positive, HER2 negative breast cancer. She has completed Adriamycin and cytoxin therapy. She was recently seen by Dr. Layton and is now scheduled for the above procedure with plan for postoperative radiation.     History is obtained from the patient and chart review    Hx of abnormal bleeding or anti-platelet use: denies    Menstrual history: No LMP recorded. (Menstrual status: Chemotherapy).     Past Medical History  Past Medical History:   Diagnosis Date     Breast cancer (H) 01/2022     Sinus tachycardia        Past Surgical History  Past Surgical History:   Procedure Laterality Date     DILATION AND CURETTAGE       INSERT PORT VASCULAR ACCESS Left 2/18/2022    Procedure: INSERTION, VASCULAR ACCESS PORT;  Surgeon: Elliott Ramirez PA-C;  Location: [facility] [place] [facility]      CHEST PORT PLACEMENT > 5 YRS OF AGE  2/18/2022       Prior to Admission Medications  Current Outpatient Medications   Medication Sig Dispense Refill     LORazepam (ATIVAN) 1 MG tablet Take 1  tablet (1 mg) by mouth every 6 hours as needed for nausea 30 tablet 0     lidocaine-prilocaine (EMLA) 2.5-2.5 % external cream Apply to port site one hour prior to access may start using six days after port placement. 30 g 1     ondansetron (ZOFRAN) 8 MG tablet Take 1 tablet (8 mg) by mouth every 8 hours as needed for nausea (vomiting) (Patient not taking: Reported on 7/27/2022) 30 tablet 2     prochlorperazine (COMPAZINE) 10 MG tablet Take 1 tablet (10 mg) by mouth every 6 hours as needed (Nausea/Vomiting) (Patient not taking: Reported on 7/27/2022) 30 tablet 2     riboflavin (VITAMIN  B2) 25 MG TABS  (Patient not taking: No sig reported)         Allergies  No Known Allergies    Social History  Social History     Socioeconomic History     Marital status:      Spouse name: Summer     Number of children: 2     Years of education: Not on file     Highest education level: Not on file   Occupational History     Not on file   Tobacco Use     Smoking status: Never Smoker     Smokeless tobacco: Never Used   Substance and Sexual Activity     Alcohol use: Yes     Comment: Rare     Drug use: Never     Sexual activity: Not on file   Other Topics Concern     Not on file   Social History Narrative     Not on file     Social Determinants of Health     Financial Resource Strain: Not on file   Food Insecurity: Not on file   Transportation Needs: Not on file   Physical Activity: Not on file   Stress: Not on file   Social Connections: Not on file   Intimate Partner Violence: Not At Risk     Fear of Current or Ex-Partner: No     Emotionally Abused: No     Physically Abused: No     Sexually Abused: No   Housing Stability: Not on file       Family History  Family History   Problem Relation Age of Onset     Breast Cancer Maternal Grandmother         Dx in her 70's     Anesthesia Reaction No family hx of      Deep Vein Thrombosis (DVT) No family hx of        Review of Systems  The complete review of systems is negative other than  noted in the HPI or here.   Anesthesia Evaluation   Pt has had prior anesthetic.     No history of anesthetic complications       ROS/MED HX  ENT/Pulmonary:  - neg pulmonary ROS  (-) tobacco use   Neurologic:  - neg neurologic ROS     Cardiovascular:     (+) -----Previous cardiac testing   Echo: Date: 2/2022 Results:  Interpretation Summary  Normal left and right ventricular size, thickness, global, and regional  systolic function. Biplane LVEF is 56%. Global peak LV longitudinal strain is  averaged at -20.1%. This is within reported normal limits (normal <-18%).  No significant valvular abnormalities are noted.  Estimated pulmonary artery systolic pressure is normal.  No pericardial effusion.  Previous study not available for comparison.  Stress Test: Date: Results:    ECG Reviewed: Date: 4/2019 Results:  Sinus tachycardia  Cath: Date: Results:   (-) taking anticoagulants/antiplatelets   METS/Exercise Tolerance: >4 METS Comment: Walking at sculpture garden, etc. Has 3 flights of stairs in her home and takes those regularly without issue   Hematologic:     (+) anemia,  (-) history of blood clots and history of blood transfusion   Musculoskeletal:  - neg musculoskeletal ROS     GI/Hepatic:  - neg GI/hepatic ROS  (-) GERD   Renal/Genitourinary:  - neg Renal ROS     Endo:  - neg endo ROS  (-) chronic steroid usage   Psychiatric/Substance Use:  - neg psychiatric ROS     Infectious Disease:  - neg infectious disease ROS     Malignancy:   (+) Malignancy, History of Breast.Breast CA Active status post Chemo.        Other:            Virtual visit -  No vitals were obtained    Physical Exam  Constitutional: Awake, alert, cooperative, no apparent distress, and appears stated age.  HENT: Normocephalic  Respiratory: non labored breathing   Neurologic: Awake, alert, oriented to name, place and time.   Neuropsychiatric: Calm, cooperative. Normal affect.      Prior Labs/Diagnostic Studies   All labs and imaging personally  reviewed     EKG/ stress test - if available please see in ROS above   Echo result w/o MOPS: Interpretation SummaryNormal left and right ventricular size, thickness, global, and regionalsystolic function. Biplane LVEF is 56%. Global peak LV longitudinal strain isaveraged at -20.1%. This is within reported normal limits (normal <-18%).No significant valvular abnormalities are noted.Estimated pulmonary artery systolic pressure is normal.No pericardial effusion.Previous study not available for comparison.      No flowsheet data found.      The patient's records and results personally reviewed by this provider.     Outside records reviewed from: Care Everywhere      Assessment      Tori Steele is a 46 year old female seen as a PAC referral for risk assessment and optimization for anesthesia.    Plan/Recommendations  Pt will be optimized for the proposed procedure.  See below for details on the assessment, risk, and preoperative recommendations    NEUROLOGY  - No history of TIA, CVA or seizure  -Post Op delirium risk factors:  No risk identified    ENT  - No current airway concerns.  Will need to be reassessed day of surgery.  Mallampati: Unable to assess  TM: Unable to assess    CARDIAC  - No history of CAD, Hypertension and Afib   -denies cardiac history or symptoms  -previous cardiac testing above  - METS (Metabolic Equivalents)  Patient performs 4 or more METS exercise without symptoms            Total Score: 0      RCRI-Very low risk: Class 1 0.4% complication rate            Total Score: 0        PULMONARY  HUE Low Risk            Total Score: 1    HUE: Often tired      - Denies asthma or inhaler use  - Tobacco History      History   Smoking Status     Never Smoker   Smokeless Tobacco     Never Used       GI  PONV High Risk  Total Score: 3           1 AN PONV: Pt is Female    1 AN PONV: Patient is not a current smoker    1 AN PONV: Intended Post Op Opioids        /RENAL  - Baseline Creatinine   "WNL    ENDOCRINE    - BMI: Estimated body mass index is 23.35 kg/m  as calculated from the following:    Height as of 5/24/22: 1.6 m (5' 3\").    Weight as of 7/8/22: 59.8 kg (131 lb 12.8 oz).  Healthy Weight (BMI 18.5-24.9)  - No history of Diabetes Mellitus    HEME  VTE Medium Risk 1.8%            Total Score: 5    VTE: Current cancer      - No history of abnormal bleeding or antiplatelet use.  -hgb 8.6, stable. Anemia in the setting of chemotherapy. Patient denies previous blood transfusions or iron infusions    The patient is optimized for their procedure. AVS with information on surgery time/arrival time, meds and NPO status given by nursing staff. No further diagnostic testing indicated.    Please refer to the physical examination documented by the anesthesiologist in the anesthesia record on the day of surgery.    Video-Visit Details    Type of service:  Video Visit    Patient verbally consented to video service today: YES    Video Start Time: 0829  Video End Time (time video stopped): 0840    Originating Location (pt. Location): Home    Distant Location (provider location): [facility]    Mode of Communication:  Video Conference via [employer]  On the day of service:     Prep time: 10 minutes  Visit time: 11 minutes  Documentation time: 6 minutes  ------------------------------------------  Total time: 27 minutes      Erika Phelps PA-C  Preoperative Assessment Center  [facility]  Clinic and Surgery Center  Phone: [phone]  Fax: [phone]  "

## 2022-07-27 NOTE — PATIENT INSTRUCTIONS
Preparing for Your Surgery      Name:  Tori Steele   MRN:  [id number]   :  1975   Today's Date:  2022         Arriving for surgery:  Surgery date:  2022  Arrival time:  8:00 am    Restrictions due to COVID 19:    Effective 2022  1 visitor may accompany patient and wait in the surgery waiting room  All visitors must wear a mask and social distance      parking is available for anyone with mobility limitations or disabilities. (Monday- Friday 7 am- 5 pm)    Please come to:    [facility]  [address] [place] [phone]    Please check in on the 5th floor at the Ambulatory Surgery Center       What can I eat or drink?    -  You may eat and drink normally until 8 hours before surgery. (Until Midnight)  -  You may have clear liquids up to 4 hours before surgery. (Until 5 am)  Examples of clear liquids:  Water  Clear broth  Juices (apple, white grape, white cranberry  and cider) without pulp  Noncarbonated, powder based beverages  (lemonade and Zay-Aid)  Sodas (Sprite, 7-Up, ginger ale and seltzer)  Coffee or tea (without milk or cream)  Gatorade    --No alcohol for at least 24 hours before surgery    Which medicines can I take?    Hold Aspirin for 7 days before surgery.   Hold Multivitamins for 7 days before surgery.  Hold Supplements for 7 days before surgery.  Hold Ibuprofen (Advil, Motrin) for 1 day before surgery--unless otherwise directed by surgeon.  Hold Naproxen (Aleve) for 4 days before surgery.      -  PLEASE TAKE the following medications the day of surgery   Lorazepam if needed       How do I prepare myself?  - Please take 2 showers before surgery using Scrubcare or Hibiclens soap.    Use this soap only from the neck to your toes.     Leave the soap on your skin for one minute--then rinse thoroughly.      You may use your own shampoo and conditioner; no other hair products.   - Please remove all jewelry and body piercings.  - No lotions,  deodorants or fragrance.  - No makeup or fingernail polish.   - Bring your ID and insurance card.    -If you have a Deep Brain Stimulator, a Spinal Cord Stimulator or any implanted Neuro Device you must bring the remote to the Surgery Center         ALL PATIENTS ARE REQUIRED TO HAVE A RESPONSIBLE ADULT TO DRIVE AND BE IN ATTENDANCE WITH THEM FOR 24 HOURS FOLLOWING SURGERY       Questions or Concerns:    -For questions regarding the day of surgery please contact the Ambulatory Surgery Center at [phone].    -If you have health changes between today and your surgery please contact your surgeon.     - For questions after surgery please contact your surgeon's office     For questions after surgery contact your surgeon

## 2022-07-28 DIAGNOSIS — Z17.0 MALIGNANT NEOPLASM OF CENTRAL PORTION OF RIGHT BREAST IN FEMALE, ESTROGEN RECEPTOR POSITIVE (H): Primary | ICD-10-CM

## 2022-07-28 DIAGNOSIS — C50.111 MALIGNANT NEOPLASM OF CENTRAL PORTION OF RIGHT BREAST IN FEMALE, ESTROGEN RECEPTOR POSITIVE (H): Primary | ICD-10-CM

## 2022-07-29 ENCOUNTER — LAB (OUTPATIENT)
Dept: LAB | Facility: CLINIC | Age: 47
End: 2022-07-29
Payer: OTHER GOVERNMENT

## 2022-07-29 DIAGNOSIS — Z17.0 MALIGNANT NEOPLASM OF CENTRAL PORTION OF RIGHT BREAST IN FEMALE, ESTROGEN RECEPTOR POSITIVE (H): Primary | ICD-10-CM

## 2022-07-29 DIAGNOSIS — C50.111 MALIGNANT NEOPLASM OF CENTRAL PORTION OF RIGHT BREAST IN FEMALE, ESTROGEN RECEPTOR POSITIVE (H): ICD-10-CM

## 2022-07-29 DIAGNOSIS — C50.111 MALIGNANT NEOPLASM OF CENTRAL PORTION OF RIGHT BREAST IN FEMALE, ESTROGEN RECEPTOR POSITIVE (H): Primary | ICD-10-CM

## 2022-07-29 DIAGNOSIS — Z17.0 MALIGNANT NEOPLASM OF CENTRAL PORTION OF RIGHT BREAST IN FEMALE, ESTROGEN RECEPTOR POSITIVE (H): ICD-10-CM

## 2022-07-29 LAB
ALBUMIN SERPL-MCNC: 3 G/DL (ref 3.4–5)
ALP SERPL-CCNC: 69 U/L (ref 40–150)
ALT SERPL W P-5'-P-CCNC: 18 U/L (ref 0–50)
ANION GAP SERPL CALCULATED.3IONS-SCNC: 12 MMOL/L (ref 3–14)
AST SERPL W P-5'-P-CCNC: 18 U/L (ref 0–45)
BASOPHILS # BLD AUTO: 0 10E3/UL (ref 0–0.2)
BASOPHILS NFR BLD AUTO: 1 %
BILIRUB SERPL-MCNC: 0.5 MG/DL (ref 0.2–1.3)
BUN SERPL-MCNC: 8 MG/DL (ref 7–30)
CALCIUM SERPL-MCNC: 9 MG/DL (ref 8.5–10.1)
CHLORIDE BLD-SCNC: 107 MMOL/L (ref 94–109)
CO2 SERPL-SCNC: 26 MMOL/L (ref 20–32)
CORTIS SERPL-MCNC: 6.5 UG/DL
CREAT SERPL-MCNC: 0.64 MG/DL (ref 0.52–1.04)
EOSINOPHIL # BLD AUTO: 0 10E3/UL (ref 0–0.7)
EOSINOPHIL NFR BLD AUTO: 1 %
ERYTHROCYTE [DISTWIDTH] IN BLOOD BY AUTOMATED COUNT: 14.7 % (ref 10–15)
GFR SERPL CREATININE-BSD FRML MDRD: >90 ML/MIN/1.73M2
GLUCOSE BLD-MCNC: 109 MG/DL (ref 70–99)
HCT VFR BLD AUTO: 29 % (ref 35–47)
HGB BLD-MCNC: 9.2 G/DL (ref 11.7–15.7)
IMM GRANULOCYTES # BLD: 0 10E3/UL
IMM GRANULOCYTES NFR BLD: 0 %
LYMPHOCYTES # BLD AUTO: 0.4 10E3/UL (ref 0.8–5.3)
LYMPHOCYTES NFR BLD AUTO: 10 %
MCH RBC QN AUTO: 32.6 PG (ref 26.5–33)
MCHC RBC AUTO-ENTMCNC: 31.7 G/DL (ref 31.5–36.5)
MCV RBC AUTO: 103 FL (ref 78–100)
MONOCYTES # BLD AUTO: 0.5 10E3/UL (ref 0–1.3)
MONOCYTES NFR BLD AUTO: 13 %
NEUTROPHILS # BLD AUTO: 2.9 10E3/UL (ref 1.6–8.3)
NEUTROPHILS NFR BLD AUTO: 75 %
NRBC # BLD AUTO: 0 10E3/UL
NRBC BLD AUTO-RTO: 0 /100
PLATELET # BLD AUTO: 358 10E3/UL (ref 150–450)
POTASSIUM BLD-SCNC: 3.7 MMOL/L (ref 3.4–5.3)
PROT SERPL-MCNC: 6.9 G/DL (ref 6.8–8.8)
RBC # BLD AUTO: 2.82 10E6/UL (ref 3.8–5.2)
SODIUM SERPL-SCNC: 145 MMOL/L (ref 133–144)
T4 FREE SERPL-MCNC: 1.12 NG/DL (ref 0.76–1.46)
TSH SERPL DL<=0.005 MIU/L-ACNC: 1.39 MU/L (ref 0.4–4)
WBC # BLD AUTO: 3.9 10E3/UL (ref 4–11)

## 2022-07-29 PROCEDURE — 82533 TOTAL CORTISOL: CPT

## 2022-07-29 PROCEDURE — 84439 ASSAY OF FREE THYROXINE: CPT

## 2022-07-29 PROCEDURE — 84443 ASSAY THYROID STIM HORMONE: CPT

## 2022-07-29 PROCEDURE — 80053 COMPREHEN METABOLIC PANEL: CPT

## 2022-07-29 PROCEDURE — 85025 COMPLETE CBC W/AUTO DIFF WBC: CPT

## 2022-07-29 PROCEDURE — 82040 ASSAY OF SERUM ALBUMIN: CPT

## 2022-07-29 PROCEDURE — 36415 COLL VENOUS BLD VENIPUNCTURE: CPT

## 2022-07-29 NOTE — NURSING NOTE
Chief Complaint   Patient presents with     Blood Draw     Labs drawn by RN via .       Labs collected from venipuncture by RN. Vitals taken. Checked in for appointment(s).    Yue Argueta RN

## 2022-08-02 ENCOUNTER — ANCILLARY PROCEDURE (OUTPATIENT)
Dept: MAMMOGRAPHY | Facility: CLINIC | Age: 47
End: 2022-08-02
Attending: SURGERY
Payer: OTHER GOVERNMENT

## 2022-08-02 DIAGNOSIS — C50.911 INVASIVE DUCTAL CARCINOMA OF RIGHT BREAST (H): ICD-10-CM

## 2022-08-02 PROCEDURE — 19281 PERQ DEVICE BREAST 1ST IMAG: CPT | Mod: RT | Performed by: STUDENT IN AN ORGANIZED HEALTH CARE EDUCATION/TRAINING PROGRAM

## 2022-08-08 ENCOUNTER — ANCILLARY PROCEDURE (OUTPATIENT)
Dept: MAMMOGRAPHY | Facility: CLINIC | Age: 47
End: 2022-08-08
Attending: SURGERY
Payer: OTHER GOVERNMENT

## 2022-08-08 ENCOUNTER — HOSPITAL ENCOUNTER (OUTPATIENT)
Dept: NUCLEAR MEDICINE | Facility: CLINIC | Age: 47
Setting detail: NUCLEAR MEDICINE
Discharge: HOME | End: 2022-08-08
Attending: SURGERY | Admitting: SURGERY
Payer: OTHER GOVERNMENT

## 2022-08-08 ENCOUNTER — HOSPITAL ENCOUNTER (OUTPATIENT)
Facility: AMBULATORY SURGERY CENTER | Age: 47
Discharge: HOME | End: 2022-08-08
Attending: SURGERY
Payer: OTHER GOVERNMENT

## 2022-08-08 ENCOUNTER — ANESTHESIA (OUTPATIENT)
Dept: SURGERY | Facility: AMBULATORY SURGERY CENTER | Age: 47
End: 2022-08-08
Payer: OTHER GOVERNMENT

## 2022-08-08 VITALS
HEIGHT: 63 IN | OXYGEN SATURATION: 97 % | RESPIRATION RATE: 16 BRPM | WEIGHT: 129.5 LBS | TEMPERATURE: 98.3 F | HEART RATE: 90 BPM | DIASTOLIC BLOOD PRESSURE: 63 MMHG | BODY MASS INDEX: 22.95 KG/M2 | SYSTOLIC BLOOD PRESSURE: 106 MMHG

## 2022-08-08 DIAGNOSIS — C50.911 INVASIVE DUCTAL CARCINOMA OF RIGHT BREAST (H): ICD-10-CM

## 2022-08-08 PROCEDURE — 38525 BIOPSY/REMOVAL LYMPH NODES: CPT | Mod: RT | Performed by: SURGERY

## 2022-08-08 PROCEDURE — 88342 IMHCHEM/IMCYTCHM 1ST ANTB: CPT | Mod: TC,XU | Performed by: SURGERY

## 2022-08-08 PROCEDURE — 38792 RA TRACER ID OF SENTINL NODE: CPT

## 2022-08-08 PROCEDURE — 88342 IMHCHEM/IMCYTCHM 1ST ANTB: CPT | Mod: 26 | Performed by: PATHOLOGY

## 2022-08-08 PROCEDURE — 88307 TISSUE EXAM BY PATHOLOGIST: CPT | Mod: TC | Performed by: SURGERY

## 2022-08-08 PROCEDURE — 38525 BIOPSY/REMOVAL LYMPH NODES: CPT | Mod: RT

## 2022-08-08 PROCEDURE — 88360 TUMOR IMMUNOHISTOCHEM/MANUAL: CPT | Mod: 26 | Performed by: PATHOLOGY

## 2022-08-08 PROCEDURE — 38900 IO MAP OF SENT LYMPH NODE: CPT | Mod: RT | Performed by: SURGERY

## 2022-08-08 PROCEDURE — 99207 NM LYMPHOSCINTIGRAPHY INJECTION ONLY: CPT

## 2022-08-08 PROCEDURE — 76098 X-RAY EXAM SURGICAL SPECIMEN: CPT | Performed by: RADIOLOGY

## 2022-08-08 PROCEDURE — 88341 IMHCHEM/IMCYTCHM EA ADD ANTB: CPT | Mod: 26 | Performed by: PATHOLOGY

## 2022-08-08 PROCEDURE — 88307 TISSUE EXAM BY PATHOLOGIST: CPT | Mod: 26 | Performed by: PATHOLOGY

## 2022-08-08 PROCEDURE — 19301 PARTIAL MASTECTOMY: CPT | Mod: RT

## 2022-08-08 PROCEDURE — 19301 PARTIAL MASTECTOMY: CPT | Mod: RT | Performed by: SURGERY

## 2022-08-08 RX ORDER — HEPARIN SODIUM (PORCINE) LOCK FLUSH IV SOLN 100 UNIT/ML 100 UNIT/ML
5-10 SOLUTION INTRAVENOUS
Status: DISCONTINUED | OUTPATIENT
Start: 2022-08-08 | End: 2022-08-09 | Stop reason: HOSPADM

## 2022-08-08 RX ORDER — LIDOCAINE 40 MG/G
CREAM TOPICAL
Status: DISCONTINUED | OUTPATIENT
Start: 2022-08-08 | End: 2022-08-08 | Stop reason: HOSPADM

## 2022-08-08 RX ORDER — FENTANYL CITRATE 50 UG/ML
25 INJECTION, SOLUTION INTRAMUSCULAR; INTRAVENOUS
Status: DISCONTINUED | OUTPATIENT
Start: 2022-08-08 | End: 2022-08-09 | Stop reason: HOSPADM

## 2022-08-08 RX ORDER — ACETAMINOPHEN 325 MG/1
975 TABLET ORAL ONCE
Status: COMPLETED | OUTPATIENT
Start: 2022-08-08 | End: 2022-08-08

## 2022-08-08 RX ORDER — OXYCODONE HYDROCHLORIDE 5 MG/1
5 TABLET ORAL EVERY 4 HOURS PRN
Status: DISCONTINUED | OUTPATIENT
Start: 2022-08-08 | End: 2022-08-09 | Stop reason: HOSPADM

## 2022-08-08 RX ORDER — GABAPENTIN 300 MG/1
300 CAPSULE ORAL
Status: COMPLETED | OUTPATIENT
Start: 2022-08-08 | End: 2022-08-08

## 2022-08-08 RX ORDER — SODIUM CHLORIDE, SODIUM LACTATE, POTASSIUM CHLORIDE, CALCIUM CHLORIDE 600; 310; 30; 20 MG/100ML; MG/100ML; MG/100ML; MG/100ML
INJECTION, SOLUTION INTRAVENOUS CONTINUOUS
Status: DISCONTINUED | OUTPATIENT
Start: 2022-08-08 | End: 2022-08-09 | Stop reason: HOSPADM

## 2022-08-08 RX ORDER — SODIUM CHLORIDE, SODIUM LACTATE, POTASSIUM CHLORIDE, CALCIUM CHLORIDE 600; 310; 30; 20 MG/100ML; MG/100ML; MG/100ML; MG/100ML
INJECTION, SOLUTION INTRAVENOUS CONTINUOUS
Status: DISCONTINUED | OUTPATIENT
Start: 2022-08-08 | End: 2022-08-08 | Stop reason: HOSPADM

## 2022-08-08 RX ORDER — FENTANYL CITRATE 50 UG/ML
25 INJECTION, SOLUTION INTRAMUSCULAR; INTRAVENOUS EVERY 5 MIN PRN
Status: DISCONTINUED | OUTPATIENT
Start: 2022-08-08 | End: 2022-08-08 | Stop reason: HOSPADM

## 2022-08-08 RX ORDER — MEPERIDINE HYDROCHLORIDE 25 MG/ML
12.5 INJECTION INTRAMUSCULAR; INTRAVENOUS; SUBCUTANEOUS
Status: DISCONTINUED | OUTPATIENT
Start: 2022-08-08 | End: 2022-08-09 | Stop reason: HOSPADM

## 2022-08-08 RX ORDER — FENTANYL CITRATE 50 UG/ML
INJECTION, SOLUTION INTRAMUSCULAR; INTRAVENOUS PRN
Status: DISCONTINUED | OUTPATIENT
Start: 2022-08-08 | End: 2022-08-08

## 2022-08-08 RX ORDER — HEPARIN SODIUM,PORCINE 10 UNIT/ML
5-10 VIAL (ML) INTRAVENOUS
Status: DISCONTINUED | OUTPATIENT
Start: 2022-08-08 | End: 2022-08-09 | Stop reason: HOSPADM

## 2022-08-08 RX ORDER — ONDANSETRON 2 MG/ML
4 INJECTION INTRAMUSCULAR; INTRAVENOUS EVERY 30 MIN PRN
Status: DISCONTINUED | OUTPATIENT
Start: 2022-08-08 | End: 2022-08-09 | Stop reason: HOSPADM

## 2022-08-08 RX ORDER — ISOSULFAN BLUE 50 MG/5ML
INJECTION, SOLUTION SUBCUTANEOUS PRN
Status: DISCONTINUED | OUTPATIENT
Start: 2022-08-08 | End: 2022-08-08 | Stop reason: HOSPADM

## 2022-08-08 RX ORDER — ONDANSETRON 4 MG/1
4 TABLET, ORALLY DISINTEGRATING ORAL EVERY 30 MIN PRN
Status: DISCONTINUED | OUTPATIENT
Start: 2022-08-08 | End: 2022-08-09 | Stop reason: HOSPADM

## 2022-08-08 RX ORDER — HEPARIN SODIUM,PORCINE 10 UNIT/ML
5-10 VIAL (ML) INTRAVENOUS EVERY 24 HOURS
Status: DISCONTINUED | OUTPATIENT
Start: 2022-08-08 | End: 2022-08-09 | Stop reason: HOSPADM

## 2022-08-08 RX ORDER — DEXAMETHASONE SODIUM PHOSPHATE 4 MG/ML
INJECTION, SOLUTION INTRA-ARTICULAR; INTRALESIONAL; INTRAMUSCULAR; INTRAVENOUS; SOFT TISSUE PRN
Status: DISCONTINUED | OUTPATIENT
Start: 2022-08-08 | End: 2022-08-08

## 2022-08-08 RX ORDER — HYDROMORPHONE HYDROCHLORIDE 1 MG/ML
0.2 INJECTION, SOLUTION INTRAMUSCULAR; INTRAVENOUS; SUBCUTANEOUS EVERY 5 MIN PRN
Status: DISCONTINUED | OUTPATIENT
Start: 2022-08-08 | End: 2022-08-08 | Stop reason: HOSPADM

## 2022-08-08 RX ORDER — PROPOFOL 10 MG/ML
INJECTION, EMULSION INTRAVENOUS CONTINUOUS PRN
Status: DISCONTINUED | OUTPATIENT
Start: 2022-08-08 | End: 2022-08-08

## 2022-08-08 RX ORDER — PROPOFOL 10 MG/ML
INJECTION, EMULSION INTRAVENOUS PRN
Status: DISCONTINUED | OUTPATIENT
Start: 2022-08-08 | End: 2022-08-08

## 2022-08-08 RX ORDER — ACETAMINOPHEN 325 MG/1
650 TABLET ORAL
Status: DISCONTINUED | OUTPATIENT
Start: 2022-08-08 | End: 2022-08-09 | Stop reason: HOSPADM

## 2022-08-08 RX ORDER — ONDANSETRON 2 MG/ML
INJECTION INTRAMUSCULAR; INTRAVENOUS PRN
Status: DISCONTINUED | OUTPATIENT
Start: 2022-08-08 | End: 2022-08-08

## 2022-08-08 RX ORDER — CEFAZOLIN SODIUM 2 G/50ML
2 SOLUTION INTRAVENOUS SEE ADMIN INSTRUCTIONS
Status: DISCONTINUED | OUTPATIENT
Start: 2022-08-08 | End: 2022-08-08 | Stop reason: HOSPADM

## 2022-08-08 RX ORDER — CEFAZOLIN SODIUM 2 G/50ML
2 SOLUTION INTRAVENOUS
Status: COMPLETED | OUTPATIENT
Start: 2022-08-08 | End: 2022-08-08

## 2022-08-08 RX ADMIN — HEPARIN SODIUM (PORCINE) LOCK FLUSH IV SOLN 100 UNIT/ML 5 ML: 100 SOLUTION at 11:31

## 2022-08-08 RX ADMIN — Medication 100 MCG: at 10:35

## 2022-08-08 RX ADMIN — ACETAMINOPHEN 975 MG: 325 TABLET ORAL at 09:17

## 2022-08-08 RX ADMIN — FENTANYL CITRATE 50 MCG: 50 INJECTION, SOLUTION INTRAMUSCULAR; INTRAVENOUS at 09:39

## 2022-08-08 RX ADMIN — SODIUM CHLORIDE, SODIUM LACTATE, POTASSIUM CHLORIDE, CALCIUM CHLORIDE: 600; 310; 30; 20 INJECTION, SOLUTION INTRAVENOUS at 09:19

## 2022-08-08 RX ADMIN — FENTANYL CITRATE 50 MCG: 50 INJECTION, SOLUTION INTRAMUSCULAR; INTRAVENOUS at 09:50

## 2022-08-08 RX ADMIN — GABAPENTIN 300 MG: 300 CAPSULE ORAL at 09:17

## 2022-08-08 RX ADMIN — PROPOFOL 100 MCG/KG/MIN: 10 INJECTION, EMULSION INTRAVENOUS at 10:34

## 2022-08-08 RX ADMIN — DEXAMETHASONE SODIUM PHOSPHATE 4 MG: 4 INJECTION, SOLUTION INTRA-ARTICULAR; INTRALESIONAL; INTRAMUSCULAR; INTRAVENOUS; SOFT TISSUE at 09:45

## 2022-08-08 RX ADMIN — ACETAMINOPHEN 975 MG: 325 TABLET ORAL at 09:19

## 2022-08-08 RX ADMIN — Medication 100 MCG: at 10:31

## 2022-08-08 RX ADMIN — Medication 200 MCG: at 10:13

## 2022-08-08 RX ADMIN — Medication 100 MCG: at 10:40

## 2022-08-08 RX ADMIN — Medication 100 MCG: at 10:05

## 2022-08-08 RX ADMIN — Medication 100 MCG: at 10:22

## 2022-08-08 RX ADMIN — PROPOFOL 100 MG: 10 INJECTION, EMULSION INTRAVENOUS at 09:39

## 2022-08-08 RX ADMIN — Medication 100 MCG: at 10:34

## 2022-08-08 RX ADMIN — Medication 100 MCG: at 10:52

## 2022-08-08 RX ADMIN — Medication 100 MCG: at 09:57

## 2022-08-08 RX ADMIN — PROPOFOL 150 MCG/KG/MIN: 10 INJECTION, EMULSION INTRAVENOUS at 09:39

## 2022-08-08 RX ADMIN — FENTANYL CITRATE 50 MCG: 50 INJECTION, SOLUTION INTRAMUSCULAR; INTRAVENOUS at 09:28

## 2022-08-08 RX ADMIN — CEFAZOLIN SODIUM 2 G: 2 SOLUTION INTRAVENOUS at 09:24

## 2022-08-08 RX ADMIN — ONDANSETRON 4 MG: 2 INJECTION INTRAMUSCULAR; INTRAVENOUS at 09:45

## 2022-08-08 NOTE — OR NURSING
Notified Dr. Ac that patient was unable to void for HCG test. Per Dr. Ac test can be canceled. No blood test necessary.

## 2022-08-08 NOTE — ANESTHESIA CARE TRANSFER NOTE
Patient: Tori Steele    Procedure: Procedure(s):  RIGHT SEED LOCALIZED BREAST LUMPECTOMY  WITH SENTINEL LYMPH NODE BIOPSY       Diagnosis: Invasive ductal carcinoma of right breast (H) [C50.911]  Diagnosis Additional Information: No value filed.    Anesthesia Type:   No value filed.     Note:    Oropharynx: spontaneously breathing  Level of Consciousness: awake  Oxygen Supplementation: room air    Independent Airway: airway patency satisfactory and stable  Dentition: dentition unchanged  Vital Signs Stable: post-procedure vital signs reviewed and stable  Report to RN Given: handoff report given  Patient transferred to: PACU    Handoff Report: Identifed the Patient, Identified the Reponsible Provider, Reviewed the pertinent medical history, Discussed the surgical course, Reviewed Intra-OP anesthesia mangement and issues during anesthesia, Set expectations for post-procedure period and Allowed opportunity for questions and acknowledgement of understanding      Vitals:  Vitals Value Taken Time   /49 08/08/22 1109   Temp 36.9  C (98.5  F) 08/08/22 1109   Pulse 95 08/08/22 1107   Resp 22 08/08/22 1109   SpO2 99 % 08/08/22 1109   Vitals shown include unvalidated device data.    Electronically Signed By: GENIE Caldera CRNA  August 8, 2022  11:11 AM

## 2022-08-08 NOTE — ANESTHESIA PREPROCEDURE EVALUATION
Anesthesia Pre-Procedure Evaluation    Patient: Tori Steele   MRN: [id number] : 1975        Procedure : Procedure(s):  RIGHT SEED LOCALIZED BREAST LUMPECTOMY  WITH SENTINEL LYMPH NODE BIOPSY          Past Medical History:   Diagnosis Date     Breast cancer (H) 2022     Sinus tachycardia       Past Surgical History:   Procedure Laterality Date     DILATION AND CURETTAGE       INSERT PORT VASCULAR ACCESS Left 2022    Procedure: INSERTION, VASCULAR ACCESS PORT;  Surgeon: Elliott Ramirez PA-C;  Location: [facility] OR     IR CHEST PORT PLACEMENT > 5 YRS OF AGE  2022      No Known Allergies   Social History     Tobacco Use     Smoking status: Never Smoker     Smokeless tobacco: Never Used   Substance Use Topics     Alcohol use: Yes     Comment: Rare      Wt Readings from Last 1 Encounters:   22 58.7 kg (129 lb 8 oz)        Anesthesia Evaluation            ROS/MED HX  ENT/Pulmonary:  - neg pulmonary ROS     Neurologic:  - neg neurologic ROS     Cardiovascular:     (+) -----ARAIZA.     METS/Exercise Tolerance:     Hematologic:  - neg hematologic  ROS     Musculoskeletal:  - neg musculoskeletal ROS     GI/Hepatic:  - neg GI/hepatic ROS     Renal/Genitourinary:  - neg Renal ROS     Endo:  - neg endo ROS     Psychiatric/Substance Use:  - neg psychiatric ROS     Infectious Disease:  - neg infectious disease ROS     Malignancy:   (+) Malignancy, History of Breast.Breast CA Active status post Chemo.        Other:  - neg other ROS          Physical Exam    Airway  airway exam normal           Respiratory Devices and Support         Dental  no notable dental history         Cardiovascular   cardiovascular exam normal          Pulmonary   pulmonary exam normal                OUTSIDE LABS:  CBC:   Lab Results   Component Value Date    WBC 3.9 (L) 2022    WBC 8.1 2022    HGB 9.2 (L) 2022    HGB 8.6 (L) 2022    HCT 29.0 (L) 2022    HCT 27.3 (L) 2022     2022      07/08/2022     BMP:   Lab Results   Component Value Date     (H) 07/29/2022     07/08/2022    POTASSIUM 3.7 07/29/2022    POTASSIUM 3.8 07/08/2022    CHLORIDE 107 07/29/2022    CHLORIDE 110 (H) 07/08/2022    CO2 26 07/29/2022    CO2 25 07/08/2022    BUN 8 07/29/2022    BUN 5 (L) 07/08/2022    CR 0.64 07/29/2022    CR 0.65 07/08/2022     (H) 07/29/2022     (H) 07/08/2022     COAGS:   Lab Results   Component Value Date    PTT 31 02/04/2022    INR 1.09 05/24/2022     POC:   Lab Results   Component Value Date    HCG Negative 02/18/2022    HCGS Negative 02/04/2022     HEPATIC:   Lab Results   Component Value Date    ALBUMIN 3.0 (L) 07/29/2022    PROTTOTAL 6.9 07/29/2022    ALT 18 07/29/2022    AST 18 07/29/2022    GGT 12 02/04/2022    ALKPHOS 69 07/29/2022    BILITOTAL 0.5 07/29/2022     OTHER:   Lab Results   Component Value Date    A1C 5.6 02/04/2022    JENNIE 9.0 07/29/2022    MAG 1.7 (L) 02/07/2022    LIPASE 57 (H) 05/24/2022    TSH 1.39 07/29/2022    T4 1.12 07/29/2022       Anesthesia Plan    ASA Status:  3   NPO Status:  NPO Appropriate    Anesthesia Type: General.     - Airway: LMA   Induction: Intravenous.   Maintenance: Balanced.   Techniques and Equipment:     - Lines/Monitors: Port in situ     Consents    Anesthesia Plan(s) and associated risks, benefits, and realistic alternatives discussed. Questions answered and patient/representative(s) expressed understanding.    - Discussed:     - Discussed with:  Patient      - Extended Intubation/Ventilatory Support Discussed: No.      - Patient is DNR/DNI Status: No    Use of blood products discussed: No .     Postoperative Care    Pain management: IV analgesics, Multi-modal analgesia.   PONV prophylaxis: Ondansetron (or other 5HT-3), Dexamethasone or Solumedrol     Comments:                Jesus Ac MD

## 2022-08-08 NOTE — BRIEF OP NOTE
[facility] Brief Operative Note    Pre-operative diagnosis: Invasive ductal carcinoma of right breast (H) [C50.911]   Post-operative diagnosis Same   Procedure: Procedure(s):  RIGHT SEED LOCALIZED BREAST LUMPECTOMY  WITH SENTINEL LYMPH NODE BIOPSY   Surgeon(s): Surgeon(s) and Role:     * Gurpreet Layton MD - Primary   Estimated blood loss: <5cc   Specimens: ID Type Source Tests Collected by Time Destination   1 : Right Breast Mass Tissue Breast, Right SURGICAL PATHOLOGY EXAM Gurpreet Layton MD 8/8/2022 10:23 AM    2 : Right Axillary [place] lymph node #1 Tissue Lymph Node(s), [place] SURGICAL PATHOLOGY EXAM Gurpreet Layton MD 8/8/2022 10:32 AM    3 : Right Axillary [place] Lymph Node #2 Tissue Lymph Node(s), [place] SURGICAL PATHOLOGY EXAM Gurpreet Layton MD 8/8/2022 10:44 AM       Findings: 2 sentinel nodes, right breast mass with RFID seed removed

## 2022-08-08 NOTE — INTERVAL H&P NOTE
I have reviewed the surgical (or preoperative) H&P that is linked to this encounter, and examined the patient. There are no significant changes    Clinical Conditions Present on Arrival:  Clinically Significant Risk Factors Present on Admission           # Hypernatremia: Na = N/A on admission, will monitor as appropriate

## 2022-08-08 NOTE — DISCHARGE INSTRUCTIONS
"POSTOP Instructions: Lumpectomy with [place] Lymph Node Biopsy    From Dr Layton:  1. Patient to follow up with appointment in 2 weeks. Your pathology report will be reviewed with you at the postoperative visit.  2. Take Tylenol 1000 mg by mouth every 8 hours for 3 days. After that, may take as needed according to the packaging. You make take ibuprofen 600mg every 8 hours as needed for pain.  3. Caution with putting ice on the incision as it will be numb you will not realize it if you leave the ice for too long and can damage your skin.  4. If you develop any fever/chills, worsening pain, redness, swelling, bruising, or drainage from your wound please call the clinic (Monday through Friday 8:00am-5:00pm [phone] Mayuri COUGHLIN) or on-call surgical oncology resident (nights and weekends [phone] and ask \"I would like to page the Surgical Oncology Resident on call.\")   5. No lifting over 15 lbs and no strenuous physical activity for 2 weeks.    Please continue to gently stretch your arm/shoulders and reach overhead.  No bed rest; moving around will keep blood clots from forming in your legs.  6. The glue own your incision will fall off on its own in 10-14 days. Your sutures are dissolvable.   Please refrain from submerging in a bathtub or swimming pool.    Please refrain from applying alcohol or peroxide to the incision.  7. Wear a supportive bra \"around the clock\" 24/7 for a minimum of 2 weeks.  8. There may be a burning sensation and/or numbness in the axillary (armpit) incision and along the inside of the upper arm.  This is common and expected after a sentinel lymph node biopsy.    [facility]  Home Care Following Anesthesia  For 24 hours after surgery:  Get plenty of rest.  A responsible adult must stay with you for at least 24 hours after you leave the surgery center.  Do not drive or use heavy equipment.  If you have weakness or tingling, don't drive or use heavy " "equipment until this feeling goes away.   Do not drink alcohol.   Avoid strenuous or risky activities.  Ask for help when climbing stairs.  You may feel lightheaded.  IF so, sit for a few minutes before standing.  Have someone help you get up.   If you have nausea (feel sick to your stomach): Drink only clear liquids such as apple juice, ginger ale, broth or 7-Up.  Rest may also help.  Be sure to drink enough fluids.  Move to a regular diet as you feel able.   You may have a slight fever.  Call the doctor if your fever is over 100 F (37.7 C) (taken under the tongue) or lasts longer than 24 hours.  You may have a dry mouth, a sore throat, muscle aches or trouble sleeping. These should go away after 24 hours.  Do not make important or legal decisions.   It is recommended to avoid smoking.   If you use hormonal birth control (such as the pill, patch, ring or implants):  You will need a second form of birth control for 7 days (condoms, a diaphragm or contraceptive foam).  While in the surgery center, you received a medicine called Sugammadex.  Hormonal birth control (such as the pill, patch, ring or implants) will not work as well for a week after taking this medicine.  Today you received a Marcaine or bupivacaine block to numb the nerves near your surgery site.  This is a block using local anesthetic or \"numbing\" medication injected around the nerves to anesthetize or \"numb\" the area supplied by those nerves.  This block is injected into the muscle layer near your surgical site.  The medication may numb the location where you had surgery for 6-18 hours, but may last up to 24 hours.  If your surgical site is an arm or leg you should be careful with your affected limb, since it is possible to injure your limb without being aware of it due to the numbing.  Until full feeling returns, you should guard against bumping or hitting your limb, and avoid extreme hot or cold temperatures on the skin.  As the block wears off, the " feeling will return as a tingling or prickly sensation near your surgical site.  You will experience more discomfort from your incision as the feeling returns.  You may want to take a pain pill (a narcotic or Tylenol if this was prescribed by your surgeon) when you start to experience mild pain before the pain beccomes more severe.  If your pain medications do not control your pain you should notifiy your surgeon.    Tips for taking pain medications  To get the best pain relief possible, remember these points:  Take pain medications as directed, before pain becomes severe.  Pain medication can upset your stomach: taking it with food may help.  Constipation is a common side effect of pain medication. Drink plenty of  fluids.  Eat foods high in fiber. Take a stool softener if recommended by your doctor or pharmacist.  Do not drink alcohol, drive or operate machinery while taking pain medications.  Ask about other ways to control pain, such as with heat, ice or relaxation.    Tylenol/Acetaminophen Consumption  To help encourage the safe use of acetaminophen, the makers of TYLENOL  have lowered the maximum daily dose for single-ingredient Extra Strength TYLENOL  (acetaminophen) products sold in the [place] from 8 pills per day (4,000 mg) to 6 pills per day (3,000 mg). The dosing interval has also changed from 2 pills every 4-6 hours to 2 pills every 6 hours.  If you feel your pain relief is insufficient, you may take Tylenol/Acetaminophen in addition to your narcotic pain medication.   Be careful not to exceed 3,000 mg of Tylenol/Acetaminophen in a 24 hour period from all sources.  If you are taking extra strength Tylenol/acetaminophen (500 mg), the maximum dose is 6 tablets in 24 hours.  If you are taking regular strength acetaminophen (325 mg), the maximum dose is 9 tablets in 24 hours.    Call a doctor for any of the following:  Signs of infection (fever, growing tenderness at the surgery site, a large amount of  drainage or bleeding, severe pain, foul-smelling drainage, redness, swelling).  It has been over 8 to 10 hours since surgery and you are still not able to urinate (pass water).  Headache for over 24 hours.  Numbness, tingling or weakness the day after surgery (if you had spinal anesthesia).  Signs of Covid-19 infection (temperature over 100 degrees, shortness of breath, cough, loss of taste/smell, generalized body aches, persistent headache, chills, sore throat, nausea/vomiting/diarrhea)  Your doctor is:       Dr. Gurpreet Layton, [facility]: [phone]               Or dial [phone] and ask for the resident on call for:  [facility]  For emergency care, call the:  [place] [facility]:  [phone] (TTY for hearing impaired: [phone])

## 2022-08-08 NOTE — ANESTHESIA POSTPROCEDURE EVALUATION
Patient: Tori Steele    Procedure: Procedure(s):  RIGHT SEED LOCALIZED BREAST LUMPECTOMY  WITH SENTINEL LYMPH NODE BIOPSY       Anesthesia Type:  General    Note:  Disposition: Outpatient   Postop Pain Control: Uneventful            Sign Out: Well controlled pain   PONV: No   Neuro/Psych: Uneventful            Sign Out: Acceptable/Baseline neuro status   Airway/Respiratory: Uneventful            Sign Out: Acceptable/Baseline resp. status   CV/Hemodynamics: Uneventful            Sign Out: Acceptable CV status; No obvious hypovolemia; No obvious fluid overload   Other NRE: NONE   DID A NON-ROUTINE EVENT OCCUR? No           Last vitals:  Vitals Value Taken Time   /56 08/08/22 1130   Temp 36.8  C (98.3  F) 08/08/22 1130   Pulse 98 08/08/22 1136   Resp 54 08/08/22 1136   SpO2 97 % 08/08/22 1137   Vitals shown include unvalidated device data.    Electronically Signed By: Jesus Ac MD  August 8, 2022  12:20 PM

## 2022-08-08 NOTE — OP NOTE
Procedure Date: 08/08/2022    PREOPERATIVE DIAGNOSIS:  Right breast cancer.    POSTOPERATIVE DIAGNOSIS:  Right breast cancer.    PROCEDURES:  Lymphatic mapping, right seed localized lumpectomy, right axillary sentinel lymph node biopsy x 2, and mastopexy advancement flaps..    ATTENDING SURGEON:  Gurpreet Layton MD    RESIDENT SURGEON:  Luca Jenkins MD    ANESTHESIA:  General with LMA.    INDICATIONS FOR PROCEDURE:  The patient is a 46-year-old woman who was diagnosed with a right-sided breast cancer.  She received preoperative chemotherapy.  She had a seed placed into the tumor to localize the tumor.  She now presents for surgical treatment.    DESCRIPTION OF PROCEDURE:  After informed consent, the patient was brought to the operating room, given a general anesthetic, and LMA was inserted.  I injected technetium sulfur colloid and isosulfan blue into her right breast.  She was prepped and draped in the usual fashion.  Using the localizer, I identified the seed, then administered local anesthetic to the upper portion of her right breast.  I made a curvilinear incision at the edge of the nipple-areolar complex.  The Bovie cautery was used to incise subcutaneous tissues.  I then dissected circumferentially around the seed to ensure adequate surgical margins.  The dissection went down to the underlying fascia, which was excised.  The specimen was removed and oriented.  The seed was identified within the tumor.  The specimen was sent to Radiology and target lesion was removed.  I placed surgical clips in all 4 quadrants of the resection bed to minimize the defect.  I mobilized the breast tissue superiorly and inferior to the lumpectomy cavity.  I then sutured the mobilized breast tissue segments together to minimize the defect.  The dermis was closed with interrupted 3-0 Vicryl suture.  The skin was closed with a running 4-0 PDS subcuticular stitch.  Next, I identified a transcutaneous hot spot in the right axilla.   Local anesthetic was administered and a transverse axillary incision was made with a scalpel.  The Bovie cautery was used to incise subcutaneous tissues.  The clavipectoral fascia was divided.  I identified a blue-stained radioactive lymph node.  [place] lymph node #1 was removed and had ex vivo counts of 515 counts per second.  I identified a second radioactive, non-blue lymph node.  This lymph node had ex vivo counts of 615 counts per second.  There were no additional blue, radioactive or palpable lymph nodes.  This incision was also closed with interrupted 3-0 Vicryl suture for the dermis and a running 4-0 PDS subcuticular stitch for the skin.  Dermabond was placed on both incisions, and the patient was taken to the recovery room in stable condition.    Gurpreet Layton MD        D: 2022   T: 2022   MT: HUNTER    Name:     TORI RILEY  MRN:      [id number]        Account:        [id number]   :      1975           Procedure Date: 2022     Document: [id number]

## 2022-08-15 DIAGNOSIS — Z17.0 MALIGNANT NEOPLASM OF CENTRAL PORTION OF RIGHT BREAST IN FEMALE, ESTROGEN RECEPTOR POSITIVE (H): Primary | ICD-10-CM

## 2022-08-15 DIAGNOSIS — L65.9 ALOPECIA: ICD-10-CM

## 2022-08-15 DIAGNOSIS — C50.111 MALIGNANT NEOPLASM OF CENTRAL PORTION OF RIGHT BREAST IN FEMALE, ESTROGEN RECEPTOR POSITIVE (H): Primary | ICD-10-CM

## 2022-08-17 DIAGNOSIS — C50.111 MALIGNANT NEOPLASM OF CENTRAL PORTION OF RIGHT BREAST IN FEMALE, ESTROGEN RECEPTOR POSITIVE (H): Primary | ICD-10-CM

## 2022-08-17 DIAGNOSIS — Z17.0 MALIGNANT NEOPLASM OF CENTRAL PORTION OF RIGHT BREAST IN FEMALE, ESTROGEN RECEPTOR POSITIVE (H): Primary | ICD-10-CM

## 2022-08-18 LAB
PATH REPORT.COMMENTS IMP SPEC: ABNORMAL
PATH REPORT.COMMENTS IMP SPEC: YES
PATH REPORT.FINAL DX SPEC: ABNORMAL
PATH REPORT.GROSS SPEC: ABNORMAL
PATH REPORT.MICROSCOPIC SPEC OTHER STN: ABNORMAL
PATH REPORT.RELEVANT HX SPEC: ABNORMAL
PATHOLOGY SYNOPTIC REPORT: ABNORMAL
PHOTO IMAGE: ABNORMAL

## 2022-08-22 ENCOUNTER — ONCOLOGY VISIT (OUTPATIENT)
Dept: ONCOLOGY | Facility: CLINIC | Age: 47
End: 2022-08-22
Attending: SURGERY
Payer: OTHER GOVERNMENT

## 2022-08-22 VITALS
DIASTOLIC BLOOD PRESSURE: 74 MMHG | HEIGHT: 63 IN | OXYGEN SATURATION: 99 % | SYSTOLIC BLOOD PRESSURE: 108 MMHG | WEIGHT: 129.8 LBS | BODY MASS INDEX: 23 KG/M2 | RESPIRATION RATE: 16 BRPM | TEMPERATURE: 98.4 F | HEART RATE: 109 BPM

## 2022-08-22 DIAGNOSIS — C50.111 MALIGNANT NEOPLASM OF CENTRAL PORTION OF RIGHT BREAST IN FEMALE, ESTROGEN RECEPTOR POSITIVE (H): Primary | ICD-10-CM

## 2022-08-22 DIAGNOSIS — Z17.0 MALIGNANT NEOPLASM OF CENTRAL PORTION OF RIGHT BREAST IN FEMALE, ESTROGEN RECEPTOR POSITIVE (H): Primary | ICD-10-CM

## 2022-08-22 PROCEDURE — G0463 HOSPITAL OUTPT CLINIC VISIT: HCPCS

## 2022-08-22 ASSESSMENT — PAIN SCALES - GENERAL: PAINLEVEL: NO PAIN (0)

## 2022-08-22 NOTE — LETTER
8/22/2022         RE: Tori Steele  [address] [address]        Dear Colleague,    Thank you for referring your patient, Tori Steele, to the [facility]. Please see a copy of my visit note below.    Tori Steele is here for a postoperative visit after undergoing a lumpectomy and a sentinel lymph node biopsy after having preoperative chemotherapy.  Her final pathology report revealed a 4.5 cm residual cancer.  She did have multiple positive margins.  One of 2 sentinel nodes were positive.  Today, we talked to her about the need for additional treatment.  She is still a candidate for reexcision of her margins.  I told her there is a possibility that she could still have positive margins after reexcision and that would necessitate a mastectomy.  With a positive node, radiation therapy will be recommended regardless of mastectomy versus lumpectomy.  We will tentatively schedule her for reexcision of lumpectomy margins as soon as possible.        Again, thank you for allowing me to participate in the care of your patient.      Sincerely,    Gurpreet Layton MD

## 2022-08-22 NOTE — PROGRESS NOTES
Tori Steele is here for a postoperative visit after undergoing a lumpectomy and a sentinel lymph node biopsy after having preoperative chemotherapy.  Her final pathology report revealed a 4.5 cm residual cancer.  She did have multiple positive margins.  One of 2 sentinel nodes were positive.  Today, we talked to her about the need for additional treatment.  She is still a candidate for reexcision of her margins.  I told her there is a possibility that she could still have positive margins after reexcision and that would necessitate a mastectomy.  With a positive node, radiation therapy will be recommended regardless of mastectomy versus lumpectomy.  We will tentatively schedule her for reexcision of lumpectomy margins as soon as possible.

## 2022-08-22 NOTE — NURSING NOTE
"Oncology Rooming Note    August 22, 2022 2:56 PM   Tori Steele is a 46 year old female who presents for:    Chief Complaint   Patient presents with     Oncology Clinic Visit     Malignant neoplasm of central portion of right breast in female, estrogen receptor positive (H) (Primary Dx)      Initial Vitals: /74 (BP Location: Right arm, Patient Position: Sitting, Cuff Size: Adult Regular)   Pulse 109   Temp 98.4  F (36.9  C) (Oral)   Resp 16   Ht 1.6 m (5' 3\")   Wt 58.9 kg (129 lb 12.8 oz)   SpO2 99%   BMI 22.99 kg/m   Estimated body mass index is 22.99 kg/m  as calculated from the following:    Height as of this encounter: 1.6 m (5' 3\").    Weight as of this encounter: 58.9 kg (129 lb 12.8 oz). Body surface area is 1.62 meters squared.  No Pain (0) Comment: Data Unavailable   No LMP recorded. (Menstrual status: Chemotherapy).  Allergies reviewed: Yes  Medications reviewed: Yes    Medications: Medication refills not needed today.  Pharmacy name entered into [facility]:    [id number] [place], [place] - [address] AT [facility] [facility] [place], [place] - [phone]    Clinical concerns: None       Dedrick Rowe            "

## 2022-08-23 ENCOUNTER — DOCUMENTATION ONLY (OUTPATIENT)
Dept: ONCOLOGY | Facility: CLINIC | Age: 47
End: 2022-08-23

## 2022-08-23 ENCOUNTER — TELEPHONE (OUTPATIENT)
Dept: ONCOLOGY | Facility: CLINIC | Age: 47
End: 2022-08-23

## 2022-08-23 DIAGNOSIS — C50.911 INVASIVE DUCTAL CARCINOMA OF RIGHT BREAST (H): Primary | ICD-10-CM

## 2022-08-23 PROBLEM — Z17.0 MALIGNANT NEOPLASM OF CENTRAL PORTION OF RIGHT BREAST IN FEMALE, ESTROGEN RECEPTOR POSITIVE (H): Status: ACTIVE | Noted: 2022-08-23

## 2022-08-23 PROBLEM — C50.111 MALIGNANT NEOPLASM OF CENTRAL PORTION OF RIGHT BREAST IN FEMALE, ESTROGEN RECEPTOR POSITIVE (H): Status: ACTIVE | Noted: 2022-08-23

## 2022-08-26 ENCOUNTER — ANESTHESIA EVENT (OUTPATIENT)
Dept: SURGERY | Facility: AMBULATORY SURGERY CENTER | Age: 47
End: 2022-08-26
Payer: OTHER GOVERNMENT

## 2022-08-29 ENCOUNTER — ANESTHESIA (OUTPATIENT)
Dept: SURGERY | Facility: AMBULATORY SURGERY CENTER | Age: 47
End: 2022-08-29
Payer: OTHER GOVERNMENT

## 2022-08-29 ENCOUNTER — HOSPITAL ENCOUNTER (OUTPATIENT)
Facility: AMBULATORY SURGERY CENTER | Age: 47
Discharge: HOME | End: 2022-08-29
Attending: SURGERY
Payer: OTHER GOVERNMENT

## 2022-08-29 VITALS
SYSTOLIC BLOOD PRESSURE: 107 MMHG | TEMPERATURE: 96.9 F | HEART RATE: 93 BPM | HEIGHT: 63 IN | WEIGHT: 129 LBS | OXYGEN SATURATION: 100 % | BODY MASS INDEX: 22.86 KG/M2 | DIASTOLIC BLOOD PRESSURE: 71 MMHG | RESPIRATION RATE: 16 BRPM

## 2022-08-29 DIAGNOSIS — Z17.0 MALIGNANT NEOPLASM OF CENTRAL PORTION OF RIGHT BREAST IN FEMALE, ESTROGEN RECEPTOR POSITIVE (H): ICD-10-CM

## 2022-08-29 DIAGNOSIS — C50.111 MALIGNANT NEOPLASM OF CENTRAL PORTION OF RIGHT BREAST IN FEMALE, ESTROGEN RECEPTOR POSITIVE (H): ICD-10-CM

## 2022-08-29 LAB
HCG UR QL: NEGATIVE
INTERNAL QC OK POCT: NORMAL
POCT KIT EXPIRATION DATE: NORMAL
POCT KIT LOT NUMBER: NORMAL

## 2022-08-29 PROCEDURE — 19301 PARTIAL MASTECTOMY: CPT | Mod: 58,RT

## 2022-08-29 PROCEDURE — 19301 PARTIAL MASTECTOMY: CPT | Mod: 58 | Performed by: SURGERY

## 2022-08-29 PROCEDURE — 81025 URINE PREGNANCY TEST: CPT | Performed by: PATHOLOGY

## 2022-08-29 PROCEDURE — 88307 TISSUE EXAM BY PATHOLOGIST: CPT | Mod: 26 | Performed by: PATHOLOGY

## 2022-08-29 PROCEDURE — 88307 TISSUE EXAM BY PATHOLOGIST: CPT | Mod: TC | Performed by: SURGERY

## 2022-08-29 RX ORDER — ACETAMINOPHEN 325 MG/1
975 TABLET ORAL ONCE
Status: COMPLETED | OUTPATIENT
Start: 2022-08-29 | End: 2022-08-29

## 2022-08-29 RX ORDER — ONDANSETRON 2 MG/ML
INJECTION INTRAMUSCULAR; INTRAVENOUS PRN
Status: DISCONTINUED | OUTPATIENT
Start: 2022-08-29 | End: 2022-08-29

## 2022-08-29 RX ORDER — HYDROMORPHONE HYDROCHLORIDE 1 MG/ML
0.2 INJECTION, SOLUTION INTRAMUSCULAR; INTRAVENOUS; SUBCUTANEOUS EVERY 5 MIN PRN
Status: DISCONTINUED | OUTPATIENT
Start: 2022-08-29 | End: 2022-08-29 | Stop reason: HOSPADM

## 2022-08-29 RX ORDER — FENTANYL CITRATE 50 UG/ML
25 INJECTION, SOLUTION INTRAMUSCULAR; INTRAVENOUS EVERY 5 MIN PRN
Status: DISCONTINUED | OUTPATIENT
Start: 2022-08-29 | End: 2022-08-29 | Stop reason: HOSPADM

## 2022-08-29 RX ORDER — PROPOFOL 10 MG/ML
INJECTION, EMULSION INTRAVENOUS CONTINUOUS PRN
Status: DISCONTINUED | OUTPATIENT
Start: 2022-08-29 | End: 2022-08-29

## 2022-08-29 RX ORDER — FENTANYL CITRATE 50 UG/ML
25 INJECTION, SOLUTION INTRAMUSCULAR; INTRAVENOUS
Status: DISCONTINUED | OUTPATIENT
Start: 2022-08-29 | End: 2022-08-30 | Stop reason: HOSPADM

## 2022-08-29 RX ORDER — ONDANSETRON 2 MG/ML
4 INJECTION INTRAMUSCULAR; INTRAVENOUS EVERY 30 MIN PRN
Status: DISCONTINUED | OUTPATIENT
Start: 2022-08-29 | End: 2022-08-30 | Stop reason: HOSPADM

## 2022-08-29 RX ORDER — LIDOCAINE HYDROCHLORIDE 20 MG/ML
INJECTION, SOLUTION INFILTRATION; PERINEURAL PRN
Status: DISCONTINUED | OUTPATIENT
Start: 2022-08-29 | End: 2022-08-29

## 2022-08-29 RX ORDER — SCOLOPAMINE TRANSDERMAL SYSTEM 1 MG/1
1 PATCH, EXTENDED RELEASE TRANSDERMAL ONCE
Status: DISCONTINUED | OUTPATIENT
Start: 2022-08-29 | End: 2022-08-29 | Stop reason: HOSPADM

## 2022-08-29 RX ORDER — SODIUM CHLORIDE, SODIUM LACTATE, POTASSIUM CHLORIDE, CALCIUM CHLORIDE 600; 310; 30; 20 MG/100ML; MG/100ML; MG/100ML; MG/100ML
INJECTION, SOLUTION INTRAVENOUS CONTINUOUS
Status: DISCONTINUED | OUTPATIENT
Start: 2022-08-29 | End: 2022-08-29 | Stop reason: HOSPADM

## 2022-08-29 RX ORDER — FENTANYL CITRATE 50 UG/ML
INJECTION, SOLUTION INTRAMUSCULAR; INTRAVENOUS PRN
Status: DISCONTINUED | OUTPATIENT
Start: 2022-08-29 | End: 2022-08-29

## 2022-08-29 RX ORDER — MEPERIDINE HYDROCHLORIDE 25 MG/ML
12.5 INJECTION INTRAMUSCULAR; INTRAVENOUS; SUBCUTANEOUS
Status: DISCONTINUED | OUTPATIENT
Start: 2022-08-29 | End: 2022-08-30 | Stop reason: HOSPADM

## 2022-08-29 RX ORDER — LIDOCAINE 40 MG/G
CREAM TOPICAL
Status: DISCONTINUED | OUTPATIENT
Start: 2022-08-29 | End: 2022-08-29 | Stop reason: HOSPADM

## 2022-08-29 RX ORDER — DEXAMETHASONE SODIUM PHOSPHATE 4 MG/ML
INJECTION, SOLUTION INTRA-ARTICULAR; INTRALESIONAL; INTRAMUSCULAR; INTRAVENOUS; SOFT TISSUE PRN
Status: DISCONTINUED | OUTPATIENT
Start: 2022-08-29 | End: 2022-08-29

## 2022-08-29 RX ORDER — CEFAZOLIN SODIUM 1 G/3ML
INJECTION, POWDER, FOR SOLUTION INTRAMUSCULAR; INTRAVENOUS PRN
Status: DISCONTINUED | OUTPATIENT
Start: 2022-08-29 | End: 2022-08-29

## 2022-08-29 RX ORDER — GLYCOPYRROLATE 0.2 MG/ML
INJECTION, SOLUTION INTRAMUSCULAR; INTRAVENOUS PRN
Status: DISCONTINUED | OUTPATIENT
Start: 2022-08-29 | End: 2022-08-29

## 2022-08-29 RX ORDER — PROPOFOL 10 MG/ML
INJECTION, EMULSION INTRAVENOUS PRN
Status: DISCONTINUED | OUTPATIENT
Start: 2022-08-29 | End: 2022-08-29

## 2022-08-29 RX ORDER — ACETAMINOPHEN 325 MG/1
975 TABLET ORAL EVERY 8 HOURS PRN
COMMUNITY
End: 2022-10-03

## 2022-08-29 RX ORDER — OXYCODONE HYDROCHLORIDE 5 MG/1
5 TABLET ORAL EVERY 4 HOURS PRN
Status: DISCONTINUED | OUTPATIENT
Start: 2022-08-29 | End: 2022-08-30 | Stop reason: HOSPADM

## 2022-08-29 RX ORDER — SODIUM CHLORIDE, SODIUM LACTATE, POTASSIUM CHLORIDE, CALCIUM CHLORIDE 600; 310; 30; 20 MG/100ML; MG/100ML; MG/100ML; MG/100ML
INJECTION, SOLUTION INTRAVENOUS CONTINUOUS
Status: DISCONTINUED | OUTPATIENT
Start: 2022-08-29 | End: 2022-08-30 | Stop reason: HOSPADM

## 2022-08-29 RX ORDER — ONDANSETRON 4 MG/1
4 TABLET, ORALLY DISINTEGRATING ORAL EVERY 30 MIN PRN
Status: DISCONTINUED | OUTPATIENT
Start: 2022-08-29 | End: 2022-08-30 | Stop reason: HOSPADM

## 2022-08-29 RX ADMIN — Medication 100 MCG: at 11:43

## 2022-08-29 RX ADMIN — PROPOFOL 150 MCG/KG/MIN: 10 INJECTION, EMULSION INTRAVENOUS at 11:38

## 2022-08-29 RX ADMIN — CEFAZOLIN SODIUM 2 G: 1 INJECTION, POWDER, FOR SOLUTION INTRAMUSCULAR; INTRAVENOUS at 11:26

## 2022-08-29 RX ADMIN — Medication 100 MCG: at 11:56

## 2022-08-29 RX ADMIN — PROPOFOL 200 MG: 10 INJECTION, EMULSION INTRAVENOUS at 11:38

## 2022-08-29 RX ADMIN — GLYCOPYRROLATE 0.2 MG: 0.2 INJECTION, SOLUTION INTRAMUSCULAR; INTRAVENOUS at 11:44

## 2022-08-29 RX ADMIN — DEXAMETHASONE SODIUM PHOSPHATE 4 MG: 4 INJECTION, SOLUTION INTRA-ARTICULAR; INTRALESIONAL; INTRAMUSCULAR; INTRAVENOUS; SOFT TISSUE at 11:44

## 2022-08-29 RX ADMIN — FENTANYL CITRATE 50 MCG: 50 INJECTION, SOLUTION INTRAMUSCULAR; INTRAVENOUS at 11:42

## 2022-08-29 RX ADMIN — ACETAMINOPHEN 975 MG: 325 TABLET ORAL at 13:25

## 2022-08-29 RX ADMIN — ONDANSETRON 4 MG: 2 INJECTION INTRAMUSCULAR; INTRAVENOUS at 12:06

## 2022-08-29 RX ADMIN — SODIUM CHLORIDE, SODIUM LACTATE, POTASSIUM CHLORIDE, CALCIUM CHLORIDE: 600; 310; 30; 20 INJECTION, SOLUTION INTRAVENOUS at 10:56

## 2022-08-29 RX ADMIN — LIDOCAINE HYDROCHLORIDE 60 MG: 20 INJECTION, SOLUTION INFILTRATION; PERINEURAL at 11:38

## 2022-08-29 RX ADMIN — Medication 100 MCG: at 12:05

## 2022-08-29 NOTE — DISCHARGE INSTRUCTIONS
[facility]  Home Care Following Anesthesia  For 24 hours after surgery:  Get plenty of rest.  A responsible adult must stay with you for at least 24 hours after you leave the surgery center.  Do not drive or use heavy equipment.  If you have weakness or tingling, don't drive or use heavy equipment until this feeling goes away.   Do not drink alcohol.   Avoid strenuous or risky activities.  Ask for help when climbing stairs.  You may feel lightheaded.  IF so, sit for a few minutes before standing.  Have someone help you get up.   If you have nausea (feel sick to your stomach): Drink only clear liquids such as apple juice, ginger ale, broth or 7-Up.  Rest may also help.  Be sure to drink enough fluids.  Move to a regular diet as you feel able.   You may have a slight fever.  Call the doctor if your fever is over 100 F (37.7 C) (taken under the tongue) or lasts longer than 24 hours.  You may have a dry mouth, a sore throat, muscle aches or trouble sleeping. These should go away after 24 hours.  Do not make important or legal decisions.   It is recommended to avoid smoking.               Tips for taking pain medications  To get the best pain relief possible, remember these points:  Take pain medications as directed, before pain becomes severe.  Pain medication can upset your stomach: taking it with food may help.  Constipation is a common side effect of pain medication. Drink plenty of  fluids.  Eat foods high in fiber. Take a stool softener if recommended by your doctor or pharmacist.  Do not drink alcohol, drive or operate machinery while taking pain medications.  Ask about other ways to control pain, such as with heat, ice or relaxation.    Tylenol/Acetaminophen Consumption  To help encourage the safe use of acetaminophen, the makers of TYLENOL  have lowered the maximum daily dose for single-ingredient Extra Strength TYLENOL  (acetaminophen) products sold in the [place] from 8 pills  per day (4,000 mg) to 6 pills per day (3,000 mg). The dosing interval has also changed from 2 pills every 4-6 hours to 2 pills every 6 hours.  If you feel your pain relief is insufficient, you may take Tylenol/Acetaminophen in addition to your narcotic pain medication.   Be careful not to exceed 3,000 mg of Tylenol/Acetaminophen in a 24 hour period from all sources.  If you are taking extra strength Tylenol/acetaminophen (500 mg), the maximum dose is 6 tablets in 24 hours.  If you are taking regular strength acetaminophen (325 mg), the maximum dose is 9 tablets in 24 hours.    Call a doctor for any of the following:  Signs of infection (fever, growing tenderness at the surgery site, a large amount of drainage or bleeding, severe pain, foul-smelling drainage, redness, swelling).  It has been over 8 to 10 hours since surgery and you are still not able to urinate (pass water).  Headache for over 24 hours.  Numbness, tingling or weakness the day after surgery (if you had spinal anesthesia).  Signs of Covid-19 infection (temperature over 100 degrees, shortness of breath, cough, loss of taste/smell, generalized body aches, persistent headache, chills, sore throat, nausea/vomiting/diarrhea)  Your doctor is:       Dr. Gurpreet Layton, [facility]: [phone]               Or dial [phone] and ask for the resident on call for:  [facility]  For emergency care, call the:  [place] [facility]:  [phone] (TTY for hearing impaired: [phone])

## 2022-08-29 NOTE — ANESTHESIA POSTPROCEDURE EVALUATION
Patient: Tori Steele    Procedure: Procedure(s):  Re-excision of right lumpectomy site       Anesthesia Type:  General    Note:  Disposition: Outpatient   Postop Pain Control: Uneventful            Sign Out: Well controlled pain   PONV:    Neuro/Psych: Uneventful            Sign Out: Acceptable/Baseline neuro status   Airway/Respiratory: Uneventful            Sign Out: AIRWAY IN SITU/Resp. Support   CV/Hemodynamics: Uneventful            Sign Out: Acceptable CV status; No obvious hypovolemia; No obvious fluid overload   Other NRE:    DID A NON-ROUTINE EVENT OCCUR?            Last vitals:  Vitals Value Taken Time   BP 96/66 08/29/22 1250   Temp 36  C (96.8  F) 08/29/22 1230   Pulse 93 08/29/22 1250   Resp 16 08/29/22 1250   SpO2 100 % 08/29/22 1251   Vitals shown include unvalidated device data.    Electronically Signed By: Dequan Leslie MD  August 29, 2022  3:19 PM

## 2022-08-29 NOTE — ANESTHESIA CARE TRANSFER NOTE
Patient: Tori Steele    Procedure: Procedure(s):  Re-excision of right lumpectomy site       Diagnosis: Malignant neoplasm of central portion of right breast in female, estrogen receptor positive (H) [C50.111, Z17.0]  Diagnosis Additional Information: No value filed.    Anesthesia Type:   General     Note:    Oropharynx: oropharynx clear of all foreign objects and spontaneously breathing  Level of Consciousness: awake and drowsy  Oxygen Supplementation: nasal cannula  Level of Supplemental Oxygen (L/min / FiO2): 3  Independent Airway: airway patency satisfactory and stable  Dentition: dentition unchanged  Vital Signs Stable: post-procedure vital signs reviewed and stable  Report to RN Given: handoff report given  Patient transferred to: PACU    Handoff Report: Identifed the Patient, Identified the Reponsible Provider, Reviewed the pertinent medical history, Discussed the surgical course, Reviewed Intra-OP anesthesia mangement and issues during anesthesia, Set expectations for post-procedure period and Allowed opportunity for questions and acknowledgement of understanding      Vitals:  Vitals Value Taken Time   BP 98/55 08/29/22 1223   Temp 97    Pulse 93 08/29/22 1225   Resp 23 08/29/22 1225   SpO2 100 % 08/29/22 1225   Vitals shown include unvalidated device data.    Electronically Signed By: GENIE Carroll CRNA  August 29, 2022  12:27 PM

## 2022-08-29 NOTE — ANESTHESIA PREPROCEDURE EVALUATION
Anesthesia Pre-Procedure Evaluation    Patient: Tori Steele   MRN: [id number] : 1975        Procedure : Procedure(s):  Re-excision of right lumpectomy site          Past Medical History:   Diagnosis Date     Breast cancer (H) 2022     Sinus tachycardia       Past Surgical History:   Procedure Laterality Date     DILATION AND CURETTAGE       INSERT PORT VASCULAR ACCESS Left 2022    Procedure: INSERTION, VASCULAR ACCESS PORT;  Surgeon: Elliott Ramirez PA-C;  Location: [facility] OR     IR CHEST PORT PLACEMENT > 5 YRS OF AGE  2022     LUMPECTOMY BREAST WITH SENTINEL NODE, COMBINED Right 2022    Procedure: RIGHT SEED LOCALIZED BREAST LUMPECTOMY  WITH SENTINEL LYMPH NODE BIOPSY;  Surgeon: Gurpreet Layton MD;  Location: [facility] OR      No Known Allergies   Social History     Tobacco Use     Smoking status: Never Smoker     Smokeless tobacco: Never Used   Substance Use Topics     Alcohol use: Yes     Comment: Rare      Wt Readings from Last 1 Encounters:   22 58.5 kg (129 lb)        Anesthesia Evaluation            ROS/MED HX  ENT/Pulmonary:  - neg pulmonary ROS     Neurologic:  - neg neurologic ROS     Cardiovascular:     (+) -----ARAIZA. Irregular Heartbeat/Palpitations,     METS/Exercise Tolerance:     Hematologic:       Musculoskeletal:       GI/Hepatic:  - neg GI/hepatic ROS     Renal/Genitourinary:  - neg Renal ROS     Endo:  - neg endo ROS     Psychiatric/Substance Use:  - neg psychiatric ROS     Infectious Disease:  - neg infectious disease ROS     Malignancy:  - neg malignancy ROS     Other:               OUTSIDE LABS:  CBC:   Lab Results   Component Value Date    WBC 3.9 (L) 2022    WBC 8.1 2022    HGB 9.2 (L) 2022    HGB 8.6 (L) 2022    HCT 29.0 (L) 2022    HCT 27.3 (L) 2022     2022     2022     BMP:   Lab Results   Component Value Date     (H) 2022     2022    POTASSIUM 3.7 2022    POTASSIUM  3.8 07/08/2022    CHLORIDE 107 07/29/2022    CHLORIDE 110 (H) 07/08/2022    CO2 26 07/29/2022    CO2 25 07/08/2022    BUN 8 07/29/2022    BUN 5 (L) 07/08/2022    CR 0.64 07/29/2022    CR 0.65 07/08/2022     (H) 07/29/2022     (H) 07/08/2022     COAGS:   Lab Results   Component Value Date    PTT 31 02/04/2022    INR 1.09 05/24/2022     POC:   Lab Results   Component Value Date    HCG Negative 08/29/2022    HCGS Negative 02/04/2022     HEPATIC:   Lab Results   Component Value Date    ALBUMIN 3.0 (L) 07/29/2022    PROTTOTAL 6.9 07/29/2022    ALT 18 07/29/2022    AST 18 07/29/2022    GGT 12 02/04/2022    ALKPHOS 69 07/29/2022    BILITOTAL 0.5 07/29/2022     OTHER:   Lab Results   Component Value Date    A1C 5.6 02/04/2022    JENNIE 9.0 07/29/2022    MAG 1.7 (L) 02/07/2022    LIPASE 57 (H) 05/24/2022    TSH 1.39 07/29/2022    T4 1.12 07/29/2022       Anesthesia Plan    ASA Status:  2      Anesthesia Type: General.     - Airway: LMA              Consents    Anesthesia Plan(s) and associated risks, benefits, and realistic alternatives discussed. Questions answered and patient/representative(s) expressed understanding.     - Discussed: Risks, Benefits and Alternatives for BOTH SEDATION and the PROCEDURE were discussed     - Discussed with:  Patient      - Extended Intubation/Ventilatory Support Discussed: No.      - Patient is DNR/DNI Status: No    Use of blood products discussed: No .     Postoperative Care    Pain management: IV analgesics, Oral pain medications.   PONV prophylaxis: Ondansetron (or other 5HT-3), Dexamethasone or Solumedrol, Scopolamine patch     Comments:                Mac Denny MD, MD

## 2022-08-31 NOTE — OP NOTE
Procedure Date: 2022    PREOPERATIVE DIAGNOSIS:  Right breast cancer.    POSTOPERATIVE DIAGNOSIS:  Right breast cancer.    PROCEDURE:  Reexcision of right lumpectomy margins.    ATTENDING SURGEON:  Gurpreet Layton MD    ASSISTANT:  Maria R Barraza PA-C, who assisted because there was no surgical resident available.    ANESTHESIA:  General with LMA.    INDICATIONS FOR PROCEDURE:  The patient is a 46-year-old woman who was diagnosed with a right-sided breast cancer, received preoperative chemotherapy and then underwent a lumpectomy.  She had positive superior, inferior and medial margins for invasive breast cancer.  She now presents for surgical excision.    DESCRIPTION OF PROCEDURE:  After informed consent, the patient was brought to the operating room, given a general anesthetic, and LMA was placed.  She was prepped and draped in the usual fashion.  I injected local anesthetic into her right breast. I opened her previous incision with a scalpel.  The Bovie cautery was used to incise subcutaneous tissues.  The seroma cavity was aspirated.  I then excised new superior, inferior and medial margins.  I labeled new margins with a new 3-0 silks.  The incision was closed with interrupted 3-0 Vicryl suture for the dermal layer and a running 4-0 PDS subcuticular stitch for the skin.  Dermabond was placed and the patient was taken to the recovery room in stable condition.    Gurpreet Layton MD        D: 2022   T: 2022   MT: PAKMT    Name:     TORI RILEY  MRN:      [id number]        Account:        [id number]   :      1975           Procedure Date: 2022     Document: [id number]

## 2022-09-01 LAB
PATH REPORT.COMMENTS IMP SPEC: NORMAL
PATH REPORT.COMMENTS IMP SPEC: NORMAL
PATH REPORT.FINAL DX SPEC: NORMAL
PATH REPORT.GROSS SPEC: NORMAL
PATH REPORT.MICROSCOPIC SPEC OTHER STN: NORMAL
PATH REPORT.RELEVANT HX SPEC: NORMAL
PHOTO IMAGE: NORMAL

## 2022-09-09 ENCOUNTER — ONCOLOGY VISIT (OUTPATIENT)
Dept: ONCOLOGY | Facility: CLINIC | Age: 47
End: 2022-09-09
Attending: INTERNAL MEDICINE
Payer: OTHER GOVERNMENT

## 2022-09-09 ENCOUNTER — LAB (OUTPATIENT)
Dept: LAB | Facility: CLINIC | Age: 47
End: 2022-09-09
Attending: INTERNAL MEDICINE
Payer: OTHER GOVERNMENT

## 2022-09-09 ENCOUNTER — RESEARCH ENCOUNTER (OUTPATIENT)
Dept: ONCOLOGY | Facility: CLINIC | Age: 47
End: 2022-09-09

## 2022-09-09 VITALS
WEIGHT: 128.1 LBS | RESPIRATION RATE: 16 BRPM | OXYGEN SATURATION: 100 % | HEART RATE: 114 BPM | TEMPERATURE: 98.6 F | SYSTOLIC BLOOD PRESSURE: 116 MMHG | BODY MASS INDEX: 22.69 KG/M2 | DIASTOLIC BLOOD PRESSURE: 75 MMHG

## 2022-09-09 DIAGNOSIS — Z17.0 MALIGNANT NEOPLASM OF CENTRAL PORTION OF RIGHT BREAST IN FEMALE, ESTROGEN RECEPTOR POSITIVE (H): Primary | ICD-10-CM

## 2022-09-09 DIAGNOSIS — C50.111 MALIGNANT NEOPLASM OF CENTRAL PORTION OF RIGHT BREAST IN FEMALE, ESTROGEN RECEPTOR POSITIVE (H): Primary | ICD-10-CM

## 2022-09-09 DIAGNOSIS — C50.911 INVASIVE DUCTAL CARCINOMA OF RIGHT BREAST (H): ICD-10-CM

## 2022-09-09 LAB
ALBUMIN SERPL BCG-MCNC: 4 G/DL (ref 3.5–5.2)
ALP SERPL-CCNC: 97 U/L (ref 35–104)
ALT SERPL W P-5'-P-CCNC: 8 U/L (ref 10–35)
ANION GAP SERPL CALCULATED.3IONS-SCNC: 11 MMOL/L (ref 7–15)
AST SERPL W P-5'-P-CCNC: 21 U/L (ref 10–35)
BASOPHILS # BLD AUTO: 0 10E3/UL (ref 0–0.2)
BASOPHILS NFR BLD AUTO: 0 %
BILIRUB SERPL-MCNC: 0.5 MG/DL
BUN SERPL-MCNC: 10 MG/DL (ref 6–20)
CALCIUM SERPL-MCNC: 9.5 MG/DL (ref 8.6–10)
CHLORIDE SERPL-SCNC: 104 MMOL/L (ref 98–107)
CREAT SERPL-MCNC: 0.66 MG/DL (ref 0.51–0.95)
DEPRECATED HCO3 PLAS-SCNC: 25 MMOL/L (ref 22–29)
EOSINOPHIL # BLD AUTO: 0.3 10E3/UL (ref 0–0.7)
EOSINOPHIL NFR BLD AUTO: 10 %
ERYTHROCYTE [DISTWIDTH] IN BLOOD BY AUTOMATED COUNT: 13.2 % (ref 10–15)
GFR SERPL CREATININE-BSD FRML MDRD: >90 ML/MIN/1.73M2
GLUCOSE SERPL-MCNC: 96 MG/DL (ref 70–99)
HCT VFR BLD AUTO: 36.4 % (ref 35–47)
HGB BLD-MCNC: 11.5 G/DL (ref 11.7–15.7)
IMM GRANULOCYTES # BLD: 0 10E3/UL
IMM GRANULOCYTES NFR BLD: 0 %
LYMPHOCYTES # BLD AUTO: 0.6 10E3/UL (ref 0.8–5.3)
LYMPHOCYTES NFR BLD AUTO: 17 %
MCH RBC QN AUTO: 30 PG (ref 26.5–33)
MCHC RBC AUTO-ENTMCNC: 31.6 G/DL (ref 31.5–36.5)
MCV RBC AUTO: 95 FL (ref 78–100)
MONOCYTES # BLD AUTO: 0.2 10E3/UL (ref 0–1.3)
MONOCYTES NFR BLD AUTO: 7 %
NEUTROPHILS # BLD AUTO: 2.3 10E3/UL (ref 1.6–8.3)
NEUTROPHILS NFR BLD AUTO: 66 %
NRBC # BLD AUTO: 0 10E3/UL
NRBC BLD AUTO-RTO: 0 /100
PLATELET # BLD AUTO: 232 10E3/UL (ref 150–450)
POTASSIUM SERPL-SCNC: 3.5 MMOL/L (ref 3.4–5.3)
PROT SERPL-MCNC: 6.9 G/DL (ref 6.4–8.3)
RBC # BLD AUTO: 3.83 10E6/UL (ref 3.8–5.2)
SODIUM SERPL-SCNC: 140 MMOL/L (ref 136–145)
TSH SERPL DL<=0.005 MIU/L-ACNC: 1.52 UIU/ML (ref 0.3–4.2)
WBC # BLD AUTO: 3.5 10E3/UL (ref 4–11)

## 2022-09-09 PROCEDURE — 85004 AUTOMATED DIFF WBC COUNT: CPT

## 2022-09-09 PROCEDURE — 82040 ASSAY OF SERUM ALBUMIN: CPT

## 2022-09-09 PROCEDURE — G0463 HOSPITAL OUTPT CLINIC VISIT: HCPCS

## 2022-09-09 PROCEDURE — 80053 COMPREHEN METABOLIC PANEL: CPT

## 2022-09-09 PROCEDURE — 99001 SPECIMEN HANDLING PT-LAB: CPT

## 2022-09-09 PROCEDURE — 99214 OFFICE O/P EST MOD 30 MIN: CPT | Performed by: INTERNAL MEDICINE

## 2022-09-09 PROCEDURE — 36415 COLL VENOUS BLD VENIPUNCTURE: CPT

## 2022-09-09 PROCEDURE — 84443 ASSAY THYROID STIM HORMONE: CPT

## 2022-09-09 RX ORDER — HEPARIN SODIUM (PORCINE) LOCK FLUSH IV SOLN 100 UNIT/ML 100 UNIT/ML
5 SOLUTION INTRAVENOUS
Status: DISCONTINUED | OUTPATIENT
Start: 2022-09-09 | End: 2022-09-09 | Stop reason: HOSPADM

## 2022-09-09 ASSESSMENT — PAIN SCALES - GENERAL: PAINLEVEL: NO PAIN (0)

## 2022-09-09 NOTE — NURSING NOTE
[id number]: Study Visit Note   Subject name: Tori Steele     Visit: 30 day post surgery visit    Did the study visit occur within the appropriate window allowed by the protocol? yes    Since the last study visit, She has been doing well. She has no needs or concerns at this time     I have personally interviewed Tori Steele and reviewed her medical record for adverse events and concomitant medications and these have been recorded on the corresponding logs in Tori Steele's research file.     Tori Steele was given the opportunity to ask any trial related questions.  Please see provider progress note for physical exam and other clinical information. Labs were reviewed - any significant lab values were addressed and reviewed.    CHARISMA Babb, RN  CRC-RN,   Pager: [phone]       Muscle Hinge Flap Text: The defect edges were debeveled with a #15 scalpel blade.  Given the size, depth and location of the defect and the proximity to free margins a muscle hinge flap was deemed most appropriate.  Using a sterile surgical marker, an appropriate hinge flap was drawn incorporating the defect. The area thus outlined was incised with a #15 scalpel blade.  The skin margins were undermined to an appropriate distance in all directions utilizing iris scissors.

## 2022-09-09 NOTE — NURSING NOTE
"Oncology Rooming Note    September 9, 2022 11:30 AM   Tori Steele is a 46 year old female who presents for:    Chief Complaint   Patient presents with     Oncology Clinic Visit     Rtn For Invasive Ductal Carcinoma of Right Breast, MRI Review     Blood Draw     Labs (including research labs) drawn with  by rn.  VS taken.     Initial Vitals: /75 (BP Location: Right arm, Patient Position: Sitting, Cuff Size: Adult Regular)   Pulse 114   Temp 98.6  F (37  C) (Oral)   Resp 16   Wt 58.1 kg (128 lb 1.6 oz)   SpO2 100%   BMI 22.69 kg/m   Estimated body mass index is 22.69 kg/m  as calculated from the following:    Height as of 8/29/22: 1.6 m (5' 3\").    Weight as of this encounter: 58.1 kg (128 lb 1.6 oz). Body surface area is 1.61 meters squared.  No Pain (0) Comment: Data Unavailable   No LMP recorded. (Menstrual status: Chemotherapy).  Allergies reviewed: Yes  Medications reviewed: Yes    Medications: Medication refills not needed today.  Pharmacy name entered into [facility]:    [id number] [place], [place] - [address] [facility] [address]  [facility]  [facility] [place], [place] - [phone]    Clinical concerns: none.       Austin Esposito"

## 2022-09-09 NOTE — PROGRESS NOTES
Oncology/Hematology Visit Note  Sep 9, 2022    Reason for Visit: follow up of breast cancer     History of Present Illness: Tori Steele is a 46 year old female with breat cancer. Right-sided 5.5 cm ER positive MS positive HER-2 negative breast cancer with associated DCIS.  Her MammaPrint came back as high risk.  She consented for the I-SPY clinical trial and has been randomized to the oral paclitaxel, Encequidar and dostarlimab arm.      2/21/22 started trial with oral paclitaxel, Encequidar and dostarlimab.    3/15/22  her IV immunotherapy dostarlimab was held due to diarrhea that recovered with Imodium and time  4/4/22   Carbo was added in weekly due in hopes for more significant reduction in cancer  5/17/22 AC start  7/8/22 AC End  8/8/2022 - R. Lumpectomy with SNLBX -    -INVASIVE BREAST CARCINOMA OF NO SPECIAL TYPE (INVASIVE DUCTAL CARCINOMA), with  apocrine features, SAMANTHA GRADE 2, size 45 x 32 mm, residual after neoadjuvant systemic therapy   -Ductal carcinoma in situ (DCIS), nuclear grade 3, cribriform and solid type(s), with focal necrosis   -DCIS is admixed with invasive carcinoma, and comprises 30% of the tumor cellularity   -Invasive carcinoma is estrogen receptor positive, progesterone receptor positive and HER2  negative (score 1+) by immunohistochemistry    -METASTATIC BREAST DUCTAL CARCINOMA in one of two lymph nodes (1/2), residual after  neoadjuvant  systemic therapy   -Extranodal extension present (size 2 mm)   -[facility] residual cancer burden is 3.284 (RCB Class III). \  8/29/2022 - Resection of involved margins - now negative       Interval History:     Ms. Steele returns after her surgery with results as noted above.  Today we discussed the fact that chemotherapy reduced her tumor but unfortunately she still had significant residual after her treatment.  We had a discussion about how this will occur in estrogen receptor positive tumors even though they are MammaPrint high  risk.  I explained to them the need to optimize her endocrine therapy as noted below.    She has not yet met with Dr. Whitehead and radiation therapy.  This will occur in about 10 days.    She otherwise feels well.  Her energy is coming back.  She has no issues with her appetite or diarrhea.    SOCIAL HISTORY: She is planning a trip around [facility] to [place] the [facility].  I told her we will schedule any procedures around that time and she should not even think about canceling the trip.    ROS:  Patient denies the following: fevers, body aches, chills, headaches, vision changes, dizziness, chest pain, shortness of breath, nausea, vomiting, diarrhea, constipation, abdominal pain, rashes, bruising/bleeding, mouth sores, swelling or pain in the legs.        Current Outpatient Prescriptions               Current Outpatient Medications   Medication Sig Dispense Refill     ferrous sulfate (FEROSUL) 325 (65 Fe) MG tablet Take 325 mg by mouth         lidocaine-prilocaine (EMLA) 2.5-2.5 % external cream Apply to port site one hour prior to access may start using six days after port placement. 30 g 1     ondansetron (ZOFRAN) 8 MG tablet Take 1 tablet (8 mg) by mouth every 8 hours as needed for nausea 30 tablet 0     prochlorperazine (COMPAZINE) 10 MG tablet Take 1 tablet (10 mg) by mouth every 6 hours as needed (Nausea/Vomiting) 30 tablet 2     riboflavin (VITAMIN  B2) 25 MG TABS                  Physical Examination: ECOG 1    /75 (BP Location: Right arm, Patient Position: Sitting, Cuff Size: Adult Regular)   Pulse 114   Temp 98.6  F (37  C) (Oral)   Resp 16   Wt 58.1 kg (128 lb 1.6 oz)   SpO2 100%   BMI 22.69 kg/m        Wt Readings from Last 4 Encounters:   22 58.1 kg (128 lb 1.6 oz)   22 58.5 kg (129 lb)   22 58.9 kg (129 lb 12.8 oz)   22 58.7 kg (129 lb 8 oz)         ECO  General: She looks well, she was not examined in detail       Laboratory Data:     Latest Reference  Range & Units 06/24/22 10:57 07/08/22 08:35   Sodium 133 - 144 mmol/L 143 142   Potassium 3.4 - 5.3 mmol/L 3.6 3.8   Chloride 94 - 109 mmol/L 107 110 (H)   Carbon Dioxide 20 - 32 mmol/L 26 25   Urea Nitrogen 7 - 30 mg/dL 5 (L) 5 (L)   Creatinine 0.52 - 1.04 mg/dL 0.59 0.65   GFR Estimate >60 mL/min/1.73m2 >90 >90   Calcium 8.5 - 10.1 mg/dL 8.6 8.9   Anion Gap 3 - 14 mmol/L 10 7   Albumin 3.4 - 5.0 g/dL 2.9 (L) 2.9 (L)   Protein Total 6.8 - 8.8 g/dL 6.6 (L) 6.6 (L)   Alkaline Phosphatase 40 - 150 U/L 80 84   ALT 0 - 50 U/L 17 15   AST 0 - 45 U/L 13 15   Bilirubin Total 0.2 - 1.3 mg/dL 0.2 0.3   Cortisol Serum   ug/dL 11.1    T4 Free 0.76 - 1.46 ng/dL 0.96    TSH 0.40 - 4.00 mU/L 1.66    Glucose 70 - 99 mg/dL 131 (H) 104 (H)   WBC 4.0 - 11.0 10e3/uL 7.4 8.1   Hemoglobin 11.7 - 15.7 g/dL 8.9 (L) 8.6 (L)   Hematocrit 35.0 - 47.0 % 28.3 (L) 27.3 (L)   Platelet Count 150 - 450 10e3/uL 246 327   RBC Count 3.80 - 5.20 10e6/uL 2.75 (L) 2.62 (L)   MCV 78 - 100 fL 103 (H) 104 (H)   MCH 26.5 - 33.0 pg 32.4 32.8   MCHC 31.5 - 36.5 g/dL 31.4 (L) 31.5   RDW 10.0 - 15.0 % 17.0 (H) 16.8 (H)   % Neutrophils % 78 81   % Lymphocytes % 11 8   % Monocytes % 8 9   % Eosinophils % 0 0   % Basophils % 0 0   Absolute Basophils 0.0 - 0.2 10e3/uL 0.0 0.0   (H): Data is abnormally high  (L): Data is abnormally low    I reviewed the above labs today.     PROBLEMS:    1. Right sided ER+, GA+, HER2-negative, MammaPrint high risk T=5.5cm, N=0 breast cancer. Treated on I-SPY with assigned arm of dostarlimab/oral paclitaxel. Carbo added week 3. Received ACx4. Pathologic staging showed pT2a. PN1a. ER+, GA+, HER21+, RCB=3   2. Uncertain menopausal status    IMPRESSION: The patient, her , and I had an extensive discussion today regarding what happened to her and where to go next.  She does need to see Dr. Whitehead and discuss the appropriate interval and radiation therapy.    I explained to the patient and her  that estrogen receptor positive  tumors are the least responsive to chemotherapy in the context of ice by 2.  This suggests that even though they are determined high risk by MammaPrint we need additional therapy including endocrine therapy.  This would involve ovarian suppression, an aromatase inhibitor, and in her case abemaciclib.  She had an initial VIRGIE 67 of greater than 20% which would qualify her for this regimen.    I told her we will do this sequentially.  First we will start with her on go Serlin to make sure she is breast.  We will then start the letrozole sometime towards the end of her radiation therapy and then add abemaciclib once we understand how she tolerates the letrozole.    PLAN:  1.  Will put in orders to start goserelin  2. Will set up appointment for about 2 months to begin letrozole and Zometa  3.  Has appointment with Dr. Whitehead in about 10 days  4.  Contacts us sooner as necessary    30 minutes spent on the date of the encounter doing chart review, review of test results, interpretation of tests, patient visit, documentation and discussion with family       Keegan Lockett MD

## 2022-09-09 NOTE — LETTER
9/9/2022         RE: Tori Steele  [address] [facility] [address]        Dear Colleague,    Thank you for referring your patient, Tori Steele, to the [facility]. Please see a copy of my visit note below.    Oncology/Hematology Visit Note  Sep 9, 2022    Reason for Visit: follow up of breast cancer     History of Present Illness: Tori Steele is a 46 year old female with breat cancer. Right-sided 5.5 cm ER positive [place] positive HER-2 negative breast cancer with associated DCIS.  Her MammaPrint came back as high risk.  She consented for the I-SPY clinical trial and has been randomized to the oral paclitaxel, Encequidar and dostarlimab arm.      2/21/22 started trial with oral paclitaxel, Encequidar and dostarlimab.    3/15/22  her IV immunotherapy dostarlimab was held due to diarrhea that recovered with Imodium and time  4/4/22   Carbo was added in weekly due in hopes for more significant reduction in cancer  5/17/22 AC start  7/8/22 AC End  8/8/2022 - R. Lumpectomy with SNLBX -    -INVASIVE BREAST CARCINOMA OF NO SPECIAL TYPE (INVASIVE DUCTAL CARCINOMA), with  apocrine features, SAMANTHA GRADE 2, size 45 x 32 mm, residual after neoadjuvant systemic therapy   -Ductal carcinoma in situ (DCIS), nuclear grade 3, cribriform and solid type(s), with focal necrosis   -DCIS is admixed with invasive carcinoma, and comprises 30% of the tumor cellularity   -Invasive carcinoma is estrogen receptor positive, progesterone receptor positive and HER2  negative (score 1+) by immunohistochemistry    -METASTATIC BREAST DUCTAL CARCINOMA in one of two lymph nodes (1/2), residual after  neoadjuvant  systemic therapy   -Extranodal extension present (size 2 mm)   -[facility] residual cancer burden is 3.284 (RCB Class III). \  8/29/2022 - Resection of involved margins - now negative       Interval History:  Ms. Steele returns after her surgery with results as noted above.  Today we  discussed the fact that chemotherapy reduced her tumor but unfortunately she still had significant residual after her treatment.  We had a discussion about how this will occur in estrogen receptor positive tumors even though they are MammaPrint high risk.  I explained to them the need to optimize her endocrine therapy as noted below.    She has not yet met with Dr. Whitehead and radiation therapy.  This will occur in about 10 days.    She otherwise feels well.  Her energy is coming back.  She has no issues with her appetite or diarrhea.    SOCIAL HISTORY: [place] [facility] [place] [place] [facility].  I told her we will schedule any procedures around that time and she should not even think about canceling the trip.    ROS:  Patient denies the following: fevers, body aches, chills, headaches, vision changes, dizziness, chest pain, shortness of breath, nausea, vomiting, diarrhea, constipation, abdominal pain, rashes, bruising/bleeding, mouth sores, swelling or pain in the legs.        Current Outpatient Prescriptions               Current Outpatient Medications   Medication Sig Dispense Refill     ferrous sulfate (FEROSUL) 325 (65 Fe) MG tablet Take 325 mg by mouth         lidocaine-prilocaine (EMLA) 2.5-2.5 % external cream Apply to port site one hour prior to access may start using six days after port placement. 30 g 1     ondansetron (ZOFRAN) 8 MG tablet Take 1 tablet (8 mg) by mouth every 8 hours as needed for nausea 30 tablet 0     prochlorperazine (COMPAZINE) 10 MG tablet Take 1 tablet (10 mg) by mouth every 6 hours as needed (Nausea/Vomiting) 30 tablet 2     riboflavin (VITAMIN  B2) 25 MG TABS                  Physical Examination: ECOG 1    /75 (BP Location: Right arm, Patient Position: Sitting, Cuff Size: Adult Regular)   Pulse 114   Temp 98.6  F (37  C) (Oral)   Resp 16   Wt 58.1 kg (128 lb 1.6 oz)   SpO2 100%   BMI 22.69 kg/m        Wt Readings from Last 4 Encounters:    22 58.1 kg (128 lb 1.6 oz)   22 58.5 kg (129 lb)   22 58.9 kg (129 lb 12.8 oz)   22 58.7 kg (129 lb 8 oz)         ECO  General: She looks well, she was not examined in detail       Laboratory Data:     Latest Reference Range & Units 22 10:57 22 08:35   Sodium 133 - 144 mmol/L 143 142   Potassium 3.4 - 5.3 mmol/L 3.6 3.8   Chloride 94 - 109 mmol/L 107 110 (H)   Carbon Dioxide 20 - 32 mmol/L 26 25   Urea Nitrogen 7 - 30 mg/dL 5 (L) 5 (L)   Creatinine 0.52 - 1.04 mg/dL 0.59 0.65   GFR Estimate >60 mL/min/1.73m2 >90 >90   Calcium 8.5 - 10.1 mg/dL 8.6 8.9   Anion Gap 3 - 14 mmol/L 10 7   Albumin 3.4 - 5.0 g/dL 2.9 (L) 2.9 (L)   Protein Total 6.8 - 8.8 g/dL 6.6 (L) 6.6 (L)   Alkaline Phosphatase 40 - 150 U/L 80 84   ALT 0 - 50 U/L 17 15   AST 0 - 45 U/L 13 15   Bilirubin Total 0.2 - 1.3 mg/dL 0.2 0.3   Cortisol Serum   ug/dL 11.1    T4 Free 0.76 - 1.46 ng/dL 0.96    TSH 0.40 - 4.00 mU/L 1.66    Glucose 70 - 99 mg/dL 131 (H) 104 (H)   WBC 4.0 - 11.0 10e3/uL 7.4 8.1   Hemoglobin 11.7 - 15.7 g/dL 8.9 (L) 8.6 (L)   Hematocrit 35.0 - 47.0 % 28.3 (L) 27.3 (L)   Platelet Count 150 - 450 10e3/uL 246 327   RBC Count 3.80 - 5.20 10e6/uL 2.75 (L) 2.62 (L)   MCV 78 - 100 fL 103 (H) 104 (H)   MCH 26.5 - 33.0 pg 32.4 32.8   MCHC 31.5 - 36.5 g/dL 31.4 (L) 31.5   RDW 10.0 - 15.0 % 17.0 (H) 16.8 (H)   % Neutrophils % 78 81   % Lymphocytes % 11 8   % Monocytes % 8 9   % Eosinophils % 0 0   % Basophils % 0 0   Absolute Basophils 0.0 - 0.2 10e3/uL 0.0 0.0   (H): Data is abnormally high  (L): Data is abnormally low    I reviewed the above labs today.     PROBLEMS:    1. Right sided ER+, MD+, HER2-negative, MammaPrint high risk T=5.5cm, N=0 breast cancer. Treated on I-SPY with assigned arm of dostarlimab/oral paclitaxel. Carbo added week 3. Received ACx4. Pathologic staging showed pT2a. PN1a. ER+, MD+, HER21+, RCB=3   2. Uncertain menopausal status    IMPRESSION: The patient, her , and I had an  extensive discussion today regarding what happened to her and where to go next.  She does need to see Dr. Whitehead and discuss the appropriate interval and radiation therapy.    I explained to the patient and her  that estrogen receptor positive tumors are the least responsive to chemotherapy in the context of ice by 2.  This suggests that even though they are determined high risk by MammaPrint we need additional therapy including endocrine therapy.  This would involve ovarian suppression, an aromatase inhibitor, and in her case abemaciclib.  She had an initial VIRGIE 67 of greater than 20% which would qualify her for this regimen.    I told her we will do this sequentially.  First we will start with her on go Serlin to make sure she is breast.  We will then start the letrozole sometime towards the end of her radiation therapy and then add abemaciclib once we understand how she tolerates the letrozole.    PLAN:  1.  Will put in orders to start goserelin  2. Will set up appointment for about 2 months to begin letrozole and Zometa  3.  Has appointment with Dr. Whitehead in about 10 days  4.  Contacts us sooner as necessary    30 minutes spent on the date of the encounter doing chart review, review of test results, interpretation of tests, patient visit, documentation and discussion with family       Keegan Lockett MD

## 2022-09-09 NOTE — NURSING NOTE
Chief Complaint   Patient presents with     Oncology Clinic Visit     Rtn For Invasive Ductal Carcinoma of Right Breast, MRI Review     Blood Draw     Labs (including research labs) drawn with  by rn.  VS taken.     Labs drawn with  by rn.  Pt tolerated well.  VS taken.  Pt checked in for next appt.    Thuy Palafox RN

## 2022-09-19 ENCOUNTER — INFUSION THERAPY VISIT (OUTPATIENT)
Dept: ONCOLOGY | Facility: CLINIC | Age: 47
End: 2022-09-19
Attending: INTERNAL MEDICINE
Payer: OTHER GOVERNMENT

## 2022-09-19 ENCOUNTER — APPOINTMENT (OUTPATIENT)
Dept: LAB | Facility: CLINIC | Age: 47
End: 2022-09-19
Attending: INTERNAL MEDICINE
Payer: OTHER GOVERNMENT

## 2022-09-19 VITALS
TEMPERATURE: 97.9 F | BODY MASS INDEX: 22.46 KG/M2 | OXYGEN SATURATION: 99 % | HEART RATE: 78 BPM | WEIGHT: 126.8 LBS | DIASTOLIC BLOOD PRESSURE: 71 MMHG | RESPIRATION RATE: 16 BRPM | SYSTOLIC BLOOD PRESSURE: 122 MMHG

## 2022-09-19 DIAGNOSIS — C50.111 MALIGNANT NEOPLASM OF CENTRAL PORTION OF RIGHT BREAST IN FEMALE, ESTROGEN RECEPTOR POSITIVE (H): Primary | ICD-10-CM

## 2022-09-19 DIAGNOSIS — Z17.0 MALIGNANT NEOPLASM OF CENTRAL PORTION OF RIGHT BREAST IN FEMALE, ESTROGEN RECEPTOR POSITIVE (H): Primary | ICD-10-CM

## 2022-09-19 PROCEDURE — 250N000011 HC RX IP 250 OP 636: Performed by: INTERNAL MEDICINE

## 2022-09-19 PROCEDURE — 96402 CHEMO HORMON ANTINEOPL SQ/IM: CPT

## 2022-09-19 RX ADMIN — GOSERELIN ACETATE 3.6 MG: 3.6 IMPLANT SUBCUTANEOUS at 14:54

## 2022-09-19 ASSESSMENT — PAIN SCALES - GENERAL: PAINLEVEL: NO PAIN (0)

## 2022-09-19 NOTE — PROGRESS NOTES
Infusion Nursing Note:  Tori Steele presents today for cycle 1 day 1 zoladex.    Patient seen by provider today: No   present during visit today: Not Applicable.    Note: Pt comes to infusion today with no questions or concerns.  Pt has no pain today and denies any need for intervention at this appointment.  Pt has been afebrile and denies signs and symptoms of infection including: cough, SOB, sore throat, diarrhea, vomiting, rash, or pain with urination. Written materials provided for zoladex. Side effects reviewed. Pt verbalized understanding and wishes to proceed with today's planned treatment.    Intravenous Access:  No Intravenous access/labs at this visit.    Treatment Conditions:  Not Applicable.    Post Infusion Assessment:  Patient tolerated zoladex injection to LEFT abdomen without incident.     Discharge Plan:   Patient declined prescription refills.  Discharge instructions reviewed with: Patient.  Patient and/or family verbalized understanding of discharge instructions and all questions answered.  AVS to patient via [employer].  Patient will return 10/17/22 for next appointment.   Patient discharged in stable condition accompanied by: self.  Departure Mode: Ambulatory.      Emily Meese, RN

## 2022-09-19 NOTE — NURSING NOTE
Chief Complaint   Patient presents with     Lab Only     Vitals taken.     No labs ordered, per care coordinator no labs needed. Vitals taken and checked in for appts.    Jamila Pineda RN

## 2022-09-19 NOTE — PATIENT INSTRUCTIONS
[facility] Triage and after hours / weekends / holidays:  [phone]    Please call the triage or after hours line if you experience a temperature greater than or equal to 100.4, shaking chills, have uncontrolled nausea, vomiting and/or diarrhea, dizziness, shortness of breath, chest pain, bleeding, unexplained bruising, or if you have any other new/concerning symptoms, questions or concerns.      If you are having any concerning symptoms or wish to speak to a provider before your next infusion visit, please call your care coordinator or triage to notify them so we can adequately serve you.     If you need a refill on a narcotic prescription or other medication, please call before your infusion appointment.                September 2022 Sunday Monday Tuesday Wednesday Thursday Friday Saturday                       1     2     3       4     5     6     7     8     9    LAB CENTRAL  11:00 AM   (15 min.)    [facility]   [facility]    RETURN  11:15 AM   (30 min.)   Keegan Lockett MD   [facility] 10       11     12     13     14     15     16     17  Happy Birthday!       18     19    LAB CENTRAL   2:15 PM   (15 min.)   [facility]   [facility]    ONC INFUSION 1 HR (60 MIN)   3:00 PM   (60 min.)    ONC INFUSION NURSE   [facility] 20     21     22     23    RETURN  10:30 AM   (30 min.)   Katina Whitehead MD   [facility]    CT SIM  11:00 AM   (60 min.)   Katina Whitehead MD   [facility] 24       25     26     27     28     29     30                        October 2022 Sunday Monday Tuesday Wednesday Thursday Friday Saturday                                 1       2     3    LAB CENTRAL   2:30 PM   (15 min.)    [facility]   [facility]    RETURN   2:45 PM   (45 min.)   Milka Mota,  DERRICK   [facility] 4     5     6     7     8       9     10     11     12     13     14     15       16     17    ONC INFUSION 0.5 HR (30 MIN)  10:00 AM   (30 min.)    ONC INFUSION NURSE   [facility] 18     19     20     21     22       23     24     25     26     27     28     29       30     31                                                Lab Results:  No results found for this or any previous visit (from the past 12 hour(s)).

## 2022-09-22 NOTE — PROGRESS NOTES
Department of Radiation Oncology                   [place] [address] [address] [place]  [address]  Office:  [phone]  Fax:  [phone]   Radiation Oncology Clinic  [address] [place] [address]  Phone:  [phone]  Fax:  [phone]     RE: Tori Steele : 1975   MRN: [id number] WILVER: 2022     OUTPATIENT VISIT NOTE       PROBLEM: Invasive ductal carcinoma of the right breast, Byron grade 3, ER+/ME+/HER2-, cT3N0, s/p neoadjuvant chemotherapy, s/p [id number](sn)     was seen for initial consultation in the Dept of Therapeutic Radiology on 2022 at the request of Dr. Lockett.    HISTORY OF PRESENT ILLNESS: Ms. Steele is a 47 year old premenopausal female with a locally advanced RIGHT breast cancer who I initially met on 3/2/2022. Briefly, she had a screening mammogram on 2022 which showed new mass in her right breast with associated calcifications, confirmed by diagnostic mammogram and ultrasound. Biopsy revealed ([id number]) invasive ductal carcinoma, Byron grade 3, with associated DCIS, nuclear grade 3, cribriform and solid types with comedonecrosis. There was suspicious angiolymphatic invasion. Invasive component was ER positive (83%), ME positive (79%), HER-2 negative (IHC 1+), Ki-67 was 25% (15[facility]      Further evaluation with breast MRI showed non-mass enhancement measuring 5.5 cm extending from 12:00 anteriorly to 1:30 posteriorly. There were no morphologically abnormal lymph nodes. She established care with Dr. Lockett and Dr. Layton and enrolled in I-SPY-2 trial. Her MammaPrint came back as ultra high risk (index: -0.329), blueprint was luminal B. Systemic staging with CT Chest/Abdomen/Pelvis on 2022 showed no evidence of distant metastasis.        Ms. Steele was randomized to the oral Pacliaxel, Encequidar and Dostarlimab arm. She began systemic therapy on 2022. Week 6 MRI on 3/28/2022 showed the  suspicious enhancement to measure 4.6 x 1.9 cm. Given the slower than anticipated response, Carboplatin was added on 4/4/2022. She complete the first 12 weeks of therapy on 5/9/2022. MRI evaluation showed residual enhancement of 3.4 x 1.5 cm. US guided biopsy confirmed residual viable tumor. Thus she proceeded with dose dense AC and started this on 5/17/2022 and completed on 7/8/2022.     Post AC MRI on 7/21/2022 showed the residual enhancement of 1.5 x 1.7 cm with overall decreased intensity.     Ms. Steele proceeded with seed localized lumpectomy with mastopexy advancement flaps and right axillary SLN biopsy with Dr. Layton on 8/8/2022. Pathology revealed residual invasive ductal carcinoma with apocrine features, [place] grade 2, measuring 4.5 x 3.2 cm, with treatment effect. This was again ER+/CA+/HER2 nonamplified. There was associated DCIS,  Grade 3, estimated to be 4.5 cm, comprising 30% of the tumor cellularity. LVSI was present. Invasive carcinoma was 0.05 mm from the posteiror margin, 2 mm from the anterior margin, and positive at the suprrior, inferior, and medial margins. DCIS was 0.07 mm from the sueprior margin, 1 mm from the medial margin, 1.2 mm from the anterior margin. One of the two removed SLN contained a 4.5 mm metastatic carcinoma with 2 mm extranodal extension, ER+/CA+/HER2-, with no apparent treatment effect. Her RCB class was III.     She was taken back for re-excision on 8/29/2022. Excised superior, inferior and medial margins were all negative for atypica or malignancy.     She met with Dr. Lockett on 9/9/2022, who recommended ovarian suppression with Goserelin followed by Letrozole. She will also be put on Abemaciclib.     Ms. Steele is with her  today to discuss adjuvant radiation therapy. On interview, she states that she has recovered well from recent surgery. She has some tightness in her right shoulder but does not have any problem raising her arm above the head. She has [place]  Cruise to the [facility] around [facility], leaving [facility] on 11/19/2022. She has canceled this trip several times and is very much looking forward to it.       PAST MEDICAL HISTORY:   Past Medical History:   Diagnosis Date     Breast cancer (H) 01/2022     Sinus tachycardia         Past Surgical History:   Procedure Laterality Date     DILATION AND CURETTAGE       INSERT PORT VASCULAR ACCESS Left 2/18/2022    Procedure: INSERTION, VASCULAR ACCESS PORT;  Surgeon: Elliott Ramirez PA-C;  Location: [facility] OR     IR CHEST PORT PLACEMENT > 5 YRS OF AGE  2/18/2022     LUMPECTOMY BREAST Right 8/29/2022    Procedure: Re-excision of right lumpectomy site;  Surgeon: Gurpreet Layton MD;  Location: [facility] OR     LUMPECTOMY BREAST WITH SENTINEL NODE, COMBINED Right 8/8/2022    Procedure: RIGHT SEED LOCALIZED BREAST LUMPECTOMY  WITH SENTINEL LYMPH NODE BIOPSY;  Surgeon: Gurpreet Layton MD;  Location: [facility] OR     CHEMOTHERAPY HISTORY: I-SPY-2 trial neoadjuvant chemotherapy    PAST RADIATION THERAPY HISTORY: None    MEDICATIONS:   Current Outpatient Medications   Medication Sig Dispense Refill     acetaminophen (TYLENOL) 325 MG tablet Take 975 mg by mouth every 8 hours as needed for mild pain       lidocaine-prilocaine (EMLA) 2.5-2.5 % external cream Apply to port site one hour prior to access may start using six days after port placement. 30 g 1     LORazepam (ATIVAN) 1 MG tablet Take 1 tablet (1 mg) by mouth every 6 hours as needed for nausea 30 tablet 0     riboflavin (VITAMIN  B2) 25 MG TABS          ALLERGIES:  No Known Allergies.    SOCIAL HISTORY:   Tobacco: Never  Alcohol: Yes, 1 drink per week.    with 2 children, age 14 (daughter) and age 12 (son). Lives in [place]. Originally from [place].   Homemaker, previously worked as a speech therapist.      FAMILY HISTORY:   family history includes Breast Cancer in her maternal grandmother.   Maternal grandmother: breast cancer diagnosed in her 70s  No family  history of ovarian cancer  Breast Cancer STAT panel was negative for actionable mutations.        REVIEW OF SYMPTOMS:     , both vaginal deliveries. First full-term pregnancy at age 31.   Breast feeding x 6 months   Used oral contraceptive in the past, on and off for five years. No history of HRT.  Menarche at the age of 14.  She had FSH in 2021, which came back 33.8; she has regular menstruation. She has fibroids.      PHYSICAL EXAMINATION:    There were no vitals taken for this visit.   Gen: appears well  Resp: breathing comfortably on room air  CV: well perfused  Breast: well healed lumpectomy and axillary scars. No edema or erythema.   Axilla: no palpable axillary adenopathy  Neuro: grossly intact.     ASSESSMENT AND PLAN: In summary, Ms. Steele is a 46 yo premenopausal female with a cT3N0 invasive ductal carcinoma of the right breast, s/p neoadjuvant chemotherapy on I-SPY2 trial, s/p right lumpectomy and SLN biopsy. Her initial tumor was just over 5 cm, grade 3, ER+/CA+/HER2-. She did have some treatment response to chemotherapy, but still had 4.5 cm residual tumor in the breast with LVSI. One SLN had a 4.5 mm metastasis with MAGGIE. She had several positive margins, which required re-excision. The final closest margin was posterior margin of 0.05 mm.     We recommend a course of radiation therapy directed at her right breast, axilla level I, II, III, and SCV fossa. Given central location of the tumor and young age, we will also plan to include IM chain as long s the lung dose is not prohibitively high. We will treat the above field to 4240 cGy in 16 fractions followed by a boost of 12.5 to 15 Gy in 5 to 6 fractions to the lumpectomy cavity.     I discussed the logistics and the side effects of the treatment in detail. She will be simulated today and expect to start treatment in about a week. The schedule will allow her to complete her treatment by mid November, giving her some time to recuperate before  the planned cruise.      Thank you for allowing us to participate in this patient's care.  Please feel free to call with any questions or concerns.       Katina Whitehead M.D./Ph.D.  Radiation Oncologist   Department of Radiation Oncology  [facility]  Phone: [phone]       I reviewed patient's chart, internal/external medical records, imaging studies (including actual images), labs and pathology reports.  I interviewed and counseled the patient face to face.     60 minutes were spent on the date of the encounter doing chart review, history and exam, documentation and further activities as noted above.           Katina Whitehead MD

## 2022-09-23 ENCOUNTER — OFFICE VISIT (OUTPATIENT)
Dept: RADIATION ONCOLOGY | Facility: CLINIC | Age: 47
End: 2022-09-23
Attending: RADIOLOGY
Payer: OTHER GOVERNMENT

## 2022-09-23 DIAGNOSIS — Z17.0 MALIGNANT NEOPLASM OF CENTRAL PORTION OF RIGHT BREAST IN FEMALE, ESTROGEN RECEPTOR POSITIVE (H): Primary | ICD-10-CM

## 2022-09-23 DIAGNOSIS — C50.111 MALIGNANT NEOPLASM OF CENTRAL PORTION OF RIGHT BREAST IN FEMALE, ESTROGEN RECEPTOR POSITIVE (H): Primary | ICD-10-CM

## 2022-09-23 PROCEDURE — 77290 THER RAD SIMULAJ FIELD CPLX: CPT | Performed by: RADIOLOGY

## 2022-09-23 PROCEDURE — 77290 THER RAD SIMULAJ FIELD CPLX: CPT | Mod: 26 | Performed by: RADIOLOGY

## 2022-09-23 PROCEDURE — 77263 THER RADIOLOGY TX PLNG CPLX: CPT | Performed by: RADIOLOGY

## 2022-09-23 PROCEDURE — 77334 RADIATION TREATMENT AID(S): CPT | Mod: 26 | Performed by: RADIOLOGY

## 2022-09-23 PROCEDURE — 77334 RADIATION TREATMENT AID(S): CPT | Performed by: RADIOLOGY

## 2022-09-23 NOTE — PROGRESS NOTES
Radiation Therapy Patient Education    Person involved with teaching: Patient    Patient educational needs for self management of treatment-related side effects assessment completed.  [facility] Patient Ed tab contains Patient Learning Assessment    Education Materials Given  Skin Care During Radiation Treatment    Educational Topics Discussed  Side effects expected, Skin care and When to call MD/RN    Response To Teaching  Verbalizes understanding    GYN Only  Vaginal Dilator-given and educated: N/A    Referrals sent: None    Chemotherapy?  No

## 2022-09-23 NOTE — LETTER
9/23/2022         RE: Tori Steele  [address] [place] [address]        Dear Colleague,    Thank you for referring your patient, Tori Steele, to the [facility] RADIATION ONCOLOGY. Please see a copy of my visit note below.    Radiation Therapy Patient Education    Person involved with teaching: Patient    Patient educational needs for self management of treatment-related side effects assessment completed.  [facility] Patient Ed tab contains Patient Learning Assessment    Education Materials Given  Skin Care During Radiation Treatment    Educational Topics Discussed  Side effects expected, Skin care and When to call MD/RN    Response To Teaching  Verbalizes understanding    GYN Only  Vaginal Dilator-given and educated: N/A    Referrals sent: None    Chemotherapy?  No          Again, thank you for allowing me to participate in the care of your patient.        Sincerely,        Katina Whitehead MD

## 2022-09-23 NOTE — LETTER
2022         RE: Tori Steele  [address] [address]        Dear Colleague,    Thank you for referring your patient, Tori Steele, to the [facility] RADIATION ONCOLOGY. Please see a copy of my visit note below.    Department of Radiation Oncology                   [place] [address]  [address] [place]  [address]  Office:  [phone]  Fax:  [phone]   Radiation Oncology Clinic  [address] [place] [address]  Phone:  [phone]  Fax:  [phone]     RE: Tori Steele : 1975   MRN: [id number] WILVER: 2022     OUTPATIENT VISIT NOTE       PROBLEM: Invasive ductal carcinoma of the right breast, [place] grade 3, ER+/NE+/HER2-, cT3N0, s/p neoadjuvant chemotherapy, s/p [id number](sn)     was seen for initial consultation in the Dept of Therapeutic Radiology on 2022 at the request of Dr. Lockett.    HISTORY OF PRESENT ILLNESS: Ms. Steele is a 47 year old premenopausal female with a locally advanced RIGHT breast cancer who I initially met on 3/2/2022. Briefly, she had a screening mammogram on 2022 which showed new mass in her right breast with associated calcifications, confirmed by diagnostic mammogram and ultrasound. Biopsy revealed ([id number]) invasive ductal carcinoma, Byron grade 3, with associated DCIS, nuclear grade 3, cribriform and solid types with comedonecrosis. There was suspicious angiolymphatic invasion. Invasive component was ER positive (83%), NE positive (79%), HER-2 negative (IHC 1+), Ki-67 was 25% (15[facility]      Further evaluation with breast MRI showed non-mass enhancement measuring 5.5 cm extending from 12:00 anteriorly to 1:30 posteriorly. There were no morphologically abnormal lymph nodes. She established care with Dr. Lockett and Dr. Layton and enrolled in I-SPY-2 trial. Her MammaPrint came back as ultra high risk (index: -0.329), blueprint was luminal B. Systemic  staging with CT Chest/Abdomen/Pelvis on 2/4/2022 showed no evidence of distant metastasis.        Ms. Steele was randomized to the oral Pacliaxel, Encequidar and Dostarlimab arm. She began systemic therapy on 2/21/2022. Week 6 MRI on 3/28/2022 showed the suspicious enhancement to measure 4.6 x 1.9 cm. Given the slower than anticipated response, Carboplatin was added on 4/4/2022. She complete the first 12 weeks of therapy on 5/9/2022. MRI evaluation showed residual enhancement of 3.4 x 1.5 cm. US guided biopsy confirmed residual viable tumor. Thus she proceeded with dose dense AC and started this on 5/17/2022 and completed on 7/8/2022.     Post AC MRI on 7/21/2022 showed the residual enhancement of 1.5 x 1.7 cm with overall decreased intensity.     Ms. Steele proceeded with seed localized lumpectomy with mastopexy advancement flaps and right axillary SLN biopsy with Dr. Layton on 8/8/2022. Pathology revealed residual invasive ductal carcinoma with apocrine features, Byron grade 2, measuring 4.5 x 3.2 cm, with treatment effect. This was again ER+/[place]+/HER2 nonamplified. There was associated DCIS,  Grade 3, estimated to be 4.5 cm, comprising 30% of the tumor cellularity. LVSI was present. Invasive carcinoma was 0.05 mm from the posteiror margin, 2 mm from the anterior margin, and positive at the suprrior, inferior, and medial margins. DCIS was 0.07 mm from the sueprior margin, 1 mm from the medial margin, 1.2 mm from the anterior margin. One of the two removed SLN contained a 4.5 mm metastatic carcinoma with 2 mm extranodal extension, ER+/[place]+/HER2-, with no apparent treatment effect. Her RCB class was III.     She was taken back for re-excision on 8/29/2022. Excised superior, inferior and medial margins were all negative for atypica or malignancy.     She met with Dr. Lockett on 9/9/2022, who recommended ovarian suppression with Goserelin followed by Letrozole. She will also be put on Abemaciclib.     Ms. Steele is  with her  today to discuss adjuvant radiation therapy. On interview, she states that she has recovered well from recent surgery. She has some tightness in her right shoulder but does not have any problem raising her arm above the head. She has [place] Cruise to the [facility] around [facility], leaving [facility] on 11/19/2022. She has canceled this trip several times and is very much looking forward to it.       PAST MEDICAL HISTORY:   Past Medical History:   Diagnosis Date     Breast cancer (H) 01/2022     Sinus tachycardia         Past Surgical History:   Procedure Laterality Date     DILATION AND CURETTAGE       INSERT PORT VASCULAR ACCESS Left 2/18/2022    Procedure: INSERTION, VASCULAR ACCESS PORT;  Surgeon: Elliott Ramirez PA-C;  Location: [facility] OR     IR CHEST PORT PLACEMENT > 5 YRS OF AGE  2/18/2022     LUMPECTOMY BREAST Right 8/29/2022    Procedure: Re-excision of right lumpectomy site;  Surgeon: Gurpreet Layton MD;  Location: [facility] OR     LUMPECTOMY BREAST WITH SENTINEL NODE, COMBINED Right 8/8/2022    Procedure: RIGHT SEED LOCALIZED BREAST LUMPECTOMY  WITH SENTINEL LYMPH NODE BIOPSY;  Surgeon: Gurpreet Layton MD;  Location: [facility] OR     CHEMOTHERAPY HISTORY: I-SPY-2 trial neoadjuvant chemotherapy    PAST RADIATION THERAPY HISTORY: None    MEDICATIONS:   Current Outpatient Medications   Medication Sig Dispense Refill     acetaminophen (TYLENOL) 325 MG tablet Take 975 mg by mouth every 8 hours as needed for mild pain       lidocaine-prilocaine (EMLA) 2.5-2.5 % external cream Apply to port site one hour prior to access may start using six days after port placement. 30 g 1     LORazepam (ATIVAN) 1 MG tablet Take 1 tablet (1 mg) by mouth every 6 hours as needed for nausea 30 tablet 0     riboflavin (VITAMIN  B2) 25 MG TABS          ALLERGIES:  No Known Allergies.    SOCIAL HISTORY:   Tobacco: Never  Alcohol: Yes, 1 drink per week.    with 2 children, age 14 (daughter) and age 12 (son).  Lives in [place]. Originally from [place].   Homemaker, previously worked as a speech therapist.      FAMILY HISTORY:   family history includes Breast Cancer in her maternal grandmother.   Maternal grandmother: breast cancer diagnosed in her 70s  No family history of ovarian cancer  Breast Cancer STAT panel was negative for actionable mutations.        REVIEW OF SYMPTOMS:     , both vaginal deliveries. First full-term pregnancy at age 31.   Breast feeding x 6 months   Used oral contraceptive in the past, on and off for five years. No history of HRT.  Menarche at the age of 14.  She had FSH in 2021, which came back 33.8; she has regular menstruation. She has fibroids.      PHYSICAL EXAMINATION:    There were no vitals taken for this visit.   Gen: appears well  Resp: breathing comfortably on room air  CV: well perfused  Breast: well healed lumpectomy and axillary scars. No edema or erythema.   Axilla: no palpable axillary adenopathy  Neuro: grossly intact.     ASSESSMENT AND PLAN: In summary, Ms. Steele is a 48 yo premenopausal female with a cT3N0 invasive ductal carcinoma of the right breast, s/p neoadjuvant chemotherapy on I-SPY2 trial, s/p right lumpectomy and SLN biopsy. Her initial tumor was just over 5 cm, grade 3, ER+/AK+/HER2-. She did have some treatment response to chemotherapy, but still had 4.5 cm residual tumor in the breast with LVSI. One SLN had a 4.5 mm metastasis with MAGGIE. She had several positive margins, which required re-excision. The final closest margin was posterior margin of 0.05 mm.     We recommend a course of radiation therapy directed at her right breast, axilla level I, II, III, and SCV fossa. Given central location of the tumor and young age, we will also plan to include IM chain as long s the lung dose is not prohibitively high. We will treat the above field to 4240 cGy in 16 fractions followed by a boost of 12.5 to 15 Gy in 5 to 6 fractions to the lumpectomy cavity.     I  discussed the logistics and the side effects of the treatment in detail. She will be simulated today and expect to start treatment in about a week. The schedule will allow her to complete her treatment by mid November, giving her some time to recuperate before the planned cruise.      Thank you for allowing us to participate in this patient's care.  Please feel free to call with any questions or concerns.       Katina Whitehead M.D./Ph.D.  Radiation Oncologist   Department of Radiation Oncology  [facility]  Phone: [phone]       I reviewed patient's chart, internal/external medical records, imaging studies (including actual images), labs and pathology reports.  I interviewed and counseled the patient face to face.     60 minutes were spent on the date of the encounter doing chart review, history and exam, documentation and further activities as noted above.           Katina Whitehead MD

## 2022-09-24 ENCOUNTER — HEALTH MAINTENANCE LETTER (OUTPATIENT)
Age: 47
End: 2022-09-24

## 2022-09-30 ENCOUNTER — APPOINTMENT (OUTPATIENT)
Dept: RADIATION ONCOLOGY | Facility: CLINIC | Age: 47
End: 2022-09-30
Attending: RADIOLOGY
Payer: OTHER GOVERNMENT

## 2022-09-30 PROCEDURE — 77280 THER RAD SIMULAJ FIELD SMPL: CPT | Performed by: RADIOLOGY

## 2022-09-30 PROCEDURE — 77280 THER RAD SIMULAJ FIELD SMPL: CPT | Mod: 26 | Performed by: RADIOLOGY

## 2022-10-03 ENCOUNTER — APPOINTMENT (OUTPATIENT)
Dept: RADIATION ONCOLOGY | Facility: CLINIC | Age: 47
End: 2022-10-03
Attending: RADIOLOGY
Payer: OTHER GOVERNMENT

## 2022-10-03 ENCOUNTER — VIRTUAL VISIT (OUTPATIENT)
Dept: ONCOLOGY | Facility: CLINIC | Age: 47
End: 2022-10-03
Attending: PHYSICIAN ASSISTANT
Payer: OTHER GOVERNMENT

## 2022-10-03 ENCOUNTER — APPOINTMENT (OUTPATIENT)
Dept: LAB | Facility: CLINIC | Age: 47
End: 2022-10-03
Attending: PHYSICIAN ASSISTANT
Payer: OTHER GOVERNMENT

## 2022-10-03 VITALS
WEIGHT: 129.4 LBS | SYSTOLIC BLOOD PRESSURE: 106 MMHG | OXYGEN SATURATION: 99 % | RESPIRATION RATE: 16 BRPM | BODY MASS INDEX: 22.92 KG/M2 | DIASTOLIC BLOOD PRESSURE: 60 MMHG | HEART RATE: 107 BPM | TEMPERATURE: 98.8 F

## 2022-10-03 VITALS
SYSTOLIC BLOOD PRESSURE: 112 MMHG | HEART RATE: 62 BPM | WEIGHT: 129.5 LBS | BODY MASS INDEX: 22.94 KG/M2 | DIASTOLIC BLOOD PRESSURE: 74 MMHG

## 2022-10-03 DIAGNOSIS — C50.111 MALIGNANT NEOPLASM OF CENTRAL PORTION OF RIGHT BREAST IN FEMALE, ESTROGEN RECEPTOR POSITIVE (H): Primary | ICD-10-CM

## 2022-10-03 DIAGNOSIS — Z17.0 MALIGNANT NEOPLASM OF CENTRAL PORTION OF RIGHT BREAST IN FEMALE, ESTROGEN RECEPTOR POSITIVE (H): Primary | ICD-10-CM

## 2022-10-03 DIAGNOSIS — C50.911 INVASIVE DUCTAL CARCINOMA OF RIGHT BREAST (H): ICD-10-CM

## 2022-10-03 LAB
ALBUMIN SERPL BCG-MCNC: 3.8 G/DL (ref 3.5–5.2)
ALP SERPL-CCNC: 98 U/L (ref 35–104)
ALT SERPL W P-5'-P-CCNC: 10 U/L (ref 10–35)
ANION GAP SERPL CALCULATED.3IONS-SCNC: 8 MMOL/L (ref 7–15)
AST SERPL W P-5'-P-CCNC: 15 U/L (ref 10–35)
BASOPHILS # BLD AUTO: 0 10E3/UL (ref 0–0.2)
BASOPHILS NFR BLD AUTO: 0 %
BILIRUB SERPL-MCNC: 0.4 MG/DL
BUN SERPL-MCNC: 11 MG/DL (ref 6–20)
CALCIUM SERPL-MCNC: 9.2 MG/DL (ref 8.6–10)
CHLORIDE SERPL-SCNC: 105 MMOL/L (ref 98–107)
CREAT SERPL-MCNC: 0.62 MG/DL (ref 0.51–0.95)
DEPRECATED HCO3 PLAS-SCNC: 28 MMOL/L (ref 22–29)
EOSINOPHIL # BLD AUTO: 0.3 10E3/UL (ref 0–0.7)
EOSINOPHIL NFR BLD AUTO: 7 %
ERYTHROCYTE [DISTWIDTH] IN BLOOD BY AUTOMATED COUNT: 13.1 % (ref 10–15)
GFR SERPL CREATININE-BSD FRML MDRD: >90 ML/MIN/1.73M2
GLUCOSE SERPL-MCNC: 127 MG/DL (ref 70–99)
HCT VFR BLD AUTO: 35 % (ref 35–47)
HGB BLD-MCNC: 11.1 G/DL (ref 11.7–15.7)
IMM GRANULOCYTES # BLD: 0 10E3/UL
IMM GRANULOCYTES NFR BLD: 0 %
LYMPHOCYTES # BLD AUTO: 0.7 10E3/UL (ref 0.8–5.3)
LYMPHOCYTES NFR BLD AUTO: 19 %
MCH RBC QN AUTO: 28.8 PG (ref 26.5–33)
MCHC RBC AUTO-ENTMCNC: 31.7 G/DL (ref 31.5–36.5)
MCV RBC AUTO: 91 FL (ref 78–100)
MONOCYTES # BLD AUTO: 0.3 10E3/UL (ref 0–1.3)
MONOCYTES NFR BLD AUTO: 7 %
NEUTROPHILS # BLD AUTO: 2.7 10E3/UL (ref 1.6–8.3)
NEUTROPHILS NFR BLD AUTO: 67 %
NRBC # BLD AUTO: 0 10E3/UL
NRBC BLD AUTO-RTO: 0 /100
PLATELET # BLD AUTO: 227 10E3/UL (ref 150–450)
POTASSIUM SERPL-SCNC: 3.7 MMOL/L (ref 3.4–5.3)
PROT SERPL-MCNC: 6.7 G/DL (ref 6.4–8.3)
RBC # BLD AUTO: 3.85 10E6/UL (ref 3.8–5.2)
SODIUM SERPL-SCNC: 141 MMOL/L (ref 136–145)
WBC # BLD AUTO: 4 10E3/UL (ref 4–11)

## 2022-10-03 PROCEDURE — 77387 GUIDANCE FOR RADJ TX DLVR: CPT | Performed by: RADIOLOGY

## 2022-10-03 PROCEDURE — 77336 RADIATION PHYSICS CONSULT: CPT | Performed by: RADIOLOGY

## 2022-10-03 PROCEDURE — G0463 HOSPITAL OUTPT CLINIC VISIT: HCPCS | Mod: PN,RTG | Performed by: PHYSICIAN ASSISTANT

## 2022-10-03 PROCEDURE — 80053 COMPREHEN METABOLIC PANEL: CPT | Mod: GT | Performed by: PHYSICIAN ASSISTANT

## 2022-10-03 PROCEDURE — 85025 COMPLETE CBC W/AUTO DIFF WBC: CPT | Mod: GT | Performed by: PHYSICIAN ASSISTANT

## 2022-10-03 PROCEDURE — G6002 STEREOSCOPIC X-RAY GUIDANCE: HCPCS | Mod: 26 | Performed by: RADIOLOGY

## 2022-10-03 PROCEDURE — 36415 COLL VENOUS BLD VENIPUNCTURE: CPT | Performed by: PHYSICIAN ASSISTANT

## 2022-10-03 PROCEDURE — 36591 DRAW BLOOD OFF VENOUS DEVICE: CPT | Mod: GT | Performed by: PHYSICIAN ASSISTANT

## 2022-10-03 PROCEDURE — 99213 OFFICE O/P EST LOW 20 MIN: CPT | Mod: 95 | Performed by: PHYSICIAN ASSISTANT

## 2022-10-03 PROCEDURE — 77412 RADIATION TX DELIVERY LVL 3: CPT | Performed by: RADIOLOGY

## 2022-10-03 ASSESSMENT — PAIN SCALES - GENERAL: PAINLEVEL: NO PAIN (0)

## 2022-10-03 NOTE — NURSING NOTE
Chief Complaint   Patient presents with     Blood Draw     Labs drawn via  by RN. Vitals taken.     Labs collected from venipuncture by RN. Vitals taken.       Jamila Pineda RN

## 2022-10-03 NOTE — PROGRESS NOTES
Tori is a 47 year old who is being evaluated via a billable video visit.      How would you like to obtain your AVS? MyChart  If the video visit is dropped, the invitation should be resent by: Text to cell phone: [phone]  Will anyone else be joining your video visit? Yes: Summer - . How would they like to receive their invitation? Other e-mail: in person      Video-Visit Details    Video Start Time: 3:16 PM    Type of service:  Video Visit    Video End Time:3:33    Originating Location (pt. Location): Home    Distant Location (provider location):  [facility] CANCER [facility]     Platform used for Video Visit: Dharmesh Garcai    Oncology/Hematology Visit Note  Oct 3, 2022       Reason for Visit: follow up of breast cancer     History of Present Illness: Tori Steele is a 46 year old female with breat cancer. Right-sided 5.5 cm ER positive NH positive HER-2 negative breast cancer with associated DCIS.  Her MammaPrint came back as high risk.  She consented for the I-SPY clinical trial and has been randomized to the oral paclitaxel, Encequidar and dostarlimab arm.      2/21/22 started trial with oral paclitaxel, Encequidar and dostarlimab.    3/15/22  her IV immunotherapy dostarlimab was held due to diarrhea that recovered with Imodium and time  4/4/22   Carbo was added in weekly due in hopes for more significant reduction in cancer  5/17/22 AC start  7/8/22 AC End  8/8/2022 - R. Lumpectomy with SNLBX -               -INVASIVE BREAST CARCINOMA OF NO SPECIAL TYPE (INVASIVE DUCTAL CARCINOMA), with   apocrine features, SAMANTHA GRADE 2, size 45 x 32 mm, residual after neoadjuvant systemic therapy              -Ductal carcinoma in situ (DCIS), nuclear grade 3, cribriform and solid type(s), with focal necrosis              -DCIS is admixed with invasive carcinoma, and comprises 30% of the tumor cellularity              -Invasive carcinoma is estrogen receptor positive, progesterone  "receptor positive and HER2   negative (score 1+) by immunohistochemistry               -METASTATIC BREAST DUCTAL CARCINOMA in one of two lymph nodes (1/2), residual after            neoadjuvant    systemic therapy              -Extranodal extension present (size 2 mm)              -[facility] residual cancer burden is 3.284 (RCB Class III).   8/29/2022 - Resection of involved margins - now negative        Interval History:     Ms. Steele was seen over video today with her . She is doing really well.  She started radiation today and it was not as terrifying as she was thinking it would be.  She has her schedule planned out through the end of October.     She feels \"back to me\" and has been doing all her home activities with the children as she usually does, this has felt really good to her.  She feels the hardest part is now behind her.      She tolerated the Zoladex well, however on further questioning, she is having some mild light hot flashes, nothing like with initiation of chemo.  She also noted some stiffness in all her joints, this improves with walking/moving.      Sleep is good, mood is stable, no new /GI issues.  No fevers or infections.      SOCIAL HISTORY: She is planning a trip around [facility] to [place] to the [facility].  I told her we will not schedule any procedures around that time and she should not even think about canceling the trip.     ROS:  Patient denies the following: fevers, body aches, chills, headaches, vision changes, dizziness, chest pain, shortness of breath, nausea, vomiting, diarrhea, constipation, abdominal pain, rashes, bruising/bleeding, mouth sores, swelling or pain in the legs.      Currently not taking medications.     Physical Examination: ECOG 1     /75 (BP Location: Right arm, Patient Position: Sitting, Cuff Size: Adult Regular)   Pulse 114   Temp 98.6  F (37  C) (Oral)   Resp 16   Wt 58.1 kg (128 lb 1.6 oz)   SpO2 100%   BMI 22.69 kg/m  "             Wt Readings from Last 4 Encounters:   22 58.1 kg (128 lb 1.6 oz)   22 58.5 kg (129 lb)   22 58.9 kg (129 lb 12.8 oz)   22 58.7 kg (129 lb 8 oz)            ECO  Video physical exam  General: Patient appears well in no acute distress.   Skin: No visualized rash or lesions on visualized skin  Eyes: EOMI, no erythema, sclera icterus or discharge noted  Resp: Appears to be breathing comfortably without accessory muscle usage, speaking in full sentences, no cough  MSK: Appears to have normal range of motion based on visualized movements  Neurologic: No apparent tremors, facial movements symmetric  Psych: affect bright, alert and oriented    Laboratory Data:      10/03/22 14:00   Sodium 141   Potassium 3.7   Chloride 105   Carbon Dioxide (CO2) 28   Urea Nitrogen 11.0   Creatinine 0.62   GFR Estimate >90   Calcium 9.2   Anion Gap 8   Albumin 3.8   Protein Total 6.7   Alkaline Phosphatase 98   ALT 10   AST 15   Bilirubin Total 0.4   Glucose 127 (H)   WBC 4.0   Hemoglobin 11.1 (L)   Hematocrit 35.0   Platelet Count 227   RBC Count 3.85   MCV 91       I reviewed the above labs today.      PROBLEMS:     1. Right sided ER+, GA+, HER2-negative, MammaPrint high risk T=5.5cm, N=0 breast cancer. Treated on I-SPY with assigned arm of dostarlimab/oral paclitaxel. Carbo added week 3. Received ACx4. Pathologic staging showed pT2a. PN1a. ER+, GA+, HER21+, RCB=3   2. Uncertain menopausal status     IMPRESSION: Tori has started radiation and is physically and emotionally feeling well.  We discussed aggressive endocrine treatment.  This would involve ovarian suppression, an aromatase inhibitor, and in her case abemaciclib.  She had an initial VIRGIE 67 of greater than 20% which would qualify her for this regimen.     I told her we will do this sequentially.  She tolerated her Zoladex injection, this is likely contributing to her arthralgias, but at this time they are transient and improve with moving.        PLAN:  1.  Continue monthly Zoladex.  Will see her in November to start letrozole  2. Will reschedule Zometa for December, given it was scheduled the week of her vacation and she likely will get bone pains from it  3. Will start abemaciclib after the letrozole, assuming tolerability   4.  Contacts us sooner as necessary     30 minutes spent on the date of the encounter doing chart review, review of test results, interpretation of tests, patient visit, documentation and discussion with family     Janette Mota PA-C

## 2022-10-03 NOTE — LETTER
10/3/2022         RE: Tori Steele  [address] [facility] [address]        Dear Colleague,    Thank you for referring your patient, Tori Steele, to the [facility] RADIATION ONCOLOGY. Please see a copy of my visit note below.    [facility] PHYSICIANS  SPECIALIZING IN BREAKTHROUGHS  Radiation Oncology    On Treatment Visit Note      Tori Steele      Date: 10/3/2022   MRN: [id number]   : 1975  Diagnosis: Breast Cancer      Reason for Visit:  On Radiation Treatment Visit     Treatment Summary to Date   R Breast and Nodes Current Dose: 265/4240 cGy Fractions:       Chemotherapy  Chemo concurrent with radx?: No    ED Visit/Hosiptal Admission: None    Treatment Breaks: None    Subjective:  Ms. Steele tolerated the first treatment well. She has no questions or concerns.     Nursing ROS:   Nutrition Alteration  Diet Type: Patient's Preference  Skin  Skin Reaction: 0 - No changes  Skin Progress: Aquaphor            Psychosocial  Mood - Anxiety: 0 - Normal  Pain Assessment  0-10 Pain Scale: 0      Objective:   /74   Pulse 62   Wt 58.7 kg (129 lb 8 oz)   BMI 22.94 kg/m    Gen: Appears well, in no acute distress  Skin: No erythema    Labs:  CBC RESULTS: Recent Labs   Lab Test 10/03/22  1400   WBC 4.0   RBC 3.85   HGB 11.1*   HCT 35.0   MCV 91   MCH 28.8   MCHC 31.7   RDW 13.1        ELECTROLYTES:  Recent Labs   Lab Test 10/03/22  1400      POTASSIUM 3.7   CHLORIDE 105   JENNIE 9.2   CO2 28   BUN 11.0   CR 0.62   *       Assessment:    Tolerating radiation therapy well.  All questions and concerns addressed.    Toxicities:  Dermatitis: Grade 0: No toxicity    Plan:   Continue current therapy.    Discussed skin care. She is to apply Aquaphor twice daily to the treated area.     Mosaiq chart and setup information reviewed  Ports checked    Medication Review  Med list reviewed with patient?: Yes  Med list printed and given: Offered and  declined             Katina Whitehead MD/PhD  [phone] clinic  Pager [phone]    Please do not send letter to referring physician.        I saw and examined the patient with the resident.  I have reviewed and edited the resident's note and agree with the plan of care.      15 minutes were spent on the date of the encounter doing chart review, history and exam, documentation and further activities as noted above.         Katina Whitehead MD      Again, thank you for allowing me to participate in the care of your patient.        Sincerely,        Katina Whitehead MD

## 2022-10-03 NOTE — LETTER
Date:October 4, 2022      Provider requested that no letter be sent. Do not send.       [facility]

## 2022-10-04 ENCOUNTER — APPOINTMENT (OUTPATIENT)
Dept: RADIATION ONCOLOGY | Facility: CLINIC | Age: 47
End: 2022-10-04
Attending: RADIOLOGY
Payer: OTHER GOVERNMENT

## 2022-10-04 PROCEDURE — 77412 RADIATION TX DELIVERY LVL 3: CPT | Performed by: RADIOLOGY

## 2022-10-04 PROCEDURE — G6002 STEREOSCOPIC X-RAY GUIDANCE: HCPCS | Mod: 26 | Performed by: RADIOLOGY

## 2022-10-04 PROCEDURE — 77387 GUIDANCE FOR RADJ TX DLVR: CPT | Performed by: RADIOLOGY

## 2022-10-04 NOTE — PROGRESS NOTES
[facility] PHYSICIANS  SPECIALIZING IN BREAKTHROUGHS  Radiation Oncology    On Treatment Visit Note      Tori Steele      Date: 10/3/2022   MRN: [id number]   : 1975  Diagnosis: Breast Cancer      Reason for Visit:  On Radiation Treatment Visit     Treatment Summary to Date   R Breast and Nodes Current Dose: 265/4240 cGy Fractions:       Chemotherapy  Chemo concurrent with radx?: No    ED Visit/Hosiptal Admission: None    Treatment Breaks: None    Subjective:  Ms. Steele tolerated the first treatment well. She has no questions or concerns.     Nursing ROS:   Nutrition Alteration  Diet Type: Patient's Preference  Skin  Skin Reaction: 0 - No changes  Skin Progress: Aquaphor            Psychosocial  Mood - Anxiety: 0 - Normal  Pain Assessment  0-10 Pain Scale: 0      Objective:   /74   Pulse 62   Wt 58.7 kg (129 lb 8 oz)   BMI 22.94 kg/m    Gen: Appears well, in no acute distress  Skin: No erythema    Labs:  CBC RESULTS: Recent Labs   Lab Test 10/03/22  1400   WBC 4.0   RBC 3.85   HGB 11.1*   HCT 35.0   MCV 91   MCH 28.8   MCHC 31.7   RDW 13.1        ELECTROLYTES:  Recent Labs   Lab Test 10/03/22  1400      POTASSIUM 3.7   CHLORIDE 105   JENNIE 9.2   CO2 28   BUN 11.0   CR 0.62   *       Assessment:    Tolerating radiation therapy well.  All questions and concerns addressed.    Toxicities:  Dermatitis: Grade 0: No toxicity    Plan:   Continue current therapy.    Discussed skin care. She is to apply Aquaphor twice daily to the treated area.     Mosaiq chart and setup information reviewed  Ports checked    Medication Review  Med list reviewed with patient?: Yes  Med list printed and given: Offered and declined             Katina Whitehead MD/PhD  [phone] clinic  Pager [phone]    Please do not send letter to referring physician.        I saw and examined the patient with the resident.  I have reviewed and edited the resident's note and agree with the plan of  care.      15 minutes were spent on the date of the encounter doing chart review, history and exam, documentation and further activities as noted above.         Katina Whitehead MD

## 2022-10-05 ENCOUNTER — APPOINTMENT (OUTPATIENT)
Dept: RADIATION ONCOLOGY | Facility: CLINIC | Age: 47
End: 2022-10-05
Attending: RADIOLOGY
Payer: OTHER GOVERNMENT

## 2022-10-05 PROCEDURE — 77387 GUIDANCE FOR RADJ TX DLVR: CPT | Performed by: RADIOLOGY

## 2022-10-05 PROCEDURE — 77412 RADIATION TX DELIVERY LVL 3: CPT | Performed by: RADIOLOGY

## 2022-10-05 PROCEDURE — G6002 STEREOSCOPIC X-RAY GUIDANCE: HCPCS | Mod: 26 | Performed by: RADIOLOGY

## 2022-10-06 ENCOUNTER — APPOINTMENT (OUTPATIENT)
Dept: RADIATION ONCOLOGY | Facility: CLINIC | Age: 47
End: 2022-10-06
Attending: RADIOLOGY
Payer: OTHER GOVERNMENT

## 2022-10-06 PROCEDURE — 77387 GUIDANCE FOR RADJ TX DLVR: CPT | Performed by: RADIOLOGY

## 2022-10-06 PROCEDURE — G6002 STEREOSCOPIC X-RAY GUIDANCE: HCPCS | Mod: 26 | Performed by: RADIOLOGY

## 2022-10-06 PROCEDURE — 77412 RADIATION TX DELIVERY LVL 3: CPT | Performed by: RADIOLOGY

## 2022-10-07 ENCOUNTER — APPOINTMENT (OUTPATIENT)
Dept: RADIATION ONCOLOGY | Facility: CLINIC | Age: 47
End: 2022-10-07
Attending: RADIOLOGY
Payer: OTHER GOVERNMENT

## 2022-10-07 PROCEDURE — 77427 RADIATION TX MANAGEMENT X5: CPT | Performed by: RADIOLOGY

## 2022-10-07 PROCEDURE — 77387 GUIDANCE FOR RADJ TX DLVR: CPT | Performed by: RADIOLOGY

## 2022-10-07 PROCEDURE — 77412 RADIATION TX DELIVERY LVL 3: CPT | Performed by: RADIOLOGY

## 2022-10-07 PROCEDURE — G6002 STEREOSCOPIC X-RAY GUIDANCE: HCPCS | Mod: 26 | Performed by: RADIOLOGY

## 2022-10-10 ENCOUNTER — APPOINTMENT (OUTPATIENT)
Dept: RADIATION ONCOLOGY | Facility: CLINIC | Age: 47
End: 2022-10-10
Attending: RADIOLOGY
Payer: OTHER GOVERNMENT

## 2022-10-10 VITALS
OXYGEN SATURATION: 100 % | WEIGHT: 129.2 LBS | DIASTOLIC BLOOD PRESSURE: 67 MMHG | SYSTOLIC BLOOD PRESSURE: 116 MMHG | BODY MASS INDEX: 22.89 KG/M2 | HEART RATE: 103 BPM

## 2022-10-10 DIAGNOSIS — C50.111 MALIGNANT NEOPLASM OF CENTRAL PORTION OF RIGHT BREAST IN FEMALE, ESTROGEN RECEPTOR POSITIVE (H): Primary | ICD-10-CM

## 2022-10-10 DIAGNOSIS — Z17.0 MALIGNANT NEOPLASM OF CENTRAL PORTION OF RIGHT BREAST IN FEMALE, ESTROGEN RECEPTOR POSITIVE (H): Primary | ICD-10-CM

## 2022-10-10 PROCEDURE — 77387 GUIDANCE FOR RADJ TX DLVR: CPT | Performed by: RADIOLOGY

## 2022-10-10 PROCEDURE — G6002 STEREOSCOPIC X-RAY GUIDANCE: HCPCS | Mod: 26 | Performed by: RADIOLOGY

## 2022-10-10 PROCEDURE — 77412 RADIATION TX DELIVERY LVL 3: CPT | Performed by: RADIOLOGY

## 2022-10-10 PROCEDURE — 77336 RADIATION PHYSICS CONSULT: CPT | Performed by: RADIOLOGY

## 2022-10-10 NOTE — PROGRESS NOTES
[facility] PHYSICIANS  SPECIALIZING IN BREAKTHROUGHS  Radiation Oncology    On Treatment Visit Note      Tori Steele      Date: 10/10/2022  MRN: [id number]   : 1975  Diagnosis: Breast Cancer    Reason for Visit:  On Radiation Treatment Visit     Treatment Summary to Date  Treatment Site: Rt breast, Ax, IM + SCV Current Dose: 1590/4240 cGy Fractions:       Chemotherapy  Chemo concurrent with radx?: No    ED Visit/Hospital Admission: None    Treatment Breaks: None    Subjective:  Ms. Steele has not noticed any skin changes. She endorses an ache in her Right breast that was not present prior to radiation. She endorses fatigue that was worse at the end of last week.     Nursing ROS:   Nutrition Alteration  Diet Type: Patient's Preference  Skin  Skin Reaction: 0 - No changes  Skin Progress: Aquaphor      Psychosocial  Mood - Anxiety: 0 - Normal  Psychosocial Note: Feeling a little fatigue  Pain Assessment  0-10 Pain Scale: 0    Objective:   /67   Pulse 103   Wt 58.6 kg (129 lb 3.2 oz)   SpO2 100%   BMI 22.89 kg/m    Gen: Appears well, in no acute distress  Skin: erythema over nipple    Labs:  CBC RESULTS: Recent Labs   Lab Test 10/03/22  1400   WBC 4.0   RBC 3.85   HGB 11.1*   HCT 35.0   MCV 91   MCH 28.8   MCHC 31.7   RDW 13.1        ELECTROLYTES:  Recent Labs   Lab Test 10/03/22  1400      POTASSIUM 3.7   CHLORIDE 105   JENNIE 9.2   CO2 28   BUN 11.0   CR 0.62   *       Assessment:    Tolerating radiation therapy well.  All questions and concerns addressed.    Toxicities:  Dermatitis: Grade 1: Faint erythema or dry desquamation   Fatigue: Grade 1    Plan:   1. Continue current therapy.    2. Continue Aquaphor twice daily to the treated area.   3. Patient may use tylenol or ibuprofen for pain.     Tapgageiq chart and setup information reviewed  Ports checked    Medication Review  Med list reviewed with patient?: Yes  Med list printed and given: Offered and  declined       Tre Mancilla MD  PGY-2 Radiation Oncology  Department of Radiation Oncology  [facility] [place]  Phone: [phone]    I saw and examined the patient with the resident.  I have reviewed and edited the resident's note and agree with the plan of care.      15 minutes were spent on the date of the encounter doing chart review, history and exam, documentation and further activities as noted above.         Katina Whitehead MD

## 2022-10-10 NOTE — LETTER
Date:October 11, 2022      Provider requested that no letter be sent. Do not send.       [facility]

## 2022-10-10 NOTE — LETTER
10/10/2022         RE: Tori Steele  [address] [facility] [address]        Dear Colleague,    Thank you for referring your patient, Tori Steele, to the [facility] RADIATION ONCOLOGY. Please see a copy of my visit note below.    [facility] PHYSICIANS  SPECIALIZING IN BREAKTHROUGHS  Radiation Oncology    On Treatment Visit Note      Tori Steele      Date: 10/10/2022  MRN: [id number]   : 1975  Diagnosis: Breast Cancer    Reason for Visit:  On Radiation Treatment Visit     Treatment Summary to Date  Treatment Site: Rt breast, Ax, IM + SCV Current Dose: 1590/4240 cGy Fractions:       Chemotherapy  Chemo concurrent with radx?: No    ED Visit/Hospital Admission: None    Treatment Breaks: None    Subjective:  Ms. Steele has not noticed any skin changes. She endorses an ache in her Right breast that was not present prior to radiation. She endorses fatigue that was worse at the end of last week.     Nursing ROS:   Nutrition Alteration  Diet Type: Patient's Preference  Skin  Skin Reaction: 0 - No changes  Skin Progress: Aquaphor      Psychosocial  Mood - Anxiety: 0 - Normal  Psychosocial Note: Feeling a little fatigue  Pain Assessment  0-10 Pain Scale: 0    Objective:   /67   Pulse 103   Wt 58.6 kg (129 lb 3.2 oz)   SpO2 100%   BMI 22.89 kg/m    Gen: Appears well, in no acute distress  Skin: erythema over nipple    Labs:  CBC RESULTS: Recent Labs   Lab Test 10/03/22  1400   WBC 4.0   RBC 3.85   HGB 11.1*   HCT 35.0   MCV 91   MCH 28.8   MCHC 31.7   RDW 13.1        ELECTROLYTES:  Recent Labs   Lab Test 10/03/22  1400      POTASSIUM 3.7   CHLORIDE 105   JENNIE 9.2   CO2 28   BUN 11.0   CR 0.62   *       Assessment:    Tolerating radiation therapy well.  All questions and concerns addressed.    Toxicities:  Dermatitis: Grade 1: Faint erythema or dry desquamation   Fatigue: Grade 1    Plan:   1. Continue current therapy.    2. Continue  Aquaphor twice daily to the treated area.   3. Patient may use tylenol or ibuprofen for pain.     Mosaiq chart and setup information reviewed  Ports checked    Medication Review  Med list reviewed with patient?: Yes  Med list printed and given: Offered and declined       Tre Mancilla MD  PGY-2 Radiation Oncology  Department of Radiation Oncology  [facility]  Phone: [phone]    I saw and examined the patient with the resident.  I have reviewed and edited the resident's note and agree with the plan of care.      15 minutes were spent on the date of the encounter doing chart review, history and exam, documentation and further activities as noted above.         Katina Whitehead MD      Again, thank you for allowing me to participate in the care of your patient.        Sincerely,        Katina Whitehead MD

## 2022-10-11 ENCOUNTER — APPOINTMENT (OUTPATIENT)
Dept: RADIATION ONCOLOGY | Facility: CLINIC | Age: 47
End: 2022-10-11
Attending: RADIOLOGY
Payer: OTHER GOVERNMENT

## 2022-10-11 PROCEDURE — 77387 GUIDANCE FOR RADJ TX DLVR: CPT | Performed by: RADIOLOGY

## 2022-10-11 PROCEDURE — G6002 STEREOSCOPIC X-RAY GUIDANCE: HCPCS | Mod: 26 | Performed by: RADIOLOGY

## 2022-10-11 PROCEDURE — 77412 RADIATION TX DELIVERY LVL 3: CPT | Performed by: RADIOLOGY

## 2022-10-12 ENCOUNTER — APPOINTMENT (OUTPATIENT)
Dept: RADIATION ONCOLOGY | Facility: CLINIC | Age: 47
End: 2022-10-12
Attending: RADIOLOGY
Payer: OTHER GOVERNMENT

## 2022-10-12 PROCEDURE — 77412 RADIATION TX DELIVERY LVL 3: CPT | Performed by: RADIOLOGY

## 2022-10-12 PROCEDURE — G6002 STEREOSCOPIC X-RAY GUIDANCE: HCPCS | Mod: 26 | Performed by: RADIOLOGY

## 2022-10-12 PROCEDURE — 77387 GUIDANCE FOR RADJ TX DLVR: CPT | Performed by: RADIOLOGY

## 2022-10-13 ENCOUNTER — APPOINTMENT (OUTPATIENT)
Dept: RADIATION ONCOLOGY | Facility: CLINIC | Age: 47
End: 2022-10-13
Attending: RADIOLOGY
Payer: OTHER GOVERNMENT

## 2022-10-13 PROCEDURE — G6002 STEREOSCOPIC X-RAY GUIDANCE: HCPCS | Mod: 26 | Performed by: RADIOLOGY

## 2022-10-13 PROCEDURE — 77387 GUIDANCE FOR RADJ TX DLVR: CPT | Performed by: RADIOLOGY

## 2022-10-13 PROCEDURE — 77412 RADIATION TX DELIVERY LVL 3: CPT | Performed by: RADIOLOGY

## 2022-10-14 ENCOUNTER — APPOINTMENT (OUTPATIENT)
Dept: RADIATION ONCOLOGY | Facility: CLINIC | Age: 47
End: 2022-10-14
Attending: RADIOLOGY
Payer: OTHER GOVERNMENT

## 2022-10-14 DIAGNOSIS — C50.111 MALIGNANT NEOPLASM OF CENTRAL PORTION OF RIGHT BREAST IN FEMALE, ESTROGEN RECEPTOR POSITIVE (H): Primary | ICD-10-CM

## 2022-10-14 DIAGNOSIS — Z17.0 MALIGNANT NEOPLASM OF CENTRAL PORTION OF RIGHT BREAST IN FEMALE, ESTROGEN RECEPTOR POSITIVE (H): Primary | ICD-10-CM

## 2022-10-14 PROCEDURE — 77427 RADIATION TX MANAGEMENT X5: CPT | Mod: GC | Performed by: RADIOLOGY

## 2022-10-14 PROCEDURE — 77387 GUIDANCE FOR RADJ TX DLVR: CPT | Performed by: RADIOLOGY

## 2022-10-14 PROCEDURE — 77412 RADIATION TX DELIVERY LVL 3: CPT | Performed by: RADIOLOGY

## 2022-10-14 RX ORDER — LOPERAMIDE HCL 2 MG
CAPSULE ORAL
Qty: 30 CAPSULE | Refills: 0 | Status: SHIPPED | OUTPATIENT
Start: 2022-10-14 | End: 2023-04-03

## 2022-10-14 RX ORDER — PROCHLORPERAZINE MALEATE 10 MG
10 TABLET ORAL EVERY 6 HOURS PRN
Qty: 30 TABLET | Refills: 2 | Status: SHIPPED | OUTPATIENT
Start: 2022-10-14 | End: 2023-04-03

## 2022-10-17 ENCOUNTER — OFFICE VISIT (OUTPATIENT)
Dept: RADIATION ONCOLOGY | Facility: CLINIC | Age: 47
End: 2022-10-17
Attending: RADIOLOGY
Payer: OTHER GOVERNMENT

## 2022-10-17 ENCOUNTER — INFUSION THERAPY VISIT (OUTPATIENT)
Dept: ONCOLOGY | Facility: CLINIC | Age: 47
End: 2022-10-17
Attending: INTERNAL MEDICINE
Payer: OTHER GOVERNMENT

## 2022-10-17 VITALS
OXYGEN SATURATION: 99 % | DIASTOLIC BLOOD PRESSURE: 49 MMHG | HEART RATE: 98 BPM | TEMPERATURE: 98.1 F | RESPIRATION RATE: 16 BRPM | SYSTOLIC BLOOD PRESSURE: 113 MMHG

## 2022-10-17 VITALS — BODY MASS INDEX: 22.98 KG/M2 | WEIGHT: 129.7 LBS

## 2022-10-17 DIAGNOSIS — C50.111 MALIGNANT NEOPLASM OF CENTRAL PORTION OF RIGHT BREAST IN FEMALE, ESTROGEN RECEPTOR POSITIVE (H): Primary | ICD-10-CM

## 2022-10-17 DIAGNOSIS — Z17.0 MALIGNANT NEOPLASM OF CENTRAL PORTION OF RIGHT BREAST IN FEMALE, ESTROGEN RECEPTOR POSITIVE (H): Primary | ICD-10-CM

## 2022-10-17 DIAGNOSIS — C50.111 MALIGNANT NEOPLASM OF CENTRAL PORTION OF RIGHT BREAST IN FEMALE, ESTROGEN RECEPTOR POSITIVE (H): ICD-10-CM

## 2022-10-17 DIAGNOSIS — Z17.0 MALIGNANT NEOPLASM OF CENTRAL PORTION OF RIGHT BREAST IN FEMALE, ESTROGEN RECEPTOR POSITIVE (H): ICD-10-CM

## 2022-10-17 DIAGNOSIS — C50.911 INVASIVE DUCTAL CARCINOMA OF RIGHT BREAST (H): Primary | ICD-10-CM

## 2022-10-17 PROCEDURE — 250N000011 HC RX IP 250 OP 636: Performed by: INTERNAL MEDICINE

## 2022-10-17 PROCEDURE — G6002 STEREOSCOPIC X-RAY GUIDANCE: HCPCS | Mod: 26 | Performed by: RADIOLOGY

## 2022-10-17 PROCEDURE — 96402 CHEMO HORMON ANTINEOPL SQ/IM: CPT

## 2022-10-17 PROCEDURE — 77336 RADIATION PHYSICS CONSULT: CPT | Performed by: RADIOLOGY

## 2022-10-17 PROCEDURE — 77387 GUIDANCE FOR RADJ TX DLVR: CPT | Performed by: RADIOLOGY

## 2022-10-17 PROCEDURE — 77412 RADIATION TX DELIVERY LVL 3: CPT | Performed by: RADIOLOGY

## 2022-10-17 RX ADMIN — GOSERELIN ACETATE 3.6 MG: 3.6 IMPLANT SUBCUTANEOUS at 10:34

## 2022-10-17 NOTE — LETTER
10/17/2022         RE: Tori Steele  [address] [facility] [place] [address]        Dear Colleague,    Thank you for referring your patient, Tori Steele, to the [facility] RADIATION ONCOLOGY. Please see a copy of my visit note below.    [facility] PHYSICIANS  SPECIALIZING IN BREAKTHROUGHS  Radiation Oncology    On Treatment Visit Note      Tori Steele      Date: 10/17/2022  MRN: [id number]   : 1975  Diagnosis: Breast Cancer    Reason for Visit:  On Radiation Treatment Visit     Treatment Summary to Date  Treatment Site: Rt breast, Ax, IM + SCV Current Dose: 2915 cGy Fractions:       Chemotherapy  Chemo concurrent with radx?: No  Oncologist: Dr. Lockett  Drug Name/Frequency 1: Zoladex shaina    ED Visit/Hospital Admission: None    Treatment Breaks: None    Subjective:  Ms. Steele reports her Right breast and axilla are noticeably darker than her Left breast. There is no pain or irritation. 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     Nursing ROS:   Nutrition Alteration  Diet Type: Patient's Preference  Nutrition Note: appetite good  Skin  Skin Reaction: 1 - Faint erythema or dry desquamation  Skin Progress: Aquaphor        Cardiovascular  Respiratory effort: 1 - Normal - without distress        Psychosocial  Mood - Anxiety: 0 - Normal  Psychosocial Note: Feeling a little fatigue  Pain Assessment  0-10 Pain Scale: 0    Objective:   Wt 58.8 kg (129 lb 11.2 oz)   BMI 22.98 kg/m    Gen: Appears well, in no acute distress  Skin: erythema over nipple    Labs:  CBC RESULTS: Recent Labs   Lab Test 10/03/22  1400   WBC 4.0   RBC 3.85   HGB 11.1*   HCT 35.0   MCV 91   MCH 28.8   MCHC 31.7   RDW 13.1        ELECTROLYTES:  Recent Labs   Lab Test 10/03/22  1400      POTASSIUM 3.7   CHLORIDE 105   JENNIE 9.2   CO2 28   BUN 11.0   CR 0.62   *       Assessment:    Tolerating radiation therapy well.  All questions and concerns  addressed.    Toxicities:  Dermatitis: Grade 1: Faint erythema or dry desquamation   Fatigue: Grade 1    Plan:    1. Continue current therapy.    2. Continue Aquaphor twice daily to the treated area.   3. Use of Mepilex counseling provided    Mosaiq chart and setup information reviewed  Ports checked    Medication Review  Med list reviewed with patient?: Yes  Med list printed and given: Offered and declined       Tre Mancilla MD  PGY-2 Radiation Oncology  Department of Radiation Oncology  [facility]  Phone: [phone]      I saw and examined the patient with the resident.  I have reviewed and edited the resident's note and agree with the plan of care.      15 minutes were spent on the date of the encounter doing chart review, history and exam, documentation and further activities as noted above.       Katina Whitehead MD

## 2022-10-17 NOTE — PROGRESS NOTES
[facility] PHYSICIANS  SPECIALIZING IN BREAKTHROUGHS  Radiation Oncology    On Treatment Visit Note      Tori Steele      Date: 10/17/2022  MRN: [id number]   : 1975  Diagnosis: Breast Cancer    Reason for Visit:  On Radiation Treatment Visit     Treatment Summary to Date  Treatment Site: Rt breast, Ax, IM + SCV Current Dose: 2915 cGy Fractions:       Chemotherapy  Chemo concurrent with radx?: No  Oncologist: Dr. Lockett  Drug Name/Frequency 1: Zoladex shaina    ED Visit/Hospital Admission: None    Treatment Breaks: None    Subjective:  Ms. Steele reports her Right breast and axilla are noticeably darker than her Left breast. There is no pain or irritation. 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     Nursing ROS:   Nutrition Alteration  Diet Type: Patient's Preference  Nutrition Note: appetite good  Skin  Skin Reaction: 1 - Faint erythema or dry desquamation  Skin Progress: Aquaphor        Cardiovascular  Respiratory effort: 1 - Normal - without distress        Psychosocial  Mood - Anxiety: 0 - Normal  Psychosocial Note: Feeling a little fatigue  Pain Assessment  0-10 Pain Scale: 0    Objective:   Wt 58.8 kg (129 lb 11.2 oz)   BMI 22.98 kg/m    Gen: Appears well, in no acute distress  Skin: erythema over nipple    Labs:  CBC RESULTS: Recent Labs   Lab Test 10/03/22  1400   WBC 4.0   RBC 3.85   HGB 11.1*   HCT 35.0   MCV 91   MCH 28.8   MCHC 31.7   RDW 13.1        ELECTROLYTES:  Recent Labs   Lab Test 10/03/22  1400      POTASSIUM 3.7   CHLORIDE 105   JENNIE 9.2   CO2 28   BUN 11.0   CR 0.62   *       Assessment:    Tolerating radiation therapy well.  All questions and concerns addressed.    Toxicities:  Dermatitis: Grade 1: Faint erythema or dry desquamation   Fatigue: Grade 1    Plan:    1. Continue current therapy.    2. Continue Aquaphor twice daily to the treated area.   3. Use of Mepilex counseling provided    Mosaiq chart and setup  information reviewed  Ports checked    Medication Review  Med list reviewed with patient?: Yes  Med list printed and given: Offered and declined       Tre Mancilla MD  PGY-2 Radiation Oncology  Department of Radiation Oncology  [facility]  Phone: [phone]      I saw and examined the patient with the resident.  I have reviewed and edited the resident's note and agree with the plan of care.        15 minutes were spent on the date of the encounter doing chart review, history and exam, documentation and further activities as noted above.           Katina Whitehead MD

## 2022-10-17 NOTE — PATIENT INSTRUCTIONS
[facility] Triage and after hours / weekends / holidays:  [phone]    Please call the triage or after hours line if you experience a temperature greater than or equal to 100.4, shaking chills, have uncontrolled nausea, vomiting and/or diarrhea, dizziness, shortness of breath, chest pain, bleeding, unexplained bruising, or if you have any other new/concerning symptoms, questions or concerns.      If you are having any concerning symptoms or wish to speak to a provider before your next infusion visit, please call your care coordinator or triage to notify them so we can adequately serve you.     If you need a refill on a narcotic prescription or other medication, please call before your infusion appointment.           Your upcoming schedule will be:    11/7- change to 11/8 video  11/14- Zoladex only (changing Zometa day)  12/12- labs, pam, zoladex and zometa  1/6- video with dR Lockett  1/9- labs, zoladex  2/6- labs, pam, zoladex

## 2022-10-17 NOTE — PROGRESS NOTES
Infusion Nursing Note:  Tori Steele presents today for Zoladex.    Patient seen by provider today: No   present during visit today: Not Applicable.    Note: Tori denied fevers, chills, cough, SOB and chest pain. No constipation/diarrhea. No pain today. Offered no questions or concerns during her visit today.    Intravenous Access:  No Intravenous access/labs at this visit.    Treatment Conditions:  Not Applicable.    Post Infusion Assessment:  Patient tolerated zoladex injection to right abdomen without incident.     Discharge Plan:   Patient declined prescription refills.  Discharge instructions reviewed with: Patient.  Patient and/or family verbalized understanding of discharge instructions and all questions answered.  AVS to patient via Specpage[employer].  Patient will return 11/14 for next appointment.   Patient discharged in stable condition accompanied by: self.  Departure Mode: Ambulatory.  Face to Face time: 0.      Kimberly Carrasco RN

## 2022-10-18 ENCOUNTER — APPOINTMENT (OUTPATIENT)
Dept: RADIATION ONCOLOGY | Facility: CLINIC | Age: 47
End: 2022-10-18
Attending: RADIOLOGY
Payer: OTHER GOVERNMENT

## 2022-10-18 PROCEDURE — 77321 SPECIAL TELETX PORT PLAN: CPT | Mod: 26 | Performed by: RADIOLOGY

## 2022-10-18 PROCEDURE — 77334 RADIATION TREATMENT AID(S): CPT | Mod: 26 | Performed by: RADIOLOGY

## 2022-10-18 PROCEDURE — 77412 RADIATION TX DELIVERY LVL 3: CPT | Performed by: RADIOLOGY

## 2022-10-18 PROCEDURE — 77307 TELETHX ISODOSE PLAN CPLX: CPT | Performed by: RADIOLOGY

## 2022-10-18 PROCEDURE — 77387 GUIDANCE FOR RADJ TX DLVR: CPT | Performed by: RADIOLOGY

## 2022-10-18 PROCEDURE — 77334 RADIATION TREATMENT AID(S): CPT | Performed by: RADIOLOGY

## 2022-10-18 PROCEDURE — 77321 SPECIAL TELETX PORT PLAN: CPT | Performed by: RADIOLOGY

## 2022-10-19 ENCOUNTER — APPOINTMENT (OUTPATIENT)
Dept: RADIATION ONCOLOGY | Facility: CLINIC | Age: 47
End: 2022-10-19
Attending: RADIOLOGY
Payer: OTHER GOVERNMENT

## 2022-10-19 PROCEDURE — 77280 THER RAD SIMULAJ FIELD SMPL: CPT | Mod: 26 | Performed by: RADIOLOGY

## 2022-10-19 PROCEDURE — 77280 THER RAD SIMULAJ FIELD SMPL: CPT | Performed by: RADIOLOGY

## 2022-10-19 PROCEDURE — 77412 RADIATION TX DELIVERY LVL 3: CPT | Performed by: RADIOLOGY

## 2022-10-20 ENCOUNTER — APPOINTMENT (OUTPATIENT)
Dept: RADIATION ONCOLOGY | Facility: CLINIC | Age: 47
End: 2022-10-20
Attending: RADIOLOGY
Payer: OTHER GOVERNMENT

## 2022-10-20 PROCEDURE — G6002 STEREOSCOPIC X-RAY GUIDANCE: HCPCS | Mod: 26 | Performed by: RADIOLOGY

## 2022-10-20 PROCEDURE — 77387 GUIDANCE FOR RADJ TX DLVR: CPT | Performed by: RADIOLOGY

## 2022-10-20 PROCEDURE — 77412 RADIATION TX DELIVERY LVL 3: CPT | Performed by: RADIOLOGY

## 2022-10-21 ENCOUNTER — APPOINTMENT (OUTPATIENT)
Dept: RADIATION ONCOLOGY | Facility: CLINIC | Age: 47
End: 2022-10-21
Attending: RADIOLOGY
Payer: OTHER GOVERNMENT

## 2022-10-21 PROCEDURE — 77412 RADIATION TX DELIVERY LVL 3: CPT | Performed by: RADIOLOGY

## 2022-10-21 PROCEDURE — 77427 RADIATION TX MANAGEMENT X5: CPT | Mod: GC | Performed by: RADIOLOGY

## 2022-10-21 PROCEDURE — 77387 GUIDANCE FOR RADJ TX DLVR: CPT | Performed by: RADIOLOGY

## 2022-10-24 ENCOUNTER — ALLIED HEALTH/NURSE VISIT (OUTPATIENT)
Dept: RADIATION ONCOLOGY | Facility: CLINIC | Age: 47
End: 2022-10-24
Attending: RADIOLOGY
Payer: OTHER GOVERNMENT

## 2022-10-24 VITALS
DIASTOLIC BLOOD PRESSURE: 63 MMHG | HEART RATE: 85 BPM | BODY MASS INDEX: 22.94 KG/M2 | OXYGEN SATURATION: 100 % | SYSTOLIC BLOOD PRESSURE: 118 MMHG | WEIGHT: 129.5 LBS

## 2022-10-24 DIAGNOSIS — C50.111 MALIGNANT NEOPLASM OF CENTRAL PORTION OF RIGHT BREAST IN FEMALE, ESTROGEN RECEPTOR POSITIVE (H): Primary | ICD-10-CM

## 2022-10-24 DIAGNOSIS — Z17.0 MALIGNANT NEOPLASM OF CENTRAL PORTION OF RIGHT BREAST IN FEMALE, ESTROGEN RECEPTOR POSITIVE (H): Primary | ICD-10-CM

## 2022-10-24 PROCEDURE — 77412 RADIATION TX DELIVERY LVL 3: CPT | Performed by: RADIOLOGY

## 2022-10-24 PROCEDURE — G6002 STEREOSCOPIC X-RAY GUIDANCE: HCPCS | Mod: 26 | Performed by: RADIOLOGY

## 2022-10-24 PROCEDURE — 77336 RADIATION PHYSICS CONSULT: CPT | Performed by: RADIOLOGY

## 2022-10-24 PROCEDURE — 77387 GUIDANCE FOR RADJ TX DLVR: CPT | Performed by: RADIOLOGY

## 2022-10-24 NOTE — LETTER
10/24/2022         RE: Tori Steele  [address] [facility] [address]        Dear Colleague,    Thank you for referring your patient, Tori Steele, to the [facility] RADIATION ONCOLOGY. Please see a copy of my visit note below.    [facility] PHYSICIANS  SPECIALIZING IN BREAKTHROUGHS  Radiation Oncology    On Treatment Visit Note      Tori Steele      Date: 10/24/2022   MRN: [id number]   : 1975         Reason for Visit:  On Radiation Treatment Visit     Treatment Summary to Date  Treatment Site: Rt breast, Ax, IM + SCV   Current Dose: 4240 cGy/4240 cGy +  cGy   Fractions:           ED Visit/Hosiptal Admission: none    Treatment Breaks: None      Subjective:   Tori is starting to feel irritations in her axilla, but not to the point of requiring ay pain medication. She otherwise is feeling about the same as last week.        Objective:   There were no vitals taken for this visit.  Gen: Appears well, in no acute distress  Skin: Hyperpigmentation of the right breast and axilla. No breakdown.     Labs:  CBC RESULTS: Recent Labs   Lab Test 10/03/22  1400   WBC 4.0   RBC 3.85   HGB 11.1*   HCT 35.0   MCV 91   MCH 28.8   MCHC 31.7   RDW 13.1        ELECTROLYTES:  Recent Labs   Lab Test 10/03/22  1400      POTASSIUM 3.7   CHLORIDE 105   JENNIE 9.2   CO2 28   BUN 11.0   CR 0.62   *       Assessment:    Tolerating radiation therapy well.  All questions and concerns addressed.    Toxicities:  Fatigue: Grade 1: Fatigue relieved by rest  Dermatitis: Grade 1: Faint erythema or dry desquamation    Plan:   1. Continue current therapy.    2. May take Ibuprofen or Tylenol for pain in the axilla.  3. Discussed upcoming boost treatment.       Neumitraiq chart and setup information reviewed  Ports checked      Katina Whitehead MD/PhD  [phone] clinic  Pager [phone]    Please do not send letter to referring physician.        15 minutes were spent on  the date of the encounter doing chart review, history and exam, documentation and further activities as noted above.         Katina Whitehead MD      Again, thank you for allowing me to participate in the care of your patient.        Sincerely,        Katina Whitehead MD

## 2022-10-24 NOTE — PROGRESS NOTES
[facility] PHYSICIANS  SPECIALIZING IN BREAKTHROUGHS  Radiation Oncology    On Treatment Visit Note      Tori Steele      Date: 10/24/2022   MRN: [id number]   : 1975         Reason for Visit:  On Radiation Treatment Visit     Treatment Summary to Date  Treatment Site: Rt breast, Ax, IM + SCV   Current Dose: 4240 cGy/4240 cGy +  cGy   Fractions:           ED Visit/Hosiptal Admission: none    Treatment Breaks: None      Subjective:   Tori is starting to feel irritations in her axilla, but not to the point of requiring ay pain medication. She otherwise is feeling about the same as last week.        Objective:   There were no vitals taken for this visit.  Gen: Appears well, in no acute distress  Skin: Hyperpigmentation of the right breast and axilla. No breakdown.     Labs:  CBC RESULTS: Recent Labs   Lab Test 10/03/22  1400   WBC 4.0   RBC 3.85   HGB 11.1*   HCT 35.0   MCV 91   MCH 28.8   MCHC 31.7   RDW 13.1        ELECTROLYTES:  Recent Labs   Lab Test 10/03/22  1400      POTASSIUM 3.7   CHLORIDE 105   JENNIE 9.2   CO2 28   BUN 11.0   CR 0.62   *       Assessment:    Tolerating radiation therapy well.  All questions and concerns addressed.    Toxicities:  Fatigue: Grade 1: Fatigue relieved by rest  Dermatitis: Grade 1: Faint erythema or dry desquamation    Plan:   1. Continue current therapy.    2. May take Ibuprofen or Tylenol for pain in the axilla.  3. Discussed upcoming boost treatment.       Mosaiq chart and setup information reviewed  Ports checked      Katina Whitehead MD/PhD  [phone] clinic  Pager [phone]    Please do not send letter to referring physician.        15 minutes were spent on the date of the encounter doing chart review, history and exam, documentation and further activities as noted above.         Katina Whitehead MD

## 2022-10-24 NOTE — LETTER
Date:October 25, 2022      Provider requested that no letter be sent. Do not send.       [facility]

## 2022-10-25 ENCOUNTER — APPOINTMENT (OUTPATIENT)
Dept: RADIATION ONCOLOGY | Facility: CLINIC | Age: 47
End: 2022-10-25
Attending: RADIOLOGY
Payer: OTHER GOVERNMENT

## 2022-10-25 PROCEDURE — 77412 RADIATION TX DELIVERY LVL 3: CPT | Performed by: RADIOLOGY

## 2022-10-26 ENCOUNTER — APPOINTMENT (OUTPATIENT)
Dept: RADIATION ONCOLOGY | Facility: CLINIC | Age: 47
End: 2022-10-26
Attending: RADIOLOGY
Payer: OTHER GOVERNMENT

## 2022-10-26 PROCEDURE — 77412 RADIATION TX DELIVERY LVL 3: CPT | Performed by: RADIOLOGY

## 2022-10-27 ENCOUNTER — APPOINTMENT (OUTPATIENT)
Dept: RADIATION ONCOLOGY | Facility: CLINIC | Age: 47
End: 2022-10-27
Attending: RADIOLOGY
Payer: OTHER GOVERNMENT

## 2022-10-27 PROCEDURE — 77412 RADIATION TX DELIVERY LVL 3: CPT | Performed by: RADIOLOGY

## 2022-10-28 ENCOUNTER — APPOINTMENT (OUTPATIENT)
Dept: RADIATION ONCOLOGY | Facility: CLINIC | Age: 47
End: 2022-10-28
Attending: RADIOLOGY
Payer: OTHER GOVERNMENT

## 2022-10-28 PROCEDURE — 77412 RADIATION TX DELIVERY LVL 3: CPT | Performed by: RADIOLOGY

## 2022-10-28 PROCEDURE — 77427 RADIATION TX MANAGEMENT X5: CPT | Performed by: RADIOLOGY

## 2022-10-31 ENCOUNTER — OFFICE VISIT (OUTPATIENT)
Dept: RADIATION ONCOLOGY | Facility: CLINIC | Age: 47
End: 2022-10-31
Attending: RADIOLOGY
Payer: OTHER GOVERNMENT

## 2022-10-31 VITALS
WEIGHT: 131 LBS | BODY MASS INDEX: 23.21 KG/M2 | DIASTOLIC BLOOD PRESSURE: 61 MMHG | HEART RATE: 100 BPM | OXYGEN SATURATION: 99 % | SYSTOLIC BLOOD PRESSURE: 116 MMHG

## 2022-10-31 DIAGNOSIS — Z17.0 MALIGNANT NEOPLASM OF CENTRAL PORTION OF RIGHT BREAST IN FEMALE, ESTROGEN RECEPTOR POSITIVE (H): Primary | ICD-10-CM

## 2022-10-31 DIAGNOSIS — C50.111 MALIGNANT NEOPLASM OF CENTRAL PORTION OF RIGHT BREAST IN FEMALE, ESTROGEN RECEPTOR POSITIVE (H): Primary | ICD-10-CM

## 2022-10-31 PROCEDURE — 77412 RADIATION TX DELIVERY LVL 3: CPT | Performed by: RADIOLOGY

## 2022-10-31 NOTE — PATIENT INSTRUCTIONS
Continuing Management of the Effects of Radiation Treatment    The side effects of radiation therapy should gradually decrease in 2 to 3 weeks after you have finished radiation.  Some effects take longer to resolve.    Skin reactions:  Skin changes (such as redness or irritation) should begin to get better gradually.  Some people will have a permanent change in skin color.  Their skin may be more pink or  tan  than the untreated skin.  The skin may be thinner or more fragile than before treatment.  Continue to use a gentle moisturizing lotion for several months.  You should always protect the skin in the area that was treated by using sunscreen of spf 30 or higher.      Other skin care instructions:    Fatigue caused by radiation therapy will decrease and your energy will improve.    For concerns or questions call Department of Therapeutic Radiology [phone]

## 2022-10-31 NOTE — LETTER
Date:November 1, 2022      Provider requested that no letter be sent. Do not send.       [facility]

## 2022-10-31 NOTE — PROGRESS NOTES
[facility] PHYSICIANS  SPECIALIZING IN BREAKTHROUGHS  Radiation Oncology    On Treatment Visit Note      Tori Steele      Date: 10/31/2022  MRN: [id number]   : 1975         Reason for Visit:  On Radiation Treatment Visit     Treatment Summary to Date  Treatment Site: Rt breast, Ax, IM + SCV Current Dose: 5490/5490 cGy Fractions:       Chemotherapy  Chemo concurrent with radx?: No  Oncologist: Dr. Lockett      Subjective:   Tori reports mild pain in her axilla. She has an increased skin reaction without skin breakdown. She reports fatigue is more noticeable, and has been napping more.      Nursing ROS:   Nutrition Alteration  Diet Type: Patient's Preference  Nutrition Note: appetite good  Skin  Skin Reaction: 1 - Faint erythema or dry desquamation (Mepilex given for ax, chaffing)  Skin Progress: Aquaphor        Cardiovascular  Respiratory effort: 1 - Normal - without distress        Psychosocial  Mood - Anxiety: 0 - Normal  Psychosocial Note: Feeling a little fatigue  Pain Assessment  0-10 Pain Scale: 0    Objective:   /61   Pulse 100   Wt 59.4 kg (131 lb)   SpO2 99%   BMI 23.21 kg/m    Gen: Appears well, in no acute distress  Skin: Increased Hyperpigmentation of the right breast and axilla as well as SCV fossa. No breakdown.     Labs:  CBC RESULTS: Recent Labs   Lab Test 10/03/22  1400   WBC 4.0   RBC 3.85   HGB 11.1*   HCT 35.0   MCV 91   MCH 28.8   MCHC 31.7   RDW 13.1        ELECTROLYTES:  Recent Labs   Lab Test 10/03/22  1400      POTASSIUM 3.7   CHLORIDE 105   JENNIE 9.2   CO2 28   BUN 11.0   CR 0.62   *       Assessment:    Tolerating radiation therapy well.  All questions and concerns addressed.    Toxicities:  Fatigue: Grade 1: Fatigue relieved by rest  Pain: Grade 1: Mild pain  Dermatitis: Grade 1: Faint erythema or dry desquamation    Plan:   1. Impending breakdown in axilla and SCV fossa. Mepilex provided. Discussed expected skin reaction and  recovery.   2. Return to clinic in 1 month for visit with Laxmi Bautista.     Mosaiq chart and setup information reviewed  Ports checked      Tre Mancilla MD  PGY-2 Radiation Oncology  Department of Radiation Oncology  [facility]  Phone: [phone]    I saw and examined the patient with the resident.  I have reviewed and edited the resident's note and agree with the plan of care.        15 minutes were spent on the date of the encounter doing chart review, history and exam, documentation and further activities as noted above.           Katina Whitehead MD

## 2022-10-31 NOTE — LETTER
10/31/2022         RE: Tori Steele  [address] [facility] [address]        Dear Colleague,    Thank you for referring your patient, Tori Steele, to the [facility] RADIATION ONCOLOGY. Please see a copy of my visit note below.    [facility] PHYSICIANS  SPECIALIZING IN BREAKTHROUGHS  Radiation Oncology    On Treatment Visit Note      Tori Steele      Date: 10/31/2022  MRN: [id number]   : 1975         Reason for Visit:  On Radiation Treatment Visit     Treatment Summary to Date  Treatment Site: Rt breast, Ax, IM + SCV Current Dose: 5490/5490 cGy Fractions:       Chemotherapy  Chemo concurrent with radx?: No  Oncologist: Dr. Lockett      Subjective:   Tori reports mild pain in her axilla. She has an increased skin reaction without skin breakdown. She reports fatigue is more noticeable, and has been napping more.      Nursing ROS:   Nutrition Alteration  Diet Type: Patient's Preference  Nutrition Note: appetite good  Skin  Skin Reaction: 1 - Faint erythema or dry desquamation (Mepilex given for ax, chaffing)  Skin Progress: Aquaphor        Cardiovascular  Respiratory effort: 1 - Normal - without distress        Psychosocial  Mood - Anxiety: 0 - Normal  Psychosocial Note: Feeling a little fatigue  Pain Assessment  0-10 Pain Scale: 0    Objective:   /61   Pulse 100   Wt 59.4 kg (131 lb)   SpO2 99%   BMI 23.21 kg/m    Gen: Appears well, in no acute distress  Skin: Increased Hyperpigmentation of the right breast and axilla as well as SCV fossa. No breakdown.     Labs:  CBC RESULTS: Recent Labs   Lab Test 10/03/22  1400   WBC 4.0   RBC 3.85   HGB 11.1*   HCT 35.0   MCV 91   MCH 28.8   MCHC 31.7   RDW 13.1        ELECTROLYTES:  Recent Labs   Lab Test 10/03/22  1400      POTASSIUM 3.7   CHLORIDE 105   JENNIE 9.2   CO2 28   BUN 11.0   CR 0.62   *       Assessment:    Tolerating radiation therapy well.  All questions and concerns  addressed.    Toxicities:  Fatigue: Grade 1: Fatigue relieved by rest  Pain: Grade 1: Mild pain  Dermatitis: Grade 1: Faint erythema or dry desquamation    Plan:   1. Impending breakdown in axilla and SCV fossa. Mepilex provided. Discussed expected skin reaction and recovery.   2. Return to clinic in 1 month for visit with Laxmi Bautista.     Mosaiq chart and setup information reviewed  Ports checked      Tre Mancilla MD  PGY-2 Radiation Oncology  Department of Radiation Oncology  [facility]  Phone: [phone]    I saw and examined the patient with the resident.  I have reviewed and edited the resident's note and agree with the plan of care.        15 minutes were spent on the date of the encounter doing chart review, history and exam, documentation and further activities as noted above.           Katina Whitehead MD      Again, thank you for allowing me to participate in the care of your patient.        Sincerely,        Katina Whitehead MD

## 2022-11-07 ENCOUNTER — LAB (OUTPATIENT)
Dept: LAB | Facility: CLINIC | Age: 47
End: 2022-11-07
Attending: PHYSICIAN ASSISTANT
Payer: OTHER GOVERNMENT

## 2022-11-07 VITALS
BODY MASS INDEX: 22.9 KG/M2 | TEMPERATURE: 98 F | HEART RATE: 92 BPM | SYSTOLIC BLOOD PRESSURE: 103 MMHG | DIASTOLIC BLOOD PRESSURE: 69 MMHG | RESPIRATION RATE: 16 BRPM | OXYGEN SATURATION: 100 % | WEIGHT: 129.3 LBS

## 2022-11-07 DIAGNOSIS — D50.0 IRON DEFICIENCY ANEMIA DUE TO CHRONIC BLOOD LOSS: ICD-10-CM

## 2022-11-07 DIAGNOSIS — C50.911 INVASIVE DUCTAL CARCINOMA OF RIGHT BREAST (H): Primary | ICD-10-CM

## 2022-11-07 DIAGNOSIS — C50.911 INVASIVE DUCTAL CARCINOMA OF RIGHT BREAST (H): ICD-10-CM

## 2022-11-07 DIAGNOSIS — C50.111 MALIGNANT NEOPLASM OF CENTRAL PORTION OF RIGHT BREAST IN FEMALE, ESTROGEN RECEPTOR POSITIVE (H): Primary | ICD-10-CM

## 2022-11-07 DIAGNOSIS — Z17.0 MALIGNANT NEOPLASM OF CENTRAL PORTION OF RIGHT BREAST IN FEMALE, ESTROGEN RECEPTOR POSITIVE (H): Primary | ICD-10-CM

## 2022-11-07 LAB
ALBUMIN SERPL BCG-MCNC: 3.9 G/DL (ref 3.5–5.2)
ALP SERPL-CCNC: 97 U/L (ref 35–104)
ALT SERPL W P-5'-P-CCNC: 11 U/L (ref 10–35)
ANION GAP SERPL CALCULATED.3IONS-SCNC: 11 MMOL/L (ref 7–15)
AST SERPL W P-5'-P-CCNC: 16 U/L (ref 10–35)
BASOPHILS # BLD AUTO: 0 10E3/UL (ref 0–0.2)
BASOPHILS NFR BLD AUTO: 1 %
BILIRUB SERPL-MCNC: 0.4 MG/DL
BUN SERPL-MCNC: 10.4 MG/DL (ref 6–20)
CALCIUM SERPL-MCNC: 9.5 MG/DL (ref 8.6–10)
CHLORIDE SERPL-SCNC: 104 MMOL/L (ref 98–107)
CREAT SERPL-MCNC: 0.59 MG/DL (ref 0.51–0.95)
DEPRECATED HCO3 PLAS-SCNC: 24 MMOL/L (ref 22–29)
EOSINOPHIL # BLD AUTO: 0.4 10E3/UL (ref 0–0.7)
EOSINOPHIL NFR BLD AUTO: 11 %
ERYTHROCYTE [DISTWIDTH] IN BLOOD BY AUTOMATED COUNT: 13.2 % (ref 10–15)
GFR SERPL CREATININE-BSD FRML MDRD: >90 ML/MIN/1.73M2
GLUCOSE SERPL-MCNC: 96 MG/DL (ref 70–99)
HCT VFR BLD AUTO: 36.5 % (ref 35–47)
HGB BLD-MCNC: 11.7 G/DL (ref 11.7–15.7)
HOLD SPECIMEN: NORMAL
HOLD SPECIMEN: NORMAL
IMM GRANULOCYTES # BLD: 0 10E3/UL
IMM GRANULOCYTES NFR BLD: 0 %
LYMPHOCYTES # BLD AUTO: 0.4 10E3/UL (ref 0.8–5.3)
LYMPHOCYTES NFR BLD AUTO: 13 %
MCH RBC QN AUTO: 28.4 PG (ref 26.5–33)
MCHC RBC AUTO-ENTMCNC: 32.1 G/DL (ref 31.5–36.5)
MCV RBC AUTO: 89 FL (ref 78–100)
MONOCYTES # BLD AUTO: 0.3 10E3/UL (ref 0–1.3)
MONOCYTES NFR BLD AUTO: 10 %
NEUTROPHILS # BLD AUTO: 2.3 10E3/UL (ref 1.6–8.3)
NEUTROPHILS NFR BLD AUTO: 65 %
NRBC # BLD AUTO: 0 10E3/UL
NRBC BLD AUTO-RTO: 0 /100
PLATELET # BLD AUTO: 195 10E3/UL (ref 150–450)
POTASSIUM SERPL-SCNC: 4 MMOL/L (ref 3.4–5.3)
PROT SERPL-MCNC: 6.9 G/DL (ref 6.4–8.3)
RBC # BLD AUTO: 4.12 10E6/UL (ref 3.8–5.2)
SODIUM SERPL-SCNC: 139 MMOL/L (ref 136–145)
WBC # BLD AUTO: 3.4 10E3/UL (ref 4–11)

## 2022-11-07 PROCEDURE — 250N000011 HC RX IP 250 OP 636: Performed by: PHYSICIAN ASSISTANT

## 2022-11-07 PROCEDURE — 82728 ASSAY OF FERRITIN: CPT

## 2022-11-07 PROCEDURE — 36591 DRAW BLOOD OFF VENOUS DEVICE: CPT | Performed by: PHYSICIAN ASSISTANT

## 2022-11-07 PROCEDURE — 83550 IRON BINDING TEST: CPT

## 2022-11-07 PROCEDURE — 80053 COMPREHEN METABOLIC PANEL: CPT

## 2022-11-07 PROCEDURE — 85025 COMPLETE CBC W/AUTO DIFF WBC: CPT

## 2022-11-07 RX ORDER — HEPARIN SODIUM (PORCINE) LOCK FLUSH IV SOLN 100 UNIT/ML 100 UNIT/ML
5 SOLUTION INTRAVENOUS
Status: COMPLETED | OUTPATIENT
Start: 2022-11-07 | End: 2022-11-07

## 2022-11-07 RX ADMIN — Medication 5 ML: at 09:56

## 2022-11-07 ASSESSMENT — PAIN SCALES - GENERAL: PAINLEVEL: NO PAIN (0)

## 2022-11-07 NOTE — NURSING NOTE
"Chief Complaint   Patient presents with     Port Draw     [facility] labs drawn from port by rn.  VS taken.     Port accessed with 20 gauge 3/4\" gripper needle and labs drawn by rn.  Port flushed with NS and heparin then de-accessed.  Pt tolerated well.  VS taken.    Provider notified that JIC labs were drawn.      Thuy Palafox, RN      "

## 2022-11-08 ENCOUNTER — VIRTUAL VISIT (OUTPATIENT)
Dept: ONCOLOGY | Facility: CLINIC | Age: 47
End: 2022-11-08
Attending: PHYSICIAN ASSISTANT
Payer: OTHER GOVERNMENT

## 2022-11-08 DIAGNOSIS — D50.0 IRON DEFICIENCY ANEMIA DUE TO CHRONIC BLOOD LOSS: Primary | ICD-10-CM

## 2022-11-08 DIAGNOSIS — C50.911 INVASIVE DUCTAL CARCINOMA OF RIGHT BREAST (H): ICD-10-CM

## 2022-11-08 LAB
FERRITIN SERPL-MCNC: 14 NG/ML (ref 6–175)
IRON BINDING CAPACITY (ROCHE): 370 UG/DL (ref 240–430)
IRON SATN MFR SERPL: 11 % (ref 15–46)
IRON SERPL-MCNC: 41 UG/DL (ref 37–145)

## 2022-11-08 PROCEDURE — 99214 OFFICE O/P EST MOD 30 MIN: CPT | Mod: 95 | Performed by: PHYSICIAN ASSISTANT

## 2022-11-08 RX ORDER — LETROZOLE 2.5 MG/1
2.5 TABLET, FILM COATED ORAL DAILY
Qty: 90 TABLET | Refills: 3 | Status: SHIPPED | OUTPATIENT
Start: 2022-11-08 | End: 2024-02-02

## 2022-11-08 NOTE — PROCEDURES
Radiotherapy Treatment Summary          Date of Report: 2022     PATIENT: TORI STEELE  MEDICAL RECORD NO: [id number]  : 1975     DIAGNOSIS: C50.111 Malignant neoplasm of central portion of right female breast  INTENT OF RADIOTHERAPY: Cure  PATHOLOGY: Invasive ductal carcinoma, [place] grade 3, HR+, HER2 -                                  STAGE: cT3N0 --> [id number](sn)  CONCURRENT SYSTEMIC THERAPY: none                    Details of the treatments summarized below are found in records kept in the Department of Radiation Oncology at [facility].     Treatment Summary:  Radiation Oncology - Course: 1 Protocol:   Treatment Site Current Dose Modality From To Elapsed Days Fx.  1 R Breast Axilla IM  4,240 cGy 10 X 10/03/2022 10/24/2022  21 16  2 R SCV  4,240 cGy 10x/18x 10/03/2022 10/24/2022  21 16  1 R Breast Boost  1,250 cGy e12 10/25/2022 10/31/2022   6  5        Dose per Fraction:   R Breast Axilla IM, SCV: 265 cGy;    R Breast Boost: 250 cGy     Total Dose:   R Breast: 5,490 cGy; R Axilla, IM and SCV: 4,250 cGy              COMMENTS:                      Ms. Steele is a 47 year old premenopausal female with a locally advanced RIGHT breast cancer. This was   detected on screening mammogram on 2022, which showed a new mass in her right breast. Work up with   MRI characterized the non-mass enhancement to measure 5.5 cm located at 12:00 to 1:30 position, with no   morphologically abnormal lymph nodes. Biopsy was consistent with invasive ductal carcinoma, Byron   grade 3, ER positive (83%), AR positive (79%), HER-2 negative (IHC 1+), Ki-67 was 25% (15% upon review   at [facility]), with suspicious angiolymphatic invasion.      She enrolled in I-SPY-2 trial. Her MammaPrint came back as ultra high risk (index: -0.329), blueprint was   luminal B. Systemic staging with CT Chest/Abdomen/Pelvis on 2022 showed no evidence of distant   metastasis.  Ms. Steele was randomized to the oral Pacliaxel,  Encequidar and Dostarlimab arm. Carboplatin was   added on 4/4/2022.  US guided biopsy confirmed residual viable tumor. Thus she proceeded with dose dense   AC x 4.        Ms. Steele proceeded with seed localized lumpectomy with mastopexy advancement flaps and right axillary   SLN biopsy with Dr. Layton on 8/8/2022. Pathology revealed residual invasive ductal carcinoma with apocrine   features, Byron grade 2, measuring 4.5 x 3.2 cm, ER+/DC+/HER2 nonamplified. There was associated   DCIS, Grade 3, estimated to be 4.5 cm, comprising 30% of the tumor cellularity. LVSI was present. Invasive   carcinoma was 0.05 mm from the posterior margin, 2 mm from the anterior margin, and positive at the superior,   inferior, and medial margins. DCIS was 0.07 mm from the superior margin, 1 mm from the medial margin, 1.2   mm from the anterior margin. One of the two removed SLN contained a 4.5 mm metastatic carcinoma with 2   mm extranodal extension, ER+/DC+/HER2-, with no apparent treatment effect. [id number](sn).  Her RCB class   was III.      She was taken back for re-excision on 8/29/2022. Excised superior, inferior and medial margins were all   negative for atypia or malignancy.      Dr. Lockett recommended ovarian suppression with Goserelin followed by Letrozole with Abemaciclib.     She established care at the department of radiation oncology and proceeded with adjuvant radiation therapy   with curative intent to the right breast and regional lymphatics with 4240 cGy in 16 fractions via 3DCRT with a   boost to the lumpectomy cavity with 1250 cGy in 5 fractions via electron.     The patient tolerated treatment well. Early in treatment she developed grade 1 dermatitis which was treated with   Aquaphor and Mepilex. She then developed grade 1 fatigue and grade 1 pain in the axilla.      ED visits/hospitalizations: none     Missed treatments: none     Acute Toxicity Profile by CTC v5.0:  Fatigue: Grade 1  Dermatitis: Grade 1  Pain:  Grade 1  PAIN MANAGEMENT:                           Not required.      FOLLOW UP PLAN:                         Followup with medical oncology as scheduled.  Return to clinic in 1 month for followup with Laxmi Bautista NP.      Resident Physician:    Tre Mancilla M.D.   Staff Physician: Katina Whitehead M.D.     CC: MD Keegan Garza MD                                     Radiation Oncology:  [facility] [address] [place] [address]

## 2022-11-08 NOTE — PROGRESS NOTES
Tori is a 47 year old who is being evaluated via a billable video visit.      How would you like to obtain your AVS? [email or web address]  If the video visit is dropped, the invitation should be resent by: Text to cell phone: [phone]  Will anyone else be joining your video visit? No        Video-Visit Details    Video Start Time: 8:00    Type of service:  Video Visit    Video End Time:8:45    Originating Location (pt. Location):home    Distant Location (provider location): OFF SITE    Platform used for Video Visit: YASMIN Garcia    Nov 8, 2022      Reason for Visit: follow up of breast cancer     History of Present Illness: Tori Steele is a 46 year old female with breat cancer. Right-sided 5.5 cm ER positive PA positive HER-2 negative breast cancer with associated DCIS.  Her MammaPrint came back as high risk.  She consented for the I-SPY clinical trial and has been randomized to the oral paclitaxel, Encequidar and dostarlimab arm.      2/21/22 started trial with oral paclitaxel, Encequidar and dostarlimab.    3/15/22  her IV immunotherapy dostarlimab was held due to diarrhea that recovered with Imodium and time  4/4/22   Carbo was added in weekly due in hopes for more significant reduction in cancer  5/17/22 AC start  7/8/22 AC End  8/8/2022 - R. Lumpectomy with SNLBX -               -INVASIVE BREAST CARCINOMA OF NO SPECIAL TYPE (INVASIVE DUCTAL CARCINOMA), with   apocrine features, SAMANTHA GRADE 2, size 45 x 32 mm, residual after neoadjuvant systemic therapy              -Ductal carcinoma in situ (DCIS), nuclear grade 3, cribriform and solid type(s), with focal necrosis              -DCIS is admixed with invasive carcinoma, and comprises 30% of the tumor cellularity              -Invasive carcinoma is estrogen receptor positive, progesterone receptor positive and HER2   negative (score 1+) by immunohistochemistry               -METASTATIC BREAST DUCTAL CARCINOMA in one of two lymph nodes (1/2), residual  "after            neoadjuvant    systemic therapy              -Extranodal extension present (size 2 mm)              -[facility] residual cancer burden is 3.284 (RCB Class III).     -2022 - Resection of involved margins - now negative    -22 started Zoladex    - 10/31/22 completed XRT 21 fractions        Interval History:     Ms. Steele was seen over video today. She is doing really well.  She tolerated XRT well.  Did note some fatigue afterwards but her skin is holding up well.       She feels \"back to me\" and has been doing all her home activities with the children as she usually does, this has felt really good to her.  She feels the hardest part is now behind her.       Doing well with the Zoladex; has mild light hot flashes and mild stiffness in all her joints, this improves with walking/moving.       Sleep is good, mood is stable, no new /GI issues.  No fevers or infections.      SOCIAL HISTORY: She is planning a trip around Thanksgiving to [place] to the [facility].      ROS:  Patient denies the following: fevers, body aches, chills, headaches, vision changes, dizziness, chest pain, shortness of breath, nausea, vomiting, diarrhea, constipation, abdominal pain, rashes, bruising/bleeding, mouth sores, swelling or pain in the legs.      Currently not taking medications.      Physical Examination: ECOG 1     /75 (BP Location: Right arm, Patient Position: Sitting, Cuff Size: Adult Regular)   Pulse 114   Temp 98.6  F (37  C) (Oral)   Resp 16   Wt 58.1 kg (128 lb 1.6 oz)   SpO2 100%   BMI 22.69 kg/m                Wt Readings from Last 4 Encounters:   22 58.1 kg (128 lb 1.6 oz)   22 58.5 kg (129 lb)   22 58.9 kg (129 lb 12.8 oz)   22 58.7 kg (129 lb 8 oz)            ECO  Video physical exam  General: Patient appears well in no acute distress.   Skin: No visualized rash or lesions on visualized skin  Eyes: EOMI, no erythema, sclera icterus or discharge " noted  Resp: Appears to be breathing comfortably without accessory muscle usage, speaking in full sentences, no cough  MSK: Appears to have normal range of motion based on visualized movements  Neurologic: No apparent tremors, facial movements symmetric  Psych: affect bright, alert and oriented     Laboratory Data:           10/03/22 14:00 11/07/22 10:03   Sodium 141 139   Potassium 3.7 4.0   Chloride 105 104   Carbon Dioxide (CO2) 28 24   Urea Nitrogen 11.0 10.4   Creatinine 0.62 0.59   GFR Estimate >90 >90   Calcium 9.2 9.5   Anion Gap 8 11   Albumin 3.8 3.9   Protein Total 6.7 6.9   Alkaline Phosphatase 98 97   ALT 10 11   AST 15 16   Bilirubin Total 0.4 0.4   Glucose 127 (H) 96   WBC 4.0 3.4 (L)   Hemoglobin 11.1 (L) 11.7   Hematocrit 35.0 36.5   Platelet Count 227 195   RBC Count 3.85 4.12   MCV 91 89     I reviewed the above labs today.      PROBLEMS:     1. Right sided ER+, AK+, HER2-negative, MammaPrint high risk T=5.5cm, N=0 breast cancer. Treated on I-SPY with assigned arm of dostarlimab/oral paclitaxel. Carbo added week 3. Received ACx4. Pathologic staging showed pT2a. PN1a. ER+, AK+, HER21+, RCB=3   2. Pre-menopausal, now on Zoladex monthly tolerating this well     IMPRESSION: Tori completed radiation and is physically and emotionally feeling well.  We discussed aggressive endocrine treatment.  This would involve ovarian suppression, an aromatase inhibitor, and in her case abemaciclib.  She had an initial VIRGIE 67 of greater than 20% which would qualify her for this regimen.     I told her we will do this sequentially.  She tolerated her Zoladex injection,.  She is going on vacation and will start letrozole upon return (end of November).  I will see her early December to start the abemaciclib.     We also discussed BSO today as well.  She is interested possibly in the future.      PLAN:  1.  Continue monthly Zoladex.  Start letrozole after vacation  2. December start for Zometa and abemaciclib  3.  Encouraged good nutrition and exercise   4.  Contacts us sooner as necessary      Janette Mota PA-C

## 2022-11-10 DIAGNOSIS — C50.111 MALIGNANT NEOPLASM OF CENTRAL PORTION OF RIGHT BREAST IN FEMALE, ESTROGEN RECEPTOR POSITIVE (H): Primary | ICD-10-CM

## 2022-11-10 DIAGNOSIS — Z17.0 MALIGNANT NEOPLASM OF CENTRAL PORTION OF RIGHT BREAST IN FEMALE, ESTROGEN RECEPTOR POSITIVE (H): Primary | ICD-10-CM

## 2022-11-11 DIAGNOSIS — C50.911 INVASIVE DUCTAL CARCINOMA OF RIGHT BREAST (H): Primary | ICD-10-CM

## 2022-11-14 ENCOUNTER — INFUSION THERAPY VISIT (OUTPATIENT)
Dept: ONCOLOGY | Facility: CLINIC | Age: 47
End: 2022-11-14
Attending: PHYSICIAN ASSISTANT
Payer: OTHER GOVERNMENT

## 2022-11-14 VITALS
DIASTOLIC BLOOD PRESSURE: 57 MMHG | OXYGEN SATURATION: 100 % | TEMPERATURE: 98.2 F | HEART RATE: 107 BPM | RESPIRATION RATE: 16 BRPM | SYSTOLIC BLOOD PRESSURE: 100 MMHG

## 2022-11-14 DIAGNOSIS — Z17.0 MALIGNANT NEOPLASM OF CENTRAL PORTION OF RIGHT BREAST IN FEMALE, ESTROGEN RECEPTOR POSITIVE (H): Primary | ICD-10-CM

## 2022-11-14 DIAGNOSIS — C50.111 MALIGNANT NEOPLASM OF CENTRAL PORTION OF RIGHT BREAST IN FEMALE, ESTROGEN RECEPTOR POSITIVE (H): Primary | ICD-10-CM

## 2022-11-14 PROCEDURE — 96402 CHEMO HORMON ANTINEOPL SQ/IM: CPT

## 2022-11-14 PROCEDURE — 250N000011 HC RX IP 250 OP 636: Performed by: PHYSICIAN ASSISTANT

## 2022-11-14 RX ADMIN — GOSERELIN ACETATE 3.6 MG: 3.6 IMPLANT SUBCUTANEOUS at 10:11

## 2022-11-14 ASSESSMENT — PAIN SCALES - GENERAL: PAINLEVEL: NO PAIN (0)

## 2022-11-14 NOTE — PROGRESS NOTES
Infusion Nursing Note:  Tori Steele presents today for zoladex injection.    Patient seen by provider today: No   present during visit today: Not Applicable.    Note: Patient arrives feeling well, no new complaints/concerns. Denies any fevers, chills, nausea/vomiting, diarrhea/constipation, sob, pain, vision changes, dizziness, swelling, fatigue, or cough.    Post Infusion Assessment:  Patient tolerated injection into upper LEFT quadrant without incident. Ice applied to site prior to injection.    Discharge Plan:   Patient declined prescription refills.  Discharge instructions reviewed with: Patient.  Patient and/or family verbalized understanding of discharge instructions and all questions answered.  AVS to patient via [employer].  Patient will return 12/12/22 for next appointment.   Patient discharged in stable condition accompanied by: self.  Departure Mode: Ambulatory.      Miriam Conroy RN

## 2022-11-14 NOTE — PATIENT INSTRUCTIONS
[facility] Triage and after hours / weekends / holidays:  [phone]    Please call the triage or after hours line if you experience a temperature greater than or equal to 100.4, shaking chills, have uncontrolled nausea, vomiting and/or diarrhea, dizziness, shortness of breath, chest pain, bleeding, unexplained bruising, or if you have any other new/concerning symptoms, questions or concerns.      If you are having any concerning symptoms or wish to speak to a provider before your next infusion visit, please call your care coordinator or triage to notify them so we can adequately serve you.     If you need a refill on a narcotic prescription or other medication, please call before your infusion appointment.                November 2022 Sunday Monday Tuesday Wednesday Thursday Friday Saturday             1     2     3     4     5       6     7    LAB CENTRAL   9:30 AM   (15 min.)   [facility] DRAW   [facility] 8    VIDEO VISIT RETURN   7:45 AM   (45 min.)   Milka Mota PA-C   [facility] 9     10     11     12       13     14    ONC INFUSION 0.5 HR (30 MIN)   9:30 AM   (30 min.)    ONC INFUSION NURSE   [facility] 15     16     17     18     19       20     21     22     23     24     25     26       27     28    RETURN   1:30 PM   (30 min.)   Laxmi Bautista APRN CNP   [facility] Radiation Oncology 29 30 December 2022 Sunday Monday Tuesday Wednesday Thursday Friday Saturday                       1     2     3       4     5     6     7     8     9     10       11     12    LAB CENTRAL   9:00 AM   (15 min.)   [facility]   [facility]    RETURN   9:15 AM   (45 min.)   Milka Mota PA-C   [facility]    ONC INFUSION 1 HR (60 MIN)  11:00 AM   (60 min.)   [facility]FUSION NURSE   [facility]  [facility] 13     14     15     16     17       18     19     20     21     22     23     24       25     26     27     28     29     30     31                       Lab Results:  No results found for this or any previous visit (from the past 12 hour(s)).

## 2022-11-28 ENCOUNTER — OFFICE VISIT (OUTPATIENT)
Dept: RADIATION ONCOLOGY | Facility: CLINIC | Age: 47
End: 2022-11-28
Attending: RADIOLOGY
Payer: OTHER GOVERNMENT

## 2022-11-28 VITALS
HEART RATE: 76 BPM | DIASTOLIC BLOOD PRESSURE: 76 MMHG | WEIGHT: 132.5 LBS | OXYGEN SATURATION: 100 % | BODY MASS INDEX: 23.47 KG/M2 | SYSTOLIC BLOOD PRESSURE: 114 MMHG

## 2022-11-28 DIAGNOSIS — C50.111 MALIGNANT NEOPLASM OF CENTRAL PORTION OF RIGHT BREAST IN FEMALE, ESTROGEN RECEPTOR POSITIVE (H): Primary | ICD-10-CM

## 2022-11-28 DIAGNOSIS — Z17.0 MALIGNANT NEOPLASM OF CENTRAL PORTION OF RIGHT BREAST IN FEMALE, ESTROGEN RECEPTOR POSITIVE (H): Primary | ICD-10-CM

## 2022-11-28 PROCEDURE — 99024 POSTOP FOLLOW-UP VISIT: CPT | Performed by: NURSE PRACTITIONER

## 2022-11-28 PROCEDURE — G0463 HOSPITAL OUTPT CLINIC VISIT: HCPCS

## 2022-11-28 NOTE — LETTER
2022         RE: Tori Steele  [address] [facility] [facility] [place] [address]        Dear Colleague,    Thank you for referring your patient, Tori Steele, to the [facility] RADIATION ONCOLOGY. Please see a copy of my visit note below.    FOLLOW-UP VISIT    Patient Name: Tori Steele      : 1975     Age: 47 year old        ______________________________________________________________________________     Chief Complaint   Patient presents with     Breast Cancer     Radiation follow up     /76   Pulse 76   Wt 60.1 kg (132 lb 8 oz)   SpO2 100%   BMI 23.47 kg/m       Date Radiation Completed: Radiaiton Rt breast, Ax, IM + SCV 5490 cGy 22-10/31/22    Pain  Denies    Labs  Other Labs: No    Imaging  None    Other Appointments: No    Residual Radiation side effect: Skin is healing, energy coming back slowly.     Additional Instructions: Has not started Femara yet    Nurse face-to-face time: Level 3:  10 min face to face time          Radiation Oncology Follow-up Visit  2022        Tori Steele  MRN: [id number]   : 1975     DISEASE TREATED:   Invasive ductal carcinoma of the right breast, grade 3, ER positive, [place] positive, HER2 negative, stage cT3N0 --> [id number]    RADIATION THERAPY DELIVERED:   5490 centigrade completed 10/31/2022    INTERVAL SINCE COMPLETION OF RADIATION THERAPY:   1 month    SUBJECTIVE:   Tori Steele is a 47 year old female who is here today for routine 1 month follow up after completing radiation therapy.  She has seen healing of her skin reaction and continues to moisturize.  She still has some hyperpigmentation.  She has been doing her stretching and range of motion exercises for chest and shoulder.  She has seen lymphedema therapy.  She does occasionally get sharp shooting pains and reassured her that that is completely normal.  Fatigue has improved but is not gone.  She just did get back from a [place] cruise so she was  quite busy.  She was very careful about sun exposure.  She has seen medical oncology.  She has not started letrozole.    ROS:  Complete review of systems is negative except for symptoms discussed in subjective above.    Current Outpatient Medications   Medication     letrozole (FEMARA) 2.5 MG tablet     lidocaine-prilocaine (EMLA) 2.5-2.5 % external cream     loperamide (IMODIUM) 2 MG capsule     prochlorperazine (COMPAZINE) 10 MG tablet     No current facility-administered medications for this visit.        No Known Allergies    Past Medical History:   Diagnosis Date     Breast cancer (H) 01/2022     Sinus tachycardia          PHYSICAL EXAM:  /76   Pulse 76   Wt 60.1 kg (132 lb 8 oz)   SpO2 100%   BMI 23.47 kg/m    Gen: Alert, in NAD  Right breast without erythema or desquamation.  Some hyperpigmentation remains.  No significant lymphedema.  Psychiatric: Appropriate mood and affect      LABS AND IMAGING:  Reviewed.    IMPRESSION:   Ms. Steele is a 47 year old female with a invasive ductal carcinoma of the right breast s/p 1 month out from radiation and doing well.    PLAN:   Patient has recovered nicely from acute side effects of radiation therapy.  Discussed long term care with continued moisturizing of the treated breast, stretching and range of motion exercises, and sun screen.  Because her supraclavicular area was treated she should have at least yearly TSH checked.  Patient will follow up with medical oncology for continued care and imaging.  She will follow up here as needed.  Discussed to come in or call with any concerns or questions that may develop.        Laxmi Bautista NP  Radiation Oncology  [facility]

## 2022-11-28 NOTE — PROGRESS NOTES
FOLLOW-UP VISIT    Patient Name: Tori Steele      : 1975     Age: 47 year old        ______________________________________________________________________________     Chief Complaint   Patient presents with     Breast Cancer     Radiation follow up     /76   Pulse 76   Wt 60.1 kg (132 lb 8 oz)   SpO2 100%   BMI 23.47 kg/m       Date Radiation Completed: Radiaiton Rt breast, Ax, IM + SCV 5490 cGy 22-10/31/22    Pain  Denies    Labs  Other Labs: No    Imaging  None    Other Appointments: No    Residual Radiation side effect: Skin is healing, energy coming back slowly.     Additional Instructions: Has not started Femara yet    Nurse face-to-face time: Level 3:  10 min face to face time

## 2022-11-28 NOTE — PROGRESS NOTES
Radiation Oncology Follow-up Visit  2022        Tori Steele  MRN: [id number]   : 1975     DISEASE TREATED:   Invasive ductal carcinoma of the right breast, grade 3, ER positive, [place] positive, HER2 negative, stage cT3N0 --> [id number]    RADIATION THERAPY DELIVERED:   5490 centigrade completed 10/31/2022    INTERVAL SINCE COMPLETION OF RADIATION THERAPY:   1 month    SUBJECTIVE:   Tori Steele is a 47 year old female who is here today for routine 1 month follow up after completing radiation therapy.  She has seen healing of her skin reaction and continues to moisturize.  She still has some hyperpigmentation.  She has been doing her stretching and range of motion exercises for chest and shoulder.  She has seen lymphedema therapy.  She does occasionally get sharp shooting pains and reassured her that that is completely normal.  Fatigue has improved but is not gone.  She just did get back from a [place] cruise so she was quite busy.  She was very careful about sun exposure.  She has seen medical oncology.  She has not started letrozole.    ROS:  Complete review of systems is negative except for symptoms discussed in subjective above.    Current Outpatient Medications   Medication     letrozole (FEMARA) 2.5 MG tablet     lidocaine-prilocaine (EMLA) 2.5-2.5 % external cream     loperamide (IMODIUM) 2 MG capsule     prochlorperazine (COMPAZINE) 10 MG tablet     No current facility-administered medications for this visit.        No Known Allergies    Past Medical History:   Diagnosis Date     Breast cancer (H) 2022     Sinus tachycardia          PHYSICAL EXAM:  /76   Pulse 76   Wt 60.1 kg (132 lb 8 oz)   SpO2 100%   BMI 23.47 kg/m    Gen: Alert, in NAD  Right breast without erythema or desquamation.  Some hyperpigmentation remains.  No significant lymphedema.  Psychiatric: Appropriate mood and affect      LABS AND IMAGING:  Reviewed.    IMPRESSION:   Ms. Steele is a 47 year old female  with a invasive ductal carcinoma of the right breast s/p 1 month out from radiation and doing well.    PLAN:   Patient has recovered nicely from acute side effects of radiation therapy.  Discussed long term care with continued moisturizing of the treated breast, stretching and range of motion exercises, and sun screen.  Because her supraclavicular area was treated she should have at least yearly TSH checked.  Patient will follow up with medical oncology for continued care and imaging.  She will follow up here as needed.  Discussed to come in or call with any concerns or questions that may develop.        Laxmi Bautista, NP  Radiation Oncology  [facility]

## 2022-12-05 ENCOUNTER — TELEPHONE (OUTPATIENT)
Dept: ONCOLOGY | Facility: CLINIC | Age: 47
End: 2022-12-05

## 2022-12-05 DIAGNOSIS — C50.911 INVASIVE DUCTAL CARCINOMA OF RIGHT BREAST (H): Primary | ICD-10-CM

## 2022-12-05 NOTE — ORAL ONC MGMT
"Oral Chemotherapy Monitoring Program    Lab Monitoring Plan  CBC/CMP every 2 weeks x 4 then monthly  Labs drawn outside of [place]: NA  Subjective/Objective:  Tori Steele is a 47 year old female contacted by phone for an initial visit for oral chemotherapy education.     ORAL CHEMOTHERAPY 8/23/2022 12/5/2022   Assessment Type Initial Work up New Teach   Diagnosis Code Breast Cancer Breast Cancer   Providers Dr. Cathryn Lockett   [facility]   Drug Name Verzenio (abemaciclib) Verzenio (abemaciclib)   Dose 150 mg 150 mg   Current Schedule BID BID   Cycle Details Continuous Continuous   Planned next cycle start date - 12/12/2022   Any new drug interactions? No -   Is the dose as ordered appropriate for the patient? Yes -       Last PHQ-2 Score on record:   PHQ-2 ( 1999 [facility]) 10/2/2020 9/3/2020   Q1: Little interest or pleasure in doing things 0 0   Q2: Feeling down, depressed or hopeless 0 0   PHQ-2 Score 0 0   PHQ-2 Total Score (12-17 Years)- Positive if 3 or more points; Administer PHQ-A if positive 0 0       Vitals:  BP:   BP Readings from Last 1 Encounters:   11/28/22 114/76     Wt Readings from Last 1 Encounters:   11/28/22 60.1 kg (132 lb 8 oz)     Estimated body surface area is 1.63 meters squared as calculated from the following:    Height as of 8/29/22: 1.6 m (5' 3\").    Weight as of 11/28/22: 60.1 kg (132 lb 8 oz).    Labs:  _  Result Component Current Result Ref Range   Sodium 139 (11/7/2022) 136 - 145 mmol/L     _  Result Component Current Result Ref Range   Potassium 4.0 (11/7/2022) 3.4 - 5.3 mmol/L     _  Result Component Current Result Ref Range   Calcium 9.5 (11/7/2022) 8.6 - 10.0 mg/dL     No results found for Mag within last 30 days.     No results found for Phos within last 30 days.     _  Result Component Current Result Ref Range   Albumin 3.9 (11/7/2022) 3.5 - 5.2 g/dL     _  Result Component Current Result Ref Range   Urea Nitrogen 10.4 (11/7/2022) 6.0 - 20.0 mg/dL "     _  Result Component Current Result Ref Range   Creatinine 0.59 (11/7/2022) 0.51 - 0.95 mg/dL     _  Result Component Current Result Ref Range   AST 16 (11/7/2022) 10 - 35 U/L     _  Result Component Current Result Ref Range   ALT 11 (11/7/2022) 10 - 35 U/L     _  Result Component Current Result Ref Range   Bilirubin Total 0.4 (11/7/2022) <=1.2 mg/dL     _  Result Component Current Result Ref Range   WBC Count 3.4 (L) (11/7/2022) 4.0 - 11.0 10e3/uL     _  Result Component Current Result Ref Range   Hemoglobin 11.7 (11/7/2022) 11.7 - 15.7 g/dL     _  Result Component Current Result Ref Range   Platelet Count 195 (11/7/2022) 150 - 450 10e3/uL     No results found for ANC within last 30 days.     _  Result Component Current Result Ref Range   Absolute Neutrophils 2.3 (11/7/2022) 1.6 - 8.3 10e3/uL        Assessment:  Patient is appropriate to start therapy.    Plan:  Basic chemotherapy teaching was reviewed with the patient including indication, start date of therapy, dose, administration, adverse effects, missed doses, food and drug interactions, monitoring, side effect management, office contact information, and safe handling. Written materials were provided and all questions answered.    Follow-Up:  Pharmacy to complete an initial assessment about 1 week after starting - planning on 12/12/22?     Marilee Solitario, [place], [facility]  Oral Chemotherapy Monitoring Program  [facility]  [phone]

## 2022-12-12 ENCOUNTER — INFUSION THERAPY VISIT (OUTPATIENT)
Dept: ONCOLOGY | Facility: CLINIC | Age: 47
End: 2022-12-12
Attending: PHYSICIAN ASSISTANT
Payer: OTHER GOVERNMENT

## 2022-12-12 ENCOUNTER — LAB (OUTPATIENT)
Dept: LAB | Facility: CLINIC | Age: 47
End: 2022-12-12
Attending: PHYSICIAN ASSISTANT
Payer: OTHER GOVERNMENT

## 2022-12-12 VITALS
RESPIRATION RATE: 18 BRPM | DIASTOLIC BLOOD PRESSURE: 71 MMHG | HEART RATE: 107 BPM | TEMPERATURE: 98 F | BODY MASS INDEX: 23.19 KG/M2 | SYSTOLIC BLOOD PRESSURE: 101 MMHG | OXYGEN SATURATION: 100 % | WEIGHT: 130.9 LBS

## 2022-12-12 DIAGNOSIS — C50.911 INVASIVE DUCTAL CARCINOMA OF RIGHT BREAST (H): Primary | ICD-10-CM

## 2022-12-12 DIAGNOSIS — C50.111 MALIGNANT NEOPLASM OF CENTRAL PORTION OF RIGHT BREAST IN FEMALE, ESTROGEN RECEPTOR POSITIVE (H): Primary | ICD-10-CM

## 2022-12-12 DIAGNOSIS — Z17.0 MALIGNANT NEOPLASM OF CENTRAL PORTION OF RIGHT BREAST IN FEMALE, ESTROGEN RECEPTOR POSITIVE (H): Primary | ICD-10-CM

## 2022-12-12 DIAGNOSIS — Z78.0 POST-MENOPAUSAL: ICD-10-CM

## 2022-12-12 DIAGNOSIS — Z17.0 MALIGNANT NEOPLASM OF CENTRAL PORTION OF RIGHT BREAST IN FEMALE, ESTROGEN RECEPTOR POSITIVE (H): ICD-10-CM

## 2022-12-12 DIAGNOSIS — F43.21 ADJUSTMENT DISORDER WITH DEPRESSED MOOD: ICD-10-CM

## 2022-12-12 DIAGNOSIS — C50.111 MALIGNANT NEOPLASM OF CENTRAL PORTION OF RIGHT BREAST IN FEMALE, ESTROGEN RECEPTOR POSITIVE (H): ICD-10-CM

## 2022-12-12 LAB
ALBUMIN SERPL BCG-MCNC: 4.1 G/DL (ref 3.5–5.2)
ALP SERPL-CCNC: 112 U/L (ref 35–104)
ALT SERPL W P-5'-P-CCNC: 15 U/L (ref 10–35)
ANION GAP SERPL CALCULATED.3IONS-SCNC: 10 MMOL/L (ref 7–15)
AST SERPL W P-5'-P-CCNC: 19 U/L (ref 10–35)
BASOPHILS # BLD AUTO: 0 10E3/UL (ref 0–0.2)
BASOPHILS NFR BLD AUTO: 1 %
BILIRUB SERPL-MCNC: 1 MG/DL
BUN SERPL-MCNC: 13.9 MG/DL (ref 6–20)
CALCIUM SERPL-MCNC: 9.6 MG/DL (ref 8.6–10)
CHLORIDE SERPL-SCNC: 103 MMOL/L (ref 98–107)
CREAT SERPL-MCNC: 0.57 MG/DL (ref 0.51–0.95)
DEPRECATED HCO3 PLAS-SCNC: 26 MMOL/L (ref 22–29)
EOSINOPHIL # BLD AUTO: 0.3 10E3/UL (ref 0–0.7)
EOSINOPHIL NFR BLD AUTO: 8 %
ERYTHROCYTE [DISTWIDTH] IN BLOOD BY AUTOMATED COUNT: 14.5 % (ref 10–15)
GFR SERPL CREATININE-BSD FRML MDRD: >90 ML/MIN/1.73M2
GLUCOSE SERPL-MCNC: 120 MG/DL (ref 70–99)
HCT VFR BLD AUTO: 35.8 % (ref 35–47)
HGB BLD-MCNC: 11.7 G/DL (ref 11.7–15.7)
HOLD SPECIMEN: NORMAL
IMM GRANULOCYTES # BLD: 0 10E3/UL
IMM GRANULOCYTES NFR BLD: 0 %
LYMPHOCYTES # BLD AUTO: 0.5 10E3/UL (ref 0.8–5.3)
LYMPHOCYTES NFR BLD AUTO: 15 %
MCH RBC QN AUTO: 29.1 PG (ref 26.5–33)
MCHC RBC AUTO-ENTMCNC: 32.7 G/DL (ref 31.5–36.5)
MCV RBC AUTO: 89 FL (ref 78–100)
MONOCYTES # BLD AUTO: 0.3 10E3/UL (ref 0–1.3)
MONOCYTES NFR BLD AUTO: 8 %
NEUTROPHILS # BLD AUTO: 2.2 10E3/UL (ref 1.6–8.3)
NEUTROPHILS NFR BLD AUTO: 68 %
NRBC # BLD AUTO: 0 10E3/UL
NRBC BLD AUTO-RTO: 0 /100
PLATELET # BLD AUTO: 188 10E3/UL (ref 150–450)
POTASSIUM SERPL-SCNC: 3.7 MMOL/L (ref 3.4–5.3)
PROT SERPL-MCNC: 7.1 G/DL (ref 6.4–8.3)
RBC # BLD AUTO: 4.02 10E6/UL (ref 3.8–5.2)
SODIUM SERPL-SCNC: 139 MMOL/L (ref 136–145)
WBC # BLD AUTO: 3.2 10E3/UL (ref 4–11)

## 2022-12-12 PROCEDURE — 250N000011 HC RX IP 250 OP 636: Performed by: PHYSICIAN ASSISTANT

## 2022-12-12 PROCEDURE — 258N000003 HC RX IP 258 OP 636: Performed by: PHYSICIAN ASSISTANT

## 2022-12-12 PROCEDURE — 250N000011 HC RX IP 250 OP 636: Performed by: INTERNAL MEDICINE

## 2022-12-12 PROCEDURE — G0463 HOSPITAL OUTPT CLINIC VISIT: HCPCS

## 2022-12-12 PROCEDURE — 96402 CHEMO HORMON ANTINEOPL SQ/IM: CPT

## 2022-12-12 PROCEDURE — 85025 COMPLETE CBC W/AUTO DIFF WBC: CPT

## 2022-12-12 PROCEDURE — 82040 ASSAY OF SERUM ALBUMIN: CPT

## 2022-12-12 PROCEDURE — G0463 HOSPITAL OUTPT CLINIC VISIT: HCPCS | Mod: 25

## 2022-12-12 PROCEDURE — G0463 HOSPITAL OUTPT CLINIC VISIT: HCPCS | Mod: 25 | Performed by: PHYSICIAN ASSISTANT

## 2022-12-12 PROCEDURE — 36591 DRAW BLOOD OFF VENOUS DEVICE: CPT

## 2022-12-12 PROCEDURE — 96365 THER/PROPH/DIAG IV INF INIT: CPT

## 2022-12-12 PROCEDURE — 99214 OFFICE O/P EST MOD 30 MIN: CPT | Performed by: PHYSICIAN ASSISTANT

## 2022-12-12 PROCEDURE — 84520 ASSAY OF UREA NITROGEN: CPT

## 2022-12-12 RX ORDER — HEPARIN SODIUM (PORCINE) LOCK FLUSH IV SOLN 100 UNIT/ML 100 UNIT/ML
500 SOLUTION INTRAVENOUS ONCE
Status: COMPLETED | OUTPATIENT
Start: 2022-12-12 | End: 2022-12-12

## 2022-12-12 RX ORDER — HEPARIN SODIUM,PORCINE 10 UNIT/ML
5 VIAL (ML) INTRAVENOUS
Status: CANCELLED | OUTPATIENT
Start: 2022-12-12

## 2022-12-12 RX ORDER — HEPARIN SODIUM (PORCINE) LOCK FLUSH IV SOLN 100 UNIT/ML 100 UNIT/ML
5 SOLUTION INTRAVENOUS
Status: CANCELLED | OUTPATIENT
Start: 2022-12-12

## 2022-12-12 RX ORDER — ZOLEDRONIC ACID 0.04 MG/ML
4 INJECTION, SOLUTION INTRAVENOUS ONCE
Status: CANCELLED | OUTPATIENT
Start: 2022-12-12 | End: 2022-12-12

## 2022-12-12 RX ORDER — ZOLEDRONIC ACID 0.04 MG/ML
4 INJECTION, SOLUTION INTRAVENOUS ONCE
Status: COMPLETED | OUTPATIENT
Start: 2022-12-12 | End: 2022-12-12

## 2022-12-12 RX ORDER — HEPARIN SODIUM (PORCINE) LOCK FLUSH IV SOLN 100 UNIT/ML 100 UNIT/ML
5 SOLUTION INTRAVENOUS ONCE
Status: COMPLETED | OUTPATIENT
Start: 2022-12-12 | End: 2022-12-12

## 2022-12-12 RX ADMIN — ZOLEDRONIC ACID 4 MG: 0.04 INJECTION, SOLUTION INTRAVENOUS at 12:09

## 2022-12-12 RX ADMIN — Medication 500 UNITS: at 12:30

## 2022-12-12 RX ADMIN — Medication 5 ML: at 09:33

## 2022-12-12 RX ADMIN — GOSERELIN ACETATE 3.6 MG: 3.6 IMPLANT SUBCUTANEOUS at 12:21

## 2022-12-12 RX ADMIN — SODIUM CHLORIDE 250 ML: 9 INJECTION, SOLUTION INTRAVENOUS at 11:56

## 2022-12-12 ASSESSMENT — PAIN SCALES - GENERAL: PAINLEVEL: NO PAIN (0)

## 2022-12-12 NOTE — NURSING NOTE
"Oncology Rooming Note    December 12, 2022 12:01 PM   Tori Steele is a 47 year old female who presents for:    Chief Complaint   Patient presents with     Port Draw     Labs drawn via port by RN in lab. VS taken.      Oncology Clinic Visit     Breast cancer     Initial Vitals: /71 (BP Location: Left arm, Patient Position: Sitting, Cuff Size: Adult Regular)   Pulse 107   Temp 98  F (36.7  C) (Oral)   Resp 18   Wt 59.4 kg (130 lb 14.4 oz)   SpO2 100%   BMI 23.19 kg/m   Estimated body mass index is 23.19 kg/m  as calculated from the following:    Height as of 8/29/22: 1.6 m (5' 3\").    Weight as of this encounter: 59.4 kg (130 lb 14.4 oz). Body surface area is 1.62 meters squared.  No Pain (0) Comment: Data Unavailable   No LMP recorded. (Menstrual status: Chemotherapy).  Allergies reviewed: Yes  Medications reviewed: Yes    Medications: Medication refills not needed today.  Pharmacy name entered into [facility]:    [id number] - [place], [place] - [address] AT [facility] [address] [facility] [place], [place] - [address] [place] [address] [phone]    Clinical concerns: No major changes reported.       Ashley Izquierdo LPN December 12, 2022 12:01 PM              "

## 2022-12-12 NOTE — PROGRESS NOTES
Dec 12, 2022      Reason for Visit: follow up of breast cancer     History of Present Illness: Tori Steele is a 46 year old female with breat cancer. Right-sided 5.5 cm ER positive DE positive HER-2 negative breast cancer with associated DCIS.  Her MammaPrint came back as high risk.  She consented for the I-SPY clinical trial and has been randomized to the oral paclitaxel, Encequidar and dostarlimab arm.      2/21/22 started trial with oral paclitaxel, Encequidar and dostarlimab.    3/15/22  her IV immunotherapy dostarlimab was held due to diarrhea that recovered with Imodium and time  4/4/22   Carbo was added in weekly due in hopes for more significant reduction in cancer  5/17/22 AC start  7/8/22 AC End  8/8/2022 - R. Lumpectomy with SNLBX -               -INVASIVE BREAST CARCINOMA OF NO SPECIAL TYPE (INVASIVE DUCTAL CARCINOMA), with   apocrine features, SAMANTHA GRADE 2, size 45 x 32 mm, residual after neoadjuvant systemic therapy              -Ductal carcinoma in situ (DCIS), nuclear grade 3, cribriform and solid type(s), with focal necrosis              -DCIS is admixed with invasive carcinoma, and comprises 30% of the tumor cellularity              -Invasive carcinoma is estrogen receptor positive, progesterone receptor positive and HER2   negative (score 1+) by immunohistochemistry               -METASTATIC BREAST DUCTAL CARCINOMA in one of two lymph nodes (1/2), residual after            neoadjuvant    systemic therapy              -Extranodal extension present (size 2 mm)              -[facility] residual cancer burden is 3.284 (RCB Class III).   8/29/2022 - Resection of involved margins - now negative  9/19/22 started Zoladex  10/31/22 completed XRT 21 fractions     12/12/22: starting Zometa q6m and abemaciclib/letrozole      Interval History:     Ms. Steele was seen today in person. She is doing really well, she was on a cruise over [place].  She tolerated XRT well and skin healed.       PT- shoulder is doing great, completed PT    Developed a subconjuntival hemorrhage 3 weeks ago and then again a week ago.  Has very dry eyes since chemo, has been worse.  Denies emesis, straining, etc.      Energy is good.  Has some emotions recently related to just processing the last year.      Doing well with the Zoladex; has mild light hot flashes and mild stiffness in all her joints, this improves with walking/moving.       Sleep is good, mood is stable, no new /GI issues.  No fevers or infections.        ROS:  Patient denies the following: fevers, body aches, chills, headaches, vision changes, dizziness, chest pain, shortness of breath, nausea, vomiting, diarrhea, constipation, abdominal pain, rashes, bruising/bleeding, mouth sores, swelling or pain in the legs.      Currently not taking medications.      Physical Examination:/71 (BP Location: Left arm, Patient Position: Sitting, Cuff Size: Adult Regular)   Pulse 107   Temp 98  F (36.7  C) (Oral)   Resp 18   Wt 59.4 kg (130 lb 14.4 oz)   SpO2 100%   BMI 23.19 kg/m    Wt Readings from Last 4 Encounters:   12/12/22 59.4 kg (130 lb 14.4 oz)   11/28/22 60.1 kg (132 lb 8 oz)   11/07/22 58.7 kg (129 lb 4.8 oz)   10/31/22 59.4 kg (131 lb)     Vital signs were reviewed.   Patient alert and oriented x3.   PERRLA. EOMI. No scleral icterus noted. OP without thrush/sores.  Neck exam: No palpable cervical, supraclavicular or axillary nodes bilaterally.   Heart: RRR no murmurs noted.   Lungs: clear to auscultation bilaterally.  No crackles or wheezing.   Abd: positive bowel sounds in all four quadrants.  No tenderness to palpation.  No hepatomegaly.   Extremities: No lower extremity edema.   Neuro: grossly intact.   Mood and affect is stable.   Right breast: dermatitis from radiation is dry and healing       Laboratory Data:      11/07/22 10:03 12/12/22 09:41   Sodium 139 139   Potassium 4.0 3.7   Chloride 104 103   Carbon Dioxide (CO2) 24 26   Urea  Nitrogen 10.4 13.9   Creatinine 0.59 0.57   GFR Estimate >90 >90   Calcium 9.5 9.6   Anion Gap 11 10   Albumin 3.9 4.1   Protein Total 6.9 7.1   Alkaline Phosphatase 97 112 (H)   ALT 11 15   AST 16 19   Bilirubin Total 0.4 1.0   Ferritin 14    Glucose 96 120 (H)      09/09/22 11:22 10/03/22 14:00 11/07/22 10:03 12/12/22 09:41   WBC 3.5 (L) 4.0 3.4 (L) 3.2 (L)   Hemoglobin 11.5 (L) 11.1 (L) 11.7 11.7   Hematocrit 36.4 35.0 36.5 35.8   Platelet Count 232 227 195 188   RBC Count 3.83 3.85 4.12 4.02   MCV 95 91 89 89   MCH 30.0 28.8 28.4 29.1   MCHC 31.6 31.7 32.1 32.7   RDW 13.2 13.1 13.2 14.5   % Neutrophils 66 67 65 68   % Lymphocytes 17 19 13 15   % Monocytes 7 7 10 8   % Eosinophils 10 7 11 8   % Basophils 0 0 1 1   Absolute Basophils 0.0 0.0 0.0 0.0   Absolute Eosinophils 0.3 0.3 0.4 0.3   Absolute Immature Granulocytes 0.0 0.0 0.0 0.0   Absolute Lymphocytes 0.6 (L) 0.7 (L) 0.4 (L) 0.5 (L)   Absolute Monocytes 0.2 0.3 0.3 0.3   % Immature Granulocytes 0 0 0 0   Absolute Neutrophils 2.3 2.7 2.3 2.2     I reviewed the above labs today.      PROBLEMS:     1. Right sided ER+, HI+, HER2-negative, MammaPrint high risk T=5.5cm, N=0 breast cancer. Treated on I-SPY with assigned arm of dostarlimab/oral paclitaxel. Carbo added week 3. Received ACx4. Pathologic staging showed pT2a. PN1a. ER+, HI+, HER21+, RCB=3   2. Pre-menopausal, now on Zoladex monthly tolerating this well  3. Coping       IMPRESSION: Tori completed radiation and is physically well.  We discussed aggressive endocrine treatment.  This would involve ovarian suppression, an aromatase inhibitor, and in her case abemaciclib.  She had an initial VIRGIE 67 of greater than 20% which would qualify her for this regimen.     She started her Zoladex injections, tolerating well and now that she is completed XRT she will start letrozole/abemaciclib. We discussed side effects from this combination including worsening hot flashes and joint aches, in addition to n/v/d and  low counts from the abema. She is going to see how she responds to her Zometa today/tomorrow and then get started.     In the past, we discussed BSO, but will revisit this in 2023.       PLAN:  1.  Continue monthly Zoladex.  Start letrozole/abema later this week after the zometa  2. Zometa today, and every 6 months to reduce the risk of bone metastasis and bone protection.  Will get a baseline DEXA   3. Encouraged good nutrition and exercise, discussed coping resources   4. Labs every 2 weeks on abema      Janette Mota PA-C

## 2022-12-12 NOTE — NURSING NOTE
Chief Complaint   Patient presents with     Port Draw     Labs drawn via port by RN in lab. VS taken.      Port accessed and labs drawn by RN.  Pt tolerated well.  VS taken.  Pt checked in for next appt.    Kat Martins RN

## 2022-12-12 NOTE — PROGRESS NOTES
Infusion Nursing Note:  Tori Steele presents today for Dose #1 Zometa and Dose #4 Zoladex  Patient seen and examined by Janette Mota in clinic prior to infusion    Note: Zoladex injected to RLQ of abdomen without incident.  Ice placed prior to injection.      Intravenous Access:  Peripheral IV placed in lab    Treatment Conditions:  Component      Latest Ref Rng & Units 12/12/2022   WBC      4.0 - 11.0 10e3/uL 3.2 (L)   RBC Count      3.80 - 5.20 10e6/uL 4.02   Hemoglobin      11.7 - 15.7 g/dL 11.7   Hematocrit      35.0 - 47.0 % 35.8   MCV      78 - 100 fL 89   MCH      26.5 - 33.0 pg 29.1   MCHC      31.5 - 36.5 g/dL 32.7   RDW      10.0 - 15.0 % 14.5   Platelet Count      150 - 450 10e3/uL 188   % Neutrophils      % 68   % Lymphocytes      % 15   % Monocytes      % 8   % Eosinophils      % 8   % Basophils      % 1   % Immature Granulocytes      % 0   NRBCs per 100 WBC      <1 /100 0   Absolute Neutrophils      1.6 - 8.3 10e3/uL 2.2   Absolute Lymphocytes      0.8 - 5.3 10e3/uL 0.5 (L)   Absolute Monocytes      0.0 - 1.3 10e3/uL 0.3   Absolute Eosinophils      0.0 - 0.7 10e3/uL 0.3   Absolute Basophils      0.0 - 0.2 10e3/uL 0.0   Absolute Immature Granulocytes      <=0.4 10e3/uL 0.0   Absolute NRBCs      10e3/uL 0.0   Sodium      136 - 145 mmol/L 139   Potassium      3.4 - 5.3 mmol/L 3.7   Chloride      98 - 107 mmol/L 103   Carbon Dioxide (CO2)      22 - 29 mmol/L 26   Anion Gap      7 - 15 mmol/L 10   Urea Nitrogen      6.0 - 20.0 mg/dL 13.9   Creatinine      0.51 - 0.95 mg/dL 0.57   Calcium      8.6 - 10.0 mg/dL 9.6   Glucose      70 - 99 mg/dL 120 (H)   Alkaline Phosphatase      35 - 104 U/L 112 (H)   AST      10 - 35 U/L 19   ALT      10 - 35 U/L 15   Protein Total      6.4 - 8.3 g/dL 7.1   Albumin      3.5 - 5.2 g/dL 4.1   Bilirubin Total      <=1.2 mg/dL 1.0   GFR Estimate      >60 mL/min/1.73m2 >90     Results reviewed, labs MET treatment parameters, ok to proceed with treatment.    Post Infusion  Assessment:  Patient tolerated zometa without incident.     Discharge Plan:     Patient declined prescription refills.    AVS to patient via [facility].  Patient will return 1/9/22 for zoladex injection      Zee Portillo RN

## 2022-12-21 ENCOUNTER — TELEPHONE (OUTPATIENT)
Dept: ONCOLOGY | Facility: CLINIC | Age: 47
End: 2022-12-21

## 2022-12-21 NOTE — ORAL ONC MGMT
"Oral Chemotherapy Monitoring Program    Placed call to Tori in follow up of oral chemotherapy for initial assessment after starting abemaciclib. Left message requesting call back. No medication names mentioned.     Oscar Bentley, PharmD  Hematology/Oncology Clinical Pharmacist  [facility]  [facility]    Oral Chemotherapy Monitoring Program    Subjective/Objective:  Tori Steele is a 47 year old female contacted by phone for a follow-up visit for oral chemotherapy.  Tori returned phone call and completed initial follow-up assessment. Tori confirmed taking the correct dose of abemaciclib 150mg twice daily and letrozole 2.5mg once daily. She started Thursday 12/15/2022 and denies missing any doses since starting. Denies starting any other medications or medication changes. Denies ED/hospitalization visits. She reports some joint pain when starting the first few days but has subsided over the past 1-2 days. She reports a \"feeling in the stomach\" and has some nausea, but has not needed to take any antinausea medication. She reports softer stools and denies any diarrhea. Has not needed any loperamide. Overall she feels the first week has gone well. Discussed labs at 2 weeks. Agreeable to change labs to 12/28 at the same time. Sent inbasket to scheduling to change. No questions or concerns today.     ORAL CHEMOTHERAPY 8/23/2022 12/5/2022 12/21/2022 12/21/2022   Assessment Type Initial Work up New Teach;Refill Left Voicemail Initial Follow up   Diagnosis Code Breast Cancer Breast Cancer Breast Cancer Breast Cancer   Providers Dr. Cathryn Lockett   [facility]   Drug Name Verzenio (abemaciclib) Verzenio (abemaciclib) Verzenio (abemaciclib) Verzenio (abemaciclib)   Dose 150 mg 150 mg 150 mg 150 mg   Current Schedule BID BID BID BID   Cycle Details Continuous Continuous Continuous Continuous   Start Date of Last Cycle - - " "12/15/2022 12/15/2022   Planned next cycle start date - 12/12/2022 1/12/2023 1/12/2022   Doses missed in last 2 weeks - - - 0   Adherence Assessment - - - Adherent   Adverse Effects - - - No AE identified during assessment   Any new drug interactions? No - - No   Is the dose as ordered appropriate for the patient? Yes - - Yes   Since the last time we talked, have you been hospitalized or used the emergency room? - - - No       Last PHQ-2 Score on record:   PHQ-2 ( [facility]) 10/2/2020 9/3/2020   Q1: Little interest or pleasure in doing things 0 0   Q2: Feeling down, depressed or hopeless 0 0   PHQ-2 Score 0 0   PHQ-2 Total Score (12-17 Years)- Positive if 3 or more points; Administer PHQ-A if positive 0 0       Vitals:  BP:   BP Readings from Last 1 Encounters:   12/12/22 101/71     Wt Readings from Last 1 Encounters:   12/12/22 59.4 kg (130 lb 14.4 oz)     Estimated body surface area is 1.62 meters squared as calculated from the following:    Height as of 8/29/22: 1.6 m (5' 3\").    Weight as of 12/12/22: 59.4 kg (130 lb 14.4 oz).    Labs:  _  Result Component Current Result Ref Range   Sodium 139 (12/12/2022) 136 - 145 mmol/L     _  Result Component Current Result Ref Range   Potassium 3.7 (12/12/2022) 3.4 - 5.3 mmol/L   _  Result Component Current Result Ref Range   Calcium 9.6 (12/12/2022) 8.6 - 10.0 mg/dL   _  Result Component Current Result Ref Range   Albumin 4.1 (12/12/2022) 3.5 - 5.2 g/dL   _  Result Component Current Result Ref Range   Urea Nitrogen 13.9 (12/12/2022) 6.0 - 20.0 mg/dL   _  Result Component Current Result Ref Range   Creatinine 0.57 (12/12/2022) 0.51 - 0.95 mg/dL   _  Result Component Current Result Ref Range   AST 19 (12/12/2022) 10 - 35 U/L   _  Result Component Current Result Ref Range   ALT 15 (12/12/2022) 10 - 35 U/L     Result Component Current Result Ref Range   Bilirubin Total 1.0 (12/12/2022) <=1.2 mg/dL   _  Result Component Current Result Ref Range   WBC Count 3.2 (L) " (12/12/2022) 4.0 - 11.0 10e3/uL   _  Result Component Current Result Ref Range   Hemoglobin 11.7 (12/12/2022) 11.7 - 15.7 g/dL   _  Result Component Current Result Ref Range   Platelet Count 188 (12/12/2022) 150 - 450 10e3/uL   _  Result Component Current Result Ref Range   Absolute Neutrophils 2.2 (12/12/2022) 1.6 - 8.3 10e3/uL      Assessment/Plan:  Cycle 1 start 12/15/2023. Patient reports mild nausea and softer stools since starting, not requiring any anti-nausea or anti-diarrheal medication intervention. Joint pain has subsided since starting. Continue plan of care. Sent inbasket to scheduling to change labs to day 14 (12/28 at 1015). Patient aware of change.     Follow-Up:  12/28: labs at 1015    Refill Due:  Cycle 2 due to start 1/12/2023    Oscar Bentley, PharmD  Hematology/Oncology Clinical Pharmacist  [facility]  [facility]

## 2022-12-28 ENCOUNTER — LAB (OUTPATIENT)
Dept: LAB | Facility: CLINIC | Age: 47
End: 2022-12-28
Attending: INTERNAL MEDICINE
Payer: OTHER GOVERNMENT

## 2022-12-28 ENCOUNTER — TELEPHONE (OUTPATIENT)
Dept: ONCOLOGY | Facility: CLINIC | Age: 47
End: 2022-12-28

## 2022-12-28 DIAGNOSIS — C50.911 INVASIVE DUCTAL CARCINOMA OF RIGHT BREAST (H): ICD-10-CM

## 2022-12-28 LAB
ALBUMIN SERPL BCG-MCNC: 4 G/DL (ref 3.5–5.2)
ALP SERPL-CCNC: 107 U/L (ref 35–104)
ALT SERPL W P-5'-P-CCNC: 10 U/L (ref 10–35)
ANION GAP SERPL CALCULATED.3IONS-SCNC: 8 MMOL/L (ref 7–15)
AST SERPL W P-5'-P-CCNC: 14 U/L (ref 10–35)
BASOPHILS # BLD AUTO: 0 10E3/UL (ref 0–0.2)
BASOPHILS NFR BLD AUTO: 1 %
BILIRUB SERPL-MCNC: 0.5 MG/DL
BUN SERPL-MCNC: 8.8 MG/DL (ref 6–20)
CALCIUM SERPL-MCNC: 8.8 MG/DL (ref 8.6–10)
CHLORIDE SERPL-SCNC: 107 MMOL/L (ref 98–107)
CREAT SERPL-MCNC: 0.68 MG/DL (ref 0.51–0.95)
DEPRECATED HCO3 PLAS-SCNC: 26 MMOL/L (ref 22–29)
EOSINOPHIL # BLD AUTO: 0.1 10E3/UL (ref 0–0.7)
EOSINOPHIL NFR BLD AUTO: 6 %
ERYTHROCYTE [DISTWIDTH] IN BLOOD BY AUTOMATED COUNT: 14.9 % (ref 10–15)
GFR SERPL CREATININE-BSD FRML MDRD: >90 ML/MIN/1.73M2
GLUCOSE SERPL-MCNC: 104 MG/DL (ref 70–99)
HCT VFR BLD AUTO: 37.7 % (ref 35–47)
HGB BLD-MCNC: 12 G/DL (ref 11.7–15.7)
IMM GRANULOCYTES # BLD: 0 10E3/UL
IMM GRANULOCYTES NFR BLD: 1 %
LYMPHOCYTES # BLD AUTO: 0.5 10E3/UL (ref 0.8–5.3)
LYMPHOCYTES NFR BLD AUTO: 21 %
MCH RBC QN AUTO: 29.5 PG (ref 26.5–33)
MCHC RBC AUTO-ENTMCNC: 31.8 G/DL (ref 31.5–36.5)
MCV RBC AUTO: 93 FL (ref 78–100)
MONOCYTES # BLD AUTO: 0.1 10E3/UL (ref 0–1.3)
MONOCYTES NFR BLD AUTO: 6 %
NEUTROPHILS # BLD AUTO: 1.5 10E3/UL (ref 1.6–8.3)
NEUTROPHILS NFR BLD AUTO: 65 %
NRBC # BLD AUTO: 0 10E3/UL
NRBC BLD AUTO-RTO: 0 /100
PLATELET # BLD AUTO: 198 10E3/UL (ref 150–450)
POTASSIUM SERPL-SCNC: 3.7 MMOL/L (ref 3.4–5.3)
PROT SERPL-MCNC: 7.2 G/DL (ref 6.4–8.3)
RBC # BLD AUTO: 4.07 10E6/UL (ref 3.8–5.2)
SODIUM SERPL-SCNC: 141 MMOL/L (ref 136–145)
WBC # BLD AUTO: 2.2 10E3/UL (ref 4–11)

## 2022-12-28 PROCEDURE — 85025 COMPLETE CBC W/AUTO DIFF WBC: CPT

## 2022-12-28 PROCEDURE — 80053 COMPREHEN METABOLIC PANEL: CPT

## 2022-12-28 PROCEDURE — 36415 COLL VENOUS BLD VENIPUNCTURE: CPT

## 2022-12-28 NOTE — NURSING NOTE
Chief Complaint   Patient presents with     Blood Draw     Labs obtained via venipuncture 23 gauge butterfly needle

## 2022-12-28 NOTE — TELEPHONE ENCOUNTER
Oral Chemotherapy Monitoring Program  Lab Follow Up    Reviewed lab results from 12/28/2022.    ORAL CHEMOTHERAPY 8/23/2022 12/5/2022 12/21/2022 12/21/2022 12/28/2022   Assessment Type Initial Work up New Teach;Refill Left Voicemail Initial Follow up Lab Monitoring   Diagnosis Code Breast Cancer Breast Cancer Breast Cancer Breast Cancer Breast Cancer   Providers Dr. Cathryn Lockett   [facility]   Drug Name Verzenio (abemaciclib) Verzenio (abemaciclib) Verzenio (abemaciclib) Verzenio (abemaciclib) Verzenio (abemaciclib)   Dose 150 mg 150 mg 150 mg 150 mg 150 mg   Current Schedule BID BID BID BID BID   Cycle Details Continuous Continuous Continuous Continuous Continuous   Start Date of Last Cycle - - 12/15/2022 12/15/2022 12/15/2022   Planned next cycle start date - 12/12/2022 1/12/2023 1/12/2022 1/12/2022   Doses missed in last 2 weeks - - - 0 -   Adherence Assessment - - - Adherent -   Adverse Effects - - - No AE identified during assessment -   Any new drug interactions? No - - No -   Is the dose as ordered appropriate for the patient? Yes - - Yes -   Since the last time we talked, have you been hospitalized or used the emergency room? - - - No -       Labs:  _  Result Component Current Result Ref Range   Sodium 141 (12/28/2022) 136 - 145 mmol/L     _  Result Component Current Result Ref Range   Potassium 3.7 (12/28/2022) 3.4 - 5.3 mmol/L     _  Result Component Current Result Ref Range   Calcium 8.8 (12/28/2022) 8.6 - 10.0 mg/dL     No results found for Mag within last 30 days.     No results found for Phos within last 30 days.     _  Result Component Current Result Ref Range   Albumin 4.0 (12/28/2022) 3.5 - 5.2 g/dL     _  Result Component Current Result Ref Range   Urea Nitrogen 8.8 (12/28/2022) 6.0 - 20.0 mg/dL     _  Result Component Current Result Ref Range   Creatinine 0.68 (12/28/2022) 0.51 - 0.95 mg/dL     _  Result Component Current  Result Ref Range   AST 14 (12/28/2022) 10 - 35 U/L     _  Result Component Current Result Ref Range   ALT 10 (12/28/2022) 10 - 35 U/L     _  Result Component Current Result Ref Range   Bilirubin Total 0.5 (12/28/2022) <=1.2 mg/dL     _  Result Component Current Result Ref Range   WBC Count 2.2 (L) (12/28/2022) 4.0 - 11.0 10e3/uL     _  Result Component Current Result Ref Range   Hemoglobin 12.0 (12/28/2022) 11.7 - 15.7 g/dL     _  Result Component Current Result Ref Range   Platelet Count 198 (12/28/2022) 150 - 450 10e3/uL     No results found for ANC within last 30 days.     _  Result Component Current Result Ref Range   Absolute Neutrophils 1.5 (L) (12/28/2022) 1.6 - 8.3 10e3/uL        Assessment & Plan:  No concerning abnormalities. ANC decreased to 1.5 but no changes necessary at this time. Continue to monitor.    Questions answered to patient's satisfaction.    Follow-Up:  Review appointment with Dr. Lockett 1/6/2022.    Landry Hair, Pharmacy Intern  Oral Chemotherapy Monitoring Program  [phone]

## 2023-01-05 ENCOUNTER — DOCUMENTATION ONLY (OUTPATIENT)
Dept: ONCOLOGY | Facility: CLINIC | Age: 48
End: 2023-01-05

## 2023-01-05 DIAGNOSIS — C50.911 INVASIVE DUCTAL CARCINOMA OF RIGHT BREAST (H): Primary | ICD-10-CM

## 2023-01-06 ENCOUNTER — VIRTUAL VISIT (OUTPATIENT)
Dept: ONCOLOGY | Facility: CLINIC | Age: 48
End: 2023-01-06
Attending: INTERNAL MEDICINE
Payer: OTHER GOVERNMENT

## 2023-01-06 DIAGNOSIS — C50.111 MALIGNANT NEOPLASM OF CENTRAL PORTION OF RIGHT BREAST IN FEMALE, ESTROGEN RECEPTOR POSITIVE (H): Primary | ICD-10-CM

## 2023-01-06 DIAGNOSIS — Z17.0 MALIGNANT NEOPLASM OF CENTRAL PORTION OF RIGHT BREAST IN FEMALE, ESTROGEN RECEPTOR POSITIVE (H): Primary | ICD-10-CM

## 2023-01-06 PROCEDURE — 99213 OFFICE O/P EST LOW 20 MIN: CPT | Mod: 95 | Performed by: INTERNAL MEDICINE

## 2023-01-06 NOTE — LETTER
1/6/2023         RE: Tori Steele  [address] [address]        Dear Colleague,    Thank you for referring your patient, Tori Steele, to the [facility]. Please see a copy of my visit note below.    Tori is a 47 year old who is being evaluated via a billable video visit.      Patient stated se is in the state of [place] for the visit today.    How would you like to obtain your AVS? MyChart  If the video visit is dropped, the invitation should be resent by: Send to e-mail at: [email or web address]  Will anyone else be joining your video visit? Aida Salas, Virtual Visit Facilitator      Video-Visit Details    Type of service:  Video Visit   Video Start Time: 1108  Video End Time: 1119    Originating Location (pt. Location): Home  Distant Location (provider location):  On Site  Platform used for Video Visit: Dharmesh           Jan 6, 2023      Reason for Visit: follow up of breast cancer     History of Present Illness: Tori Steele is a 46 year old female with breat cancer. Right-sided 5.5 cm ER positive MS positive HER-2 negative breast cancer with associated DCIS.  Her MammaPrint came back as high risk.  She consented for the I-SPY clinical trial and has been randomized to the oral paclitaxel, Encequidar and dostarlimab arm.      2/21/22 started trial with oral paclitaxel, Encequidar and dostarlimab.    3/15/22  her IV immunotherapy dostarlimab was held due to diarrhea that recovered with Imodium and time  4/4/22   Carbo was added in weekly due in hopes for more significant reduction in cancer  5/17/22 AC start  7/8/22 AC End  8/8/2022 - R. Lumpectomy with SNLBX -               -INVASIVE BREAST CARCINOMA OF NO SPECIAL TYPE (INVASIVE DUCTAL CARCINOMA), with   apocrine features, SAMANTHA GRADE 2, size 45 x 32 mm, residual after neoadjuvant systemic therapy              -Ductal carcinoma in situ (DCIS), nuclear grade 3, cribriform and solid type(s),  with focal necrosis              -DCIS is admixed with invasive carcinoma, and comprises 30% of the tumor cellularity              -Invasive carcinoma is estrogen receptor positive, progesterone receptor positive and HER2   negative (score 1+) by immunohistochemistry               -METASTATIC BREAST DUCTAL CARCINOMA in one of two lymph nodes (1/2), residual after            neoadjuvant    systemic therapy              -Extranodal extension present (size 2 mm)              -[facility] residual cancer burden is 3.284 (RCB Class III).   8/29/2022 - Resection of involved margins - now negative  9/19/22 started Zoladex  10/31/22 completed XRT 21 fractions     12/12/22: starting Zometa q6m and abemaciclib/letrozole      Interval History:     Ms. Steele was seen t by video visit today.  She continues to do very well.  She had a visit to a [employer] and [place] and was fully active.  She does state that she may be overdid some activities such as bike riding but had no localized limitations in her overall function and wellbeing.    As noted she started her abemaciclib and letrozole just about 3 weeks ago.  She states her stools are little bit loose but she is not having diarrhea and nothing that she is taken Imodium for.  She is not having musculoskeletal aches and pains thus far and is hopefully tolerating things very well.      ROS otherwise unremarkable    Patient denies the following: fevers, body aches, chills, headaches, vision changes, dizziness, chest pain, shortness of breath, nausea, vomiting, diarrhea, constipation, abdominal pain, rashes, bruising/bleeding, mouth sores, swelling or pain in the legs.      Currently not taking medications.      Physical Examination: She looked well on the video call, hair is growing back  Wt Readings from Last 4 Encounters:   12/12/22 59.4 kg (130 lb 14.4 oz)   11/28/22 60.1 kg (132 lb 8 oz)   11/07/22 58.7 kg (129 lb 4.8 oz)   10/31/22 59.4 kg (131 lb)        Laboratory  Data:     Latest Reference Range & Units 12/28/22 08:06   Sodium 136 - 145 mmol/L 141   Potassium 3.4 - 5.3 mmol/L 3.7   Chloride 98 - 107 mmol/L 107   Carbon Dioxide (CO2) 22 - 29 mmol/L 26   Urea Nitrogen 6.0 - 20.0 mg/dL 8.8   Creatinine 0.51 - 0.95 mg/dL 0.68   GFR Estimate >60 mL/min/1.73m2 >90   Calcium 8.6 - 10.0 mg/dL 8.8   Anion Gap 7 - 15 mmol/L 8   Albumin 3.5 - 5.2 g/dL 4.0   Protein Total 6.4 - 8.3 g/dL 7.2   Alkaline Phosphatase 35 - 104 U/L 107 (H)   ALT 10 - 35 U/L 10   AST 10 - 35 U/L 14   Bilirubin Total <=1.2 mg/dL 0.5   Glucose 70 - 99 mg/dL 104 (H)   WBC 4.0 - 11.0 10e3/uL 2.2 (L)   Hemoglobin 11.7 - 15.7 g/dL 12.0   Hematocrit 35.0 - 47.0 % 37.7   Platelet Count 150 - 450 10e3/uL 198   RBC Count 3.80 - 5.20 10e6/uL 4.07   MCV 78 - 100 fL 93   MCH 26.5 - 33.0 pg 29.5   MCHC 31.5 - 36.5 g/dL 31.8   RDW 10.0 - 15.0 % 14.9   % Neutrophils % 65   % Lymphocytes % 21   % Monocytes % 6   % Eosinophils % 6   % Basophils % 1   Absolute Basophils 0.0 - 0.2 10e3/uL 0.0   Absolute Eosinophils 0.0 - 0.7 10e3/uL 0.1   Absolute Immature Granulocytes <=0.4 10e3/uL 0.0   Absolute Lymphocytes 0.8 - 5.3 10e3/uL 0.5 (L)   Absolute Monocytes 0.0 - 1.3 10e3/uL 0.1   % Immature Granulocytes % 1   Absolute Neutrophils 1.6 - 8.3 10e3/uL 1.5 (L)   Absolute NRBCs 10e3/uL 0.0   NRBCs per 100 WBC <1 /100 0   (H): Data is abnormally high  (L): Data is abnormally low      I reviewed the above labs today.      PROBLEMS:     1. Right sided ER+, AK+, HER2-negative, MammaPrint high risk T=5.5cm, N=0 breast cancer. Treated on I-SPY with assigned arm of dostarlimab/oral paclitaxel. Carbo added week 3. Received ACx4. Pathologic staging showed pT2a. PN1a. ER+, AK+, HER21+, RCB=3   2. Pre-menopausal, now on Zoladex monthly tolerating this well  3. Minimal elevation in Alk Phos       IMPRESSION: She is very early on in her adjuvant therapy treatment but so far is tolerated well.  We will continue to see her on monthly intervals for a  short period of time while she gets adjusted to her therapy.    We do need to keep an eye on her liver function tests.       PLAN:  1.  Continue monthly Zoladex.    2. Continue  letrozole/abema   3. Continue to check labs q 2 weeks during initial abema therapy.  4. RTC in 1 month, can see Ms. Mota then      Keegan Lockett MD

## 2023-01-06 NOTE — PROGRESS NOTES
Tori is a 47 year old who is being evaluated via a billable video visit.      Patient stated se is in the state of [place] for the visit today.    How would you like to obtain your AVS? MyChart  If the video visit is dropped, the invitation should be resent by: Send to e-mail at: [email or web address]  Will anyone else be joining your video visit? Aida Salas, Virtual Visit Facilitator      Video-Visit Details    Type of service:  Video Visit   Video Start Time: 1108  Video End Time: 1119    Originating Location (pt. Location): Home  Distant Location (provider location):  On Site  Platform used for Video Visit: Yeelion           Jan 6, 2023      Reason for Visit: follow up of breast cancer     History of Present Illness: Tori Steele is a 46 year old female with breat cancer. Right-sided 5.5 cm ER positive NM positive HER-2 negative breast cancer with associated DCIS.  Her MammaPrint came back as high risk.  She consented for the I-SPY clinical trial and has been randomized to the oral paclitaxel, Encequidar and dostarlimab arm.      2/21/22 started trial with oral paclitaxel, Encequidar and dostarlimab.    3/15/22  her IV immunotherapy dostarlimab was held due to diarrhea that recovered with Imodium and time  4/4/22   Carbo was added in weekly due in hopes for more significant reduction in cancer  5/17/22 AC start  7/8/22 AC End  8/8/2022 - R. Lumpectomy with SNLBX -               -INVASIVE BREAST CARCINOMA OF NO SPECIAL TYPE (INVASIVE DUCTAL CARCINOMA), with   apocrine features, SAMANTHA GRADE 2, size 45 x 32 mm, residual after neoadjuvant systemic therapy              -Ductal carcinoma in situ (DCIS), nuclear grade 3, cribriform and solid type(s), with focal necrosis              -DCIS is admixed with invasive carcinoma, and comprises 30% of the tumor cellularity              -Invasive carcinoma is estrogen receptor positive, progesterone receptor positive and HER2   negative (score 1+) by  immunohistochemistry               -METASTATIC BREAST DUCTAL CARCINOMA in one of two lymph nodes (1/2), residual after            neoadjuvant    systemic therapy              -Extranodal extension present (size 2 mm)              -[facility] residual cancer burden is 3.284 (RCB Class III).   8/29/2022 - Resection of involved margins - now negative  9/19/22 started Zoladex  10/31/22 completed XRT 21 fractions     12/12/22: starting Zometa q6m and abemaciclib/letrozole      Interval History:     Ms. Steele was seen t by video visit today.  She continues to do very well.  She had a visit to a [place] and [place] and was fully active.  She does state that she may be overdid some activities such as bike riding but had no localized limitations in her overall function and wellbeing.    As noted she started her abemaciclib and letrozole just about 3 weeks ago.  She states her stools are little bit loose but she is not having diarrhea and nothing that she is taken Imodium for.  She is not having musculoskeletal aches and pains thus far and is hopefully tolerating things very well.      ROS otherwise unremarkable    Patient denies the following: fevers, body aches, chills, headaches, vision changes, dizziness, chest pain, shortness of breath, nausea, vomiting, diarrhea, constipation, abdominal pain, rashes, bruising/bleeding, mouth sores, swelling or pain in the legs.      Currently not taking medications.      Physical Examination: She looked well on the video call, hair is growing back  Wt Readings from Last 4 Encounters:   12/12/22 59.4 kg (130 lb 14.4 oz)   11/28/22 60.1 kg (132 lb 8 oz)   11/07/22 58.7 kg (129 lb 4.8 oz)   10/31/22 59.4 kg (131 lb)        Laboratory Data:     Latest Reference Range & Units 12/28/22 08:06   Sodium 136 - 145 mmol/L 141   Potassium 3.4 - 5.3 mmol/L 3.7   Chloride 98 - 107 mmol/L 107   Carbon Dioxide (CO2) 22 - 29 mmol/L 26   Urea Nitrogen 6.0 - 20.0 mg/dL 8.8   Creatinine 0.51 -  0.95 mg/dL 0.68   GFR Estimate >60 mL/min/1.73m2 >90   Calcium 8.6 - 10.0 mg/dL 8.8   Anion Gap 7 - 15 mmol/L 8   Albumin 3.5 - 5.2 g/dL 4.0   Protein Total 6.4 - 8.3 g/dL 7.2   Alkaline Phosphatase 35 - 104 U/L 107 (H)   ALT 10 - 35 U/L 10   AST 10 - 35 U/L 14   Bilirubin Total <=1.2 mg/dL 0.5   Glucose 70 - 99 mg/dL 104 (H)   WBC 4.0 - 11.0 10e3/uL 2.2 (L)   Hemoglobin 11.7 - 15.7 g/dL 12.0   Hematocrit 35.0 - 47.0 % 37.7   Platelet Count 150 - 450 10e3/uL 198   RBC Count 3.80 - 5.20 10e6/uL 4.07   MCV 78 - 100 fL 93   MCH 26.5 - 33.0 pg 29.5   MCHC 31.5 - 36.5 g/dL 31.8   RDW 10.0 - 15.0 % 14.9   % Neutrophils % 65   % Lymphocytes % 21   % Monocytes % 6   % Eosinophils % 6   % Basophils % 1   Absolute Basophils 0.0 - 0.2 10e3/uL 0.0   Absolute Eosinophils 0.0 - 0.7 10e3/uL 0.1   Absolute Immature Granulocytes <=0.4 10e3/uL 0.0   Absolute Lymphocytes 0.8 - 5.3 10e3/uL 0.5 (L)   Absolute Monocytes 0.0 - 1.3 10e3/uL 0.1   % Immature Granulocytes % 1   Absolute Neutrophils 1.6 - 8.3 10e3/uL 1.5 (L)   Absolute NRBCs 10e3/uL 0.0   NRBCs per 100 WBC <1 /100 0   (H): Data is abnormally high  (L): Data is abnormally low      I reviewed the above labs today.      PROBLEMS:     1. Right sided ER+, NH+, HER2-negative, MammaPrint high risk T=5.5cm, N=0 breast cancer. Treated on I-SPY with assigned arm of dostarlimab/oral paclitaxel. Carbo added week 3. Received ACx4. Pathologic staging showed pT2a. PN1a. ER+, NH+, HER21+, RCB=3   2. Pre-menopausal, now on Zoladex monthly tolerating this well  3. Minimal elevation in Alk Phos       IMPRESSION: She is very early on in her adjuvant therapy treatment but so far is tolerated well.  We will continue to see her on monthly intervals for a short period of time while she gets adjusted to her therapy.    We do need to keep an eye on her liver function tests.       PLAN:  1.  Continue monthly Zoladex.    2. Continue  letrozole/abema   3. Continue to check labs q 2 weeks during initial  abema therapy.  4. RTC in 1 month, can see Ms. Mota then      Keegan Lockett MD

## 2023-01-06 NOTE — NURSING NOTE
Patient verified medications and allergies are correct via eCheck-in. Patient confirms no changes at this time and/or since last reviewed by clinic staff.    Anaya Salas, Virtual Facilitator

## 2023-01-09 ENCOUNTER — INFUSION THERAPY VISIT (OUTPATIENT)
Dept: ONCOLOGY | Facility: CLINIC | Age: 48
End: 2023-01-09
Attending: INTERNAL MEDICINE
Payer: OTHER GOVERNMENT

## 2023-01-09 ENCOUNTER — APPOINTMENT (OUTPATIENT)
Dept: LAB | Facility: CLINIC | Age: 48
End: 2023-01-09
Attending: INTERNAL MEDICINE
Payer: OTHER GOVERNMENT

## 2023-01-09 VITALS
RESPIRATION RATE: 16 BRPM | OXYGEN SATURATION: 98 % | BODY MASS INDEX: 23.52 KG/M2 | DIASTOLIC BLOOD PRESSURE: 60 MMHG | WEIGHT: 132.8 LBS | TEMPERATURE: 97 F | HEART RATE: 91 BPM | SYSTOLIC BLOOD PRESSURE: 101 MMHG

## 2023-01-09 DIAGNOSIS — C50.911 INVASIVE DUCTAL CARCINOMA OF RIGHT BREAST (H): Primary | ICD-10-CM

## 2023-01-09 LAB
ALBUMIN SERPL BCG-MCNC: 3.8 G/DL (ref 3.5–5.2)
ALP SERPL-CCNC: 98 U/L (ref 35–104)
ALT SERPL W P-5'-P-CCNC: 10 U/L (ref 10–35)
ANION GAP SERPL CALCULATED.3IONS-SCNC: 7 MMOL/L (ref 7–15)
AST SERPL W P-5'-P-CCNC: 13 U/L (ref 10–35)
BASOPHILS # BLD AUTO: 0 10E3/UL (ref 0–0.2)
BASOPHILS NFR BLD AUTO: 1 %
BILIRUB SERPL-MCNC: 0.6 MG/DL
BUN SERPL-MCNC: 8.9 MG/DL (ref 6–20)
CALCIUM SERPL-MCNC: 8.6 MG/DL (ref 8.6–10)
CHLORIDE SERPL-SCNC: 109 MMOL/L (ref 98–107)
CREAT SERPL-MCNC: 0.64 MG/DL (ref 0.51–0.95)
DEPRECATED HCO3 PLAS-SCNC: 25 MMOL/L (ref 22–29)
EOSINOPHIL # BLD AUTO: 0.1 10E3/UL (ref 0–0.7)
EOSINOPHIL NFR BLD AUTO: 3 %
ERYTHROCYTE [DISTWIDTH] IN BLOOD BY AUTOMATED COUNT: 15.2 % (ref 10–15)
GFR SERPL CREATININE-BSD FRML MDRD: >90 ML/MIN/1.73M2
GLUCOSE SERPL-MCNC: 106 MG/DL (ref 70–99)
HCT VFR BLD AUTO: 34.3 % (ref 35–47)
HGB BLD-MCNC: 11 G/DL (ref 11.7–15.7)
IMM GRANULOCYTES # BLD: 0 10E3/UL
IMM GRANULOCYTES NFR BLD: 0 %
LYMPHOCYTES # BLD AUTO: 0.4 10E3/UL (ref 0.8–5.3)
LYMPHOCYTES NFR BLD AUTO: 20 %
MCH RBC QN AUTO: 30.5 PG (ref 26.5–33)
MCHC RBC AUTO-ENTMCNC: 32.1 G/DL (ref 31.5–36.5)
MCV RBC AUTO: 95 FL (ref 78–100)
MONOCYTES # BLD AUTO: 0.2 10E3/UL (ref 0–1.3)
MONOCYTES NFR BLD AUTO: 9 %
NEUTROPHILS # BLD AUTO: 1.4 10E3/UL (ref 1.6–8.3)
NEUTROPHILS NFR BLD AUTO: 67 %
NRBC # BLD AUTO: 0 10E3/UL
NRBC BLD AUTO-RTO: 0 /100
PLATELET # BLD AUTO: 155 10E3/UL (ref 150–450)
POTASSIUM SERPL-SCNC: 3.7 MMOL/L (ref 3.4–5.3)
PROT SERPL-MCNC: 6.8 G/DL (ref 6.4–8.3)
RBC # BLD AUTO: 3.61 10E6/UL (ref 3.8–5.2)
SODIUM SERPL-SCNC: 141 MMOL/L (ref 136–145)
WBC # BLD AUTO: 2 10E3/UL (ref 4–11)

## 2023-01-09 PROCEDURE — 80053 COMPREHEN METABOLIC PANEL: CPT

## 2023-01-09 PROCEDURE — 85025 COMPLETE CBC W/AUTO DIFF WBC: CPT

## 2023-01-09 PROCEDURE — 36591 DRAW BLOOD OFF VENOUS DEVICE: CPT

## 2023-01-09 PROCEDURE — 250N000011 HC RX IP 250 OP 636: Performed by: INTERNAL MEDICINE

## 2023-01-09 PROCEDURE — 96402 CHEMO HORMON ANTINEOPL SQ/IM: CPT

## 2023-01-09 RX ORDER — HEPARIN SODIUM (PORCINE) LOCK FLUSH IV SOLN 100 UNIT/ML 100 UNIT/ML
5 SOLUTION INTRAVENOUS ONCE
Status: COMPLETED | OUTPATIENT
Start: 2023-01-09 | End: 2023-01-09

## 2023-01-09 RX ADMIN — Medication 5 ML: at 09:36

## 2023-01-09 RX ADMIN — GOSERELIN ACETATE 3.6 MG: 3.6 IMPLANT SUBCUTANEOUS at 10:02

## 2023-01-09 ASSESSMENT — PAIN SCALES - GENERAL: PAINLEVEL: NO PAIN (0)

## 2023-01-09 NOTE — PATIENT INSTRUCTIONS
[facility] Triage and after hours / weekends / holidays:  [phone]    Please call the triage or after hours line if you experience a temperature greater than or equal to 100.4, shaking chills, have uncontrolled nausea, vomiting and/or diarrhea, dizziness, shortness of breath, chest pain, bleeding, unexplained bruising, or if you have any other new/concerning symptoms, questions or concerns.      If you are having any concerning symptoms or wish to speak to a provider before your next infusion visit, please call your care coordinator or triage to notify them so we can adequately serve you.     If you need a refill on a narcotic prescription or other medication, please call before your infusion appointment.                January 2023 Sunday Monday Tuesday Wednesday Thursday Friday Saturday   1     2     3     4     5     6    VIDEO VISIT RETURN  10:45 AM   (30 min.)   Keegan Lockett MD   [facility] 7       8     9    LAB CENTRAL   9:15 AM   (15 min.)   [facility] LAB DRAW   [facility]    ONC INFUSION 0.5 HR (30 MIN)  10:00 AM   (30 min.)    ONC INFUSION NURSE   [facility] 10     11     12     13     14       15     16     17     18     19     20     21       22     23    LAB CENTRAL   9:00 AM   (15 min.)   [facility] DRAW   [facility] 24     25     26     27     28       29     30     31                                       February 2023 Sunday Monday Tuesday Wednesday Thursday Friday Saturday                  1     2     3     4       5     6    LAB CENTRAL   9:00 AM   (15 min.)   [facility] LAB DRAW   [facility]    RETURN   9:15 AM   (45 min.)   Milka Mota PA-C   [facility]    ONC INFUSION 0.5 HR (30 MIN)  11:30 AM   (30 min.)    ONC INFUSION NURSE   [facility] 7     8     9     10      11       12     13     14     15     16     17     18       19     20     21     22     23     24     25       26     27     28                                            Recent Results (from the past 24 hour(s))   Comprehensive metabolic panel    Collection Time: 01/09/23  9:31 AM   Result Value Ref Range    Sodium 141 136 - 145 mmol/L    Potassium 3.7 3.4 - 5.3 mmol/L    Chloride 109 (H) 98 - 107 mmol/L    Carbon Dioxide (CO2) 25 22 - 29 mmol/L    Anion Gap 7 7 - 15 mmol/L    Urea Nitrogen 8.9 6.0 - 20.0 mg/dL    Creatinine 0.64 0.51 - 0.95 mg/dL    Calcium 8.6 8.6 - 10.0 mg/dL    Glucose 106 (H) 70 - 99 mg/dL    Alkaline Phosphatase 98 35 - 104 U/L    AST 13 10 - 35 U/L    ALT 10 10 - 35 U/L    Protein Total 6.8 6.4 - 8.3 g/dL    Albumin 3.8 3.5 - 5.2 g/dL    Bilirubin Total 0.6 <=1.2 mg/dL    GFR Estimate >90 >60 mL/min/1.73m2   CBC with platelets and differential    Collection Time: 01/09/23  9:31 AM   Result Value Ref Range    WBC Count 2.0 (L) 4.0 - 11.0 10e3/uL    RBC Count 3.61 (L) 3.80 - 5.20 10e6/uL    Hemoglobin 11.0 (L) 11.7 - 15.7 g/dL    Hematocrit 34.3 (L) 35.0 - 47.0 %    MCV 95 78 - 100 fL    MCH 30.5 26.5 - 33.0 pg    MCHC 32.1 31.5 - 36.5 g/dL    RDW 15.2 (H) 10.0 - 15.0 %    Platelet Count 155 150 - 450 10e3/uL    % Neutrophils 67 %    % Lymphocytes 20 %    % Monocytes 9 %    % Eosinophils 3 %    % Basophils 1 %    % Immature Granulocytes 0 %    NRBCs per 100 WBC 0 <1 /100    Absolute Neutrophils 1.4 (L) 1.6 - 8.3 10e3/uL    Absolute Lymphocytes 0.4 (L) 0.8 - 5.3 10e3/uL    Absolute Monocytes 0.2 0.0 - 1.3 10e3/uL    Absolute Eosinophils 0.1 0.0 - 0.7 10e3/uL    Absolute Basophils 0.0 0.0 - 0.2 10e3/uL    Absolute Immature Granulocytes 0.0 <=0.4 10e3/uL    Absolute NRBCs 0.0 10e3/uL

## 2023-01-09 NOTE — PROGRESS NOTES
Infusion Nursing Note:  Tori Steele presents today for Cycle 2, Day 1-- Zoladex Injection.    Patient seen by provider today: No   present during visit today: Not Applicable.    Note: Patient reports feeling good on arrival to infusion suite. Reports she still has hot flashes at night, but they are manageable. No fever, chills, or cough reoprted. Denies pain. No other questions or concerns.     Intravenous Access:  Implanted Port.    Treatment Conditions:  Lab Results   Component Value Date    HGB 11.0 (L) 01/09/2023    WBC 2.0 (L) 01/09/2023    ANEU 16.5 (H) 06/13/2022    ANEUTAUTO 1.4 (L) 01/09/2023     01/09/2023      Lab Results   Component Value Date     12/28/2022    POTASSIUM 3.7 12/28/2022    MAG 1.7 (L) 02/07/2022    CR 0.68 12/28/2022    JENNIE 8.8 12/28/2022    BILITOTAL 0.5 12/28/2022    ALBUMIN 4.0 12/28/2022    ALT 10 12/28/2022    AST 14 12/28/2022     Results reviewed, labs MET treatment parameters, ok to proceed with treatment.    Post Infusion Assessment:  Patient tolerated Zoladex subcutaneous injection in the left lower quadrant without incident.  Iced Prior to injection.   Site free from redness, edema or discomfort.  No evidence of extravasations.  Access discontinued per protocol.     Discharge Plan:   Prescription refills given for Verzenio.  Discharge instructions reviewed with: Patient.  Patient and/or family verbalized understanding of discharge instructions and all questions answered.  AVS to patient via Ivaldi.  Patient will return 2/6/23 for next appointment.   Patient discharged in stable condition accompanied by: self.  Departure Mode: Ambulatory.      Anaya Cunningham, RN

## 2023-01-09 NOTE — NURSING NOTE
Chief Complaint   Patient presents with     Port Draw     Labs drawn by RN via port, vitals taken.     Port accessed with 20 gauge 3/4 inch flat needle by RN, labs collected, line flushed with saline and heparin.  Vitals taken. Pt checked in for appointment(s).    Yue Argueta RN

## 2023-01-12 DIAGNOSIS — C50.911 INVASIVE DUCTAL CARCINOMA OF RIGHT BREAST (H): ICD-10-CM

## 2023-01-17 ENCOUNTER — TELEPHONE (OUTPATIENT)
Dept: ONCOLOGY | Facility: CLINIC | Age: 48
End: 2023-01-17
Payer: OTHER GOVERNMENT

## 2023-01-17 NOTE — TELEPHONE ENCOUNTER
I have reviewed and agree to the information submitted for this Free Drug Application.   Bhumi Mccann PharmD  Oral Chemotherapy Monitoring Program  [facility]  [phone]     Free Drug Application Initiated  Medication: Verzenio  Sponsor: Nolvia Hand  [facility] Check:        Reyna Simental CPOncology Pharmacy [facility] [address] [address]  Office: [phone]  Fax: [phone]  [email or web address]

## 2023-01-19 NOTE — TELEPHONE ENCOUNTER
Free Drug Application Denied  Denial Reason(s): Dont help  insurance  Patient notified? Yes      Reyna Simental CPhTOncology Pharmacy Liaison     [facility] [place] [address]  Office: [phone]  Fax: [phone]  [email or web address]

## 2023-01-23 ENCOUNTER — LAB (OUTPATIENT)
Dept: LAB | Facility: CLINIC | Age: 48
End: 2023-01-23
Attending: INTERNAL MEDICINE
Payer: OTHER GOVERNMENT

## 2023-01-23 ENCOUNTER — TELEPHONE (OUTPATIENT)
Dept: ONCOLOGY | Facility: CLINIC | Age: 48
End: 2023-01-23

## 2023-01-23 DIAGNOSIS — C50.911 INVASIVE DUCTAL CARCINOMA OF RIGHT BREAST (H): ICD-10-CM

## 2023-01-23 LAB
ALBUMIN SERPL BCG-MCNC: 4.3 G/DL (ref 3.5–5.2)
ALP SERPL-CCNC: 93 U/L (ref 35–104)
ALT SERPL W P-5'-P-CCNC: 11 U/L (ref 10–35)
ANION GAP SERPL CALCULATED.3IONS-SCNC: 9 MMOL/L (ref 7–15)
AST SERPL W P-5'-P-CCNC: 16 U/L (ref 10–35)
BASOPHILS # BLD AUTO: 0 10E3/UL (ref 0–0.2)
BASOPHILS NFR BLD AUTO: 1 %
BILIRUB SERPL-MCNC: 0.5 MG/DL
BUN SERPL-MCNC: 11.1 MG/DL (ref 6–20)
CALCIUM SERPL-MCNC: 9.1 MG/DL (ref 8.6–10)
CHLORIDE SERPL-SCNC: 106 MMOL/L (ref 98–107)
CREAT SERPL-MCNC: 0.73 MG/DL (ref 0.51–0.95)
DEPRECATED HCO3 PLAS-SCNC: 25 MMOL/L (ref 22–29)
EOSINOPHIL # BLD AUTO: 0.1 10E3/UL (ref 0–0.7)
EOSINOPHIL NFR BLD AUTO: 2 %
ERYTHROCYTE [DISTWIDTH] IN BLOOD BY AUTOMATED COUNT: 15.2 % (ref 10–15)
GFR SERPL CREATININE-BSD FRML MDRD: >90 ML/MIN/1.73M2
GLUCOSE SERPL-MCNC: 100 MG/DL (ref 70–99)
HCT VFR BLD AUTO: 38.1 % (ref 35–47)
HGB BLD-MCNC: 12.1 G/DL (ref 11.7–15.7)
IMM GRANULOCYTES # BLD: 0 10E3/UL
IMM GRANULOCYTES NFR BLD: 1 %
LYMPHOCYTES # BLD AUTO: 0.4 10E3/UL (ref 0.8–5.3)
LYMPHOCYTES NFR BLD AUTO: 16 %
MCH RBC QN AUTO: 30.4 PG (ref 26.5–33)
MCHC RBC AUTO-ENTMCNC: 31.8 G/DL (ref 31.5–36.5)
MCV RBC AUTO: 96 FL (ref 78–100)
MONOCYTES # BLD AUTO: 0.2 10E3/UL (ref 0–1.3)
MONOCYTES NFR BLD AUTO: 8 %
NEUTROPHILS # BLD AUTO: 1.6 10E3/UL (ref 1.6–8.3)
NEUTROPHILS NFR BLD AUTO: 72 %
NRBC # BLD AUTO: 0 10E3/UL
NRBC BLD AUTO-RTO: 0 /100
PLATELET # BLD AUTO: 203 10E3/UL (ref 150–450)
POTASSIUM SERPL-SCNC: 4 MMOL/L (ref 3.4–5.3)
PROT SERPL-MCNC: 7.8 G/DL (ref 6.4–8.3)
RBC # BLD AUTO: 3.98 10E6/UL (ref 3.8–5.2)
SODIUM SERPL-SCNC: 140 MMOL/L (ref 136–145)
WBC # BLD AUTO: 2.2 10E3/UL (ref 4–11)

## 2023-01-23 PROCEDURE — 85025 COMPLETE CBC W/AUTO DIFF WBC: CPT

## 2023-01-23 PROCEDURE — 80053 COMPREHEN METABOLIC PANEL: CPT

## 2023-01-23 PROCEDURE — 36415 COLL VENOUS BLD VENIPUNCTURE: CPT

## 2023-01-23 NOTE — TELEPHONE ENCOUNTER
Oral Chemotherapy Monitoring Program  Lab Follow Up    Reviewed lab results from today. Tori confirms taking the appropriate dose of Verzenio 150mg twice daily. Denies new or worsening side effects, missed doses, and recent hospital or ED visits. Patient has not had any recent medication changes. She is anxious about hearing her copayment cost for the medication now that it is the start of the new year, but is aware that Reyna is working diligently on this.     ORAL CHEMOTHERAPY 8/23/2022 12/5/2022 12/21/2022 12/21/2022 12/28/2022 1/5/2023 1/23/2023   Assessment Type Initial Work up New Teach;Refill Left Voicemail Initial Follow up Lab Monitoring Refill Lab Monitoring;Monthly Follow up   Diagnosis Code Breast Cancer Breast Cancer Breast Cancer Breast Cancer Breast Cancer Breast Cancer Breast Cancer   Providers Dr. Cathryn Lockett   [facility]   Drug Name Verzenio (abemaciclib) Verzenio (abemaciclib) Verzenio (abemaciclib) Verzenio (abemaciclib) Verzenio (abemaciclib) Verzenio (abemaciclib) Verzenio (abemaciclib)   Dose 150 mg 150 mg 150 mg 150 mg 150 mg 150 mg 150 mg   Current Schedule BID BID BID BID BID BID BID   Cycle Details Continuous Continuous Continuous Continuous Continuous Continuous Continuous   Start Date of Last Cycle - - 12/15/2022 12/15/2022 12/15/2022 - -   Planned next cycle start date - 12/12/2022 1/12/2023 1/12/2022 1/12/2022 1/12/2023 -   Doses missed in last 2 weeks - - - 0 - - -   Adherence Assessment - - - Adherent - - -   Adverse Effects - - - No AE identified during assessment - - -   Any new drug interactions? No - - No - - -   Is the dose as ordered appropriate for the patient? Yes - - Yes - - -   Since the last time we talked, have you been hospitalized or used the emergency room? - - - No - - -       Labs:  _  Result Component Current Result Ref Range   Sodium 140 (1/23/2023) 136 -  145 mmol/L     _  Result Component Current Result Ref Range   Potassium 4.0 (1/23/2023) 3.4 - 5.3 mmol/L     _  Result Component Current Result Ref Range   Calcium 9.1 (1/23/2023) 8.6 - 10.0 mg/dL     No results found for Mag within last 30 days.     No results found for Phos within last 30 days.     _  Result Component Current Result Ref Range   Albumin 4.3 (1/23/2023) 3.5 - 5.2 g/dL     _  Result Component Current Result Ref Range   Urea Nitrogen 11.1 (1/23/2023) 6.0 - 20.0 mg/dL     _  Result Component Current Result Ref Range   Creatinine 0.73 (1/23/2023) 0.51 - 0.95 mg/dL     _  Result Component Current Result Ref Range   AST 16 (1/23/2023) 10 - 35 U/L     _  Result Component Current Result Ref Range   ALT 11 (1/23/2023) 10 - 35 U/L     _  Result Component Current Result Ref Range   Bilirubin Total 0.5 (1/23/2023) <=1.2 mg/dL     _  Result Component Current Result Ref Range   WBC Count 2.2 (L) (1/23/2023) 4.0 - 11.0 10e3/uL     _  Result Component Current Result Ref Range   Hemoglobin 12.1 (1/23/2023) 11.7 - 15.7 g/dL     _  Result Component Current Result Ref Range   Platelet Count 203 (1/23/2023) 150 - 450 10e3/uL     No results found for ANC within last 30 days.     _  Result Component Current Result Ref Range   Absolute Neutrophils 1.6 (1/23/2023) 1.6 - 8.3 10e3/uL        Assessment & Plan:  Results show no concerning abnormalities. Continue Verzenio therapy as planned. Questions answered to patient's satisfaction.    Follow-Up:  2/6: labs + appt with Janette Park, PharmD, BCACP  Hematology/Oncology Clinical Pharmacist  Oral Chemotherapy Monitoring Program  [facility]  [phone]

## 2023-01-23 NOTE — NURSING NOTE
Chief Complaint   Patient presents with     Labs Only     Labs collected from venipuncture by RN.      Afia Driver, RN

## 2023-01-27 DIAGNOSIS — C50.911 INVASIVE DUCTAL CARCINOMA OF RIGHT BREAST (H): Primary | ICD-10-CM

## 2023-02-02 DIAGNOSIS — C50.911 INVASIVE DUCTAL CARCINOMA OF RIGHT BREAST (H): Primary | ICD-10-CM

## 2023-02-06 ENCOUNTER — APPOINTMENT (OUTPATIENT)
Dept: LAB | Facility: CLINIC | Age: 48
End: 2023-02-06
Attending: PHYSICIAN ASSISTANT
Payer: OTHER GOVERNMENT

## 2023-02-06 ENCOUNTER — ONCOLOGY VISIT (OUTPATIENT)
Dept: ONCOLOGY | Facility: CLINIC | Age: 48
End: 2023-02-06
Attending: PHYSICIAN ASSISTANT
Payer: OTHER GOVERNMENT

## 2023-02-06 VITALS
TEMPERATURE: 98.3 F | BODY MASS INDEX: 23.21 KG/M2 | RESPIRATION RATE: 16 BRPM | DIASTOLIC BLOOD PRESSURE: 66 MMHG | SYSTOLIC BLOOD PRESSURE: 110 MMHG | HEART RATE: 104 BPM | OXYGEN SATURATION: 99 % | WEIGHT: 131 LBS

## 2023-02-06 DIAGNOSIS — Z78.0 POST-MENOPAUSAL: ICD-10-CM

## 2023-02-06 DIAGNOSIS — C50.911 INVASIVE DUCTAL CARCINOMA OF RIGHT BREAST (H): Primary | ICD-10-CM

## 2023-02-06 LAB
ALBUMIN SERPL BCG-MCNC: 4.1 G/DL (ref 3.5–5.2)
ALP SERPL-CCNC: 72 U/L (ref 35–104)
ALT SERPL W P-5'-P-CCNC: 9 U/L (ref 10–35)
ANION GAP SERPL CALCULATED.3IONS-SCNC: 9 MMOL/L (ref 7–15)
AST SERPL W P-5'-P-CCNC: 16 U/L (ref 10–35)
BASOPHILS # BLD AUTO: 0 10E3/UL (ref 0–0.2)
BASOPHILS NFR BLD AUTO: 1 %
BILIRUB SERPL-MCNC: 0.5 MG/DL
BUN SERPL-MCNC: 11.9 MG/DL (ref 6–20)
CALCIUM SERPL-MCNC: 9.2 MG/DL (ref 8.6–10)
CHLORIDE SERPL-SCNC: 106 MMOL/L (ref 98–107)
CREAT SERPL-MCNC: 0.77 MG/DL (ref 0.51–0.95)
DEPRECATED HCO3 PLAS-SCNC: 26 MMOL/L (ref 22–29)
EOSINOPHIL # BLD AUTO: 0.1 10E3/UL (ref 0–0.7)
EOSINOPHIL NFR BLD AUTO: 3 %
ERYTHROCYTE [DISTWIDTH] IN BLOOD BY AUTOMATED COUNT: 14.6 % (ref 10–15)
GFR SERPL CREATININE-BSD FRML MDRD: >90 ML/MIN/1.73M2
GLUCOSE SERPL-MCNC: 99 MG/DL (ref 70–99)
HCT VFR BLD AUTO: 35.3 % (ref 35–47)
HGB BLD-MCNC: 11.6 G/DL (ref 11.7–15.7)
IMM GRANULOCYTES # BLD: 0 10E3/UL
IMM GRANULOCYTES NFR BLD: 0 %
LYMPHOCYTES # BLD AUTO: 0.5 10E3/UL (ref 0.8–5.3)
LYMPHOCYTES NFR BLD AUTO: 18 %
MCH RBC QN AUTO: 31.6 PG (ref 26.5–33)
MCHC RBC AUTO-ENTMCNC: 32.9 G/DL (ref 31.5–36.5)
MCV RBC AUTO: 96 FL (ref 78–100)
MONOCYTES # BLD AUTO: 0.2 10E3/UL (ref 0–1.3)
MONOCYTES NFR BLD AUTO: 8 %
NEUTROPHILS # BLD AUTO: 2 10E3/UL (ref 1.6–8.3)
NEUTROPHILS NFR BLD AUTO: 70 %
NRBC # BLD AUTO: 0 10E3/UL
NRBC BLD AUTO-RTO: 0 /100
PLATELET # BLD AUTO: 187 10E3/UL (ref 150–450)
POTASSIUM SERPL-SCNC: 4.2 MMOL/L (ref 3.4–5.3)
PROT SERPL-MCNC: 7.3 G/DL (ref 6.4–8.3)
RBC # BLD AUTO: 3.67 10E6/UL (ref 3.8–5.2)
SODIUM SERPL-SCNC: 141 MMOL/L (ref 136–145)
WBC # BLD AUTO: 2.8 10E3/UL (ref 4–11)

## 2023-02-06 PROCEDURE — 96402 CHEMO HORMON ANTINEOPL SQ/IM: CPT

## 2023-02-06 PROCEDURE — 82435 ASSAY OF BLOOD CHLORIDE: CPT

## 2023-02-06 PROCEDURE — 250N000011 HC RX IP 250 OP 636: Performed by: PHYSICIAN ASSISTANT

## 2023-02-06 PROCEDURE — 36591 DRAW BLOOD OFF VENOUS DEVICE: CPT

## 2023-02-06 PROCEDURE — G0463 HOSPITAL OUTPT CLINIC VISIT: HCPCS | Mod: 25 | Performed by: PHYSICIAN ASSISTANT

## 2023-02-06 PROCEDURE — 85025 COMPLETE CBC W/AUTO DIFF WBC: CPT

## 2023-02-06 PROCEDURE — 250N000011 HC RX IP 250 OP 636: Performed by: INTERNAL MEDICINE

## 2023-02-06 PROCEDURE — G0463 HOSPITAL OUTPT CLINIC VISIT: HCPCS

## 2023-02-06 PROCEDURE — 82374 ASSAY BLOOD CARBON DIOXIDE: CPT

## 2023-02-06 PROCEDURE — 99214 OFFICE O/P EST MOD 30 MIN: CPT | Performed by: PHYSICIAN ASSISTANT

## 2023-02-06 PROCEDURE — 99207 PR NO BILLABLE SERVICE THIS VISIT: CPT

## 2023-02-06 RX ORDER — HEPARIN SODIUM (PORCINE) LOCK FLUSH IV SOLN 100 UNIT/ML 100 UNIT/ML
5 SOLUTION INTRAVENOUS ONCE
Status: COMPLETED | OUTPATIENT
Start: 2023-02-06 | End: 2023-02-06

## 2023-02-06 RX ADMIN — GOSERELIN ACETATE 3.6 MG: 3.6 IMPLANT SUBCUTANEOUS at 11:15

## 2023-02-06 RX ADMIN — Medication 5 ML: at 09:42

## 2023-02-06 ASSESSMENT — PAIN SCALES - GENERAL: PAINLEVEL: NO PAIN (0)

## 2023-02-06 NOTE — NURSING NOTE
"Oncology Rooming Note    February 6, 2023 9:57 AM   Tori Steele is a 47 year old female who presents for:    Chief Complaint   Patient presents with     Port Draw     Labs drawn via port by RN. Vitals taken.     Oncology Clinic Visit     Breast cancer     Initial Vitals: /66 (BP Location: Left arm, Patient Position: Sitting, Cuff Size: Adult Regular)   Pulse 104   Temp 98.3  F (36.8  C) (Oral)   Resp 16   Wt 59.4 kg (131 lb)   SpO2 99%   BMI 23.21 kg/m   Estimated body mass index is 23.21 kg/m  as calculated from the following:    Height as of 8/29/22: 1.6 m (5' 3\").    Weight as of this encounter: 59.4 kg (131 lb). Body surface area is 1.62 meters squared.  No Pain (0) Comment: Data Unavailable   No LMP recorded. (Menstrual status: Chemotherapy).  Allergies reviewed: Yes  Medications reviewed: Yes    Medications: Medication refills not needed today.  Pharmacy name entered into [facility]:    [id number] - [place], [place] - [address] [facility] [address]  [facility] [place], [place] - [address]  [facility] - [place], [place] - [address]     Clinical concerns: none       Jojo Baca CMA            "

## 2023-02-06 NOTE — PROGRESS NOTES
Infusion Nursing Note:  Tori Steele presents today for C3D1 Zoladex.    Patient seen by provider today: Yes: Milka JIMÉNEZ   present during visit today: Not Applicable.    Note: No complaints made. Appointment is not made by the time patient left the facility. Instructed patient if unable to see next few days to call george    Intravenous Access:  No Intravenous access/labs at this visit.    Treatment Conditions:  Not Applicable.    Post Infusion Assessment:  Patient tolerated one Zoladex injection on right lower abdomen  without incident. Ice prior to injection.   Site patent and intact, free from redness, edema or discomfort.  No evidence of extravasations.     Discharge Plan:   Patient declined prescription refills.  Discharge instructions reviewed with: Patient.  Patient and/or family verbalized understanding of discharge instructions and all questions answered.  AVS to patient via CarenaT.  Patient will return next month for next appointment. See notes above.  Patient discharged in stable condition accompanied by: self.  Departure Mode: Ambulatory.      YESENIA INTERIANO RN

## 2023-02-06 NOTE — PATIENT INSTRUCTIONS
Contact Numbers  [facility]: [phone]    After Hours:  [phone]  Triage: [phone]    Please call the [facility] Triage line if you experience a temperature greater than or equal to 100.5, shaking chills, have uncontrolled nausea, vomiting and/or diarrhea, dizziness, shortness of breath, chest pain, bleeding, unexplained bruising, or if you have any other new/concerning symptoms, questions or concerns.     If it is after hours, weekends, or holidays, please call the main hospital  at  [phone] and ask to speak to the Oncology doctor on call.     If you are having any concerning symptoms or wish to speak to a provider before your next infusion visit, please call your care coordinator or triage to notify them so we can adequately serve you.     If you need a refill on a narcotic prescription or other medication, please call triage before your infusion appointment.       February 2023 Sunday Monday Tuesday Wednesday Thursday Friday Saturday                  1     2     3     4       5     6    LAB CENTRAL   9:00 AM   (15 min.)   [facility]RAW   [facility]    RETURN   9:15 AM   (45 min.)   Milka Mota PA-C   [facility]    ONC INFUSION 0.5 HR (30 MIN)  11:30 AM   (30 min.)    ONC INFUSION NURSE   [facility] 7     8     9     10     11       12     13     14     15     16     17     18       19     20     21     22     23     24     25       26     27     28                                       March 2023 Sunday Monday Tuesday Wednesday Thursday Friday Saturday                  1     2     3     4       5     6     7     8     9     10     11       12     13     14     15     16     17     18       19     20     21     22     23     24     25       26     27     28     29     30     31                            Lab Results:  Recent Results (from the past 12 hour(s))    Comprehensive metabolic panel    Collection Time: 02/06/23  9:48 AM   Result Value Ref Range    Sodium 141 136 - 145 mmol/L    Potassium 4.2 3.4 - 5.3 mmol/L    Chloride 106 98 - 107 mmol/L    Carbon Dioxide (CO2) 26 22 - 29 mmol/L    Anion Gap 9 7 - 15 mmol/L    Urea Nitrogen 11.9 6.0 - 20.0 mg/dL    Creatinine 0.77 0.51 - 0.95 mg/dL    Calcium 9.2 8.6 - 10.0 mg/dL    Glucose 99 70 - 99 mg/dL    Alkaline Phosphatase 72 35 - 104 U/L    AST 16 10 - 35 U/L    ALT 9 (L) 10 - 35 U/L    Protein Total 7.3 6.4 - 8.3 g/dL    Albumin 4.1 3.5 - 5.2 g/dL    Bilirubin Total 0.5 <=1.2 mg/dL    GFR Estimate >90 >60 mL/min/1.73m2   CBC with platelets and differential    Collection Time: 02/06/23  9:48 AM   Result Value Ref Range    WBC Count 2.8 (L) 4.0 - 11.0 10e3/uL    RBC Count 3.67 (L) 3.80 - 5.20 10e6/uL    Hemoglobin 11.6 (L) 11.7 - 15.7 g/dL    Hematocrit 35.3 35.0 - 47.0 %    MCV 96 78 - 100 fL    MCH 31.6 26.5 - 33.0 pg    MCHC 32.9 31.5 - 36.5 g/dL    RDW 14.6 10.0 - 15.0 %    Platelet Count 187 150 - 450 10e3/uL    % Neutrophils 70 %    % Lymphocytes 18 %    % Monocytes 8 %    % Eosinophils 3 %    % Basophils 1 %    % Immature Granulocytes 0 %    NRBCs per 100 WBC 0 <1 /100    Absolute Neutrophils 2.0 1.6 - 8.3 10e3/uL    Absolute Lymphocytes 0.5 (L) 0.8 - 5.3 10e3/uL    Absolute Monocytes 0.2 0.0 - 1.3 10e3/uL    Absolute Eosinophils 0.1 0.0 - 0.7 10e3/uL    Absolute Basophils 0.0 0.0 - 0.2 10e3/uL    Absolute Immature Granulocytes 0.0 <=0.4 10e3/uL    Absolute NRBCs 0.0 10e3/uL

## 2023-02-06 NOTE — PROGRESS NOTES
Feb 6, 2023      Reason for Visit: follow up of breast cancer     History of Present Illness: Tori Steele is a 46 year old female with breat cancer. Right-sided 5.5 cm ER positive DE positive HER-2 negative breast cancer with associated DCIS.  Her MammaPrint came back as high risk.  She consented for the I-SPY clinical trial and has been randomized to the oral paclitaxel, Encequidar and dostarlimab arm.      2/21/22 started trial with oral paclitaxel, Encequidar and dostarlimab.    3/15/22  her IV immunotherapy dostarlimab was held due to diarrhea that recovered with Imodium and time  4/4/22   Carbo was added in weekly due in hopes for more significant reduction in cancer  5/17/22 AC start  7/8/22 AC End  8/8/2022 - R. Lumpectomy with SNLBX -               -INVASIVE BREAST CARCINOMA OF NO SPECIAL TYPE (INVASIVE DUCTAL CARCINOMA), with   apocrine features, SAMANTHA GRADE 2, size 45 x 32 mm, residual after neoadjuvant systemic therapy              -Ductal carcinoma in situ (DCIS), nuclear grade 3, cribriform and solid type(s), with focal necrosis              -DCIS is admixed with invasive carcinoma, and comprises 30% of the tumor cellularity              -Invasive carcinoma is estrogen receptor positive, progesterone receptor positive and HER2   negative (score 1+) by immunohistochemistry               -METASTATIC BREAST DUCTAL CARCINOMA in one of two lymph nodes (1/2), residual after            neoadjuvant    systemic therapy              -Extranodal extension present (size 2 mm)              -[facility] residual cancer burden is 3.284 (RCB Class III).   8/29/2022 - Resection of involved margins - now negative  9/19/22 started Zoladex  10/31/22 completed XRT 21 fractions     12/12/22: starting Zometa q6m and abemaciclib/letrozole      Interval History:  She is seen today unaccompanied and reports that she is feeling well overall.   -She attended a recent support group and found it very helpful.  -She  reports occasional loose stool and feeling uncomfortable prior to these, but this is not daily.   -She reports hot flashes 2-3/day, more so at night. She is managing by dressing in layers.   -Over the weekend she had some soreness in her right breast that has resolved today.  She denies any other breast changes.   -She reports she is sleeping well.   -She has occasional headaches that resolved spontaneously without use of medication and without associated dizziness or vision changes.  -She reports an area over her L inner upper arm that is bumpy and itchy, better with the use of cortisone cream for a few days.  She used to have skin issues like this in the past where she would break out in hives. No other skin concerns.   -Doing well with the Zoladex; has mild light hot flashes and mild stiffness in all her joints, this improves with walking/moving.  These have improved but have not resolved.   -Has very dry eyes since chemo, saw eye doctor around 1/3/23 who prescribed eye drops.      Review of Systems:  Patient denies any of the following except if noted above: fevers, chills, mouth sores, difficulty with energy, vision or hearing changes, chest pain, dyspnea, abdominal pain, nausea, vomiting, constipation, urinary concerns, numbness, tingling, issues with sleep or mood. She also denies lumps, bumps, rashes or skin lesions, bleeding or bruising issues.      Currently not taking medications.      Physical Examination:/66 (BP Location: Left arm, Patient Position: Sitting, Cuff Size: Adult Regular)   Pulse 104   Temp 98.3  F (36.8  C) (Oral)   Resp 16   Wt 59.4 kg (131 lb)   SpO2 99%   BMI 23.21 kg/m    Wt Readings from Last 4 Encounters:   02/06/23 59.4 kg (131 lb)   01/09/23 60.2 kg (132 lb 12.8 oz)   12/12/22 59.4 kg (130 lb 14.4 oz)   11/28/22 60.1 kg (132 lb 8 oz)     Vital signs were reviewed.   Patient alert and oriented x3.   PERRLA. EOMI. No scleral icterus noted.   Neck exam: No palpable  cervical, supraclavicular or axillary nodes bilaterally.   Heart: RRR no murmurs noted.   Breast: R breast with mild lymphedema in the lower portion of the breast, no other masses.  Left breast without mass.   Lungs: clear to auscultation bilaterally.  No crackles or wheezing.   Abd: positive bowel sounds in all four quadrants.  No tenderness to palpation.  No hepatomegaly.   Extremities: No lower extremity edema.   Neuro: grossly intact.   Mood and affect is stable.   Right breast: dermatitis from radiation is dry and healing       Laboratory Data:  Most Recent 3 CBC's:Recent Labs   Lab Test 02/06/23  0948 01/23/23  0921 01/09/23  0931   WBC 2.8* 2.2* 2.0*   HGB 11.6* 12.1 11.0*   MCV 96 96 95    203 155   ANEUTAUTO 2.0 1.6 1.4*     Most Recent 3 BMP's:  Recent Labs   Lab Test 02/06/23  0948 01/23/23  0921 01/09/23  0931    140 141   POTASSIUM 4.2 4.0 3.7   CHLORIDE 106 106 109*   CO2 26 25 25   BUN 11.9 11.1 8.9   CR 0.77 0.73 0.64   ANIONGAP 9 9 7   JENNIE 9.2 9.1 8.6   GLC 99 100* 106*   PROTTOTAL 7.3 7.8 6.8   ALBUMIN 4.1 4.3 3.8    Most Recent 3 LFT's:  Recent Labs   Lab Test 02/06/23  0948 01/23/23  0921 01/09/23  0931   AST 16 16 13   ALT 9* 11 10   ALKPHOS 72 93 98   BILITOTAL 0.5 0.5 0.6    Most Recent 2 TSH and T4:  Recent Labs   Lab Test 09/09/22  1122 07/29/22  1009 06/24/22  1057   TSH 1.52 1.39 1.66   T4  --  1.12 0.96     I reviewed the above labs today.       PROBLEMS:     1. Right sided ER+, DC+, HER2-negative, MammaPrint high risk T=5.5cm, N=0 breast cancer. Treated on I-SPY with assigned arm of dostarlimab/oral paclitaxel. Carbo added week 3. Received ACx4. Pathologic staging showed pT2a. PN1a. ER+, DC+, HER21+, RCB=3   2. Pre-menopausal, now on Zoladex monthly tolerating this well.  3. Coping.       IMPRESSION: Tori completed radiation and is currently doing well on Zoladex, letrozole and abemaciclib.      PLAN:  1.  Continue monthly Zoladex.    2.  Continue  letrozole/abemaciclib.  3. Zometa every 6 months to reduce the risk of bone metastasis and bone protection. Due May 2023.   4. Encouraged good nutrition and exercise, discussed coping resources, including ongoing support groups, survivorship event in  April.   5. Labs every 4 weeks on abemaciclib.  6. Follow-up in one month, and then April 2023 with a survivorship visit.    I saw patient and performed the ROS and exam myself.  The assessment and plan were mutually discussed and this note was edited to reflect my findings.  Janette Mota PA-C    45 minutes spent on the date of the encounter doing chart review, lab review, patient visit, counseling and documentation.

## 2023-02-06 NOTE — NURSING NOTE
"Chief Complaint   Patient presents with     Port Draw     Labs drawn via port by RN. Vitals taken.     Labs drawn via port by RN. Port accessed with 20G 3/4\" power needle. Flushed with NS and heparin. De-accessed. Pt tolerated well. Vitals taken. Pt checked in for next appointment.    Ratna Rivera, RN  "

## 2023-02-21 ENCOUNTER — TELEPHONE (OUTPATIENT)
Dept: ONCOLOGY | Facility: CLINIC | Age: 48
End: 2023-02-21
Payer: OTHER GOVERNMENT

## 2023-02-21 NOTE — TELEPHONE ENCOUNTER
Oral Chemotherapy Monitoring Program    Subjective/Objective:  Tori Steele is a 47 year old female contacted by phone for a follow-up visit for oral chemotherapy. Tori confirmed taking abemaciclib 150 mg BID and denies any missed doses in the last 2 weeks. Patient denies side effects and notes no changes. She feels she is learning how to tolerate therapy.    ORAL CHEMOTHERAPY 12/21/2022 12/21/2022 12/28/2022 1/5/2023 1/23/2023 2/2/2023 2/21/2023   Assessment Type Left Voicemail Initial Follow up Lab Monitoring Refill Lab Monitoring;Monthly Follow up Refill Monthly Follow up   Diagnosis Code Breast Cancer Breast Cancer Breast Cancer Breast Cancer Breast Cancer Breast Cancer Breast Cancer   Providers Dr. Cathryn Lockett   [facility]   Drug Name Verzenio (abemaciclib) Verzenio (abemaciclib) Verzenio (abemaciclib) Verzenio (abemaciclib) Verzenio (abemaciclib) Verzenio (abemaciclib) Verzenio (abemaciclib)   Dose 150 mg 150 mg 150 mg 150 mg 150 mg 150 mg 150 mg   Current Schedule BID BID BID BID BID BID BID   Cycle Details Continuous Continuous Continuous Continuous Continuous Continuous Continuous   Start Date of Last Cycle 12/15/2022 12/15/2022 12/15/2022 - - - -   Planned next cycle start date 1/12/2023 1/12/2022 1/12/2022 1/12/2023 - - -   Doses missed in last 2 weeks - 0 - - - - 0   Adherence Assessment - Adherent - - - - Adherent   Adverse Effects - No AE identified during assessment - - - - No AE identified during assessment   Any new drug interactions? - No - - - - -   Is the dose as ordered appropriate for the patient? - Yes - - - - -   Since the last time we talked, have you been hospitalized or used the emergency room? - No - - - - -       Last PHQ-2 Score on record:   PHQ-2 ( 1999 [facility]) 10/2/2020 9/3/2020   Q1: Little interest or pleasure in doing things 0 0   Q2: Feeling down, depressed or hopeless  "0 0   PHQ-2 Score 0 0   PHQ-2 Total Score (12-17 Years)- Positive if 3 or more points; Administer PHQ-A if positive 0 0       Vitals:  BP:   BP Readings from Last 1 Encounters:   02/06/23 110/66     Wt Readings from Last 1 Encounters:   02/06/23 59.4 kg (131 lb)     Estimated body surface area is 1.62 meters squared as calculated from the following:    Height as of 8/29/22: 1.6 m (5' 3\").    Weight as of 2/6/23: 59.4 kg (131 lb).    Labs:  _  Result Component Current Result Ref Range   Sodium 141 (2/6/2023) 136 - 145 mmol/L     _  Result Component Current Result Ref Range   Potassium 4.2 (2/6/2023) 3.4 - 5.3 mmol/L     _  Result Component Current Result Ref Range   Calcium 9.2 (2/6/2023) 8.6 - 10.0 mg/dL     No results found for Mag within last 30 days.     No results found for Phos within last 30 days.     _  Result Component Current Result Ref Range   Albumin 4.1 (2/6/2023) 3.5 - 5.2 g/dL     _  Result Component Current Result Ref Range   Urea Nitrogen 11.9 (2/6/2023) 6.0 - 20.0 mg/dL     _  Result Component Current Result Ref Range   Creatinine 0.77 (2/6/2023) 0.51 - 0.95 mg/dL     _  Result Component Current Result Ref Range   AST 16 (2/6/2023) 10 - 35 U/L     _  Result Component Current Result Ref Range   ALT 9 (L) (2/6/2023) 10 - 35 U/L     _  Result Component Current Result Ref Range   Bilirubin Total 0.5 (2/6/2023) <=1.2 mg/dL     _  Result Component Current Result Ref Range   WBC Count 2.8 (L) (2/6/2023) 4.0 - 11.0 10e3/uL     _  Result Component Current Result Ref Range   Hemoglobin 11.6 (L) (2/6/2023) 11.7 - 15.7 g/dL     _  Result Component Current Result Ref Range   Platelet Count 187 (2/6/2023) 150 - 450 10e3/uL     No results found for ANC within last 30 days.     _  Result Component Current Result Ref Range   Absolute Neutrophils 2.0 (2/6/2023) 1.6 - 8.3 10e3/uL          Assessment/Plan:  Patient is tolerating therapy well.    Continue abemaciclib as planned.    Follow-Up:  Review labs and KELLE " appointment 3/6.    Landry Hair, Pharmacy Intern  Oral Chemotherapy Monitoring Program  [phone]

## 2023-03-02 DIAGNOSIS — C50.911 INVASIVE DUCTAL CARCINOMA OF RIGHT BREAST (H): Primary | ICD-10-CM

## 2023-03-06 ENCOUNTER — INFUSION THERAPY VISIT (OUTPATIENT)
Dept: ONCOLOGY | Facility: CLINIC | Age: 48
End: 2023-03-06
Attending: PHYSICIAN ASSISTANT
Payer: OTHER GOVERNMENT

## 2023-03-06 ENCOUNTER — APPOINTMENT (OUTPATIENT)
Dept: LAB | Facility: CLINIC | Age: 48
End: 2023-03-06
Attending: PHYSICIAN ASSISTANT
Payer: OTHER GOVERNMENT

## 2023-03-06 VITALS
RESPIRATION RATE: 16 BRPM | OXYGEN SATURATION: 98 % | TEMPERATURE: 98 F | WEIGHT: 132.6 LBS | DIASTOLIC BLOOD PRESSURE: 65 MMHG | SYSTOLIC BLOOD PRESSURE: 104 MMHG | BODY MASS INDEX: 23.49 KG/M2 | HEART RATE: 60 BPM

## 2023-03-06 DIAGNOSIS — C50.911 INVASIVE DUCTAL CARCINOMA OF RIGHT BREAST (H): Primary | ICD-10-CM

## 2023-03-06 DIAGNOSIS — Z17.0 MALIGNANT NEOPLASM OF CENTRAL PORTION OF RIGHT BREAST IN FEMALE, ESTROGEN RECEPTOR POSITIVE (H): ICD-10-CM

## 2023-03-06 DIAGNOSIS — C50.111 MALIGNANT NEOPLASM OF CENTRAL PORTION OF RIGHT BREAST IN FEMALE, ESTROGEN RECEPTOR POSITIVE (H): ICD-10-CM

## 2023-03-06 DIAGNOSIS — Z78.0 POST-MENOPAUSAL: ICD-10-CM

## 2023-03-06 LAB
ALBUMIN SERPL BCG-MCNC: 4.1 G/DL (ref 3.5–5.2)
ALP SERPL-CCNC: 59 U/L (ref 35–104)
ALT SERPL W P-5'-P-CCNC: 14 U/L (ref 10–35)
ANION GAP SERPL CALCULATED.3IONS-SCNC: 8 MMOL/L (ref 7–15)
AST SERPL W P-5'-P-CCNC: 16 U/L (ref 10–35)
BASOPHILS # BLD AUTO: 0 10E3/UL (ref 0–0.2)
BASOPHILS NFR BLD AUTO: 1 %
BILIRUB SERPL-MCNC: 0.5 MG/DL
BUN SERPL-MCNC: 10.7 MG/DL (ref 6–20)
CALCIUM SERPL-MCNC: 9.1 MG/DL (ref 8.6–10)
CHLORIDE SERPL-SCNC: 107 MMOL/L (ref 98–107)
CREAT SERPL-MCNC: 0.72 MG/DL (ref 0.51–0.95)
DEPRECATED HCO3 PLAS-SCNC: 26 MMOL/L (ref 22–29)
EOSINOPHIL # BLD AUTO: 0.1 10E3/UL (ref 0–0.7)
EOSINOPHIL NFR BLD AUTO: 4 %
ERYTHROCYTE [DISTWIDTH] IN BLOOD BY AUTOMATED COUNT: 13.6 % (ref 10–15)
GFR SERPL CREATININE-BSD FRML MDRD: >90 ML/MIN/1.73M2
GLUCOSE SERPL-MCNC: 97 MG/DL (ref 70–99)
HCT VFR BLD AUTO: 34.1 % (ref 35–47)
HGB BLD-MCNC: 11.3 G/DL (ref 11.7–15.7)
IMM GRANULOCYTES # BLD: 0 10E3/UL
IMM GRANULOCYTES NFR BLD: 0 %
LYMPHOCYTES # BLD AUTO: 0.5 10E3/UL (ref 0.8–5.3)
LYMPHOCYTES NFR BLD AUTO: 19 %
MCH RBC QN AUTO: 32.8 PG (ref 26.5–33)
MCHC RBC AUTO-ENTMCNC: 33.1 G/DL (ref 31.5–36.5)
MCV RBC AUTO: 99 FL (ref 78–100)
MONOCYTES # BLD AUTO: 0.2 10E3/UL (ref 0–1.3)
MONOCYTES NFR BLD AUTO: 9 %
NEUTROPHILS # BLD AUTO: 1.7 10E3/UL (ref 1.6–8.3)
NEUTROPHILS NFR BLD AUTO: 67 %
NRBC # BLD AUTO: 0 10E3/UL
NRBC BLD AUTO-RTO: 0 /100
PLATELET # BLD AUTO: 191 10E3/UL (ref 150–450)
POTASSIUM SERPL-SCNC: 4 MMOL/L (ref 3.4–5.3)
PROT SERPL-MCNC: 7.3 G/DL (ref 6.4–8.3)
RBC # BLD AUTO: 3.44 10E6/UL (ref 3.8–5.2)
SODIUM SERPL-SCNC: 141 MMOL/L (ref 136–145)
WBC # BLD AUTO: 2.5 10E3/UL (ref 4–11)

## 2023-03-06 PROCEDURE — 99214 OFFICE O/P EST MOD 30 MIN: CPT | Performed by: PHYSICIAN ASSISTANT

## 2023-03-06 PROCEDURE — 80053 COMPREHEN METABOLIC PANEL: CPT

## 2023-03-06 PROCEDURE — 85025 COMPLETE CBC W/AUTO DIFF WBC: CPT

## 2023-03-06 PROCEDURE — 250N000011 HC RX IP 250 OP 636: Performed by: PHYSICIAN ASSISTANT

## 2023-03-06 PROCEDURE — 96402 CHEMO HORMON ANTINEOPL SQ/IM: CPT

## 2023-03-06 PROCEDURE — G0463 HOSPITAL OUTPT CLINIC VISIT: HCPCS | Mod: 25 | Performed by: PHYSICIAN ASSISTANT

## 2023-03-06 PROCEDURE — 36591 DRAW BLOOD OFF VENOUS DEVICE: CPT

## 2023-03-06 RX ORDER — HEPARIN SODIUM (PORCINE) LOCK FLUSH IV SOLN 100 UNIT/ML 100 UNIT/ML
5 SOLUTION INTRAVENOUS ONCE
Status: COMPLETED | OUTPATIENT
Start: 2023-03-06 | End: 2023-03-06

## 2023-03-06 RX ADMIN — GOSERELIN ACETATE 3.6 MG: 3.6 IMPLANT SUBCUTANEOUS at 13:11

## 2023-03-06 RX ADMIN — Medication 5 ML: at 11:50

## 2023-03-06 ASSESSMENT — PAIN SCALES - GENERAL: PAINLEVEL: NO PAIN (0)

## 2023-03-06 NOTE — NURSING NOTE
"Oncology Rooming Note    March 6, 2023 12:00 PM   Tori Steele is a 47 year old female who presents for:    Chief Complaint   Patient presents with     Port Draw     Labs drawn by RN via port, vitals taken.     Oncology Clinic Visit     Invasive ductal carcinoma of right breast      Initial Vitals: /65   Pulse 60   Temp 98  F (36.7  C)   Resp 16   Wt 60.1 kg (132 lb 9.6 oz)   SpO2 98%   BMI 23.49 kg/m   Estimated body mass index is 23.49 kg/m  as calculated from the following:    Height as of 8/29/22: 1.6 m (5' 3\").    Weight as of this encounter: 60.1 kg (132 lb 9.6 oz). Body surface area is 1.63 meters squared.  No Pain (0) Comment: Data Unavailable   No LMP recorded. (Menstrual status: Chemotherapy).  Allergies reviewed: Yes  Medications reviewed: Yes    Medications: Medication refills not needed today.  Pharmacy name entered into [facility]:    [id number] - [place], [place] - [address] [facility] [address]  [facility]  [facility] [place], [place] - [phone]  [facility] - [place], [place] - [address]    Clinical concerns: No new clinical concerns other than reason for visit today.     Griselda Suarez, EMT            "

## 2023-03-06 NOTE — PROGRESS NOTES
Mar 6, 2023      Reason for Visit: follow up of breast cancer     History of Present Illness: Tori Steele is a 46 year old female with breat cancer. Right-sided 5.5 cm ER positive [place] positive HER-2 negative breast cancer with associated DCIS.  Her MammaPrint came back as high risk.  She consented for the I-SPY clinical trial and has been randomized to the oral paclitaxel, Encequidar and dostarlimab arm.      2/21/22 started trial with oral paclitaxel, Encequidar and dostarlimab.    3/15/22  her IV immunotherapy dostarlimab was held due to diarrhea that recovered with Imodium and time  4/4/22   Carbo was added in weekly due in hopes for more significant reduction in cancer  5/17/22 AC start  7/8/22 AC End  8/8/2022 - R. Lumpectomy with SNLBX -               -INVASIVE BREAST CARCINOMA OF NO SPECIAL TYPE (INVASIVE DUCTAL CARCINOMA), with   apocrine features, SAMANTHA GRADE 2, size 45 x 32 mm, residual after neoadjuvant systemic therapy              -Ductal carcinoma in situ (DCIS), nuclear grade 3, cribriform and solid type(s), with focal necrosis              -DCIS is admixed with invasive carcinoma, and comprises 30% of the tumor cellularity              -Invasive carcinoma is estrogen receptor positive, progesterone receptor positive and HER2   negative (score 1+) by immunohistochemistry               -METASTATIC BREAST DUCTAL CARCINOMA in one of two lymph nodes (1/2), residual after            neoadjuvant    systemic therapy              -Extranodal extension present (size 2 mm)              -[facility] residual cancer burden is 3.284 (RCB Class III).   8/29/2022 - Resection of involved margins - now negative  9/19/22 started Zoladex  10/31/22 completed XRT 21 fractions     12/12/22: starting Zometa q6m and abemaciclib/letrozole      Interval History:  She is seen today with her .  Overall she thinks she is continuing to make improvements in her general wellbeing.  She recently attended the  living beyond breast cancer group here at the [facility] and found this very helpful.    Still having hot flashes, but manageable.  Tolerating the abemaciclib well.  Doing well with the Zoladex; has mild light hot flashes and mild stiffness in all her joints, this improves with walking/moving.     She is doing lymphedema therapy and does feel like the lymphedema in her right breast is better.      Physical Examination:/65   Pulse 60   Temp 98  F (36.7  C)   Resp 16   Wt 60.1 kg (132 lb 9.6 oz)   SpO2 98%   BMI 23.49 kg/m    Wt Readings from Last 4 Encounters:   03/06/23 60.1 kg (132 lb 9.6 oz)   02/06/23 59.4 kg (131 lb)   01/09/23 60.2 kg (132 lb 12.8 oz)   12/12/22 59.4 kg (130 lb 14.4 oz)     Vital signs were reviewed.   Patient alert and oriented x3.   PERRLA. EOMI. No scleral icterus noted.   Neck exam: No palpable cervical, supraclavicular or axillary nodes bilaterally.   Heart: RRR no murmurs noted.   Breast: R breast with mild lymphedema in the lower portion of the breast, no other masses.  Left breast without mass.   Lungs: clear to auscultation bilaterally.  No crackles or wheezing.   Abd: positive bowel sounds in all four quadrants.  No tenderness to palpation.  No hepatomegaly.   Extremities: No lower extremity edema.   Neuro: grossly intact.   Mood and affect is stable.   Right breast: dermatitis from radiation is dry and healing       Laboratory Data:  Most Recent 3 CBC's:  Recent Labs   Lab Test 03/06/23  1148 02/06/23  0948 01/23/23  0921   WBC 2.5* 2.8* 2.2*   HGB 11.3* 11.6* 12.1   MCV 99 96 96    187 203   ANEUTAUTO 1.7 2.0 1.6     Most Recent 3 BMP's:  Recent Labs   Lab Test 02/06/23  0948 01/23/23  0921 01/09/23  0931    140 141   POTASSIUM 4.2 4.0 3.7   CHLORIDE 106 106 109*   CO2 26 25 25   BUN 11.9 11.1 8.9   CR 0.77 0.73 0.64   ANIONGAP 9 9 7   JENNIE 9.2 9.1 8.6   GLC 99 100* 106*   PROTTOTAL 7.3 7.8 6.8   ALBUMIN 4.1 4.3 3.8    Most Recent 3 LFT's:  Recent Labs   Lab Test  02/06/23  0948 01/23/23  0921 01/09/23  0931   AST 16 16 13   ALT 9* 11 10   ALKPHOS 72 93 98   BILITOTAL 0.5 0.5 0.6    Most Recent 2 TSH and T4:  Recent Labs   Lab Test 09/09/22  1122 07/29/22  1009 06/24/22  1057   TSH 1.52 1.39 1.66   T4  --  1.12 0.96     I reviewed the above labs today.       PROBLEMS:     1. Right sided ER+, [place]+, HER2-negative, MammaPrint high risk T=5.5cm, N=0 breast cancer. Treated on I-SPY with assigned arm of dostarlimab/oral paclitaxel. Carbo added week 3. Received ACx4. Pathologic staging showed pT2a. PN1a. ER+, OR+, HER21+, RCB=3   2. Pre-menopausal, now on Zoladex monthly tolerating this well.  3. Coping.       IMPRESSION: Tori completed radiation and is currently doing well on Zoladex, letrozole and abemaciclib.      PLAN:  1.  Continue monthly Zoladex.    2.  Continue letrozole/abemaciclib.  3. Zometa every 6 months to reduce the risk of bone metastasis and bone protection. Due May 2023.   4. Encouraged good nutrition and exercise, discussed coping resources, including ongoing support groups, survivorship event in  April.  She has embraced all of this, started more routine exercise has been going to support groups and overall is feeling like she is coping better.  5. Labs every 4 weeks on abemaciclib.  6.  I will see Mimi April 2023 for a survivorship visit.        40 minutes spent on the date of the encounter doing chart review, lab review, patient visit, counseling and documentation.

## 2023-03-06 NOTE — PATIENT INSTRUCTIONS
Contact Numbers    [facility]: [phone]    Call with chills and/or temperature greater than or equal to 100.5, uncontrolled nausea/vomiting, diarrhea, constipation, dizziness, shortness of breath, chest pain, bleeding, unexplained bruising, or any new/concerning symptoms, questions/concerns.     If you are having any concerning symptoms or wish to speak to a provider before your next infusion visit, please call your care coordinator or triage to notify them so we can adequately serve you.       After Hours: [phone]    If after hours, weekends, or holidays, call main hospital  and ask for Oncology doctor on call.      March 2023 Sunday Monday Tuesday Wednesday Thursday Friday Saturday                  1     2     3     4       5     6    LAB CENTRAL  11:00 AM   (15 min.)   [facility] D[facility]   [facility]    RETURN CCSL  11:15 AM   (45 min.)   Milka Mota PA-C   [facility]    ONC INFUSION 0.5 HR (30 MIN)  12:00 PM   (30 min.)    ONC INFUSION NURSE   [facility] 7     8     9     10     11       12     13     14     15     16     17     18       19     20     21     22     23     24     25       26     27     28     29     30     31                        April 2023 Sunday Monday Tuesday Wednesday Thursday Friday Saturday                                 1       2     3    LAB CENTRAL   9:00 AM   (15 min.)    [facility]   [facility]    RETURN CCSL   9:15 AM   (45 min.)   Milka Mota PA-C   [facility]    ONC INFUSION 0.5 HR (30 MIN)  10:00 AM   (30 min.)    ONC INFUSION NURSE   [facility] 4     5     6     7     8       9     10     11     12     13     14     15       16     17     18     19     20     21     22       23     24     25     26     27     28     29       30                                                      Lab Results:  Recent Results (from the past 12 hour(s))   Comprehensive metabolic panel    Collection Time: 03/06/23 11:48 AM   Result Value Ref Range    Sodium 141 136 - 145 mmol/L    Potassium 4.0 3.4 - 5.3 mmol/L    Chloride 107 98 - 107 mmol/L    Carbon Dioxide (CO2) 26 22 - 29 mmol/L    Anion Gap 8 7 - 15 mmol/L    Urea Nitrogen 10.7 6.0 - 20.0 mg/dL    Creatinine 0.72 0.51 - 0.95 mg/dL    Calcium 9.1 8.6 - 10.0 mg/dL    Glucose 97 70 - 99 mg/dL    Alkaline Phosphatase 59 35 - 104 U/L    AST 16 10 - 35 U/L    ALT 14 10 - 35 U/L    Protein Total 7.3 6.4 - 8.3 g/dL    Albumin 4.1 3.5 - 5.2 g/dL    Bilirubin Total 0.5 <=1.2 mg/dL    GFR Estimate >90 >60 mL/min/1.73m2   CBC with platelets and differential    Collection Time: 03/06/23 11:48 AM   Result Value Ref Range    WBC Count 2.5 (L) 4.0 - 11.0 10e3/uL    RBC Count 3.44 (L) 3.80 - 5.20 10e6/uL    Hemoglobin 11.3 (L) 11.7 - 15.7 g/dL    Hematocrit 34.1 (L) 35.0 - 47.0 %    MCV 99 78 - 100 fL    MCH 32.8 26.5 - 33.0 pg    MCHC 33.1 31.5 - 36.5 g/dL    RDW 13.6 10.0 - 15.0 %    Platelet Count 191 150 - 450 10e3/uL    % Neutrophils 67 %    % Lymphocytes 19 %    % Monocytes 9 %    % Eosinophils 4 %    % Basophils 1 %    % Immature Granulocytes 0 %    NRBCs per 100 WBC 0 <1 /100    Absolute Neutrophils 1.7 1.6 - 8.3 10e3/uL    Absolute Lymphocytes 0.5 (L) 0.8 - 5.3 10e3/uL    Absolute Monocytes 0.2 0.0 - 1.3 10e3/uL    Absolute Eosinophils 0.1 0.0 - 0.7 10e3/uL    Absolute Basophils 0.0 0.0 - 0.2 10e3/uL    Absolute Immature Granulocytes 0.0 <=0.4 10e3/uL    Absolute NRBCs 0.0 10e3/uL

## 2023-03-06 NOTE — PROGRESS NOTES
Infusion Nursing Note:  Tori Steele presents today for Zoladex.    Patient seen by provider today: Yes: JSESY Maria    Note: Patient presents to clinic today feeling well with no questions.  Pt did not request or require any intervention for pain today.    Intravenous Access:  No Intravenous access at this visit.    Treatment Conditions:  Not Applicable.    Post Infusion Assessment:  Patient tolerated injection without incident to LLQ of abdomen with ice prior.    Discharge Plan:   Patient declined prescription refills.  Discharge instructions reviewed with: Patient.  Patient and/or family verbalized understanding of discharge instructions and all questions answered.  AVS to patient via servtag.  Patient will return 4/3/2023 for next appointment.   Patient discharged in stable condition accompanied by: .  Departure Mode: Ambulatory.    Yareli Arreaga RN

## 2023-03-06 NOTE — LETTER
3/6/2023         RE: Tori Steele  [address] [facility] [address]        Dear Colleague,    Thank you for referring your patient, Tori Steele, to the [facility]. Please see a copy of my visit note below.      Mar 6, 2023      Reason for Visit: follow up of breast cancer     History of Present Illness: Tori Steele is a 46 year old female with breat cancer. Right-sided 5.5 cm ER positive [place] positive HER-2 negative breast cancer with associated DCIS.  Her MammaPrint came back as high risk.  She consented for the I-SPY clinical trial and has been randomized to the oral paclitaxel, Encequidar and dostarlimab arm.      2/21/22 started trial with oral paclitaxel, Encequidar and dostarlimab.    3/15/22  her IV immunotherapy dostarlimab was held due to diarrhea that recovered with Imodium and time  4/4/22   Carbo was added in weekly due in hopes for more significant reduction in cancer  5/17/22 AC start  7/8/22 AC End  8/8/2022 - R. Lumpectomy with SNLBX -               -INVASIVE BREAST CARCINOMA OF NO SPECIAL TYPE (INVASIVE DUCTAL CARCINOMA), with   apocrine features, SAMANTHA GRADE 2, size 45 x 32 mm, residual after neoadjuvant systemic therapy              -Ductal carcinoma in situ (DCIS), nuclear grade 3, cribriform and solid type(s), with focal necrosis              -DCIS is admixed with invasive carcinoma, and comprises 30% of the tumor cellularity              -Invasive carcinoma is estrogen receptor positive, progesterone receptor positive and HER2   negative (score 1+) by immunohistochemistry               -METASTATIC BREAST DUCTAL CARCINOMA in one of two lymph nodes (1/2), residual after            neoadjuvant    systemic therapy              -Extranodal extension present (size 2 mm)              -[facility] residual cancer burden is 3.284 (RCB Class III).   8/29/2022 - Resection of involved margins - now negative  9/19/22 started Zoladex  10/31/22  completed XRT 21 fractions     12/12/22: starting Zometa q6m and abemaciclib/letrozole      Interval History:  She is seen today with her .  Overall she thinks she is continuing to make improvements in her general wellbeing.  She recently attended the living beyond breast cancer group here at the [facility] and found this very helpful.    Still having hot flashes, but manageable.  Tolerating the abemaciclib well.  Doing well with the Zoladex; has mild light hot flashes and mild stiffness in all her joints, this improves with walking/moving.     She is doing lymphedema therapy and does feel like the lymphedema in her right breast is better.      Physical Examination:/65   Pulse 60   Temp 98  F (36.7  C)   Resp 16   Wt 60.1 kg (132 lb 9.6 oz)   SpO2 98%   BMI 23.49 kg/m    Wt Readings from Last 4 Encounters:   03/06/23 60.1 kg (132 lb 9.6 oz)   02/06/23 59.4 kg (131 lb)   01/09/23 60.2 kg (132 lb 12.8 oz)   12/12/22 59.4 kg (130 lb 14.4 oz)     Vital signs were reviewed.   Patient alert and oriented x3.   PERRLA. EOMI. No scleral icterus noted.   Neck exam: No palpable cervical, supraclavicular or axillary nodes bilaterally.   Heart: RRR no murmurs noted.   Breast: R breast with mild lymphedema in the lower portion of the breast, no other masses.  Left breast without mass.   Lungs: clear to auscultation bilaterally.  No crackles or wheezing.   Abd: positive bowel sounds in all four quadrants.  No tenderness to palpation.  No hepatomegaly.   Extremities: No lower extremity edema.   Neuro: grossly intact.   Mood and affect is stable.   Right breast: dermatitis from radiation is dry and healing       Laboratory Data:  Most Recent 3 CBC's:  Recent Labs   Lab Test 03/06/23  1148 02/06/23  0948 01/23/23  0921   WBC 2.5* 2.8* 2.2*   HGB 11.3* 11.6* 12.1   MCV 99 96 96    187 203   ANEUTAUTO 1.7 2.0 1.6     Most Recent 3 BMP's:  Recent Labs   Lab Test 02/06/23  0948 01/23/23  0921 01/09/23  0931     140 141   POTASSIUM 4.2 4.0 3.7   CHLORIDE 106 106 109*   CO2 26 25 25   BUN 11.9 11.1 8.9   CR 0.77 0.73 0.64   ANIONGAP 9 9 7   JENNIE 9.2 9.1 8.6   GLC 99 100* 106*   PROTTOTAL 7.3 7.8 6.8   ALBUMIN 4.1 4.3 3.8    Most Recent 3 LFT's:  Recent Labs   Lab Test 02/06/23  0948 01/23/23  0921 01/09/23  0931   AST 16 16 13   ALT 9* 11 10   ALKPHOS 72 93 98   BILITOTAL 0.5 0.5 0.6    Most Recent 2 TSH and T4:  Recent Labs   Lab Test 09/09/22  1122 07/29/22  1009 06/24/22  1057   TSH 1.52 1.39 1.66   T4  --  1.12 0.96     I reviewed the above labs today.       PROBLEMS:     1. Right sided ER+, [place]+, HER2-negative, MammaPrint high risk T=5.5cm, N=0 breast cancer. Treated on I-SPY with assigned arm of dostarlimab/oral paclitaxel. Carbo added week 3. Received ACx4. Pathologic staging showed pT2a. PN1a. ER+, NJ+, HER21+, RCB=3   2. Pre-menopausal, now on Zoladex monthly tolerating this well.  3. Coping.       IMPRESSION: Tori completed radiation and is currently doing well on Zoladex, letrozole and abemaciclib.      PLAN:  1.  Continue monthly Zoladex.    2.  Continue letrozole/abemaciclib.  3. Zometa every 6 months to reduce the risk of bone metastasis and bone protection. Due May 2023.   4. Encouraged good nutrition and exercise, discussed coping resources, including ongoing support groups, survivorship event in  April.  She has embraced all of this, started more routine exercise has been going to support groups and overall is feeling like she is coping better.  5. Labs every 4 weeks on abemaciclib.  6.  I will see Mimi April 2023 for a survivorship visit.        40 minutes spent on the date of the encounter doing chart review, lab review, patient visit, counseling and documentation.        Milka Mota PA-C

## 2023-03-29 DIAGNOSIS — C50.911 INVASIVE DUCTAL CARCINOMA OF RIGHT BREAST (H): Primary | ICD-10-CM

## 2023-04-03 ENCOUNTER — APPOINTMENT (OUTPATIENT)
Dept: LAB | Facility: CLINIC | Age: 48
End: 2023-04-03
Attending: PHYSICIAN ASSISTANT
Payer: OTHER GOVERNMENT

## 2023-04-03 ENCOUNTER — INFUSION THERAPY VISIT (OUTPATIENT)
Dept: ONCOLOGY | Facility: CLINIC | Age: 48
End: 2023-04-03
Attending: PHYSICIAN ASSISTANT
Payer: OTHER GOVERNMENT

## 2023-04-03 VITALS
TEMPERATURE: 98.5 F | HEART RATE: 90 BPM | WEIGHT: 139.99 LBS | SYSTOLIC BLOOD PRESSURE: 116 MMHG | OXYGEN SATURATION: 100 % | DIASTOLIC BLOOD PRESSURE: 82 MMHG | RESPIRATION RATE: 16 BRPM | BODY MASS INDEX: 24.8 KG/M2

## 2023-04-03 DIAGNOSIS — Z78.0 POST-MENOPAUSAL: Primary | ICD-10-CM

## 2023-04-03 DIAGNOSIS — C50.911 INVASIVE DUCTAL CARCINOMA OF RIGHT BREAST (H): ICD-10-CM

## 2023-04-03 DIAGNOSIS — C50.911 INVASIVE DUCTAL CARCINOMA OF RIGHT BREAST (H): Primary | ICD-10-CM

## 2023-04-03 LAB
ALBUMIN SERPL BCG-MCNC: 4.1 G/DL (ref 3.5–5.2)
ALP SERPL-CCNC: 61 U/L (ref 35–104)
ALT SERPL W P-5'-P-CCNC: 14 U/L (ref 10–35)
ANION GAP SERPL CALCULATED.3IONS-SCNC: 9 MMOL/L (ref 7–15)
AST SERPL W P-5'-P-CCNC: 18 U/L (ref 10–35)
BASOPHILS # BLD AUTO: 0 10E3/UL (ref 0–0.2)
BASOPHILS NFR BLD AUTO: 1 %
BILIRUB SERPL-MCNC: 0.6 MG/DL
BUN SERPL-MCNC: 12.4 MG/DL (ref 6–20)
CALCIUM SERPL-MCNC: 9.4 MG/DL (ref 8.6–10)
CHLORIDE SERPL-SCNC: 105 MMOL/L (ref 98–107)
CREAT SERPL-MCNC: 0.7 MG/DL (ref 0.51–0.95)
DEPRECATED CALCIDIOL+CALCIFEROL SERPL-MC: 12 UG/L (ref 20–75)
DEPRECATED HCO3 PLAS-SCNC: 25 MMOL/L (ref 22–29)
EOSINOPHIL # BLD AUTO: 0.1 10E3/UL (ref 0–0.7)
EOSINOPHIL NFR BLD AUTO: 2 %
ERYTHROCYTE [DISTWIDTH] IN BLOOD BY AUTOMATED COUNT: 12.4 % (ref 10–15)
GFR SERPL CREATININE-BSD FRML MDRD: >90 ML/MIN/1.73M2
GLUCOSE SERPL-MCNC: 103 MG/DL (ref 70–99)
HCT VFR BLD AUTO: 34 % (ref 35–47)
HGB BLD-MCNC: 11.3 G/DL (ref 11.7–15.7)
IMM GRANULOCYTES # BLD: 0 10E3/UL
IMM GRANULOCYTES NFR BLD: 0 %
LYMPHOCYTES # BLD AUTO: 0.6 10E3/UL (ref 0.8–5.3)
LYMPHOCYTES NFR BLD AUTO: 20 %
MCH RBC QN AUTO: 33.9 PG (ref 26.5–33)
MCHC RBC AUTO-ENTMCNC: 33.2 G/DL (ref 31.5–36.5)
MCV RBC AUTO: 102 FL (ref 78–100)
MONOCYTES # BLD AUTO: 0.2 10E3/UL (ref 0–1.3)
MONOCYTES NFR BLD AUTO: 8 %
NEUTROPHILS # BLD AUTO: 1.9 10E3/UL (ref 1.6–8.3)
NEUTROPHILS NFR BLD AUTO: 69 %
NRBC # BLD AUTO: 0 10E3/UL
NRBC BLD AUTO-RTO: 0 /100
PLATELET # BLD AUTO: 178 10E3/UL (ref 150–450)
POTASSIUM SERPL-SCNC: 4 MMOL/L (ref 3.4–5.3)
PROT SERPL-MCNC: 7.3 G/DL (ref 6.4–8.3)
RBC # BLD AUTO: 3.33 10E6/UL (ref 3.8–5.2)
SODIUM SERPL-SCNC: 139 MMOL/L (ref 136–145)
WBC # BLD AUTO: 2.8 10E3/UL (ref 4–11)

## 2023-04-03 PROCEDURE — 250N000011 HC RX IP 250 OP 636: Performed by: PHYSICIAN ASSISTANT

## 2023-04-03 PROCEDURE — 82310 ASSAY OF CALCIUM: CPT

## 2023-04-03 PROCEDURE — G0463 HOSPITAL OUTPT CLINIC VISIT: HCPCS | Mod: 25 | Performed by: PHYSICIAN ASSISTANT

## 2023-04-03 PROCEDURE — 82306 VITAMIN D 25 HYDROXY: CPT

## 2023-04-03 PROCEDURE — 85025 COMPLETE CBC W/AUTO DIFF WBC: CPT

## 2023-04-03 PROCEDURE — 96402 CHEMO HORMON ANTINEOPL SQ/IM: CPT

## 2023-04-03 PROCEDURE — 80053 COMPREHEN METABOLIC PANEL: CPT

## 2023-04-03 PROCEDURE — 36591 DRAW BLOOD OFF VENOUS DEVICE: CPT

## 2023-04-03 PROCEDURE — 99215 OFFICE O/P EST HI 40 MIN: CPT | Performed by: PHYSICIAN ASSISTANT

## 2023-04-03 RX ORDER — HEPARIN SODIUM (PORCINE) LOCK FLUSH IV SOLN 100 UNIT/ML 100 UNIT/ML
5 SOLUTION INTRAVENOUS ONCE
Status: COMPLETED | OUTPATIENT
Start: 2023-04-03 | End: 2023-04-03

## 2023-04-03 RX ADMIN — Medication 5 ML: at 09:34

## 2023-04-03 RX ADMIN — GOSERELIN ACETATE 3.6 MG: 3.6 IMPLANT SUBCUTANEOUS at 11:36

## 2023-04-03 ASSESSMENT — PAIN SCALES - GENERAL
PAINLEVEL: NO PAIN (0)
PAINLEVEL: NO PAIN (0)

## 2023-04-03 NOTE — NURSING NOTE
"Chief Complaint   Patient presents with     Port Draw     Labs drawn via port by RN. VS taken.     Port accessed with 20g 3/4\" power needle and labs drawn by rn.  Port flushed with NS and heparin.  Pt tolerated well.  VS taken.  Pt checked in for next appt.    Elliott Angulo RN  "

## 2023-04-03 NOTE — PROGRESS NOTES
CANCER SURVIVORSHIP VISIT - END OF TREATMENT VISIT  Apr 3, 2023    REASON FOR VISIT: survivorship visit for history of breast cancer    CANCER HISTORY AND TREATMENT:  Tori Steele is a 46 year old female with breat cancer. Right-sided 5.5 cm ER positive SD positive HER-2 negative breast cancer with associated DCIS.  Her MammaPrint came back as high risk.  She consented for the I-SPY clinical trial and has been randomized to the oral paclitaxel, Encequidar and dostarlimab arm.      2/21/22 started trial with oral paclitaxel, Encequidar and dostarlimab.    3/15/22  her IV immunotherapy dostarlimab was held due to diarrhea that recovered with Imodium and time  4/4/22   Carbo was added in weekly due in hopes for more significant reduction in cancer  5/17/22 AC start  7/8/22 AC End  8/8/2022 - R. Lumpectomy with SNLBX -               -INVASIVE BREAST CARCINOMA OF NO SPECIAL TYPE (INVASIVE DUCTAL CARCINOMA), with   apocrine features, SAMANTHA GRADE 2, size 45 x 32 mm, residual after neoadjuvant systemic therapy              -Ductal carcinoma in situ (DCIS), nuclear grade 3, cribriform and solid type(s), with focal necrosis              -DCIS is admixed with invasive carcinoma, and comprises 30% of the tumor cellularity              -Invasive carcinoma is estrogen receptor positive, progesterone receptor positive and HER2   negative (score 1+) by immunohistochemistry               -METASTATIC BREAST DUCTAL CARCINOMA in one of two lymph nodes (1/2), residual after neoadjuvant systemic therapy              -Extranodal extension present (size 2 mm)              -The [facility] residual cancer burden is 3.284 (RCB Class III).   8/29/2022 - Resection of involved margins - now negative  9/19/22 started Zoladex  10/31/22 completed XRT 21 fractions     12/12/22: starting Zometa q6m and abemaciclib/letrozole     INTERVAL HISTORY:   Tori is here today with her .  She was feeling slightly apprehensive for the visit  today.  She recollects that her last visit she had been doing really well however just thinking about the survivorship visit today did make her reflect on what she is gone through more over this last year.    Largely she is doing well with her therapy.  She is not taking any antiemetics scheduled.  She takes a rare Imodium for mostly prophylactically if she has plans outside the home.    She still has some fatigue but is able to do all of her activities around the house and with her kids.  She is sleeping well at night.       Otherwise, ROS negative for: fevers, headaches, visual changes, new sites of pain, shortness of breath, cough, chest pain, abdominal pain, nausea, vomiting, abnormal vaginal bleeding, or leg swelling.      Current Outpatient Medications   Medication Sig Dispense Refill     abemaciclib (VERZENIO) 150 MG tablet Take 1 tablet (150 mg) by mouth 2 times daily for 28 days 56 tablet 0     letrozole (FEMARA) 2.5 MG tablet Take 1 tablet (2.5 mg) by mouth daily 90 tablet 3     abemaciclib (VERZENIO) 150 MG tablet Take 1 tablet (150 mg) by mouth 2 times daily for 28 days 56 tablet 0     lidocaine-prilocaine (EMLA) 2.5-2.5 % external cream Apply to port site one hour prior to access may start using six days after port placement. (Patient not taking: Reported on 1/9/2023) 30 g 1     loperamide (IMODIUM) 2 MG capsule Start with 2 caps (4 mg), then take one cap (2 mg) after each diarrheal stool as needed. Do not use more than 8 caps (16 mg) per day. (Patient not taking: Reported on 1/9/2023) 30 capsule 0     prochlorperazine (COMPAZINE) 10 MG tablet Take 1 tablet (10 mg) by mouth every 6 hours as needed for nausea or vomiting (Patient not taking: Reported on 4/3/2023) 30 tablet 2        No Known Allergies    PHYSICAL EXAMINATION  /82   Pulse 90   Temp 98.5  F (36.9  C) (Oral)   Resp 16   Wt 63.5 kg (139 lb 15.9 oz)   SpO2 100%   BMI 24.80 kg/m    Constitutional: Alert, oriented female in no  visible distress.  Eyes: PERRL. Anicteric sclerae.  ENT/Mouth: OM moist and pink without lesions or thrush.  CV: RRR, no murmurs appreciated.  Resp: CTAB throughout  Abdomen: Soft, non-tender, non-distended. Bowel sounds present. No masses appreciated. No organomegaly noted.  Extremities: No lower extremity edema appreciated.  Skin: Warm, dry. No bruising or petechiae noted.  Lymph: No cervical, supraclavicular, or axillary lymphadenopathy appreciated.   Neuro: CN II-XII grossly intact.  Breast: Right sided lumpectomy noted.  There is some mild lymphedema throughout the right breast but no palpable masses or concerns.  Left breast without any concerns.    LABS:  No results found for this or any previous visit (from the past 24 hour(s)).      IMPRESSION/PLAN:  Tori Steele is a 47 year old female with a history of ER positive breast cancer.  She participated in ISPY-2 and had an RCB 3.  She completed radiation therapy.  She remains on Verzenio for 2 years, monthly Zoladex, daily aromatase inhibitor.  She is, here for cancer survivorship visit following completion of cancer treatment.    Cancer history.  I have been seeing Tori monthly.  She is tolerating her regimen extremely well.  She will continue with monthly labs.  We discussed getting her first diagnostic mammogram at her next visit since its been 6 months from her radiation therapy.  We will then move to every 2 to 3-month follow-up.     We also discussed removing her port today.  She would prefer to have it taken out and is okay getting her monthly lab draws through her arm.    Genetic testing. She completed genetic testing.    Cardiotoxicity. Given her exposure to Adriamycin, she may be at risk for cardiomyopathy, arrhythmias, and left ventricular dysfunction. A post-treatment echo will be performed.  She should continue to follow-up routinely with her PCP for lipid testing, and monitoring of blood pressure and blood glucose, with treatment as  indicated.    Peripheral neuropathy.   She did not develop peripheral neuropathy on treatment. We would not anticipate this as a late side effect.    Risk of developing osteoporosis. She is post menopausal, we will obtain a baseline DEXA.. She should continue to have DEXA scans every 2 years, and was recommended to perform weightbearing exercises 2-3 days per week, and to supplement with 1200 mg of calcium, 1000 international unites of vitamin D daily.   -Continue Zometa every 6 months    Risk of hematologic malignancy. Rare risk of developing secondary hematologic malignancy. Annual CBC recommended.  Right now she is getting routine labs monthly because of her Verzenio    Radiation side effects. Radiation would have included the lungs, bone, skin. She should seek medical attention if she notices any new skin lesions in the area of radiation. The bones are at risk for fractures, so she should inform a provider with any new pains in these areas. The lungs are at risk for fibrosis, restrictive and/or obstructive lung disease. Currently, she is some mild right breast lymphedema and wearing a compression garment.  If she should develop any shortness of breath, hemoptysis, cough, would consider further evaluation with CT or X-ray. No routine screening for potential lung complications unless symptomatic.    Coping: We discussed her coping today.  She is working on this successfully.    Fatigue.  Slowly getting better.  She is working on getting in movement and sleeping well.  - CBC, TSH if continuing to have fatigue  - Recommend regular exercise    Sexuality concerns. She has had issues with vaginal dryness. Recommended Replens. If not effective, other alternatives include [facility], [facility], or venturing to [facility].  I sent her a message with some of our resources.    Risk of lymphedema: Can occur at any time.  She has had lymphedema throughout the right breast and is wearing a compression garment.    Cancer  screening. She should undergo routine screening for women in her age group.  She does need to reestablish with a new PCP.    Healthy lifestyle. She should maintain a healthy weight with a BMI between 20-25. She should exercise at least 150 minutes weekly at moderate intensity. She should see the eye doctor every 1-2 years, and dentist every 6 months for cleanings. She should not use any tobacco. She should minimize alcohol intake. If continuing to drink, should follow CDC recommendations for no more than 1 alcoholic drink/day for women.    60 minutes spent on the date of the encounter doing chart review, review of test results, interpretation of tests, patient visit, documentation and discussion with family

## 2023-04-03 NOTE — LETTER
4/3/2023         RE: Tori Steele  [address] [facility] [address]        Dear Colleague,    Thank you for referring your patient, Tori Steele, to the [facility]. Please see a copy of my visit note below.    CANCER SURVIVORSHIP VISIT - END OF TREATMENT VISIT  Apr 3, 2023    REASON FOR VISIT: survivorship visit for history of breast cancer    CANCER HISTORY AND TREATMENT:  Tori Steele is a 46 year old female with breat cancer. Right-sided 5.5 cm ER positive DC positive HER-2 negative breast cancer with associated DCIS.  Her MammaPrint came back as high risk.  She consented for the I-SPY clinical trial and has been randomized to the oral paclitaxel, Encequidar and dostarlimab arm.      2/21/22 started trial with oral paclitaxel, Encequidar and dostarlimab.    3/15/22  her IV immunotherapy dostarlimab was held due to diarrhea that recovered with Imodium and time  4/4/22   Carbo was added in weekly due in hopes for more significant reduction in cancer  5/17/22 AC start  7/8/22 AC End  8/8/2022 - R. Lumpectomy with SNLBX -               -INVASIVE BREAST CARCINOMA OF NO SPECIAL TYPE (INVASIVE DUCTAL CARCINOMA), with   apocrine features, SAMANTHA GRADE 2, size 45 x 32 mm, residual after neoadjuvant systemic therapy              -Ductal carcinoma in situ (DCIS), nuclear grade 3, cribriform and solid type(s), with focal necrosis              -DCIS is admixed with invasive carcinoma, and comprises 30% of the tumor cellularity              -Invasive carcinoma is estrogen receptor positive, progesterone receptor positive and HER2   negative (score 1+) by immunohistochemistry               -METASTATIC BREAST DUCTAL CARCINOMA in one of two lymph nodes (1/2), residual after neoadjuvant systemic therapy              -Extranodal extension present (size 2 mm)              -[facility] residual cancer burden is 3.284 (RCB Class III).   8/29/2022 - Resection of involved  margins - now negative  9/19/22 started Zoladex  10/31/22 completed XRT 21 fractions     12/12/22: starting Zometa q6m and abemaciclib/letrozole     INTERVAL HISTORY:   Tori is here today with her .  She was feeling slightly apprehensive for the visit today.  She recollects that her last visit she had been doing really well however just thinking about the survivorship visit today did make her reflect on what she is gone through more over this last year.    Largely she is doing well with her therapy.  She is not taking any antiemetics scheduled.  She takes a rare Imodium for mostly prophylactically if she has plans outside the home.    She still has some fatigue but is able to do all of her activities around the house and with her kids.  She is sleeping well at night.       Otherwise, ROS negative for: fevers, headaches, visual changes, new sites of pain, shortness of breath, cough, chest pain, abdominal pain, nausea, vomiting, abnormal vaginal bleeding, or leg swelling.      Current Outpatient Medications   Medication Sig Dispense Refill    abemaciclib (VERZENIO) 150 MG tablet Take 1 tablet (150 mg) by mouth 2 times daily for 28 days 56 tablet 0    letrozole (FEMARA) 2.5 MG tablet Take 1 tablet (2.5 mg) by mouth daily 90 tablet 3    abemaciclib (VERZENIO) 150 MG tablet Take 1 tablet (150 mg) by mouth 2 times daily for 28 days 56 tablet 0    lidocaine-prilocaine (EMLA) 2.5-2.5 % external cream Apply to port site one hour prior to access may start using six days after port placement. (Patient not taking: Reported on 1/9/2023) 30 g 1    loperamide (IMODIUM) 2 MG capsule Start with 2 caps (4 mg), then take one cap (2 mg) after each diarrheal stool as needed. Do not use more than 8 caps (16 mg) per day. (Patient not taking: Reported on 1/9/2023) 30 capsule 0    prochlorperazine (COMPAZINE) 10 MG tablet Take 1 tablet (10 mg) by mouth every 6 hours as needed for nausea or vomiting (Patient not taking: Reported on  4/3/2023) 30 tablet 2        No Known Allergies    PHYSICAL EXAMINATION  /82   Pulse 90   Temp 98.5  F (36.9  C) (Oral)   Resp 16   Wt 63.5 kg (139 lb 15.9 oz)   SpO2 100%   BMI 24.80 kg/m    Constitutional: Alert, oriented female in no visible distress.  Eyes: PERRL. Anicteric sclerae.  ENT/Mouth: OM moist and pink without lesions or thrush.  CV: RRR, no murmurs appreciated.  Resp: CTAB throughout  Abdomen: Soft, non-tender, non-distended. Bowel sounds present. No masses appreciated. No organomegaly noted.  Extremities: No lower extremity edema appreciated.  Skin: Warm, dry. No bruising or petechiae noted.  Lymph: No cervical, supraclavicular, or axillary lymphadenopathy appreciated.   Neuro: CN II-XII grossly intact.  Breast: Right sided lumpectomy noted.  There is some mild lymphedema throughout the right breast but no palpable masses or concerns.  Left breast without any concerns.    LABS:  No results found for this or any previous visit (from the past 24 hour(s)).      IMPRESSION/PLAN:  Tori Steele is a 47 year old female with a history of ER positive breast cancer.  She participated in ISPY-2 and had an RCB 3.  She completed radiation therapy.  She remains on Verzenio for 2 years, monthly Zoladex, daily aromatase inhibitor.  She is, here for cancer survivorship visit following completion of cancer treatment.    Cancer history.  I have been seeing Tori monthly.  She is tolerating her regimen extremely well.  She will continue with monthly labs.  We discussed getting her first diagnostic mammogram at her next visit since its been 6 months from her radiation therapy.  We will then move to every 2 to 3-month follow-up.     We also discussed removing her port today.  She would prefer to have it taken out and is okay getting her monthly lab draws through her arm.    Genetic testing. She completed genetic testing.    Cardiotoxicity. Given her exposure to Adriamycin, she may be at risk for  cardiomyopathy, arrhythmias, and left ventricular dysfunction. A post-treatment echo will be performed.  She should continue to follow-up routinely with her PCP for lipid testing, and monitoring of blood pressure and blood glucose, with treatment as indicated.    Peripheral neuropathy.   She did not develop peripheral neuropathy on treatment. We would not anticipate this as a late side effect.    Risk of developing osteoporosis. She is post menopausal, we will obtain a baseline DEXA.. She should continue to have DEXA scans every 2 years, and was recommended to perform weightbearing exercises 2-3 days per week, and to supplement with 1200 mg of calcium, 1000 international unites of vitamin D daily.   -Continue Zometa every 6 months    Risk of hematologic malignancy. Rare risk of developing secondary hematologic malignancy. Annual CBC recommended.  Right now she is getting routine labs monthly because of her Verzenio    Radiation side effects. Radiation would have included the lungs, bone, skin. She should seek medical attention if she notices any new skin lesions in the area of radiation. The bones are at risk for fractures, so she should inform a provider with any new pains in these areas. The lungs are at risk for fibrosis, restrictive and/or obstructive lung disease. Currently, she is some mild right breast lymphedema and wearing a compression garment.  If she should develop any shortness of breath, hemoptysis, cough, would consider further evaluation with CT or X-ray. No routine screening for potential lung complications unless symptomatic.    Coping: We discussed her coping today.  She is working on this successfully.    Fatigue.  Slowly getting better.  She is working on getting in movement and sleeping well.  - CBC, TSH if continuing to have fatigue  - Recommend regular exercise    Sexuality concerns. She has had issues with vaginal dryness. Recommended Replens. If not effective, other alternatives include  [facility], [facility], or venturing to [facility].  I sent her a message with some of our resources.    Risk of lymphedema: Can occur at any time.  She has had lymphedema throughout the right breast and is wearing a compression garment.    Cancer screening. She should undergo routine screening for women in her age group.  She does need to reestablish with a new PCP.    Healthy lifestyle. She should maintain a healthy weight with a BMI between 20-25. She should exercise at least 150 minutes weekly at moderate intensity. She should see the eye doctor every 1-2 years, and dentist every 6 months for cleanings. She should not use any tobacco. She should minimize alcohol intake. If continuing to drink, should follow CDC recommendations for no more than 1 alcoholic drink/day for women.    60 minutes spent on the date of the encounter doing chart review, review of test results, interpretation of tests, patient visit, documentation and discussion with family     Sincerely,        Milka Mota PA-C

## 2023-04-03 NOTE — LETTER
4/3/2023         RE: Tori Steele  [address] [facility] [address]      CANCER SURVIVORSHIP VISIT - END OF TREATMENT VISIT  Apr 3, 2023    REASON FOR VISIT: survivorship visit for history of breast cancer    CANCER HISTORY AND TREATMENT:  Tori Steele is a 46 year old female with breat cancer. Right-sided 5.5 cm ER positive WA positive HER-2 negative breast cancer with associated DCIS.  Her MammaPrint came back as high risk.  She consented for the I-SPY clinical trial and has been randomized to the oral paclitaxel, Encequidar and dostarlimab arm.      2/21/22 started trial with oral paclitaxel, Encequidar and dostarlimab.    3/15/22  her IV immunotherapy dostarlimab was held due to diarrhea that recovered with Imodium and time  4/4/22   Carbo was added in weekly due in hopes for more significant reduction in cancer  5/17/22 AC start  7/8/22 AC End  8/8/2022 - R. Lumpectomy with SNLBX -               -INVASIVE BREAST CARCINOMA OF NO SPECIAL TYPE (INVASIVE DUCTAL CARCINOMA), with   apocrine features, SAMANTHA GRADE 2, size 45 x 32 mm, residual after neoadjuvant systemic therapy              -Ductal carcinoma in situ (DCIS), nuclear grade 3, cribriform and solid type(s), with focal necrosis              -DCIS is admixed with invasive carcinoma, and comprises 30% of the tumor cellularity              -Invasive carcinoma is estrogen receptor positive, progesterone receptor positive and HER2   negative (score 1+) by immunohistochemistry               -METASTATIC BREAST DUCTAL CARCINOMA in one of two lymph nodes (1/2), residual after neoadjuvant systemic therapy              -Extranodal extension present (size 2 mm)              -[facility] residual cancer burden is 3.284 (RCB Class III).   8/29/2022 - Resection of involved margins - now negative  9/19/22 started Zoladex  10/31/22 completed XRT 21 fractions     12/12/22: starting Zometa q6m and abemaciclib/letrozole     INTERVAL HISTORY:   Tori  is here today with her .  She was feeling slightly apprehensive for the visit today.  She recollects that her last visit she had been doing really well however just thinking about the survivorship visit today did make her reflect on what she is gone through more over this last year.    Largely she is doing well with her therapy.  She is not taking any antiemetics scheduled.  She takes a rare Imodium for mostly prophylactically if she has plans outside the home.    She still has some fatigue but is able to do all of her activities around the house and with her kids.  She is sleeping well at night.       Otherwise, ROS negative for: fevers, headaches, visual changes, new sites of pain, shortness of breath, cough, chest pain, abdominal pain, nausea, vomiting, abnormal vaginal bleeding, or leg swelling.      Current Outpatient Medications   Medication Sig Dispense Refill    abemaciclib (VERZENIO) 150 MG tablet Take 1 tablet (150 mg) by mouth 2 times daily for 28 days 56 tablet 0    letrozole (FEMARA) 2.5 MG tablet Take 1 tablet (2.5 mg) by mouth daily 90 tablet 3    abemaciclib (VERZENIO) 150 MG tablet Take 1 tablet (150 mg) by mouth 2 times daily for 28 days 56 tablet 0    lidocaine-prilocaine (EMLA) 2.5-2.5 % external cream Apply to port site one hour prior to access may start using six days after port placement. (Patient not taking: Reported on 1/9/2023) 30 g 1    loperamide (IMODIUM) 2 MG capsule Start with 2 caps (4 mg), then take one cap (2 mg) after each diarrheal stool as needed. Do not use more than 8 caps (16 mg) per day. (Patient not taking: Reported on 1/9/2023) 30 capsule 0    prochlorperazine (COMPAZINE) 10 MG tablet Take 1 tablet (10 mg) by mouth every 6 hours as needed for nausea or vomiting (Patient not taking: Reported on 4/3/2023) 30 tablet 2        No Known Allergies    PHYSICAL EXAMINATION  /82   Pulse 90   Temp 98.5  F (36.9  C) (Oral)   Resp 16   Wt 63.5 kg (139 lb 15.9 oz)    SpO2 100%   BMI 24.80 kg/m    Constitutional: Alert, oriented female in no visible distress.  Eyes: PERRL. Anicteric sclerae.  ENT/Mouth: OM moist and pink without lesions or thrush.  CV: RRR, no murmurs appreciated.  Resp: CTAB throughout  Abdomen: Soft, non-tender, non-distended. Bowel sounds present. No masses appreciated. No organomegaly noted.  Extremities: No lower extremity edema appreciated.  Skin: Warm, dry. No bruising or petechiae noted.  Lymph: No cervical, supraclavicular, or axillary lymphadenopathy appreciated.   Neuro: CN II-XII grossly intact.  Breast: Right sided lumpectomy noted.  There is some mild lymphedema throughout the right breast but no palpable masses or concerns.  Left breast without any concerns.    LABS:  No results found for this or any previous visit (from the past 24 hour(s)).      IMPRESSION/PLAN:  Tori Steele is a 47 year old female with a history of ER positive breast cancer.  She participated in ISPY-2 and had an RCB 3.  She completed radiation therapy.  She remains on Verzenio for 2 years, monthly Zoladex, daily aromatase inhibitor.  She is, here for cancer survivorship visit following completion of cancer treatment.    Cancer history.  I have been seeing Tori monthly.  She is tolerating her regimen extremely well.  She will continue with monthly labs.  We discussed getting her first diagnostic mammogram at her next visit since its been 6 months from her radiation therapy.  We will then move to every 2 to 3-month follow-up.     We also discussed removing her port today.  She would prefer to have it taken out and is okay getting her monthly lab draws through her arm.    Genetic testing. She completed genetic testing.    Cardiotoxicity. Given her exposure to Adriamycin, she may be at risk for cardiomyopathy, arrhythmias, and left ventricular dysfunction. A post-treatment echo will be performed.  She should continue to follow-up routinely with her PCP for lipid testing,  and monitoring of blood pressure and blood glucose, with treatment as indicated.    Peripheral neuropathy.   She did not develop peripheral neuropathy on treatment. We would not anticipate this as a late side effect.    Risk of developing osteoporosis. She is post menopausal, we will obtain a baseline DEXA.. She should continue to have DEXA scans every 2 years, and was recommended to perform weightbearing exercises 2-3 days per week, and to supplement with 1200 mg of calcium, 1000 international unites of vitamin D daily.   -Continue Zometa every 6 months    Risk of hematologic malignancy. Rare risk of developing secondary hematologic malignancy. Annual CBC recommended.  Right now she is getting routine labs monthly because of her Verzenio    Radiation side effects. Radiation would have included the lungs, bone, skin. She should seek medical attention if she notices any new skin lesions in the area of radiation. The bones are at risk for fractures, so she should inform a provider with any new pains in these areas. The lungs are at risk for fibrosis, restrictive and/or obstructive lung disease. Currently, she is some mild right breast lymphedema and wearing a compression garment.  If she should develop any shortness of breath, hemoptysis, cough, would consider further evaluation with CT or X-ray. No routine screening for potential lung complications unless symptomatic.    Coping: We discussed her coping today.  She is working on this successfully.    Fatigue.  Slowly getting better.  She is working on getting in movement and sleeping well.  - CBC, TSH if continuing to have fatigue  - Recommend regular exercise    Sexuality concerns. She has had issues with vaginal dryness. Recommended Replens. If not effective, other alternatives include [facility], [facility], or venturing to [facility].  I sent her a message with some of our resources.    Risk of lymphedema: Can occur at any time.  She has had lymphedema throughout  the right breast and is wearing a compression garment.    Cancer screening. She should undergo routine screening for women in her age group.  She does need to reestablish with a new PCP.    Healthy lifestyle. She should maintain a healthy weight with a BMI between 20-25. She should exercise at least 150 minutes weekly at moderate intensity. She should see the eye doctor every 1-2 years, and dentist every 6 months for cleanings. She should not use any tobacco. She should minimize alcohol intake. If continuing to drink, should follow CDC recommendations for no more than 1 alcoholic drink/day for women.    60 minutes spent on the date of the encounter doing chart review, review of test results, interpretation of tests, patient visit, documentation and discussion with family               Milka Mota PA-C

## 2023-04-03 NOTE — PATIENT INSTRUCTIONS
Contact Numbers    [facility] [place] [facility] [place]: [phone]    Call with chills and/or temperature greater than or equal to 100.5, uncontrolled nausea/vomiting, diarrhea, constipation, dizziness, shortness of breath, chest pain, bleeding, unexplained bruising, or any new/concerning symptoms, questions/concerns.     If you are having any concerning symptoms or wish to speak to a provider before your next infusion visit, please call your care coordinator or triage to notify them so we can adequately serve you.       After Hours: [phone]    If after hours, weekends, or holidays, call main hospital  and ask for Oncology doctor on call.      April 2023 Sunday Monday Tuesday Wednesday Thursday Friday Saturday                                 1       2     3    LAB CENTRAL   9:00 AM   (15 min.)   [facility]   [facility]    RETURN CCSL   9:15 AM   (45 min.)   Milka Mota PA-C   [facility]    ONC INFUSION 0.5 HR (30 MIN)  10:00 AM   (30 min.)    ONC INFUSION NURSE   [facility] 4     5     6     7     8       9     10     11     12     13     14     15       16     17     18     19     20     21     22       23     24     25     26     27     28     29       30                                                 May 2023      Luis F Monday Tuesday Wednesday Thursday Friday Saturday        1    LAB CENTRAL   9:30 AM   (15 min.)   [facility]   [facility]    MA DIAGNOSTIC DIGITAL BILAT   9:45 AM   (30 min.)   [id number]    RETURN CCSL  10:15 AM   (45 min.)   Milka Mota PA-C   [facility]    ONC INFUSION 0.5 HR (30 MIN)  12:00 PM   (30 min.)    ONC INFUSION NURSE   [facility] 2     3     4     5     6       7     8     9     10     11     12     13       14      15     16     17     18     19     20       21     22     23     24     25     26     27       28     29    LAB CENTRAL  11:00 AM   (15 min.)   [facility] LAB DRAW   [facility]    ONC INFUSION 0.5 HR (30 MIN)  11:30 AM   (30 min.)    ONC INFUSION NURSE   [facility] 30     31                                      Lab Results:  Recent Results (from the past 12 hour(s))   Comprehensive metabolic panel    Collection Time: 04/03/23  9:32 AM   Result Value Ref Range    Sodium 139 136 - 145 mmol/L    Potassium 4.0 3.4 - 5.3 mmol/L    Chloride 105 98 - 107 mmol/L    Carbon Dioxide (CO2) 25 22 - 29 mmol/L    Anion Gap 9 7 - 15 mmol/L    Urea Nitrogen 12.4 6.0 - 20.0 mg/dL    Creatinine 0.70 0.51 - 0.95 mg/dL    Calcium 9.4 8.6 - 10.0 mg/dL    Glucose 103 (H) 70 - 99 mg/dL    Alkaline Phosphatase 61 35 - 104 U/L    AST 18 10 - 35 U/L    ALT 14 10 - 35 U/L    Protein Total 7.3 6.4 - 8.3 g/dL    Albumin 4.1 3.5 - 5.2 g/dL    Bilirubin Total 0.6 <=1.2 mg/dL    GFR Estimate >90 >60 mL/min/1.73m2   CBC with platelets and differential    Collection Time: 04/03/23  9:32 AM   Result Value Ref Range    WBC Count 2.8 (L) 4.0 - 11.0 10e3/uL    RBC Count 3.33 (L) 3.80 - 5.20 10e6/uL    Hemoglobin 11.3 (L) 11.7 - 15.7 g/dL    Hematocrit 34.0 (L) 35.0 - 47.0 %     (H) 78 - 100 fL    MCH 33.9 (H) 26.5 - 33.0 pg    MCHC 33.2 31.5 - 36.5 g/dL    RDW 12.4 10.0 - 15.0 %    Platelet Count 178 150 - 450 10e3/uL    % Neutrophils 69 %    % Lymphocytes 20 %    % Monocytes 8 %    % Eosinophils 2 %    % Basophils 1 %    % Immature Granulocytes 0 %    NRBCs per 100 WBC 0 <1 /100    Absolute Neutrophils 1.9 1.6 - 8.3 10e3/uL    Absolute Lymphocytes 0.6 (L) 0.8 - 5.3 10e3/uL    Absolute Monocytes 0.2 0.0 - 1.3 10e3/uL    Absolute Eosinophils 0.1 0.0 - 0.7 10e3/uL    Absolute Basophils 0.0 0.0 - 0.2 10e3/uL    Absolute Immature Granulocytes 0.0 <=0.4 10e3/uL    Absolute NRBCs 0.0 10e3/uL

## 2023-04-03 NOTE — NURSING NOTE
"Oncology Rooming Note    April 3, 2023 9:39 AM   Tori Steele is a 47 year old female who presents for:    Chief Complaint   Patient presents with     Port Draw     Labs drawn via port by RN. VS taken.     Oncology Clinic Visit     Breast cancer     Initial Vitals: /82   Pulse 90   Temp 98.5  F (36.9  C) (Oral)   Resp 16   Wt 63.5 kg (139 lb 15.9 oz)   SpO2 100%   BMI 24.80 kg/m   Estimated body mass index is 24.8 kg/m  as calculated from the following:    Height as of 8/29/22: 1.6 m (5' 3\").    Weight as of this encounter: 63.5 kg (139 lb 15.9 oz). Body surface area is 1.68 meters squared.  No Pain (0) Comment: Data Unavailable   No LMP recorded. (Menstrual status: Chemotherapy).  Allergies reviewed: Yes  Medications reviewed: Yes    Medications: Medication refills not needed today.  Pharmacy name entered into [facility]:    [id number] - [place], [place] - [address] [facility] [address]  [facility] [place], [place] - [address]  [facility] - [place], [place] - [address]    Clinical concerns: none       Jojo Baca CMA            "

## 2023-04-03 NOTE — PROGRESS NOTES
Infusion Nursing Note:  Tori Steele presents today for Zoladex.    Patient seen by provider today: Yes: JESSY Maria    Note: Patient presents to clinic today feeling well with no questions.  Pt did not request or require any intervention for pain today.    Intravenous Access:  No Intravenous access at this visit.    Treatment Conditions:  Not Applicable.    Post Infusion Assessment:  Patient tolerated injection without incident to RLQ of abdomen with ice prior.    Discharge Plan:   Patient declined prescription refills.  Discharge instructions reviewed with: Patient.  Patient and/or family verbalized understanding of discharge instructions and all questions answered.  AVS to patient via myTips.  Patient will return 5/1/2023 for next appointment.   Patient discharged in stable condition accompanied by: .  Departure Mode: Ambulatory.    Yareli Arreaga RN

## 2023-04-04 DIAGNOSIS — E55.9 VITAMIN D DEFICIENCY: Primary | ICD-10-CM

## 2023-04-11 ENCOUNTER — HOSPITAL ENCOUNTER (OUTPATIENT)
Facility: AMBULATORY SURGERY CENTER | Age: 48
Discharge: HOME | End: 2023-04-11
Attending: RADIOLOGY | Admitting: RADIOLOGY
Payer: OTHER GOVERNMENT

## 2023-04-11 ENCOUNTER — ANCILLARY PROCEDURE (OUTPATIENT)
Dept: RADIOLOGY | Facility: AMBULATORY SURGERY CENTER | Age: 48
End: 2023-04-11
Attending: PHYSICIAN ASSISTANT
Payer: OTHER GOVERNMENT

## 2023-04-11 VITALS
HEIGHT: 63 IN | WEIGHT: 138 LBS | HEART RATE: 84 BPM | BODY MASS INDEX: 24.45 KG/M2 | SYSTOLIC BLOOD PRESSURE: 100 MMHG | RESPIRATION RATE: 18 BRPM | DIASTOLIC BLOOD PRESSURE: 60 MMHG | OXYGEN SATURATION: 100 % | TEMPERATURE: 97.2 F

## 2023-04-11 DIAGNOSIS — C50.911 INVASIVE DUCTAL CARCINOMA OF RIGHT BREAST (H): ICD-10-CM

## 2023-04-11 LAB — INR PPP: 0.98 (ref 0.85–1.15)

## 2023-04-11 PROCEDURE — 85610 PROTHROMBIN TIME: CPT | Performed by: PATHOLOGY

## 2023-04-11 PROCEDURE — 36590 REMOVAL TUNNELED CV CATH: CPT

## 2023-04-11 PROCEDURE — 36590 REMOVAL TUNNELED CV CATH: CPT | Performed by: RADIOLOGY

## 2023-04-11 RX ORDER — LIDOCAINE 40 MG/G
CREAM TOPICAL
Status: DISCONTINUED | OUTPATIENT
Start: 2023-04-11 | End: 2023-04-12 | Stop reason: HOSPADM

## 2023-04-11 RX ORDER — LIDOCAINE HYDROCHLORIDE AND EPINEPHRINE 10; 10 MG/ML; UG/ML
INJECTION, SOLUTION INFILTRATION; PERINEURAL DAILY PRN
Status: DISCONTINUED | OUTPATIENT
Start: 2023-04-11 | End: 2023-04-11 | Stop reason: HOSPADM

## 2023-04-11 RX ORDER — SODIUM CHLORIDE 9 MG/ML
INJECTION, SOLUTION INTRAVENOUS CONTINUOUS
Status: DISCONTINUED | OUTPATIENT
Start: 2023-04-11 | End: 2023-04-12 | Stop reason: HOSPADM

## 2023-04-11 NOTE — BRIEF OP NOTE
[facility] Brief Procedure Note    Pre-operative diagnosis: Breast cancer, completed chemotherapy   Post-operative diagnosis Same   Procedure: Left chest port removal   Surgeon: Shilpi Correa MD   Assistants(s): -   Estimated blood loss: None        Findings: Uneventful removal of left chest port.   Complications: None.

## 2023-04-11 NOTE — DISCHARGE INSTRUCTIONS
A collaboration between [facility] and [facility]  Experts in minimally invasive, targeted treatments performed using imaging guidance    Venous Access Device, Port Catheter or Tunneled Central Line Removal    Today you had your existing venous access device removed, either because it was no longer needed or because there was malfunction or infection issues.    After you go home:  Drink plenty of fluids.  Generally 6-8 (8 ounce) glasses a day is recommended.  Resume your regular diet unless otherwise ordered by a medical provider.  Keep any applied tape/gauze dressings clean and dry.  Change tape/gauze dressings if they get wet or soiled.  You may shower the following day after procedure, however cover and protect from moisture any tape/gauze dressings.  You may let water hit and run over dried skin glue, but do not scrub.  Pat the area dry after showering.  Port removal incisions are closed with absorbable suture, meaning they do not need to be removed at a later date, and a topical skin adhesive (skin glue).  This glue will wear off in 7-14 days.  Do not remove before this time.  If 14 days have passed and residual glue is present, you may gently remove it.  You may remove tape/gauze dressings after 5 days if the site looks closed and in the process of healing.  Do not apply gels, lotions, or ointments to the glue site for the first 10 days as this may cause the glue to prematurely soften and fail.  Do not perform strenuous activities or lift greater than 10 pounds for the next three days.  If there is bleeding or oozing from the procedure site, apply firm pressure to the area for 5-10 minutes.  If the bleeding continues seek medical advice at the numbers below.  Mild procedure site discomfort can be treated with an ice pack and over-the-counter pain relievers.                Call our Interventional Radiology (IR) service if:  If you start bleeding from the  procedure site.  If you do start to bleed from the site, lie down and hold some pressure on the site.  Our radiology provider can help you decide if you need to return to the hospital.  If you have new or worsening pain related to the procedure.  If you have concerning swelling at the procedure site.  If you develop persistent nausea or vomiting.  If you develop hives or a rash or any unexplained itching.  If you have a fever of greater than 100.5  F and chills in the first 5 days after procedure.  Any other concerns related to your procedure.      [facility]  Interventional Radiology (IR)  [address]  [address] [facility] [place] [address]    Contact Number:  [phone]  (IR control desk)  Monday - Friday 8:00 am - 4:30 pm    After hours for urgent concerns:  [phone]  After 4:30 pm Monday - Friday, Weekends and Holidays.   Ask for Interventional Radiology on-call.  Someone is available 24 hours a day.  [facility] toll free number:  [phone]

## 2023-04-17 ENCOUNTER — ANCILLARY PROCEDURE (OUTPATIENT)
Dept: BONE DENSITY | Facility: CLINIC | Age: 48
End: 2023-04-17
Attending: PHYSICIAN ASSISTANT
Payer: OTHER GOVERNMENT

## 2023-04-17 DIAGNOSIS — C50.911 INVASIVE DUCTAL CARCINOMA OF RIGHT BREAST (H): ICD-10-CM

## 2023-04-17 DIAGNOSIS — Z78.0 POST-MENOPAUSAL: ICD-10-CM

## 2023-04-17 DIAGNOSIS — C50.911 INVASIVE DUCTAL CARCINOMA OF RIGHT BREAST (H): Primary | ICD-10-CM

## 2023-04-17 PROCEDURE — 77080 DXA BONE DENSITY AXIAL: CPT | Mod: TC | Performed by: RADIOLOGY

## 2023-04-20 ENCOUNTER — TELEPHONE (OUTPATIENT)
Dept: ONCOLOGY | Facility: CLINIC | Age: 48
End: 2023-04-20
Payer: OTHER GOVERNMENT

## 2023-04-20 NOTE — TELEPHONE ENCOUNTER
Oral Chemotherapy Monitoring Program    Subjective/Objective:  Tori Steele is a 47 year old female contacted by phone for a follow-up visit for oral chemotherapy. Tori confirmed taking 1 tablet (150 mg) of abemaciclib 2 times daily with no missed doses in the past 2 weeks.     She reports tolerating therapy well with occasional loose stools and she only takes Imodium about once a month to manage it if she has to go out for activities. She denied changes in appetite or energy level or other noticeable side effects.    She counted her remaining abemaciclib on hand - she just opened 1 x 7-day pack today and has an additional 4 x unopened 7-day packs on hand.        1/5/2023     1:00 PM 1/23/2023     3:00 PM 2/2/2023    10:00 AM 2/21/2023     3:00 PM 3/2/2023     8:00 AM 3/29/2023    11:00 AM 4/20/2023    10:00 AM   ORAL CHEMOTHERAPY   Assessment Type Refill Lab Monitoring;Monthly Follow up Refill Monthly Follow up Refill Refill Monthly Follow up   Diagnosis Code Breast Cancer Breast Cancer Breast Cancer Breast Cancer Breast Cancer Breast Cancer Breast Cancer   Providers Dr. Cathryn Lockett   [facility]   Drug Name Verzenio (abemaciclib) Verzenio (abemaciclib) Verzenio (abemaciclib) Verzenio (abemaciclib) Verzenio (abemaciclib) Verzenio (abemaciclib) Verzenio (abemaciclib)   Dose 150 mg 150 mg 150 mg 150 mg 150 mg  150 mg   Current Schedule BID BID BID BID BID  BID   Cycle Details Continuous Continuous Continuous Continuous Continuous  Continuous   Start Date of Last Cycle       3/30/2023   Planned next cycle start date 1/12/2023 4/27/2023   Doses missed in last 2 weeks    0   0   Adherence Assessment    Adherent   Adherent   Adverse Effects    No AE identified during assessment   No AE identified during assessment       Last PHQ-2 Score on record:       10/2/2020     9:50 AM 9/3/2020    12:20 PM   PHQ-2  "( 1999 Pf[employer])   Q1: Little interest or pleasure in doing things 0 0   Q2: Feeling down, depressed or hopeless 0 0   PHQ-2 Score 0 0   PHQ-2 Total Score (12-17 Years)- Positive if 3 or more points; Administer PHQ-A if positive 0 0       Vitals:  BP:   BP Readings from Last 1 Encounters:   04/11/23 100/60     Wt Readings from Last 1 Encounters:   04/11/23 62.6 kg (138 lb)     Estimated body surface area is 1.67 meters squared as calculated from the following:    Height as of 4/11/23: 1.6 m (5' 3\").    Weight as of 4/11/23: 62.6 kg (138 lb).    Labs:  _  Result Component Current Result Ref Range   Sodium 139 (4/3/2023) 136 - 145 mmol/L     _  Result Component Current Result Ref Range   Potassium 4.0 (4/3/2023) 3.4 - 5.3 mmol/L     _  Result Component Current Result Ref Range   Calcium 9.4 (4/3/2023) 8.6 - 10.0 mg/dL     No results found for Mag within last 30 days.     No results found for Phos within last 30 days.     _  Result Component Current Result Ref Range   Albumin 4.1 (4/3/2023) 3.5 - 5.2 g/dL     _  Result Component Current Result Ref Range   Urea Nitrogen 12.4 (4/3/2023) 6.0 - 20.0 mg/dL     _  Result Component Current Result Ref Range   Creatinine 0.70 (4/3/2023) 0.51 - 0.95 mg/dL     _  Result Component Current Result Ref Range   AST 18 (4/3/2023) 10 - 35 U/L     _  Result Component Current Result Ref Range   ALT 14 (4/3/2023) 10 - 35 U/L     _  Result Component Current Result Ref Range   Bilirubin Total 0.6 (4/3/2023) <=1.2 mg/dL     _  Result Component Current Result Ref Range   WBC Count 2.8 (L) (4/3/2023) 4.0 - 11.0 10e3/uL     _  Result Component Current Result Ref Range   Hemoglobin 11.3 (L) (4/3/2023) 11.7 - 15.7 g/dL     _  Result Component Current Result Ref Range   Platelet Count 178 (4/3/2023) 150 - 450 10e3/uL     No results found for ANC within last 30 days.     _  Result Component Current Result Ref Range   Absolute Neutrophils 1.9 (4/3/2023) 1.6 - 8.3 10e3/uL    "       Assessment/Plan:  Tori is adherent and tolerating abemaciclib treatment with mild and manageable loose stools. She has some extra supply at home so I will push back her refill dates.    Follow-Up:  5/1 Janette Mota appt with labs and infusion    Refill Due:  With her supplies left on hand, she will be due to finish her supplies on 5/24 and due for additional supplies on 5/25.    Rachel Palma  Pharmacy Intern  Oral Chemotherapy Monitoring Program   [facility]  [phone]

## 2023-04-26 ENCOUNTER — HOSPITAL ENCOUNTER (OUTPATIENT)
Dept: CARDIOLOGY | Facility: CLINIC | Age: 48
Discharge: HOME | End: 2023-04-26
Attending: PHYSICIAN ASSISTANT | Admitting: PHYSICIAN ASSISTANT
Payer: OTHER GOVERNMENT

## 2023-04-26 DIAGNOSIS — Z78.0 POST-MENOPAUSAL: ICD-10-CM

## 2023-04-26 DIAGNOSIS — C50.911 INVASIVE DUCTAL CARCINOMA OF RIGHT BREAST (H): ICD-10-CM

## 2023-04-26 LAB
BI-PLANE LVEF ECHO: NORMAL
LVEF ECHO: NORMAL

## 2023-04-26 PROCEDURE — 93306 TTE W/DOPPLER COMPLETE: CPT

## 2023-04-26 PROCEDURE — 93306 TTE W/DOPPLER COMPLETE: CPT | Mod: 26 | Performed by: INTERNAL MEDICINE

## 2023-05-01 ENCOUNTER — APPOINTMENT (OUTPATIENT)
Dept: LAB | Facility: CLINIC | Age: 48
End: 2023-05-01
Attending: PHYSICIAN ASSISTANT
Payer: OTHER GOVERNMENT

## 2023-05-01 ENCOUNTER — ONCOLOGY VISIT (OUTPATIENT)
Dept: ONCOLOGY | Facility: CLINIC | Age: 48
End: 2023-05-01
Attending: PHYSICIAN ASSISTANT
Payer: OTHER GOVERNMENT

## 2023-05-01 ENCOUNTER — ANCILLARY PROCEDURE (OUTPATIENT)
Dept: MAMMOGRAPHY | Facility: CLINIC | Age: 48
End: 2023-05-01
Payer: OTHER GOVERNMENT

## 2023-05-01 VITALS
RESPIRATION RATE: 16 BRPM | OXYGEN SATURATION: 96 % | SYSTOLIC BLOOD PRESSURE: 127 MMHG | WEIGHT: 139.4 LBS | HEART RATE: 95 BPM | BODY MASS INDEX: 24.69 KG/M2 | DIASTOLIC BLOOD PRESSURE: 70 MMHG | TEMPERATURE: 98.5 F

## 2023-05-01 DIAGNOSIS — C50.911 INVASIVE DUCTAL CARCINOMA OF RIGHT BREAST (H): ICD-10-CM

## 2023-05-01 DIAGNOSIS — C50.911 INVASIVE DUCTAL CARCINOMA OF RIGHT BREAST (H): Primary | ICD-10-CM

## 2023-05-01 DIAGNOSIS — Z79.811 LONG TERM CURRENT USE OF AROMATASE INHIBITOR: ICD-10-CM

## 2023-05-01 DIAGNOSIS — Z78.0 POST-MENOPAUSAL: ICD-10-CM

## 2023-05-01 DIAGNOSIS — E55.9 VITAMIN D DEFICIENCY: ICD-10-CM

## 2023-05-01 LAB
ALBUMIN SERPL BCG-MCNC: 4.3 G/DL (ref 3.5–5.2)
ALP SERPL-CCNC: 69 U/L (ref 35–104)
ALT SERPL W P-5'-P-CCNC: 12 U/L (ref 10–35)
ANION GAP SERPL CALCULATED.3IONS-SCNC: 9 MMOL/L (ref 7–15)
AST SERPL W P-5'-P-CCNC: 18 U/L (ref 10–35)
BASOPHILS # BLD AUTO: 0 10E3/UL (ref 0–0.2)
BASOPHILS NFR BLD AUTO: 1 %
BILIRUB SERPL-MCNC: 0.5 MG/DL
BUN SERPL-MCNC: 9.6 MG/DL (ref 6–20)
CALCIUM SERPL-MCNC: 9.9 MG/DL (ref 8.6–10)
CHLORIDE SERPL-SCNC: 105 MMOL/L (ref 98–107)
CREAT SERPL-MCNC: 0.83 MG/DL (ref 0.51–0.95)
DEPRECATED HCO3 PLAS-SCNC: 27 MMOL/L (ref 22–29)
EOSINOPHIL # BLD AUTO: 0.1 10E3/UL (ref 0–0.7)
EOSINOPHIL NFR BLD AUTO: 2 %
ERYTHROCYTE [DISTWIDTH] IN BLOOD BY AUTOMATED COUNT: 11.5 % (ref 10–15)
GFR SERPL CREATININE-BSD FRML MDRD: 88 ML/MIN/1.73M2
GLUCOSE SERPL-MCNC: 106 MG/DL (ref 70–99)
HCT VFR BLD AUTO: 35.9 % (ref 35–47)
HGB BLD-MCNC: 12 G/DL (ref 11.7–15.7)
IMM GRANULOCYTES # BLD: 0 10E3/UL
IMM GRANULOCYTES NFR BLD: 0 %
LYMPHOCYTES # BLD AUTO: 0.5 10E3/UL (ref 0.8–5.3)
LYMPHOCYTES NFR BLD AUTO: 16 %
MCH RBC QN AUTO: 34.8 PG (ref 26.5–33)
MCHC RBC AUTO-ENTMCNC: 33.4 G/DL (ref 31.5–36.5)
MCV RBC AUTO: 104 FL (ref 78–100)
MONOCYTES # BLD AUTO: 0.2 10E3/UL (ref 0–1.3)
MONOCYTES NFR BLD AUTO: 7 %
NEUTROPHILS # BLD AUTO: 2.1 10E3/UL (ref 1.6–8.3)
NEUTROPHILS NFR BLD AUTO: 74 %
NRBC # BLD AUTO: 0 10E3/UL
NRBC BLD AUTO-RTO: 0 /100
PLATELET # BLD AUTO: 191 10E3/UL (ref 150–450)
POTASSIUM SERPL-SCNC: 3.8 MMOL/L (ref 3.4–5.3)
PROT SERPL-MCNC: 7.9 G/DL (ref 6.4–8.3)
RBC # BLD AUTO: 3.45 10E6/UL (ref 3.8–5.2)
SODIUM SERPL-SCNC: 141 MMOL/L (ref 136–145)
WBC # BLD AUTO: 2.8 10E3/UL (ref 4–11)

## 2023-05-01 PROCEDURE — 96402 CHEMO HORMON ANTINEOPL SQ/IM: CPT

## 2023-05-01 PROCEDURE — 36415 COLL VENOUS BLD VENIPUNCTURE: CPT

## 2023-05-01 PROCEDURE — 250N000011 HC RX IP 250 OP 636: Performed by: INTERNAL MEDICINE

## 2023-05-01 PROCEDURE — 80053 COMPREHEN METABOLIC PANEL: CPT

## 2023-05-01 PROCEDURE — 77066 DX MAMMO INCL CAD BI: CPT

## 2023-05-01 PROCEDURE — G0279 TOMOSYNTHESIS, MAMMO: HCPCS

## 2023-05-01 PROCEDURE — 99215 OFFICE O/P EST HI 40 MIN: CPT | Performed by: PHYSICIAN ASSISTANT

## 2023-05-01 PROCEDURE — G0463 HOSPITAL OUTPT CLINIC VISIT: HCPCS | Mod: 25 | Performed by: PHYSICIAN ASSISTANT

## 2023-05-01 PROCEDURE — 85025 COMPLETE CBC W/AUTO DIFF WBC: CPT

## 2023-05-01 RX ADMIN — GOSERELIN ACETATE 3.6 MG: 3.6 IMPLANT SUBCUTANEOUS at 11:57

## 2023-05-01 ASSESSMENT — PAIN SCALES - GENERAL: PAINLEVEL: NO PAIN (0)

## 2023-05-01 NOTE — LETTER
5/1/2023         RE: Tori Steele  [address] [facility] [address]        Dear Colleague,    Thank you for referring your patient, Tori Steele, to the [facility]. Please see a copy of my visit note below.      May 1, 2023    REASON FOR VISIT:  history of breast cancer    CANCER HISTORY AND TREATMENT:  Tori Steele is a 46 year old female with breat cancer. Right-sided 5.5 cm ER positive NC positive HER-2 negative breast cancer with associated DCIS.  Her MammaPrint came back as high risk.  She consented for the I-SPY clinical trial and has been randomized to the oral paclitaxel, Encequidar and dostarlimab arm.      2/21/22 started trial with oral paclitaxel, Encequidar and dostarlimab.    3/15/22  her IV immunotherapy dostarlimab was held due to diarrhea that recovered with Imodium and time  4/4/22   Carbo was added in weekly due in hopes for more significant reduction in cancer  5/17/22 AC start  7/8/22 AC End  8/8/2022 - R. Lumpectomy with SNLBX -               -INVASIVE BREAST CARCINOMA OF NO SPECIAL TYPE (INVASIVE DUCTAL CARCINOMA), with   apocrine features, SAMANTHA GRADE 2, size 45 x 32 mm, residual after neoadjuvant systemic therapy              -Ductal carcinoma in situ (DCIS), nuclear grade 3, cribriform and solid type(s), with focal necrosis              -DCIS is admixed with invasive carcinoma, and comprises 30% of the tumor cellularity              -Invasive carcinoma is estrogen receptor positive, progesterone receptor positive and HER2   negative (score 1+) by immunohistochemistry               -METASTATIC BREAST DUCTAL CARCINOMA in one of two lymph nodes (1/2), residual after neoadjuvant systemic therapy              -Extranodal extension present (size 2 mm)              -[facility] residual cancer burden is 3.284 (RCB Class III).   8/29/2022 - Resection of involved margins - now negative  9/19/22 started Zoladex  10/31/22 completed XRT 21  fractions     12/12/22: starting Zometa q6m and abemaciclib/letrozole     INTERVAL HISTORY:   Tori is here today with her .  She was happy to get her mammogram report that states things look good.      Largely she is doing well with her therapy.  She is not taking any antiemetics scheduled.  She takes a rare Imodium for mostly prophylactically if she has plans outside the home.    She still has some fatigue but is able to do all of her activities around the house and with her kids.  She is sleeping well at night.       Otherwise, ROS negative for: fevers, headaches, visual changes, new sites of pain, shortness of breath, cough, chest pain, abdominal pain, nausea, vomiting, abnormal vaginal bleeding, or leg swelling.      Current Outpatient Medications   Medication Sig Dispense Refill    letrozole (FEMARA) 2.5 MG tablet Take 1 tablet (2.5 mg) by mouth daily 90 tablet 3    vitamin D3 (CHOLECALCIFEROL) 250 mcg (42117 units) capsule Take 1 capsule (250 mcg) by mouth once a week 8 capsule 0        No Known Allergies    PHYSICAL EXAMINATION  /70 (BP Location: Left arm, Patient Position: Sitting, Cuff Size: Adult Regular)   Pulse 95   Temp 98.5  F (36.9  C) (Oral)   Resp 16   Wt 63.2 kg (139 lb 6.4 oz)   SpO2 96%   BMI 24.69 kg/m    Constitutional: Alert, oriented female in no visible distress.  Eyes: PERRL. Anicteric sclerae.  ENT/Mouth: OM moist and pink without lesions or thrush.  CV: RRR, no murmurs appreciated.  Resp: CTAB throughout  Abdomen: Soft, non-tender, non-distended. Bowel sounds present. No masses appreciated. No organomegaly noted.  Extremities: No lower extremity edema appreciated.  Skin: Warm, dry. No bruising or petechiae noted.  Lymph: No cervical, supraclavicular, or axillary lymphadenopathy appreciated.   Neuro: CN II-XII grossly intact.  Breast: Right sided lumpectomy noted.  There is some mild lymphedema throughout the right breast but no palpable masses or concerns.  Left  breast without any concerns.    LABS:  No results found for this or any previous visit (from the past 24 hour(s)).      IMPRESSION/PLAN:  Tori Steele is a 47 year old female with a history of ER positive breast cancer.  She participated in ISPY-2 and had an RCB 3.  She completed radiation therapy.  She remains on Verzenio (started Dec 2022, thus will be done in Dec of 2024) for 2 years, monthly Zoladex, daily aromatase inhibitor.      Breast cancer: Tori is doing very well today.  She is physically recovering from her prior treatment.  She is tolerating her endocrine therapy well.  -Continue monthly labs, monthly Zoladex, daily abemaciclib and anastrozole  -Diagnostic mammogram done today showing no concerning areas  -Port has been removed  -Post AC echo done showing no concerning abnormalities.  Encouraged PCP for lipid testing, blood pressure and glucose monitoring.  -Encouraged daily exercise    Bone health: Baseline DEXA scan showed normal bone density.  She is on high-dose vitamin D because of low levels.  She is taking oral calcium.  -Continue with Zometa every 6 months, next dose due later in May 2023    Vaginal dryness: Recommended Replens, gave her a list of other options to try as well    Right breast lymphedema: She has had lymphedema throughout the right breast and is wearing a compression garment.  Things are improving.      45 minutes spent on the date of the encounter doing chart review, review of test results, interpretation of tests, patient visit, documentation and discussion with family         Sincerely,        Milka Mota PA-C

## 2023-05-01 NOTE — PROGRESS NOTES
Infusion Nursing Note:  Tori Steele presents today for C6D1 Zoladex.    Patient seen by provider today: Yes: Milka Mota PA-C   present during visit today: Not Applicable.    Note: Patient reported to clinic today with no new complaints or concerns after seeing provider.    Intravenous Access:  Labs drawn without difficulty.    Treatment Conditions:  Lab Results   Component Value Date    HGB 12.0 05/01/2023    WBC 2.8 (L) 05/01/2023    ANEU 16.5 (H) 06/13/2022    ANEUTAUTO 2.1 05/01/2023     05/01/2023      Lab Results   Component Value Date     05/01/2023    POTASSIUM 3.8 05/01/2023    MAG 1.7 (L) 02/07/2022    CR 0.83 05/01/2023    JENNIE 9.9 05/01/2023    BILITOTAL 0.5 05/01/2023    ALBUMIN 4.3 05/01/2023    ALT 12 05/01/2023    AST 18 05/01/2023     Post Infusion Assessment:  Patient tolerated injection without incident.  No evidence of extravasations.  Access discontinued per protocol.     Discharge Plan:   Patient declined prescription refills.  Discharge instructions reviewed with: Patient.  Patient and/or family verbalized understanding of discharge instructions and all questions answered.  AVS to patient via [employer] [place].  Patient will return 5/29/23 for next appointment.   Patient discharged in stable condition accompanied by: self.  Departure Mode: Ambulatory.  Face to Face time: 0 minutes.    Fam Hardin RN

## 2023-05-01 NOTE — NURSING NOTE
Chief Complaint   Patient presents with     Blood Draw     Labs drawn with  by rn.  VS taken.     Labs drawn with  by rn.  Pt tolerated well.  VS taken.  Pt checked in for next appt.    Thuy Palafox RN

## 2023-05-01 NOTE — PATIENT INSTRUCTIONS
[facility] Main Line: [phone]    Call triage nurse with chills and/or temperature greater than or equal to 100.4, uncontrolled nausea/vomiting, diarrhea, constipation, dizziness, shortness of breath, chest pain, bleeding, unexplained bruising, or any new/concerning symptoms, questions/concerns.   If you are having any concerning symptoms or wish to speak to a provider before your next infusion visit, please call your care coordinator or triage to notify them so we can adequately serve you.   Nurse Triage line:  [phone]    If after hours, weekends, or holidays, call main hospital  and ask for Oncology doctor on call @ [phone]     May 2023      Luis F Monday Tuesday Wednesday Thursday Friday Saturday        1    LAB CENTRAL   9:30 AM   (15 min.)   [facility] DRAW   [facility]    MA DIAGNOSTIC DIGITAL BILAT   9:45 AM   (30 min.)   [id number]   [facility] [place]    RETURN CCSL  10:15 AM   (45 min.)   Milka Mota PA-C   [facility]    ONC INFUSION 0.5 HR (30 MIN)  12:00 PM   (30 min.)    ONC INFUSION NURSE   [facility] 2     3     4     5     6       7     8     9     10     11     12     13       14     15     16     17     18     19     20       21     22     23     24     25     26     27       28     29    LAB CENTRAL  11:00 AM   (15 min.)   [facility] DRAW   [facility]    ONC INFUSION 0.5 HR (30 MIN)  11:30 AM   (60 min.)    ONC INFUSION NURSE   [facility] 30 31 June 2023 Sunday Monday Tuesday Wednesday Thursday Friday Saturday                       1     2     3       4     5     6     7     8     9     10       11     12     13     14     15     16     17       18     19     20     21     22     23     24       25     26    LAB CENTRAL   9:30  AM   (15 min.)   [facility] LAB DRAW   [facility]    ONC INFUSION 0.5 HR (30 MIN)  10:00 AM   (30 min.)    ONC INFUSION NURSE   [facility] 27     28     29     30                            Lab Results:  Recent Results (from the past 12 hour(s))   Comprehensive metabolic panel    Collection Time: 05/01/23 10:11 AM   Result Value Ref Range    Sodium 141 136 - 145 mmol/L    Potassium 3.8 3.4 - 5.3 mmol/L    Chloride 105 98 - 107 mmol/L    Carbon Dioxide (CO2) 27 22 - 29 mmol/L    Anion Gap 9 7 - 15 mmol/L    Urea Nitrogen 9.6 6.0 - 20.0 mg/dL    Creatinine 0.83 0.51 - 0.95 mg/dL    Calcium 9.9 8.6 - 10.0 mg/dL    Glucose 106 (H) 70 - 99 mg/dL    Alkaline Phosphatase 69 35 - 104 U/L    AST 18 10 - 35 U/L    ALT 12 10 - 35 U/L    Protein Total 7.9 6.4 - 8.3 g/dL    Albumin 4.3 3.5 - 5.2 g/dL    Bilirubin Total 0.5 <=1.2 mg/dL    GFR Estimate 88 >60 mL/min/1.73m2   CBC with platelets and differential    Collection Time: 05/01/23 10:11 AM   Result Value Ref Range    WBC Count 2.8 (L) 4.0 - 11.0 10e3/uL    RBC Count 3.45 (L) 3.80 - 5.20 10e6/uL    Hemoglobin 12.0 11.7 - 15.7 g/dL    Hematocrit 35.9 35.0 - 47.0 %     (H) 78 - 100 fL    MCH 34.8 (H) 26.5 - 33.0 pg    MCHC 33.4 31.5 - 36.5 g/dL    RDW 11.5 10.0 - 15.0 %    Platelet Count 191 150 - 450 10e3/uL    % Neutrophils 74 %    % Lymphocytes 16 %    % Monocytes 7 %    % Eosinophils 2 %    % Basophils 1 %    % Immature Granulocytes 0 %    NRBCs per 100 WBC 0 <1 /100    Absolute Neutrophils 2.1 1.6 - 8.3 10e3/uL    Absolute Lymphocytes 0.5 (L) 0.8 - 5.3 10e3/uL    Absolute Monocytes 0.2 0.0 - 1.3 10e3/uL    Absolute Eosinophils 0.1 0.0 - 0.7 10e3/uL    Absolute Basophils 0.0 0.0 - 0.2 10e3/uL    Absolute Immature Granulocytes 0.0 <=0.4 10e3/uL    Absolute NRBCs 0.0 10e3/uL

## 2023-05-01 NOTE — LETTER
5/1/2023         RE: Tori Steele  [address] [facility] [address]        May 1, 2023    REASON FOR VISIT:  history of breast cancer    CANCER HISTORY AND TREATMENT:  Tori Steele is a 46 year old female with breat cancer. Right-sided 5.5 cm ER positive MO positive HER-2 negative breast cancer with associated DCIS.  Her MammaPrint came back as high risk.  She consented for the I-SPY clinical trial and has been randomized to the oral paclitaxel, Encequidar and dostarlimab arm.      2/21/22 started trial with oral paclitaxel, Encequidar and dostarlimab.    3/15/22  her IV immunotherapy dostarlimab was held due to diarrhea that recovered with Imodium and time  4/4/22   Carbo was added in weekly due in hopes for more significant reduction in cancer  5/17/22 AC start  7/8/22 AC End  8/8/2022 - R. Lumpectomy with SNLBX -               -INVASIVE BREAST CARCINOMA OF NO SPECIAL TYPE (INVASIVE DUCTAL CARCINOMA), with   apocrine features, SAMANTHA GRADE 2, size 45 x 32 mm, residual after neoadjuvant systemic therapy              -Ductal carcinoma in situ (DCIS), nuclear grade 3, cribriform and solid type(s), with focal necrosis              -DCIS is admixed with invasive carcinoma, and comprises 30% of the tumor cellularity              -Invasive carcinoma is estrogen receptor positive, progesterone receptor positive and HER2   negative (score 1+) by immunohistochemistry               -METASTATIC BREAST DUCTAL CARCINOMA in one of two lymph nodes (1/2), residual after neoadjuvant systemic therapy              -Extranodal extension present (size 2 mm)              -[facility] residual cancer burden is 3.284 (RCB Class III).   8/29/2022 - Resection of involved margins - now negative  9/19/22 started Zoladex  10/31/22 completed XRT 21 fractions     12/12/22: starting Zometa q6m and abemaciclib/letrozole     INTERVAL HISTORY:   Tori is here today with her .  She was happy to get her mammogram  report that states things look good.      Largely she is doing well with her therapy.  She is not taking any antiemetics scheduled.  She takes a rare Imodium for mostly prophylactically if she has plans outside the home.    She still has some fatigue but is able to do all of her activities around the house and with her kids.  She is sleeping well at night.       Otherwise, ROS negative for: fevers, headaches, visual changes, new sites of pain, shortness of breath, cough, chest pain, abdominal pain, nausea, vomiting, abnormal vaginal bleeding, or leg swelling.      Current Outpatient Medications   Medication Sig Dispense Refill    letrozole (FEMARA) 2.5 MG tablet Take 1 tablet (2.5 mg) by mouth daily 90 tablet 3    vitamin D3 (CHOLECALCIFEROL) 250 mcg (49081 units) capsule Take 1 capsule (250 mcg) by mouth once a week 8 capsule 0        No Known Allergies    PHYSICAL EXAMINATION  /70 (BP Location: Left arm, Patient Position: Sitting, Cuff Size: Adult Regular)   Pulse 95   Temp 98.5  F (36.9  C) (Oral)   Resp 16   Wt 63.2 kg (139 lb 6.4 oz)   SpO2 96%   BMI 24.69 kg/m    Constitutional: Alert, oriented female in no visible distress.  Eyes: PERRL. Anicteric sclerae.  ENT/Mouth: OM moist and pink without lesions or thrush.  CV: RRR, no murmurs appreciated.  Resp: CTAB throughout  Abdomen: Soft, non-tender, non-distended. Bowel sounds present. No masses appreciated. No organomegaly noted.  Extremities: No lower extremity edema appreciated.  Skin: Warm, dry. No bruising or petechiae noted.  Lymph: No cervical, supraclavicular, or axillary lymphadenopathy appreciated.   Neuro: CN II-XII grossly intact.  Breast: Right sided lumpectomy noted.  There is some mild lymphedema throughout the right breast but no palpable masses or concerns.  Left breast without any concerns.    LABS:  No results found for this or any previous visit (from the past 24 hour(s)).      IMPRESSION/PLAN:  Tori Steele is a 47 year old  female with a history of ER positive breast cancer.  She participated in ISPY-2 and had an RCB 3.  She completed radiation therapy.  She remains on Verzenio (started Dec 2022, thus will be done in Dec of 2024) for 2 years, monthly Zoladex, daily aromatase inhibitor.      Breast cancer: Tori is doing very well today.  She is physically recovering from her prior treatment.  She is tolerating her endocrine therapy well.  -Continue monthly labs, monthly Zoladex, daily abemaciclib and anastrozole  -Diagnostic mammogram done today showing no concerning areas  -Port has been removed  -Post AC echo done showing no concerning abnormalities.  Encouraged PCP for lipid testing, blood pressure and glucose monitoring.  -Encouraged daily exercise    Bone health: Baseline DEXA scan showed normal bone density.  She is on high-dose vitamin D because of low levels.  She is taking oral calcium.  -Continue with Zometa every 6 months, next dose due later in May 2023    Vaginal dryness: Recommended Replens, gave her a list of other options to try as well    Right breast lymphedema: She has had lymphedema throughout the right breast and is wearing a compression garment.  Things are improving.      45 minutes spent on the date of the encounter doing chart review, review of test results, interpretation of tests, patient visit, documentation and discussion with family             Milka Mota PA-C

## 2023-05-03 NOTE — PROGRESS NOTES
May 1, 2023    REASON FOR VISIT:  history of breast cancer    CANCER HISTORY AND TREATMENT:  Tori Steele is a 46 year old female with breat cancer. Right-sided 5.5 cm ER positive ID positive HER-2 negative breast cancer with associated DCIS.  Her MammaPrint came back as high risk.  She consented for the I-SPY clinical trial and has been randomized to the oral paclitaxel, Encequidar and dostarlimab arm.      2/21/22 started trial with oral paclitaxel, Encequidar and dostarlimab.    3/15/22  her IV immunotherapy dostarlimab was held due to diarrhea that recovered with Imodium and time  4/4/22   Carbo was added in weekly due in hopes for more significant reduction in cancer  5/17/22 AC start  7/8/22 AC End  8/8/2022 - R. Lumpectomy with SNLBX -               -INVASIVE BREAST CARCINOMA OF NO SPECIAL TYPE (INVASIVE DUCTAL CARCINOMA), with   apocrine features, SAMANTHA GRADE 2, size 45 x 32 mm, residual after neoadjuvant systemic therapy              -Ductal carcinoma in situ (DCIS), nuclear grade 3, cribriform and solid type(s), with focal necrosis              -DCIS is admixed with invasive carcinoma, and comprises 30% of the tumor cellularity              -Invasive carcinoma is estrogen receptor positive, progesterone receptor positive and HER2   negative (score 1+) by immunohistochemistry               -METASTATIC BREAST DUCTAL CARCINOMA in one of two lymph nodes (1/2), residual after neoadjuvant systemic therapy              -Extranodal extension present (size 2 mm)              -[facility] residual cancer burden is 3.284 (RCB Class III).   8/29/2022 - Resection of involved margins - now negative  9/19/22 started Zoladex  10/31/22 completed XRT 21 fractions     12/12/22: starting Zometa q6m and abemaciclib/letrozole     INTERVAL HISTORY:   Tori is here today with her .  She was happy to get her mammogram report that states things look good.      Largely she is doing well with her therapy.  She  is not taking any antiemetics scheduled.  She takes a rare Imodium for mostly prophylactically if she has plans outside the home.    She still has some fatigue but is able to do all of her activities around the house and with her kids.  She is sleeping well at night.       Otherwise, ROS negative for: fevers, headaches, visual changes, new sites of pain, shortness of breath, cough, chest pain, abdominal pain, nausea, vomiting, abnormal vaginal bleeding, or leg swelling.      Current Outpatient Medications   Medication Sig Dispense Refill     letrozole (FEMARA) 2.5 MG tablet Take 1 tablet (2.5 mg) by mouth daily 90 tablet 3     vitamin D3 (CHOLECALCIFEROL) 250 mcg (29334 units) capsule Take 1 capsule (250 mcg) by mouth once a week 8 capsule 0        No Known Allergies    PHYSICAL EXAMINATION  /70 (BP Location: Left arm, Patient Position: Sitting, Cuff Size: Adult Regular)   Pulse 95   Temp 98.5  F (36.9  C) (Oral)   Resp 16   Wt 63.2 kg (139 lb 6.4 oz)   SpO2 96%   BMI 24.69 kg/m    Constitutional: Alert, oriented female in no visible distress.  Eyes: PERRL. Anicteric sclerae.  ENT/Mouth: OM moist and pink without lesions or thrush.  CV: RRR, no murmurs appreciated.  Resp: CTAB throughout  Abdomen: Soft, non-tender, non-distended. Bowel sounds present. No masses appreciated. No organomegaly noted.  Extremities: No lower extremity edema appreciated.  Skin: Warm, dry. No bruising or petechiae noted.  Lymph: No cervical, supraclavicular, or axillary lymphadenopathy appreciated.   Neuro: CN II-XII grossly intact.  Breast: Right sided lumpectomy noted.  There is some mild lymphedema throughout the right breast but no palpable masses or concerns.  Left breast without any concerns.    LABS:  No results found for this or any previous visit (from the past 24 hour(s)).      IMPRESSION/PLAN:  Tori Steele is a 47 year old female with a history of ER positive breast cancer.  She participated in ISPY-2 and had an  RCB 3.  She completed radiation therapy.  She remains on Verzenio (started Dec 2022, thus will be done in Dec of 2024) for 2 years, monthly Zoladex, daily aromatase inhibitor.      Breast cancer: Tori is doing very well today.  She is physically recovering from her prior treatment.  She is tolerating her endocrine therapy well.  -Continue monthly labs, monthly Zoladex, daily abemaciclib and anastrozole  -Diagnostic mammogram done today showing no concerning areas  -Port has been removed  -Post AC echo done showing no concerning abnormalities.  Encouraged PCP for lipid testing, blood pressure and glucose monitoring.  -Encouraged daily exercise    Bone health: Baseline DEXA scan showed normal bone density.  She is on high-dose vitamin D because of low levels.  She is taking oral calcium.  -Continue with Zometa every 6 months, next dose due later in May 2023    Vaginal dryness: Recommended Replens, gave her a list of other options to try as well    Right breast lymphedema: She has had lymphedema throughout the right breast and is wearing a compression garment.  Things are improving.      45 minutes spent on the date of the encounter doing chart review, review of test results, interpretation of tests, patient visit, documentation and discussion with family

## 2023-05-18 DIAGNOSIS — C50.911 INVASIVE DUCTAL CARCINOMA OF RIGHT BREAST (H): Primary | ICD-10-CM

## 2023-05-22 ENCOUNTER — TELEPHONE (OUTPATIENT)
Dept: ONCOLOGY | Facility: CLINIC | Age: 48
End: 2023-05-22
Payer: OTHER GOVERNMENT

## 2023-05-22 NOTE — TELEPHONE ENCOUNTER
Oral Chemotherapy Monitoring Program    Subjective/Objective:  Tori Steele is a 47 year old female phone called for a follow-up visit for oral chemotherapy. Tori confirmed to taking 1 tablet (150 mg) of Abemaciblib (Verzenio) 2 times a day. She says she is doing well and has no concerns about her current drug therapy. She experiences loose stools one to two times a week and has not taken any antidiarrheals in the past month. Tori claims her diarrhea is well controlled. Tori also missed one morning dose last week when she was on vacation. She denies nausea, vommiting and constipation.         2/2/2023    10:00 AM 2/21/2023     3:00 PM 3/2/2023     8:00 AM 3/29/2023    11:00 AM 4/20/2023    10:00 AM 5/18/2023     9:00 AM 5/22/2023     3:00 PM   ORAL CHEMOTHERAPY   Assessment Type Refill Monthly Follow up Refill Refill Monthly Follow up Refill Monthly Follow up   Diagnosis Code Breast Cancer Breast Cancer Breast Cancer Breast Cancer Breast Cancer Breast Cancer Breast Cancer   Providers Dr. Cathryn Lockett   [facility]   Drug Name Verzenio (abemaciclib) Verzenio (abemaciclib) Verzenio (abemaciclib) Verzenio (abemaciclib) Verzenio (abemaciclib) Verzenio (abemaciclib) Verzenio (abemaciclib)   Dose 150 mg 150 mg 150 mg  150 mg 150 mg 150 mg   Current Schedule BID BID BID  BID BID BID   Cycle Details Continuous Continuous Continuous  Continuous Continuous Continuous   Start Date of Last Cycle     3/30/2023     Planned next cycle start date     4/27/2023     Doses missed in last 2 weeks  0   0  1   Adherence Assessment  Adherent   Adherent  Adherent   Adverse Effects  No AE identified during assessment   No AE identified during assessment         Last PHQ-2 Score on record:       10/2/2020     9:50 AM 9/3/2020    12:20 PM   PHQ-2 ( 1999 Pfizer)   Q1: Little interest or pleasure in doing things 0 0   Q2: Feeling  "down, depressed or hopeless 0 0   PHQ-2 Score 0 0   PHQ-2 Total Score (12-17 Years)- Positive if 3 or more points; Administer PHQ-A if positive 0 0       Vitals:  BP:   BP Readings from Last 1 Encounters:   05/01/23 127/70     Wt Readings from Last 1 Encounters:   05/01/23 63.2 kg (139 lb 6.4 oz)     Estimated body surface area is 1.68 meters squared as calculated from the following:    Height as of 4/11/23: 1.6 m (5' 3\").    Weight as of 5/1/23: 63.2 kg (139 lb 6.4 oz).    Labs:  _  Result Component Current Result Ref Range   Sodium 141 (5/1/2023) 136 - 145 mmol/L     _  Result Component Current Result Ref Range   Potassium 3.8 (5/1/2023) 3.4 - 5.3 mmol/L     _  Result Component Current Result Ref Range   Calcium 9.9 (5/1/2023) 8.6 - 10.0 mg/dL     No results found for Mag within last 30 days.     No results found for Phos within last 30 days.     _  Result Component Current Result Ref Range   Albumin 4.3 (5/1/2023) 3.5 - 5.2 g/dL     _  Result Component Current Result Ref Range   Urea Nitrogen 9.6 (5/1/2023) 6.0 - 20.0 mg/dL     _  Result Component Current Result Ref Range   Creatinine 0.83 (5/1/2023) 0.51 - 0.95 mg/dL     _  Result Component Current Result Ref Range   AST 18 (5/1/2023) 10 - 35 U/L     _  Result Component Current Result Ref Range   ALT 12 (5/1/2023) 10 - 35 U/L     _  Result Component Current Result Ref Range   Bilirubin Total 0.5 (5/1/2023) <=1.2 mg/dL     _  Result Component Current Result Ref Range   WBC Count 2.8 (L) (5/1/2023) 4.0 - 11.0 10e3/uL     _  Result Component Current Result Ref Range   Hemoglobin 12.0 (5/1/2023) 11.7 - 15.7 g/dL     _  Result Component Current Result Ref Range   Platelet Count 191 (5/1/2023) 150 - 450 10e3/uL     No results found for ANC within last 30 days.     _  Result Component Current Result Ref Range   Absolute Neutrophils 2.1 (5/1/2023) 1.6 - 8.3 10e3/uL          Assessment/Plan:  Patient is doing well and has no concerning side effects.  Continue Abemaciblib " (Verzenio) regimen as planned.     Follow-Up:  05/29/23 Lab appt    Refill Due:    6/15/22

## 2023-05-26 DIAGNOSIS — C50.111 MALIGNANT NEOPLASM OF CENTRAL PORTION OF RIGHT BREAST IN FEMALE, ESTROGEN RECEPTOR POSITIVE (H): Primary | ICD-10-CM

## 2023-05-26 DIAGNOSIS — Z17.0 MALIGNANT NEOPLASM OF CENTRAL PORTION OF RIGHT BREAST IN FEMALE, ESTROGEN RECEPTOR POSITIVE (H): Primary | ICD-10-CM

## 2023-05-26 DIAGNOSIS — C50.911 INVASIVE DUCTAL CARCINOMA OF RIGHT BREAST (H): Primary | ICD-10-CM

## 2023-05-26 RX ORDER — ZOLEDRONIC ACID 0.04 MG/ML
4 INJECTION, SOLUTION INTRAVENOUS ONCE
Status: CANCELLED | OUTPATIENT
Start: 2023-06-10 | End: 2023-06-10

## 2023-05-26 RX ORDER — HEPARIN SODIUM (PORCINE) LOCK FLUSH IV SOLN 100 UNIT/ML 100 UNIT/ML
5 SOLUTION INTRAVENOUS
Status: CANCELLED | OUTPATIENT
Start: 2023-06-10

## 2023-05-26 RX ORDER — HEPARIN SODIUM,PORCINE 10 UNIT/ML
5 VIAL (ML) INTRAVENOUS
Status: CANCELLED | OUTPATIENT
Start: 2023-06-10

## 2023-05-27 DIAGNOSIS — Z17.0 MALIGNANT NEOPLASM OF CENTRAL PORTION OF RIGHT BREAST IN FEMALE, ESTROGEN RECEPTOR POSITIVE (H): Primary | ICD-10-CM

## 2023-05-27 DIAGNOSIS — C50.111 MALIGNANT NEOPLASM OF CENTRAL PORTION OF RIGHT BREAST IN FEMALE, ESTROGEN RECEPTOR POSITIVE (H): Primary | ICD-10-CM

## 2023-05-27 RX ORDER — ZOLEDRONIC ACID 0.04 MG/ML
4 INJECTION, SOLUTION INTRAVENOUS ONCE
Status: DISCONTINUED | OUTPATIENT
Start: 2023-05-27 | End: 2023-05-31 | Stop reason: HOSPADM

## 2023-05-27 RX ORDER — HEPARIN SODIUM,PORCINE 10 UNIT/ML
5 VIAL (ML) INTRAVENOUS
Status: DISCONTINUED | OUTPATIENT
Start: 2023-05-27 | End: 2023-05-31 | Stop reason: HOSPADM

## 2023-05-27 RX ORDER — HEPARIN SODIUM (PORCINE) LOCK FLUSH IV SOLN 100 UNIT/ML 100 UNIT/ML
5 SOLUTION INTRAVENOUS
Status: DISCONTINUED | OUTPATIENT
Start: 2023-05-27 | End: 2023-05-31 | Stop reason: HOSPADM

## 2023-05-29 ENCOUNTER — INFUSION THERAPY VISIT (OUTPATIENT)
Dept: ONCOLOGY | Facility: CLINIC | Age: 48
End: 2023-05-29
Attending: PHYSICIAN ASSISTANT
Payer: OTHER GOVERNMENT

## 2023-05-29 ENCOUNTER — APPOINTMENT (OUTPATIENT)
Dept: LAB | Facility: CLINIC | Age: 48
End: 2023-05-29
Attending: PHYSICIAN ASSISTANT
Payer: OTHER GOVERNMENT

## 2023-05-29 VITALS
BODY MASS INDEX: 25.17 KG/M2 | WEIGHT: 142.1 LBS | SYSTOLIC BLOOD PRESSURE: 100 MMHG | RESPIRATION RATE: 18 BRPM | OXYGEN SATURATION: 100 % | TEMPERATURE: 98.4 F | DIASTOLIC BLOOD PRESSURE: 67 MMHG | HEART RATE: 97 BPM

## 2023-05-29 DIAGNOSIS — Z17.0 MALIGNANT NEOPLASM OF CENTRAL PORTION OF RIGHT BREAST IN FEMALE, ESTROGEN RECEPTOR POSITIVE (H): ICD-10-CM

## 2023-05-29 DIAGNOSIS — C50.111 MALIGNANT NEOPLASM OF CENTRAL PORTION OF RIGHT BREAST IN FEMALE, ESTROGEN RECEPTOR POSITIVE (H): ICD-10-CM

## 2023-05-29 DIAGNOSIS — C50.911 INVASIVE DUCTAL CARCINOMA OF RIGHT BREAST (H): Primary | ICD-10-CM

## 2023-05-29 LAB
ALBUMIN SERPL BCG-MCNC: 4 G/DL (ref 3.5–5.2)
ALP SERPL-CCNC: 74 U/L (ref 35–104)
ALT SERPL W P-5'-P-CCNC: 14 U/L (ref 10–35)
ANION GAP SERPL CALCULATED.3IONS-SCNC: 7 MMOL/L (ref 7–15)
AST SERPL W P-5'-P-CCNC: 18 U/L (ref 10–35)
BASOPHILS # BLD AUTO: 0 10E3/UL (ref 0–0.2)
BASOPHILS NFR BLD AUTO: 1 %
BILIRUB SERPL-MCNC: 0.6 MG/DL
BUN SERPL-MCNC: 12.2 MG/DL (ref 6–20)
CALCIUM SERPL-MCNC: 9.5 MG/DL (ref 8.6–10)
CALCIUM SERPL-MCNC: 9.5 MG/DL (ref 8.6–10)
CHLORIDE SERPL-SCNC: 104 MMOL/L (ref 98–107)
CREAT SERPL-MCNC: 0.86 MG/DL (ref 0.51–0.95)
DEPRECATED HCO3 PLAS-SCNC: 28 MMOL/L (ref 22–29)
EOSINOPHIL # BLD AUTO: 0.1 10E3/UL (ref 0–0.7)
EOSINOPHIL NFR BLD AUTO: 2 %
ERYTHROCYTE [DISTWIDTH] IN BLOOD BY AUTOMATED COUNT: 11.3 % (ref 10–15)
GFR SERPL CREATININE-BSD FRML MDRD: 84 ML/MIN/1.73M2
GLUCOSE SERPL-MCNC: 114 MG/DL (ref 70–99)
HCT VFR BLD AUTO: 35.4 % (ref 35–47)
HGB BLD-MCNC: 11.9 G/DL (ref 11.7–15.7)
IMM GRANULOCYTES # BLD: 0 10E3/UL
IMM GRANULOCYTES NFR BLD: 0 %
LYMPHOCYTES # BLD AUTO: 0.5 10E3/UL (ref 0.8–5.3)
LYMPHOCYTES NFR BLD AUTO: 20 %
MCH RBC QN AUTO: 34.8 PG (ref 26.5–33)
MCHC RBC AUTO-ENTMCNC: 33.6 G/DL (ref 31.5–36.5)
MCV RBC AUTO: 104 FL (ref 78–100)
MONOCYTES # BLD AUTO: 0.2 10E3/UL (ref 0–1.3)
MONOCYTES NFR BLD AUTO: 6 %
NEUTROPHILS # BLD AUTO: 1.8 10E3/UL (ref 1.6–8.3)
NEUTROPHILS NFR BLD AUTO: 71 %
NRBC # BLD AUTO: 0 10E3/UL
NRBC BLD AUTO-RTO: 0 /100
PLATELET # BLD AUTO: 194 10E3/UL (ref 150–450)
POTASSIUM SERPL-SCNC: 3.8 MMOL/L (ref 3.4–5.3)
PROT SERPL-MCNC: 7.3 G/DL (ref 6.4–8.3)
RBC # BLD AUTO: 3.42 10E6/UL (ref 3.8–5.2)
SODIUM SERPL-SCNC: 139 MMOL/L (ref 136–145)
WBC # BLD AUTO: 2.6 10E3/UL (ref 4–11)

## 2023-05-29 PROCEDURE — 85025 COMPLETE CBC W/AUTO DIFF WBC: CPT

## 2023-05-29 PROCEDURE — 96402 CHEMO HORMON ANTINEOPL SQ/IM: CPT

## 2023-05-29 PROCEDURE — 36415 COLL VENOUS BLD VENIPUNCTURE: CPT

## 2023-05-29 PROCEDURE — 80053 COMPREHEN METABOLIC PANEL: CPT

## 2023-05-29 PROCEDURE — 96365 THER/PROPH/DIAG IV INF INIT: CPT

## 2023-05-29 PROCEDURE — 250N000011 HC RX IP 250 OP 636: Performed by: INTERNAL MEDICINE

## 2023-05-29 RX ORDER — ZOLEDRONIC ACID 0.04 MG/ML
4 INJECTION, SOLUTION INTRAVENOUS ONCE
Status: COMPLETED | OUTPATIENT
Start: 2023-05-29 | End: 2023-05-29

## 2023-05-29 RX ADMIN — GOSERELIN ACETATE 3.6 MG: 3.6 IMPLANT SUBCUTANEOUS at 11:43

## 2023-05-29 RX ADMIN — ZOLEDRONIC ACID 4 MG: 0.04 INJECTION, SOLUTION INTRAVENOUS at 11:37

## 2023-05-29 ASSESSMENT — PAIN SCALES - GENERAL: PAINLEVEL: NO PAIN (0)

## 2023-05-29 NOTE — PROGRESS NOTES
Infusion Nursing Note:  Tori Steele presents today for zoladex, q 6 month zometa.    Patient seen by provider today: No   present during visit today: Not Applicable.    Note:   Patient is feeling well today. She denies any signs of infection, any dental concerns and jaw pain, and pain. She confirms she is taking calcium/vitamin D.    Intravenous Access:  Peripheral IV placed in lab.    Treatment Conditions:  Lab Results   Component Value Date     05/29/2023    POTASSIUM 3.8 05/29/2023    MAG 1.7 (L) 02/07/2022    CR 0.86 05/29/2023    JENNIE 9.5 05/29/2023    JENNIE 9.5 05/29/2023    BILITOTAL 0.6 05/29/2023    ALBUMIN 4.0 05/29/2023    ALT 14 05/29/2023    AST 18 05/29/2023     Results reviewed, labs MET treatment parameters, ok to proceed with treatment.      Post Infusion Assessment:  Patient tolerated infusion without incident.  Patient tolerated zoladex injection into right lower abdomen without incident.  Blood return noted pre and post infusion.  Site patent and intact, free from redness, edema or discomfort.  No evidence of extravasations.  Access discontinued per protocol.       Discharge Plan:   Discharge instructions reviewed with: Patient.  Patient and/or family verbalized understanding of discharge instructions and all questions answered.  AVS to patient via [employer].  Patient will return 6/26 for next appointment.   Patient discharged in stable condition accompanied by: self.  Departure Mode: Ambulatory.      Angelita Morris RN

## 2023-05-29 NOTE — PATIENT INSTRUCTIONS
[facility] Triage and after hours / weekends / holidays:  [phone]    Please call the triage or after hours line if you experience a temperature greater than or equal to 100.5, shaking chills, have uncontrolled nausea, vomiting and/or diarrhea, dizziness, shortness of breath, chest pain, bleeding, unexplained bruising, or if you have any other new/concerning symptoms, questions or concerns.      If you are having any concerning symptoms or wish to speak to a provider before your next infusion visit, please call your care coordinator or triage to notify them so we can adequately serve you.     If you need a refill on a narcotic prescription or other medication, please call before your infusion appointment.

## 2023-05-29 NOTE — NURSING NOTE
Chief Complaint   Patient presents with     Blood Draw     Labs drawn with PIV start by RN. Vitals taken.     Labs drawn with PIV start by RN. Pt tolerated well. Vitals taken.     Ratna Rivera RN

## 2023-05-30 ENCOUNTER — TELEPHONE (OUTPATIENT)
Dept: ONCOLOGY | Facility: CLINIC | Age: 48
End: 2023-05-30
Payer: OTHER GOVERNMENT

## 2023-05-30 NOTE — TELEPHONE ENCOUNTER
Oral Chemotherapy Monitoring Program    Subjective/Objective:  Tori Steele is a 47 year old female contacted by phone for a follow-up visit for oral chemotherapy. Tori confirmed her dose of 1 tablet of Verzenio by mouth 2 times a day and has not missed any doses within the last two weeks. She denies nausea, vomiting, diarrhea, abdominal pain, appetite change, and fatigue.         2/21/2023     3:00 PM 3/2/2023     8:00 AM 3/29/2023    11:00 AM 4/20/2023    10:00 AM 5/18/2023     9:00 AM 5/22/2023     3:00 PM 5/30/2023     3:00 PM   ORAL CHEMOTHERAPY   Assessment Type Monthly Follow up Refill Refill Monthly Follow up Refill Monthly Follow up Monthly Follow up   Diagnosis Code Breast Cancer Breast Cancer Breast Cancer Breast Cancer Breast Cancer Breast Cancer Breast Cancer   Providers Dr. Cathryn Lockett   [facility]   Drug Name Verzenio (abemaciclib) Verzenio (abemaciclib) Verzenio (abemaciclib) Verzenio (abemaciclib) Verzenio (abemaciclib) Verzenio (abemaciclib) Verzenio (abemaciclib)   Dose 150 mg 150 mg  150 mg 150 mg 150 mg 150 mg   Current Schedule BID BID  BID BID BID BID   Cycle Details Continuous Continuous  Continuous Continuous Continuous Continuous   Start Date of Last Cycle    3/30/2023      Planned next cycle start date    4/27/2023      Doses missed in last 2 weeks 0   0  1 0   Adherence Assessment Adherent   Adherent  Adherent Adherent   Adverse Effects No AE identified during assessment   No AE identified during assessment   No AE identified during assessment       Last PHQ-2 Score on record:       10/2/2020     9:50 AM 9/3/2020    12:20 PM   PHQ-2 ( 1999 Pfizer)   Q1: Little interest or pleasure in doing things 0 0   Q2: Feeling down, depressed or hopeless 0 0   PHQ-2 Score 0 0   PHQ-2 Total Score (12-17 Years)- Positive if 3 or more points; Administer PHQ-A if positive 0 0       Vitals:  BP:  "  BP Readings from Last 1 Encounters:   05/29/23 100/67     Wt Readings from Last 1 Encounters:   05/29/23 64.5 kg (142 lb 1.6 oz)     Estimated body surface area is 1.69 meters squared as calculated from the following:    Height as of 4/11/23: 1.6 m (5' 3\").    Weight as of 5/29/23: 64.5 kg (142 lb 1.6 oz).    Labs:  _  Result Component Current Result Ref Range   Sodium 139 (5/29/2023) 136 - 145 mmol/L     _  Result Component Current Result Ref Range   Potassium 3.8 (5/29/2023) 3.4 - 5.3 mmol/L     _  Result Component Current Result Ref Range   Calcium 9.5 (5/29/2023) 8.6 - 10.0 mg/dL    9.5 (5/29/2023) 8.6 - 10.0 mg/dL     No results found for Mag within last 30 days.     No results found for Phos within last 30 days.     _  Result Component Current Result Ref Range   Albumin 4.0 (5/29/2023) 3.5 - 5.2 g/dL     _  Result Component Current Result Ref Range   Urea Nitrogen 12.2 (5/29/2023) 6.0 - 20.0 mg/dL     _  Result Component Current Result Ref Range   Creatinine 0.86 (5/29/2023) 0.51 - 0.95 mg/dL     _  Result Component Current Result Ref Range   AST 18 (5/29/2023) 10 - 35 U/L     _  Result Component Current Result Ref Range   ALT 14 (5/29/2023) 10 - 35 U/L     _  Result Component Current Result Ref Range   Bilirubin Total 0.6 (5/29/2023) <=1.2 mg/dL     _  Result Component Current Result Ref Range   WBC Count 2.6 (L) (5/29/2023) 4.0 - 11.0 10e3/uL     _  Result Component Current Result Ref Range   Hemoglobin 11.9 (5/29/2023) 11.7 - 15.7 g/dL     _  Result Component Current Result Ref Range   Platelet Count 194 (5/29/2023) 150 - 450 10e3/uL     No results found for ANC within last 30 days.     _  Result Component Current Result Ref Range   Absolute Neutrophils 1.8 (5/29/2023) 1.6 - 8.3 10e3/uL          Assessment/Plan:  Tori has been tolerating her Verzenio well.     Continue therapy as planned.    Follow-Up:  Review labs for 6/26/23.     Endy Bagley  Pharmacy Intern   Oral Chemotherapy Monitoring Program "   [phone]

## 2023-06-15 DIAGNOSIS — C50.911 INVASIVE DUCTAL CARCINOMA OF RIGHT BREAST (H): Primary | ICD-10-CM

## 2023-06-26 ENCOUNTER — APPOINTMENT (OUTPATIENT)
Dept: LAB | Facility: CLINIC | Age: 48
End: 2023-06-26
Attending: PHYSICIAN ASSISTANT
Payer: OTHER GOVERNMENT

## 2023-06-26 ENCOUNTER — INFUSION THERAPY VISIT (OUTPATIENT)
Dept: ONCOLOGY | Facility: CLINIC | Age: 48
End: 2023-06-26
Attending: PHYSICIAN ASSISTANT
Payer: OTHER GOVERNMENT

## 2023-06-26 VITALS
HEART RATE: 91 BPM | SYSTOLIC BLOOD PRESSURE: 114 MMHG | DIASTOLIC BLOOD PRESSURE: 72 MMHG | OXYGEN SATURATION: 99 % | BODY MASS INDEX: 25.6 KG/M2 | RESPIRATION RATE: 16 BRPM | TEMPERATURE: 98 F | WEIGHT: 144.5 LBS

## 2023-06-26 DIAGNOSIS — C50.911 INVASIVE DUCTAL CARCINOMA OF RIGHT BREAST (H): Primary | ICD-10-CM

## 2023-06-26 LAB
ALBUMIN SERPL BCG-MCNC: 4.2 G/DL (ref 3.5–5.2)
ALP SERPL-CCNC: 68 U/L (ref 35–104)
ALT SERPL W P-5'-P-CCNC: 13 U/L (ref 0–50)
ANION GAP SERPL CALCULATED.3IONS-SCNC: 7 MMOL/L (ref 7–15)
AST SERPL W P-5'-P-CCNC: 16 U/L (ref 0–45)
BASOPHILS # BLD AUTO: 0 10E3/UL (ref 0–0.2)
BASOPHILS NFR BLD AUTO: 1 %
BILIRUB SERPL-MCNC: 0.6 MG/DL
BUN SERPL-MCNC: 8.2 MG/DL (ref 6–20)
CALCIUM SERPL-MCNC: 9.1 MG/DL (ref 8.6–10)
CHLORIDE SERPL-SCNC: 106 MMOL/L (ref 98–107)
CREAT SERPL-MCNC: 0.83 MG/DL (ref 0.51–0.95)
DEPRECATED HCO3 PLAS-SCNC: 28 MMOL/L (ref 22–29)
EOSINOPHIL # BLD AUTO: 0.1 10E3/UL (ref 0–0.7)
EOSINOPHIL NFR BLD AUTO: 2 %
ERYTHROCYTE [DISTWIDTH] IN BLOOD BY AUTOMATED COUNT: 11.3 % (ref 10–15)
GFR SERPL CREATININE-BSD FRML MDRD: 88 ML/MIN/1.73M2
GLUCOSE SERPL-MCNC: 103 MG/DL (ref 70–99)
HCT VFR BLD AUTO: 36.9 % (ref 35–47)
HGB BLD-MCNC: 12.1 G/DL (ref 11.7–15.7)
IMM GRANULOCYTES # BLD: 0 10E3/UL
IMM GRANULOCYTES NFR BLD: 0 %
LYMPHOCYTES # BLD AUTO: 0.6 10E3/UL (ref 0.8–5.3)
LYMPHOCYTES NFR BLD AUTO: 22 %
MCH RBC QN AUTO: 34.4 PG (ref 26.5–33)
MCHC RBC AUTO-ENTMCNC: 32.8 G/DL (ref 31.5–36.5)
MCV RBC AUTO: 105 FL (ref 78–100)
MONOCYTES # BLD AUTO: 0.2 10E3/UL (ref 0–1.3)
MONOCYTES NFR BLD AUTO: 8 %
NEUTROPHILS # BLD AUTO: 1.8 10E3/UL (ref 1.6–8.3)
NEUTROPHILS NFR BLD AUTO: 67 %
NRBC # BLD AUTO: 0 10E3/UL
NRBC BLD AUTO-RTO: 0 /100
PLATELET # BLD AUTO: 174 10E3/UL (ref 150–450)
POTASSIUM SERPL-SCNC: 3.8 MMOL/L (ref 3.4–5.3)
PROT SERPL-MCNC: 7.4 G/DL (ref 6.4–8.3)
RBC # BLD AUTO: 3.52 10E6/UL (ref 3.8–5.2)
SODIUM SERPL-SCNC: 141 MMOL/L (ref 136–145)
WBC # BLD AUTO: 2.7 10E3/UL (ref 4–11)

## 2023-06-26 PROCEDURE — 36415 COLL VENOUS BLD VENIPUNCTURE: CPT

## 2023-06-26 PROCEDURE — 96402 CHEMO HORMON ANTINEOPL SQ/IM: CPT

## 2023-06-26 PROCEDURE — 80053 COMPREHEN METABOLIC PANEL: CPT

## 2023-06-26 PROCEDURE — 250N000011 HC RX IP 250 OP 636: Mod: JZ | Performed by: INTERNAL MEDICINE

## 2023-06-26 PROCEDURE — 85025 COMPLETE CBC W/AUTO DIFF WBC: CPT

## 2023-06-26 RX ADMIN — GOSERELIN ACETATE 3.6 MG: 3.6 IMPLANT SUBCUTANEOUS at 10:33

## 2023-06-26 ASSESSMENT — PAIN SCALES - GENERAL: PAINLEVEL: NO PAIN (0)

## 2023-06-26 NOTE — PROGRESS NOTES
Infusion Nursing Note:  Tori Steele presents today for Zoladex.    Patient seen by provider today: No   present during visit today: Not Applicable.    Note: Tori presents to infusion today feeling well. She denies any pain, infectious symptoms, or other concerns. Ice applied prior to injection.    Intravenous Access:  No Intravenous access at this visit.    Treatment Conditions:  Not Applicable.    Post Infusion Assessment:  Patient tolerated injection to LLQ abdomen without incident.     Discharge Plan:   Discharge instructions reviewed with: Patient.  Patient and/or family verbalized understanding of discharge instructions and all questions answered.  AVS to patient via [employer].  Patient will return 7/24 for next appointment.   Patient discharged in stable condition accompanied by: self.  Departure Mode: Ambulatory.      Wilma Serrano RN   [Restricted in physically strenuous activity but ambulatory and able to carry out work of a light or sedentary nature] : Status 1- Restricted in physically strenuous activity but ambulatory and able to carry out work of a light or sedentary nature, e.g., light house work, office work [Normal] : affect appropriate [de-identified] : family denies wt changes (unable to weigh)  [de-identified] : port dressing remains intact

## 2023-06-26 NOTE — NURSING NOTE
Chief Complaint   Patient presents with     Blood Draw     Labs collected from venipuncture by RN. Vitals taken. Checked in for appointment(s).      Nani BENJAMIN RN PHN BSN  BMT/Oncology Lab

## 2023-06-27 ENCOUNTER — MYC MEDICAL ADVICE (OUTPATIENT)
Dept: ONCOLOGY | Facility: CLINIC | Age: 48
End: 2023-06-27
Payer: OTHER GOVERNMENT

## 2023-06-27 NOTE — ORAL ONC MGMT
Oral Chemotherapy Monitoring Program  Lab Follow Up    Reviewed lab results from 6/26.        3/29/2023    11:00 AM 4/20/2023    10:00 AM 5/18/2023     9:00 AM 5/22/2023     3:00 PM 5/30/2023     3:00 PM 6/15/2023    11:00 AM 6/27/2023     3:00 PM   ORAL CHEMOTHERAPY   Assessment Type Refill Monthly Follow up Refill Monthly Follow up Monthly Follow up Refill Lab Monitoring   Diagnosis Code Breast Cancer Breast Cancer Breast Cancer Breast Cancer Breast Cancer Breast Cancer Breast Cancer   Providers Dr. Cathryn Lockett   [facility]   Drug Name Verzenio (abemaciclib) Verzenio (abemaciclib) Verzenio (abemaciclib) Verzenio (abemaciclib) Verzenio (abemaciclib) Verzenio (abemaciclib) Verzenio (abemaciclib)   Dose  150 mg 150 mg 150 mg 150 mg 150 mg 150 mg   Current Schedule  BID BID BID BID BID BID   Cycle Details  Continuous Continuous Continuous Continuous Continuous Continuous   Start Date of Last Cycle  3/30/2023        Planned next cycle start date  4/27/2023        Doses missed in last 2 weeks  0  1 0     Adherence Assessment  Adherent  Adherent Adherent     Adverse Effects  No AE identified during assessment   No AE identified during assessment         Labs:  _  Result Component Current Result Ref Range   Sodium 141 (6/26/2023) 136 - 145 mmol/L     _  Result Component Current Result Ref Range   Potassium 3.8 (6/26/2023) 3.4 - 5.3 mmol/L     _  Result Component Current Result Ref Range   Calcium 9.1 (6/26/2023) 8.6 - 10.0 mg/dL          No results found for Mag within last 30 days.     No results found for Phos within last 30 days.     _  Result Component Current Result Ref Range   Albumin 4.2 (6/26/2023) 3.5 - 5.2 g/dL     _  Result Component Current Result Ref Range   Urea Nitrogen 8.2 (6/26/2023) 6.0 - 20.0 mg/dL     _  Result Component Current Result Ref Range   Creatinine 0.83 (6/26/2023) 0.51 - 0.95 mg/dL      _  Result Component Current Result Ref Range   AST 16 (6/26/2023) 0 - 45 U/L     _  Result Component Current Result Ref Range   ALT 13 (6/26/2023) 0 - 50 U/L     _  Result Component Current Result Ref Range   Bilirubin Total 0.6 (6/26/2023) <=1.2 mg/dL     _  Result Component Current Result Ref Range   WBC Count 2.7 (L) (6/26/2023) 4.0 - 11.0 10e3/uL     _  Result Component Current Result Ref Range   Hemoglobin 12.1 (6/26/2023) 11.7 - 15.7 g/dL     _  Result Component Current Result Ref Range   Platelet Count 174 (6/26/2023) 150 - 450 10e3/uL     No results found for ANC within last 30 days.     _  Result Component Current Result Ref Range   Absolute Neutrophils 1.8 (6/26/2023) 1.6 - 8.3 10e3/uL        Assessment & Plan:  No concerning abnormalities, CMP and CBC are stable. Continue Verzenio as prescribed. [employer] message sent to patient.    Peggy Cotter, PharmD, BCOP  Oral Chemotherapy Monitoring Program  [facility]  [phone]

## 2023-07-13 DIAGNOSIS — C50.911 INVASIVE DUCTAL CARCINOMA OF RIGHT BREAST (H): Primary | ICD-10-CM

## 2023-07-21 ENCOUNTER — VIRTUAL VISIT (OUTPATIENT)
Dept: ONCOLOGY | Facility: CLINIC | Age: 48
End: 2023-07-21
Attending: INTERNAL MEDICINE
Payer: OTHER GOVERNMENT

## 2023-07-21 VITALS — BODY MASS INDEX: 24.45 KG/M2 | WEIGHT: 138 LBS | HEIGHT: 63 IN

## 2023-07-21 DIAGNOSIS — C50.911 INVASIVE DUCTAL CARCINOMA OF RIGHT BREAST (H): Primary | ICD-10-CM

## 2023-07-21 PROCEDURE — 99214 OFFICE O/P EST MOD 30 MIN: CPT | Mod: VID | Performed by: INTERNAL MEDICINE

## 2023-07-21 ASSESSMENT — PAIN SCALES - GENERAL: PAINLEVEL: NO PAIN (0)

## 2023-07-21 NOTE — LETTER
7/21/2023         RE: Tori Steele  [address] [address]        Dear Colleague,    Thank you for referring your patient, Tori Steele, to the [facility]. Please see a copy of my visit note below.    Virtual Visit Details    Type of service:  Video Visit     Originating Location (pt. Location): Home    Distant Location (provider location):  On Site  Platform used for Video Visit: Well        Jul 21, 2023    REASON FOR VISIT:  history of breast cancer    CANCER HISTORY AND TREATMENT:  Tori Steele is a 46 year old female with breat cancer. Right-sided 5.5 cm ER positive GA positive HER-2 negative breast cancer with associated DCIS.  Her MammaPrint came back as high risk.  She consented for the I-SPY clinical trial and has been randomized to the oral paclitaxel, Encequidar and dostarlimab arm.      2/21/22 started trial with oral paclitaxel, Encequidar and dostarlimab.    3/15/22  her IV immunotherapy dostarlimab was held due to diarrhea that recovered with Imodium and time  4/4/22   Carbo was added in weekly due in hopes for more significant reduction in cancer  5/17/22 AC start  7/8/22 AC End  8/8/2022 - R. Lumpectomy with SNLBX -               -INVASIVE BREAST CARCINOMA OF NO SPECIAL TYPE (INVASIVE DUCTAL CARCINOMA), with   apocrine features, SAMANTHA GRADE 2, size 45 x 32 mm, residual after neoadjuvant systemic therapy              -Ductal carcinoma in situ (DCIS), nuclear grade 3, cribriform and solid type(s), with focal necrosis              -DCIS is admixed with invasive carcinoma, and comprises 30% of the tumor cellularity              -Invasive carcinoma is estrogen receptor positive, progesterone receptor positive and HER2   negative (score 1+) by immunohistochemistry               -METASTATIC BREAST DUCTAL CARCINOMA in one of two lymph nodes (1/2), residual after neoadjuvant systemic therapy              -Extranodal extension present (size 2  mm)              -[facility] residual cancer burden is 3.284 (RCB Class III).   8/29/2022 - Resection of involved margins - now negative  9/19/22 started Zoladex  10/31/22 completed XRT 21 fractions     12/12/22: starting Zometa q6m and abemaciclib/letrozole     INTERVAL HISTORY:   Tori is here today by video visit.  She notes that since we last saw her she is occasionally having more problems with GI symptoms.  This is manifested by some cramping and perhaps 2 to 3 days of diarrhea or loose stools.  It then reverts to normal stools.  She has not been taking anything consistently for this.  We talked about management with antidiarrheals which could also help with the cramping.  We have decided for now to continue on her existing dose.    Otherwise she is doing fairly well.  She is not having any particular symptoms other than the GI symptoms as outlined.  She has noticed during the past several visits that her random glucose was slightly elevated.  We talked about hemoglobin A1c.  I also mention the fact that some immune checkpoint inhibitors have been associated with glucose intolerance.  Her elevations are not so severe that they are consistent with type I diabetes mellitus,  but we should certainly check her hemoglobin A1c     Otherwise, ROS negative for: fevers, headaches, visual changes, new sites of pain, shortness of breath, cough, chest pain, abdominal pain, nausea, vomiting, abnormal vaginal bleeding, or leg swelling.      Current Outpatient Medications   Medication Sig Dispense Refill    abemaciclib (VERZENIO) 150 MG tablet Take 1 tablet (150 mg) by mouth 2 times daily for 28 days 56 tablet 0    letrozole (FEMARA) 2.5 MG tablet Take 1 tablet (2.5 mg) by mouth daily 90 tablet 3    vitamin D3 (CHOLECALCIFEROL) 250 mcg (44844 units) capsule Take 1 capsule (250 mcg) by mouth once a week (Patient not taking: Reported on 7/21/2023) 8 capsule 0        No Known Allergies    PHYSICAL EXAMINATION  Ht 1.6 m  "(5' 3\")   Wt 62.6 kg (138 lb)   BMI 24.45 kg/m    GENERAL: She looks well on the video visit. No focal signs    LABS:  No results found for this or any previous visit (from the past 24 hour(s)).      PROBLEMS:     1. Right sided ER+, NE+, HER2-negative, MammaPrint high risk T=5.5cm, N=0 breast cancer. Treated on I-SPY with assigned arm of dostarlimab/oral paclitaxel. Carbo added week 3. Received ACx4. Pathologic staging showed pT2a. PN1a. ER+, NE+, HER21+, RCB=3   2. Pre-menopausal, now on Zoladex monthly, letrozole, abemaciclibl  3. Minimal elevation in glucose        IMPRESSION: She has had a little more GI symptoms on her abemaciclib.  We will continue dosing for now and follow-up.    We will also check her hemoglobin A1c when she gets blood drawn for her to go Serlin dosing.       PLAN:  1.  Continue monthly Zoladex.    2. Continue  letrozole/abema   3. RTC in 3 month, can see Ms. Mota then    Keegan Lockett MD      "

## 2023-07-21 NOTE — NURSING NOTE
Is the patient currently in the state of [place]? YES    Visit mode:VIDEO    If the visit is dropped, the patient can be reconnected by: VIDEO VISIT: Send to e-mail at: [email or web address]    Will anyone else be joining the visit? NO      How would you like to obtain your AVS? MyChart    Are changes needed to the allergy or medication list? Yes, pt is also taking a multivitamin.    Reason for visit: DIOR HINOJOSA, VF

## 2023-07-21 NOTE — PROGRESS NOTES
Virtual Visit Details    Type of service:  Video Visit     Originating Location (pt. Location): Home    Distant Location (provider location):  On Site  Platform used for Video Visit: Dharmesh        Jul 21, 2023    REASON FOR VISIT:  history of breast cancer    CANCER HISTORY AND TREATMENT:  Tori Steele is a 46 year old female with breat cancer. Right-sided 5.5 cm ER positive [place] positive HER-2 negative breast cancer with associated DCIS.  Her MammaPrint came back as high risk.  She consented for the I-SPY clinical trial and has been randomized to the oral paclitaxel, Encequidar and dostarlimab arm.      2/21/22 started trial with oral paclitaxel, Encequidar and dostarlimab.    3/15/22  her IV immunotherapy dostarlimab was held due to diarrhea that recovered with Imodium and time  4/4/22   Carbo was added in weekly due in hopes for more significant reduction in cancer  5/17/22 AC start  7/8/22 AC End  8/8/2022 - R. Lumpectomy with SNLBX -               -INVASIVE BREAST CARCINOMA OF NO SPECIAL TYPE (INVASIVE DUCTAL CARCINOMA), with   apocrine features, SAMANTHA GRADE 2, size 45 x 32 mm, residual after neoadjuvant systemic therapy              -Ductal carcinoma in situ (DCIS), nuclear grade 3, cribriform and solid type(s), with focal necrosis              -DCIS is admixed with invasive carcinoma, and comprises 30% of the tumor cellularity              -Invasive carcinoma is estrogen receptor positive, progesterone receptor positive and HER2   negative (score 1+) by immunohistochemistry               -METASTATIC BREAST DUCTAL CARCINOMA in one of two lymph nodes (1/2), residual after neoadjuvant systemic therapy              -Extranodal extension present (size 2 mm)              -[facility] residual cancer burden is 3.284 (RCB Class III).   8/29/2022 - Resection of involved margins - now negative  9/19/22 started Zoladex  10/31/22 completed XRT 21 fractions     12/12/22: starting Zometa q6m and  "abemaciclib/letrozole     INTERVAL HISTORY:   Tori is here today by video visit.  She notes that since we last saw her she is occasionally having more problems with GI symptoms.  This is manifested by some cramping and perhaps 2 to 3 days of diarrhea or loose stools.  It then reverts to normal stools.  She has not been taking anything consistently for this.  We talked about management with antidiarrheals which could also help with the cramping.  We have decided for now to continue on her existing dose.    Otherwise she is doing fairly well.  She is not having any particular symptoms other than the GI symptoms as outlined.  She has noticed during the past several visits that her random glucose was slightly elevated.  We talked about hemoglobin A1c.  I also mention the fact that some immune checkpoint inhibitors have been associated with glucose intolerance.  Her elevations are not so severe that they are consistent with type I diabetes mellitus,  but we should certainly check her hemoglobin A1c     Otherwise, ROS negative for: fevers, headaches, visual changes, new sites of pain, shortness of breath, cough, chest pain, abdominal pain, nausea, vomiting, abnormal vaginal bleeding, or leg swelling.      Current Outpatient Medications   Medication Sig Dispense Refill     abemaciclib (VERZENIO) 150 MG tablet Take 1 tablet (150 mg) by mouth 2 times daily for 28 days 56 tablet 0     letrozole (FEMARA) 2.5 MG tablet Take 1 tablet (2.5 mg) by mouth daily 90 tablet 3     vitamin D3 (CHOLECALCIFEROL) 250 mcg (52753 units) capsule Take 1 capsule (250 mcg) by mouth once a week (Patient not taking: Reported on 7/21/2023) 8 capsule 0        No Known Allergies    PHYSICAL EXAMINATION  Ht 1.6 m (5' 3\")   Wt 62.6 kg (138 lb)   BMI 24.45 kg/m    GENERAL: She looks well on the video visit. No focal signs    LABS:  No results found for this or any previous visit (from the past 24 hour(s)).      PROBLEMS:     1. Right sided ER+, [place]+, " HER2-negative, MammaPrint high risk T=5.5cm, N=0 breast cancer. Treated on I-SPY with assigned arm of dostarlimab/oral paclitaxel. Carbo added week 3. Received ACx4. Pathologic staging showed pT2a. PN1a. ER+, MS+, HER21+, RCB=3   2. Pre-menopausal, now on Zoladex monthly, letrozole, abemaciclibl  3. Minimal elevation in glucose        IMPRESSION: She has had a little more GI symptoms on her abemaciclib.  We will continue dosing for now and follow-up.    We will also check her hemoglobin A1c when she gets blood drawn for her to go Serlin dosing.       PLAN:  1.  Continue monthly Zoladex.    2. Continue  letrozole/abema   3. RTC in 3 month, can see Ms. Mota then    Keegan Lockett MD

## 2023-07-24 ENCOUNTER — APPOINTMENT (OUTPATIENT)
Dept: LAB | Facility: CLINIC | Age: 48
End: 2023-07-24
Attending: INTERNAL MEDICINE
Payer: OTHER GOVERNMENT

## 2023-07-24 ENCOUNTER — INFUSION THERAPY VISIT (OUTPATIENT)
Dept: ONCOLOGY | Facility: CLINIC | Age: 48
End: 2023-07-24
Attending: INTERNAL MEDICINE
Payer: OTHER GOVERNMENT

## 2023-07-24 ENCOUNTER — DOCUMENTATION ONLY (OUTPATIENT)
Dept: ONCOLOGY | Facility: CLINIC | Age: 48
End: 2023-07-24

## 2023-07-24 VITALS
RESPIRATION RATE: 14 BRPM | OXYGEN SATURATION: 100 % | WEIGHT: 145.3 LBS | SYSTOLIC BLOOD PRESSURE: 110 MMHG | TEMPERATURE: 98 F | DIASTOLIC BLOOD PRESSURE: 69 MMHG | BODY MASS INDEX: 25.74 KG/M2 | HEART RATE: 82 BPM

## 2023-07-24 DIAGNOSIS — C50.911 INVASIVE DUCTAL CARCINOMA OF RIGHT BREAST (H): Primary | ICD-10-CM

## 2023-07-24 LAB
ALBUMIN SERPL BCG-MCNC: 4.3 G/DL (ref 3.5–5.2)
ALP SERPL-CCNC: 62 U/L (ref 35–104)
ALT SERPL W P-5'-P-CCNC: 11 U/L (ref 0–50)
ANION GAP SERPL CALCULATED.3IONS-SCNC: 9 MMOL/L (ref 7–15)
AST SERPL W P-5'-P-CCNC: 17 U/L (ref 0–45)
BASOPHILS # BLD AUTO: 0 10E3/UL (ref 0–0.2)
BASOPHILS NFR BLD AUTO: 1 %
BILIRUB SERPL-MCNC: 0.6 MG/DL
BUN SERPL-MCNC: 10.4 MG/DL (ref 6–20)
CALCIUM SERPL-MCNC: 9.6 MG/DL (ref 8.6–10)
CHLORIDE SERPL-SCNC: 104 MMOL/L (ref 98–107)
CREAT SERPL-MCNC: 0.83 MG/DL (ref 0.51–0.95)
DEPRECATED HCO3 PLAS-SCNC: 26 MMOL/L (ref 22–29)
EOSINOPHIL # BLD AUTO: 0.1 10E3/UL (ref 0–0.7)
EOSINOPHIL NFR BLD AUTO: 3 %
ERYTHROCYTE [DISTWIDTH] IN BLOOD BY AUTOMATED COUNT: 11.5 % (ref 10–15)
GFR SERPL CREATININE-BSD FRML MDRD: 88 ML/MIN/1.73M2
GLUCOSE SERPL-MCNC: 101 MG/DL (ref 70–99)
HBA1C MFR BLD: 5.5 %
HCT VFR BLD AUTO: 35.4 % (ref 35–47)
HGB BLD-MCNC: 11.8 G/DL (ref 11.7–15.7)
IMM GRANULOCYTES # BLD: 0 10E3/UL
IMM GRANULOCYTES NFR BLD: 0 %
LYMPHOCYTES # BLD AUTO: 0.6 10E3/UL (ref 0.8–5.3)
LYMPHOCYTES NFR BLD AUTO: 22 %
MCH RBC QN AUTO: 34.2 PG (ref 26.5–33)
MCHC RBC AUTO-ENTMCNC: 33.3 G/DL (ref 31.5–36.5)
MCV RBC AUTO: 103 FL (ref 78–100)
MONOCYTES # BLD AUTO: 0.3 10E3/UL (ref 0–1.3)
MONOCYTES NFR BLD AUTO: 11 %
NEUTROPHILS # BLD AUTO: 1.8 10E3/UL (ref 1.6–8.3)
NEUTROPHILS NFR BLD AUTO: 63 %
NRBC # BLD AUTO: 0 10E3/UL
NRBC BLD AUTO-RTO: 0 /100
PLATELET # BLD AUTO: 178 10E3/UL (ref 150–450)
POTASSIUM SERPL-SCNC: 3.9 MMOL/L (ref 3.4–5.3)
PROT SERPL-MCNC: 7.6 G/DL (ref 6.4–8.3)
RBC # BLD AUTO: 3.45 10E6/UL (ref 3.8–5.2)
SODIUM SERPL-SCNC: 139 MMOL/L (ref 136–145)
WBC # BLD AUTO: 2.7 10E3/UL (ref 4–11)

## 2023-07-24 PROCEDURE — 83036 HEMOGLOBIN GLYCOSYLATED A1C: CPT

## 2023-07-24 PROCEDURE — 36415 COLL VENOUS BLD VENIPUNCTURE: CPT

## 2023-07-24 PROCEDURE — 85025 COMPLETE CBC W/AUTO DIFF WBC: CPT

## 2023-07-24 PROCEDURE — 96402 CHEMO HORMON ANTINEOPL SQ/IM: CPT

## 2023-07-24 PROCEDURE — 80053 COMPREHEN METABOLIC PANEL: CPT

## 2023-07-24 PROCEDURE — 250N000011 HC RX IP 250 OP 636: Mod: JZ | Performed by: INTERNAL MEDICINE

## 2023-07-24 RX ADMIN — GOSERELIN ACETATE 3.6 MG: 3.6 IMPLANT SUBCUTANEOUS at 11:44

## 2023-07-24 ASSESSMENT — PAIN SCALES - GENERAL: PAINLEVEL: NO PAIN (0)

## 2023-07-24 NOTE — PATIENT INSTRUCTIONS
[facility] Triage and after hours / weekends / holidays:  [phone]    Please call the triage or after hours line if you experience a temperature greater than or equal to 100.4, shaking chills, have uncontrolled nausea, vomiting and/or diarrhea, dizziness, shortness of breath, chest pain, bleeding, unexplained bruising, or if you have any other new/concerning symptoms, questions or concerns.      If you are having any concerning symptoms or wish to speak to a provider before your next infusion visit, please call triage to notify them so we can adequately serve you.     If you need a refill on a narcotic prescription or other medication, please call before your infusion appointment.

## 2023-07-24 NOTE — PROGRESS NOTES
Oral Chemotherapy Monitoring Program  Lab Follow Up    Reviewed lab results from 7/24/23.        5/18/2023     9:00 AM 5/22/2023     3:00 PM 5/30/2023     3:00 PM 6/15/2023    11:00 AM 6/27/2023     3:00 PM 7/13/2023     4:00 PM 7/24/2023    12:00 PM   ORAL CHEMOTHERAPY   Assessment Type Refill Monthly Follow up Monthly Follow up Refill Lab Monitoring Refill Lab Monitoring   Diagnosis Code Breast Cancer Breast Cancer Breast Cancer Breast Cancer Breast Cancer Breast Cancer Breast Cancer   Providers Dr. Cathryn Lockett   [facility]   Drug Name Verzenio (abemaciclib) Verzenio (abemaciclib) Verzenio (abemaciclib) Verzenio (abemaciclib) Verzenio (abemaciclib) Verzenio (abemaciclib) Verzenio (abemaciclib)   Dose 150 mg 150 mg 150 mg 150 mg 150 mg 150 mg 150 mg   Current Schedule BID BID BID BID BID BID BID   Cycle Details Continuous Continuous Continuous Continuous Continuous Continuous Continuous   Doses missed in last 2 weeks  1 0       Adherence Assessment  Adherent Adherent       Adverse Effects   No AE identified during assessment           Labs:  _  Result Component Current Result Ref Range   Sodium 139 (7/24/2023) 136 - 145 mmol/L     _  Result Component Current Result Ref Range   Potassium 3.9 (7/24/2023) 3.4 - 5.3 mmol/L     _  Result Component Current Result Ref Range   Calcium 9.6 (7/24/2023) 8.6 - 10.0 mg/dL     No results found for Mag within last 30 days.     No results found for Phos within last 30 days.     _  Result Component Current Result Ref Range   Albumin 4.3 (7/24/2023) 3.5 - 5.2 g/dL     _  Result Component Current Result Ref Range   Urea Nitrogen 10.4 (7/24/2023) 6.0 - 20.0 mg/dL     _  Result Component Current Result Ref Range   Creatinine 0.83 (7/24/2023) 0.51 - 0.95 mg/dL     _  Result Component Current Result Ref Range   AST 17 (7/24/2023) 0 - 45 U/L     _  Result Component Current Result Ref  Range   ALT 11 (7/24/2023) 0 - 50 U/L     _  Result Component Current Result Ref Range   Bilirubin Total 0.6 (7/24/2023) <=1.2 mg/dL     _  Result Component Current Result Ref Range   WBC Count 2.7 (L) (7/24/2023) 4.0 - 11.0 10e3/uL     _  Result Component Current Result Ref Range   Hemoglobin 11.8 (7/24/2023) 11.7 - 15.7 g/dL     _  Result Component Current Result Ref Range   Platelet Count 178 (7/24/2023) 150 - 450 10e3/uL     No results found for ANC within last 30 days.     _  Result Component Current Result Ref Range   Absolute Neutrophils 1.8 (7/24/2023) 1.6 - 8.3 10e3/uL        Assessment & Plan:  No concerning lab abnormalities.  No changes with Verzenio needed at this time.    Patient not contacted as patient was seen in infusion today.    Lavinia Smith, PharmD, BCPS, BCOP  Oncology Clinical Pharmacy Specialist  [facility]/ [facility]  [phone]

## 2023-07-24 NOTE — NURSING NOTE
Chief Complaint   Patient presents with    Blood Draw     Labs drawn via  by RN in lab. VS taken.      Labs drawn via venipuncture. Vital signs taken. Checked into next appointment.   Deborah Fong RN

## 2023-07-24 NOTE — PROGRESS NOTES
Infusion Nursing Note:  Tori Steele presents today for Zoladex.    Patient seen by provider today: No; Dr. Lockett 7/21    present during visit today: Not Applicable.    Note: Toir comes into the clinic today doing well overall and has no concerns to offer following her provider visit 7/21. She has no pain and denies s/s of infection. No health changes to report.      Intravenous Access:  No Intravenous access/labs at this visit.    Treatment Conditions:  Not Applicable.      Post Infusion Assessment:  Patient tolerated zoladex injection to right lower abdomen without incident.     Patient ICED prior to injection.      Discharge Plan:   Patient declined prescription refills.  Discharge instructions reviewed with: Patient.  Patient and/or family verbalized understanding of discharge instructions and all questions answered.  AVS to patient via CrelowT.  Patient will return 8/21 for next appointment.   Patient discharged in stable condition accompanied by: self.  Departure Mode: Ambulatory.      Silvia Arroyo RN

## 2023-07-27 ENCOUNTER — TELEPHONE (OUTPATIENT)
Dept: ONCOLOGY | Facility: CLINIC | Age: 48
End: 2023-07-27
Payer: OTHER GOVERNMENT

## 2023-07-27 NOTE — ORAL ONC MGMT
"Oral Chemotherapy Monitoring Program    Subjective/Objective:  Tori Steele is a 47 year old female contacted by phone for a follow-up visit for oral chemotherapy.  Patient overall doing well with this treatment.  Patient explained how she takes her medication daily; she is taking correct dose and frequency.      Abdominal cramping - uses hot packs.  Manageable.  Patient spoke to Dr Lockett last Friday 7/21.  He is aware of the cramping, and recommended she try Imodium for the cramping (which she didn't realize she could do that).  Diarrhea - Patient is not using Imodium, unless she goes out of the house.  When she is home, she lets the diarrhea \"take its course\" and manages it through diet.    Fatigue - Manageable.    Patient is happy to know this will all subside.  Dr Lockett told her she is a \"late lilly\" since these side effects did not start at the beginning of her treatment.          5/22/2023     3:00 PM 5/30/2023     3:00 PM 6/15/2023    11:00 AM 6/27/2023     3:00 PM 7/13/2023     4:00 PM 7/24/2023    12:00 PM 7/27/2023    11:00 AM   ORAL CHEMOTHERAPY   Assessment Type Monthly Follow up Monthly Follow up Refill Lab Monitoring Refill Lab Monitoring Monthly Follow up   Diagnosis Code Breast Cancer Breast Cancer Breast Cancer Breast Cancer Breast Cancer Breast Cancer Breast Cancer   Providers Dr. Cathryn Lockett   [facility]   Drug Name Verzenio (abemaciclib) Verzenio (abemaciclib) Verzenio (abemaciclib) Verzenio (abemaciclib) Verzenio (abemaciclib) Verzenio (abemaciclib) Verzenio (abemaciclib)   Dose 150 mg 150 mg 150 mg 150 mg 150 mg 150 mg 150 mg   Current Schedule BID BID BID BID BID BID BID   Cycle Details Continuous Continuous Continuous Continuous Continuous Continuous Continuous   Doses missed in last 2 weeks 1 0     0   Adherence Assessment Adherent Adherent     Adherent   Adverse Effects  No AE " "identified during assessment     Diarrhea;Fatigue   Diarrhea       Grade 1   Pharmacist Intervention(diarrhea)       No   Fatigue       Grade 1   Pharmacist Intervention(fatigue)       No   Any new drug interactions?       No   Is the dose as ordered appropriate for the patient?       Yes   Has the patient missed any days of school, work, or other routine activity?       No   Since the last time we talked, have you been hospitalized or used the emergency room?       No       Last PHQ-2 Score on record:       10/2/2020     9:50 AM 9/3/2020    12:20 PM   PHQ-2 ( 1999 Pf[employer])   Q1: Little interest or pleasure in doing things 0 0   Q2: Feeling down, depressed or hopeless 0 0   PHQ-2 Score 0 0   PHQ-2 Total Score (12-17 Years)- Positive if 3 or more points; Administer PHQ-A if positive 0 0       Vitals:  BP:   BP Readings from Last 1 Encounters:   07/24/23 110/69     Wt Readings from Last 1 Encounters:   07/24/23 65.9 kg (145 lb 4.8 oz)     Estimated body surface area is 1.71 meters squared as calculated from the following:    Height as of 7/21/23: 1.6 m (5' 3\").    Weight as of 7/24/23: 65.9 kg (145 lb 4.8 oz).    Labs:  _  Result Component Current Result Ref Range   Sodium 139 (7/24/2023) 136 - 145 mmol/L     _  Result Component Current Result Ref Range   Potassium 3.9 (7/24/2023) 3.4 - 5.3 mmol/L     _  Result Component Current Result Ref Range   Calcium 9.6 (7/24/2023) 8.6 - 10.0 mg/dL     No results found for Mag within last 30 days.     No results found for Phos within last 30 days.     _  Result Component Current Result Ref Range   Albumin 4.3 (7/24/2023) 3.5 - 5.2 g/dL     _  Result Component Current Result Ref Range   Urea Nitrogen 10.4 (7/24/2023) 6.0 - 20.0 mg/dL     _  Result Component Current Result Ref Range   Creatinine 0.83 (7/24/2023) 0.51 - 0.95 mg/dL     _  Result Component Current Result Ref Range   AST 17 (7/24/2023) 0 - 45 U/L     _  Result Component Current Result Ref Range   ALT 11 (7/24/2023) 0 - " 50 U/L     _  Result Component Current Result Ref Range   Bilirubin Total 0.6 (7/24/2023) <=1.2 mg/dL     _  Result Component Current Result Ref Range   WBC Count 2.7 (L) (7/24/2023) 4.0 - 11.0 10e3/uL     _  Result Component Current Result Ref Range   Hemoglobin 11.8 (7/24/2023) 11.7 - 15.7 g/dL     _  Result Component Current Result Ref Range   Platelet Count 178 (7/24/2023) 150 - 450 10e3/uL     _  Result Component Current Result Ref Range   Absolute Neutrophils 1.8 (7/24/2023) 1.6 - 8.3 10e3/uL     Assessment/Plan:  Patient tolerating treatment well.  Continue current regimen and management of tolerable side effects.      Follow-Up:  8/21 labs + Zoladex injection    Refill Due:  Patient picked up refill on 7/24 (when she was here for Zoladex injection)     Bhumi Mccann PharmD  Oral Chemotherapy Monitoring Program  [facility]  [phone]

## 2023-08-14 DIAGNOSIS — C50.911 INVASIVE DUCTAL CARCINOMA OF RIGHT BREAST (H): Primary | ICD-10-CM

## 2023-08-21 ENCOUNTER — INFUSION THERAPY VISIT (OUTPATIENT)
Dept: ONCOLOGY | Facility: CLINIC | Age: 48
End: 2023-08-21
Attending: INTERNAL MEDICINE
Payer: OTHER GOVERNMENT

## 2023-08-21 ENCOUNTER — APPOINTMENT (OUTPATIENT)
Dept: LAB | Facility: CLINIC | Age: 48
End: 2023-08-21
Attending: INTERNAL MEDICINE
Payer: OTHER GOVERNMENT

## 2023-08-21 VITALS
TEMPERATURE: 98.2 F | DIASTOLIC BLOOD PRESSURE: 71 MMHG | WEIGHT: 144.9 LBS | HEART RATE: 95 BPM | BODY MASS INDEX: 25.67 KG/M2 | SYSTOLIC BLOOD PRESSURE: 116 MMHG | OXYGEN SATURATION: 99 % | RESPIRATION RATE: 16 BRPM

## 2023-08-21 DIAGNOSIS — C50.911 INVASIVE DUCTAL CARCINOMA OF RIGHT BREAST (H): Primary | ICD-10-CM

## 2023-08-21 LAB
ALBUMIN SERPL BCG-MCNC: 4.1 G/DL (ref 3.5–5.2)
ALP SERPL-CCNC: 54 U/L (ref 35–104)
ALT SERPL W P-5'-P-CCNC: 14 U/L (ref 0–50)
ANION GAP SERPL CALCULATED.3IONS-SCNC: 8 MMOL/L (ref 7–15)
AST SERPL W P-5'-P-CCNC: 18 U/L (ref 0–45)
BASOPHILS # BLD AUTO: 0 10E3/UL (ref 0–0.2)
BASOPHILS NFR BLD AUTO: 1 %
BILIRUB SERPL-MCNC: 0.7 MG/DL
BUN SERPL-MCNC: 9.5 MG/DL (ref 6–20)
CALCIUM SERPL-MCNC: 9.3 MG/DL (ref 8.6–10)
CHLORIDE SERPL-SCNC: 106 MMOL/L (ref 98–107)
CREAT SERPL-MCNC: 0.85 MG/DL (ref 0.51–0.95)
DEPRECATED HCO3 PLAS-SCNC: 27 MMOL/L (ref 22–29)
EOSINOPHIL # BLD AUTO: 0.1 10E3/UL (ref 0–0.7)
EOSINOPHIL NFR BLD AUTO: 4 %
ERYTHROCYTE [DISTWIDTH] IN BLOOD BY AUTOMATED COUNT: 11.6 % (ref 10–15)
GFR SERPL CREATININE-BSD FRML MDRD: 86 ML/MIN/1.73M2
GLUCOSE SERPL-MCNC: 118 MG/DL (ref 70–99)
HCT VFR BLD AUTO: 35.1 % (ref 35–47)
HGB BLD-MCNC: 11.5 G/DL (ref 11.7–15.7)
IMM GRANULOCYTES # BLD: 0 10E3/UL
IMM GRANULOCYTES NFR BLD: 0 %
LYMPHOCYTES # BLD AUTO: 0.6 10E3/UL (ref 0.8–5.3)
LYMPHOCYTES NFR BLD AUTO: 21 %
MCH RBC QN AUTO: 34.2 PG (ref 26.5–33)
MCHC RBC AUTO-ENTMCNC: 32.8 G/DL (ref 31.5–36.5)
MCV RBC AUTO: 105 FL (ref 78–100)
MONOCYTES # BLD AUTO: 0.2 10E3/UL (ref 0–1.3)
MONOCYTES NFR BLD AUTO: 8 %
NEUTROPHILS # BLD AUTO: 1.9 10E3/UL (ref 1.6–8.3)
NEUTROPHILS NFR BLD AUTO: 66 %
NRBC # BLD AUTO: 0 10E3/UL
NRBC BLD AUTO-RTO: 0 /100
PLATELET # BLD AUTO: 177 10E3/UL (ref 150–450)
POTASSIUM SERPL-SCNC: 3.7 MMOL/L (ref 3.4–5.3)
PROT SERPL-MCNC: 7.1 G/DL (ref 6.4–8.3)
RBC # BLD AUTO: 3.36 10E6/UL (ref 3.8–5.2)
SODIUM SERPL-SCNC: 141 MMOL/L (ref 136–145)
WBC # BLD AUTO: 2.8 10E3/UL (ref 4–11)

## 2023-08-21 PROCEDURE — 96402 CHEMO HORMON ANTINEOPL SQ/IM: CPT

## 2023-08-21 PROCEDURE — 85025 COMPLETE CBC W/AUTO DIFF WBC: CPT

## 2023-08-21 PROCEDURE — 36415 COLL VENOUS BLD VENIPUNCTURE: CPT

## 2023-08-21 PROCEDURE — 80053 COMPREHEN METABOLIC PANEL: CPT

## 2023-08-21 PROCEDURE — 250N000011 HC RX IP 250 OP 636: Mod: JZ | Performed by: INTERNAL MEDICINE

## 2023-08-21 RX ADMIN — GOSERELIN ACETATE 3.6 MG: 3.6 IMPLANT SUBCUTANEOUS at 10:58

## 2023-08-21 ASSESSMENT — PAIN SCALES - GENERAL: PAINLEVEL: NO PAIN (0)

## 2023-08-21 NOTE — NURSING NOTE
Chief Complaint   Patient presents with    Blood Draw     Vitals, blood drawn via VPT by LPN. Pt checked into appt.      STEFANIE Watkins LPN

## 2023-08-21 NOTE — PATIENT INSTRUCTIONS
[facility] Triage and after hours / weekends / holidays:  [phone]    Please call the triage or after hours line if you experience a temperature greater than or equal to 100.4, shaking chills, have uncontrolled nausea, vomiting and/or diarrhea, dizziness, shortness of breath, chest pain, bleeding, unexplained bruising, or if you have any other new/concerning symptoms, questions or concerns.      If you are having any concerning symptoms or wish to speak to a provider before your next infusion visit, please call triage to notify them so we can adequately serve you.     If you need a refill on a narcotic prescription or other medication, please call before your infusion appointment.

## 2023-08-21 NOTE — PROGRESS NOTES
Infusion Nursing Note:  Tori Steele presents today for Cycle 10 Day 1 Zoladex.    Patient seen by provider today: No   present during visit today: Not Applicable.    Note: Patient denies any signs or symptoms of infection. Patient endorses intermittent fatigue and hot flashes. Patient offers no other complaints or concerns. Patient feels ready for her treatment today.    Intravenous Access:  No Intravenous labs at this visit.    Treatment Conditions:  Lab Results   Component Value Date    HGB 11.5 (L) 08/21/2023    WBC 2.8 (L) 08/21/2023    ANEU 16.5 (H) 06/13/2022    ANEUTAUTO 1.9 08/21/2023     08/21/2023        Lab Results   Component Value Date     08/21/2023    POTASSIUM 3.7 08/21/2023    MAG 1.7 (L) 02/07/2022    CR 0.85 08/21/2023    JENNIE 9.3 08/21/2023    BILITOTAL 0.7 08/21/2023    ALBUMIN 4.1 08/21/2023    ALT 14 08/21/2023    AST 18 08/21/2023     Results reviewed, labs MET treatment parameters, ok to proceed with treatment.    Post Infusion Assessment:  Patient tolerated Zoladex injection without incident. into the left lower abdomen.  Patient iced prior to injection.    Discharge Plan:   Patient declined prescription refills.  Patient and/or family verbalized understanding of discharge instructions and all questions answered.  AVS to patient via [id number].  Patient will return 09/18/23 for next appointment.   Patient discharged in stable condition accompanied by: self.  Departure Mode: Ambulatory.    Maida Castro RN

## 2023-09-11 ENCOUNTER — TRANSCRIBE ORDERS (OUTPATIENT)
Dept: ONCOLOGY | Facility: CLINIC | Age: 48
End: 2023-09-11
Payer: OTHER GOVERNMENT

## 2023-09-11 DIAGNOSIS — C50.911 INVASIVE DUCTAL CARCINOMA OF RIGHT BREAST (H): Primary | ICD-10-CM

## 2023-09-14 DIAGNOSIS — C50.911 INVASIVE DUCTAL CARCINOMA OF RIGHT BREAST (H): Primary | ICD-10-CM

## 2023-09-18 ENCOUNTER — INFUSION THERAPY VISIT (OUTPATIENT)
Dept: ONCOLOGY | Facility: CLINIC | Age: 48
End: 2023-09-18
Attending: INTERNAL MEDICINE
Payer: OTHER GOVERNMENT

## 2023-09-18 ENCOUNTER — APPOINTMENT (OUTPATIENT)
Dept: LAB | Facility: CLINIC | Age: 48
End: 2023-09-18
Attending: INTERNAL MEDICINE
Payer: OTHER GOVERNMENT

## 2023-09-18 ENCOUNTER — TELEPHONE (OUTPATIENT)
Dept: ONCOLOGY | Facility: CLINIC | Age: 48
End: 2023-09-18

## 2023-09-18 VITALS
BODY MASS INDEX: 26 KG/M2 | HEART RATE: 89 BPM | OXYGEN SATURATION: 100 % | RESPIRATION RATE: 16 BRPM | TEMPERATURE: 98.7 F | DIASTOLIC BLOOD PRESSURE: 73 MMHG | WEIGHT: 146.8 LBS | SYSTOLIC BLOOD PRESSURE: 111 MMHG

## 2023-09-18 DIAGNOSIS — C50.911 INVASIVE DUCTAL CARCINOMA OF RIGHT BREAST (H): Primary | ICD-10-CM

## 2023-09-18 LAB
ALBUMIN SERPL BCG-MCNC: 4.3 G/DL (ref 3.5–5.2)
ALP SERPL-CCNC: 55 U/L (ref 35–104)
ALT SERPL W P-5'-P-CCNC: 10 U/L (ref 0–50)
ANION GAP SERPL CALCULATED.3IONS-SCNC: 11 MMOL/L (ref 7–15)
AST SERPL W P-5'-P-CCNC: 18 U/L (ref 0–45)
BASOPHILS # BLD AUTO: 0 10E3/UL (ref 0–0.2)
BASOPHILS NFR BLD AUTO: 1 %
BILIRUB SERPL-MCNC: 0.7 MG/DL
BUN SERPL-MCNC: 8.9 MG/DL (ref 6–20)
CALCIUM SERPL-MCNC: 9.4 MG/DL (ref 8.6–10)
CHLORIDE SERPL-SCNC: 105 MMOL/L (ref 98–107)
CREAT SERPL-MCNC: 0.84 MG/DL (ref 0.51–0.95)
DEPRECATED HCO3 PLAS-SCNC: 25 MMOL/L (ref 22–29)
EGFRCR SERPLBLD CKD-EPI 2021: 86 ML/MIN/1.73M2
EOSINOPHIL # BLD AUTO: 0.1 10E3/UL (ref 0–0.7)
EOSINOPHIL NFR BLD AUTO: 3 %
ERYTHROCYTE [DISTWIDTH] IN BLOOD BY AUTOMATED COUNT: 11.5 % (ref 10–15)
GLUCOSE SERPL-MCNC: 100 MG/DL (ref 70–99)
HCT VFR BLD AUTO: 35.6 % (ref 35–47)
HGB BLD-MCNC: 11.6 G/DL (ref 11.7–15.7)
IMM GRANULOCYTES # BLD: 0 10E3/UL
IMM GRANULOCYTES NFR BLD: 0 %
LYMPHOCYTES # BLD AUTO: 0.6 10E3/UL (ref 0.8–5.3)
LYMPHOCYTES NFR BLD AUTO: 17 %
MCH RBC QN AUTO: 34 PG (ref 26.5–33)
MCHC RBC AUTO-ENTMCNC: 32.6 G/DL (ref 31.5–36.5)
MCV RBC AUTO: 104 FL (ref 78–100)
MONOCYTES # BLD AUTO: 0.3 10E3/UL (ref 0–1.3)
MONOCYTES NFR BLD AUTO: 10 %
NEUTROPHILS # BLD AUTO: 2.3 10E3/UL (ref 1.6–8.3)
NEUTROPHILS NFR BLD AUTO: 69 %
NRBC # BLD AUTO: 0 10E3/UL
NRBC BLD AUTO-RTO: 0 /100
PLATELET # BLD AUTO: 190 10E3/UL (ref 150–450)
POTASSIUM SERPL-SCNC: 3.9 MMOL/L (ref 3.4–5.3)
PROT SERPL-MCNC: 7.6 G/DL (ref 6.4–8.3)
RBC # BLD AUTO: 3.41 10E6/UL (ref 3.8–5.2)
SODIUM SERPL-SCNC: 141 MMOL/L (ref 136–145)
WBC # BLD AUTO: 3.2 10E3/UL (ref 4–11)

## 2023-09-18 PROCEDURE — 250N000011 HC RX IP 250 OP 636: Mod: JZ | Performed by: INTERNAL MEDICINE

## 2023-09-18 PROCEDURE — 80053 COMPREHEN METABOLIC PANEL: CPT

## 2023-09-18 PROCEDURE — 36415 COLL VENOUS BLD VENIPUNCTURE: CPT

## 2023-09-18 PROCEDURE — 96402 CHEMO HORMON ANTINEOPL SQ/IM: CPT

## 2023-09-18 PROCEDURE — 85025 COMPLETE CBC W/AUTO DIFF WBC: CPT

## 2023-09-18 RX ADMIN — GOSERELIN ACETATE 3.6 MG: 3.6 IMPLANT SUBCUTANEOUS at 11:14

## 2023-09-18 ASSESSMENT — PAIN SCALES - GENERAL: PAINLEVEL: NO PAIN (0)

## 2023-09-18 NOTE — TELEPHONE ENCOUNTER
Oral Chemotherapy Monitoring Program    Primary Oncologist: Dr Keegan Lockett  Primary Oncology Clinic: [facility]  Cancer Diagnosis: Breast cancer    Therapy History:  Verzenio 150mg PO BID    Subjective/Objective:  Tori Steele is a 48 year old female contacted by phone for a follow-up visit for oral chemotherapy.  Patient says she's doing well with the Verzenio.  Her diarrhea and cramping have resolved and she hasn't needed any diarrhea medicine in the last month.          6/27/2023     3:00 PM 7/13/2023     4:00 PM 7/24/2023    12:00 PM 7/27/2023    11:00 AM 8/14/2023     2:00 PM 9/11/2023    10:00 AM 9/18/2023     4:00 PM   ORAL CHEMOTHERAPY   Assessment Type Lab Monitoring Refill Lab Monitoring Monthly Follow up Refill Refill Lab Monitoring;Monthly Follow up   Diagnosis Code Breast Cancer Breast Cancer Breast Cancer Breast Cancer Breast Cancer Breast Cancer Breast Cancer   Providers Dr. Cathryn Lockett   [facility]   Drug Name Verzenio (abemaciclib) Verzenio (abemaciclib) Verzenio (abemaciclib) Verzenio (abemaciclib) Verzenio (abemaciclib) Verzenio (abemaciclib) Verzenio (abemaciclib)   Dose 150 mg 150 mg 150 mg 150 mg  150 mg 150 mg   Current Schedule BID BID BID BID  BID BID   Cycle Details Continuous Continuous Continuous Continuous  Continuous Continuous   Doses missed in last 2 weeks    0   0   Adherence Assessment    Adherent   Adherent   Adverse Effects    Diarrhea;Fatigue   No AE identified during assessment   Diarrhea    Grade 1      Pharmacist Intervention(diarrhea)    No      Fatigue    Grade 1      Pharmacist Intervention(fatigue)    No      Any new drug interactions?    No  No    Is the dose as ordered appropriate for the patient?    Yes  Yes    Has the patient missed any days of school, work, or other routine activity?    No      Since the last time we talked, have you been  "hospitalized or used the emergency room?    No          Vitals:  BP:   BP Readings from Last 1 Encounters:   09/18/23 111/73     Wt Readings from Last 1 Encounters:   09/18/23 66.6 kg (146 lb 12.8 oz)     Estimated body surface area is 1.72 meters squared as calculated from the following:    Height as of 7/21/23: 1.6 m (5' 3\").    Weight as of an earlier encounter on 9/18/23: 66.6 kg (146 lb 12.8 oz).    Labs:  _  Result Component Current Result Ref Range   Sodium 141 (9/18/2023) 136 - 145 mmol/L     _  Result Component Current Result Ref Range   Potassium 3.9 (9/18/2023) 3.4 - 5.3 mmol/L     _  Result Component Current Result Ref Range   Calcium 9.4 (9/18/2023) 8.6 - 10.0 mg/dL     No results found for Mag within last 30 days.     No results found for Phos within last 30 days.     _  Result Component Current Result Ref Range   Albumin 4.3 (9/18/2023) 3.5 - 5.2 g/dL     _  Result Component Current Result Ref Range   Urea Nitrogen 8.9 (9/18/2023) 6.0 - 20.0 mg/dL     _  Result Component Current Result Ref Range   Creatinine 0.84 (9/18/2023) 0.51 - 0.95 mg/dL       _  Result Component Current Result Ref Range   AST 18 (9/18/2023) 0 - 45 U/L     _  Result Component Current Result Ref Range   ALT 10 (9/18/2023) 0 - 50 U/L     _  Result Component Current Result Ref Range   Bilirubin Total 0.7 (9/18/2023) <=1.2 mg/dL       _  Result Component Current Result Ref Range   WBC Count 3.2 (L) (9/18/2023) 4.0 - 11.0 10e3/uL     _  Result Component Current Result Ref Range   Hemoglobin 11.6 (L) (9/18/2023) 11.7 - 15.7 g/dL     _  Result Component Current Result Ref Range   Platelet Count 190 (9/18/2023) 150 - 450 10e3/uL     No results found for ANC within last 30 days.       Assessment:  Patient is tolerating Verzenio well.  Labs show no concerning abnormalities.    Plan:  No change needed at this time.      Lavinia  Lavinia Luis, PharmD, BCPS, BCOP  Oncology Clinical Pharmacy Specialist  [facility]  [phone]

## 2023-09-18 NOTE — PROGRESS NOTES
Infusion Nursing Note:  Tori Steele presents today for Zoladex.    Patient seen by provider today: No   present during visit today: Not Applicable.    Note: Patient denies the following: fevers, body aches, chills, headaches, vision changes, dizziness, chest pain, shortness of breath, nausea, vomiting, constipation, abdominal pain, rashes, bruising/bleeding, mouth sores, swelling or pain in the legs. Ok for treatment.       Intravenous Access:  No Intravenous access at this visit.    Treatment Conditions:  Not Applicable.      Post Infusion Assessment:  Patient tolerated Zoladex injection into RLQ of abdomen without incident.   -ice applied prior      Discharge Plan:   Patient declined prescription refills.  Discharge instructions reviewed with: Patient and Family.  Patient and/or family verbalized understanding of discharge instructions and all questions answered.  AVS to patient via [employer]ano.  Patient will return 10/16 for next appointment.   Patient discharged in stable condition accompanied by: self and .  Departure Mode: Ambulatory.    Lynne Olivo RN

## 2023-10-09 DIAGNOSIS — C50.911 INVASIVE DUCTAL CARCINOMA OF RIGHT BREAST (H): Primary | ICD-10-CM

## 2023-10-16 ENCOUNTER — APPOINTMENT (OUTPATIENT)
Dept: LAB | Facility: CLINIC | Age: 48
End: 2023-10-16
Payer: OTHER GOVERNMENT

## 2023-10-16 ENCOUNTER — ONCOLOGY VISIT (OUTPATIENT)
Dept: ONCOLOGY | Facility: CLINIC | Age: 48
End: 2023-10-16
Attending: INTERNAL MEDICINE
Payer: OTHER GOVERNMENT

## 2023-10-16 VITALS
DIASTOLIC BLOOD PRESSURE: 65 MMHG | OXYGEN SATURATION: 100 % | WEIGHT: 146.5 LBS | RESPIRATION RATE: 16 BRPM | HEART RATE: 97 BPM | TEMPERATURE: 98.9 F | BODY MASS INDEX: 25.95 KG/M2 | SYSTOLIC BLOOD PRESSURE: 113 MMHG

## 2023-10-16 DIAGNOSIS — C50.911 INVASIVE DUCTAL CARCINOMA OF RIGHT BREAST (H): Primary | ICD-10-CM

## 2023-10-16 DIAGNOSIS — C50.111 MALIGNANT NEOPLASM OF CENTRAL PORTION OF RIGHT BREAST IN FEMALE, ESTROGEN RECEPTOR POSITIVE (H): ICD-10-CM

## 2023-10-16 DIAGNOSIS — Z17.0 MALIGNANT NEOPLASM OF CENTRAL PORTION OF RIGHT BREAST IN FEMALE, ESTROGEN RECEPTOR POSITIVE (H): ICD-10-CM

## 2023-10-16 PROCEDURE — 99214 OFFICE O/P EST MOD 30 MIN: CPT | Performed by: PHYSICIAN ASSISTANT

## 2023-10-16 PROCEDURE — G0463 HOSPITAL OUTPT CLINIC VISIT: HCPCS | Mod: 25 | Performed by: PHYSICIAN ASSISTANT

## 2023-10-16 PROCEDURE — 250N000011 HC RX IP 250 OP 636: Mod: JZ | Performed by: PHYSICIAN ASSISTANT

## 2023-10-16 PROCEDURE — 96402 CHEMO HORMON ANTINEOPL SQ/IM: CPT

## 2023-10-16 PROCEDURE — 99213 OFFICE O/P EST LOW 20 MIN: CPT | Performed by: PHYSICIAN ASSISTANT

## 2023-10-16 RX ADMIN — GOSERELIN ACETATE 3.6 MG: 3.6 IMPLANT SUBCUTANEOUS at 12:48

## 2023-10-16 ASSESSMENT — PAIN SCALES - GENERAL: PAINLEVEL: NO PAIN (0)

## 2023-10-16 NOTE — PROGRESS NOTES
Oct 16, 2023    REASON FOR VISIT:  history of breast cancer    CANCER HISTORY AND TREATMENT:  Tori Steele is a 46 year old female with breat cancer. Right-sided 5.5 cm ER positive [place] positive HER-2 negative breast cancer with associated DCIS.  Her MammaPrint came back as high risk.  She consented for the I-SPY clinical trial and has been randomized to the oral paclitaxel, Encequidar and dostarlimab arm.      2/21/22 started trial with oral paclitaxel, Encequidar and dostarlimab.    3/15/22  her IV immunotherapy dostarlimab was held due to diarrhea that recovered with Imodium and time  4/4/22   Carbo was added in weekly due in hopes for more significant reduction in cancer  5/17/22 AC start  7/8/22 AC End  8/8/2022 - R. Lumpectomy with SNLBX -               -INVASIVE BREAST CARCINOMA OF NO SPECIAL TYPE (INVASIVE DUCTAL CARCINOMA), with   apocrine features, SAMANTHA GRADE 2, size 45 x 32 mm, residual after neoadjuvant systemic therapy              -Ductal carcinoma in situ (DCIS), nuclear grade 3, cribriform and solid type(s), with focal necrosis              -DCIS is admixed with invasive carcinoma, and comprises 30% of the tumor cellularity              -Invasive carcinoma is estrogen receptor positive, progesterone receptor positive and HER2   negative (score 1+) by immunohistochemistry               -METASTATIC BREAST DUCTAL CARCINOMA in one of two lymph nodes (1/2), residual after neoadjuvant systemic therapy              -Extranodal extension present (size 2 mm)              -[facility] residual cancer burden is 3.284 (RCB Class III).   8/29/2022 - Resection of involved margins - now negative  9/19/22 started Zoladex  10/31/22 completed XRT 21 fractions     12/12/22: starting Zometa q6m and abemaciclib/letrozole     INTERVAL HISTORY:   Tori is here today with her .  She reports that she is doing well.    She continues to have fatigue, but takes a nap when needed, and is able to complete  "her daily activities.  She isn't exercising quite as often as she would like to. She is sleeping well at night.    She has been going to [employer] support meetings via [employer], and has been very happy with the support system that she has formed.      Vasomotor symptoms present, but manageable. Continues to experience vaginal dryness, but has information for products to use, should she choose to do so.      Continues to use lymphedmea compression bra and sleeve to for right chest and UE lymphedema. She states that she checks in with lymphedema specialist via tele-health monthly.     Headaches that she reports are infrequent, about every other week. Self reported history of \"ice pick headaches.\" States that she will take a 500 mg Tylenol, and they resolve.  Denies vision changes, photophobia, tinnitus, n/v, disequilibrium.     Otherwise, ROS negative for: fevers, headaches, visual changes, new sites of pain, shortness of breath, cough, chest pain, abdominal pain, nausea, vomiting, abnormal vaginal bleeding, or leg swelling.      Current Outpatient Medications   Medication Sig Dispense Refill    abemaciclib (VERZENIO) 150 MG tablet Take 1 tablet (150 mg) by mouth 2 times daily for 28 days 56 tablet 0    letrozole (FEMARA) 2.5 MG tablet Take 1 tablet (2.5 mg) by mouth daily 90 tablet 3    vitamin D3 (CHOLECALCIFEROL) 250 mcg (43100 units) capsule Take 1 capsule (250 mcg) by mouth once a week (Patient not taking: Reported on 7/21/2023) 8 capsule 0        No Known Allergies    PHYSICAL EXAMINATION  /65 (BP Location: Left arm, Patient Position: Sitting, Cuff Size: Adult Regular)   Pulse 97   Temp 98.9  F (37.2  C) (Oral)   Resp 16   Wt 66.5 kg (146 lb 8 oz)   SpO2 100%   BMI 25.95 kg/m    Constitutional: Alert, oriented female in no visible distress.  Eyes: PERRL. Anicteric sclerae.  ENT/Mouth: OM moist and pink without lesions or thrush.  CV: RRR, no murmurs appreciated.  Resp: CTAB throughout  Abdomen: Soft, " non-tender, non-distended. Bowel sounds present. No masses appreciated. No organomegaly noted.  Extremities: No lower extremity edema appreciated.  Skin: Warm, dry. No bruising or petechiae noted.  Lymph: No cervical, supraclavicular, or axillary lymphadenopathy appreciated.   Neuro: CN II-XII grossly intact.  Breast: Right sided lumpectomy noted pre-areolar.  There is some mild lymphedema throughout the right breast but no palpable masses or concerns.  Left breast without any concerns.  Psych: normal mood and affect; answering questions appropriately    LABS:  Component      Latest Ref [facility] 10/16/2023  11:44 AM   WBC      4.0 - 11.0 10e3/uL 2.9 (L)    RBC Count      3.80 - 5.20 10e6/uL 3.29 (L)    Hemoglobin      11.7 - 15.7 g/dL 11.5 (L)    Hematocrit      35.0 - 47.0 % 34.6 (L)    MCV      78 - 100 fL 105 (H)    MCH      26.5 - 33.0 pg 35.0 (H)    MCHC      31.5 - 36.5 g/dL 33.2    RDW      10.0 - 15.0 % 11.4    Platelet Count      150 - 450 10e3/uL 183    % Neutrophils      % 71    % Lymphocytes      % 18    % Monocytes      % 7    % Eosinophils      % 3    % Basophils      % 1    % Immature Granulocytes      % 0    NRBCs per 100 WBC      <1 /100 0    Absolute Neutrophils      1.6 - 8.3 10e3/uL 2.1    Absolute Lymphocytes      0.8 - 5.3 10e3/uL 0.5 (L)    Absolute Monocytes      0.0 - 1.3 10e3/uL 0.2    Absolute Eosinophils      0.0 - 0.7 10e3/uL 0.1    Absolute Basophils      0.0 - 0.2 10e3/uL 0.0    Absolute Immature Granulocytes      <=0.4 10e3/uL 0.0    Absolute NRBCs      10e3/uL 0.0       Component      Latest Ref [facility] 10/16/2023  11:44 AM   Sodium      135 - 145 mmol/L 140    Anion Gap      7 - 15 mmol/L 7    Calcium      8.6 - 10.0 mg/dL 9.6    Creatinine      0.51 - 0.95 mg/dL 0.88    GFR Estimate      >60 mL/min/1.73m2 82    Alkaline Phosphatase      35 - 104 U/L 59    AST      0 - 45 U/L 16    ALT      0 - 50 U/L 13    Protein Total      6.4 - 8.3 g/dL 7.4    Bilirubin Total      <=1.2 mg/dL 0.7     Potassium      3.4 - 5.3 mmol/L 3.7    Urea Nitrogen      6.0 - 20.0 mg/dL 8.2    Chloride      98 - 107 mmol/L 105    Carbon Dioxide (CO2)      22 - 29 mmol/L 28    Glucose      70 - 99 mg/dL 92    Albumin      3.5 - 5.2 g/dL 4.2        IMPRESSION/PLAN:  Tori Steele is a 47 year old female with a history of ER positive breast cancer.  She participated in ISPY-2 and had an RCB 3.  She completed radiation therapy.  She remains on Verzenio (started Dec 2022, thus will be done in Dec of 2024) for 2 years, monthly Zoladex, daily aromatase inhibitor.      Breast cancer: Tori is doing very well today.  She is physically recovering from her prior treatment.  She is tolerating her endocrine therapy well.  -Continue monthly labs, monthly Zoladex, daily abemaciclib and anastrozole  -Mammogram due May 2024   -Post AC echo done showing no concerning abnormalities.  Encouraged PCP for lipid testing, blood pressure and glucose monitoring.  - Discussed bilateral oophorectomy of which she is considering, possibly late 2024  - Received Cycle 12 of Zoladex 10/16/2023   - Encouraged daily exercise    Bone health: Baseline DEXA scan showed normal bone density.  She is on high-dose vitamin D because of low levels.  She is taking oral calcium.  -Zometa received 5/29/23; Continue with Zometa every 6 months- due next in Nov/Dec    Vaginal dryness: Ongoing vaginal dryness, previously recommended Replens and  other options to try as well. Has not tried any products at this time. Discussed benefits of adequate hydration/moisture.     Right breast lymphedema: She has had lymphedema throughout the right breast. Wears compression bra and sleeve 2-3 times/week. Follows with [facility] specialist monthly.     Headaches  Patient reports a headache approximately every other week, that is relieved by Tylenol 500 mg. Denies vision changes, n/v.  Patient reassured, and discussed management of headaches. Red flags (vision changes, not relieved by  medication, vomiting) that may require further evaluation.       JESSY Dumas Student    45 minutes spent on the date of the encounter doing chart review, review of test results, interpretation of tests, patient visit, documentation and discussion with family     I saw patient and performed the ROS and exam myself.  The assessment and plan were mutually discussed and this note was edited to reflect my findings.  Janette Mota PA-C

## 2023-10-16 NOTE — NURSING NOTE
Chief Complaint   Patient presents with    Blood Draw     Vitals taken, Venipuncture labs drawn, checked into next appt     /65 (BP Location: Left arm, Patient Position: Sitting, Cuff Size: Adult Regular)   Pulse 97   Temp 98.9  F (37.2  C) (Oral)   Resp 16   Wt 66.5 kg (146 lb 8 oz)   SpO2 100%   BMI 25.95 kg/m    Fam Hardin RN on 10/16/2023 at 11:47 AM

## 2023-10-16 NOTE — LETTER
10/16/2023         RE: Tor[facility] [place]  [address] [address]        Dear Colleague,    Thank you for referring your patient, Tori Steele, to the [facility]. Please see a copy of my visit note below.      Oct 16, 2023    REASON FOR VISIT:  history of breast cancer    CANCER HISTORY AND TREATMENT:  Tori Steele is a 46 year old female with breast cancer. Right-sided 5.5 cm ER positive [place] positive HER-2 negative breast cancer with associated DCIS.  Her MammaPrint came back as high risk.  She consented for the I-SPY clinical trial and has been randomized to the oral paclitaxel, Encequidar and dostarlimab arm.      2/21/22 started trial with oral paclitaxel, Encequidar and dostarlimab.    3/15/22  her IV immunotherapy dostarlimab was held due to diarrhea that recovered with Imodium and time  4/4/22   Carbo was added in weekly due in hopes for more significant reduction in cancer  5/17/22 AC start  7/8/22 AC End  8/8/2022 - R. Lumpectomy with SNLBX -               -INVASIVE BREAST CARCINOMA OF NO SPECIAL TYPE (INVASIVE DUCTAL CARCINOMA), with   apocrine features, SAMANTHA GRADE 2, size 45 x 32 mm, residual after neoadjuvant systemic therapy              -Ductal carcinoma in situ (DCIS), nuclear grade 3, cribriform and solid type(s), with focal necrosis              -DCIS is admixed with invasive carcinoma, and comprises 30% of the tumor cellularity              -Invasive carcinoma is estrogen receptor positive, progesterone receptor positive and HER2   negative (score 1+) by immunohistochemistry               -METASTATIC BREAST DUCTAL CARCINOMA in one of two lymph nodes (1/2), residual after neoadjuvant systemic therapy              -Extranodal extension present (size 2 mm)              -[facility] residual cancer burden is 3.284 (RCB Class III).   8/29/2022 - Resection of involved margins - now negative  9/19/22 started Zoladex  10/31/22 completed XRT 21  "fractions     12/12/22: starting Zometa q6m and abemaciclib/letrozole     INTERVAL HISTORY:   Tori is here today with her .  She reports that she is doing well.    She continues to have fatigue, but takes a nap when needed, and is able to complete her daily activities.  She isn't exercising quite as often as she would like to. She is sleeping well at night.    She has been going to [employer] support meetings via [employer], and has been very happy with the support system that she has formed.      Vasomotor symptoms present, but manageable. Continues to experience vaginal dryness, but has information for products to use, should she choose to do so.      Continues to use lymphedmea compression bra and sleeve to for right chest and UE lymphedema. She states that she checks in with lymphedema specialist via tele-health monthly.     Headaches that she reports are infrequent, about every other week. Self reported history of \"ice pick headaches.\" States that she will take a 500 mg Tylenol, and they resolve.  Denies vision changes, photophobia, tinnitus, n/v, disequilibrium.     Otherwise, ROS negative for: fevers, headaches, visual changes, new sites of pain, shortness of breath, cough, chest pain, abdominal pain, nausea, vomiting, abnormal vaginal bleeding, or leg swelling.      Current Outpatient Medications   Medication Sig Dispense Refill    abemaciclib (VERZENIO) 150 MG tablet Take 1 tablet (150 mg) by mouth 2 times daily for 28 days 56 tablet 0    letrozole (FEMARA) 2.5 MG tablet Take 1 tablet (2.5 mg) by mouth daily 90 tablet 3    vitamin D3 (CHOLECALCIFEROL) 250 mcg (35020 units) capsule Take 1 capsule (250 mcg) by mouth once a week (Patient not taking: Reported on 7/21/2023) 8 capsule 0        No Known Allergies    PHYSICAL EXAMINATION  /65 (BP Location: Left arm, Patient Position: Sitting, Cuff Size: Adult Regular)   Pulse 97   Temp 98.9  F (37.2  C) (Oral)   Resp 16   Wt 66.5 kg (146 lb 8 oz)   " SpO2 100%   BMI 25.95 kg/m    Constitutional: Alert, oriented female in no visible distress.  Eyes: PERRL. Anicteric sclerae.  ENT/Mouth: OM moist and pink without lesions or thrush.  CV: RRR, no murmurs appreciated.  Resp: CTAB throughout  Abdomen: Soft, non-tender, non-distended. Bowel sounds present. No masses appreciated. No organomegaly noted.  Extremities: No lower extremity edema appreciated.  Skin: Warm, dry. No bruising or petechiae noted.  Lymph: No cervical, supraclavicular, or axillary lymphadenopathy appreciated.   Neuro: CN II-XII grossly intact.  Breast: Right sided lumpectomy noted pre-areolar.  There is some mild lymphedema throughout the right breast but no palpable masses or concerns.  Left breast without any concerns.  Psych: normal mood and affect; answering questions appropriately    LABS:  Component      Latest Ref Rn 10/16/2023  11:44 AM   WBC      4.0 - 11.0 10e3/uL 2.9 (L)    RBC Count      3.80 - 5.20 10e6/uL 3.29 (L)    Hemoglobin      11.7 - 15.7 g/dL 11.5 (L)    Hematocrit      35.0 - 47.0 % 34.6 (L)    MCV      78 - 100 fL 105 (H)    MCH      26.5 - 33.0 pg 35.0 (H)    MCHC      31.5 - 36.5 g/dL 33.2    RDW      10.0 - 15.0 % 11.4    Platelet Count      150 - 450 10e3/uL 183    % Neutrophils      % 71    % Lymphocytes      % 18    % Monocytes      % 7    % Eosinophils      % 3    % Basophils      % 1    % Immature Granulocytes      % 0    NRBCs per 100 WBC      <1 /100 0    Absolute Neutrophils      1.6 - 8.3 10e3/uL 2.1    Absolute Lymphocytes      0.8 - 5.3 10e3/uL 0.5 (L)    Absolute Monocytes      0.0 - 1.3 10e3/uL 0.2    Absolute Eosinophils      0.0 - 0.7 10e3/uL 0.1    Absolute Basophils      0.0 - 0.2 10e3/uL 0.0    Absolute Immature Granulocytes      <=0.4 10e3/uL 0.0    Absolute NRBCs      10e3/uL 0.0       Component      Latest Ref Rn 10/16/2023  11:44 AM   Sodium      135 - 145 mmol/L 140    Anion Gap      7 - 15 mmol/L 7    Calcium      8.6 - 10.0 mg/dL 9.6     Creatinine      0.51 - 0.95 mg/dL 0.88    GFR Estimate      >60 mL/min/1.73m2 82    Alkaline Phosphatase      35 - 104 U/L 59    AST      0 - 45 U/L 16    ALT      0 - 50 U/L 13    Protein Total      6.4 - 8.3 g/dL 7.4    Bilirubin Total      <=1.2 mg/dL 0.7    Potassium      3.4 - 5.3 mmol/L 3.7    Urea Nitrogen      6.0 - 20.0 mg/dL 8.2    Chloride      98 - 107 mmol/L 105    Carbon Dioxide (CO2)      22 - 29 mmol/L 28    Glucose      70 - 99 mg/dL 92    Albumin      3.5 - 5.2 g/dL 4.2        IMPRESSION/PLAN:  Tori Steele is a 47 year old female with a history of ER positive breast cancer.  She participated in ISPY-2 and had an RCB 3.  She completed radiation therapy.  She remains on Verzenio (started Dec 2022, thus will be done in Dec of 2024) for 2 years, monthly Zoladex, daily aromatase inhibitor.      Breast cancer: Tori is doing very well today.  She is physically recovering from her prior treatment.  She is tolerating her endocrine therapy well.  -Continue monthly labs, monthly Zoladex, daily abemaciclib and anastrozole  -Mammogram due May 2024   -Post AC echo done showing no concerning abnormalities.  Encouraged PCP for lipid testing, blood pressure and glucose monitoring.  - Discussed bilateral oophorectomy of which she is considering, possibly late 2024  - Received Cycle 12 of Zoladex 10/16/2023   - Encouraged daily exercise    Bone health: Baseline DEXA scan showed normal bone density.  She is on high-dose vitamin D because of low levels.  She is taking oral calcium.  -Zometa received 5/29/23; Continue with Zometa every 6 months- due next in Nov/Dec    Vaginal dryness: Ongoing vaginal dryness, previously recommended Replens and  other options to try as well. Has not tried any products at this time. Discussed benefits of adequate hydration/moisture.     Right breast lymphedema: She has had lymphedema throughout the right breast. Wears compression bra and sleeve 2-3 times/week. Follows with [facility]  specialist monthly.     Headaches  Patient reports a headache approximately every other week, that is relieved by Tylenol 500 mg. Denies vision changes, n/v.  Patient reassured, and discussed management of headaches. Red flags (vision changes, not relieved by medication, vomiting) that may require further evaluation.       JESSY Dumas Student    45 minutes spent on the date of the encounter doing chart review, review of test results, interpretation of tests, patient visit, documentation and discussion with family     I saw patient and performed the ROS and exam myself.  The assessment and plan were mutually discussed and this note was edited to reflect my findings.  Janette Mota PA-C

## 2023-10-16 NOTE — NURSING NOTE
"Oncology Rooming Note    October 16, 2023 11:54 AM   Tori Steele is a 48 year old female who presents for:    Chief Complaint   Patient presents with    Blood Draw     Vitals taken, Venipuncture labs drawn, checked into next appt    Oncology Clinic Visit     Breast CA     Initial Vitals: /65 (BP Location: Left arm, Patient Position: Sitting, Cuff Size: Adult Regular)   Pulse 97   Temp 98.9  F (37.2  C) (Oral)   Resp 16   Wt 66.5 kg (146 lb 8 oz)   SpO2 100%   BMI 25.95 kg/m   Estimated body mass index is 25.95 kg/m  as calculated from the following:    Height as of 7/21/23: 1.6 m (5' 3\").    Weight as of this encounter: 66.5 kg (146 lb 8 oz). Body surface area is 1.72 meters squared.  No Pain (0) Comment: Data Unavailable   No LMP recorded. (Menstrual status: Chemotherapy).  Allergies reviewed: Yes  Medications reviewed: Yes    Medications: Medication refills not needed today.  Pharmacy name entered into [facility]:    [id number] - [place], [place] - [address] AT [facility] - [place], [place] - [phone]  [facility] - [place], [place] - [address]     Clinical concerns: Patient states there are no new concerns to discuss with provider.       Vince Sapp              "

## 2023-10-16 NOTE — NURSING NOTE
Patient received zoladex injection to left abdomen. Patient iced prior to injection. Tolerated injection well without incident.      Kimberly Carrasco RN

## 2023-10-18 RX ORDER — ZOLEDRONIC ACID 0.04 MG/ML
4 INJECTION, SOLUTION INTRAVENOUS ONCE
Status: CANCELLED | OUTPATIENT
Start: 2023-12-07 | End: 2023-12-07

## 2023-10-18 RX ORDER — HEPARIN SODIUM (PORCINE) LOCK FLUSH IV SOLN 100 UNIT/ML 100 UNIT/ML
5 SOLUTION INTRAVENOUS
Status: CANCELLED | OUTPATIENT
Start: 2023-12-07

## 2023-10-18 RX ORDER — HEPARIN SODIUM,PORCINE 10 UNIT/ML
5 VIAL (ML) INTRAVENOUS
Status: CANCELLED | OUTPATIENT
Start: 2023-12-07

## 2023-11-06 ENCOUNTER — MYC MEDICAL ADVICE (OUTPATIENT)
Dept: ONCOLOGY | Facility: CLINIC | Age: 48
End: 2023-11-06
Payer: OTHER GOVERNMENT

## 2023-11-06 DIAGNOSIS — C50.911 INVASIVE DUCTAL CARCINOMA OF RIGHT BREAST (H): Primary | ICD-10-CM

## 2023-11-13 ENCOUNTER — INFUSION THERAPY VISIT (OUTPATIENT)
Dept: ONCOLOGY | Facility: CLINIC | Age: 48
End: 2023-11-13
Attending: PHYSICIAN ASSISTANT
Payer: OTHER GOVERNMENT

## 2023-11-13 ENCOUNTER — APPOINTMENT (OUTPATIENT)
Dept: LAB | Facility: CLINIC | Age: 48
End: 2023-11-13
Attending: PHYSICIAN ASSISTANT
Payer: OTHER GOVERNMENT

## 2023-11-13 VITALS
TEMPERATURE: 98.5 F | BODY MASS INDEX: 25.79 KG/M2 | WEIGHT: 145.6 LBS | SYSTOLIC BLOOD PRESSURE: 121 MMHG | HEART RATE: 96 BPM | DIASTOLIC BLOOD PRESSURE: 77 MMHG | OXYGEN SATURATION: 99 % | RESPIRATION RATE: 16 BRPM

## 2023-11-13 DIAGNOSIS — C50.911 INVASIVE DUCTAL CARCINOMA OF RIGHT BREAST (H): Primary | ICD-10-CM

## 2023-11-13 LAB
ALBUMIN SERPL BCG-MCNC: 4.1 G/DL (ref 3.5–5.2)
ALP SERPL-CCNC: 61 U/L (ref 35–104)
ALT SERPL W P-5'-P-CCNC: 10 U/L (ref 0–50)
ANION GAP SERPL CALCULATED.3IONS-SCNC: 9 MMOL/L (ref 7–15)
AST SERPL W P-5'-P-CCNC: 19 U/L (ref 0–45)
BASOPHILS # BLD AUTO: 0 10E3/UL (ref 0–0.2)
BASOPHILS NFR BLD AUTO: 1 %
BILIRUB SERPL-MCNC: 0.6 MG/DL
BUN SERPL-MCNC: 10.8 MG/DL (ref 6–20)
CALCIUM SERPL-MCNC: 9.4 MG/DL (ref 8.6–10)
CHLORIDE SERPL-SCNC: 105 MMOL/L (ref 98–107)
CREAT SERPL-MCNC: 0.85 MG/DL (ref 0.51–0.95)
DEPRECATED HCO3 PLAS-SCNC: 27 MMOL/L (ref 22–29)
EGFRCR SERPLBLD CKD-EPI 2021: 85 ML/MIN/1.73M2
EOSINOPHIL # BLD AUTO: 0.1 10E3/UL (ref 0–0.7)
EOSINOPHIL NFR BLD AUTO: 3 %
ERYTHROCYTE [DISTWIDTH] IN BLOOD BY AUTOMATED COUNT: 11.6 % (ref 10–15)
GLUCOSE SERPL-MCNC: 135 MG/DL (ref 70–99)
HCT VFR BLD AUTO: 35.2 % (ref 35–47)
HGB BLD-MCNC: 11.6 G/DL (ref 11.7–15.7)
IMM GRANULOCYTES # BLD: 0 10E3/UL
IMM GRANULOCYTES NFR BLD: 0 %
LYMPHOCYTES # BLD AUTO: 0.6 10E3/UL (ref 0.8–5.3)
LYMPHOCYTES NFR BLD AUTO: 20 %
MCH RBC QN AUTO: 34.4 PG (ref 26.5–33)
MCHC RBC AUTO-ENTMCNC: 33 G/DL (ref 31.5–36.5)
MCV RBC AUTO: 105 FL (ref 78–100)
MONOCYTES # BLD AUTO: 0.2 10E3/UL (ref 0–1.3)
MONOCYTES NFR BLD AUTO: 5 %
NEUTROPHILS # BLD AUTO: 2.1 10E3/UL (ref 1.6–8.3)
NEUTROPHILS NFR BLD AUTO: 71 %
NRBC # BLD AUTO: 0 10E3/UL
NRBC BLD AUTO-RTO: 0 /100
PLATELET # BLD AUTO: 180 10E3/UL (ref 150–450)
POTASSIUM SERPL-SCNC: 3.7 MMOL/L (ref 3.4–5.3)
PROT SERPL-MCNC: 7.4 G/DL (ref 6.4–8.3)
RBC # BLD AUTO: 3.37 10E6/UL (ref 3.8–5.2)
SODIUM SERPL-SCNC: 141 MMOL/L (ref 135–145)
WBC # BLD AUTO: 3 10E3/UL (ref 4–11)

## 2023-11-13 PROCEDURE — 36415 COLL VENOUS BLD VENIPUNCTURE: CPT

## 2023-11-13 PROCEDURE — 250N000011 HC RX IP 250 OP 636: Mod: JZ | Performed by: PHYSICIAN ASSISTANT

## 2023-11-13 PROCEDURE — 80053 COMPREHEN METABOLIC PANEL: CPT

## 2023-11-13 PROCEDURE — 85025 COMPLETE CBC W/AUTO DIFF WBC: CPT

## 2023-11-13 PROCEDURE — 96402 CHEMO HORMON ANTINEOPL SQ/IM: CPT

## 2023-11-13 RX ADMIN — GOSERELIN ACETATE 3.6 MG: 3.6 IMPLANT SUBCUTANEOUS at 11:27

## 2023-11-13 ASSESSMENT — PAIN SCALES - GENERAL: PAINLEVEL: NO PAIN (0)

## 2023-11-13 NOTE — PROGRESS NOTES
Infusion Nursing Note:  Tori Steele presents today for Zoladex Injection.    Patient seen by provider today: No   present during visit today: Not Applicable.    Note: Patient reports feeling well on arrival to infusion suite today. Denies signs of infection: no fever, cough, chills, rash, chest pain, or shortness of breath. Denies GI symptoms, signs of bleeding, or pain. Reports hot flashes are unchanged and tolerable. Confirms she is taking PO chemo as prescribed. No new changes, questions, or concerns today. Feels ready to proceed with treatment.       Intravenous Access:  No Intravenous access/labs at this visit.    Treatment Conditions:  No lab results at the time of discharge, no parameters needed for injection.      Post Infusion Assessment:  Patient tolerated Zoladex injection to the Right Lower Abdomen  without incident.   Ices prior to injection.     Discharge Plan:   Discharge instructions reviewed with: Patient and Family.  Patient and/or family verbalized understanding of discharge instructions and all questions answered.  AVS to patient via [employer].  Patient will return 12/11/23 for next appointment.   Patient discharged in stable condition accompanied by: .  Departure Mode: Ambulatory.      Anaya Cunningham, RN

## 2023-11-13 NOTE — PATIENT INSTRUCTIONS
[facility] Triage and after hours / weekends / holidays:  [phone] option #5, then #2    Please call the triage or after hours line if you experience a temperature greater than or equal to 100.4, shaking chills, have uncontrolled nausea, vomiting and/or diarrhea, dizziness, shortness of breath, chest pain, bleeding, unexplained bruising, or if you have any other new/concerning symptoms, questions or concerns.      If you are having any concerning symptoms or wish to speak to a provider before your next infusion visit, please call triage to notify them so we can adequately serve you.     If you need a refill on a narcotic prescription or other medication, please call before your infusion appointment.

## 2023-11-14 ENCOUNTER — MYC MEDICAL ADVICE (OUTPATIENT)
Dept: ONCOLOGY | Facility: CLINIC | Age: 48
End: 2023-11-14
Payer: OTHER GOVERNMENT

## 2023-11-14 NOTE — TELEPHONE ENCOUNTER
Corresponded through portal ./[email or web address]   Oral Chemotherapy Monitoring Program  Lab Follow Up    Reviewed lab results from 11/13.        7/24/2023    12:00 PM 7/27/2023    11:00 AM 8/14/2023     2:00 PM 9/11/2023    10:00 AM 9/18/2023     4:00 PM 10/9/2023    10:00 AM 11/14/2023     2:00 PM   ORAL CHEMOTHERAPY   Assessment Type Lab Monitoring Monthly Follow up Refill Refill Lab Monitoring;Monthly Follow up Refill Lab Monitoring   Diagnosis Code Breast Cancer Breast Cancer Breast Cancer Breast Cancer Breast Cancer Breast Cancer Breast Cancer   Providers Dr. Cathryn Lockett   [facility]   Is this patient followed by the [facility] OC team?       No   Drug Name Verzenio (abemaciclib) Verzenio (abemaciclib) Verzenio (abemaciclib) Verzenio (abemaciclib) Verzenio (abemaciclib) Verzenio (abemaciclib) Verzenio (abemaciclib)   Dose 150 mg 150 mg  150 mg 150 mg 150 mg 150 mg   Current Schedule BID BID  BID BID BID BID   Cycle Details Continuous Continuous  Continuous Continuous Continuous Continuous   Doses missed in last 2 weeks  0   0     Adherence Assessment  Adherent   Adherent     Adverse Effects  Diarrhea;Fatigue   No AE identified during assessment     Diarrhea  Grade 1        Pharmacist Intervention(diarrhea)  No        Fatigue  Grade 1        Pharmacist Intervention(fatigue)  No        Any new drug interactions?  No  No      Is the dose as ordered appropriate for the patient?  Yes  Yes      Has the patient missed any days of school, work, or other routine activity?  No        Since the last time we talked, have you been hospitalized or used the emergency room?  No            Labs:  _  Result Component Current Result Ref Range   Sodium 141 (11/13/2023) 135 - 145 mmol/L     _  Result Component Current Result Ref Range   Potassium 3.7 (11/13/2023) 3.4 - 5.3 mmol/L     _  Result Component Current Result Ref Range   Calcium 9.4 (11/13/2023) 8.6 - 10.0 mg/dL     No  results found for Mag within last 30 days.     No results found for Phos within last 30 days.     _  Result Component Current Result Ref Range   Albumin 4.1 (11/13/2023) 3.5 - 5.2 g/dL     _  Result Component Current Result Ref Range   Urea Nitrogen 10.8 (11/13/2023) 6.0 - 20.0 mg/dL     _  Result Component Current Result Ref Range   Creatinine 0.85 (11/13/2023) 0.51 - 0.95 mg/dL     _  Result Component Current Result Ref Range   AST 19 (11/13/2023) 0 - 45 U/L     _  Result Component Current Result Ref Range   ALT 10 (11/13/2023) 0 - 50 U/L     _  Result Component Current Result Ref Range   Bilirubin Total 0.6 (11/13/2023) <=1.2 mg/dL     _  Result Component Current Result Ref Range   WBC Count 3.0 (L) (11/13/2023) 4.0 - 11.0 10e3/uL     _  Result Component Current Result Ref Range   Hemoglobin 11.6 (L) (11/13/2023) 11.7 - 15.7 g/dL     _  Result Component Current Result Ref Range   Platelet Count 180 (11/13/2023) 150 - 450 10e3/uL     No results found for ANC within last 30 days.     _  Result Component Current Result Ref Range   Absolute Neutrophils 2.1 (11/13/2023) 1.6 - 8.3 10e3/uL        Assessment & Plan:  No concerning abnormalities. [employer] message sent to patient.    Follow-Up:  12/11 labs and infusion appointment    Anaya Almanza, PharmD, BCOP  Oral Chemotherapy Monitoring Program  [facility]  [phone]

## 2023-12-04 DIAGNOSIS — C50.911 INVASIVE DUCTAL CARCINOMA OF RIGHT BREAST (H): Primary | ICD-10-CM

## 2023-12-11 ENCOUNTER — APPOINTMENT (OUTPATIENT)
Dept: LAB | Facility: CLINIC | Age: 48
End: 2023-12-11
Attending: PHYSICIAN ASSISTANT
Payer: OTHER GOVERNMENT

## 2023-12-11 ENCOUNTER — INFUSION THERAPY VISIT (OUTPATIENT)
Dept: ONCOLOGY | Facility: CLINIC | Age: 48
End: 2023-12-11
Attending: PHYSICIAN ASSISTANT
Payer: OTHER GOVERNMENT

## 2023-12-11 VITALS
SYSTOLIC BLOOD PRESSURE: 125 MMHG | OXYGEN SATURATION: 95 % | DIASTOLIC BLOOD PRESSURE: 80 MMHG | RESPIRATION RATE: 16 BRPM | HEART RATE: 90 BPM | BODY MASS INDEX: 25.93 KG/M2 | TEMPERATURE: 98.2 F | WEIGHT: 146.4 LBS

## 2023-12-11 DIAGNOSIS — C50.911 INVASIVE DUCTAL CARCINOMA OF RIGHT BREAST (H): ICD-10-CM

## 2023-12-11 DIAGNOSIS — C50.111 MALIGNANT NEOPLASM OF CENTRAL PORTION OF RIGHT BREAST IN FEMALE, ESTROGEN RECEPTOR POSITIVE (H): Primary | ICD-10-CM

## 2023-12-11 DIAGNOSIS — Z17.0 MALIGNANT NEOPLASM OF CENTRAL PORTION OF RIGHT BREAST IN FEMALE, ESTROGEN RECEPTOR POSITIVE (H): Primary | ICD-10-CM

## 2023-12-11 LAB
ALBUMIN SERPL BCG-MCNC: 4 G/DL (ref 3.5–5.2)
ALP SERPL-CCNC: 59 U/L (ref 40–150)
ALT SERPL W P-5'-P-CCNC: 7 U/L (ref 0–50)
ANION GAP SERPL CALCULATED.3IONS-SCNC: 7 MMOL/L (ref 7–15)
AST SERPL W P-5'-P-CCNC: 17 U/L (ref 0–45)
BASOPHILS # BLD AUTO: 0 10E3/UL (ref 0–0.2)
BASOPHILS NFR BLD AUTO: 1 %
BILIRUB SERPL-MCNC: 0.6 MG/DL
BUN SERPL-MCNC: 12.3 MG/DL (ref 6–20)
CALCIUM SERPL-MCNC: 9.2 MG/DL (ref 8.6–10)
CALCIUM SERPL-MCNC: 9.2 MG/DL (ref 8.6–10)
CHLORIDE SERPL-SCNC: 106 MMOL/L (ref 98–107)
CREAT SERPL-MCNC: 0.81 MG/DL (ref 0.51–0.95)
DEPRECATED HCO3 PLAS-SCNC: 27 MMOL/L (ref 22–29)
EGFRCR SERPLBLD CKD-EPI 2021: 90 ML/MIN/1.73M2
EOSINOPHIL # BLD AUTO: 0.1 10E3/UL (ref 0–0.7)
EOSINOPHIL NFR BLD AUTO: 3 %
ERYTHROCYTE [DISTWIDTH] IN BLOOD BY AUTOMATED COUNT: 11.5 % (ref 10–15)
GLUCOSE SERPL-MCNC: 100 MG/DL (ref 70–99)
HCT VFR BLD AUTO: 35.7 % (ref 35–47)
HGB BLD-MCNC: 11.5 G/DL (ref 11.7–15.7)
IMM GRANULOCYTES # BLD: 0 10E3/UL
IMM GRANULOCYTES NFR BLD: 1 %
LYMPHOCYTES # BLD AUTO: 0.7 10E3/UL (ref 0.8–5.3)
LYMPHOCYTES NFR BLD AUTO: 22 %
MCH RBC QN AUTO: 34.5 PG (ref 26.5–33)
MCHC RBC AUTO-ENTMCNC: 32.2 G/DL (ref 31.5–36.5)
MCV RBC AUTO: 107 FL (ref 78–100)
MONOCYTES # BLD AUTO: 0.3 10E3/UL (ref 0–1.3)
MONOCYTES NFR BLD AUTO: 8 %
NEUTROPHILS # BLD AUTO: 2.1 10E3/UL (ref 1.6–8.3)
NEUTROPHILS NFR BLD AUTO: 65 %
NRBC # BLD AUTO: 0 10E3/UL
NRBC BLD AUTO-RTO: 0 /100
PLATELET # BLD AUTO: 192 10E3/UL (ref 150–450)
POTASSIUM SERPL-SCNC: 3.7 MMOL/L (ref 3.4–5.3)
PROT SERPL-MCNC: 7.4 G/DL (ref 6.4–8.3)
RBC # BLD AUTO: 3.33 10E6/UL (ref 3.8–5.2)
SODIUM SERPL-SCNC: 140 MMOL/L (ref 135–145)
WBC # BLD AUTO: 3.2 10E3/UL (ref 4–11)

## 2023-12-11 PROCEDURE — 258N000003 HC RX IP 258 OP 636: Performed by: PHYSICIAN ASSISTANT

## 2023-12-11 PROCEDURE — 96365 THER/PROPH/DIAG IV INF INIT: CPT

## 2023-12-11 PROCEDURE — 96402 CHEMO HORMON ANTINEOPL SQ/IM: CPT

## 2023-12-11 PROCEDURE — 36415 COLL VENOUS BLD VENIPUNCTURE: CPT

## 2023-12-11 PROCEDURE — 80053 COMPREHEN METABOLIC PANEL: CPT

## 2023-12-11 PROCEDURE — 85004 AUTOMATED DIFF WBC COUNT: CPT

## 2023-12-11 PROCEDURE — 250N000011 HC RX IP 250 OP 636: Mod: JZ | Performed by: PHYSICIAN ASSISTANT

## 2023-12-11 RX ORDER — ZOLEDRONIC ACID 0.04 MG/ML
4 INJECTION, SOLUTION INTRAVENOUS ONCE
Status: COMPLETED | OUTPATIENT
Start: 2023-12-11 | End: 2023-12-11

## 2023-12-11 RX ORDER — MULTIVITAMIN,THERAPEUTIC
1 TABLET ORAL DAILY
COMMUNITY

## 2023-12-11 RX ADMIN — GOSERELIN ACETATE 3.6 MG: 3.6 IMPLANT SUBCUTANEOUS at 07:56

## 2023-12-11 RX ADMIN — ZOLEDRONIC ACID 4 MG: 0.04 INJECTION, SOLUTION INTRAVENOUS at 08:02

## 2023-12-11 RX ADMIN — SODIUM CHLORIDE 250 ML: 9 INJECTION, SOLUTION INTRAVENOUS at 07:56

## 2023-12-11 ASSESSMENT — PAIN SCALES - GENERAL: PAINLEVEL: NO PAIN (0)

## 2023-12-11 NOTE — PROGRESS NOTES
Infusion Nursing Note:  Tori Steele presents today for zometa and zoladex.    Patient seen by provider today: No   present during visit today: Not Applicable.    Note: Patient denies any new concerns today. Denies any fevers or chills. Reports some intermittent diarrhea which is at baseline.      Intravenous Access:  Peripheral IV placed in lab    Treatment Conditions:  Lab Results   Component Value Date    HGB 11.5 (L) 12/11/2023    WBC 3.2 (L) 12/11/2023    ANEU 16.5 (H) 06/13/2022    ANEUTAUTO 2.1 12/11/2023     12/11/2023        Lab Results   Component Value Date     12/11/2023    POTASSIUM 3.7 12/11/2023    MAG 1.7 (L) 02/07/2022    CR 0.81 12/11/2023    JENNIE 9.2 12/11/2023    JENNIE 9.2 12/11/2023    BILITOTAL 0.6 12/11/2023    ALBUMIN 4.0 12/11/2023    ALT 7 12/11/2023    AST 17 12/11/2023       Results reviewed, labs MET treatment parameters, ok to proceed with treatment.      Post Infusion Assessment:  Patient tolerated infusion without incident.  Patient tolerated injection without incident.  Zoladex given subcutaneously into LEFT abdomen. Patient iced prior to the injection  Blood return noted pre and post infusion.  Site patent and intact, free from redness, edema or discomfort.  No evidence of extravasations.  Access discontinued per protocol.       Discharge Plan:   Patient declined prescription refills.  Discharge instructions reviewed with: Patient.  Patient and/or family verbalized understanding of discharge instructions and all questions answered.  AVS to patient via [employer].  Patient will return 1/5/23 for next appointment.   Patient discharged in stable condition accompanied by: self.  Departure Mode: Ambulatory.      Kat Padilla RN

## 2023-12-11 NOTE — PATIENT INSTRUCTIONS
[facility] Triage and after hours / weekends / holidays:  [phone]    Please call the triage or after hours line if you experience a temperature greater than or equal to 100.4, shaking chills, have uncontrolled nausea, vomiting and/or diarrhea, dizziness, shortness of breath, chest pain, bleeding, unexplained bruising, or if you have any other new/concerning symptoms, questions or concerns.      If you are having any concerning symptoms or wish to speak to a provider before your next infusion visit, please call triage to notify them so we can adequately serve you.     If you need a refill on a narcotic prescription or other medication, please call before your infusion appointment.             December 2023 Sunday Monday Tuesday Wednesday Thursday Friday Saturday                            1     2       3     4     5     6     7     8     9       10     11    LAB PERIPHERAL   6:45 AM   (15 min.)   [facility]   [facility]    ONC INFUSION 1 HR (60 MIN)   7:30 AM   (60 min.)    ONC INFUSION NURSE   [facility] 12     13     14     15     16       17     18     19     20     21     22     23       24     25     26     27     28     29     30       31                                               January 2024 Sunday Monday Tuesday Wednesday Thursday Friday Saturday        1     2     3     4     5    LAB PERIPHERAL  11:00 AM   (15 min.)   [facility]   [facility]    RETURN CCSL  11:15 AM   (30 min.)   Keegan Lockett MD   [facility]    ONC INFUSION 0.5 HR (30 MIN)  12:00 PM   (30 min.)    ONC INFUSION NURSE   [facility] 6       7     8     9     10     11     12     13       14     15     16     17     18     19     20       21     22     23     24     25     26     27       28     29     30     31                                   Recent Results  (from the past 24 hour(s))   Calcium    Collection Time: 12/11/23  7:19 AM   Result Value Ref Range    Calcium 9.2 8.6 - 10.0 mg/dL   Comprehensive metabolic panel    Collection Time: 12/11/23  7:19 AM   Result Value Ref Range    Sodium 140 135 - 145 mmol/L    Potassium 3.7 3.4 - 5.3 mmol/L    Carbon Dioxide (CO2) 27 22 - 29 mmol/L    Anion Gap 7 7 - 15 mmol/L    Urea Nitrogen 12.3 6.0 - 20.0 mg/dL    Creatinine 0.81 0.51 - 0.95 mg/dL    GFR Estimate 90 >60 mL/min/1.73m2    Calcium 9.2 8.6 - 10.0 mg/dL    Chloride 106 98 - 107 mmol/L    Glucose 100 (H) 70 - 99 mg/dL    Alkaline Phosphatase 59 40 - 150 U/L    AST 17 0 - 45 U/L    ALT 7 0 - 50 U/L    Protein Total 7.4 6.4 - 8.3 g/dL    Albumin 4.0 3.5 - 5.2 g/dL    Bilirubin Total 0.6 <=1.2 mg/dL   CBC with platelets and differential    Collection Time: 12/11/23  7:19 AM   Result Value Ref Range    WBC Count 3.2 (L) 4.0 - 11.0 10e3/uL    RBC Count 3.33 (L) 3.80 - 5.20 10e6/uL    Hemoglobin 11.5 (L) 11.7 - 15.7 g/dL    Hematocrit 35.7 35.0 - 47.0 %     (H) 78 - 100 fL    MCH 34.5 (H) 26.5 - 33.0 pg    MCHC 32.2 31.5 - 36.5 g/dL    RDW 11.5 10.0 - 15.0 %    Platelet Count 192 150 - 450 10e3/uL    % Neutrophils 65 %    % Lymphocytes 22 %    % Monocytes 8 %    % Eosinophils 3 %    % Basophils 1 %    % Immature Granulocytes 1 %    NRBCs per 100 WBC 0 <1 /100    Absolute Neutrophils 2.1 1.6 - 8.3 10e3/uL    Absolute Lymphocytes 0.7 (L) 0.8 - 5.3 10e3/uL    Absolute Monocytes 0.3 0.0 - 1.3 10e3/uL    Absolute Eosinophils 0.1 0.0 - 0.7 10e3/uL    Absolute Basophils 0.0 0.0 - 0.2 10e3/uL    Absolute Immature Granulocytes 0.0 <=0.4 10e3/uL    Absolute NRBCs 0.0 10e3/uL

## 2023-12-11 NOTE — NURSING NOTE
Chief Complaint   Patient presents with    Labs Only     PIV placed for labs and infusion, vitals checked     Heidi Hogan RN on 12/11/2023 at 7:22 AM

## 2023-12-12 ENCOUNTER — TELEPHONE (OUTPATIENT)
Dept: ONCOLOGY | Facility: CLINIC | Age: 48
End: 2023-12-12
Payer: OTHER GOVERNMENT

## 2023-12-12 NOTE — ORAL ONC MGMT
Oral Chemotherapy Monitoring Program    Primary Oncologist: Dr Lockett  Drug: abemaciclib  Start Date: 12/15/2022      Subjective/Objective:  Tori Steele is a 48 year old female contacted by phone for a follow-up visit for oral chemotherapy.  Pt confirms they are taking abemaciclib as prescribed. Pt reports tolerating the medication well. Tori does still have intermittent diarrhea, but feels this is overall stable and has never worsened. She also experiences fatigue, but is able to complete ADLs and voices that this is not worsening either.      I also reviewed labs from 12/11/23 and note no concerning abnormalities.      Assessment/Plan:  Pt is currently tolerating therapy well. Plan to continue abemaciclib as prescribed. Pt verbalized understanding and agrees to plan. Encouraged pt to call with any issues or concerns.    Pt has now been on treatment for ~1 year and is stable. Will move Tori to our stable pt status. I explained this to Tori today and voiced she can always reach out to us if needed. IB message sent to Dr Lockett.    Follow-Up:  1/5/23: Dr Locektt visit    Janneth Zhao PharmD  Oral Chemotherapy Monitoring Program  [facility]  [phone]  December 12, 2023 8/14/2023     2:00 PM 9/11/2023    10:00 AM 9/18/2023     4:00 PM 10/9/2023    10:00 AM 11/14/2023     2:00 PM 12/4/2023     3:00 PM 12/12/2023    11:00 AM   ORAL CHEMOTHERAPY   Assessment Type Refill Refill Lab Monitoring;Monthly Follow up Refill Lab Monitoring Refill Lab Monitoring;Monthly Follow up   Diagnosis Code Breast Cancer Breast Cancer Breast Cancer Breast Cancer Breast Cancer Breast Cancer Breast Cancer   Providers Dr. Cathryn Lockett   [facility]   Is this patient followed by the [facility] OC team?     No No No   Drug Name Verzenio (abemaciclib) Verzenio (abemaciclib) Verzenio (abemaciclib) Verzenio (abemaciclib)  "Verzenio (abemaciclib) Verzenio (abemaciclib) Verzenio (abemaciclib)   Dose  150 mg 150 mg 150 mg 150 mg 150 mg 150 mg   Current Schedule  BID BID BID BID BID BID   Cycle Details  Continuous Continuous Continuous Continuous Continuous Continuous   Doses missed in last 2 weeks   0       Adherence Assessment   Adherent       Adverse Effects   No AE identified during assessment    No AE identified during assessment   Any new drug interactions?  No        Is the dose as ordered appropriate for the patient?  Yes            Vitals:  BP:   BP Readings from Last 1 Encounters:   12/11/23 125/80     Wt Readings from Last 1 Encounters:   12/11/23 66.4 kg (146 lb 6.4 oz)     Estimated body surface area is 1.72 meters squared as calculated from the following:    Height as of 7/21/23: 1.6 m (5' 3\").    Weight as of 12/11/23: 66.4 kg (146 lb 6.4 oz).    Labs:  _  Result Component Current Result Ref Range   Sodium 140 (12/11/2023) 135 - 145 mmol/L     _  Result Component Current Result Ref Range   Potassium 3.7 (12/11/2023) 3.4 - 5.3 mmol/L     _  Result Component Current Result Ref Range   Calcium 9.2 (12/11/2023) 8.6 - 10.0 mg/dL    9.2 (12/11/2023) 8.6 - 10.0 mg/dL     No results found for Mag within last 30 days.     No results found for Phos within last 30 days.     _  Result Component Current Result Ref Range   Albumin 4.0 (12/11/2023) 3.5 - 5.2 g/dL     _  Result Component Current Result Ref Range   Urea Nitrogen 12.3 (12/11/2023) 6.0 - 20.0 mg/dL     _  Result Component Current Result Ref Range   Creatinine 0.81 (12/11/2023) 0.51 - 0.95 mg/dL       _  Result Component Current Result Ref Range   AST 17 (12/11/2023) 0 - 45 U/L     _  Result Component Current Result Ref Range   ALT 7 (12/11/2023) 0 - 50 U/L     _  Result Component Current Result Ref Range   Bilirubin Total 0.6 (12/11/2023) <=1.2 mg/dL       _  Result Component Current Result Ref Range   WBC Count 3.2 (L) (12/11/2023) 4.0 - 11.0 10e3/uL     _  Result Component " Current Result Ref Range   Hemoglobin 11.5 (L) (12/11/2023) 11.7 - 15.7 g/dL     _  Result Component Current Result Ref Range   Platelet Count 192 (12/11/2023) 150 - 450 10e3/uL     No results found for ANC within last 30 days.

## 2024-01-03 DIAGNOSIS — C50.911 INVASIVE DUCTAL CARCINOMA OF RIGHT BREAST (H): Primary | ICD-10-CM

## 2024-01-05 ENCOUNTER — ONCOLOGY VISIT (OUTPATIENT)
Dept: ONCOLOGY | Facility: CLINIC | Age: 49
End: 2024-01-05
Attending: INTERNAL MEDICINE
Payer: OTHER GOVERNMENT

## 2024-01-05 ENCOUNTER — APPOINTMENT (OUTPATIENT)
Dept: LAB | Facility: CLINIC | Age: 49
End: 2024-01-05
Attending: INTERNAL MEDICINE
Payer: OTHER GOVERNMENT

## 2024-01-05 VITALS
RESPIRATION RATE: 16 BRPM | HEART RATE: 101 BPM | WEIGHT: 145 LBS | TEMPERATURE: 98 F | BODY MASS INDEX: 25.69 KG/M2 | OXYGEN SATURATION: 98 % | SYSTOLIC BLOOD PRESSURE: 108 MMHG | DIASTOLIC BLOOD PRESSURE: 73 MMHG

## 2024-01-05 DIAGNOSIS — C50.911 INVASIVE DUCTAL CARCINOMA OF RIGHT BREAST (H): ICD-10-CM

## 2024-01-05 DIAGNOSIS — C50.911 INVASIVE DUCTAL CARCINOMA OF RIGHT BREAST (H): Primary | ICD-10-CM

## 2024-01-05 LAB
ALBUMIN SERPL BCG-MCNC: 4.1 G/DL (ref 3.5–5.2)
ALP SERPL-CCNC: 65 U/L (ref 40–150)
ALT SERPL W P-5'-P-CCNC: 12 U/L (ref 0–50)
ANION GAP SERPL CALCULATED.3IONS-SCNC: 9 MMOL/L (ref 7–15)
AST SERPL W P-5'-P-CCNC: 18 U/L (ref 0–45)
BASOPHILS # BLD AUTO: 0 10E3/UL (ref 0–0.2)
BASOPHILS NFR BLD AUTO: 1 %
BILIRUB SERPL-MCNC: 0.7 MG/DL
BUN SERPL-MCNC: 9.5 MG/DL (ref 6–20)
CALCIUM SERPL-MCNC: 9 MG/DL (ref 8.6–10)
CHLORIDE SERPL-SCNC: 106 MMOL/L (ref 98–107)
CREAT SERPL-MCNC: 0.83 MG/DL (ref 0.51–0.95)
DEPRECATED HCO3 PLAS-SCNC: 26 MMOL/L (ref 22–29)
EGFRCR SERPLBLD CKD-EPI 2021: 88 ML/MIN/1.73M2
EOSINOPHIL # BLD AUTO: 0.1 10E3/UL (ref 0–0.7)
EOSINOPHIL NFR BLD AUTO: 2 %
ERYTHROCYTE [DISTWIDTH] IN BLOOD BY AUTOMATED COUNT: 11.6 % (ref 10–15)
GLUCOSE SERPL-MCNC: 104 MG/DL (ref 70–99)
HCT VFR BLD AUTO: 35.7 % (ref 35–47)
HGB BLD-MCNC: 11.8 G/DL (ref 11.7–15.7)
IMM GRANULOCYTES # BLD: 0 10E3/UL
IMM GRANULOCYTES NFR BLD: 0 %
LYMPHOCYTES # BLD AUTO: 0.5 10E3/UL (ref 0.8–5.3)
LYMPHOCYTES NFR BLD AUTO: 16 %
MCH RBC QN AUTO: 34.7 PG (ref 26.5–33)
MCHC RBC AUTO-ENTMCNC: 33.1 G/DL (ref 31.5–36.5)
MCV RBC AUTO: 105 FL (ref 78–100)
MONOCYTES # BLD AUTO: 0.3 10E3/UL (ref 0–1.3)
MONOCYTES NFR BLD AUTO: 8 %
NEUTROPHILS # BLD AUTO: 2.5 10E3/UL (ref 1.6–8.3)
NEUTROPHILS NFR BLD AUTO: 73 %
NRBC # BLD AUTO: 0 10E3/UL
NRBC BLD AUTO-RTO: 0 /100
PLATELET # BLD AUTO: 195 10E3/UL (ref 150–450)
POTASSIUM SERPL-SCNC: 4 MMOL/L (ref 3.4–5.3)
PROT SERPL-MCNC: 7.5 G/DL (ref 6.4–8.3)
RBC # BLD AUTO: 3.4 10E6/UL (ref 3.8–5.2)
SODIUM SERPL-SCNC: 141 MMOL/L (ref 135–145)
WBC # BLD AUTO: 3.4 10E3/UL (ref 4–11)

## 2024-01-05 PROCEDURE — 36415 COLL VENOUS BLD VENIPUNCTURE: CPT

## 2024-01-05 PROCEDURE — G0463 HOSPITAL OUTPT CLINIC VISIT: HCPCS | Mod: 25 | Performed by: INTERNAL MEDICINE

## 2024-01-05 PROCEDURE — 99214 OFFICE O/P EST MOD 30 MIN: CPT | Performed by: INTERNAL MEDICINE

## 2024-01-05 PROCEDURE — 96402 CHEMO HORMON ANTINEOPL SQ/IM: CPT

## 2024-01-05 PROCEDURE — 82374 ASSAY BLOOD CARBON DIOXIDE: CPT

## 2024-01-05 PROCEDURE — 82247 BILIRUBIN TOTAL: CPT

## 2024-01-05 PROCEDURE — 99213 OFFICE O/P EST LOW 20 MIN: CPT | Performed by: INTERNAL MEDICINE

## 2024-01-05 PROCEDURE — 250N000011 HC RX IP 250 OP 636: Mod: JZ | Performed by: PHYSICIAN ASSISTANT

## 2024-01-05 PROCEDURE — 85025 COMPLETE CBC W/AUTO DIFF WBC: CPT

## 2024-01-05 RX ADMIN — GOSERELIN ACETATE 3.6 MG: 3.6 IMPLANT SUBCUTANEOUS at 13:00

## 2024-01-05 ASSESSMENT — PAIN SCALES - GENERAL: PAINLEVEL: NO PAIN (0)

## 2024-01-05 NOTE — LETTER
1/5/2024         RE: Tori Steele  [address] [facility] [address]    Dear Colleague,    Thank you for referring your patient, Tori Steele, to the [facility]. Please see a copy of my visit note below.      Jan 5, 2024    REASON FOR VISIT:  history of breast cancer    CANCER HISTORY AND TREATMENT:  Tori Steele is a 46 year old female with breat cancer. Right-sided 5.5 cm ER positive OK positive HER-2 negative breast cancer with associated DCIS.  Her MammaPrint came back as high risk.  She consented for the I-SPY clinical trial and has been randomized to the oral paclitaxel, Encequidar and dostarlimab arm.      2/21/22 started trial with oral paclitaxel, Encequidar and dostarlimab.    3/15/22  her IV immunotherapy dostarlimab was held due to diarrhea that recovered with Imodium and time  4/4/22   Carbo was added in weekly due in hopes for more significant reduction in cancer  5/17/22 AC start  7/8/22 AC End  8/8/2022 - R. Lumpectomy with SNLBX -               -INVASIVE BREAST CARCINOMA OF NO SPECIAL TYPE (INVASIVE DUCTAL CARCINOMA), with   apocrine features, SAMANTHA GRADE 2, size 45 x 32 mm, residual after neoadjuvant systemic therapy              -Ductal carcinoma in situ (DCIS), nuclear grade 3, cribriform and solid type(s), with focal necrosis              -DCIS is admixed with invasive carcinoma, and comprises 30% of the tumor cellularity              -Invasive carcinoma is estrogen receptor positive, progesterone receptor positive and HER2   negative (score 1+) by immunohistochemistry               -METASTATIC BREAST DUCTAL CARCINOMA in one of two lymph nodes (1/2), residual after neoadjuvant systemic therapy              -Extranodal extension present (size 2 mm)              -[facility] residual cancer burden is 3.284 (RCB Class III).   8/29/2022 - Resection of involved margins - now negative  9/19/22 started Zoladex  10/31/22 completed XRT 21  fractions     12/12/22: starting Zometa q6m and abemaciclib/letrozole     INTERVAL HISTORY:   Tori is here by herself.  Overall she is doing well on her therapy.  She states that the GI symptoms she was having earlier in the summer have largely disappeared.  She did nothing specific but it appears that she is just accommodated to the side effects of abemaciclib.  She continues to have hot flashes but they are not bothersome.  She had a busy holiday season and there are no limitations in her overall sense of wellbeing or any limitations in what she could do.    SOCIAL HISTORY: Her son, a high school freshman, is in [employer].  They have been traveling to many meets.  Of note is that her  was a collegiate wrestler.    Otherwise, ROS negative for: fevers, headaches, visual changes, new sites of pain, shortness of breath, cough, chest pain, abdominal pain, nausea, vomiting, abnormal vaginal bleeding, or leg swelling.      Current Outpatient Medications   Medication Sig Dispense Refill    abemaciclib (VERZENIO) 150 MG tablet Take 1 tablet (150 mg) by mouth 2 times daily for 28 days 56 tablet 0    letrozole (FEMARA) 2.5 MG tablet Take 1 tablet (2.5 mg) by mouth daily 90 tablet 3    multivitamin, therapeutic (THERA-VIT) TABS tablet Take 1 tablet by mouth daily      vitamin D3 (CHOLECALCIFEROL) 250 mcg (41477 units) capsule Take 1 capsule (250 mcg) by mouth once a week (Patient not taking: Reported on 7/21/2023) 8 capsule 0        No Known Allergies    PHYSICAL EXAMINATION  /73   Pulse 101   Temp 98  F (36.7  C)   Resp 16   Wt 65.8 kg (145 lb)   SpO2 98%   BMI 25.69 kg/m    GENERAL: She looks well, she was not examined today    LABS:     Latest Reference Range & Units 01/05/24 11:30   Sodium 135 - 145 mmol/L 141   Potassium 3.4 - 5.3 mmol/L 4.0   Chloride 98 - 107 mmol/L 106   Carbon Dioxide (CO2) 22 - 29 mmol/L 26   Urea Nitrogen 6.0 - 20.0 mg/dL 9.5   Creatinine 0.51 - 0.95 mg/dL 0.83   GFR  Estimate >60 mL/min/1.73m2 88   Calcium 8.6 - 10.0 mg/dL 9.0   Anion Gap 7 - 15 mmol/L 9   Albumin 3.5 - 5.2 g/dL 4.1   Protein Total 6.4 - 8.3 g/dL 7.5   Alkaline Phosphatase 40 - 150 U/L 65   ALT 0 - 50 U/L 12   AST 0 - 45 U/L 18   Bilirubin Total <=1.2 mg/dL 0.7   Glucose 70 - 99 mg/dL 104 (H)   WBC 4.0 - 11.0 10e3/uL 3.4 (L)   Hemoglobin 11.7 - 15.7 g/dL 11.8   Hematocrit 35.0 - 47.0 % 35.7   Platelet Count 150 - 450 10e3/uL 195   RBC Count 3.80 - 5.20 10e6/uL 3.40 (L)   MCV 78 - 100 fL 105 (H)   MCH 26.5 - 33.0 pg 34.7 (H)   MCHC 31.5 - 36.5 g/dL 33.1   RDW 10.0 - 15.0 % 11.6   % Neutrophils % 73   % Lymphocytes % 16   % Monocytes % 8   % Eosinophils % 2   % Basophils % 1   Absolute Basophils 0.0 - 0.2 10e3/uL 0.0   Absolute Eosinophils 0.0 - 0.7 10e3/uL 0.1   Absolute Immature Granulocytes <=0.4 10e3/uL 0.0   Absolute Lymphocytes 0.8 - 5.3 10e3/uL 0.5 (L)   Absolute Monocytes 0.0 - 1.3 10e3/uL 0.3   % Immature Granulocytes % 0   Absolute Neutrophils 1.6 - 8.3 10e3/uL 2.5   Absolute NRBCs 10e3/uL 0.0   NRBCs per 100 WBC <1 /100 0   (H): Data is abnormally high  (L): Data is abnormally low    PROBLEMS:     1. Right sided ER+, KS+, HER2-negative, MammaPrint high risk T=5.5cm, N=0 breast cancer. Treated on I-SPY with assigned arm of dostarlimab/oral paclitaxel. Carbo added week 3. Received ACx4. Pathologic staging showed pT2a. PN1a. ER+, KS+, HER21+, RCB=3   2. Pre-menopausal, now on Zoladex monthly, letrozole, abemaciclib  3. Minimal elevation in glucose      IMPRESSION: She is doing much better now.  Will continue the current regimen.  Today we talked a little bit about her ovarian suppression.  She thinks that ultimately she would like to have her ovaries removed surgically when she gets the end of her abemaciclib.  We talked about every 3 month versus every month goserelin and decided not to change given that ultimate goal is to have surgical oophorectomy.    PLAN:  1.  Continue monthly Zoladex.    2. Continue   letrozole/abema   3. RTC in 3 month, can see Ms. Mota then     Keegan Lockett MD

## 2024-01-05 NOTE — PROGRESS NOTES
Infusion Nursing Note:  Tori Steele presents today for Zoladex.    Patient seen by provider today: Yes: Dr. Lockett   present during visit today: Not Applicable.    Note: Ok to proceed.      Post Infusion Assessment:  Patient tolerated injection into lower RIGHT abdomen without incident.  Patient applied ice to site prior to injection.      Discharge Plan:   Patient declined prescription refills.  Discharge instructions reviewed with: Patient.  AVS to patient via [employer].  Patient will return 2/2/24 for next appointment.   Patient discharged in stable condition accompanied by: self.  Departure Mode: Ambulatory.      Miriam Conroy RN

## 2024-01-05 NOTE — NURSING NOTE
Chief Complaint   Patient presents with    Blood Draw     Labs collected from venipuncture by RN. Vitals taken. Checked in for appointment(s).      Labs collected from venipuncture by RN. Vitals taken. Checked in for appointment(s).     Yue Argueta RN

## 2024-01-05 NOTE — NURSING NOTE
"Oncology Rooming Note    January 5, 2024 11:39 AM   Tori Steele is a 48 year old female who presents for:    Chief Complaint   Patient presents with    Blood Draw     Labs collected from venipuncture by RN. Vitals taken. Checked in for appointment(s).     Oncology Clinic Visit     Breast CA     Initial Vitals: /73   Pulse 101   Temp 98  F (36.7  C)   Resp 16   Wt 65.8 kg (145 lb)   SpO2 98%   BMI 25.69 kg/m   Estimated body mass index is 25.69 kg/m  as calculated from the following:    Height as of 7/21/23: 1.6 m (5' 3\").    Weight as of this encounter: 65.8 kg (145 lb). Body surface area is 1.71 meters squared.  No Pain (0) Comment: Data Unavailable   No LMP recorded. (Menstrual status: Chemotherapy).  Allergies reviewed: Yes  Medications reviewed: Yes    Medications: Medication refills not needed today.  Pharmacy name entered into [employer]:    [id number] [place], [place] - [address] [facility] [address]  [facility] [place], [place] - [phone]  [facility] [place], [place] - [address] [place] [address]    Frailty Screening:   Is the patient here for a new oncology consult visit in cancer care? 2. No      Clinical concerns:        Miriam Guzman              "

## 2024-01-05 NOTE — PROGRESS NOTES
Jan 5, 2024    REASON FOR VISIT:  history of breast cancer    CANCER HISTORY AND TREATMENT:  Tori Steele is a 46 year old female with breat cancer. Right-sided 5.5 cm ER positive MT positive HER-2 negative breast cancer with associated DCIS.  Her MammaPrint came back as high risk.  She consented for the I-SPY clinical trial and has been randomized to the oral paclitaxel, Encequidar and dostarlimab arm.      2/21/22 started trial with oral paclitaxel, Encequidar and dostarlimab.    3/15/22  her IV immunotherapy dostarlimab was held due to diarrhea that recovered with Imodium and time  4/4/22   Carbo was added in weekly due in hopes for more significant reduction in cancer  5/17/22 AC start  7/8/22 AC End  8/8/2022 - R. Lumpectomy with SNLBX -               -INVASIVE BREAST CARCINOMA OF NO SPECIAL TYPE (INVASIVE DUCTAL CARCINOMA), with   apocrine features, SAMANTHA GRADE 2, size 45 x 32 mm, residual after neoadjuvant systemic therapy              -Ductal carcinoma in situ (DCIS), nuclear grade 3, cribriform and solid type(s), with focal necrosis              -DCIS is admixed with invasive carcinoma, and comprises 30% of the tumor cellularity              -Invasive carcinoma is estrogen receptor positive, progesterone receptor positive and HER2   negative (score 1+) by immunohistochemistry               -METASTATIC BREAST DUCTAL CARCINOMA in one of two lymph nodes (1/2), residual after neoadjuvant systemic therapy              -Extranodal extension present (size 2 mm)              -[facility] residual cancer burden is 3.284 (RCB Class III).   8/29/2022 - Resection of involved margins - now negative  9/19/22 started Zoladex  10/31/22 completed XRT 21 fractions     12/12/22: starting Zometa q6m and abemaciclib/letrozole     INTERVAL HISTORY:   Tori is here by herself.  Overall she is doing well on her therapy.  She states that the GI symptoms she was having earlier in the summer have largely disappeared.   She did nothing specific but it appears that she is just accommodated to the side effects of abemaciclib.  She continues to have hot flashes but they are not bothersome.  She had a busy holiday season and there are no limitations in her overall sense of wellbeing or any limitations in what she could do.    SOCIAL HISTORY: Her son, a high school freshman, is in [employer].  They have been traveling to many minutes.  Of note is that her  was a collegiate wrestler.    Otherwise, ROS negative for: fevers, headaches, visual changes, new sites of pain, shortness of breath, cough, chest pain, abdominal pain, nausea, vomiting, abnormal vaginal bleeding, or leg swelling.      Current Outpatient Medications   Medication Sig Dispense Refill    abemaciclib (VERZENIO) 150 MG tablet Take 1 tablet (150 mg) by mouth 2 times daily for 28 days 56 tablet 0    letrozole (FEMARA) 2.5 MG tablet Take 1 tablet (2.5 mg) by mouth daily 90 tablet 3    multivitamin, therapeutic (THERA-VIT) TABS tablet Take 1 tablet by mouth daily      vitamin D3 (CHOLECALCIFEROL) 250 mcg (64711 units) capsule Take 1 capsule (250 mcg) by mouth once a week (Patient not taking: Reported on 7/21/2023) 8 capsule 0        No Known Allergies    PHYSICAL EXAMINATION  /73   Pulse 101   Temp 98  F (36.7  C)   Resp 16   Wt 65.8 kg (145 lb)   SpO2 98%   BMI 25.69 kg/m    GENERAL: She looks well, she was not examined today    LABS:     Latest Reference Range & Units 01/05/24 11:30   Sodium 135 - 145 mmol/L 141   Potassium 3.4 - 5.3 mmol/L 4.0   Chloride 98 - 107 mmol/L 106   Carbon Dioxide (CO2) 22 - 29 mmol/L 26   Urea Nitrogen 6.0 - 20.0 mg/dL 9.5   Creatinine 0.51 - 0.95 mg/dL 0.83   GFR Estimate >60 mL/min/1.73m2 88   Calcium 8.6 - 10.0 mg/dL 9.0   Anion Gap 7 - 15 mmol/L 9   Albumin 3.5 - 5.2 g/dL 4.1   Protein Total 6.4 - 8.3 g/dL 7.5   Alkaline Phosphatase 40 - 150 U/L 65   ALT 0 - 50 U/L 12   AST 0 - 45 U/L 18   Bilirubin Total <=1.2  mg/dL 0.7   Glucose 70 - 99 mg/dL 104 (H)   WBC 4.0 - 11.0 10e3/uL 3.4 (L)   Hemoglobin 11.7 - 15.7 g/dL 11.8   Hematocrit 35.0 - 47.0 % 35.7   Platelet Count 150 - 450 10e3/uL 195   RBC Count 3.80 - 5.20 10e6/uL 3.40 (L)   MCV 78 - 100 fL 105 (H)   MCH 26.5 - 33.0 pg 34.7 (H)   MCHC 31.5 - 36.5 g/dL 33.1   RDW 10.0 - 15.0 % 11.6   % Neutrophils % 73   % Lymphocytes % 16   % Monocytes % 8   % Eosinophils % 2   % Basophils % 1   Absolute Basophils 0.0 - 0.2 10e3/uL 0.0   Absolute Eosinophils 0.0 - 0.7 10e3/uL 0.1   Absolute Immature Granulocytes <=0.4 10e3/uL 0.0   Absolute Lymphocytes 0.8 - 5.3 10e3/uL 0.5 (L)   Absolute Monocytes 0.0 - 1.3 10e3/uL 0.3   % Immature Granulocytes % 0   Absolute Neutrophils 1.6 - 8.3 10e3/uL 2.5   Absolute NRBCs 10e3/uL 0.0   NRBCs per 100 WBC <1 /100 0   (H): Data is abnormally high  (L): Data is abnormally low    PROBLEMS:     1. Right sided ER+, PA+, HER2-negative, MammaPrint high risk T=5.5cm, N=0 breast cancer. Treated on I-SPY with assigned arm of dostarlimab/oral paclitaxel. Carbo added week 3. Received ACx4. Pathologic staging showed pT2a. PN1a. ER+, PA+, HER21+, RCB=3   2. Pre-menopausal, now on Zoladex monthly, letrozole, abemaciclib  3. Minimal elevation in glucose        IMPRESSION: She is doing much better now.  Will continue the current regimen.  Today we talked a little bit about her ovarian suppression.  She thinks that ultimately she would like to have her ovaries removed surgically when she gets the end of her abemaciclib.  We talked about every 3 month versus every month goserelin and decided not to change given that ultimate goal is to have surgical oophorectomy.    PLAN:  1.  Continue monthly Zoladex.    2. Continue  letrozole/abema   3. RTC in 3 month, can see Ms. Mota then     Keegan Lockett MD

## 2024-01-12 ENCOUNTER — TELEPHONE (OUTPATIENT)
Dept: FAMILY MEDICINE | Facility: CLINIC | Age: 49
End: 2024-01-12

## 2024-01-29 ENCOUNTER — OFFICE VISIT (OUTPATIENT)
Dept: FAMILY MEDICINE | Facility: CLINIC | Age: 49
End: 2024-01-29
Payer: OTHER GOVERNMENT

## 2024-01-29 VITALS
RESPIRATION RATE: 16 BRPM | SYSTOLIC BLOOD PRESSURE: 110 MMHG | OXYGEN SATURATION: 98 % | WEIGHT: 145.6 LBS | BODY MASS INDEX: 25.8 KG/M2 | DIASTOLIC BLOOD PRESSURE: 68 MMHG | HEART RATE: 97 BPM | HEIGHT: 63 IN | TEMPERATURE: 98.7 F

## 2024-01-29 DIAGNOSIS — Z00.00 ROUTINE GENERAL MEDICAL EXAMINATION AT A HEALTH CARE FACILITY: Primary | ICD-10-CM

## 2024-01-29 DIAGNOSIS — C50.911 INVASIVE DUCTAL CARCINOMA OF RIGHT BREAST (H): Primary | ICD-10-CM

## 2024-01-29 DIAGNOSIS — Z13.1 SCREENING FOR DIABETES MELLITUS: ICD-10-CM

## 2024-01-29 DIAGNOSIS — Z13.220 LIPID SCREENING: ICD-10-CM

## 2024-01-29 DIAGNOSIS — M79.10 MUSCLE TENSION PAIN: ICD-10-CM

## 2024-01-29 LAB
CHOLEST SERPL-MCNC: 175 MG/DL
FASTING STATUS PATIENT QL REPORTED: NORMAL
HDLC SERPL-MCNC: 68 MG/DL
LDLC SERPL CALC-MCNC: 89 MG/DL
NONHDLC SERPL-MCNC: 107 MG/DL
TRIGL SERPL-MCNC: 92 MG/DL

## 2024-01-29 PROCEDURE — 99386 PREV VISIT NEW AGE 40-64: CPT | Mod: 25 | Performed by: NURSE PRACTITIONER

## 2024-01-29 PROCEDURE — 36415 COLL VENOUS BLD VENIPUNCTURE: CPT | Performed by: NURSE PRACTITIONER

## 2024-01-29 PROCEDURE — 90715 TDAP VACCINE 7 YRS/> IM: CPT | Performed by: NURSE PRACTITIONER

## 2024-01-29 PROCEDURE — 80061 LIPID PANEL: CPT | Performed by: NURSE PRACTITIONER

## 2024-01-29 PROCEDURE — 90471 IMMUNIZATION ADMIN: CPT | Performed by: NURSE PRACTITIONER

## 2024-01-29 RX ORDER — CYCLOBENZAPRINE HCL 5 MG
TABLET ORAL
Qty: 60 TABLET | Refills: 3 | Status: SHIPPED | OUTPATIENT
Start: 2024-01-29

## 2024-01-29 ASSESSMENT — ENCOUNTER SYMPTOMS
COUGH: 0
MYALGIAS: 0
HEMATURIA: 0
CONSTIPATION: 0
FREQUENCY: 0
NAUSEA: 0
HEADACHES: 0
CHILLS: 0
FEVER: 0
HEARTBURN: 0
PALPITATIONS: 0
SORE THROAT: 0
DIZZINESS: 0
HEMATOCHEZIA: 0
DIARRHEA: 0
ABDOMINAL PAIN: 0
EYE PAIN: 0
WEAKNESS: 0
PARESTHESIAS: 0
ARTHRALGIAS: 0
SHORTNESS OF BREATH: 0
NERVOUS/ANXIOUS: 0
DYSURIA: 0
JOINT SWELLING: 0

## 2024-01-29 ASSESSMENT — PAIN SCALES - GENERAL: PAINLEVEL: NO PAIN (0)

## 2024-01-29 NOTE — PROGRESS NOTES
Preventive Care Visit  [facility]  Clarissa Murray CNP, Nurse Practitioner Primary Care  Jan 29, 2024       SUBJECTIVE:   Tori is a 48 year old, presenting for the following:  Physical (Just Physical today)        1/29/2024     9:10 AM   Additional Questions   Roomed by  LPN     Healthy Habits:     Getting at least 3 servings of Calcium per day:  Yes    Bi-annual eye exam:  NO    Dental care twice a year:  NO    Sleep apnea or symptoms of sleep apnea:  None    Diet:  Regular (no restrictions)    Frequency of exercise:  None    Taking medications regularly:  Yes    Additional concerns today:  No      Today's PHQ-2 Score:       1/29/2024     9:12 AM   PHQ-2 ( 1999 [employer])   Q1: Little interest or pleasure in doing things 0   Q2: Feeling down, depressed or hopeless 0   PHQ-2 Score 0     Had follow-up at oncology last month- doing well on maintenance.    Have you ever done Advance Care Planning? (For example, a Health Directive, POLST, or a discussion with a medical provider or your loved ones about your wishes): No, advance care planning information given to patient to review.  Patient plans to discuss their wishes with loved ones or provider.      Social History     Tobacco Use    Smoking status: Never     Passive exposure: Never    Smokeless tobacco: Never   Substance Use Topics    Alcohol use: Yes     Comment: Rare             1/29/2024     9:10 AM   Alcohol Use   Prescreen: >3 drinks/day or >7 drinks/week? Not Applicable     Reviewed orders with patient.  Reviewed health maintenance and updated orders accordingly - Yes  Lab work is in process    Breast Cancer Screening:    FHS-7:       5/1/2023    10:28 AM   Breast CA Risk Assessment (FHS-7)   Did any of your first-degree relatives have breast or ovarian cancer? No   Did any of your relatives have bilateral breast cancer? No   Did any man in your family have breast cancer? No   Did any woman in your family have breast and ovarian  cancer? No   Did any woman in your family have breast cancer before age 50 y? No   Do you have 2 or more relatives with breast and/or ovarian cancer? No   Do you have 2 or more relatives with breast and/or bowel cancer? No       Mammogram Screening: Recommended annual mammography  Pertinent mammograms are reviewed under the imaging tab.    History of abnormal Pap smear: NO - age 30-65 PAP every 5 years with negative HPV co-testing recommended     Reviewed and updated as needed this visit by clinical staff   Tobacco  Allergies  Meds              Reviewed and updated as needed this visit by Provider                  Past Medical History:   Diagnosis Date    Breast cancer (H) 01/2022    Sinus tachycardia       Past Surgical History:   Procedure Laterality Date    DILATION AND CURETTAGE      INSERT PORT VASCULAR ACCESS Left 2/18/2022    Procedure: INSERTION, VASCULAR ACCESS PORT;  Surgeon: Elliott Ramirez PA-C;  Location: [facility] OR    IR CHEST PORT PLACEMENT > 5 YRS OF AGE  2/18/2022    IR PORT REMOVAL LEFT  4/11/2023    LUMPECTOMY BREAST Right 8/29/2022    Procedure: Re-excision of right lumpectomy site;  Surgeon: Gurpreet Layton MD;  Location: [facility] OR    LUMPECTOMY BREAST WITH SENTINEL NODE, COMBINED Right 8/8/2022    Procedure: RIGHT SEED LOCALIZED BREAST LUMPECTOMY  WITH SENTINEL LYMPH NODE BIOPSY;  Surgeon: Gurpreet Layton MD;  Location: [facility] OR    REMOVE PORT VASCULAR ACCESS Left 4/11/2023    Procedure: Remove port vascular access left;  Surgeon: Shilpi Andujar MD;  Location: [facility] OR     Review of Systems   Constitutional:  Negative for chills and fever.   HENT:  Negative for congestion, ear pain, hearing loss and sore throat.    Eyes:  Negative for pain and visual disturbance.   Respiratory:  Negative for cough and shortness of breath.    Cardiovascular:  Negative for chest pain and palpitations.   Gastrointestinal:  Negative for abdominal pain, constipation, diarrhea and nausea.   Genitourinary:  Negative  "for dysuria, frequency, genital sores, hematuria and urgency.   Musculoskeletal:  Negative for arthralgias, joint swelling and myalgias.   Skin:  Negative for rash.   Neurological:  Negative for dizziness, weakness and headaches.   Psychiatric/Behavioral:  The patient is not nervous/anxious.      Overall feeling well. Jaw clenching at night.     OBJECTIVE:   /68 (BP Location: Left arm, Patient Position: Sitting, Cuff Size: Adult Regular)   Pulse 97   Temp 98.7  F (37.1  C) (Oral)   Resp 16   Ht 1.6 m (5' 3\")   Wt 66 kg (145 lb 9.6 oz)   LMP  (LMP Unknown)   SpO2 98%   Breastfeeding No   BMI 25.79 kg/m     Estimated body mass index is 25.79 kg/m  as calculated from the following:    Height as of this encounter: 1.6 m (5' 3\").    Weight as of this encounter: 66 kg (145 lb 9.6 oz).  Physical Exam  GENERAL: alert and no distress  EYES: Eyes grossly normal to inspection, PERRL and conjunctivae and sclerae normal  HENT: ear canals and TM's normal, nose and mouth without ulcers or lesions  NECK: no adenopathy, no asymmetry, masses, or scars  RESP: lungs clear to auscultation - no rales, rhonchi or wheezes  BREAST: normal without masses, tenderness or nipple discharge and no palpable axillary masses or adenopathy  CV: regular rate and rhythm, normal S1 S2, no S3 or S4, no murmur, click or rub, no peripheral edema  ABDOMEN: soft, nontender, no hepatosplenomegaly, no masses and bowel sounds normal  MS: no gross musculoskeletal defects noted, no edema  SKIN: no suspicious lesions or rashes  NEURO: Normal strength and tone, mentation intact and speech normal  PSYCH: mentation appears normal, affect normal/bright  LYMPH: no cervical, supraclavicular, axillary, or inguinal adenopathy    Diagnostic Test Results:  Labs reviewed in Epic    ASSESSMENT/PLAN:   (Z00.00) Routine general medical examination at a health care facility  (primary encounter diagnosis)  Comment: Overall good health    (M79.10) Muscle tension " "pain  Comment:   Plan: cyclobenzaprine (FLEXERIL) 5 MG tablet    (Z13.220) Lipid screening  Comment:   Plan: Lipid panel reflex to direct LDL Fasting    (Z13.1) Screening for diabetes mellitus  Comment:       Patient has been advised of split billing requirements and indicates understanding: Yes      Counseling  Reviewed preventive health counseling, as reflected in patient instructions       Healthy diet/nutrition      BMI  Estimated body mass index is 25.79 kg/m  as calculated from the following:    Height as of this encounter: 1.6 m (5' 3\").    Weight as of this encounter: 66 kg (145 lb 9.6 oz).   Weight management plan: Discussed healthy diet and exercise guidelines      She reports that she has never smoked. She has never been exposed to tobacco smoke. She has never used smokeless tobacco.          Signed Electronically by: Clarissa Murray CNP  .undefined[[id number]^^  Answers submitted by the patient for this visit:  Annual Preventive Visit (Submitted on 1/29/2024)  Chief Complaint: Annual Exam:  Blood in stool: No  heartburn: No  peripheral edema: No  mood changes: No  Skin sensation changes: No    "

## 2024-02-02 ENCOUNTER — APPOINTMENT (OUTPATIENT)
Dept: LAB | Facility: CLINIC | Age: 49
End: 2024-02-02
Attending: INTERNAL MEDICINE
Payer: OTHER GOVERNMENT

## 2024-02-02 ENCOUNTER — INFUSION THERAPY VISIT (OUTPATIENT)
Dept: ONCOLOGY | Facility: CLINIC | Age: 49
End: 2024-02-02
Attending: INTERNAL MEDICINE
Payer: OTHER GOVERNMENT

## 2024-02-02 VITALS
HEART RATE: 101 BPM | SYSTOLIC BLOOD PRESSURE: 105 MMHG | OXYGEN SATURATION: 100 % | DIASTOLIC BLOOD PRESSURE: 70 MMHG | BODY MASS INDEX: 25.61 KG/M2 | TEMPERATURE: 98.7 F | RESPIRATION RATE: 16 BRPM | WEIGHT: 144.6 LBS

## 2024-02-02 DIAGNOSIS — C50.911 INVASIVE DUCTAL CARCINOMA OF RIGHT BREAST (H): ICD-10-CM

## 2024-02-02 DIAGNOSIS — C50.911 INVASIVE DUCTAL CARCINOMA OF RIGHT BREAST (H): Primary | ICD-10-CM

## 2024-02-02 LAB
ALBUMIN SERPL BCG-MCNC: 4.1 G/DL (ref 3.5–5.2)
ALP SERPL-CCNC: 52 U/L (ref 40–150)
ALT SERPL W P-5'-P-CCNC: 12 U/L (ref 0–50)
ANION GAP SERPL CALCULATED.3IONS-SCNC: 7 MMOL/L (ref 7–15)
AST SERPL W P-5'-P-CCNC: 15 U/L (ref 0–45)
BASOPHILS # BLD AUTO: 0 10E3/UL (ref 0–0.2)
BASOPHILS NFR BLD AUTO: 1 %
BILIRUB SERPL-MCNC: 0.5 MG/DL
BUN SERPL-MCNC: 12 MG/DL (ref 6–20)
CALCIUM SERPL-MCNC: 9.3 MG/DL (ref 8.6–10)
CHLORIDE SERPL-SCNC: 106 MMOL/L (ref 98–107)
CREAT SERPL-MCNC: 0.84 MG/DL (ref 0.51–0.95)
DEPRECATED HCO3 PLAS-SCNC: 28 MMOL/L (ref 22–29)
EGFRCR SERPLBLD CKD-EPI 2021: 86 ML/MIN/1.73M2
EOSINOPHIL # BLD AUTO: 0.1 10E3/UL (ref 0–0.7)
EOSINOPHIL NFR BLD AUTO: 3 %
ERYTHROCYTE [DISTWIDTH] IN BLOOD BY AUTOMATED COUNT: 11.5 % (ref 10–15)
GLUCOSE SERPL-MCNC: 105 MG/DL (ref 70–99)
HCT VFR BLD AUTO: 34.4 % (ref 35–47)
HGB BLD-MCNC: 11.3 G/DL (ref 11.7–15.7)
IMM GRANULOCYTES # BLD: 0 10E3/UL
IMM GRANULOCYTES NFR BLD: 1 %
LYMPHOCYTES # BLD AUTO: 0.6 10E3/UL (ref 0.8–5.3)
LYMPHOCYTES NFR BLD AUTO: 17 %
MCH RBC QN AUTO: 34.3 PG (ref 26.5–33)
MCHC RBC AUTO-ENTMCNC: 32.8 G/DL (ref 31.5–36.5)
MCV RBC AUTO: 105 FL (ref 78–100)
MONOCYTES # BLD AUTO: 0.3 10E3/UL (ref 0–1.3)
MONOCYTES NFR BLD AUTO: 8 %
NEUTROPHILS # BLD AUTO: 2.6 10E3/UL (ref 1.6–8.3)
NEUTROPHILS NFR BLD AUTO: 70 %
NRBC # BLD AUTO: 0 10E3/UL
NRBC BLD AUTO-RTO: 0 /100
PLATELET # BLD AUTO: 217 10E3/UL (ref 150–450)
POTASSIUM SERPL-SCNC: 3.7 MMOL/L (ref 3.4–5.3)
PROT SERPL-MCNC: 7.4 G/DL (ref 6.4–8.3)
RBC # BLD AUTO: 3.29 10E6/UL (ref 3.8–5.2)
SODIUM SERPL-SCNC: 141 MMOL/L (ref 135–145)
WBC # BLD AUTO: 3.7 10E3/UL (ref 4–11)

## 2024-02-02 PROCEDURE — 250N000011 HC RX IP 250 OP 636: Mod: JZ | Performed by: PHYSICIAN ASSISTANT

## 2024-02-02 PROCEDURE — 85025 COMPLETE CBC W/AUTO DIFF WBC: CPT

## 2024-02-02 PROCEDURE — 96402 CHEMO HORMON ANTINEOPL SQ/IM: CPT

## 2024-02-02 PROCEDURE — 80053 COMPREHEN METABOLIC PANEL: CPT

## 2024-02-02 PROCEDURE — 36415 COLL VENOUS BLD VENIPUNCTURE: CPT

## 2024-02-02 RX ORDER — LETROZOLE 2.5 MG/1
2.5 TABLET, FILM COATED ORAL DAILY
Qty: 90 TABLET | Refills: 3 | Status: SHIPPED | OUTPATIENT
Start: 2024-02-02 | End: 2024-05-30

## 2024-02-02 RX ADMIN — GOSERELIN ACETATE 3.6 MG: 3.6 IMPLANT SUBCUTANEOUS at 12:14

## 2024-02-02 ASSESSMENT — PAIN SCALES - GENERAL: PAINLEVEL: NO PAIN (0)

## 2024-02-02 NOTE — PATIENT INSTRUCTIONS
Contact Numbers  [facility]: [phone] (for symptom and scheduling needs)    Please call the [facility] Triage line if you experience a temperature greater than or equal to 100.4, shaking chills, have uncontrolled nausea, vomiting and/or diarrhea, dizziness, shortness of breath, chest pain, bleeding, unexplained bruising, or if you have any other new/concerning symptoms, questions or concerns.     If you are having any concerning symptoms or wish to speak to a provider before your next infusion visit, please call your care coordinator or triage to notify them so we can adequately serve you.     If you need a refill on a narcotic prescription or other medication, please call triage before your infusion appointment.

## 2024-02-02 NOTE — NURSING NOTE
Chief Complaint   Patient presents with    Blood Draw     Vpt blood draw by lab RN. Vitals taken and appointment arrived     Melody Colunga RN    
yes

## 2024-02-02 NOTE — PROGRESS NOTES
Day 1 Cycle 16 Zoladex.    Patient seen by provider today: No   present during visit today: Not Applicable.    Note:   Patient arrives to infusion feeling well today.  She denies signs and symptoms of infection including:fever, cough, shortness of breath, sore throat, diarrhea, vomiting, rash, or pain with urination.       Intravenous Access:  No PIV.    Post Infusion Assessment:  Patient tolerated injection without incident into left abdominal tissue.  Patient iced prior to injection.       Discharge Plan:   Discharge instructions reviewed with: Patient and Family.  Patient and/or family verbalized understanding of discharge instructions and all questions answered.  AVS to patient via [facility].  Patient will return 3/1/24 for next appointment.   Patient discharged in stable condition accompanied by: .  Departure Mode: Ambulatory.      Aanya Romero RN

## 2024-02-02 NOTE — TELEPHONE ENCOUNTER
Pending Prescriptions:                       Disp   Refills    letrozole (FEMARA) 2.5 MG tablet [Pharmac*90 tab*3            Sig: TAKE 1 TABLET (2.5 MG) BY MOUTH DAILY    Last prescribing provider: Milka Mota    Last clinic visit date: 01/05/24 w/    Recommendations for requested medication (if none, N/A): Copied from last OV note: Continue  letrozole/abema     Any other pertinent information (if none, N/A): N/A    Refilled: Y/N, if NO, why?

## 2024-02-26 DIAGNOSIS — C50.911 INVASIVE DUCTAL CARCINOMA OF RIGHT BREAST (H): Primary | ICD-10-CM

## 2024-02-28 DIAGNOSIS — C50.911 INVASIVE DUCTAL CARCINOMA OF RIGHT BREAST (H): Primary | ICD-10-CM

## 2024-03-01 ENCOUNTER — APPOINTMENT (OUTPATIENT)
Dept: LAB | Facility: CLINIC | Age: 49
End: 2024-03-01
Attending: PHYSICIAN ASSISTANT
Payer: OTHER GOVERNMENT

## 2024-03-01 ENCOUNTER — INFUSION THERAPY VISIT (OUTPATIENT)
Dept: ONCOLOGY | Facility: CLINIC | Age: 49
End: 2024-03-01
Attending: PHYSICIAN ASSISTANT
Payer: OTHER GOVERNMENT

## 2024-03-01 VITALS
WEIGHT: 144.9 LBS | DIASTOLIC BLOOD PRESSURE: 63 MMHG | RESPIRATION RATE: 16 BRPM | HEART RATE: 104 BPM | OXYGEN SATURATION: 98 % | SYSTOLIC BLOOD PRESSURE: 118 MMHG | TEMPERATURE: 98.6 F | BODY MASS INDEX: 25.67 KG/M2

## 2024-03-01 DIAGNOSIS — C50.911 INVASIVE DUCTAL CARCINOMA OF RIGHT BREAST (H): Primary | ICD-10-CM

## 2024-03-01 LAB
ALBUMIN SERPL BCG-MCNC: 4.1 G/DL (ref 3.5–5.2)
ALP SERPL-CCNC: 58 U/L (ref 40–150)
ALT SERPL W P-5'-P-CCNC: 11 U/L (ref 0–50)
ANION GAP SERPL CALCULATED.3IONS-SCNC: 10 MMOL/L (ref 7–15)
AST SERPL W P-5'-P-CCNC: 17 U/L (ref 0–45)
BASOPHILS # BLD AUTO: 0 10E3/UL (ref 0–0.2)
BASOPHILS NFR BLD AUTO: 1 %
BILIRUB SERPL-MCNC: 0.5 MG/DL
BUN SERPL-MCNC: 13.4 MG/DL (ref 6–20)
CALCIUM SERPL-MCNC: 9.4 MG/DL (ref 8.6–10)
CHLORIDE SERPL-SCNC: 105 MMOL/L (ref 98–107)
CREAT SERPL-MCNC: 0.96 MG/DL (ref 0.51–0.95)
DEPRECATED HCO3 PLAS-SCNC: 26 MMOL/L (ref 22–29)
EGFRCR SERPLBLD CKD-EPI 2021: 74 ML/MIN/1.73M2
EOSINOPHIL # BLD AUTO: 0.1 10E3/UL (ref 0–0.7)
EOSINOPHIL NFR BLD AUTO: 2 %
ERYTHROCYTE [DISTWIDTH] IN BLOOD BY AUTOMATED COUNT: 11.8 % (ref 10–15)
GLUCOSE SERPL-MCNC: 99 MG/DL (ref 70–99)
HCT VFR BLD AUTO: 35 % (ref 35–47)
HGB BLD-MCNC: 11.5 G/DL (ref 11.7–15.7)
IMM GRANULOCYTES # BLD: 0 10E3/UL
IMM GRANULOCYTES NFR BLD: 0 %
LYMPHOCYTES # BLD AUTO: 0.7 10E3/UL (ref 0.8–5.3)
LYMPHOCYTES NFR BLD AUTO: 18 %
MCH RBC QN AUTO: 34.1 PG (ref 26.5–33)
MCHC RBC AUTO-ENTMCNC: 32.9 G/DL (ref 31.5–36.5)
MCV RBC AUTO: 104 FL (ref 78–100)
MONOCYTES # BLD AUTO: 0.2 10E3/UL (ref 0–1.3)
MONOCYTES NFR BLD AUTO: 6 %
NEUTROPHILS # BLD AUTO: 2.7 10E3/UL (ref 1.6–8.3)
NEUTROPHILS NFR BLD AUTO: 73 %
NRBC # BLD AUTO: 0 10E3/UL
NRBC BLD AUTO-RTO: 0 /100
PLATELET # BLD AUTO: 207 10E3/UL (ref 150–450)
POTASSIUM SERPL-SCNC: 3.8 MMOL/L (ref 3.4–5.3)
PROT SERPL-MCNC: 7.6 G/DL (ref 6.4–8.3)
RBC # BLD AUTO: 3.37 10E6/UL (ref 3.8–5.2)
SODIUM SERPL-SCNC: 141 MMOL/L (ref 135–145)
WBC # BLD AUTO: 3.6 10E3/UL (ref 4–11)

## 2024-03-01 PROCEDURE — 85041 AUTOMATED RBC COUNT: CPT

## 2024-03-01 PROCEDURE — 250N000011 HC RX IP 250 OP 636: Mod: JZ | Performed by: INTERNAL MEDICINE

## 2024-03-01 PROCEDURE — 36415 COLL VENOUS BLD VENIPUNCTURE: CPT

## 2024-03-01 PROCEDURE — 82040 ASSAY OF SERUM ALBUMIN: CPT

## 2024-03-01 PROCEDURE — 96402 CHEMO HORMON ANTINEOPL SQ/IM: CPT

## 2024-03-01 RX ADMIN — GOSERELIN ACETATE 3.6 MG: 3.6 IMPLANT SUBCUTANEOUS at 13:05

## 2024-03-01 ASSESSMENT — PAIN SCALES - GENERAL: PAINLEVEL: NO PAIN (0)

## 2024-03-01 NOTE — PROGRESS NOTES
Infusion Nursing Note:  Tori Steele presents today for Cycle 17 Day 1 Zoladex.    Patient seen by provider today: No   present during visit today: Not Applicable.    Note: Tori comes into the clinic today doing well overall and has no concerns to offer at this visit. No pain or s/s of infection.      Intravenous Access:  No Intravenous access/labs at this visit.    Treatment Conditions:  Not Applicable.      Post Infusion Assessment:  Patient tolerated Zoladex injection to RLQ abdomen without incident. Iced prior to injection.      Discharge Plan:   Patient declined prescription refills.  Discharge instructions reviewed with: Patient.  Patient and/or family verbalized understanding of discharge instructions and all questions answered.  AVS to patient via [employer].  Patient will return 3/29 for next appointment.   Patient discharged in stable condition accompanied by: self and .  Departure Mode: Ambulatory.      Silvia Arroyo RN

## 2024-03-01 NOTE — PATIENT INSTRUCTIONS
[facility] Triage and after hours / weekends / holidays:  [phone]    Please call the triage or after hours line if you experience a temperature greater than or equal to 100.4, shaking chills, have uncontrolled nausea, vomiting and/or diarrhea, dizziness, shortness of breath, chest pain, bleeding, unexplained bruising, or if you have any other new/concerning symptoms, questions or concerns.      If you are having any concerning symptoms or wish to speak to a provider before your next infusion visit, please call triage to notify them so we can adequately serve you.     If you need a refill on a narcotic prescription or other medication, please call before your infusion appointment.

## 2024-03-01 NOTE — NURSING NOTE
Chief Complaint   Patient presents with    Blood Draw     Vitals taken, venipuncture labs drawn, checked into next appt     /63 (BP Location: Left arm, Patient Position: Sitting, Cuff Size: Adult Regular)   Pulse 104   Temp 98.6  F (37  C) (Oral)   Resp 16   Wt 65.7 kg (144 lb 14.4 oz)   LMP  (LMP Unknown)   SpO2 98%   BMI 25.67 kg/m    Fam Hardin RN on 3/1/2024 at 12:11 PM

## 2024-03-18 DIAGNOSIS — C50.911 INVASIVE DUCTAL CARCINOMA OF RIGHT BREAST (H): Primary | ICD-10-CM

## 2024-03-25 DIAGNOSIS — C50.911 INVASIVE DUCTAL CARCINOMA OF RIGHT BREAST (H): Primary | ICD-10-CM

## 2024-04-01 ENCOUNTER — VIRTUAL VISIT (OUTPATIENT)
Dept: ONCOLOGY | Facility: CLINIC | Age: 49
End: 2024-04-01
Attending: PHYSICIAN ASSISTANT
Payer: OTHER GOVERNMENT

## 2024-04-01 ENCOUNTER — PATIENT OUTREACH (OUTPATIENT)
Dept: ONCOLOGY | Facility: CLINIC | Age: 49
End: 2024-04-01

## 2024-04-01 DIAGNOSIS — C50.111 MALIGNANT NEOPLASM OF CENTRAL PORTION OF RIGHT BREAST IN FEMALE, ESTROGEN RECEPTOR POSITIVE (H): ICD-10-CM

## 2024-04-01 DIAGNOSIS — C50.911 INVASIVE DUCTAL CARCINOMA OF RIGHT BREAST (H): Primary | ICD-10-CM

## 2024-04-01 DIAGNOSIS — D50.0 IRON DEFICIENCY ANEMIA DUE TO CHRONIC BLOOD LOSS: ICD-10-CM

## 2024-04-01 DIAGNOSIS — E55.9 VITAMIN D DEFICIENCY: ICD-10-CM

## 2024-04-01 DIAGNOSIS — Z79.811 LONG TERM CURRENT USE OF AROMATASE INHIBITOR: ICD-10-CM

## 2024-04-01 DIAGNOSIS — Z17.0 MALIGNANT NEOPLASM OF CENTRAL PORTION OF RIGHT BREAST IN FEMALE, ESTROGEN RECEPTOR POSITIVE (H): ICD-10-CM

## 2024-04-01 PROCEDURE — 99214 OFFICE O/P EST MOD 30 MIN: CPT | Mod: 95 | Performed by: PHYSICIAN ASSISTANT

## 2024-04-01 RX ORDER — HEPARIN SODIUM (PORCINE) LOCK FLUSH IV SOLN 100 UNIT/ML 100 UNIT/ML
5 SOLUTION INTRAVENOUS
Status: CANCELLED | OUTPATIENT
Start: 2024-06-04

## 2024-04-01 RX ORDER — HEPARIN SODIUM,PORCINE 10 UNIT/ML
5 VIAL (ML) INTRAVENOUS
Status: CANCELLED | OUTPATIENT
Start: 2024-06-04

## 2024-04-01 RX ORDER — ZOLEDRONIC ACID 0.04 MG/ML
4 INJECTION, SOLUTION INTRAVENOUS ONCE
Status: CANCELLED | OUTPATIENT
Start: 2024-06-04 | End: 2024-06-04

## 2024-04-01 NOTE — PROGRESS NOTES
New Patient Hematology / Oncology Nurse Navigator Note     Referral Date: 4/1/24    Referring provider:   Milka Mota PA-C   [address] [address]   Phone: [phone]       Referring Clinic/Organization: [facility]     Referred to: [facility]    Requested provider (if applicable): First available - did not specify     Evaluation for :     history of breast cancer and need for oophorectomy for ongoing breast cancer treatment.        Clinical History (per Nurse review of records provided):     4/1/24 Virtual Visit with Oncology KELLE-- BOOKMARKED  11/4/21 PAP/HPV-- BOOKMARKED      Clinical Assessment / Barriers to Care (Per Nurse):  Pt lives in [place], [place]    Records Location: [facility]     Records Needed:   N/A    Additional testing needed prior to consult:   N/A    Referral updates and Plan:   Referral received, chart reviewed, scheduling instructions updated and routed to NPS to arrange consult with [facility]yn[facility]c as requested. Will follow-up as needed as pt is already established with [facility] and discussed referral with Janette gamboa at her visit.     Josselyn Thompson, ADENN, RN, PHN, OCN  Hematology/Oncology Nurse Navigator  [facility]  [phone]

## 2024-04-01 NOTE — LETTER
4/1/2024         RE: Tori Steele  [address] [address]        Dear Colleague,    Thank you for referring your patient, Tori Steele, to the [facility]. Please see a copy of my visit note below.    Virtual Visit Details    Type of service:  Video Visit     Originating Location (pt. Location): Home    Distant Location (provider location):  On-site  Platform used for Video Visit: Dharmesh      Apr 1, 2024    REASON FOR VISIT:  history of breast cancer    CANCER HISTORY AND TREATMENT:  Tori Steele is a 48 year old female with breat cancer. Right-sided 5.5 cm ER positive LA positive HER-2 negative breast cancer with associated DCIS.  Her MammaPrint came back as high risk.  She consented for the I-SPY clinical trial and has been randomized to the oral paclitaxel, Encequidar and dostarlimab arm.      2/21/22 started trial with oral paclitaxel, Encequidar and dostarlimab.    3/15/22  her IV immunotherapy dostarlimab was held due to diarrhea that recovered with Imodium and time  4/4/22   Carbo was added in weekly due in hopes for more significant reduction in cancer  5/17/22 AC start  7/8/22 AC End  8/8/2022 - R. Lumpectomy with SNLBX -               -INVASIVE BREAST CARCINOMA OF NO SPECIAL TYPE (INVASIVE DUCTAL CARCINOMA), with   apocrine features, SAMANTHA GRADE 2, size 45 x 32 mm, residual after neoadjuvant systemic therapy              -Ductal carcinoma in situ (DCIS), nuclear grade 3, cribriform and solid type(s), with focal necrosis              -DCIS is admixed with invasive carcinoma, and comprises 30% of the tumor cellularity              -Invasive carcinoma is estrogen receptor positive, progesterone receptor positive and HER2   negative (score 1+) by immunohistochemistry               -METASTATIC BREAST DUCTAL CARCINOMA in one of two lymph nodes (1/2), residual after neoadjuvant systemic therapy              -Extranodal extension present (size 2  "mm)              -[facility] residual cancer burden is 3.284 (RCB Class III).   8/29/2022 - Resection of involved margins - now negative  9/19/22 started Zoladex  10/31/22 completed XRT 21 fractions     12/12/22: starting Zometa q6m and abemaciclib/letrozole     INTERVAL HISTORY:   Tori is here today with her  over video.  She reports that she is doing well.    She continues to get small \"flares\" of her GI tract which include loose stools and cramps.  This could be 1-2 a month and only last 1-2 days.  They are manageable for now.      This last year has brought even more healing and improvement in her wellbeing.  Her son has been wrestling for HS and this has brought her great roni.      Vasomotor symptoms present, but manageable. Continues to experience vaginal dryness, but has information for products to use, should she choose to do so.      Staying active.       Otherwise, ROS negative for: fevers, headaches, visual changes, new sites of pain, shortness of breath, cough, chest pain, abdominal pain, nausea, vomiting, abnormal vaginal bleeding, or leg swelling.      Current Outpatient Medications   Medication Sig Dispense Refill    abemaciclib (VERZENIO) 150 MG tablet Take 1 tablet (150 mg) by mouth 2 times daily for 28 days 56 tablet 0    cyclobenzaprine (FLEXERIL) 5 MG tablet Take 1-2 at bedtime as needed for muscle spasms 60 tablet 3    letrozole (FEMARA) 2.5 MG tablet TAKE 1 TABLET (2.5 MG) BY MOUTH DAILY 90 tablet 3    multivitamin, therapeutic (THERA-VIT) TABS tablet Take 1 tablet by mouth daily          No Known Allergies    PHYSICAL EXAMINATION  Video physical exam  General: Patient appears well in no acute distress.   Skin: No visualized rash or lesions on visualized skin  Eyes: EOMI, no erythema, sclera icterus or discharge noted  Resp: Appears to be breathing comfortably without accessory muscle usage, speaking in full sentences, no cough  MSK: Appears to have normal range of motion based " on visualized movements  Neurologic: No apparent tremors, facial movements symmetric  Psych: affect bright , alert and oriented      LABS:  Most Recent 3 CBC's:  Recent Labs   Lab Test 03/01/24  1206 02/02/24  1130 01/05/24  1130   WBC 3.6* 3.7* 3.4*   HGB 11.5* 11.3* 11.8   * 105* 105*    217 195   ANEUTAUTO 2.7 2.6 2.5     Most Recent 3 BMP's:  Recent Labs   Lab Test 03/01/24  1206 02/02/24  1130 01/05/24  1130    141 141   POTASSIUM 3.8 3.7 4.0   CHLORIDE 105 106 106   CO2 26 28 26   BUN 13.4 12.0 9.5   CR 0.96* 0.84 0.83   ANIONGAP 10 7 9   JENNIE 9.4 9.3 9.0   GLC 99 105* 104*   PROTTOTAL 7.6 7.4 7.5   ALBUMIN 4.1 4.1 4.1    Most Recent 3 LFT's:  Recent Labs   Lab Test 03/01/24  1206 02/02/24  1130 01/05/24  1130   AST 17 15 18   ALT 11 12 12   ALKPHOS 58 52 65   BILITOTAL 0.5 0.5 0.7    Most Recent 2 TSH and T4:  Recent Labs   Lab Test 09/09/22  1122 07/29/22  1009 06/24/22  1057   TSH 1.52 1.39 1.66   T4  --  1.12 0.96      I reviewed the above labs today.     IMPRESSION/PLAN:  Tori Steele is a 48 year old female with a history of ER positive breast cancer.  She participated in ISPY-2 and had an RCB 3.  She completed radiation therapy.  She remains on Verzenio (started Dec 2022, thus will be done in Dec of 2024) for 2 years, monthly Zoladex, daily aromatase inhibitor.      Breast cancer: Tori is doing very well today.  She is physically recovering from her prior treatment.  She is tolerating her endocrine therapy well.  -Continue monthly labs, monthly Zoladex, daily abemaciclib and anastrozole  -Mammogram due May 2024   -Post AC echo done showing no concerning abnormalities.  Encouraged PCP for lipid testing, blood pressure and glucose monitoring.  - Discussed bilateral oophorectomy of which she is considering, possibly late 2024  - Continue monthly zoladex until she plans her oophorectomy   - Encouraged daily exercise    Bone health: Baseline DEXA scan showed normal bone density.   She is on vitamin D because of low levels.  She is taking oral calcium.  -Zometa scheduled for June 2024 (dose #4)  -will recheck her Vit D levels tomorrow    Vaginal dryness: Ongoing vaginal dryness, previously recommended Replens and  other options to try as well.     Right breast lymphedema: She has had lymphedema throughout the right breast. Wears compression bra and sleeve 2-3 times/week. Follows with LE specialist monthly.     Anemia, ho SABRINA  Will recheck her iron levels again tomorrow    Janette Mota PA-C    40 minutes spent on the date of the encounter doing chart review, review of test results, interpretation of tests, patient visit, and documentation

## 2024-04-01 NOTE — PROGRESS NOTES
Virtual Visit Details    Type of service:  Video Visit     Originating Location (pt. Location): Home    Distant Location (provider location):  On-site  Platform used for Video Visit: Dharmesh      Apr 1, 2024    REASON FOR VISIT:  history of breast cancer    CANCER HISTORY AND TREATMENT:  Tori Steele is a 48 year old female with breat cancer. Right-sided 5.5 cm ER positive MD positive HER-2 negative breast cancer with associated DCIS.  Her MammaPrint came back as high risk.  She consented for the I-SPY clinical trial and has been randomized to the oral paclitaxel, Encequidar and dostarlimab arm.      2/21/22 started trial with oral paclitaxel, Encequidar and dostarlimab.    3/15/22  her IV immunotherapy dostarlimab was held due to diarrhea that recovered with Imodium and time  4/4/22   Carbo was added in weekly due in hopes for more significant reduction in cancer  5/17/22 AC start  7/8/22 AC End  8/8/2022 - R. Lumpectomy with SNLBX -               -INVASIVE BREAST CARCINOMA OF NO SPECIAL TYPE (INVASIVE DUCTAL CARCINOMA), with   apocrine features, SAMANTHA GRADE 2, size 45 x 32 mm, residual after neoadjuvant systemic therapy              -Ductal carcinoma in situ (DCIS), nuclear grade 3, cribriform and solid type(s), with focal necrosis              -DCIS is admixed with invasive carcinoma, and comprises 30% of the tumor cellularity              -Invasive carcinoma is estrogen receptor positive, progesterone receptor positive and HER2   negative (score 1+) by immunohistochemistry               -METASTATIC BREAST DUCTAL CARCINOMA in one of two lymph nodes (1/2), residual after neoadjuvant systemic therapy              -Extranodal extension present (size 2 mm)              -[facility] residual cancer burden is 3.284 (RCB Class III).   8/29/2022 - Resection of involved margins - now negative  9/19/22 started Zoladex  10/31/22 completed XRT 21 fractions     12/12/22: starting Zometa q6m and  "abemaciclib/letrozole     INTERVAL HISTORY:   Tori is here today with her  over video.  She reports that she is doing well.    She continues to get small \"flares\" of her GI tract which include loose stools and cramps.  This could be 1-2 a month and only last 1-2 days.  They are manageable for now.      This last year has brought even more healing and improvement in her wellbeing.  Her son has been wrestling for HS and this has brought her great roni.      Vasomotor symptoms present, but manageable. Continues to experience vaginal dryness, but has information for products to use, should she choose to do so.      Staying active.       Otherwise, ROS negative for: fevers, headaches, visual changes, new sites of pain, shortness of breath, cough, chest pain, abdominal pain, nausea, vomiting, abnormal vaginal bleeding, or leg swelling.      Current Outpatient Medications   Medication Sig Dispense Refill    abemaciclib (VERZENIO) 150 MG tablet Take 1 tablet (150 mg) by mouth 2 times daily for 28 days 56 tablet 0    cyclobenzaprine (FLEXERIL) 5 MG tablet Take 1-2 at bedtime as needed for muscle spasms 60 tablet 3    letrozole (FEMARA) 2.5 MG tablet TAKE 1 TABLET (2.5 MG) BY MOUTH DAILY 90 tablet 3    multivitamin, therapeutic (THERA-VIT) TABS tablet Take 1 tablet by mouth daily          No Known Allergies    PHYSICAL EXAMINATION  Video physical exam  General: Patient appears well in no acute distress.   Skin: No visualized rash or lesions on visualized skin  Eyes: EOMI, no erythema, sclera icterus or discharge noted  Resp: Appears to be breathing comfortably without accessory muscle usage, speaking in full sentences, no cough  MSK: Appears to have normal range of motion based on visualized movements  Neurologic: No apparent tremors, facial movements symmetric  Psych: affect bright , alert and oriented      LABS:  Most Recent 3 CBC's:  Recent Labs   Lab Test 03/01/24  1206 02/02/24  1130 01/05/24  1130   WBC 3.6* " 3.7* 3.4*   HGB 11.5* 11.3* 11.8   * 105* 105*    217 195   ANEUTAUTO 2.7 2.6 2.5     Most Recent 3 BMP's:  Recent Labs   Lab Test 03/01/24  1206 02/02/24  1130 01/05/24  1130    141 141   POTASSIUM 3.8 3.7 4.0   CHLORIDE 105 106 106   CO2 26 28 26   BUN 13.4 12.0 9.5   CR 0.96* 0.84 0.83   ANIONGAP 10 7 9   JENNIE 9.4 9.3 9.0   GLC 99 105* 104*   PROTTOTAL 7.6 7.4 7.5   ALBUMIN 4.1 4.1 4.1    Most Recent 3 LFT's:  Recent Labs   Lab Test 03/01/24  1206 02/02/24  1130 01/05/24  1130   AST 17 15 18   ALT 11 12 12   ALKPHOS 58 52 65   BILITOTAL 0.5 0.5 0.7    Most Recent 2 TSH and T4:  Recent Labs   Lab Test 09/09/22  1122 07/29/22  1009 06/24/22  1057   TSH 1.52 1.39 1.66   T4  --  1.12 0.96      I reviewed the above labs today.       IMPRESSION/PLAN:  Tori Steele is a 48 year old female with a history of ER positive breast cancer.  She participated in ISPY-2 and had an RCB 3.  She completed radiation therapy.  She remains on Verzenio (started Dec 2022, thus will be done in Dec of 2024) for 2 years, monthly Zoladex, daily aromatase inhibitor.      Breast cancer: Tori is doing very well today.  She is physically recovering from her prior treatment.  She is tolerating her endocrine therapy well.  -Continue monthly labs, monthly Zoladex, daily abemaciclib and anastrozole  -Mammogram due May 2024   -Post AC echo done showing no concerning abnormalities.  Encouraged PCP for lipid testing, blood pressure and glucose monitoring.  - Discussed bilateral oophorectomy of which she is considering, possibly late 2024  - Continue monthly zoladex until she plans her oophorectomy   - Encouraged daily exercise    Bone health: Baseline DEXA scan showed normal bone density.  She is on vitamin D because of low levels.  She is taking oral calcium.  -Zometa scheduled for June 2024 (dose #4)  -will recheck her Vit D levels tomorrow    Vaginal dryness: Ongoing vaginal dryness, previously recommended Replens and   other options to try as well.     Right breast lymphedema: She has had lymphedema throughout the right breast. Wears compression bra and sleeve 2-3 times/week. Follows with LE specialist monthly.     Anemia, ho SABRINA  Will recheck her iron levels again tomorrow    Janette Mota PA-C    40 minutes spent on the date of the encounter doing chart review, review of test results, interpretation of tests, patient visit, and documentation

## 2024-04-01 NOTE — NURSING NOTE
Patient confirms medications and allergies are accurate via patients echeck in completion, and or denies any changes since last reviewed/verified.   Christi Mares, Virtual Facilitator   Is the patient currently in the state of [place]? YES    Visit mode:VIDEO    If the visit is dropped, the patient can be reconnected by: VIDEO VISIT: Text to cell phone:   Telephone Information:   Mobile [phone]    and TELEPHONE VISIT: Phone number:   Telephone Information:   Mobile [phone]       Will anyone else be joining the visit? NO  (If patient encounters technical issues they should call [phone])    How would you like to obtain your AVS? MyChart    Are changes needed to the allergy or medication list? No    Reason for visit: RECHECK    Christi Mares VVF

## 2024-04-02 ENCOUNTER — APPOINTMENT (OUTPATIENT)
Dept: LAB | Facility: CLINIC | Age: 49
End: 2024-04-02
Attending: PHYSICIAN ASSISTANT
Payer: OTHER GOVERNMENT

## 2024-04-02 ENCOUNTER — INFUSION THERAPY VISIT (OUTPATIENT)
Dept: ONCOLOGY | Facility: CLINIC | Age: 49
End: 2024-04-02
Attending: INTERNAL MEDICINE
Payer: OTHER GOVERNMENT

## 2024-04-02 VITALS
RESPIRATION RATE: 16 BRPM | WEIGHT: 145.7 LBS | DIASTOLIC BLOOD PRESSURE: 77 MMHG | HEART RATE: 88 BPM | BODY MASS INDEX: 25.81 KG/M2 | SYSTOLIC BLOOD PRESSURE: 119 MMHG | TEMPERATURE: 98.5 F | OXYGEN SATURATION: 97 %

## 2024-04-02 DIAGNOSIS — E55.9 VITAMIN D DEFICIENCY: ICD-10-CM

## 2024-04-02 DIAGNOSIS — C50.911 INVASIVE DUCTAL CARCINOMA OF RIGHT BREAST (H): Primary | ICD-10-CM

## 2024-04-02 DIAGNOSIS — D50.0 IRON DEFICIENCY ANEMIA DUE TO CHRONIC BLOOD LOSS: ICD-10-CM

## 2024-04-02 LAB
ALBUMIN SERPL BCG-MCNC: 4.3 G/DL (ref 3.5–5.2)
ALP SERPL-CCNC: 66 U/L (ref 40–150)
ALT SERPL W P-5'-P-CCNC: 12 U/L (ref 0–50)
ANION GAP SERPL CALCULATED.3IONS-SCNC: 11 MMOL/L (ref 7–15)
AST SERPL W P-5'-P-CCNC: 18 U/L (ref 0–45)
BASOPHILS # BLD AUTO: 0 10E3/UL (ref 0–0.2)
BASOPHILS NFR BLD AUTO: 1 %
BILIRUB SERPL-MCNC: 0.6 MG/DL
BUN SERPL-MCNC: 10.4 MG/DL (ref 6–20)
CALCIUM SERPL-MCNC: 9.7 MG/DL (ref 8.6–10)
CHLORIDE SERPL-SCNC: 104 MMOL/L (ref 98–107)
CREAT SERPL-MCNC: 0.87 MG/DL (ref 0.51–0.95)
DEPRECATED HCO3 PLAS-SCNC: 26 MMOL/L (ref 22–29)
EGFRCR SERPLBLD CKD-EPI 2021: 83 ML/MIN/1.73M2
EOSINOPHIL # BLD AUTO: 0.1 10E3/UL (ref 0–0.7)
EOSINOPHIL NFR BLD AUTO: 1 %
ERYTHROCYTE [DISTWIDTH] IN BLOOD BY AUTOMATED COUNT: 11.9 % (ref 10–15)
FERRITIN SERPL-MCNC: 75 NG/ML (ref 6–175)
GLUCOSE SERPL-MCNC: 101 MG/DL (ref 70–99)
HCT VFR BLD AUTO: 38.1 % (ref 35–47)
HGB BLD-MCNC: 12.6 G/DL (ref 11.7–15.7)
IMM GRANULOCYTES # BLD: 0 10E3/UL
IMM GRANULOCYTES NFR BLD: 0 %
IRON BINDING CAPACITY (ROCHE): 352 UG/DL (ref 240–430)
IRON SATN MFR SERPL: 21 % (ref 15–46)
IRON SERPL-MCNC: 74 UG/DL (ref 37–145)
LYMPHOCYTES # BLD AUTO: 0.7 10E3/UL (ref 0.8–5.3)
LYMPHOCYTES NFR BLD AUTO: 16 %
MCH RBC QN AUTO: 34.3 PG (ref 26.5–33)
MCHC RBC AUTO-ENTMCNC: 33.1 G/DL (ref 31.5–36.5)
MCV RBC AUTO: 104 FL (ref 78–100)
MONOCYTES # BLD AUTO: 0.2 10E3/UL (ref 0–1.3)
MONOCYTES NFR BLD AUTO: 6 %
NEUTROPHILS # BLD AUTO: 3.2 10E3/UL (ref 1.6–8.3)
NEUTROPHILS NFR BLD AUTO: 76 %
NRBC # BLD AUTO: 0 10E3/UL
NRBC BLD AUTO-RTO: 0 /100
PLATELET # BLD AUTO: 207 10E3/UL (ref 150–450)
POTASSIUM SERPL-SCNC: 3.8 MMOL/L (ref 3.4–5.3)
PROT SERPL-MCNC: 8.3 G/DL (ref 6.4–8.3)
RBC # BLD AUTO: 3.67 10E6/UL (ref 3.8–5.2)
SODIUM SERPL-SCNC: 141 MMOL/L (ref 135–145)
VIT D+METAB SERPL-MCNC: 31 NG/ML (ref 20–50)
WBC # BLD AUTO: 4.2 10E3/UL (ref 4–11)

## 2024-04-02 PROCEDURE — 82306 VITAMIN D 25 HYDROXY: CPT

## 2024-04-02 PROCEDURE — 250N000011 HC RX IP 250 OP 636: Mod: JZ | Performed by: INTERNAL MEDICINE

## 2024-04-02 PROCEDURE — 82247 BILIRUBIN TOTAL: CPT

## 2024-04-02 PROCEDURE — 83550 IRON BINDING TEST: CPT

## 2024-04-02 PROCEDURE — 85025 COMPLETE CBC W/AUTO DIFF WBC: CPT

## 2024-04-02 PROCEDURE — 96402 CHEMO HORMON ANTINEOPL SQ/IM: CPT

## 2024-04-02 PROCEDURE — 82728 ASSAY OF FERRITIN: CPT

## 2024-04-02 PROCEDURE — 36415 COLL VENOUS BLD VENIPUNCTURE: CPT

## 2024-04-02 RX ADMIN — GOSERELIN ACETATE 3.6 MG: 3.6 IMPLANT SUBCUTANEOUS at 15:47

## 2024-04-02 ASSESSMENT — PAIN SCALES - GENERAL: PAINLEVEL: NO PAIN (0)

## 2024-04-02 NOTE — PROGRESS NOTES
Infusion Nursing Note:  Tori Steele presents today for Zoladex.    Patient seen by provider: Yes: JESSY Maria 4/1   present during visit today: Not Applicable.    Note: Tori presents to infusion today feeling well. She denies any pain, infectious symptoms, or other changes overnight. She iced prior to her injection.    Intravenous Access:  No Intravenous access at this visit.    Treatment Conditions:  Not Applicable.    Post Infusion Assessment:  Patient tolerated injection to LLQ abdomen without incident.     Discharge Plan:   Discharge instructions reviewed with: Patient.  Patient and/or family verbalized understanding of discharge instructions and all questions answered.  AVS to patient via AnsibleT.  Patient will return 5/3 for next appointment.   Patient discharged in stable condition accompanied by: self.  Departure Mode: Ambulatory.      Wilma Serrano RN

## 2024-04-08 NOTE — TELEPHONE ENCOUNTER
Called patient to schedule surgery with   Date of Surgery: 08/29  Approximate arrival time given:  Yes  Location of surgery: [facility]  Pre-Op H&P: N/A  Post-Op Appt Date: 9/12   Imaging needed:  No  Discussed COVID-19 Testing: Yes     Patient aware that pre-op RN will call 2-3 days prior to surgery with arrival time and instructions Yes  Packet sent out: Yes 08/23/22      Additional Comments:       All patients questions were answered and was instructed to review surgical packet and call back with any questions or concerns.       Eduard Krause on 8/23/2022 at 2:12 PM  
(3) no apparent problem

## 2024-04-08 NOTE — TELEPHONE ENCOUNTER
RECORDS STATUS - BREAST    RECORDS REQUESTED FROM: [facility] - Internal records   DATE REQUESTED: 4/8

## 2024-04-11 ENCOUNTER — ONCOLOGY VISIT (OUTPATIENT)
Dept: ONCOLOGY | Facility: CLINIC | Age: 49
End: 2024-04-11
Attending: PHYSICIAN ASSISTANT
Payer: OTHER GOVERNMENT

## 2024-04-11 ENCOUNTER — PRE VISIT (OUTPATIENT)
Dept: ONCOLOGY | Facility: CLINIC | Age: 49
End: 2024-04-11
Payer: OTHER GOVERNMENT

## 2024-04-11 VITALS
WEIGHT: 144.4 LBS | OXYGEN SATURATION: 98 % | RESPIRATION RATE: 16 BRPM | SYSTOLIC BLOOD PRESSURE: 120 MMHG | TEMPERATURE: 98.2 F | DIASTOLIC BLOOD PRESSURE: 79 MMHG | HEART RATE: 91 BPM | HEIGHT: 64 IN | BODY MASS INDEX: 24.65 KG/M2

## 2024-04-11 DIAGNOSIS — Z12.4 CERVICAL CANCER SCREENING: Primary | ICD-10-CM

## 2024-04-11 DIAGNOSIS — C50.911 INVASIVE DUCTAL CARCINOMA OF RIGHT BREAST (H): ICD-10-CM

## 2024-04-11 PROCEDURE — 87624 HPV HI-RISK TYP POOLED RSLT: CPT | Performed by: OBSTETRICS & GYNECOLOGY

## 2024-04-11 PROCEDURE — G0463 HOSPITAL OUTPT CLINIC VISIT: HCPCS | Performed by: OBSTETRICS & GYNECOLOGY

## 2024-04-11 PROCEDURE — G0145 SCR C/V CYTO,THINLAYER,RESCR: HCPCS | Performed by: OBSTETRICS & GYNECOLOGY

## 2024-04-11 PROCEDURE — 99205 OFFICE O/P NEW HI 60 MIN: CPT | Performed by: OBSTETRICS & GYNECOLOGY

## 2024-04-11 RX ORDER — CEFAZOLIN SODIUM 2 G/50ML
2 SOLUTION INTRAVENOUS
Status: CANCELLED | OUTPATIENT
Start: 2024-04-11

## 2024-04-11 RX ORDER — CEFAZOLIN SODIUM 2 G/50ML
2 SOLUTION INTRAVENOUS SEE ADMIN INSTRUCTIONS
Status: CANCELLED | OUTPATIENT
Start: 2024-04-11

## 2024-04-11 RX ORDER — METRONIDAZOLE 500 MG/100ML
500 INJECTION, SOLUTION INTRAVENOUS
Status: CANCELLED | OUTPATIENT
Start: 2024-04-11

## 2024-04-11 RX ORDER — HEPARIN SODIUM 5000 [USP'U]/.5ML
5000 INJECTION, SOLUTION INTRAVENOUS; SUBCUTANEOUS
Status: CANCELLED | OUTPATIENT
Start: 2024-04-11

## 2024-04-11 ASSESSMENT — PAIN SCALES - GENERAL: PAINLEVEL: NO PAIN (0)

## 2024-04-11 NOTE — PATIENT INSTRUCTIONS
US next available that works for you    Plan:  June   SALPINGO-OOPHORECTOMY, LAPAROSCOPIC BILATERAL

## 2024-04-11 NOTE — PROGRESS NOTES
GYNECOLOGIC  ONCOLOGY CONSULT    Referring provider:    Milka Mota PA-C  [address] [facility] [address]  [place] [address]   RE: Tori Steele  : 1975  WILVER: 2024    CC:  History of breast cancer    HPI: Ms Tori Steele is a 48 year old  presenting to discuss removal of her ovaries as part of the treatment for ER+ breast cancer.    I have reviewed her treateatment course for right sided ER positive,ID positive HER-2 negative breast cancer.  She started treatment in 2022 and as part of estrogen suppression has been on Zometa since 2022.    Side effects of Zometa include GI upset, vaginal dryness, and join pain. Now as time has passed, she feel ready for a more definitive management by having her ovaries removed. She has considered this option in the past but now being able to stop taking Zometa is important for her. She has consulted with her breast oncologist to arrive at this decision.      No abnormal vaginal bleeding, history of fibroid uterus      OBGYN history and Health Maintenance:   - vaginal   Last Pap Smear: , no history of dysplasia        Past Medical History:   Diagnosis Date    Breast cancer (H) 2022    Sinus tachycardia        Past Surgical History:   Procedure Laterality Date    DILATION AND CURETTAGE      INSERT PORT VASCULAR ACCESS Left 2022    Procedure: INSERTION, VASCULAR ACCESS PORT;  Surgeon: Elliott Ramirez PA-C;  Location: [facility] OR    IR CHEST PORT PLACEMENT > 5 YRS OF AGE  2022    IR PORT REMOVAL LEFT  2023    LUMPECTOMY BREAST Right 2022    Procedure: Re-excision of right lumpectomy site;  Surgeon: Gurpreet Layton MD;  Location: [facility] OR    LUMPECTOMY BREAST WITH SENTINEL NODE, COMBINED Right 2022    Procedure: RIGHT SEED LOCALIZED BREAST LUMPECTOMY  WITH SENTINEL LYMPH NODE BIOPSY;  Surgeon: Gurpreet Layton MD;  Location: [facility] OR    REMOVE PORT VASCULAR ACCESS Left 2023    Procedure: Remove port vascular  "access left;  Surgeon: Shilpi Andujar MD;  Location: [facility] OR          Current Outpatient Medications   Medication Sig Dispense Refill    abemaciclib (VERZENIO) 150 MG tablet Take 1 tablet (150 mg) by mouth 2 times daily for 28 days 56 tablet 0    cyclobenzaprine (FLEXERIL) 5 MG tablet Take 1-2 at bedtime as needed for muscle spasms 60 tablet 3    letrozole (FEMARA) 2.5 MG tablet TAKE 1 TABLET (2.5 MG) BY MOUTH DAILY 90 tablet 3    multivitamin, therapeutic (THERA-VIT) TABS tablet Take 1 tablet by mouth daily           No Known Allergies    Social History:  Social History     Tobacco Use    Smoking status: Never     Passive exposure: Never    Smokeless tobacco: Never   Substance Use Topics    Alcohol use: Yes     Comment: Rare       Family History:   The patient's family history is notable for   Family History   Problem Relation Age of Onset    Hypertension Mother     No Known Problems Father     No Known Problems Brother     Breast Cancer Maternal Grandmother         Dx in her 70's    Esophageal Cancer Maternal Grandfather     Anesthesia Reaction No family hx of     Deep Vein Thrombosis (DVT) No family hx of          Physical Exam:     /79 (BP Location: Left arm, Patient Position: Sitting, Cuff Size: Adult Regular)   Pulse 91   Temp 98.2  F (36.8  C) (Oral)   Resp 16   Ht 1.618 m (5' 3.7\")   Wt 65.5 kg (144 lb 6.4 oz)   SpO2 98%   BMI 25.02 kg/m    Body mass index is 25.02 kg/m .    General: Alert and oriented, no acute distress  Psych: Mood stable  Cardiovascular: RRR, no murmors  Pulmonary: Lungs clear . Normal respiratory effort  GI: No distention. No masses. No hernia.   Lymph: No enlarge lymph nodes in neck or groin  : Normal external genitalia. Vagina without lesions, cervix deviated toward left, pap smear performed  Uterus 8-10 cm with multiple fibroids, mobile, no distinct adnexal mass      Assessment: Tori Steele is a 48 year old woman with a history of ER + right breast cancer, " currently on Zometa.  Myomatous uterus    Discussed options for discontinuing Zometa is to undergo removal of both ovaries in pre-menopausal women. Risks, benefits and approach to surgery discussed.    Would recommend a pre-surgery Pelvic US    Approach to surgery would be a laparoscopy bilateral salpingo-oophorectomy.Reviewed site for incision and plan for same day surgery. Surgica consent obtained       Plan:     1.)    Surgery requested for June 17 2024     2.) Genetic risk factors were assessed: negative for BRCA 1/2    3.) Labs and/or tests ordered include:  Pelvic US,  Pap with HPV test collected today         Salina Del Castillo M.D., MPH,  F.A.C.O.G.  Department of Ob/Gyn and Women's Health  Division of Gynecologic Oncology  [facility] [place]  [phone]      Time: total time spent today, 60 , including preparation, review of outside records, face to face counseling, and documentation.

## 2024-04-11 NOTE — NURSING NOTE
Surgery education folder given to patient     Plan reviewed     Pre operative teaching will be completed closer to surgery (June).     Kirsten Ng RN

## 2024-04-11 NOTE — LETTER
2024         RE: Tori Steele  [address] [address]        Dear Colleague,    Thank you for referring your patient, Tori Steele, to the [facility]. Please see a copy of my visit note below.    GYNECOLOGIC  ONCOLOGY CONSULT    Referring provider:    Milka Mota PA-C  [address]  [place] [address]   RE: Tori Steele  : 1975  WILVER: 2024    CC:  History of breast cancer    HPI: Ms Tori Steele is a 48 year old  presenting to discuss removal of her ovaries as part of the treatment for ER+ breast cancer.    I have reviewed her treateatment course for right sided ER positive,[place] positive HER-2 negative breast cancer.  She started treatment in 2022 and as part of estrogen suppression has been on Zometa since 2022.    Side effects of Zometa include GI upset, vaginal dryness, and join pain. Now as time has passed, she feel ready for a more definitive management by having her ovaries removed. She has considered this option in the past but now being able to stop taking Zometa is important for her. She has consulted with her breast oncologist to arrive at this decision.      No abnormal vaginal bleeding, history of fibroid uterus      OBGYN history and Health Maintenance:   - vaginal   Last Pap Smear: , no history of dysplasia        Past Medical History:   Diagnosis Date     Breast cancer (H) 2022     Sinus tachycardia        Past Surgical History:   Procedure Laterality Date     DILATION AND CURETTAGE       INSERT PORT VASCULAR ACCESS Left 2022    Procedure: INSERTION, VASCULAR ACCESS PORT;  Surgeon: Elliott Ramirez PA-C;  [place] [facility] [place]     IR CHEST PORT PLACEMENT > 5 YRS OF AGE  2022     IR PORT REMOVAL LEFT  2023     LUMPECTOMY BREAST Right 2022    Procedure: Re-excision of right lumpectomy site;  Surgeon: Gurpreet Layton MD;  Location: [facility] OR     LUMPECTOMY BREAST  "WITH SENTINEL NODE, COMBINED Right 8/8/2022    Procedure: RIGHT SEED LOCALIZED BREAST LUMPECTOMY  WITH SENTINEL LYMPH NODE BIOPSY;  Surgeon: Gurpreet Layton MD;  [place] [facility] [place] [facility] [place]     REMOVE PORT VASCULAR ACCESS Left 4/11/2023    Procedure: Remove port vascular access left;  Surgeon: Shilpi Andujar MD;  [place] [facility] [place] [facility] [place]          Current Outpatient Medications   Medication Sig Dispense Refill     abemaciclib (VERZENIO) 150 MG tablet Take 1 tablet (150 mg) by mouth 2 times daily for 28 days 56 tablet 0     cyclobenzaprine (FLEXERIL) 5 MG tablet Take 1-2 at bedtime as needed for muscle spasms 60 tablet 3     letrozole (FEMARA) 2.5 MG tablet TAKE 1 TABLET (2.5 MG) BY MOUTH DAILY 90 tablet 3     multivitamin, therapeutic (THERA-VIT) TABS tablet Take 1 tablet by mouth daily           No Known Allergies    Social History:  Social History     Tobacco Use     Smoking status: Never     Passive exposure: Never     Smokeless tobacco: Never   Substance Use Topics     Alcohol use: Yes     Comment: Rare       Family History:   The patient's family history is notable for   Family History   Problem Relation Age of Onset     Hypertension Mother      No Known Problems Father      No Known Problems Brother      Breast Cancer Maternal Grandmother         Dx in her 70's     Esophageal Cancer Maternal Grandfather      Anesthesia Reaction No family hx of      Deep Vein Thrombosis (DVT) No family hx of          Physical Exam:     /79 (BP Location: Left arm, Patient Position: Sitting, Cuff Size: Adult Regular)   Pulse 91   Temp 98.2  F (36.8  C) (Oral)   Resp 16   Ht 1.618 m (5' 3.7\")   Wt 65.5 kg (144 lb 6.4 oz)   SpO2 98%   BMI 25.02 kg/m    Body mass index is 25.02 kg/m .    General: Alert and oriented, no acute distress  Psych: Mood stable  Cardiovascular: RRR, no murmors  Pulmonary: Lungs clear . Normal respiratory effort  GI: No distention. No masses. No hernia.   Lymph: No enlarge lymph nodes in neck or groin  : " Normal external genitalia. Vagina without lesions, cervix deviated toward left, pap smear performed  Uterus 8-10 cm with multiple fibroids, mobile, no distinct adnexal mass      Assessment: Tori Steele is a 48 year old woman with a history of ER + right breast cancer, currently on Zometa.  Myomatous uterus    Discussed options for discontinuing Zometa is to undergo removal of both ovaries in pre-menopausal women. Risks, benefits and approach to surgery discussed.    Would recommend a pre-surgery Pelvic US    Approach to surgery would be a laparoscopy bilateral salpingo-oophorectomy.Reviewed site for incision and plan for same day surgery. Surgica consent obtained       Plan:     1.)    Surgery requested for June 17 2024     2.) Genetic risk factors were assessed: negative for BRCA 1/2    3.) Labs and/or tests ordered include:  Pelvic US,  Pap with HPV test collected today         Salina Del Castillo M.D., MPH,  F.A.C.O.G.  Department of Ob/Gyn and Women's Health  Division of Gynecologic Oncology  [facility] [place]  [phone]      Time: total time spent today, 60 , including preparation, review of outside records, face to face counseling, and documentation.

## 2024-04-11 NOTE — NURSING NOTE
"Oncology Rooming Note    April 11, 2024 1:38 PM   Tori Steele is a 48 year old female who presents for:    Chief Complaint   Patient presents with    Oncology Clinic Visit     Invasive ductal carcinoma of right breast      Initial Vitals: /79 (BP Location: Left arm, Patient Position: Sitting, Cuff Size: Adult Regular)   Pulse 91   Temp 98.2  F (36.8  C) (Oral)   Resp 16   Ht 1.618 m (5' 3.7\")   Wt 65.5 kg (144 lb 6.4 oz)   SpO2 98%   BMI 25.02 kg/m   Estimated body mass index is 25.02 kg/m  as calculated from the following:    Height as of this encounter: 1.618 m (5' 3.7\").    Weight as of this encounter: 65.5 kg (144 lb 6.4 oz). Body surface area is 1.72 meters squared.  No Pain (0) Comment: Data Unavailable   No LMP recorded. (Menstrual status: Chemotherapy).  Allergies reviewed: Yes  Medications reviewed: Yes    Medications: Medication refills not needed today.  Pharmacy name entered into [employer]:    [id number] [place], [place] - [address] AT [place], [place] - [address] [place] [address] [phone]  [facility] - [place], [place] - [address] [place] [address]    Frailty Screening:   Is the patient here for a new oncology consult visit in cancer care? 1. Yes. Over the past month, have you experienced difficulty or required a caregiver to assist with:   1. Balance, walking or general mobility (including any falls)? NO  2. Completion of self-care tasks such as bathing, dressing, toileting, grooming/hygiene?  NO  3. Concentration or memory that affects your daily life?  NO       Clinical concerns: Looking at potentially having ovaries removed       Nanci Crespo              "

## 2024-04-12 ENCOUNTER — TELEPHONE (OUTPATIENT)
Dept: ONCOLOGY | Facility: CLINIC | Age: 49
End: 2024-04-12
Payer: OTHER GOVERNMENT

## 2024-04-12 NOTE — TELEPHONE ENCOUNTER
Left voicemail for patient regarding scheduling surgery with Dr. Del Castillo.  Provided contact number to discuss.  [phone]    Shannon Carson, on 4/12/2024 at 3:15 PM

## 2024-04-12 NOTE — TELEPHONE ENCOUNTER
Spoke with the patient and was able to confirm all scheduled information.     Patient is schedule for surgery with: Dr. Del Castillo    Surgery Date: 6/17     Location: [facility]    H&P: to be completed by Primary Care team - patient instructed to schedule per patient, this will be scheduled with [facility]     Post-op:  6/11, in-person visit,      Patient will receive a phone call from pre-admission nurses 1-2 days prior to surgery with arrival time and NPO instructions.    Patient aware times are subject to change up until day before surgery.     Patient questions/concerns: N/A     Surgery packet was provided to patient during appointment      Shannon Carson on 4/12/2024 at 4:20 PM

## 2024-04-15 ENCOUNTER — ANCILLARY PROCEDURE (OUTPATIENT)
Dept: ULTRASOUND IMAGING | Facility: CLINIC | Age: 49
End: 2024-04-15
Attending: OBSTETRICS & GYNECOLOGY
Payer: OTHER GOVERNMENT

## 2024-04-15 DIAGNOSIS — C50.911 INVASIVE DUCTAL CARCINOMA OF RIGHT BREAST (H): ICD-10-CM

## 2024-04-15 LAB
BKR LAB AP GYN ADEQUACY: NORMAL
BKR LAB AP GYN INTERPRETATION: NORMAL
BKR LAB AP HPV REFLEX: NORMAL
BKR LAB AP PREVIOUS ABNORMAL: NORMAL
PATH REPORT.COMMENTS IMP SPEC: NORMAL
PATH REPORT.COMMENTS IMP SPEC: NORMAL
PATH REPORT.RELEVANT HX SPEC: NORMAL

## 2024-04-15 PROCEDURE — 76856 US EXAM PELVIC COMPLETE: CPT | Performed by: RADIOLOGY

## 2024-04-15 PROCEDURE — 76830 TRANSVAGINAL US NON-OB: CPT | Performed by: RADIOLOGY

## 2024-04-17 LAB
HUMAN PAPILLOMA VIRUS 16 DNA: NEGATIVE
HUMAN PAPILLOMA VIRUS 18 DNA: NEGATIVE
HUMAN PAPILLOMA VIRUS FINAL DIAGNOSIS: NORMAL
HUMAN PAPILLOMA VIRUS OTHER HR: NEGATIVE

## 2024-04-20 DIAGNOSIS — C50.911 INVASIVE DUCTAL CARCINOMA OF RIGHT BREAST (H): Primary | ICD-10-CM

## 2024-05-02 ENCOUNTER — APPOINTMENT (OUTPATIENT)
Dept: LAB | Facility: CLINIC | Age: 49
End: 2024-05-02
Attending: INTERNAL MEDICINE
Payer: OTHER GOVERNMENT

## 2024-05-02 ENCOUNTER — INFUSION THERAPY VISIT (OUTPATIENT)
Dept: ONCOLOGY | Facility: CLINIC | Age: 49
End: 2024-05-02
Attending: INTERNAL MEDICINE
Payer: OTHER GOVERNMENT

## 2024-05-02 VITALS
WEIGHT: 146.6 LBS | HEART RATE: 109 BPM | BODY MASS INDEX: 25.4 KG/M2 | TEMPERATURE: 98.5 F | RESPIRATION RATE: 16 BRPM | OXYGEN SATURATION: 100 % | DIASTOLIC BLOOD PRESSURE: 61 MMHG | SYSTOLIC BLOOD PRESSURE: 117 MMHG

## 2024-05-02 DIAGNOSIS — C50.911 INVASIVE DUCTAL CARCINOMA OF RIGHT BREAST (H): Primary | ICD-10-CM

## 2024-05-02 LAB
ALBUMIN SERPL BCG-MCNC: 4 G/DL (ref 3.5–5.2)
ALP SERPL-CCNC: 63 U/L (ref 40–150)
ALT SERPL W P-5'-P-CCNC: 9 U/L (ref 0–50)
ANION GAP SERPL CALCULATED.3IONS-SCNC: 9 MMOL/L (ref 7–15)
AST SERPL W P-5'-P-CCNC: 16 U/L (ref 0–45)
BASOPHILS # BLD AUTO: 0 10E3/UL (ref 0–0.2)
BASOPHILS NFR BLD AUTO: 1 %
BILIRUB SERPL-MCNC: 0.6 MG/DL
BUN SERPL-MCNC: 11.5 MG/DL (ref 6–20)
CALCIUM SERPL-MCNC: 9.3 MG/DL (ref 8.6–10)
CHLORIDE SERPL-SCNC: 105 MMOL/L (ref 98–107)
CREAT SERPL-MCNC: 0.85 MG/DL (ref 0.51–0.95)
DEPRECATED HCO3 PLAS-SCNC: 26 MMOL/L (ref 22–29)
EGFRCR SERPLBLD CKD-EPI 2021: 85 ML/MIN/1.73M2
EOSINOPHIL # BLD AUTO: 0 10E3/UL (ref 0–0.7)
EOSINOPHIL NFR BLD AUTO: 1 %
ERYTHROCYTE [DISTWIDTH] IN BLOOD BY AUTOMATED COUNT: 11.9 % (ref 10–15)
GLUCOSE SERPL-MCNC: 127 MG/DL (ref 70–99)
HCT VFR BLD AUTO: 34.9 % (ref 35–47)
HGB BLD-MCNC: 11.3 G/DL (ref 11.7–15.7)
IMM GRANULOCYTES # BLD: 0 10E3/UL
IMM GRANULOCYTES NFR BLD: 0 %
LYMPHOCYTES # BLD AUTO: 0.5 10E3/UL (ref 0.8–5.3)
LYMPHOCYTES NFR BLD AUTO: 19 %
MCH RBC QN AUTO: 33.8 PG (ref 26.5–33)
MCHC RBC AUTO-ENTMCNC: 32.4 G/DL (ref 31.5–36.5)
MCV RBC AUTO: 105 FL (ref 78–100)
MONOCYTES # BLD AUTO: 0.2 10E3/UL (ref 0–1.3)
MONOCYTES NFR BLD AUTO: 6 %
NEUTROPHILS # BLD AUTO: 2 10E3/UL (ref 1.6–8.3)
NEUTROPHILS NFR BLD AUTO: 73 %
NRBC # BLD AUTO: 0 10E3/UL
NRBC BLD AUTO-RTO: 0 /100
PLATELET # BLD AUTO: 178 10E3/UL (ref 150–450)
POTASSIUM SERPL-SCNC: 3.5 MMOL/L (ref 3.4–5.3)
PROT SERPL-MCNC: 7.4 G/DL (ref 6.4–8.3)
RBC # BLD AUTO: 3.34 10E6/UL (ref 3.8–5.2)
SODIUM SERPL-SCNC: 140 MMOL/L (ref 135–145)
WBC # BLD AUTO: 2.7 10E3/UL (ref 4–11)

## 2024-05-02 PROCEDURE — 36415 COLL VENOUS BLD VENIPUNCTURE: CPT

## 2024-05-02 PROCEDURE — 82374 ASSAY BLOOD CARBON DIOXIDE: CPT

## 2024-05-02 PROCEDURE — 96402 CHEMO HORMON ANTINEOPL SQ/IM: CPT

## 2024-05-02 PROCEDURE — 250N000011 HC RX IP 250 OP 636: Mod: JZ | Performed by: PHYSICIAN ASSISTANT

## 2024-05-02 PROCEDURE — 85025 COMPLETE CBC W/AUTO DIFF WBC: CPT

## 2024-05-02 PROCEDURE — 82040 ASSAY OF SERUM ALBUMIN: CPT

## 2024-05-02 RX ADMIN — GOSERELIN ACETATE 3.6 MG: 3.6 IMPLANT SUBCUTANEOUS at 12:14

## 2024-05-02 ASSESSMENT — PAIN SCALES - GENERAL: PAINLEVEL: NO PAIN (0)

## 2024-05-02 NOTE — PROGRESS NOTES
Infusion Nursing Note:  Tori Steele presents today for Cycle 19 Day 1 Zoladex.    Patient seen by provider today: No   present during visit today: Not Applicable.    Note: Patient presents to infusion today doing well. Continues to have hot flashes that are unchanged. Denies any recent fevers, chills, SOB, chest pains or cough. No new issues or concerns today.    Intravenous Access:  No Intravenous access at this visit.    Treatment Conditions:  Lab Results   Component Value Date    HGB 11.3 (L) 05/02/2024    WBC 2.7 (L) 05/02/2024    ANEU 16.5 (H) 06/13/2022    ANEUTAUTO 2.0 05/02/2024     05/02/2024        Lab Results   Component Value Date     05/02/2024    POTASSIUM 3.5 05/02/2024    MAG 1.7 (L) 02/07/2022    CR 0.85 05/02/2024    JENNIE 9.3 05/02/2024    BILITOTAL 0.6 05/02/2024    ALBUMIN 4.0 05/02/2024    ALT 9 05/02/2024    AST 16 05/02/2024     Results reviewed, labs MET treatment parameters, ok to proceed with treatment.    Post Infusion Assessment:  Patient tolerated Zoladex injection to Right Lower Abdomen without incident.  Patient iced site prior to injection.  Site patent and intact, free from redness, edema or discomfort.  No evidence of extravasations.     Discharge Plan:   Patient declined prescription refills.  Discharge instructions reviewed with: Patient.  Patient and/or family verbalized understanding of discharge instructions and all questions answered.  AVS to patient via [employer].  Patient will return 5/31 for next appointment.   Patient discharged in stable condition accompanied by: self.  Departure Mode: Ambulatory.      Jessica Childs RN

## 2024-05-02 NOTE — PATIENT INSTRUCTIONS
Contact Numbers  [facility]: [phone] (for symptom and scheduling needs)    Please call the [facility] Triage line if you experience a temperature greater than or equal to 100.4, shaking chills, have uncontrolled nausea, vomiting and/or diarrhea, dizziness, shortness of breath, chest pain, bleeding, unexplained bruising, or if you have any other new/concerning symptoms, questions or concerns.     If you are having any concerning symptoms or wish to speak to a provider before your next infusion visit, please call your care coordinator or triage to notify them so we can adequately serve you.     If you need a refill on a narcotic prescription or other medication, please call triage before your infusion appointment.           May 2024      Luis F Monday Tuesday Wednesday Thursday Friday Saturday                  1     2    LAB PERIPHERAL  11:00 AM   (15 min.)   [facility] LAB DRAW   [facility]    ONC INFUSION 0.5 HR (30 MIN)  11:30 AM   (30 min.)    ONC INFUSION NURSE   [facility] 3     4       5     6     7     8     9     10     11       12     13     14     15     16     17     18       19     20     21     22     23     24     25       26     27     28     29     30     31    LAB PERIPHERAL  10:00 AM   (15 min.)   [facility] LAB DRAW   [facility]    ONC INFUSION 0.5 HR (30 MIN)  10:30 AM   (30 min.)    ONC INFUSION NURSE   [facility]    MA DIAGNOSTIC DIGITAL BILAT  10:45 AM   (30 min.)   [email or web address]   [facility] [place]                  June 2024 Sunday Monday Tuesday Wednesday Thursday Friday Saturday                                 1       2     3     4     5     6     7     8       9     10     11     12     13     14     15       16     17    SALPINGO-OOPHORECTOMY, LAPAROSCOPIC   7:30 AM   Salina Del Castillo MD   [facility] 18     19     20     21     22        23     24     25     26     27     28    LAB PERIPHERAL  11:00 AM   (15 min.)   [facility] LAB DRAW   [facility]    RETURN CCSL  11:15 AM   (30 min.)   Keegan Lockett MD   [facility]    ONC INFUSION 0.5 HR (30 MIN)  12:00 PM   (30 min.)    ONC INFUSION NURSE   [facility] 29       30                                                   Lab Results:  Recent Results (from the past 12 hour(s))   Comprehensive metabolic panel    Collection Time: 05/02/24 11:25 AM   Result Value Ref Range    Sodium 140 135 - 145 mmol/L    Potassium 3.5 3.4 - 5.3 mmol/L    Carbon Dioxide (CO2) 26 22 - 29 mmol/L    Anion Gap 9 7 - 15 mmol/L    Urea Nitrogen 11.5 6.0 - 20.0 mg/dL    Creatinine 0.85 0.51 - 0.95 mg/dL    GFR Estimate 85 >60 mL/min/1.73m2    Calcium 9.3 8.6 - 10.0 mg/dL    Chloride 105 98 - 107 mmol/L    Glucose 127 (H) 70 - 99 mg/dL    Alkaline Phosphatase 63 40 - 150 U/L    AST 16 0 - 45 U/L    ALT 9 0 - 50 U/L    Protein Total 7.4 6.4 - 8.3 g/dL    Albumin 4.0 3.5 - 5.2 g/dL    Bilirubin Total 0.6 <=1.2 mg/dL   CBC with platelets and differential    Collection Time: 05/02/24 11:25 AM   Result Value Ref Range    WBC Count 2.7 (L) 4.0 - 11.0 10e3/uL    RBC Count 3.34 (L) 3.80 - 5.20 10e6/uL    Hemoglobin 11.3 (L) 11.7 - 15.7 g/dL    Hematocrit 34.9 (L) 35.0 - 47.0 %     (H) 78 - 100 fL    MCH 33.8 (H) 26.5 - 33.0 pg    MCHC 32.4 31.5 - 36.5 g/dL    RDW 11.9 10.0 - 15.0 %    Platelet Count 178 150 - 450 10e3/uL    % Neutrophils 73 %    % Lymphocytes 19 %    % Monocytes 6 %    % Eosinophils 1 %    % Basophils 1 %    % Immature Granulocytes 0 %    NRBCs per 100 WBC 0 <1 /100    Absolute Neutrophils 2.0 1.6 - 8.3 10e3/uL    Absolute Lymphocytes 0.5 (L) 0.8 - 5.3 10e3/uL    Absolute Monocytes 0.2 0.0 - 1.3 10e3/uL    Absolute Eosinophils 0.0 0.0 - 0.7 10e3/uL    Absolute Basophils 0.0 0.0 - 0.2 10e3/uL    Absolute Immature Granulocytes 0.0  <=0.4 10e3/uL    Absolute NRBCs 0.0 10e3/uL

## 2024-05-02 NOTE — NURSING NOTE
Chief Complaint   Patient presents with    Blood Draw     Labs drawn via  by RN. VS taken.     Labs collected from venipuncture by RN. Vitals taken. Checked in for appointment(s).     Deb Floyd RN

## 2024-05-20 DIAGNOSIS — C50.911 INVASIVE DUCTAL CARCINOMA OF RIGHT BREAST (H): Primary | ICD-10-CM

## 2024-05-21 ENCOUNTER — TELEPHONE (OUTPATIENT)
Dept: ONCOLOGY | Facility: CLINIC | Age: 49
End: 2024-05-21
Payer: OTHER GOVERNMENT

## 2024-05-21 NOTE — TELEPHONE ENCOUNTER
CARLOS APPROVED    Medication: VERZENIO 150 MG PO TABS  Amount: $ 11,500  Foundation Name: [employer]  [facility] Phone: [phone]  [employer] Fax:   Member ID:   BIN:   PCN:   Group:   Foundation Effective Date: 11/23/2023  Foundation Expiration Date: 5/21/2025  Additional Information:   Patient Notified: Yes         Reyna Simental CPhTOncology Pharmacy [facility] [place] [address]  Office: [phone]  Fax: [phone]  [email or web address]

## 2024-05-30 ENCOUNTER — MYC REFILL (OUTPATIENT)
Dept: ONCOLOGY | Facility: CLINIC | Age: 49
End: 2024-05-30
Payer: OTHER GOVERNMENT

## 2024-05-30 DIAGNOSIS — C50.911 INVASIVE DUCTAL CARCINOMA OF RIGHT BREAST (H): ICD-10-CM

## 2024-05-30 RX ORDER — LETROZOLE 2.5 MG/1
2.5 TABLET, FILM COATED ORAL DAILY
Qty: 90 TABLET | Refills: 3 | Status: SHIPPED | OUTPATIENT
Start: 2024-05-30

## 2024-05-30 NOTE — CONFIDENTIAL NOTE
Letrozole Refill   Last prescribing provider: Milka Mota     Last clinic visit date: 4/11/24 Dr Del Castillo   4/1/24 Milka Mota     Recommendations for requested medication (if none, N/A): Copied from chart note   letrozole (FEMARA) 2.5 MG tablet TAKE 1 TABLET (2.5 MG) BY MOUTH DAILY 90 tablet 3  multivitamin, therapeutic (THERA-VIT) TABS tablet Take 1 tablet by mouth daily     Any other pertinent information (if none, N/A): N/A    Refilled: Y/N, if NO, why?

## 2024-05-31 ENCOUNTER — ANCILLARY PROCEDURE (OUTPATIENT)
Dept: MAMMOGRAPHY | Facility: CLINIC | Age: 49
End: 2024-05-31
Payer: OTHER GOVERNMENT

## 2024-05-31 ENCOUNTER — APPOINTMENT (OUTPATIENT)
Dept: LAB | Facility: CLINIC | Age: 49
End: 2024-05-31
Attending: INTERNAL MEDICINE
Payer: OTHER GOVERNMENT

## 2024-05-31 ENCOUNTER — INFUSION THERAPY VISIT (OUTPATIENT)
Dept: ONCOLOGY | Facility: CLINIC | Age: 49
End: 2024-05-31
Attending: INTERNAL MEDICINE
Payer: OTHER GOVERNMENT

## 2024-05-31 VITALS
WEIGHT: 147 LBS | SYSTOLIC BLOOD PRESSURE: 116 MMHG | DIASTOLIC BLOOD PRESSURE: 68 MMHG | BODY MASS INDEX: 25.47 KG/M2 | HEART RATE: 95 BPM | TEMPERATURE: 98.4 F | RESPIRATION RATE: 16 BRPM | OXYGEN SATURATION: 98 %

## 2024-05-31 DIAGNOSIS — C50.911 INVASIVE DUCTAL CARCINOMA OF RIGHT BREAST (H): Primary | ICD-10-CM

## 2024-05-31 DIAGNOSIS — C50.111 MALIGNANT NEOPLASM OF CENTRAL PORTION OF RIGHT BREAST IN FEMALE, ESTROGEN RECEPTOR POSITIVE (H): ICD-10-CM

## 2024-05-31 DIAGNOSIS — C50.911 INVASIVE DUCTAL CARCINOMA OF RIGHT BREAST (H): ICD-10-CM

## 2024-05-31 DIAGNOSIS — Z17.0 MALIGNANT NEOPLASM OF CENTRAL PORTION OF RIGHT BREAST IN FEMALE, ESTROGEN RECEPTOR POSITIVE (H): ICD-10-CM

## 2024-05-31 LAB
ALBUMIN SERPL BCG-MCNC: 4.2 G/DL (ref 3.5–5.2)
ALP SERPL-CCNC: 64 U/L (ref 40–150)
ALT SERPL W P-5'-P-CCNC: 15 U/L (ref 0–50)
ANION GAP SERPL CALCULATED.3IONS-SCNC: 9 MMOL/L (ref 7–15)
AST SERPL W P-5'-P-CCNC: 19 U/L (ref 0–45)
BASOPHILS # BLD AUTO: 0 10E3/UL (ref 0–0.2)
BASOPHILS NFR BLD AUTO: 1 %
BILIRUB SERPL-MCNC: 0.6 MG/DL
BUN SERPL-MCNC: 10.8 MG/DL (ref 6–20)
CALCIUM SERPL-MCNC: 9.3 MG/DL (ref 8.6–10)
CHLORIDE SERPL-SCNC: 106 MMOL/L (ref 98–107)
CREAT SERPL-MCNC: 0.85 MG/DL (ref 0.51–0.95)
DEPRECATED HCO3 PLAS-SCNC: 26 MMOL/L (ref 22–29)
EGFRCR SERPLBLD CKD-EPI 2021: 85 ML/MIN/1.73M2
EOSINOPHIL # BLD AUTO: 0.1 10E3/UL (ref 0–0.7)
EOSINOPHIL NFR BLD AUTO: 2 %
ERYTHROCYTE [DISTWIDTH] IN BLOOD BY AUTOMATED COUNT: 11.7 % (ref 10–15)
GLUCOSE SERPL-MCNC: 101 MG/DL (ref 70–99)
HCT VFR BLD AUTO: 34.8 % (ref 35–47)
HGB BLD-MCNC: 11.5 G/DL (ref 11.7–15.7)
IMM GRANULOCYTES # BLD: 0 10E3/UL
IMM GRANULOCYTES NFR BLD: 0 %
LYMPHOCYTES # BLD AUTO: 0.6 10E3/UL (ref 0.8–5.3)
LYMPHOCYTES NFR BLD AUTO: 19 %
MCH RBC QN AUTO: 33.8 PG (ref 26.5–33)
MCHC RBC AUTO-ENTMCNC: 33 G/DL (ref 31.5–36.5)
MCV RBC AUTO: 102 FL (ref 78–100)
MONOCYTES # BLD AUTO: 0.2 10E3/UL (ref 0–1.3)
MONOCYTES NFR BLD AUTO: 7 %
NEUTROPHILS # BLD AUTO: 2.2 10E3/UL (ref 1.6–8.3)
NEUTROPHILS NFR BLD AUTO: 71 %
NRBC # BLD AUTO: 0 10E3/UL
NRBC BLD AUTO-RTO: 0 /100
PLATELET # BLD AUTO: 194 10E3/UL (ref 150–450)
POTASSIUM SERPL-SCNC: 3.7 MMOL/L (ref 3.4–5.3)
PROT SERPL-MCNC: 7.5 G/DL (ref 6.4–8.3)
RBC # BLD AUTO: 3.4 10E6/UL (ref 3.8–5.2)
SODIUM SERPL-SCNC: 141 MMOL/L (ref 135–145)
WBC # BLD AUTO: 3.1 10E3/UL (ref 4–11)

## 2024-05-31 PROCEDURE — 96365 THER/PROPH/DIAG IV INF INIT: CPT

## 2024-05-31 PROCEDURE — 36415 COLL VENOUS BLD VENIPUNCTURE: CPT

## 2024-05-31 PROCEDURE — 77066 DX MAMMO INCL CAD BI: CPT

## 2024-05-31 PROCEDURE — 85025 COMPLETE CBC W/AUTO DIFF WBC: CPT

## 2024-05-31 PROCEDURE — 96402 CHEMO HORMON ANTINEOPL SQ/IM: CPT

## 2024-05-31 PROCEDURE — 250N000011 HC RX IP 250 OP 636: Mod: JZ | Performed by: PHYSICIAN ASSISTANT

## 2024-05-31 PROCEDURE — G0279 TOMOSYNTHESIS, MAMMO: HCPCS

## 2024-05-31 PROCEDURE — 80053 COMPREHEN METABOLIC PANEL: CPT

## 2024-05-31 PROCEDURE — 258N000003 HC RX IP 258 OP 636: Performed by: PHYSICIAN ASSISTANT

## 2024-05-31 RX ORDER — ZOLEDRONIC ACID 0.04 MG/ML
4 INJECTION, SOLUTION INTRAVENOUS ONCE
Status: COMPLETED | OUTPATIENT
Start: 2024-05-31 | End: 2024-05-31

## 2024-05-31 RX ADMIN — ZOLEDRONIC ACID 4 MG: 0.04 INJECTION, SOLUTION INTRAVENOUS at 11:31

## 2024-05-31 RX ADMIN — SODIUM CHLORIDE 250 ML: 9 INJECTION, SOLUTION INTRAVENOUS at 11:31

## 2024-05-31 RX ADMIN — GOSERELIN ACETATE 3.6 MG: 3.6 IMPLANT SUBCUTANEOUS at 11:52

## 2024-05-31 ASSESSMENT — PAIN SCALES - GENERAL: PAINLEVEL: NO PAIN (0)

## 2024-05-31 NOTE — NURSING NOTE
Chief Complaint   Patient presents with    Infusion     Zometa and Zoladex    Blood Draw     Labs drawn via piv placement by RN in lab. VS taken.      Labs drawn from PIV placed by RN. Line flushed with saline. Vitals taken. Pt checked in for appointment(s).    Leela Kumar RN

## 2024-05-31 NOTE — PROGRESS NOTES
Infusion Nursing Note:  Tori Steele presents today for Zometa and Zoladex.    Patient seen by provider today: No   present during visit today: Not Applicable.    Note:  Patient arrives to infusion feeling well.  Offers no new concerns at this time.    5/31/24 1100 per written communication from Milka Mota PA-C; give Zometa and Zoladex today - Eduard Carranza RN    This is supposed to be patient's last Zoladex injection, as she is scheduled to have her ovaries removed in about 3 weeks.  She is looking forward to not needing these injections anymore.      Intravenous Access:  Peripheral IV placed in lab today.    Treatment Conditions:  Lab Results   Component Value Date    HGB 11.5 (L) 05/31/2024    WBC 3.1 (L) 05/31/2024    ANEU 16.5 (H) 06/13/2022    ANEUTAUTO 2.2 05/31/2024     05/31/2024        Lab Results   Component Value Date     05/31/2024    POTASSIUM 3.7 05/31/2024    MAG 1.7 (L) 02/07/2022    CR 0.85 05/31/2024    JENNIE 9.3 05/31/2024    BILITOTAL 0.6 05/31/2024    ALBUMIN 4.2 05/31/2024    ALT 15 05/31/2024    AST 19 05/31/2024       Results reviewed, labs MET treatment parameters, ok to proceed with treatment.      Post Infusion Assessment:  Patient tolerated infusion without incident.  Patient tolerated injection without incident.  Zoladex administered subcutaneously into the LEFT side of patient's abdomen.  Patient iced prior to Zoladex for comfort.  Blood return noted pre and post infusion.  Site patent and intact, free from redness, edema or discomfort.  No evidence of extravasations.  Access discontinued per protocol.       Discharge Plan:   Patient declined prescription refills.  Discharge instructions reviewed with: Patient.  Patient and/or family verbalized understanding of discharge instructions and all questions answered.  AVS to patient via [employer] [place].  Patient will return 6/28/24 for next provider appointment.   Patient discharged in stable condition accompanied  by: self.  Departure Mode: Ambulatory.      VIRGIL BURNHAM RN

## 2024-05-31 NOTE — PATIENT INSTRUCTIONS
Contact Numbers    [facility] [place] [place] Main Line (for Scheduling/Triage/After Hours Nurse Line): [phone]    Please call the [facility] nurse triage or the after hours nurse line if you experience a temperature greater than or equal to 100.5, shaking chills, have uncontrolled nausea, vomiting and/or diarrhea, dizziness, lightheadedness, shortness of breath, chest pain, bleeding, unexplained bruising, or if you have any other new/concerning symptoms, questions or concerns.     If you are having any concerning symptoms or wish to speak to a provider before your next infusion visit, please call triage to notify them so we can adequately serve you.     If you need any refills on medications (narcotics or other medications), please call before your infusion appointment.      May 2024      Luis F Monday Tuesday Wednesday Thursday Friday Saturday                  1     2    LAB PERIPHERAL  11:00 AM   (15 min.)   [facility] LAB DRAW   [facility]    ONC INFUSION 0.5 HR (30 MIN)  11:30 AM   (30 min.)    ONC INFUSION NURSE   [facility] 3     4       5     6     7     8     9     10     11       12     13     14     15     16     17     18       19     20     21     22     23     24     25       26     27     28     29     30     31    LAB PERIPHERAL  10:00 AM   (15 min.)   [facility] LAB DRAW   [facility]    ONC INFUSION 1 HR (60 MIN)  10:30 AM   (60 min.)    ONC INFUSION NURSE   [facility]    MA DIAGNOSTIC DIGITAL BILAT  10:45 AM   (30 min.)   [email or web address]   [facility] [place]                  June 2024 Sunday Monday Tuesday Wednesday Thursday Friday Saturday                                 1       2     3     4     5     6     7     8       9     10    PRE-OPERATIVE   7:40 AM   (30 min.)   Clarissa Murray CNP   [facility] 11     12     13     14     15        16     17    SALPINGO-OOPHORECTOMY, LAPAROSCOPIC   9:15 AM   Salina Del Castillo MD   [facility] 18     19     20     21     22       23     24     25     26     27    RETURN CCSL   1:35 PM   (20 min.)   Salina Del Castillo MD   [facility] 28    LAB PERIPHERAL  11:00 AM   (15 min.)   [facility] LAB DRAW   [facility]    RETURN CCSL  11:15 AM   (30 min.)   Keegan Lockett MD   [facility]    ONC INFUSION 0.5 HR (30 MIN)  12:00 PM   (30 min.)    ONC INFUSION NURSE   [facility] 29       30                                                  Recent Results (from the past 24 hour(s))   Comprehensive metabolic panel    Collection Time: 05/31/24 10:27 AM   Result Value Ref Range    Sodium 141 135 - 145 mmol/L    Potassium 3.7 3.4 - 5.3 mmol/L    Carbon Dioxide (CO2) 26 22 - 29 mmol/L    Anion Gap 9 7 - 15 mmol/L    Urea Nitrogen 10.8 6.0 - 20.0 mg/dL    Creatinine 0.85 0.51 - 0.95 mg/dL    GFR Estimate 85 >60 mL/min/1.73m2    Calcium 9.3 8.6 - 10.0 mg/dL    Chloride 106 98 - 107 mmol/L    Glucose 101 (H) 70 - 99 mg/dL    Alkaline Phosphatase 64 40 - 150 U/L    AST 19 0 - 45 U/L    ALT 15 0 - 50 U/L    Protein Total 7.5 6.4 - 8.3 g/dL    Albumin 4.2 3.5 - 5.2 g/dL    Bilirubin Total 0.6 <=1.2 mg/dL   CBC with platelets and differential    Collection Time: 05/31/24 10:27 AM   Result Value Ref Range    WBC Count 3.1 (L) 4.0 - 11.0 10e3/uL    RBC Count 3.40 (L) 3.80 - 5.20 10e6/uL    Hemoglobin 11.5 (L) 11.7 - 15.7 g/dL    Hematocrit 34.8 (L) 35.0 - 47.0 %     (H) 78 - 100 fL    MCH 33.8 (H) 26.5 - 33.0 pg    MCHC 33.0 31.5 - 36.5 g/dL    RDW 11.7 10.0 - 15.0 %    Platelet Count 194 150 - 450 10e3/uL    % Neutrophils 71 %    % Lymphocytes 19 %    % Monocytes 7 %    % Eosinophils 2 %    % Basophils 1 %    % Immature Granulocytes 0 %    NRBCs per 100 WBC 0 <1 /100    Absolute Neutrophils 2.2 1.6 - 8.3 10e3/uL    Absolute  Lymphocytes 0.6 (L) 0.8 - 5.3 10e3/uL    Absolute Monocytes 0.2 0.0 - 1.3 10e3/uL    Absolute Eosinophils 0.1 0.0 - 0.7 10e3/uL    Absolute Basophils 0.0 0.0 - 0.2 10e3/uL    Absolute Immature Granulocytes 0.0 <=0.4 10e3/uL    Absolute NRBCs 0.0 10e3/uL

## 2024-06-07 ENCOUNTER — OFFICE VISIT (OUTPATIENT)
Dept: FAMILY MEDICINE | Facility: CLINIC | Age: 49
End: 2024-06-07
Payer: OTHER GOVERNMENT

## 2024-06-07 VITALS
HEART RATE: 95 BPM | OXYGEN SATURATION: 98 % | HEIGHT: 64 IN | SYSTOLIC BLOOD PRESSURE: 112 MMHG | TEMPERATURE: 98.3 F | BODY MASS INDEX: 25.22 KG/M2 | RESPIRATION RATE: 14 BRPM | DIASTOLIC BLOOD PRESSURE: 66 MMHG | WEIGHT: 147.7 LBS

## 2024-06-07 DIAGNOSIS — Z17.0 MALIGNANT NEOPLASM OF CENTRAL PORTION OF RIGHT BREAST IN FEMALE, ESTROGEN RECEPTOR POSITIVE (H): ICD-10-CM

## 2024-06-07 DIAGNOSIS — C50.111 MALIGNANT NEOPLASM OF CENTRAL PORTION OF RIGHT BREAST IN FEMALE, ESTROGEN RECEPTOR POSITIVE (H): ICD-10-CM

## 2024-06-07 DIAGNOSIS — Z01.818 PREOP GENERAL PHYSICAL EXAM: Primary | ICD-10-CM

## 2024-06-07 PROCEDURE — 99213 OFFICE O/P EST LOW 20 MIN: CPT | Performed by: NURSE PRACTITIONER

## 2024-06-07 ASSESSMENT — PAIN SCALES - GENERAL: PAINLEVEL: NO PAIN (0)

## 2024-06-07 NOTE — PROGRESS NOTES
Preoperative Evaluation  [facility]  [address] [facility] [address]  [place]  [facility] [address]  Phone: [phone]  Fax: [phone]  Primary Provider: Clarissa Murray CNP  Pre-op Performing Provider: Clarissa Murray CNP  Jun 7, 2024             6/3/2024   Surgical Information   What procedure is being done? Salpingo-Oophorectomy laparoscopic bilateral   Facility or Hospital where procedure/surgery will be performed: [facility]   Who is doing the procedure / surgery? Dr Salina Del Castillo   Date of surgery / procedure: June 17, 2024   Time of surgery / procedure: Not determined   Where do you plan to recover after surgery? at home with family     Fax number for surgical facility: Note does not need to be faxed, will be available electronically in Epic.      Hank Valle is a 48 year old, presenting for the following:  Pre-Op Exam (Pre-Op 06/17/2024, [facility] Dr. Del Castillo.  Salpingo-oopherectomy Laparoscopic Bilateral. )          6/7/2024     7:52 AM   Additional Questions   Roomed by  LPN     HPI related to upcoming procedure: having ovaries removed as part of breast cancer treatment plan.         6/3/2024   Pre-Op Questionnaire   Have you ever had a heart attack or stroke? No   Have you ever had surgery on your heart or blood vessels, such as a stent placement, a coronary artery bypass, or surgery on an artery in your head, neck, heart, or legs? No   Do you have chest pain with activity? No   Do you have a history of heart failure? No   Do you currently have a cold, bronchitis or symptoms of other infection? No   Do you have a cough, shortness of breath, or wheezing? No   Do you or anyone in your family have previous history of blood clots? No   Do you or does anyone in your family have a serious bleeding problem such as prolonged bleeding following surgeries or cuts? No   Have you ever had problems with anemia or been told to take iron pills? (!)  YES Hx anemia   Have you had any abnormal blood loss such as black, tarry or bloody stools, or abnormal vaginal bleeding? No   Have you ever had a blood transfusion? No   Are you willing to have a blood transfusion if it is medically needed before, during, or after your surgery? Yes   Have you or any of your relatives ever had problems with anesthesia? No   Do you have sleep apnea, excessive snoring or daytime drowsiness? No   Do you have any artifical heart valves or other implanted medical devices like a pacemaker, defibrillator, or continuous glucose monitor? No   Do you have artificial joints? No   Are you allergic to latex? No     Health Care Directive  Patient does not have a Health Care Directive or Living Will: Discussed advance care planning with patient; however, patient declined at this time.    Preoperative Review of    reviewed - no record of controlled substances prescribed.      Patient Active Problem List    Diagnosis Date Noted    Malignant neoplasm of central portion of right breast in female, estrogen receptor positive (H) 08/23/2022     Priority: Medium     Added automatically from request for surgery [id number]      Neutropenia (H24) 04/11/2022     Priority: Medium    Invasive ductal carcinoma of right breast (H) 01/28/2022     Priority: Medium     Check 12.23 for follow-up LR      Dyspnea on exertion 02/26/2018     Priority: Medium    Iron deficiency 04/25/2017     Priority: Medium     2/24/17: HGB: 10.4      Menorrhagia with irregular cycle 04/25/2017     Priority: Medium     4/25/2017: start Kariva      Routine physical examination 04/25/2017     Priority: Medium     Last physical: 4/25/2017   Last pap smear: 4/25/2017:                (can do every 3 yrs)   Last TDAP: 2013   Last Lipid: 2015 normal   Last TSH: 2017 normal   Last FBS:   Last mammogram:  Last Colonoscopy:  Last bone density scan:      Vitamin D deficiency 04/25/2017     Priority: Medium      Past Medical History:   Diagnosis  Date    Breast cancer (H) 01/2022    Sinus tachycardia      Past Surgical History:   Procedure Laterality Date    BIOPSY      DILATION AND CURETTAGE      INSERT PORT VASCULAR ACCESS Left 02/18/2022    Procedure: INSERTION, VASCULAR ACCESS PORT;  Surgeon: Elliott Ramirez PA-C;  Location: [facility] OR    IR CHEST PORT PLACEMENT > 5 YRS OF AGE  02/18/2022    IR PORT REMOVAL LEFT  04/11/2023    LUMPECTOMY BREAST Right 08/29/2022    Procedure: Re-excision of right lumpectomy site;  Surgeon: Gurpreet Layton MD;  Location: [facility] OR    LUMPECTOMY BREAST WITH SENTINEL NODE, COMBINED Right 08/08/2022    Procedure: RIGHT SEED LOCALIZED BREAST LUMPECTOMY  WITH SENTINEL LYMPH NODE BIOPSY;  Surgeon: Gurpreet Layton MD;  Location: [facility] OR    REMOVE PORT VASCULAR ACCESS Left 04/11/2023    Procedure: Remove port vascular access left;  Surgeon: Shilpi Andujar MD;  Location: [facility] OR     Current Outpatient Medications   Medication Sig Dispense Refill    abemaciclib (VERZENIO) 150 MG tablet Take 1 tablet (150 mg) by mouth 2 times daily for 28 days 56 tablet 0    cyclobenzaprine (FLEXERIL) 5 MG tablet Take 1-2 at bedtime as needed for muscle spasms 60 tablet 3    letrozole (FEMARA) 2.5 MG tablet Take 1 tablet (2.5 mg) by mouth daily 90 tablet 3    multivitamin, therapeutic (THERA-VIT) TABS tablet Take 1 tablet by mouth daily         No Known Allergies     Social History     Tobacco Use    Smoking status: Never     Passive exposure: Never    Smokeless tobacco: Never   Substance Use Topics    Alcohol use: Yes     Comment: Occasionally 1-4 a month     Family History   Problem Relation Age of Onset    Hypertension Mother     Thyroid Disease Mother     No Known Problems Father     No Known Problems Brother     Breast Cancer Maternal Grandmother         Dx in her 70's    Osteoporosis Maternal Grandmother     Esophageal Cancer Maternal Grandfather     Anesthesia Reaction No family hx of     Deep Vein Thrombosis (DVT) No family hx of   "    History   Drug Use Unknown             Review of Systems  CONSTITUTIONAL: NEGATIVE for fever, chills, change in weight  INTEGUMENTARY/SKIN: NEGATIVE for worrisome rashes, moles or lesions  EYES: NEGATIVE for vision changes or irritation  ENT/MOUTH: NEGATIVE for ear, mouth and throat problems  RESP: NEGATIVE for significant cough or SOB  BREAST: NEGATIVE for masses, tenderness or discharge  CV: NEGATIVE for chest pain, palpitations or peripheral edema  GI: NEGATIVE for nausea, abdominal pain, heartburn, or change in bowel habits  : NEGATIVE for frequency, dysuria, or hematuria  MUSCULOSKELETAL: NEGATIVE for significant arthralgias or myalgia  NEURO: NEGATIVE for weakness, dizziness or paresthesias  ENDOCRINE: NEGATIVE for temperature intolerance, skin/hair changes  HEME: NEGATIVE for bleeding problems  PSYCHIATRIC: NEGATIVE for changes in mood or affect    Objective    /66 (BP Location: Left arm, Patient Position: Sitting, Cuff Size: Adult Regular)   Pulse 95   Temp 98.3  F (36.8  C) (Oral)   Resp 14   Ht 1.619 m (5' 3.75\")   Wt 67 kg (147 lb 11.2 oz)   SpO2 98%   BMI 25.55 kg/m     Estimated body mass index is 25.55 kg/m  as calculated from the following:    Height as of this encounter: 1.619 m (5' 3.75\").    Weight as of this encounter: 67 kg (147 lb 11.2 oz).  Physical Exam  GENERAL: alert and no distress  EYES: Eyes grossly normal to inspection, PERRL and conjunctivae and sclerae normal  HEENT: ear canals and TM's normal, nose and mouth without ulcers or lesions  NECK: no adenopathy, no asymmetry, masses, or scars  RESP: lungs clear to auscultation - no rales, rhonchi or wheezes  CV: regular rate and rhythm, normal S1 S2, no S3 or S4, no murmur, click or rub, no peripheral edema  ABDOMEN: soft, nontender, no hepatosplenomegaly, no masses and bowel sounds normal  MS: no gross musculoskeletal defects noted, no edema  SKIN: no suspicious lesions or rashes  NEURO: Normal strength and tone, " mentation intact and speech normal  PSYCH: mentation appears normal, affect normal/bright    Recent Labs   Lab Test 05/31/24  1027 05/02/24  1125 08/21/23  1022 07/24/23  1130   HGB 11.5* 11.3*   < > 11.8    178   < > 178    140   < > 139   POTASSIUM 3.7 3.5   < > 3.9   CR 0.85 0.85   < > 0.83   A1C  --   --   --  5.5    < > = values in this interval not displayed.        Diagnostics  No labs were ordered during this visit.   No EKG required, no history of coronary heart disease, significant arrhythmia, peripheral arterial disease or other structural heart disease.    Revised Cardiac Risk Index (RCRI)  The patient has the following serious cardiovascular risks for perioperative complications:   - No serious cardiac risks = 0 points     RCRI Interpretation: 1 point: Class II (low risk - 0.9% complication rate)     A/P:  1. Preop general physical exam  Cleared for surgery    2. Malignant neoplasm of central portion of right breast in female, estrogen receptor positive (H)  Surgery as scheduled for Salpingo-Oophorectomy laparoscopic bilateral      Signed Electronically by: Clarissa Murray CNP  Copy of this evaluation report is provided to requesting physician.

## 2024-06-07 NOTE — PATIENT INSTRUCTIONS

## 2024-06-15 ENCOUNTER — ANESTHESIA EVENT (OUTPATIENT)
Dept: SURGERY | Facility: CLINIC | Age: 49
End: 2024-06-15
Payer: OTHER GOVERNMENT

## 2024-06-16 NOTE — ANESTHESIA PREPROCEDURE EVALUATION
Anesthesia Pre-Procedure Evaluation    Patient: Tori Steele   MRN: [id number] : 1975        Procedure : Procedure(s):  SALPINGO-OOPHORECTOMY, LAPAROSCOPIC BILATERAL          Past Medical History:   Diagnosis Date    Breast cancer (H) 2022    Sinus tachycardia       Past Surgical History:   Procedure Laterality Date    BIOPSY      DILATION AND CURETTAGE      INSERT PORT VASCULAR ACCESS Left 2022    Procedure: INSERTION, VASCULAR ACCESS PORT;  Surgeon: Elliott Ramirez PA-C;  Location: [facility] OR    IR CHEST PORT PLACEMENT > 5 YRS OF AGE  2022    IR PORT REMOVAL LEFT  2023    LUMPECTOMY BREAST Right 2022    Procedure: Re-excision of right lumpectomy site;  Surgeon: Gurpreet Layton MD;  Location: [facility] OR    LUMPECTOMY BREAST WITH SENTINEL NODE, COMBINED Right 2022    Procedure: RIGHT SEED LOCALIZED BREAST LUMPECTOMY  WITH SENTINEL LYMPH NODE BIOPSY;  Surgeon: Gurpreet Layton MD;  Location: [facility] OR    REMOVE PORT VASCULAR ACCESS Left 2023    Procedure: Remove port vascular access left;  Surgeon: Shilpi Andujar MD;  Location: [facility] OR      No Known Allergies   Social History     Tobacco Use    Smoking status: Never     Passive exposure: Never    Smokeless tobacco: Never   Substance Use Topics    Alcohol use: Yes     Comment: Occasionally 1-4 a month      Wt Readings from Last 1 Encounters:   24 67 kg (147 lb 11.2 oz)        Anesthesia Evaluation   Pt has had prior anesthetic.     No history of anesthetic complications       ROS/MED HX  ENT/Pulmonary:    (-) tobacco use and asthma   Neurologic:    (-) no seizures and no CVA   Cardiovascular:  - neg cardiovascular ROS     METS/Exercise Tolerance:     Hematologic:     (+)      anemia,          Musculoskeletal:  - neg musculoskeletal ROS     GI/Hepatic:    (-) GERD   Renal/Genitourinary:    (-) renal disease   Endo:    (-) Type II DM and obesity   Psychiatric/Substance Use:       Infectious Disease:       Malignancy:    (+) Malignancy, History of Breast.    Other: Comment: Negative HCG today    (-) Any chance pregnant       Physical Exam    Airway        Mallampati: II   TM distance: > 3 FB   Neck ROM: full   Mouth opening: > 3 cm    Respiratory Devices and Support         Dental       (+) Minor Abnormalities - some fillings, tiny chips      Cardiovascular          Rhythm and rate: regular and normal     Pulmonary           breath sounds clear to auscultation           OUTSIDE LABS:  CBC:   Lab Results   Component Value Date    WBC 3.1 (L) 05/31/2024    WBC 2.7 (L) 05/02/2024    HGB 11.5 (L) 05/31/2024    HGB 11.3 (L) 05/02/2024    HCT 34.8 (L) 05/31/2024    HCT 34.9 (L) 05/02/2024     05/31/2024     05/02/2024     BMP:   Lab Results   Component Value Date     05/31/2024     05/02/2024    POTASSIUM 3.7 05/31/2024    POTASSIUM 3.5 05/02/2024    CHLORIDE 106 05/31/2024    CHLORIDE 105 05/02/2024    CO2 26 05/31/2024    CO2 26 05/02/2024    BUN 10.8 05/31/2024    BUN 11.5 05/02/2024    CR 0.85 05/31/2024    CR 0.85 05/02/2024     (H) 05/31/2024     (H) 05/02/2024     COAGS:   Lab Results   Component Value Date    PTT 31 02/04/2022    INR 0.98 04/11/2023     POC:   Lab Results   Component Value Date    HCG Negative 08/29/2022    HCGS Negative 02/04/2022     HEPATIC:   Lab Results   Component Value Date    ALBUMIN 4.2 05/31/2024    PROTTOTAL 7.5 05/31/2024    ALT 15 05/31/2024    AST 19 05/31/2024    GGT 12 02/04/2022    ALKPHOS 64 05/31/2024    BILITOTAL 0.6 05/31/2024     OTHER:   Lab Results   Component Value Date    A1C 5.5 07/24/2023    JENNIE 9.3 05/31/2024    MAG 1.7 (L) 02/07/2022    LIPASE 57 (H) 05/24/2022    TSH 1.52 09/09/2022    T4 1.12 07/29/2022       Anesthesia Plan    ASA Status:  2    NPO Status:  NPO Appropriate    Anesthesia Type: General.     - Airway: ETT   Induction: Intravenous, Propofol.   Maintenance: Balanced.   Techniques and Equipment:     - Lines/Monitors: BIS, 2nd IV  "    Consents    Anesthesia Plan(s) and associated risks, benefits, and realistic alternatives discussed. Questions answered and patient/representative(s) expressed understanding.     - Discussed:     - Discussed with:  Patient      - Extended Intubation/Ventilatory Support Discussed: No.      - Patient is DNR/DNI Status: No     Use of blood products discussed: Yes.     - Discussed with: Patient.     - Consented: consented to blood products     Postoperative Care    Pain management: IV analgesics, Multi-modal analgesia, Oral pain medications.   PONV prophylaxis: Ondansetron (or other 5HT-3), Dexamethasone or Solumedrol     Comments:               CLAUDE CHENEY MD    I have reviewed the pertinent notes and labs in the chart from the past 30 days and (re)examined the patient.  Any updates or changes from those notes are reflected in this note.              # Overweight: Estimated body mass index is 25.55 kg/m  as calculated from the following:    Height as of 6/7/24: 1.619 m (5' 3.75\").    Weight as of 6/7/24: 67 kg (147 lb 11.2 oz).      "

## 2024-06-17 ENCOUNTER — HOSPITAL ENCOUNTER (OUTPATIENT)
Facility: CLINIC | Age: 49
Discharge: HOME | End: 2024-06-17
Attending: OBSTETRICS & GYNECOLOGY | Admitting: OBSTETRICS & GYNECOLOGY
Payer: OTHER GOVERNMENT

## 2024-06-17 ENCOUNTER — ANESTHESIA (OUTPATIENT)
Dept: SURGERY | Facility: CLINIC | Age: 49
End: 2024-06-17
Payer: OTHER GOVERNMENT

## 2024-06-17 VITALS
BODY MASS INDEX: 25.94 KG/M2 | WEIGHT: 146.39 LBS | DIASTOLIC BLOOD PRESSURE: 66 MMHG | HEIGHT: 63 IN | SYSTOLIC BLOOD PRESSURE: 116 MMHG | RESPIRATION RATE: 16 BRPM | HEART RATE: 99 BPM | TEMPERATURE: 98.1 F | OXYGEN SATURATION: 97 %

## 2024-06-17 DIAGNOSIS — Z98.890 POSTOPERATIVE STATE: Primary | ICD-10-CM

## 2024-06-17 DIAGNOSIS — C50.911 INVASIVE DUCTAL CARCINOMA OF RIGHT BREAST (H): Primary | ICD-10-CM

## 2024-06-17 LAB
ABO/RH(D): NORMAL
ANTIBODY SCREEN: NEGATIVE
ERYTHROCYTE [DISTWIDTH] IN BLOOD BY AUTOMATED COUNT: 11.7 % (ref 10–15)
HCG UR QL: NEGATIVE
HCT VFR BLD AUTO: 34.7 % (ref 35–47)
HGB BLD-MCNC: 11.4 G/DL (ref 11.7–15.7)
HOLD SPECIMEN: NORMAL
MCH RBC QN AUTO: 34.7 PG (ref 26.5–33)
MCHC RBC AUTO-ENTMCNC: 32.9 G/DL (ref 31.5–36.5)
MCV RBC AUTO: 106 FL (ref 78–100)
PLATELET # BLD AUTO: 195 10E3/UL (ref 150–450)
RBC # BLD AUTO: 3.29 10E6/UL (ref 3.8–5.2)
SPECIMEN EXPIRATION DATE: NORMAL
WBC # BLD AUTO: 3 10E3/UL (ref 4–11)

## 2024-06-17 PROCEDURE — 250N000011 HC RX IP 250 OP 636: Performed by: OBSTETRICS & GYNECOLOGY

## 2024-06-17 PROCEDURE — 710N000010 HC RECOVERY PHASE 1, LEVEL 2, PER MIN: Performed by: OBSTETRICS & GYNECOLOGY

## 2024-06-17 PROCEDURE — 999N000141 HC STATISTIC PRE-PROCEDURE NURSING ASSESSMENT: Performed by: OBSTETRICS & GYNECOLOGY

## 2024-06-17 PROCEDURE — 58661 LAPAROSCOPY REMOVE ADNEXA: CPT | Performed by: STUDENT IN AN ORGANIZED HEALTH CARE EDUCATION/TRAINING PROGRAM

## 2024-06-17 PROCEDURE — 250N000011 HC RX IP 250 OP 636: Mod: JZ | Performed by: NURSE ANESTHETIST, CERTIFIED REGISTERED

## 2024-06-17 PROCEDURE — 370N000017 HC ANESTHESIA TECHNICAL FEE, PER MIN: Performed by: OBSTETRICS & GYNECOLOGY

## 2024-06-17 PROCEDURE — 250N000009 HC RX 250: Performed by: NURSE ANESTHETIST, CERTIFIED REGISTERED

## 2024-06-17 PROCEDURE — 250N000011 HC RX IP 250 OP 636: Performed by: NURSE ANESTHETIST, CERTIFIED REGISTERED

## 2024-06-17 PROCEDURE — 58661 LAPAROSCOPY REMOVE ADNEXA: CPT | Performed by: NURSE ANESTHETIST, CERTIFIED REGISTERED

## 2024-06-17 PROCEDURE — 272N000001 HC OR GENERAL SUPPLY STERILE: Performed by: OBSTETRICS & GYNECOLOGY

## 2024-06-17 PROCEDURE — 36415 COLL VENOUS BLD VENIPUNCTURE: CPT | Performed by: OBSTETRICS & GYNECOLOGY

## 2024-06-17 PROCEDURE — 710N000012 HC RECOVERY PHASE 2, PER MINUTE: Performed by: OBSTETRICS & GYNECOLOGY

## 2024-06-17 PROCEDURE — 360N000076 HC SURGERY LEVEL 3, PER MIN: Performed by: OBSTETRICS & GYNECOLOGY

## 2024-06-17 PROCEDURE — 85027 COMPLETE CBC AUTOMATED: CPT | Performed by: OBSTETRICS & GYNECOLOGY

## 2024-06-17 PROCEDURE — 86900 BLOOD TYPING SEROLOGIC ABO: CPT | Performed by: OBSTETRICS & GYNECOLOGY

## 2024-06-17 PROCEDURE — 88305 TISSUE EXAM BY PATHOLOGIST: CPT | Mod: TC | Performed by: OBSTETRICS & GYNECOLOGY

## 2024-06-17 PROCEDURE — 250N000013 HC RX MED GY IP 250 OP 250 PS 637: Performed by: ANESTHESIOLOGY

## 2024-06-17 PROCEDURE — 58661 LAPAROSCOPY REMOVE ADNEXA: CPT | Mod: 50 | Performed by: SURGERY

## 2024-06-17 PROCEDURE — 88305 TISSUE EXAM BY PATHOLOGIST: CPT | Mod: 26 | Performed by: PATHOLOGY

## 2024-06-17 PROCEDURE — 81025 URINE PREGNANCY TEST: CPT | Performed by: OBSTETRICS & GYNECOLOGY

## 2024-06-17 RX ORDER — SODIUM CHLORIDE, SODIUM LACTATE, POTASSIUM CHLORIDE, CALCIUM CHLORIDE 600; 310; 30; 20 MG/100ML; MG/100ML; MG/100ML; MG/100ML
INJECTION, SOLUTION INTRAVENOUS CONTINUOUS
Status: DISCONTINUED | OUTPATIENT
Start: 2024-06-17 | End: 2024-06-17 | Stop reason: HOSPADM

## 2024-06-17 RX ORDER — NALOXONE HYDROCHLORIDE 0.4 MG/ML
0.1 INJECTION, SOLUTION INTRAMUSCULAR; INTRAVENOUS; SUBCUTANEOUS
Status: DISCONTINUED | OUTPATIENT
Start: 2024-06-17 | End: 2024-06-17 | Stop reason: HOSPADM

## 2024-06-17 RX ORDER — ONDANSETRON 4 MG/1
4 TABLET, ORALLY DISINTEGRATING ORAL EVERY 30 MIN PRN
Status: DISCONTINUED | OUTPATIENT
Start: 2024-06-17 | End: 2024-06-17 | Stop reason: HOSPADM

## 2024-06-17 RX ORDER — HYDROMORPHONE HCL IN WATER/PF 6 MG/30 ML
0.2 PATIENT CONTROLLED ANALGESIA SYRINGE INTRAVENOUS EVERY 5 MIN PRN
Status: DISCONTINUED | OUTPATIENT
Start: 2024-06-17 | End: 2024-06-17 | Stop reason: HOSPADM

## 2024-06-17 RX ORDER — ACETAMINOPHEN 325 MG/1
975 TABLET ORAL ONCE
Status: COMPLETED | OUTPATIENT
Start: 2024-06-17 | End: 2024-06-17

## 2024-06-17 RX ORDER — DEXAMETHASONE SODIUM PHOSPHATE 4 MG/ML
INJECTION, SOLUTION INTRA-ARTICULAR; INTRALESIONAL; INTRAMUSCULAR; INTRAVENOUS; SOFT TISSUE PRN
Status: DISCONTINUED | OUTPATIENT
Start: 2024-06-17 | End: 2024-06-17

## 2024-06-17 RX ORDER — ONDANSETRON 2 MG/ML
4 INJECTION INTRAMUSCULAR; INTRAVENOUS EVERY 30 MIN PRN
Status: DISCONTINUED | OUTPATIENT
Start: 2024-06-17 | End: 2024-06-17 | Stop reason: HOSPADM

## 2024-06-17 RX ORDER — ACETAMINOPHEN 325 MG/1
650 TABLET ORAL EVERY 6 HOURS PRN
COMMUNITY
Start: 2024-06-17 | End: 2024-09-19

## 2024-06-17 RX ORDER — DEXAMETHASONE SODIUM PHOSPHATE 4 MG/ML
4 INJECTION, SOLUTION INTRA-ARTICULAR; INTRALESIONAL; INTRAMUSCULAR; INTRAVENOUS; SOFT TISSUE
Status: DISCONTINUED | OUTPATIENT
Start: 2024-06-17 | End: 2024-06-17 | Stop reason: HOSPADM

## 2024-06-17 RX ORDER — HEPARIN SODIUM 5000 [USP'U]/.5ML
5000 INJECTION, SOLUTION INTRAVENOUS; SUBCUTANEOUS
Status: COMPLETED | OUTPATIENT
Start: 2024-06-17 | End: 2024-06-17

## 2024-06-17 RX ORDER — SODIUM CHLORIDE, SODIUM GLUCONATE, SODIUM ACETATE, POTASSIUM CHLORIDE AND MAGNESIUM CHLORIDE 526; 502; 368; 37; 30 MG/100ML; MG/100ML; MG/100ML; MG/100ML; MG/100ML
INJECTION, SOLUTION INTRAVENOUS CONTINUOUS PRN
Status: DISCONTINUED | OUTPATIENT
Start: 2024-06-17 | End: 2024-06-17

## 2024-06-17 RX ORDER — IBUPROFEN 200 MG
800 TABLET ORAL ONCE
Status: DISCONTINUED | OUTPATIENT
Start: 2024-06-17 | End: 2024-06-17 | Stop reason: HOSPADM

## 2024-06-17 RX ORDER — BUPIVACAINE HYDROCHLORIDE 5 MG/ML
INJECTION, SOLUTION EPIDURAL; INTRACAUDAL PRN
Status: DISCONTINUED | OUTPATIENT
Start: 2024-06-17 | End: 2024-06-17 | Stop reason: HOSPADM

## 2024-06-17 RX ORDER — AMOXICILLIN 250 MG
1-2 CAPSULE ORAL 2 TIMES DAILY
Qty: 30 TABLET | Refills: 0 | Status: SHIPPED | OUTPATIENT
Start: 2024-06-17

## 2024-06-17 RX ORDER — CEFAZOLIN SODIUM/WATER 2 G/20 ML
2 SYRINGE (ML) INTRAVENOUS
Status: COMPLETED | OUTPATIENT
Start: 2024-06-17 | End: 2024-06-17

## 2024-06-17 RX ORDER — OXYCODONE HYDROCHLORIDE 5 MG/1
5 TABLET ORAL EVERY 6 HOURS PRN
Qty: 6 TABLET | Refills: 0 | Status: SHIPPED | OUTPATIENT
Start: 2024-06-17 | End: 2024-06-20

## 2024-06-17 RX ORDER — IBUPROFEN 600 MG/1
600 TABLET, FILM COATED ORAL EVERY 6 HOURS PRN
COMMUNITY
Start: 2024-06-17 | End: 2024-09-19

## 2024-06-17 RX ORDER — METRONIDAZOLE 500 MG/100ML
500 INJECTION, SOLUTION INTRAVENOUS
Status: COMPLETED | OUTPATIENT
Start: 2024-06-17 | End: 2024-06-17

## 2024-06-17 RX ORDER — CEFAZOLIN SODIUM/WATER 2 G/20 ML
2 SYRINGE (ML) INTRAVENOUS SEE ADMIN INSTRUCTIONS
Status: DISCONTINUED | OUTPATIENT
Start: 2024-06-17 | End: 2024-06-17 | Stop reason: HOSPADM

## 2024-06-17 RX ORDER — PROPOFOL 10 MG/ML
INJECTION, EMULSION INTRAVENOUS PRN
Status: DISCONTINUED | OUTPATIENT
Start: 2024-06-17 | End: 2024-06-17

## 2024-06-17 RX ORDER — FENTANYL CITRATE 50 UG/ML
50 INJECTION, SOLUTION INTRAMUSCULAR; INTRAVENOUS EVERY 5 MIN PRN
Status: DISCONTINUED | OUTPATIENT
Start: 2024-06-17 | End: 2024-06-17 | Stop reason: HOSPADM

## 2024-06-17 RX ORDER — HYDROMORPHONE HCL IN WATER/PF 6 MG/30 ML
0.4 PATIENT CONTROLLED ANALGESIA SYRINGE INTRAVENOUS EVERY 5 MIN PRN
Status: DISCONTINUED | OUTPATIENT
Start: 2024-06-17 | End: 2024-06-17 | Stop reason: HOSPADM

## 2024-06-17 RX ORDER — OXYCODONE HYDROCHLORIDE 5 MG/1
5 TABLET ORAL
Status: DISCONTINUED | OUTPATIENT
Start: 2024-06-17 | End: 2024-06-17 | Stop reason: HOSPADM

## 2024-06-17 RX ORDER — ACETAMINOPHEN 325 MG/1
975 TABLET ORAL ONCE
Status: DISCONTINUED | OUTPATIENT
Start: 2024-06-17 | End: 2024-06-17 | Stop reason: HOSPADM

## 2024-06-17 RX ORDER — LIDOCAINE HYDROCHLORIDE 20 MG/ML
INJECTION, SOLUTION INFILTRATION; PERINEURAL PRN
Status: DISCONTINUED | OUTPATIENT
Start: 2024-06-17 | End: 2024-06-17

## 2024-06-17 RX ORDER — FENTANYL CITRATE 50 UG/ML
INJECTION, SOLUTION INTRAMUSCULAR; INTRAVENOUS PRN
Status: DISCONTINUED | OUTPATIENT
Start: 2024-06-17 | End: 2024-06-17

## 2024-06-17 RX ORDER — PROPOFOL 10 MG/ML
INJECTION, EMULSION INTRAVENOUS CONTINUOUS PRN
Status: DISCONTINUED | OUTPATIENT
Start: 2024-06-17 | End: 2024-06-17

## 2024-06-17 RX ORDER — ONDANSETRON 2 MG/ML
INJECTION INTRAMUSCULAR; INTRAVENOUS PRN
Status: DISCONTINUED | OUTPATIENT
Start: 2024-06-17 | End: 2024-06-17

## 2024-06-17 RX ORDER — FENTANYL CITRATE 50 UG/ML
25 INJECTION, SOLUTION INTRAMUSCULAR; INTRAVENOUS EVERY 5 MIN PRN
Status: DISCONTINUED | OUTPATIENT
Start: 2024-06-17 | End: 2024-06-17 | Stop reason: HOSPADM

## 2024-06-17 RX ADMIN — MIDAZOLAM 2 MG: 1 INJECTION INTRAMUSCULAR; INTRAVENOUS at 09:12

## 2024-06-17 RX ADMIN — Medication 2 G: at 09:25

## 2024-06-17 RX ADMIN — DEXAMETHASONE SODIUM PHOSPHATE 8 MG: 4 INJECTION, SOLUTION INTRA-ARTICULAR; INTRALESIONAL; INTRAMUSCULAR; INTRAVENOUS; SOFT TISSUE at 09:24

## 2024-06-17 RX ADMIN — FENTANYL CITRATE 100 MCG: 50 INJECTION INTRAMUSCULAR; INTRAVENOUS at 09:59

## 2024-06-17 RX ADMIN — HEPARIN SODIUM 5000 UNITS: 5000 INJECTION, SOLUTION INTRAVENOUS; SUBCUTANEOUS at 08:39

## 2024-06-17 RX ADMIN — LIDOCAINE HYDROCHLORIDE 80 MG: 20 INJECTION, SOLUTION INFILTRATION; PERINEURAL at 09:24

## 2024-06-17 RX ADMIN — Medication 40 MG: at 09:26

## 2024-06-17 RX ADMIN — PROPOFOL 100 MCG/KG/MIN: 10 INJECTION, EMULSION INTRAVENOUS at 11:03

## 2024-06-17 RX ADMIN — ONDANSETRON 4 MG: 2 INJECTION INTRAMUSCULAR; INTRAVENOUS at 11:20

## 2024-06-17 RX ADMIN — FENTANYL CITRATE 100 MCG: 50 INJECTION INTRAMUSCULAR; INTRAVENOUS at 09:24

## 2024-06-17 RX ADMIN — Medication 10 MG: at 10:50

## 2024-06-17 RX ADMIN — PROPOFOL 150 MG: 10 INJECTION, EMULSION INTRAVENOUS at 09:24

## 2024-06-17 RX ADMIN — METRONIDAZOLE 500 MG: 500 INJECTION, SOLUTION INTRAVENOUS at 08:39

## 2024-06-17 RX ADMIN — SODIUM CHLORIDE, SODIUM GLUCONATE, SODIUM ACETATE, POTASSIUM CHLORIDE AND MAGNESIUM CHLORIDE: 526; 502; 368; 37; 30 INJECTION, SOLUTION INTRAVENOUS at 09:10

## 2024-06-17 RX ADMIN — ACETAMINOPHEN 975 MG: 325 TABLET, FILM COATED ORAL at 08:39

## 2024-06-17 RX ADMIN — Medication 200 MG: at 11:31

## 2024-06-17 RX ADMIN — PROPOFOL 100 MCG/KG/MIN: 10 INJECTION, EMULSION INTRAVENOUS at 09:24

## 2024-06-17 RX ADMIN — SODIUM CHLORIDE, SODIUM GLUCONATE, SODIUM ACETATE, POTASSIUM CHLORIDE AND MAGNESIUM CHLORIDE: 526; 502; 368; 37; 30 INJECTION, SOLUTION INTRAVENOUS at 10:33

## 2024-06-17 ASSESSMENT — ENCOUNTER SYMPTOMS: SEIZURES: 0

## 2024-06-17 ASSESSMENT — ACTIVITIES OF DAILY LIVING (ADL)
ADLS_ACUITY_SCORE: 31
ADLS_ACUITY_SCORE: 32

## 2024-06-17 ASSESSMENT — LIFESTYLE VARIABLES: TOBACCO_USE: 0

## 2024-06-17 NOTE — BRIEF OP NOTE
[facility]    Brief Operative Note    Pre-operative diagnosis: Invasive ductal carcinoma of right breast (H) [C50.911]  Post-operative diagnosis Same as pre-operative diagnosis    Procedure: SALPINGO-OOPHORECTOMY, LAPAROSCOPIC BILATERAL, LYSIS OF ADHESION, Bilateral - Abdomen    Surgeon: Surgeons and Role:     * Salina Del Castillo MD - Primary     * Camilla Burnham MD - Resident - Assisting     * Margot Duarte MD - Resident - Assisting     * Oscar Shoemaker MD - Fellow - Assisting  Anesthesia: General   Estimated Blood Loss: 10ml  Urine: 300  IVF: 1500 plasmalyte    Drains: None  Specimens:   ID Type Source Tests Collected by Time Destination   1 : Left ovary and fallopian tube Tissue Ovary and Fallopian Tube, Left SURGICAL PATHOLOGY EXAM Salina Del Castillo MD 6/17/2024 10:38 AM    2 : Right ovary and fallopian tube Tissue Ovary and Fallopian Tube, Left SURGICAL PATHOLOGY EXAM Salina Del Castillo MD 6/17/2024 11:06 AM      Findings:   EUA: with normal external genitalia and cervix. Mobile uterus, anteverted. Intraabdominal exam demostrated normal liver edge, diaphragm, stomach, bowel. Fibroid uterus, with two large fundal fibroids, normal fallopian tubes and ovaries bilaterally. Short/wide appendix visualized, per general surgery wnl and did not need to be removed. Pedicles hemostatic at the end of surgery  4 ports closed (R with 12mm, fascia closed).  Complications: None.  Implants: * No implants in log *      Margot Soliman MD  Obstetrics and Gynecology, PGY2  06/17/2024, 11:41 AM

## 2024-06-17 NOTE — OP NOTE
Operative report    Preprocedure diagnosis: Invasive ductal carcinoma of right breast  Postprocedure diagnosis: Same  Procedure: Diagnostic laparoscopy    Surgeon: Dr. Oscar Shoemaker  Assistant: Dr. Camilla Burnham    Anesthesia: General endotracheal anesthesia  estimated blood loss: None from my portion  Drains: None  Complications: None  Specimens: None    Findings: Short appendix of normal diameter that is viable without signs of necrosis or perforation    Description of procedure:  General surgery was called by Dr. Del Castillo intraoperatively due to dissection of the right salpingo oophorectomy in close proximity to the appendix.  Patient was not seen prior to the operating room.  She is a 48-year-old female with a past medical history significant for right breast invasive ductal carcinoma who is currently undergoing a bilateral salpingo-oophorectomy with gynecology.  During this procedure, the right ovary was in close proximity to the appendix for which dissection was performed.  When I came into the operating room, the appendix was visible on the laparoscope.  Myself and Dr. Burnham scrubbed then sterilely and evaluated the appendix laparoscopically.  The appendix appeared to be short but of normal diameter without necrosis or perforation.  There was no drainage from it.  The mesentery appeared to be viable.  Because the patient reportedly did not have symptoms in her right lower quadrant, no additional surgery was done from my standpoint.    The patient is able to follow-up in the general surgery correct clinic postoperatively.  If she has symptoms, then additional imaging would be warranted at that time.  No plans for additional operative intervention from a general surgery standpoint at this time.  I would recommend that the patient receive screening colonoscopy routinely according to her risk profile.    Dr. Oscar Shoemaker  General Surgery

## 2024-06-17 NOTE — PROGRESS NOTES
"Gynecologic Oncology   Postoperative Check  06/17/2024    S:   Patient reports she is doing ok postoperatively. Pain is well controlled. Ambulating without dizziness. Voiding spontaneously. Tolerating crackers without nausea or vomiting. Denies chest pain, shortness of breath, dizziness, or other concerns at this time.     O:  Patient Vitals for the past 24 hrs:   BP Temp Temp src Pulse Resp SpO2 Height Weight   06/17/24 1329 117/77 97.9  F (36.6  C) Oral 99 -- 97 % -- --   06/17/24 1300 117/67 98.2  F (36.8  C) Oral 86 10 98 % -- --   06/17/24 1245 115/82 -- -- 85 15 100 % -- --   06/17/24 1230 110/74 -- -- 73 13 100 % -- --   06/17/24 1215 114/76 -- -- 82 17 100 % -- --   06/17/24 1200 111/72 -- -- 76 16 100 % -- --   06/17/24 1145 111/71 (!) 96.4  F (35.8  C) Axillary 88 16 100 % -- --   06/17/24 0737 130/73 98.3  F (36.8  C) Oral 98 15 99 % 1.6 m (5' 3\") 66.4 kg (146 lb 6.2 oz)   O2 RA    Gen: NAD  Cardio: RRR  Resp: CTAB  Abdomen: soft, appropriately tender. Lap sites CDI     A/P:   48 year old POD#0 s/p BSO. Doing well postoperatively. Appropriate for discharge. Reviewed surgical findings, discharge instructions and precautions of when to call or return to the hospital. She will follow-up post-op with Dr. Del Castillo 6/27/24.    #Postoperative State  - Reviewed intraoperative findings and recommendation for follow up with General Surgery in clinic regarding appendix.   - Hgb 11.4 > EBL 10 ml. VSS. Appropriate surgical blood loss. No signs of ongoing bleeding  - Pain management: PO pain meds PRN  - Regular diet  - Discharge with bowel regimen  - Voiding spontaneously  - Discharge to home    Dispo: To home with postop follow-up  Shrieen Floyd, APRN, DNP, CNP  6/17/2024 2:41 PM      "

## 2024-06-17 NOTE — ANESTHESIA CARE TRANSFER NOTE
Patient: Tori Steele    Procedure: Procedure(s):  SALPINGO-OOPHORECTOMY, LAPAROSCOPIC BILATERAL, LYSIS OF ADHESION       Diagnosis: Invasive ductal carcinoma of right breast (H) [C50.911]  Diagnosis Additional Information: No value filed.    Anesthesia Type:   General     Note:    Oropharynx: oropharynx clear of all foreign objects and spontaneously breathing  Level of Consciousness: drowsy  Oxygen Supplementation: face mask  Level of Supplemental Oxygen (L/min / FiO2): 10  Independent Airway: airway patency satisfactory and stable  Dentition: dentition unchanged  Vital Signs Stable: post-procedure vital signs reviewed and stable  Report to RN Given: handoff report given  Patient transferred to: PACU    Handoff Report: Identifed the Patient, Identified the Reponsible Provider, Reviewed the pertinent medical history, Discussed the surgical course, Reviewed Intra-OP anesthesia mangement and issues during anesthesia, Set expectations for post-procedure period and Allowed opportunity for questions and acknowledgement of understanding      Vitals:  Vitals Value Taken Time   /71 06/17/24 1142   Temp 98.3    Pulse 86 06/17/24 1147   Resp 19 06/17/24 1147   SpO2 100 % 06/17/24 1147   Vitals shown include unfiled device data.    Electronically Signed By: Michelle Sapp CRNA, GENIE MURPHY  June 17, 2024  11:48 AM

## 2024-06-17 NOTE — ANESTHESIA POSTPROCEDURE EVALUATION
Patient: Tori Steele    Procedure: Procedure(s):  SALPINGO-OOPHORECTOMY, LAPAROSCOPIC BILATERAL, LYSIS OF ADHESION       Anesthesia Type:  General    Note:  Disposition: Outpatient   Postop Pain Control: Uneventful            Sign Out: Well controlled pain   PONV: No   Neuro/Psych: Uneventful            Sign Out: Acceptable/Baseline neuro status   Airway/Respiratory: Uneventful            Sign Out: Acceptable/Baseline resp. status   CV/Hemodynamics: Uneventful            Sign Out: Acceptable CV status; No obvious hypovolemia; No obvious fluid overload   Other NRE: NONE   DID A NON-ROUTINE EVENT OCCUR? No           Last vitals:  Vitals Value Taken Time   /82 06/17/24 1245   Temp 35.8  C (96.4  F) 06/17/24 1145   Pulse 97 06/17/24 1249   Resp 19 06/17/24 1249   SpO2 100 % 06/17/24 1249   Vitals shown include unfiled device data.    Electronically Signed By: Xavier Blum MD  June 17, 2024  12:50 PM

## 2024-06-17 NOTE — OP NOTE
[facility]   Gynecologic Operative Note   Name: Tori Steele  MRN: [id number]  : 1975    Date: 2024    Preoperative Diagnosis:   - Invasive ductal carcinoma of the right breast  - risk reducing BSO     Postoperative Diagnosis:        Procedure: Laparoscopic bilateral salpingo oophorectomy, lysis of adhesions    Surgeon: Salina Del Castillo MD     Assistant(s): Margot Soliman MD PGY2    Anesthesia: General endotracheal     Complications: none     EBL: 10 mL   IVF: 1500 mL crystalloid   UOP: 300 mL clear urine     Findings: Normal appearing vulva without lesions. On bimanual exam, mobile anteverted uterus of normal size and closed cervix. On speculum exam, normal appearing parous cervix and normal, pink, well rugated vaginal mucosa. On laparoscopy, no evidence of injury on entry. Normal appearing ovaries, fallopian tubes, bowel, liver, stomach. Fibroid uterus with two large fundal fibroids. Right fallopian tube was adhered to bowel. Fallopian tubes and ovaries fully resected bilaterally and surgical sites hemostatic at end of case.    Indications: Tori Steele is a 48 year old female P2 with right sided ER positive,CA positive HER-2 negative breast cancer, on Zometa for estrogen suppression. She opted for oophorectomy for definitive estrogen suppression with risk reducing salpingectomy. A laparoscopic bilateral salpingo-oophorectomy were recommended at that time. All risks, benefits and alternatives were discussed and written informed consent was obtained.      Procedure: The patient was taken to the operating room where she was placed in the dorsal lithotomy position with feet in yellow fin stirrups. General endotracheal anesthesia was administered by anesthesia. An exam under anesthesia was performed. The patient was then prepped and draped in the usual sterile fashion and morales catheter placed. Attention was then turned to the abdomen where  0.25% bupivicaine was used to infiltrate the inferior aspect of and around the umbilicus. A 5-blade scalpel was used to make a 5 mm horizontal incision inferior to the umbilicus. The Veress needles was placed, and intraperitoneal placement was suggested with the water drop test and an opening pressure of <5 mm Hg. Pneumoperitoneum was achieved with good tympany of the abdomen and intraperitoneal pressure of 15 mmHg. The Veress needle was removed and a 5 mm trocar was placed under direct visualization. The 5 mm scope was placed in the port and visualized the abdomen which was free of any injury. Attention was turned to the RLQ of the abdomen where a site 2 cm superomedial to the anterior superior iliac crest was noted to be free of major blood vessels, 0.25% bupivicaine injected, and a 5 mm horizontal skin incision made. The incision was stretched with a yohana. A 5 mm port was placed under visualization within the abdomen. A 10 mm port was placed in the LLQ of the abdomen with similar technique under visualization.      Inspection of the pelvis with blunt probe showed the above. The left ureter was visualized. The left fallopian tube was grasped and a window was created bluntly through the peritoneum to skeletonize the infundibulopelvic ligament. The curved Ligasure bipolar was used to cauterize and transect the right infundibulopelvic ligament. The ligasure was then used to cauterized and cut through the mesosalpinx and the utero-ovarian vessels were cauterized and cut, fallopian tube was excised at the cornua. And endo-catch bag was placed in the 10 mm port and the ovary and fallopian tube were brought to the abdominal wall and bag was removed. The specimen was handed off for examination in pathology. Good hemostasis of the right adnexa was noted. Bowel was adhered to the right abdominal wall and right fallopian tube. The right fallopian tube was grasped and the bowel was gently dissected away from the adnexa. A  window was created bluntly to skeletonize the infundibulopelvic ligament. The curved Ligasure bipolar was used to cauterize and transect the right infundibulopelvic ligament. The ligasure was then used to cauterized and cut through the mesosalpinx and the utero-ovarian vessels were cauterized and cut, fallopian tube was excised at the cornua. And endo-catch bag was placed in the 10 mm port and the ovary and fallopian tube were brought to the abdominal wall and bag was removed. The specimen was handed off for examination in pathology. Good hemostasis of the right adnexa was noted.The appendix was visualized and noted to be wider and shorter than average, but not overtly inflamed. General surgery was consulted intraoperatively to assess for removal of the appendix. Per their assessment, appendix did not need to be removed. Please see Dr. Shoemaker's full op note.   A final visual sweep of the pelvis showed no further pathology. The ports were removed under direct visualization, and the peritoneum and fascia of the 10 mm port site closed using the Jose-Doyle insert and needle with 0-vicryl. The abdomen was desufflated was expelled. The skin incisions were closed using 4-0 vicryl and dermabond. Instrument, sponge, and needle counts were correct x 2. Dr. Del Castillo was present and scrubbed for the entire procedure. The patient was extubated in the operating room and transferred to the PACU in stable condition.    Margot Soliman MD  [facility]  Gynecology Oncology, PGY2  06/17/2024 11:52 AM    As the primary surgeon, I was scrubbed and present for the full course of the procedure.    Salina Del Castillo M.D.

## 2024-06-17 NOTE — DISCHARGE INSTRUCTIONS
Contacting your Doctor -  To contact a doctor, call Dr. Del Castillo's office @ [phone] (Mon-Fri, 8:00-4:30) or:  [phone] and ask for the resident on call for Gynecology-Oncology (answered 24 hours a day)   Emergency Department:  [facility]: [phone]  [facility]: [phone] 911 if you are in need of immediate or emergent help

## 2024-06-17 NOTE — ANESTHESIA PROCEDURE NOTES
Airway       Patient location during procedure: OR       Procedure Start/Stop Times: 6/17/2024 9:29 AM  Staff -        CRNA: Michelle Sapp APRN CRNA       Performed By: CRNA  Consent for Airway        Urgency: elective  Indications and Patient Condition       Indications for airway management: matthias-procedural       Induction type:intravenous       Mask difficulty assessment: 1 - vent by mask    Final Airway Details       Final airway type: endotracheal airway       Successful airway: ETT - single  Endotracheal Airway Details        ETT size (mm): 7.0       Cuffed: yes       Successful intubation technique: video laryngoscopy (VL used for teaching purposes)       VL Blade Size: MAC 3       Grade View of Cords: 1       Adjucts: stylet       Position: Right       Measured from: lips       Bite block used: None    Post intubation assessment        Placement verified by: capnometry, equal breath sounds and chest rise        Number of attempts at approach: 1       Secured with: silk tape       Ease of procedure: easy       Dentition: Unchanged    Medication(s) Administered   Medication Administration Time: 6/17/2024 9:29 AM    Additional Comments       VL used to show observer placement of ETT.

## 2024-06-18 NOTE — TELEPHONE ENCOUNTER
REFERRAL INFORMATION:  Referring Provider: Dr. Charles  Referring Clinic:  Reason for Visit/Diagnosis: Appendix       FUTURE VISIT INFORMATION:  Appointment Date: 7/5/2024  Appointment Time: 2 PM     NOTES RECORD STATUS  DETAILS   OFFICE NOTE from Referring Provider N/A    OFFICE NOTE from Other Specialists Internal [facility]:  6/7/24 - PCC OV with Clarissa Murray NP    [facility]:  4/11/24 - GYN ONC OV with Dr. Del Castillo   [facility] DISCHARGE SUMMARY/ ED VISITS  N/A    OPERATIVE REPORT Internal [facility]:  6/17/24 - OP Note for SALPINGO-OOPHORECTOMY, LAPAROSCOPIC BILATERAL, LYSIS OF ADHESION with Dr. Del Castillo   PERTINENT LABS Internal    IMAGING (CT, MRI, US, XR)  Internal [facility]:  4/15/24 - US Pelvic  5/24/22 - CT Chest  2/4/22 - CT Chest/Abd/Pelvis

## 2024-06-27 ENCOUNTER — ONCOLOGY VISIT (OUTPATIENT)
Dept: ONCOLOGY | Facility: CLINIC | Age: 49
End: 2024-06-27
Attending: OBSTETRICS & GYNECOLOGY
Payer: OTHER GOVERNMENT

## 2024-06-27 VITALS
TEMPERATURE: 98.4 F | BODY MASS INDEX: 25.51 KG/M2 | SYSTOLIC BLOOD PRESSURE: 118 MMHG | HEART RATE: 98 BPM | RESPIRATION RATE: 16 BRPM | OXYGEN SATURATION: 100 % | DIASTOLIC BLOOD PRESSURE: 78 MMHG | WEIGHT: 144 LBS

## 2024-06-27 DIAGNOSIS — E89.40 SURGICAL MENOPAUSE: Primary | ICD-10-CM

## 2024-06-27 PROCEDURE — 99024 POSTOP FOLLOW-UP VISIT: CPT | Performed by: OBSTETRICS & GYNECOLOGY

## 2024-06-27 PROCEDURE — G0463 HOSPITAL OUTPT CLINIC VISIT: HCPCS | Performed by: OBSTETRICS & GYNECOLOGY

## 2024-06-27 ASSESSMENT — PAIN SCALES - GENERAL: PAINLEVEL: NO PAIN (0)

## 2024-06-27 NOTE — LETTER
2024      Tori Steele  [address] [address]      Dear Colleague,    Thank you for referring your patient, Tori Steele, to the [facility]. Please see a copy of my visit note below.    GYNECOLOGIC  ONCOLOGY CLINIC NOTE    Referring provider:    Milka Mota PA-C  [address]  [place] [address]   RE: Tori Steele  : 1975  WILVER: 24     CC:  History of breast cancer    HPI: Ms Tori Steele is a 48 year old  presenting post recent surgery.      Since the procedure she reports only needing pain medication for the first two days post surgery. Had small vaginal bleeding post surgery which has since resolved/  Notes hot flashes and difficulty sleeping at night.    Course to date    I have reviewed her treateatment course for right sided ER positive,SC positive HER-2 negative breast cancer.  She started treatment in 2022 and as part of estrogen suppression has been on Zometa since 2022.    Side effects of Zometa include GI upset, vaginal dryness, and join pain. Now as time has passed, she feel ready for a more definitive management by having her ovaries removed. She has considered this option in the past but now being able to stop taking Zometa is important for her. She has consulted with her breast oncologist to arrive at this decision.      No abnormal vaginal bleeding, history of fibroid uterus      26 Surgery: Laparoscopic bilateral salpingo oophorectomy, lysis of adhesions    Pathology: No malignancy    OBGYN history and Health Maintenance:   - vaginal   Last Pap Smear: , no history of dysplasia        Past Medical History:   Diagnosis Date     Breast cancer (H) 2022     Sinus tachycardia        Past Surgical History:   Procedure Laterality Date     BIOPSY       DILATION AND CURETTAGE       INSERT PORT VASCULAR ACCESS Left 2022    Procedure: INSERTION, VASCULAR ACCESS PORT;  Surgeon:  Elliott Ramirez PA-C;  Location: [facility] OR     IR CHEST PORT PLACEMENT > 5 YRS OF AGE  02/18/2022     IR PORT REMOVAL LEFT  04/11/2023     LAPAROSCOPIC SALPINGO-OOPHORECTOMY Bilateral 6/17/2024    Procedure: SALPINGO-OOPHORECTOMY, LAPAROSCOPIC BILATERAL, LYSIS OF ADHESION;  Surgeon: Salina Del Castillo MD;  Location: [facility] OR     LUMPECTOMY BREAST Right 08/29/2022    Procedure: Re-excision of right lumpectomy site;  Surgeon: Gurpreet Layton MD;  Location: [facility] OR     LUMPECTOMY BREAST WITH SENTINEL NODE, COMBINED Right 08/08/2022    Procedure: RIGHT SEED LOCALIZED BREAST LUMPECTOMY  WITH SENTINEL LYMPH NODE BIOPSY;  Surgeon: Gurpreet Layton MD;  Location: [facility] OR     REMOVE PORT VASCULAR ACCESS Left 04/11/2023    Procedure: Remove port vascular access left;  Surgeon: Shilpi Andujar MD;  Location: [facility] OR          Current Outpatient Medications   Medication Sig Dispense Refill     abemaciclib (VERZENIO) 150 MG tablet Take 1 tablet (150 mg) by mouth 2 times daily for 28 days 56 tablet 0     acetaminophen (TYLENOL) 325 MG tablet Take 2 tablets (650 mg) by mouth every 6 hours as needed for mild pain       cyclobenzaprine (FLEXERIL) 5 MG tablet Take 1-2 at bedtime as needed for muscle spasms 60 tablet 3     ibuprofen (ADVIL/MOTRIN) 600 MG tablet Take 1 tablet (600 mg) by mouth every 6 hours as needed for other (mild and/or inflammatory pain.)       letrozole (FEMARA) 2.5 MG tablet Take 1 tablet (2.5 mg) by mouth daily 90 tablet 3     multivitamin, therapeutic (THERA-VIT) TABS tablet Take 1 tablet by mouth daily       senna-docusate (SENOKOT-S/PERICOLACE) 8.6-50 MG tablet Take 1-2 tablets by mouth 2 times daily 30 tablet 0         No Known Allergies    Social History:  Social History     Tobacco Use     Smoking status: Never     Passive exposure: Never     Smokeless tobacco: Never   Substance Use Topics     Alcohol use: Yes     Comment: Occasionally 1-4 a month       Family History:   The patient's family history is notable  for   Family History   Problem Relation Age of Onset     Hypertension Mother      Thyroid Disease Mother      No Known Problems Father      No Known Problems Brother      Breast Cancer Maternal Grandmother         Dx in her 70's     Osteoporosis Maternal Grandmother      Esophageal Cancer Maternal Grandfather      Anesthesia Reaction No family hx of      Deep Vein Thrombosis (DVT) No family hx of          Physical Exam:     /78   Pulse 98   Temp 98.4  F (36.9  C)   Resp 16   Wt 65.3 kg (144 lb)   SpO2 100%   BMI 25.51 kg/m    Body mass index is 25.51 kg/m .    General: Alert and oriented, no acute distress  Psych: Mood stable  GI: No distention. No masses. No hernia.   Incision: Intact, no erythema    Assessment: Tori Steele is a 48 year old woman with a history of ER + right breast cancer, s/p laparoscopic bilateral salpingo-oophorectomy.     Recovering without complications. Pathology reviewed negative for malignancy.    Myomatous uterus      Plan:     1.)    She will establish Gyn closer to home to discuss no estrogen option to help with menopausal symptoms. No further follow up needed in Gynecology Oncology     2.) Genetic risk factors were assessed: negative for BRCA 1/2      Salina Del Castillo M.D., MPH,  F.A.C.O.G.  Department of Ob/Gyn and Women's Health  Division of Gynecologic Oncology  [facility]  [phone]               Again, thank you for allowing me to participate in the care of your patient.        Sincerely,        Salina Del Castillo MD

## 2024-06-27 NOTE — PROGRESS NOTES
GYNECOLOGIC  ONCOLOGY CLINIC NOTE    Referring provider:    Milka Mota PA-C  [address] [address]   RE: Tori Steele  : 1975  WILVER: 26     CC:  History of breast cancer    HPI: Ms Tori Steele is a 48 year old  presenting post recent surgery.      Since the procedure she reports only needing pain medication for the first two days post surgery. Had small vaginal bleeding post surgery which has since resolved/  Notes hot flashes and difficulty sleeping at night.    Course to date    I have reviewed her treateatment course for right sided ER positive,MS positive HER-2 negative breast cancer.  She started treatment in 2022 and as part of estrogen suppression has been on Zometa since 2022.    Side effects of Zometa include GI upset, vaginal dryness, and join pain. Now as time has passed, she feel ready for a more definitive management by having her ovaries removed. She has considered this option in the past but now being able to stop taking Zometa is important for her. She has consulted with her breast oncologist to arrive at this decision.      No abnormal vaginal bleeding, history of fibroid uterus      24 Surgery: Laparoscopic bilateral salpingo oophorectomy, lysis of adhesions    Pathology: No malignancy    OBGYN history and Health Maintenance:   - vaginal   Last Pap Smear: , no history of dysplasia        Past Medical History:   Diagnosis Date    Breast cancer (H) 2022    Sinus tachycardia        Past Surgical History:   Procedure Laterality Date    BIOPSY      DILATION AND CURETTAGE      INSERT PORT VASCULAR ACCESS Left 2022    Procedure: INSERTION, VASCULAR ACCESS PORT;  Surgeon: Elliott Ramirez PA-C;  Location: [facility] OR    IR CHEST PORT PLACEMENT > 5 YRS OF AGE  2022    IR PORT REMOVAL LEFT  2023    LAPAROSCOPIC SALPINGO-OOPHORECTOMY Bilateral 2024    Procedure: SALPINGO-OOPHORECTOMY, LAPAROSCOPIC BILATERAL,  LYSIS OF ADHESION;  Surgeon: Salina Del Castillo MD;  Location: [facility] OR    LUMPECTOMY BREAST Right 08/29/2022    Procedure: Re-excision of right lumpectomy site;  Surgeon: Gurpreet Layton MD;  Location: [facility] OR    LUMPECTOMY BREAST WITH SENTINEL NODE, COMBINED Right 08/08/2022    Procedure: RIGHT SEED LOCALIZED BREAST LUMPECTOMY  WITH SENTINEL LYMPH NODE BIOPSY;  Surgeon: Gurpreet Layton MD;  Location: [facility] OR    REMOVE PORT VASCULAR ACCESS Left 04/11/2023    Procedure: Remove port vascular access left;  Surgeon: Shilpi Andujar MD;  [facility] [facility]          Current Outpatient Medications   Medication Sig Dispense Refill    abemaciclib (VERZENIO) 150 MG tablet Take 1 tablet (150 mg) by mouth 2 times daily for 28 days 56 tablet 0    acetaminophen (TYLENOL) 325 MG tablet Take 2 tablets (650 mg) by mouth every 6 hours as needed for mild pain      cyclobenzaprine (FLEXERIL) 5 MG tablet Take 1-2 at bedtime as needed for muscle spasms 60 tablet 3    ibuprofen (ADVIL/MOTRIN) 600 MG tablet Take 1 tablet (600 mg) by mouth every 6 hours as needed for other (mild and/or inflammatory pain.)      letrozole (FEMARA) 2.5 MG tablet Take 1 tablet (2.5 mg) by mouth daily 90 tablet 3    multivitamin, therapeutic (THERA-VIT) TABS tablet Take 1 tablet by mouth daily      senna-docusate (SENOKOT-S/PERICOLACE) 8.6-50 MG tablet Take 1-2 tablets by mouth 2 times daily 30 tablet 0         No Known Allergies    Social History:  Social History     Tobacco Use    Smoking status: Never     Passive exposure: Never    Smokeless tobacco: Never   Substance Use Topics    Alcohol use: Yes     Comment: Occasionally 1-4 a month       Family History:   The patient's family history is notable for   Family History   Problem Relation Age of Onset    Hypertension Mother     Thyroid Disease Mother     No Known Problems Father     No Known Problems Brother     Breast Cancer Maternal Grandmother         Dx in her 70's    Osteoporosis Maternal Grandmother      Esophageal Cancer Maternal Grandfather     Anesthesia Reaction No family hx of     Deep Vein Thrombosis (DVT) No family hx of          Physical Exam:     /78   Pulse 98   Temp 98.4  F (36.9  C)   Resp 16   Wt 65.3 kg (144 lb)   SpO2 100%   BMI 25.51 kg/m    Body mass index is 25.51 kg/m .    General: Alert and oriented, no acute distress  Psych: Mood stable  GI: No distention. No masses. No hernia.   Incision: Intact, no erythema    Assessment: Tori Steele is a 48 year old woman with a history of ER + right breast cancer, s/p laparoscopic bilateral salpingo-oophorectomy.     Recovering without complications. Pathology reviewed negative for malignancy.    Myomatous uterus      Plan:     1.)    She will establish Gyn closer to home to discuss no estrogen option to help with menopausal symptoms. No further follow up needed in Gynecology Oncology     2.) Genetic risk factors were assessed: negative for BRCA 1/2      Salina Del Castillo M.D., MPH,  F.A.C.O.G.  Department of Ob/Gyn and Women's Health  Division of Gynecologic Oncology  [facility]  [phone]

## 2024-06-27 NOTE — NURSING NOTE
"Oncology Rooming Note    June 27, 2024 1:47 PM   Tori Steele is a 48 year old female who presents for:    Chief Complaint   Patient presents with    Oncology Clinic Visit     Invasive ductal carcinoma of right breast      Initial Vitals: /78   Pulse 98   Temp 98.4  F (36.9  C)   Resp 16   Wt 65.3 kg (144 lb)   SpO2 100%   BMI 25.51 kg/m   Estimated body mass index is 25.51 kg/m  as calculated from the following:    Height as of 6/17/24: 1.6 m (5' 3\").    Weight as of this encounter: 65.3 kg (144 lb). Body surface area is 1.7 meters squared.  No Pain (0) Comment: Data Unavailable   No LMP recorded. (Menstrual status: Chemotherapy).  Allergies reviewed: Yes  Medications reviewed: Yes    Medications: Medication refills not needed today.  Pharmacy name entered into [facility]:    [id number] [place], [place] - [address] [facility] [address]  [facility] [place], [place] - [phone]  [facility] [place], [place] - [address] [place] [address]    Frailty Screening:   Is the patient here for a new oncology consult visit in cancer care? 2. No      Clinical concerns: no other complaints      David Fraire"

## 2024-06-28 ENCOUNTER — LAB (OUTPATIENT)
Dept: LAB | Facility: CLINIC | Age: 49
End: 2024-06-28
Attending: INTERNAL MEDICINE
Payer: OTHER GOVERNMENT

## 2024-06-28 ENCOUNTER — ONCOLOGY VISIT (OUTPATIENT)
Dept: ONCOLOGY | Facility: CLINIC | Age: 49
End: 2024-06-28
Attending: INTERNAL MEDICINE
Payer: OTHER GOVERNMENT

## 2024-06-28 VITALS
WEIGHT: 145.9 LBS | SYSTOLIC BLOOD PRESSURE: 109 MMHG | OXYGEN SATURATION: 96 % | BODY MASS INDEX: 25.85 KG/M2 | TEMPERATURE: 98.6 F | RESPIRATION RATE: 12 BRPM | HEART RATE: 92 BPM | DIASTOLIC BLOOD PRESSURE: 75 MMHG

## 2024-06-28 DIAGNOSIS — C50.911 INVASIVE DUCTAL CARCINOMA OF RIGHT BREAST (H): ICD-10-CM

## 2024-06-28 LAB
BASOPHILS # BLD AUTO: 0 10E3/UL (ref 0–0.2)
BASOPHILS NFR BLD AUTO: 1 %
EOSINOPHIL # BLD AUTO: 0.1 10E3/UL (ref 0–0.7)
EOSINOPHIL NFR BLD AUTO: 2 %
ERYTHROCYTE [DISTWIDTH] IN BLOOD BY AUTOMATED COUNT: 11.5 % (ref 10–15)
HCT VFR BLD AUTO: 34.5 % (ref 35–47)
HGB BLD-MCNC: 11.3 G/DL (ref 11.7–15.7)
IMM GRANULOCYTES # BLD: 0 10E3/UL
IMM GRANULOCYTES NFR BLD: 0 %
LYMPHOCYTES # BLD AUTO: 0.8 10E3/UL (ref 0.8–5.3)
LYMPHOCYTES NFR BLD AUTO: 21 %
MCH RBC QN AUTO: 33.7 PG (ref 26.5–33)
MCHC RBC AUTO-ENTMCNC: 32.8 G/DL (ref 31.5–36.5)
MCV RBC AUTO: 103 FL (ref 78–100)
MONOCYTES # BLD AUTO: 0.2 10E3/UL (ref 0–1.3)
MONOCYTES NFR BLD AUTO: 5 %
NEUTROPHILS # BLD AUTO: 2.5 10E3/UL (ref 1.6–8.3)
NEUTROPHILS NFR BLD AUTO: 71 %
NRBC # BLD AUTO: 0 10E3/UL
NRBC BLD AUTO-RTO: 0 /100
PLATELET # BLD AUTO: 201 10E3/UL (ref 150–450)
RBC # BLD AUTO: 3.35 10E6/UL (ref 3.8–5.2)
WBC # BLD AUTO: 3.5 10E3/UL (ref 4–11)

## 2024-06-28 PROCEDURE — 36415 COLL VENOUS BLD VENIPUNCTURE: CPT

## 2024-06-28 PROCEDURE — 99214 OFFICE O/P EST MOD 30 MIN: CPT | Performed by: INTERNAL MEDICINE

## 2024-06-28 PROCEDURE — G0463 HOSPITAL OUTPT CLINIC VISIT: HCPCS | Performed by: INTERNAL MEDICINE

## 2024-06-28 PROCEDURE — G2211 COMPLEX E/M VISIT ADD ON: HCPCS | Performed by: INTERNAL MEDICINE

## 2024-06-28 PROCEDURE — 85025 COMPLETE CBC W/AUTO DIFF WBC: CPT

## 2024-06-28 ASSESSMENT — PAIN SCALES - GENERAL: PAINLEVEL: NO PAIN (0)

## 2024-06-28 NOTE — LETTER
6/28/2024      Tori Steele  [address] [facility] [address]      Dear Colleague,    Thank you for referring your patient, Tori Steele, to the [facility] CAN[facility]. Please see a copy of my visit note below.      Jun 28, 2024    REASON FOR VISIT:  history of breast cancer    CANCER HISTORY AND TREATMENT:  Tori Steele is a 48 year old female with breat cancer. Right-sided 5.5 cm ER positive LA positive HER-2 negative breast cancer with associated DCIS.  Her MammaPrint came back as high risk.  She consented for the I-SPY clinical trial and has been randomized to the oral paclitaxel, Encequidar and dostarlimab arm.      2/21/22 started trial with oral paclitaxel, Encequidar and dostarlimab.    3/15/22  her IV immunotherapy dostarlimab was held due to diarrhea that recovered with Imodium and time  4/4/22   Carbo was added in weekly due in hopes for more significant reduction in cancer  5/17/22 AC start  7/8/22 AC End  8/8/2022 - R. Lumpectomy with SNLBX -               -INVASIVE BREAST CARCINOMA OF NO SPECIAL TYPE (INVASIVE DUCTAL CARCINOMA), with   apocrine features, SAMANTHA GRADE 2, size 45 x 32 mm, residual after neoadjuvant systemic therapy              -Ductal carcinoma in situ (DCIS), nuclear grade 3, cribriform and solid type(s), with focal necrosis              -DCIS is admixed with invasive carcinoma, and comprises 30% of the tumor cellularity              -Invasive carcinoma is estrogen receptor positive, progesterone receptor positive and HER2   negative (score 1+) by immunohistochemistry               -METASTATIC BREAST DUCTAL CARCINOMA in one of two lymph nodes (1/2), residual after neoadjuvant systemic therapy              -Extranodal extension present (size 2 mm)              -[facility] residual cancer burden is 3.284 (RCB Class III).   8/29/2022 - Resection of involved margins - now negative  9/19/22 started Zoladex  10/31/22 completed XRT 21 fractions    "  12/12/22: starting Zometa q6m and abemaciclib/letrozole   6/17/2024- BSO    INTERVAL HISTORY:   As noted above Dr. Del Castillo and prophylactic BSO about 10 days ago.  She has recovered from this very well and has no surgical complications.  She notes that she has more hot flashes at night.  She discussed this and decided we will give it a few more weeks before making a decision to try to medicate this.  Overall though she thinks having her ovaries removed is superior to the monthly goserelin shots.    Otherwise she is doing well.  She has no new medical complaints.  Her review of systems is otherwise unremarkable.      SOCIAL HISTORY: Her son continues with an active wrestling career and was named \"Machelle of the year\".  He will continue to wrestle during the course of the summer and additionally will train.      She has learned to manage the bowel symptoms of abema well.      Otherwise, ROS negative for: fevers, headaches, visual changes, new sites of pain, shortness of breath, cough, chest pain, abdominal pain, nausea, vomiting, abnormal vaginal bleeding, or leg swelling.      Current Outpatient Medications   Medication Sig Dispense Refill     abemaciclib (VERZENIO) 150 MG tablet Take 1 tablet (150 mg) by mouth 2 times daily for 28 days 56 tablet 0     acetaminophen (TYLENOL) 325 MG tablet Take 2 tablets (650 mg) by mouth every 6 hours as needed for mild pain       cyclobenzaprine (FLEXERIL) 5 MG tablet Take 1-2 at bedtime as needed for muscle spasms 60 tablet 3     ibuprofen (ADVIL/MOTRIN) 600 MG tablet Take 1 tablet (600 mg) by mouth every 6 hours as needed for other (mild and/or inflammatory pain.)       letrozole (FEMARA) 2.5 MG tablet Take 1 tablet (2.5 mg) by mouth daily 90 tablet 3     multivitamin, therapeutic (THERA-VIT) TABS tablet Take 1 tablet by mouth daily       senna-docusate (SENOKOT-S/PERICOLACE) 8.6-50 MG tablet Take 1-2 tablets by mouth 2 times daily 30 tablet 0        No Known " Allergies    PHYSICAL EXAMINATION  /75 (BP Location: Right arm, Patient Position: Sitting, Cuff Size: Adult Regular)   Pulse 92   Temp 98.6  F (37  C) (Oral)   Resp 12   Wt 66.2 kg (145 lb 14.4 oz)   SpO2 96%   BMI 25.85 kg/m      General: She looks well, she was not examined in detail    LABS:  Most Recent 3 CBC's:  Recent Labs   Lab Test 06/17/24  0802 05/31/24  1027 05/02/24  1125 04/02/24  1518   WBC 3.0* 3.1* 2.7* 4.2   HGB 11.4* 11.5* 11.3* 12.6   * 102* 105* 104*    194 178 207   ANEUTAUTO  --  2.2 2.0 3.2     Most Recent 3 BMP's:  Recent Labs   Lab Test 05/31/24  1027 05/02/24  1125 04/02/24  1518    140 141   POTASSIUM 3.7 3.5 3.8   CHLORIDE 106 105 104   CO2 26 26 26   BUN 10.8 11.5 10.4   CR 0.85 0.85 0.87   ANIONGAP 9 9 11   JENNIE 9.3 9.3 9.7   * 127* 101*   PROTTOTAL 7.5 7.4 8.3   ALBUMIN 4.2 4.0 4.3    Most Recent 3 LFT's:  Recent Labs   Lab Test 05/31/24  1027 05/02/24  1125 04/02/24  1518   AST 19 16 18   ALT 15 9 12   ALKPHOS 64 63 66   BILITOTAL 0.6 0.6 0.6    Most Recent 2 TSH and T4:  Recent Labs   Lab Test 09/09/22  1122 07/29/22  1009 06/24/22  1057   TSH 1.52 1.39 1.66   T4  --  1.12 0.96      I reviewed the above labs today.       PROBLEMS:     1. Right sided ER+, MD+, HER2-negative, MammaPrint high risk T=5.5cm, N=0 breast cancer. Treated on I-SPY with assigned arm of dostarlimab/oral paclitaxel. Carbo added week 3. Received ACx4. Pathologic staging showed pT2a. PN1a. ER+, MD+, HER21+, RCB=3   2. Pre-menopausal, now on Zoladex monthly, letrozole, abemaciclib  3. Minimal elevation in glucose  4. S/p BSO in June 2024        IMPRESSION: She is doing much better well with no new concerns.  She feels better after the BSO     PLAN:  1.  Sop monthly Zoladex.    2. Continue  letrozole/abema through 12/24  3. RTC in 3 month, can see MsBrent Mota then     Keegan Lockett MD    40 minutes spent on the date of the encounter doing chart review, review of test results,  interpretation of tests, patient visit, and documentation     The longitudinal plan of care for the diagnosis(es)/condition(s) as documented were addressed during this visit. Due to the added complexity in care, I will continue to support Tori in the subsequent management and with ongoing continuity of care.       Again, thank you for allowing me to participate in the care of your patient.        Sincerely,        Keegan Lockett MD

## 2024-06-28 NOTE — PROGRESS NOTES
Jun 28, 2024    REASON FOR VISIT:  history of breast cancer    CANCER HISTORY AND TREATMENT:  Tori Steele is a 48 year old female with breat cancer. Right-sided 5.5 cm ER positive GA positive HER-2 negative breast cancer with associated DCIS.  Her MammaPrint came back as high risk.  She consented for the I-SPY clinical trial and has been randomized to the oral paclitaxel, Encequidar and dostarlimab arm.      2/21/22 started trial with oral paclitaxel, Encequidar and dostarlimab.    3/15/22  her IV immunotherapy dostarlimab was held due to diarrhea that recovered with Imodium and time  4/4/22   Carbo was added in weekly due in hopes for more significant reduction in cancer  5/17/22 AC start  7/8/22 AC End  8/8/2022 - R. Lumpectomy with SNLBX -               -INVASIVE BREAST CARCINOMA OF NO SPECIAL TYPE (INVASIVE DUCTAL CARCINOMA), with   apocrine features, SAMANTHA GRADE 2, size 45 x 32 mm, residual after neoadjuvant systemic therapy              -Ductal carcinoma in situ (DCIS), nuclear grade 3, cribriform and solid type(s), with focal necrosis              -DCIS is admixed with invasive carcinoma, and comprises 30% of the tumor cellularity              -Invasive carcinoma is estrogen receptor positive, progesterone receptor positive and HER2   negative (score 1+) by immunohistochemistry               -METASTATIC BREAST DUCTAL CARCINOMA in one of two lymph nodes (1/2), residual after neoadjuvant systemic therapy              -Extranodal extension present (size 2 mm)              -[facility] residual cancer burden is 3.284 (RCB Class III).   8/29/2022 - Resection of involved margins - now negative  9/19/22 started Zoladex  10/31/22 completed XRT 21 fractions     12/12/22: starting Zometa q6m and abemaciclib/letrozole   6/17/2024- BSO    INTERVAL HISTORY:   As noted above Dr. Del Castillo and prophylactic BSO about 10 days ago.  She has recovered from this very well and has no surgical complications.  She  "notes that she has more hot flashes at night.  She discussed this and decided we will give it a few more weeks before making a decision to try to medicate this.  Overall though she thinks having her ovaries removed is superior to the monthly goserelin shots.    Otherwise she is doing well.  She has no new medical complaints.  Her review of systems is otherwise unremarkable.      SOCIAL HISTORY: Her son continues with an active wrestling career and was named \"Machelle of the year\".  He will continue to wrestle during the course of the summer and additionally will train.      She has learned to manage the bowel symptoms of abema well.      Otherwise, ROS negative for: fevers, headaches, visual changes, new sites of pain, shortness of breath, cough, chest pain, abdominal pain, nausea, vomiting, abnormal vaginal bleeding, or leg swelling.      Current Outpatient Medications   Medication Sig Dispense Refill    abemaciclib (VERZENIO) 150 MG tablet Take 1 tablet (150 mg) by mouth 2 times daily for 28 days 56 tablet 0    acetaminophen (TYLENOL) 325 MG tablet Take 2 tablets (650 mg) by mouth every 6 hours as needed for mild pain      cyclobenzaprine (FLEXERIL) 5 MG tablet Take 1-2 at bedtime as needed for muscle spasms 60 tablet 3    ibuprofen (ADVIL/MOTRIN) 600 MG tablet Take 1 tablet (600 mg) by mouth every 6 hours as needed for other (mild and/or inflammatory pain.)      letrozole (FEMARA) 2.5 MG tablet Take 1 tablet (2.5 mg) by mouth daily 90 tablet 3    multivitamin, therapeutic (THERA-VIT) TABS tablet Take 1 tablet by mouth daily      senna-docusate (SENOKOT-S/PERICOLACE) 8.6-50 MG tablet Take 1-2 tablets by mouth 2 times daily 30 tablet 0        No Known Allergies    PHYSICAL EXAMINATION  /75 (BP Location: Right arm, Patient Position: Sitting, Cuff Size: Adult Regular)   Pulse 92   Temp 98.6  F (37  C) (Oral)   Resp 12   Wt 66.2 kg (145 lb 14.4 oz)   SpO2 96%   BMI 25.85 kg/m      General: She looks well, " she was not examined in detail    LABS:  Most Recent 3 CBC's:  Recent Labs   Lab Test 06/17/24  0802 05/31/24  1027 05/02/24  1125 04/02/24  1518   WBC 3.0* 3.1* 2.7* 4.2   HGB 11.4* 11.5* 11.3* 12.6   * 102* 105* 104*    194 178 207   ANEUTAUTO  --  2.2 2.0 3.2     Most Recent 3 BMP's:  Recent Labs   Lab Test 05/31/24  1027 05/02/24  1125 04/02/24  1518    140 141   POTASSIUM 3.7 3.5 3.8   CHLORIDE 106 105 104   CO2 26 26 26   BUN 10.8 11.5 10.4   CR 0.85 0.85 0.87   ANIONGAP 9 9 11   JENNIE 9.3 9.3 9.7   * 127* 101*   PROTTOTAL 7.5 7.4 8.3   ALBUMIN 4.2 4.0 4.3    Most Recent 3 LFT's:  Recent Labs   Lab Test 05/31/24  1027 05/02/24  1125 04/02/24  1518   AST 19 16 18   ALT 15 9 12   ALKPHOS 64 63 66   BILITOTAL 0.6 0.6 0.6    Most Recent 2 TSH and T4:  Recent Labs   Lab Test 09/09/22  1122 07/29/22  1009 06/24/22  1057   TSH 1.52 1.39 1.66   T4  --  1.12 0.96      I reviewed the above labs today.       PROBLEMS:     1. Right sided ER+, MA+, HER2-negative, MammaPrint high risk T=5.5cm, N=0 breast cancer. Treated on I-SPY with assigned arm of dostarlimab/oral paclitaxel. Carbo added week 3. Received ACx4. Pathologic staging showed pT2a. PN1a. ER+, MA+, HER21+, RCB=3   2. Pre-menopausal, now on Zoladex monthly, letrozole, abemaciclib  3. Minimal elevation in glucose  4. S/p BSO in June 2024        IMPRESSION: She is doing much better well with no new concerns.  She feels better after the BSO     PLAN:  1.  Sop monthly Zoladex.    2. Continue  letrozole/abema through 12/24  3. RTC in 3 month, can see Ms. Mota then     Keegan Lockett MD    40 minutes spent on the date of the encounter doing chart review, review of test results, interpretation of tests, patient visit, and documentation     The longitudinal plan of care for the diagnosis(es)/condition(s) as documented were addressed during this visit. Due to the added complexity in care, I will continue to support Tori in the subsequent  management and with ongoing continuity of care.

## 2024-06-28 NOTE — NURSING NOTE
"Oncology Rooming Note    June 28, 2024 11:40 AM   Tori Steele is a 48 year old female who presents for:    Chief Complaint   Patient presents with    Oncology Clinic Visit     Invasive ductal carcinoma of right breast      Initial Vitals: /75 (BP Location: Right arm, Patient Position: Sitting, Cuff Size: Adult Regular)   Pulse 92   Temp 98.6  F (37  C) (Oral)   Resp 12   Wt 66.2 kg (145 lb 14.4 oz)   SpO2 96%   BMI 25.85 kg/m   Estimated body mass index is 25.85 kg/m  as calculated from the following:    Height as of 6/17/24: 1.6 m (5' 3\").    Weight as of this encounter: 66.2 kg (145 lb 14.4 oz). Body surface area is 1.72 meters squared.  No Pain (0) Comment: Data Unavailable   No LMP recorded. (Menstrual status: Chemotherapy).  Allergies reviewed: Yes  Medications reviewed: Yes    Medications: Medication refills not needed today.  Pharmacy name entered into [facility]:    [id number] - [place], [place] - [address] [facility] [address]  [facility] [place], [place] - [phone]  [facility] - [place], [place] - [address] [place] [address]    Frailty Screening:   Is the patient here for a new oncology consult visit in cancer care? 2. No      Clinical concerns: none.       Austin Esposito"

## 2024-06-28 NOTE — PROGRESS NOTES
Chief Complaint   Patient presents with    Labs Only     Blood draw,  by MA.     Shanthi Sorenson MA

## 2024-07-05 ENCOUNTER — OFFICE VISIT (OUTPATIENT)
Dept: SURGERY | Facility: CLINIC | Age: 49
End: 2024-07-05
Attending: OBSTETRICS & GYNECOLOGY
Payer: OTHER GOVERNMENT

## 2024-07-05 ENCOUNTER — PRE VISIT (OUTPATIENT)
Dept: SURGERY | Facility: CLINIC | Age: 49
End: 2024-07-05

## 2024-07-05 VITALS
DIASTOLIC BLOOD PRESSURE: 72 MMHG | SYSTOLIC BLOOD PRESSURE: 107 MMHG | OXYGEN SATURATION: 100 % | HEIGHT: 63 IN | WEIGHT: 144.9 LBS | BODY MASS INDEX: 25.68 KG/M2 | HEART RATE: 112 BPM

## 2024-07-05 DIAGNOSIS — Z98.890 POSTOPERATIVE STATE: ICD-10-CM

## 2024-07-05 PROCEDURE — 99214 OFFICE O/P EST MOD 30 MIN: CPT | Mod: 24 | Performed by: SURGERY

## 2024-07-05 ASSESSMENT — PAIN SCALES - GENERAL: PAINLEVEL: NO PAIN (0)

## 2024-07-05 NOTE — LETTER
"7/5/2024       RE: Tori Steele  [address] [facility] [address]     Dear Colleague,    Thank you for referring your patient, Tori Steele, to the [facility]AL SURGERY [facility]INIC [place] [facility]. Please see a copy of my visit note below.    New General Surgery Consultation Note        Tori Steele  [id number]  1975 July 5, 2024     Requesting Provider: Salina Del Castillo     Dear Clarissa Downs,     I had the pleasure of seeing your patient, Tori Steele is a 48 year old female who presents to clinic today for the following health issues        CHIEF COMPLAINT:      Assessment & Plan  Problem List Items Addressed This Visit    None  Visit Diagnoses       Postoperative state             Healthy appendix  No indication for elective removal      Kingsley Reyes MD    Lab Results   Component Value Date    WBC 3.5 06/28/2024     Lab Results   Component Value Date    RBC 3.35 06/28/2024     Lab Results   Component Value Date    HGB 11.3 06/28/2024     Lab Results   Component Value Date    HCT 34.5 06/28/2024     No components found for: \"MCT\"  Lab Results   Component Value Date     06/28/2024     Lab Results   Component Value Date    MCH 33.7 06/28/2024     Lab Results   Component Value Date    MCHC 32.8 06/28/2024     Lab Results   Component Value Date    RDW 11.5 06/28/2024     Lab Results   Component Value Date     06/28/2024     Last Comprehensive Metabolic Panel:  Lab Results   Component Value Date     05/31/2024    POTASSIUM 3.7 05/31/2024    CHLORIDE 106 05/31/2024    CO2 26 05/31/2024    ANIONGAP 9 05/31/2024     (H) 05/31/2024    BUN 10.8 05/31/2024    CR 0.85 05/31/2024    GFRESTIMATED 85 05/31/2024    JENNIE 9.3 05/31/2024         INR/Prothrombin Time  Liver Function Studies -   Recent Labs   Lab Test 05/31/24  1027   PROTTOTAL 7.5   ALBUMIN 4.2   BILITOTAL 0.6   ALKPHOS 64   AST 19   ALT 15 "         30 minutes spent by me on the date of the encounter doing chart review, history and exam, documentation and further activities per the note      HISTORY OF PRESENT ILLNESS:  Appendix noted to be healthy at time of lap BSO by GYN. ACS was called to evaluate it intraoperatively. No RLQ aymptoms, feels well      ROS    PAST MEDICAL HISTORY:  Past Medical History:   Diagnosis Date     Breast cancer (H) 01/2022     Sinus tachycardia         PAST SURGICAL HISTORY:  Past Surgical History:   Procedure Laterality Date     BIOPSY       DILATION AND CURETTAGE       INSERT PORT VASCULAR ACCESS Left 02/18/2022    Procedure: INSERTION, VASCULAR ACCESS PORT;  Surgeon: Elliott Ramirez PA-C;  Location: [facility] OR     IR CHEST PORT PLACEMENT > 5 YRS OF AGE  02/18/2022     IR PORT REMOVAL LEFT  04/11/2023     LAPAROSCOPIC SALPINGO-OOPHORECTOMY Bilateral 6/17/2024    Procedure: SALPINGO-OOPHORECTOMY, LAPAROSCOPIC BILATERAL, LYSIS OF ADHESION;  Surgeon: Salina Del Castillo MD;  Location: [facility] OR     LUMPECTOMY BREAST Right 08/29/2022    Procedure: Re-excision of right lumpectomy site;  Surgeon: Gurpreet Layton MD;  Location: [facility] OR     LUMPECTOMY BREAST WITH SENTINEL NODE, COMBINED Right 08/08/2022    Procedure: RIGHT SEED LOCALIZED BREAST LUMPECTOMY  WITH SENTINEL LYMPH NODE BIOPSY;  Surgeon: Gurpreet Layton MD;  Location: [facility] OR     REMOVE PORT VASCULAR ACCESS Left 04/11/2023    Procedure: Remove port vascular access left;  Surgeon: Shilpi Andujar MD;  Location: [facility] OR        MEDICATIONS:  Current Outpatient Medications   Medication Sig Dispense Refill     abemaciclib (VERZENIO) 150 MG tablet Take 1 tablet (150 mg) by mouth 2 times daily for 28 days 56 tablet 0     acetaminophen (TYLENOL) 325 MG tablet Take 2 tablets (650 mg) by mouth every 6 hours as needed for mild pain       cyclobenzaprine (FLEXERIL) 5 MG tablet Take 1-2 at bedtime as needed for muscle spasms 60 tablet 3     ibuprofen (ADVIL/MOTRIN) 600 MG tablet Take 1  tablet (600 mg) by mouth every 6 hours as needed for other (mild and/or inflammatory pain.)       letrozole (FEMARA) 2.5 MG tablet Take 1 tablet (2.5 mg) by mouth daily 90 tablet 3     multivitamin, therapeutic (THERA-VIT) TABS tablet Take 1 tablet by mouth daily       senna-docusate (SENOKOT-S/PERICOLACE) 8.6-50 MG tablet Take 1-2 tablets by mouth 2 times daily 30 tablet 0     No current facility-administered medications for this visit.        ALLERGIES:  Not on File     SOCIAL HISTORY:  Social History     Socioeconomic History     Marital status:      Spouse name: Summer     Number of children: 2   Tobacco Use     Smoking status: Never     Passive exposure: Never     Smokeless tobacco: Never   Vaping Use     Vaping status: Never Used   Substance and Sexual Activity     Alcohol use: Yes     Comment: Occasionally 1-4 a month     Drug use: Never     Sexual activity: Yes     Partners: Male     Birth control/protection: None   Other Topics Concern     Parent/sibling w/ CABG, MI or angioplasty before 65F 55M? No     Social Determinants of Health     Financial Resource Strain: Low Risk  (1/29/2024)    Financial Resource Strain      Within the past 12 months, have you or your family members you live with been unable to get utilities (heat, electricity) when it was really needed?: No   Food Insecurity: Low Risk  (1/29/2024)    Food Insecurity      Within the past 12 months, did you worry that your food would run out before you got money to buy more?: No      Within the past 12 months, did the food you bought just not last and you didn t have money to get more?: No   Transportation Needs: Low Risk  (1/29/2024)    Transportation Needs      Within the past 12 months, has lack of transportation kept you from medical appointments, getting your medicines, non-medical meetings or appointments, work, or from getting things that you need?: No    [facility] from [facility], [facility] & [facility]    Social Connections   Interpersonal Safety: Low Risk  (1/29/2024)    Interpersonal Safety      Do you feel physically and emotionally safe where you currently live?: Yes      Within the past 12 months, have you been hit, slapped, kicked or otherwise physically hurt by someone?: No      Within the past 12 months, have you been humiliated or emotionally abused in other ways by your partner or ex-partner?: No   Housing Stability: Low Risk  (1/29/2024)    Housing Stability      Do you have housing? : Yes      Are you worried about losing your housing?: No       FAMILY HISTORY:  Family History   Problem Relation Age of Onset     Hypertension Mother      Thyroid Disease Mother      No Known Problems Father      No Known Problems Brother      Breast Cancer Maternal Grandmother         Dx in her 70's     Osteoporosis Maternal Grandmother      Esophageal Cancer Maternal Grandfather      Anesthesia Reaction No family hx of      Deep Vein Thrombosis (DVT) No family hx of    PHYSICAL EXAM:  Objective   /72   Pulse 112   Ht 1.6 m (5' 3\")   Wt 65.7 kg (144 lb 14.4 oz)   SpO2 100%   BMI 25.67 kg/m    /72   Pulse 112   Ht 1.6 m (5' 3\")   Wt 65.7 kg (144 lb 14.4 oz)   SpO2 100%   BMI 25.67 kg/m    Body mass index is 25.67 kg/m .  Physical Exam   deferred      Sincerely,     Kingsley Reyes MD      Again, thank you for allowing me to participate in the care of your patient.      Sincerely,    Kingsley Reyes MD    "

## 2024-07-05 NOTE — PROGRESS NOTES
"New General Surgery Consultation Note        Tori Steele  [id number]  1975 July 5, 2024     Requesting Provider: Salina Del Castillo     Dear Clarissa Downs,     I had the pleasure of seeing your patient, Tori Steele is a 48 year old female who presents to clinic today for the following health issues        CHIEF COMPLAINT:      Assessment & Plan   Problem List Items Addressed This Visit    None  Visit Diagnoses       Postoperative state             Healthy appendix  No indication for elective removal      Kingsley Reyes MD    Lab Results   Component Value Date    WBC 3.5 06/28/2024     Lab Results   Component Value Date    RBC 3.35 06/28/2024     Lab Results   Component Value Date    HGB 11.3 06/28/2024     Lab Results   Component Value Date    HCT 34.5 06/28/2024     No components found for: \"MCT\"  Lab Results   Component Value Date     06/28/2024     Lab Results   Component Value Date    MCH 33.7 06/28/2024     Lab Results   Component Value Date    MCHC 32.8 06/28/2024     Lab Results   Component Value Date    RDW 11.5 06/28/2024     Lab Results   Component Value Date     06/28/2024     Last Comprehensive Metabolic Panel:  Lab Results   Component Value Date     05/31/2024    POTASSIUM 3.7 05/31/2024    CHLORIDE 106 05/31/2024    CO2 26 05/31/2024    ANIONGAP 9 05/31/2024     (H) 05/31/2024    BUN 10.8 05/31/2024    CR 0.85 05/31/2024    GFRESTIMATED 85 05/31/2024    JENNIE 9.3 05/31/2024         INR/Prothrombin Time  Liver Function Studies -   Recent Labs   Lab Test 05/31/24  1027   PROTTOTAL 7.5   ALBUMIN 4.2   BILITOTAL 0.6   ALKPHOS 64   AST 19   ALT 15         30 minutes spent by me on the date of the encounter doing chart review, history and exam, documentation and further activities per the note      HISTORY OF PRESENT ILLNESS:  Appendix noted to be healthy at time of lap BSO by GYN. ACS was called to evaluate it intraoperatively. No RLQ aymptoms, feels " well      ROS    PAST MEDICAL HISTORY:  Past Medical History:   Diagnosis Date    Breast cancer (H) 01/2022    Sinus tachycardia         PAST SURGICAL HISTORY:  Past Surgical History:   Procedure Laterality Date    BIOPSY      DILATION AND CURETTAGE      INSERT PORT VASCULAR ACCESS Left 02/18/2022    Procedure: INSERTION, VASCULAR ACCESS PORT;  Surgeon: Elliott Ramirez PA-C;  Location: [facility] OR    IR CHEST PORT PLACEMENT > 5 YRS OF AGE  02/18/2022    IR PORT REMOVAL LEFT  04/11/2023    LAPAROSCOPIC SALPINGO-OOPHORECTOMY Bilateral 6/17/2024    Procedure: SALPINGO-OOPHORECTOMY, LAPAROSCOPIC BILATERAL, LYSIS OF ADHESION;  Surgeon: Salina Del Castillo MD;  Location: [facility]    LUMPECTOMY BREAST Right 08/29/2022    Procedure: Re-excision of right lumpectomy site;  Surgeon: Gurpreet Layton MD;  Location: [facility] OR    LUMPECTOMY BREAST WITH SENTINEL NODE, COMBINED Right 08/08/2022    Procedure: RIGHT SEED LOCALIZED BREAST LUMPECTOMY  WITH SENTINEL LYMPH NODE BIOPSY;  Surgeon: Gurpreet Layton MD;  Location: [facility] OR    REMOVE PORT VASCULAR ACCESS Left 04/11/2023    Procedure: Remove port vascular access left;  Surgeon: Shilpi Andujar MD;  Location: [facility] OR        MEDICATIONS:  Current Outpatient Medications   Medication Sig Dispense Refill    abemaciclib (VERZENIO) 150 MG tablet Take 1 tablet (150 mg) by mouth 2 times daily for 28 days 56 tablet 0    acetaminophen (TYLENOL) 325 MG tablet Take 2 tablets (650 mg) by mouth every 6 hours as needed for mild pain      cyclobenzaprine (FLEXERIL) 5 MG tablet Take 1-2 at bedtime as needed for muscle spasms 60 tablet 3    ibuprofen (ADVIL/MOTRIN) 600 MG tablet Take 1 tablet (600 mg) by mouth every 6 hours as needed for other (mild and/or inflammatory pain.)      letrozole (FEMARA) 2.5 MG tablet Take 1 tablet (2.5 mg) by mouth daily 90 tablet 3    multivitamin, therapeutic (THERA-VIT) TABS tablet Take 1 tablet by mouth daily      senna-docusate (SENOKOT-S/PERICOLACE) 8.6-50 MG tablet  Take 1-2 tablets by mouth 2 times daily 30 tablet 0     No current facility-administered medications for this visit.        ALLERGIES:  Not on File     SOCIAL HISTORY:  Social History     Socioeconomic History    Marital status:      Spouse name: Summer    Number of children: 2   Tobacco Use    Smoking status: Never     Passive exposure: Never    Smokeless tobacco: Never   Vaping Use    Vaping status: Never Used   Substance and Sexual Activity    Alcohol use: Yes     Comment: Occasionally 1-4 a month    Drug use: Never    Sexual activity: Yes     Partners: Male     Birth control/protection: None   Other Topics Concern    Parent/sibling w/ CABG, MI or angioplasty before 65F 55M? No     Social Determinants of Health     Financial Resource Strain: Low Risk  (1/29/2024)    Financial Resource Strain     Within the past 12 months, have you or your family members you live with been unable to get utilities (heat, electricity) when it was really needed?: No   Food Insecurity: Low Risk  (1/29/2024)    Food Insecurity     Within the past 12 months, did you worry that your food would run out before you got money to buy more?: No     Within the past 12 months, did the food you bought just not last and you didn t have money to get more?: No   Transportation Needs: Low Risk  (1/29/2024)    Transportation Needs     Within the past 12 months, has lack of transportation kept you from medical appointments, getting your medicines, non-medical meetings or appointments, work, or from getting things that you need?: No    Received from [facility], [facility]    Social Connections   Interpersonal Safety: Low Risk  (1/29/2024)    Interpersonal Safety     Do you feel physically and emotionally safe where you currently live?: Yes     Within the past 12 months, have you been hit, slapped, kicked or otherwise physically hurt by someone?: No     Within the past 12 months,  "have you been humiliated or emotionally abused in other ways by your partner or ex-partner?: No   Housing Stability: Low Risk  (1/29/2024)    Housing Stability     Do you have housing? : Yes     Are you worried about losing your housing?: No       FAMILY HISTORY:  Family History   Problem Relation Age of Onset    Hypertension Mother     Thyroid Disease Mother     No Known Problems Father     No Known Problems Brother     Breast Cancer Maternal Grandmother         Dx in her 70's    Osteoporosis Maternal Grandmother     Esophageal Cancer Maternal Grandfather     Anesthesia Reaction No family hx of     Deep Vein Thrombosis (DVT) No family hx of    PHYSICAL EXAM:  Objective    /72   Pulse 112   Ht 1.6 m (5' 3\")   Wt 65.7 kg (144 lb 14.4 oz)   SpO2 100%   BMI 25.67 kg/m    /72   Pulse 112   Ht 1.6 m (5' 3\")   Wt 65.7 kg (144 lb 14.4 oz)   SpO2 100%   BMI 25.67 kg/m    Body mass index is 25.67 kg/m .  Physical Exam   deferred      Sincerely,     Kingsley Reyes MD    "

## 2024-07-15 DIAGNOSIS — C50.911 INVASIVE DUCTAL CARCINOMA OF RIGHT BREAST (H): Primary | ICD-10-CM

## 2024-08-12 DIAGNOSIS — C50.911 INVASIVE DUCTAL CARCINOMA OF RIGHT BREAST (H): Primary | ICD-10-CM

## 2024-09-04 NOTE — PATIENT INSTRUCTIONS
[facility] Triage and after hours / weekends / holidays:  [phone]    Please call the triage or after hours line if you experience a temperature greater than or equal to 100.4, shaking chills, have uncontrolled nausea, vomiting and/or diarrhea, dizziness, shortness of breath, chest pain, bleeding, unexplained bruising, or if you have any other new/concerning symptoms, questions or concerns.      If you are having any concerning symptoms or wish to speak to a provider before your next infusion visit, please call your care coordinator or triage to notify them so we can adequately serve you.     If you need a refill on a narcotic prescription or other medication, please call before your infusion appointment.                April 2022 Sunday Monday Tuesday Wednesday Thursday Friday Saturday                            1    RETURN  10:15 AM   (30 min.)   Keegan Lockett MD   [facility] 2       3     4    LAB CENTRAL   9:15 AM   (15 min.)   [facility] LAB DRAW   [facility]    ONC INFUSION 2 HR (120 MIN)  10:00 AM   (120 min.)   [facility] INFUS[facility] NURSE   [facility] 5    VIDEO VISIT NEW   2:30 PM   (75 min.)   Elana Jarvis GC   [facility] 6     7     8     9       10     11    LAB CENTRAL  10:45 AM   (15 min.)   [facility]   [facility]    VIDEO VISIT RETURN  11:15 AM   (45 min.)   Milka Mota PA-C   [facility]    ONC INFUSION 2 HR (120 MIN)  12:30 PM   (120 min.)   [facility] INF[facility]ION NURSE   [facility] 12     13    VIDEO VISIT NEW   9:45 AM   (60 min.)   Goldberg, Jessica P, LICSW   [facility] 14     15    LAB CENTRAL  12:30 PM   (15 min.)   [facility] DRAW   [facility]    ONC INFUSION 0.5 HR (30 MIN)   1:00 PM   (30 min.)   [facility] INFUSION  Caller: jose    Doctor: christian    Reason for call: pt is not able to go through the mri, could she maybe do the open mri?    Call back#: [phone]   NURSE   [facility] 16       17     18     19    LAB CENTRAL   9:00 AM   (15 min.)   [facility]   [facility]    VIDEO VISIT RETURN   9:45 AM   (45 min.)   Milka Mota PA-C   [facility]    ONC INFUSION 2 HR (120 MIN)  11:00 AM   (120 min.)   [facility] INFUSION NURSE   [facility] 20    VIDEO VISIT RETURN   9:45 AM   (60 min.)   Goldberg, Jessica P, LICSW   [facility] 21     22     23       24     25    LAB CENTRAL  10:30 AM   (15 min.)   [facility]   [facility]    RETURN  11:15 AM   (45 min.)   Leela Hills PA-C   [facility]    ONC INFUSION 2 HR (120 MIN)   1:00 PM   (120 min.)    ONC INFUSION NURSE   [facility] 26     27     28     29     30                   May 2022      Luis F Monday Tuesday Wednesday Thursday Friday Saturday   1     2    LAB CENTRAL   7:45 AM   (15 min.)    [facility]RAW   [facility]    RETURN   8:15 AM   (45 min.)   Milka Mota PA-C   [facility]    ONC INFUSION 2 HR (120 MIN)  10:00 AM   (120 min.)   [facility] INFUSION NURSE   [facility] 3     4    VIDEO VISIT RETURN  10:45 AM   (60 min.)   Goldberg, Jessica P, LICSW   [facility] 5     6     7       8     9    LAB CENTRAL   7:45 AM   (15 min.)   [facility]   [facility]    RETURN   8:15 AM   (45 min.)   Milka Mota PA-C [facility]    ONC INFUSION 2 HR (120 MIN)  10:00 AM   (120 min.)   [facility] INFUSION NURSE   [facility]    MR BREAST BILATERAL WWO   1:30 PM   (60 min.)   [id number]   [facility] 10    US BREAST BX CORE NEEDLE RIGHT    9:30 AM   (60 min.)   [id number]   [facility] 11     12     13    RETURN   9:45 AM   (30 min.)   Keegan Lockett MD   [facility] 14       15     16     17    LAB CENTRAL   8:15 AM   (15 min.)   [facility]   [facility]    ONC INFUSION 2 HR (120 MIN)   9:00 AM   (120 min.)    ONC INFUSION NURSE   [facility] 18     19     20     21       22     23     24     25     26     27     28       29     30     31    LAB CENTRAL   9:15 AM   (15 min.)   [facility]   [facility]    VIDEO VISIT RETURN   9:45 AM   (45 min.)   Milka Mota PA-C   [facility]    ONC INFUSION 2 HR (120 MIN)  11:00 AM   (120 min.)    ONC INFUSION NURSE   [facility]                                       Lab Results:  Recent Results (from the past 12 hour(s))   Comprehensive metabolic panel    Collection Time: 04/25/22 11:25 AM   Result Value Ref Range    Sodium 140 133 - 144 mmol/L    Potassium 3.6 3.4 - 5.3 mmol/L    Chloride 104 94 - 109 mmol/L    Carbon Dioxide (CO2) 28 20 - 32 mmol/L    Anion Gap 8 3 - 14 mmol/L    Urea Nitrogen 9 7 - 30 mg/dL    Creatinine 0.64 0.52 - 1.04 mg/dL    Calcium 9.1 8.5 - 10.1 mg/dL    Glucose 127 (H) 70 - 99 mg/dL    Alkaline Phosphatase 86 40 - 150 U/L    AST 15 0 - 45 U/L    ALT 21 0 - 50 U/L    Protein Total 7.4 6.8 - 8.8 g/dL    Albumin 3.3 (L) 3.4 - 5.0 g/dL    Bilirubin Total 0.4 0.2 - 1.3 mg/dL    GFR Estimate >90 >60 mL/min/1.73m2   UA reflex to Microscopic    Collection Time: 04/25/22 11:25 AM   Result Value Ref Range    Color Urine Yellow Colorless, Straw, Light Yellow, Yellow    Appearance Urine Clear Clear    Glucose Urine Negative Negative mg/dL    Bilirubin Urine Negative Negative    Ketones Urine Negative Negative mg/dL    Specific Gravity Urine 1.008 1.003 - 1.035     Blood Urine Small (A) Negative    pH Urine 6.0 5.0 - 7.0    Protein Albumin Urine Negative Negative mg/dL    Urobilinogen Urine Normal Normal, 2.0 mg/dL    Nitrite Urine Negative Negative    Leukocyte Esterase Urine Trace (A) Negative    RBC Urine 2 <=2 /HPF    WBC Urine 2 <=5 /HPF    Squamous Epithelials Urine 2 (H) <=1 /HPF   CBC with platelets and differential    Collection Time: 04/25/22 11:25 AM   Result Value Ref Range    WBC Count 4.5 4.0 - 11.0 10e3/uL    RBC Count 3.80 3.80 - 5.20 10e6/uL    Hemoglobin 11.3 (L) 11.7 - 15.7 g/dL    Hematocrit 34.9 (L) 35.0 - 47.0 %    MCV 92 78 - 100 fL    MCH 29.7 26.5 - 33.0 pg    MCHC 32.4 31.5 - 36.5 g/dL    RDW 13.9 10.0 - 15.0 %    Platelet Count 293 150 - 450 10e3/uL    % Neutrophils 71 %    % Lymphocytes 22 %    % Monocytes 6 %    % Eosinophils 1 %    % Basophils 0 %    % Immature Granulocytes 0 %    NRBCs per 100 WBC 0 <1 /100    Absolute Neutrophils 3.1 1.6 - 8.3 10e3/uL    Absolute Lymphocytes 1.0 0.8 - 5.3 10e3/uL    Absolute Monocytes 0.3 0.0 - 1.3 10e3/uL    Absolute Eosinophils 0.0 0.0 - 0.7 10e3/uL    Absolute Basophils 0.0 0.0 - 0.2 10e3/uL    Absolute Immature Granulocytes 0.0 <=0.4 10e3/uL    Absolute NRBCs 0.0 10e3/uL

## 2024-09-09 DIAGNOSIS — C50.911 INVASIVE DUCTAL CARCINOMA OF RIGHT BREAST (H): Primary | ICD-10-CM

## 2024-09-15 ASSESSMENT — ANXIETY QUESTIONNAIRES
GAD7 TOTAL SCORE: 0
8. IF YOU CHECKED OFF ANY PROBLEMS, HOW DIFFICULT HAVE THESE MADE IT FOR YOU TO DO YOUR WORK, TAKE CARE OF THINGS AT HOME, OR GET ALONG WITH OTHER PEOPLE?: NOT DIFFICULT AT ALL
7. FEELING AFRAID AS IF SOMETHING AWFUL MIGHT HAPPEN: NOT AT ALL
GAD7 TOTAL SCORE: 0
GAD7 TOTAL SCORE: 0

## 2024-09-19 ENCOUNTER — OFFICE VISIT (OUTPATIENT)
Dept: OBGYN | Facility: CLINIC | Age: 49
End: 2024-09-19
Attending: OBSTETRICS & GYNECOLOGY
Payer: OTHER GOVERNMENT

## 2024-09-19 VITALS
DIASTOLIC BLOOD PRESSURE: 68 MMHG | SYSTOLIC BLOOD PRESSURE: 104 MMHG | BODY MASS INDEX: 26.05 KG/M2 | HEIGHT: 63 IN | WEIGHT: 147 LBS

## 2024-09-19 DIAGNOSIS — N95.1 VASOMOTOR SYMPTOMS DUE TO MENOPAUSE: ICD-10-CM

## 2024-09-19 DIAGNOSIS — E89.40 SURGICAL MENOPAUSE: ICD-10-CM

## 2024-09-19 DIAGNOSIS — C50.911 INVASIVE DUCTAL CARCINOMA OF RIGHT BREAST (H): ICD-10-CM

## 2024-09-19 DIAGNOSIS — N94.10 DYSPAREUNIA IN FEMALE: Primary | ICD-10-CM

## 2024-09-19 PROCEDURE — 99204 OFFICE O/P NEW MOD 45 MIN: CPT | Performed by: OBSTETRICS & GYNECOLOGY

## 2024-09-19 NOTE — PROGRESS NOTES
CC: Tori Steele is here secondary to menopause.    HPI: The pt is a 49 year old MBF  who presents with worsening menopausal symptoms since her BSO.  She has a history of a right side breast cancer that was ER/NE+.  She has been on estrogen blocking medication.  It was first diagnosed in 2022.  She underwent laparoscopic BSO on 6/17/24 for prevention.  Since then her symptoms have increased significantly.  She is having hot flashes and night sweats.  She has interrupted sleep (her second most bothersome issue).  She has vaginal dryness (her most bothersome issue).  She has noticed weight gain and body shape changes.  She has had heartburn since her surgery, although that has improved.  She also gets joint and muscle achiness.  She doesn't drink caffeine or alcohol, doesn't smoke, and has limited sugar intake.    Past Medical History:   Diagnosis Date    Breast cancer (H) 01/2022    Sinus tachycardia        Past Surgical History:   Procedure Laterality Date    BIOPSY      DILATION AND CURETTAGE      INSERT PORT VASCULAR ACCESS Left 02/18/2022    Procedure: INSERTION, VASCULAR ACCESS PORT;  Surgeon: Elliott Ramirez PA-C;  [place] [facility]    IR CHEST PORT PLACEMENT > 5 YRS OF AGE  02/18/2022    IR PORT REMOVAL LEFT  04/11/2023    LAPAROSCOPIC SALPINGO-OOPHORECTOMY Bilateral 6/17/2024    Procedure: SALPINGO-OOPHORECTOMY, LAPAROSCOPIC BILATERAL, LYSIS OF ADHESION;  Surgeon: Salina Del Castillo MD;  Location: [facility] OR    LUMPECTOMY BREAST Right 08/29/2022    Procedure: Re-excision of right lumpectomy site;  Surgeon: Gurpreet Layton MD;  [place] [facility]    LUMPECTOMY BREAST WITH SENTINEL NODE, COMBINED Right 08/08/2022    Procedure: RIGHT SEED LOCALIZED BREAST LUMPECTOMY  WITH SENTINEL LYMPH NODE BIOPSY;  Surgeon: Gurpreet Layton MD;  [place] [facility]    REMOVE PORT VASCULAR ACCESS Left 04/11/2023    Procedure: Remove port vascular access left;  Surgeon: Shilpi Andujar MD;  [place]: [facility]       Patient's    Family History   Problem Relation Age of Onset    Hypertension Mother     Thyroid Disease Mother     No Known Problems Father     Breast Cancer Maternal Grandmother         Dx in her 70's    Osteoporosis Maternal Grandmother     Esophageal Cancer Maternal Grandfather     No Known Problems Brother     Anesthesia Reaction No family hx of     Deep Vein Thrombosis (DVT) No family hx of        Patient   Social History     Socioeconomic History    Marital status:      Spouse name: Summer    Number of children: 2    Years of education: None    Highest education level: None   Tobacco Use    Smoking status: Never     Passive exposure: Never    Smokeless tobacco: Never   Vaping Use    Vaping status: Never Used   Substance and Sexual Activity    Alcohol use: Yes     Comment: Occasionally 1-4 a month    Drug use: Never    Sexual activity: Yes     Partners: Male     Birth control/protection: None   Other Topics Concern    Parent/sibling w/ CABG, MI or angioplasty before 65F 55M? No     Social Determinants of Health     Financial Resource Strain: Low Risk  (1/29/2024)    Financial Resource Strain     Within the past 12 months, have you or your family members you live with been unable to get utilities (heat, electricity) when it was really needed?: No   Food Insecurity: Low Risk  (1/29/2024)    Food Insecurity     Within the past 12 months, did you worry that your food would run out before you got money to buy more?: No     Within the past 12 months, did the food you bought just not last and you didn t have money to get more?: No   Transportation Needs: Low Risk  (1/29/2024)    Transportation Needs     Within the past 12 months, has lack of transportation kept you from medical appointments, getting your medicines, non-medical meetings or appointments, work, or from getting things that you need?: No    [facility], [facility]  "Connections   Interpersonal Safety: Low Risk  (1/29/2024)    Interpersonal Safety     Do you feel physically and emotionally safe where you currently live?: Yes     Within the past 12 months, have you been hit, slapped, kicked or otherwise physically hurt by someone?: No     Within the past 12 months, have you been humiliated or emotionally abused in other ways by your partner or ex-partner?: No   Housing Stability: Low Risk  (1/29/2024)    Housing Stability     Do you have housing? : Yes     Are you worried about losing your housing?: No       Outpatient Medications Prior to Visit   Medication Sig Dispense Refill    abemaciclib (VERZENIO) 150 MG tablet Take 1 tablet (150 mg) by mouth 2 times daily for 28 days 56 tablet 0    cyclobenzaprine (FLEXERIL) 5 MG tablet Take 1-2 at bedtime as needed for muscle spasms 60 tablet 3    letrozole (FEMARA) 2.5 MG tablet Take 1 tablet (2.5 mg) by mouth daily 90 tablet 3    multivitamin, therapeutic (THERA-VIT) TABS tablet Take 1 tablet by mouth daily      senna-docusate (SENOKOT-S/PERICOLACE) 8.6-50 MG tablet Take 1-2 tablets by mouth 2 times daily 30 tablet 0    acetaminophen (TYLENOL) 325 MG tablet Take 2 tablets (650 mg) by mouth every 6 hours as needed for mild pain      ibuprofen (ADVIL/MOTRIN) 600 MG tablet Take 1 tablet (600 mg) by mouth every 6 hours as needed for other (mild and/or inflammatory pain.)       No facility-administered medications prior to visit.       Patient has No Known Allergies.    ROS:  12 part ROS is negative aside from those symptoms in the HPI    PE:  /68 (BP Location: Right arm, Patient Position: Sitting, Cuff Size: Adult Regular)   Ht 1.6 m (5' 3\")   Wt 66.7 kg (147 lb)           Body mass index is 26.04 kg/m .    General: overweight BF, NAD  Psych: normal mood  Neuro: CN I-XII grossly intact  MS: normal gait    Assessment: 49 year old MBF  with ER/NV+ right breast cancer, menopausal symptoms    Plan: Natural history of menopause " discussed with the patient.  We discussed the abrupt nature of surgical menopause and why her symptoms seemed to increase so much in a 1-2 week period of time after the BSO.  Since intercourse is her primary concern, we discussed lubricants and vaginal moisturizers.  I recommended coconut oil on at least a daily basis and that it may take a few weeks to see improvement.  We discussed increased foreplay to retrain her brain so that intercourse isn't automatically painful.  We discussed Smitten Kitten as an option.  Her second concern is around sleep.  We discussed sleep hygiene.  We discussed turning off all blue screens 30-60 minutes before going to bed.  We discussed fans, cooling sheets/pillows, and hot flash pajamas.  We discussed journaling before bedtime.  We discussed getting out of bed if she's been awake for more than 10-15 minutes, and then repeating her bedtime routine before going back to bed.  We also discussed exercise and diet for the body shape changes.  We discussed that the muscle/joint pain may be menopause, medication side effects, or both.  We discussed SSRIs, gabapentin, and Veozah as possibilities for the vasomotor symptoms since HRT isn't an option for her.  At this time she wants to work on the other things first and will let me know if she wants to try something for the vasomotor symptoms in the future.  Questions were answered to the best of my ability.    54 minutes spent on the date of the encounter doing chart review, review of test results, interpretation of tests, patient visit, and documentation   Answers submitted by the patient for this visit:  Patient Health Questionnaire (G7) (Submitted on 9/15/2024)  EMMA 7 TOTAL SCORE: 0

## 2024-09-20 ENCOUNTER — ONCOLOGY VISIT (OUTPATIENT)
Dept: ONCOLOGY | Facility: CLINIC | Age: 49
End: 2024-09-20
Attending: PHYSICIAN ASSISTANT
Payer: OTHER GOVERNMENT

## 2024-09-20 ENCOUNTER — APPOINTMENT (OUTPATIENT)
Dept: LAB | Facility: CLINIC | Age: 49
End: 2024-09-20
Attending: PHYSICIAN ASSISTANT
Payer: OTHER GOVERNMENT

## 2024-09-20 VITALS
BODY MASS INDEX: 26.54 KG/M2 | TEMPERATURE: 98.5 F | OXYGEN SATURATION: 100 % | SYSTOLIC BLOOD PRESSURE: 113 MMHG | WEIGHT: 149.8 LBS | HEART RATE: 105 BPM | DIASTOLIC BLOOD PRESSURE: 71 MMHG | RESPIRATION RATE: 18 BRPM

## 2024-09-20 DIAGNOSIS — Z17.0 MALIGNANT NEOPLASM OF CENTRAL PORTION OF RIGHT BREAST IN FEMALE, ESTROGEN RECEPTOR POSITIVE (H): ICD-10-CM

## 2024-09-20 DIAGNOSIS — C50.911 INVASIVE DUCTAL CARCINOMA OF RIGHT BREAST (H): ICD-10-CM

## 2024-09-20 DIAGNOSIS — D50.0 IRON DEFICIENCY ANEMIA DUE TO CHRONIC BLOOD LOSS: Primary | ICD-10-CM

## 2024-09-20 DIAGNOSIS — Z79.811 LONG TERM CURRENT USE OF AROMATASE INHIBITOR: ICD-10-CM

## 2024-09-20 DIAGNOSIS — C50.111 MALIGNANT NEOPLASM OF CENTRAL PORTION OF RIGHT BREAST IN FEMALE, ESTROGEN RECEPTOR POSITIVE (H): ICD-10-CM

## 2024-09-20 LAB
ALBUMIN SERPL BCG-MCNC: 3.9 G/DL (ref 3.5–5.2)
ALP SERPL-CCNC: 62 U/L (ref 40–150)
ALT SERPL W P-5'-P-CCNC: 12 U/L (ref 0–50)
ANION GAP SERPL CALCULATED.3IONS-SCNC: 11 MMOL/L (ref 7–15)
AST SERPL W P-5'-P-CCNC: 18 U/L (ref 0–45)
BASOPHILS # BLD AUTO: 0 10E3/UL (ref 0–0.2)
BASOPHILS NFR BLD AUTO: 1 %
BILIRUB SERPL-MCNC: 0.4 MG/DL
BUN SERPL-MCNC: 11 MG/DL (ref 6–20)
CALCIUM SERPL-MCNC: 9.3 MG/DL (ref 8.8–10.4)
CHLORIDE SERPL-SCNC: 106 MMOL/L (ref 98–107)
CREAT SERPL-MCNC: 0.86 MG/DL (ref 0.51–0.95)
EGFRCR SERPLBLD CKD-EPI 2021: 83 ML/MIN/1.73M2
EOSINOPHIL # BLD AUTO: 0.1 10E3/UL (ref 0–0.7)
EOSINOPHIL NFR BLD AUTO: 1 %
ERYTHROCYTE [DISTWIDTH] IN BLOOD BY AUTOMATED COUNT: 11.8 % (ref 10–15)
FERRITIN SERPL-MCNC: 69 NG/ML (ref 6–175)
GLUCOSE SERPL-MCNC: 78 MG/DL (ref 70–99)
HCO3 SERPL-SCNC: 24 MMOL/L (ref 22–29)
HCT VFR BLD AUTO: 33.3 % (ref 35–47)
HGB BLD-MCNC: 10.8 G/DL (ref 11.7–15.7)
IMM GRANULOCYTES # BLD: 0 10E3/UL
IMM GRANULOCYTES NFR BLD: 0 %
LYMPHOCYTES # BLD AUTO: 0.7 10E3/UL (ref 0.8–5.3)
LYMPHOCYTES NFR BLD AUTO: 19 %
MCH RBC QN AUTO: 34 PG (ref 26.5–33)
MCHC RBC AUTO-ENTMCNC: 32.4 G/DL (ref 31.5–36.5)
MCV RBC AUTO: 105 FL (ref 78–100)
MONOCYTES # BLD AUTO: 0.3 10E3/UL (ref 0–1.3)
MONOCYTES NFR BLD AUTO: 9 %
NEUTROPHILS # BLD AUTO: 2.5 10E3/UL (ref 1.6–8.3)
NEUTROPHILS NFR BLD AUTO: 70 %
NRBC # BLD AUTO: 0 10E3/UL
NRBC BLD AUTO-RTO: 0 /100
PLATELET # BLD AUTO: 168 10E3/UL (ref 150–450)
POTASSIUM SERPL-SCNC: 3.4 MMOL/L (ref 3.4–5.3)
PROT SERPL-MCNC: 7.2 G/DL (ref 6.4–8.3)
RBC # BLD AUTO: 3.18 10E6/UL (ref 3.8–5.2)
SODIUM SERPL-SCNC: 141 MMOL/L (ref 135–145)
WBC # BLD AUTO: 3.6 10E3/UL (ref 4–11)

## 2024-09-20 PROCEDURE — 99214 OFFICE O/P EST MOD 30 MIN: CPT | Performed by: PHYSICIAN ASSISTANT

## 2024-09-20 PROCEDURE — G0463 HOSPITAL OUTPT CLINIC VISIT: HCPCS | Performed by: PHYSICIAN ASSISTANT

## 2024-09-20 PROCEDURE — 82728 ASSAY OF FERRITIN: CPT | Performed by: PHYSICIAN ASSISTANT

## 2024-09-20 PROCEDURE — 85025 COMPLETE CBC W/AUTO DIFF WBC: CPT | Performed by: PHYSICIAN ASSISTANT

## 2024-09-20 PROCEDURE — 82040 ASSAY OF SERUM ALBUMIN: CPT | Performed by: PHYSICIAN ASSISTANT

## 2024-09-20 PROCEDURE — 36415 COLL VENOUS BLD VENIPUNCTURE: CPT | Performed by: PHYSICIAN ASSISTANT

## 2024-09-20 ASSESSMENT — PAIN SCALES - GENERAL: PAINLEVEL: NO PAIN (0)

## 2024-09-20 NOTE — LETTER
9/20/2024      Tori Steele  [address] [facility] [address]      Dear Colleague,    Thank you for referring your patient, Tori Steele, to the [facility]. Please see a copy of my visit note below.      Sep 20, 2024    REASON FOR VISIT:  history of breast cancer    CANCER HISTORY AND TREATMENT:  Tori Steele is a 49 year old female with breat cancer. Right-sided 5.5 cm ER positive DC positive HER-2 negative breast cancer with associated DCIS.  Her MammaPrint came back as high risk.  She consented for the I-SPY clinical trial and has been randomized to the oral paclitaxel, Encequidar and dostarlimab arm.      2/21/22 started trial with oral paclitaxel, Encequidar and dostarlimab.    3/15/22  her IV immunotherapy dostarlimab was held due to diarrhea that recovered with Imodium and time  4/4/22   Carbo was added in weekly due in hopes for more significant reduction in cancer  5/17/22 AC start  7/8/22 AC End  8/8/2022 - R. Lumpectomy with SNLBX -               -INVASIVE BREAST CARCINOMA OF NO SPECIAL TYPE (INVASIVE DUCTAL CARCINOMA), with   apocrine features, SAMANTHA GRADE 2, size 45 x 32 mm, residual after neoadjuvant systemic therapy              -Ductal carcinoma in situ (DCIS), nuclear grade 3, cribriform and solid type(s), with focal necrosis              -DCIS is admixed with invasive carcinoma, and comprises 30% of the tumor cellularity              -Invasive carcinoma is estrogen receptor positive, progesterone receptor positive and HER2   negative (score 1+) by immunohistochemistry               -METASTATIC BREAST DUCTAL CARCINOMA in one of two lymph nodes (1/2), residual after neoadjuvant systemic therapy              -Extranodal extension present (size 2 mm)              -[facility] residual cancer burden is 3.284 (RCB Class III).   8/29/2022 - Resection of involved margins - now negative  9/19/22 started Zoladex  10/31/22 completed XRT 21 fractions      12/12/22: starting Zometa q6m and abemaciclib/letrozole   6/17/2024- BSO    INTERVAL HISTORY:   As noted above Dr. Del Castillo and prophylactic BSO in June.  She has recovered from this very well and has no surgical complications.  She notes that she has more hot flashes at night, vaginal dryness and sleep issues after the BSO.     Otherwise she is doing well.  She has no new medical complaints.  Her review of systems is otherwise unremarkable.      SOCIAL HISTORY: Her son continues with an active wrestling career and her daughter is a senior, this is a big year for all of them.  Emotionally she feels she is getting stronger.      She has learned to manage the bowel symptoms of abema well but looking forward to being done in December. .      Otherwise, ROS negative for: fevers, headaches, visual changes, new sites of pain, shortness of breath, cough, chest pain, abdominal pain, nausea, vomiting, abnormal vaginal bleeding, or leg swelling.      Current Outpatient Medications   Medication Sig Dispense Refill     abemaciclib (VERZENIO) 150 MG tablet Take 1 tablet (150 mg) by mouth 2 times daily for 28 days 56 tablet 0     cyclobenzaprine (FLEXERIL) 5 MG tablet Take 1-2 at bedtime as needed for muscle spasms 60 tablet 3     letrozole (FEMARA) 2.5 MG tablet Take 1 tablet (2.5 mg) by mouth daily 90 tablet 3     multivitamin, therapeutic (THERA-VIT) TABS tablet Take 1 tablet by mouth daily       senna-docusate (SENOKOT-S/PERICOLACE) 8.6-50 MG tablet Take 1-2 tablets by mouth 2 times daily 30 tablet 0        No Known Allergies    PHYSICAL EXAMINATION  /71   Pulse 105   Temp 98.5  F (36.9  C) (Oral)   Resp 18   Wt 67.9 kg (149 lb 12.8 oz)   SpO2 100%   BMI 26.54 kg/m      Wt Readings from Last 4 Encounters:   09/20/24 67.9 kg (149 lb 12.8 oz)   09/19/24 66.7 kg (147 lb)   07/05/24 65.7 kg (144 lb 14.4 oz)   06/28/24 66.2 kg (145 lb 14.4 oz)     Vital signs were reviewed.   Patient alert and oriented x3.   PERRLA.  EOMI. No scleral icterus noted. OP without thrush/sores.  Neck exam: No palpable cervical, supraclavicular or axillary nodes bilaterally.   Heart: RRR no murmurs noted.   Breasts: R breast with no scarring at the lumpectomy site.  No palpable masses in R or L breast  Lungs: clear to auscultation bilaterally.  No crackles or wheezing.   Abd: positive bowel sounds in all four quadrants.  No tenderness to palpation.  No hepatomegaly.   Extremities: No lower extremity edema.   Neuro: grossly intact.   Mood and affect is stable.     LABS:  Most Recent 3 CBC's:  Recent Labs   Lab Test 09/20/24  0950 06/28/24  1223 06/17/24  0802 05/31/24  1027   WBC 3.6* 3.5* 3.0* 3.1*   HGB 10.8* 11.3* 11.4* 11.5*   * 103* 106* 102*    201 195 194   ANEUTAUTO 2.5 2.5  --  2.2     Most Recent 3 BMP's:  Recent Labs   Lab Test 09/20/24  0950 05/31/24  1027 05/02/24  1125    141 140   POTASSIUM 3.4 3.7 3.5   CHLORIDE 106 106 105   CO2 24 26 26   BUN 11.0 10.8 11.5   CR 0.86 0.85 0.85   ANIONGAP 11 9 9   JENNIE 9.3 9.3 9.3   GLC 78 101* 127*   PROTTOTAL 7.2 7.5 7.4   ALBUMIN 3.9 4.2 4.0    Most Recent 3 LFT's:  Recent Labs   Lab Test 09/20/24  0950 05/31/24  1027 05/02/24  1125   AST 18 19 16   ALT 12 15 9   ALKPHOS 62 64 63   BILITOTAL 0.4 0.6 0.6    Most Recent 2 TSH and T4:  Recent Labs   Lab Test 09/09/22  1122 07/29/22  1009 06/24/22  1057   TSH 1.52 1.39 1.66   T4  --  1.12 0.96      I reviewed the above labs today.       PROBLEMS:     1. Right sided ER+, NV+, HER2-negative, MammaPrint high risk T=5.5cm, N=0 breast cancer. Treated on I-SPY with assigned arm of dostarlimab/oral paclitaxel. Carbo added week 3. Received ACx4. Pathologic staging showed pT2a. PN1a. ER+, NV+, HER21+, RCB=3   2. Pre-menopausal, now on Zoladex monthly, letrozole, abemaciclib  3. Minimal elevation in glucose  4. Last zoladex shot was 5/31/24, now S/p BSO in June 2024        IMPRESSION: She is doing so well, physically and emotionally, with no  new concerns.  She feels better after the BSO     PLAN:  1.  Continue letrozole/abema through 12/24.  Abema will be done in December  3. RTC in 3 month, can see Dr Lockett then  4. Zometa in December (dose #5 out of 6)  5. DEXA in April of 2025     Janette Mota PA-C    40 minutes spent on the date of the encounter doing chart review, review of test results, interpretation of tests, patient visit, and documentation     The longitudinal plan of care for the diagnosis(es)/condition(s) as documented were addressed during this visit. Due to the added complexity in care, I will continue to support Tori in the subsequent management and with ongoing continuity of care.       Again, thank you for allowing me to participate in the care of your patient.        Sincerely,        Milka Mota PA-C

## 2024-09-20 NOTE — NURSING NOTE
Chief Complaint   Patient presents with    Blood Draw     Labs drawn with  by RN. Vitals taken. Pt checked into next appointment.       Miriam Caruso RN

## 2024-09-20 NOTE — NURSING NOTE
"Oncology Rooming Note    September 20, 2024 10:04 AM   Tori Steele is a 49 year old female who presents for:    Chief Complaint   Patient presents with    Blood Draw     Labs drawn with  by RN. Vitals taken. Pt checked into next appointment.      Oncology Clinic Visit     Invasive ductal carcinoma of right breast     Initial Vitals: /71   Pulse 105   Temp 98.5  F (36.9  C) (Oral)   Resp 18   Wt 67.9 kg (149 lb 12.8 oz)   SpO2 100%   BMI 26.54 kg/m   Estimated body mass index is 26.54 kg/m  as calculated from the following:    Height as of 9/19/24: 1.6 m (5' 3\").    Weight as of this encounter: 67.9 kg (149 lb 12.8 oz). Body surface area is 1.74 meters squared.  No Pain (0) Comment: Data Unavailable   No LMP recorded. (Menstrual status: Chemotherapy).  Allergies reviewed: Yes  Medications reviewed: Yes    Medications: Medication refills not needed today.  Pharmacy name entered into [employer] [facility]: [id number] - [place], [place] - [address] AT [facility] [address]    Frailty Screening:   Is the patient here for a new oncology consult visit in cancer care? 2. No      Clinical concerns:  none      Nanci Crespo"

## 2024-09-20 NOTE — PROGRESS NOTES
Sep 20, 2024    REASON FOR VISIT:  history of breast cancer    CANCER HISTORY AND TREATMENT:  Tori Steele is a 49 year old female with breat cancer. Right-sided 5.5 cm ER positive IL positive HER-2 negative breast cancer with associated DCIS.  Her MammaPrint came back as high risk.  She consented for the I-SPY clinical trial and has been randomized to the oral paclitaxel, Encequidar and dostarlimab arm.      2/21/22 started trial with oral paclitaxel, Encequidar and dostarlimab.    3/15/22  her IV immunotherapy dostarlimab was held due to diarrhea that recovered with Imodium and time  4/4/22   Carbo was added in weekly due in hopes for more significant reduction in cancer  5/17/22 AC start  7/8/22 AC End  8/8/2022 - R. Lumpectomy with SNLBX -               -INVASIVE BREAST CARCINOMA OF NO SPECIAL TYPE (INVASIVE DUCTAL CARCINOMA), with   apocrine features, SAMANTHA GRADE 2, size 45 x 32 mm, residual after neoadjuvant systemic therapy              -Ductal carcinoma in situ (DCIS), nuclear grade 3, cribriform and solid type(s), with focal necrosis              -DCIS is admixed with invasive carcinoma, and comprises 30% of the tumor cellularity              -Invasive carcinoma is estrogen receptor positive, progesterone receptor positive and HER2   negative (score 1+) by immunohistochemistry               -METASTATIC BREAST DUCTAL CARCINOMA in one of two lymph nodes (1/2), residual after neoadjuvant systemic therapy              -Extranodal extension present (size 2 mm)              -[facility] residual cancer burden is 3.284 (RCB Class III).   8/29/2022 - Resection of involved margins - now negative  9/19/22 started Zoladex  10/31/22 completed XRT 21 fractions     12/12/22: starting Zometa q6m and abemaciclib/letrozole   6/17/2024- BSO    INTERVAL HISTORY:   As noted above Dr. Del Castillo and prophylactic BSO in June.  She has recovered from this very well and has no surgical complications.  She notes that she  has more hot flashes at night, vaginal dryness and sleep issues after the BSO.     Otherwise she is doing well.  She has no new medical complaints.  Her review of systems is otherwise unremarkable.      SOCIAL HISTORY: Her son continues with an active wrestling career and her daughter is a senior, this is a big year for all of them.  Emotionally she feels she is getting stronger.      She has learned to manage the bowel symptoms of abema well but looking forward to being done in December. .      Otherwise, ROS negative for: fevers, headaches, visual changes, new sites of pain, shortness of breath, cough, chest pain, abdominal pain, nausea, vomiting, abnormal vaginal bleeding, or leg swelling.      Current Outpatient Medications   Medication Sig Dispense Refill    abemaciclib (VERZENIO) 150 MG tablet Take 1 tablet (150 mg) by mouth 2 times daily for 28 days 56 tablet 0    cyclobenzaprine (FLEXERIL) 5 MG tablet Take 1-2 at bedtime as needed for muscle spasms 60 tablet 3    letrozole (FEMARA) 2.5 MG tablet Take 1 tablet (2.5 mg) by mouth daily 90 tablet 3    multivitamin, therapeutic (THERA-VIT) TABS tablet Take 1 tablet by mouth daily      senna-docusate (SENOKOT-S/PERICOLACE) 8.6-50 MG tablet Take 1-2 tablets by mouth 2 times daily 30 tablet 0        No Known Allergies    PHYSICAL EXAMINATION  /71   Pulse 105   Temp 98.5  F (36.9  C) (Oral)   Resp 18   Wt 67.9 kg (149 lb 12.8 oz)   SpO2 100%   BMI 26.54 kg/m      Wt Readings from Last 4 Encounters:   09/20/24 67.9 kg (149 lb 12.8 oz)   09/19/24 66.7 kg (147 lb)   07/05/24 65.7 kg (144 lb 14.4 oz)   06/28/24 66.2 kg (145 lb 14.4 oz)     Vital signs were reviewed.   Patient alert and oriented x3.   PERRLA. EOMI. No scleral icterus noted. OP without thrush/sores.  Neck exam: No palpable cervical, supraclavicular or axillary nodes bilaterally.   Heart: RRR no murmurs noted.   Breasts: R breast with no scarring at the lumpectomy site.  No palpable masses in R  or L breast  Lungs: clear to auscultation bilaterally.  No crackles or wheezing.   Abd: positive bowel sounds in all four quadrants.  No tenderness to palpation.  No hepatomegaly.   Extremities: No lower extremity edema.   Neuro: grossly intact.   Mood and affect is stable.     LABS:  Most Recent 3 CBC's:  Recent Labs   Lab Test 09/20/24  0950 06/28/24  1223 06/17/24  0802 05/31/24  1027   WBC 3.6* 3.5* 3.0* 3.1*   HGB 10.8* 11.3* 11.4* 11.5*   * 103* 106* 102*    201 195 194   ANEUTAUTO 2.5 2.5  --  2.2     Most Recent 3 BMP's:  Recent Labs   Lab Test 09/20/24  0950 05/31/24  1027 05/02/24  1125    141 140   POTASSIUM 3.4 3.7 3.5   CHLORIDE 106 106 105   CO2 24 26 26   BUN 11.0 10.8 11.5   CR 0.86 0.85 0.85   ANIONGAP 11 9 9   JENNIE 9.3 9.3 9.3   GLC 78 101* 127*   PROTTOTAL 7.2 7.5 7.4   ALBUMIN 3.9 4.2 4.0    Most Recent 3 LFT's:  Recent Labs   Lab Test 09/20/24  0950 05/31/24  1027 05/02/24  1125   AST 18 19 16   ALT 12 15 9   ALKPHOS 62 64 63   BILITOTAL 0.4 0.6 0.6    Most Recent 2 TSH and T4:  Recent Labs   Lab Test 09/09/22  1122 07/29/22  1009 06/24/22  1057   TSH 1.52 1.39 1.66   T4  --  1.12 0.96      I reviewed the above labs today.       PROBLEMS:     1. Right sided ER+, MI+, HER2-negative, MammaPrint high risk T=5.5cm, N=0 breast cancer. Treated on I-SPY with assigned arm of dostarlimab/oral paclitaxel. Carbo added week 3. Received ACx4. Pathologic staging showed pT2a. PN1a. ER+, MI+, HER21+, RCB=3   2. Pre-menopausal, now on Zoladex monthly, letrozole, abemaciclib  3. Minimal elevation in glucose  4. Last zoladex shot was 5/31/24, now S/p BSO in June 2024        IMPRESSION: She is doing so well, physically and emotionally, with no new concerns.  She feels better after the BSO     PLAN:  1.  Continue letrozole/abema through 12/24.  Abema will be done in December  3. RTC in 3 month, can see Dr Lockett then  4. Zometa in December (dose #5 out of 6)  5. DEXA in April of 2025     Janette  Svetlana MEZA    40 minutes spent on the date of the encounter doing chart review, review of test results, interpretation of tests, patient visit, and documentation     The longitudinal plan of care for the diagnosis(es)/condition(s) as documented were addressed during this visit. Due to the added complexity in care, I will continue to support Tori in the subsequent management and with ongoing continuity of care.

## 2024-09-21 RX ORDER — ZOLEDRONIC ACID 0.04 MG/ML
4 INJECTION, SOLUTION INTRAVENOUS ONCE
OUTPATIENT
Start: 2025-05-30 | End: 2025-05-30

## 2024-09-21 RX ORDER — HEPARIN SODIUM (PORCINE) LOCK FLUSH IV SOLN 100 UNIT/ML 100 UNIT/ML
5 SOLUTION INTRAVENOUS
OUTPATIENT
Start: 2025-05-30

## 2024-09-21 RX ORDER — HEPARIN SODIUM,PORCINE 10 UNIT/ML
5 VIAL (ML) INTRAVENOUS
OUTPATIENT
Start: 2024-12-01

## 2024-09-21 RX ORDER — HEPARIN SODIUM (PORCINE) LOCK FLUSH IV SOLN 100 UNIT/ML 100 UNIT/ML
5 SOLUTION INTRAVENOUS
OUTPATIENT
Start: 2024-12-01

## 2024-09-21 RX ORDER — HEPARIN SODIUM,PORCINE 10 UNIT/ML
5 VIAL (ML) INTRAVENOUS
OUTPATIENT
Start: 2025-05-30

## 2024-09-21 RX ORDER — ZOLEDRONIC ACID 0.04 MG/ML
4 INJECTION, SOLUTION INTRAVENOUS ONCE
OUTPATIENT
Start: 2024-12-01 | End: 2024-12-01

## 2024-10-02 DIAGNOSIS — C50.111 MALIGNANT NEOPLASM OF CENTRAL PORTION OF RIGHT BREAST IN FEMALE, ESTROGEN RECEPTOR POSITIVE (H): Primary | ICD-10-CM

## 2024-10-02 DIAGNOSIS — Z17.0 MALIGNANT NEOPLASM OF CENTRAL PORTION OF RIGHT BREAST IN FEMALE, ESTROGEN RECEPTOR POSITIVE (H): Primary | ICD-10-CM

## 2024-10-07 DIAGNOSIS — C50.911 INVASIVE DUCTAL CARCINOMA OF RIGHT BREAST (H): Primary | ICD-10-CM

## 2024-11-01 DIAGNOSIS — C50.911 INVASIVE DUCTAL CARCINOMA OF RIGHT BREAST (H): Primary | ICD-10-CM

## 2024-11-25 DIAGNOSIS — C50.911 INVASIVE DUCTAL CARCINOMA OF RIGHT BREAST (H): Primary | ICD-10-CM

## 2024-12-02 DIAGNOSIS — C50.911 INVASIVE DUCTAL CARCINOMA OF RIGHT BREAST (H): Primary | ICD-10-CM

## 2024-12-06 ENCOUNTER — APPOINTMENT (OUTPATIENT)
Dept: LAB | Facility: CLINIC | Age: 49
End: 2024-12-06
Attending: INTERNAL MEDICINE
Payer: OTHER GOVERNMENT

## 2024-12-07 ENCOUNTER — DOCUMENTATION ONLY (OUTPATIENT)
Dept: ONCOLOGY | Facility: CLINIC | Age: 49
End: 2024-12-07
Payer: OTHER GOVERNMENT

## 2024-12-07 NOTE — PROGRESS NOTES
[facility] Cancer Care Oral Chemotherapy Monitoring Program    Thank you for the opportunity to be a part in the care of this patient's oral chemotherapy. The oncology pharmacy will no longer be following this patient for oral chemotherapy. If there are any questions or the plan changes, feel free to contact us.        7/15/2024    10:00 AM 8/12/2024     9:00 AM 9/9/2024     1:00 PM 10/7/2024     9:00 AM 11/1/2024     2:00 PM 12/2/2024     8:00 AM 12/7/2024    10:00 AM   ORAL CHEMOTHERAPY   Assessment Type Refill Refill Refill Refill Refill Refill Discontinuation   Stop Date       12/6/2024   Reason for Discontinuation       Therapy complete   Diagnosis Code Breast Cancer Breast Cancer Breast Cancer Breast Cancer Breast Cancer Breast Cancer Breast Cancer   Providers Dr Cathryn Lockett   [facility]   Is this patient followed by the [facility] OC team?      No No   Drug Name Verzenio (abemaciclib) Verzenio (abemaciclib) Verzenio (abemaciclib) Verzenio (abemaciclib) Verzenio (abemaciclib) Verzenio (abemaciclib) Verzenio (abemaciclib)   Dose 150 mg 150 mg 150 mg 150 mg 150 mg 150 mg 150 mg   Current Schedule BID BID BID BID BID     Cycle Details Continuous Continuous Continuous Continuous Continuous Continuous Continuous   Planned next cycle start date    10/14/2024 11/11/2024         Landry Hair, Pharmacy Intern  Oral Chemotherapy Monitoring Program  [phone]

## 2025-03-15 ENCOUNTER — HEALTH MAINTENANCE LETTER (OUTPATIENT)
Age: 50
End: 2025-03-15

## 2025-03-24 SDOH — HEALTH STABILITY: PHYSICAL HEALTH: ON AVERAGE, HOW MANY MINUTES DO YOU ENGAGE IN EXERCISE AT THIS LEVEL?: 0 MIN

## 2025-03-24 SDOH — HEALTH STABILITY: PHYSICAL HEALTH: ON AVERAGE, HOW MANY DAYS PER WEEK DO YOU ENGAGE IN MODERATE TO STRENUOUS EXERCISE (LIKE A BRISK WALK)?: 0 DAYS

## 2025-03-24 ASSESSMENT — SOCIAL DETERMINANTS OF HEALTH (SDOH): HOW OFTEN DO YOU GET TOGETHER WITH FRIENDS OR RELATIVES?: ONCE A WEEK

## 2025-03-25 ENCOUNTER — OFFICE VISIT (OUTPATIENT)
Dept: FAMILY MEDICINE | Facility: CLINIC | Age: 50
End: 2025-03-25
Payer: OTHER GOVERNMENT

## 2025-03-25 VITALS
SYSTOLIC BLOOD PRESSURE: 110 MMHG | WEIGHT: 150.5 LBS | TEMPERATURE: 99 F | DIASTOLIC BLOOD PRESSURE: 60 MMHG | OXYGEN SATURATION: 99 % | HEIGHT: 64 IN | HEART RATE: 106 BPM | BODY MASS INDEX: 25.7 KG/M2 | RESPIRATION RATE: 16 BRPM

## 2025-03-25 DIAGNOSIS — E61.1 IRON DEFICIENCY: ICD-10-CM

## 2025-03-25 DIAGNOSIS — C50.911 INVASIVE DUCTAL CARCINOMA OF RIGHT BREAST (H): ICD-10-CM

## 2025-03-25 DIAGNOSIS — Z13.220 LIPID SCREENING: ICD-10-CM

## 2025-03-25 DIAGNOSIS — Z00.00 ROUTINE GENERAL MEDICAL EXAMINATION AT A HEALTH CARE FACILITY: Primary | ICD-10-CM

## 2025-03-25 DIAGNOSIS — L29.9 ITCHING: ICD-10-CM

## 2025-03-25 DIAGNOSIS — E55.9 VITAMIN D DEFICIENCY: ICD-10-CM

## 2025-03-25 DIAGNOSIS — L85.3 DRY SKIN: ICD-10-CM

## 2025-03-25 PROBLEM — N92.1 MENORRHAGIA WITH IRREGULAR CYCLE: Status: RESOLVED | Noted: 2017-04-25 | Resolved: 2025-03-25

## 2025-03-25 LAB
CHOLEST SERPL-MCNC: 160 MG/DL
ERYTHROCYTE [DISTWIDTH] IN BLOOD BY AUTOMATED COUNT: 12.7 % (ref 10–15)
FASTING STATUS PATIENT QL REPORTED: YES
HCT VFR BLD AUTO: 36.9 % (ref 35–47)
HDLC SERPL-MCNC: 55 MG/DL
HGB BLD-MCNC: 11.9 G/DL (ref 11.7–15.7)
LDLC SERPL CALC-MCNC: 93 MG/DL
MCH RBC QN AUTO: 30.7 PG (ref 26.5–33)
MCHC RBC AUTO-ENTMCNC: 32.2 G/DL (ref 31.5–36.5)
MCV RBC AUTO: 95 FL (ref 78–100)
NONHDLC SERPL-MCNC: 105 MG/DL
PLATELET # BLD AUTO: 184 10E3/UL (ref 150–450)
RBC # BLD AUTO: 3.87 10E6/UL (ref 3.8–5.2)
TRIGL SERPL-MCNC: 58 MG/DL
TSH SERPL DL<=0.005 MIU/L-ACNC: 2.84 UIU/ML (ref 0.3–4.2)
VIT D+METAB SERPL-MCNC: 29 NG/ML (ref 20–50)
WBC # BLD AUTO: 4 10E3/UL (ref 4–11)

## 2025-03-25 PROCEDURE — 99396 PREV VISIT EST AGE 40-64: CPT | Performed by: NURSE PRACTITIONER

## 2025-03-25 PROCEDURE — 82306 VITAMIN D 25 HYDROXY: CPT | Performed by: NURSE PRACTITIONER

## 2025-03-25 PROCEDURE — 86235 NUCLEAR ANTIGEN ANTIBODY: CPT | Performed by: NURSE PRACTITIONER

## 2025-03-25 PROCEDURE — 80061 LIPID PANEL: CPT | Performed by: NURSE PRACTITIONER

## 2025-03-25 PROCEDURE — 84443 ASSAY THYROID STIM HORMONE: CPT | Performed by: NURSE PRACTITIONER

## 2025-03-25 PROCEDURE — 85027 COMPLETE CBC AUTOMATED: CPT | Performed by: NURSE PRACTITIONER

## 2025-03-25 PROCEDURE — 99213 OFFICE O/P EST LOW 20 MIN: CPT | Mod: 25 | Performed by: NURSE PRACTITIONER

## 2025-03-25 PROCEDURE — 36415 COLL VENOUS BLD VENIPUNCTURE: CPT | Performed by: NURSE PRACTITIONER

## 2025-03-25 ASSESSMENT — ANXIETY QUESTIONNAIRES
5. BEING SO RESTLESS THAT IT IS HARD TO SIT STILL: NOT AT ALL
GAD7 TOTAL SCORE: 2
IF YOU CHECKED OFF ANY PROBLEMS ON THIS QUESTIONNAIRE, HOW DIFFICULT HAVE THESE PROBLEMS MADE IT FOR YOU TO DO YOUR WORK, TAKE CARE OF THINGS AT HOME, OR GET ALONG WITH OTHER PEOPLE: NOT DIFFICULT AT ALL
7. FEELING AFRAID AS IF SOMETHING AWFUL MIGHT HAPPEN: NOT AT ALL
GAD7 TOTAL SCORE: 2
6. BECOMING EASILY ANNOYED OR IRRITABLE: NOT AT ALL
8. IF YOU CHECKED OFF ANY PROBLEMS, HOW DIFFICULT HAVE THESE MADE IT FOR YOU TO DO YOUR WORK, TAKE CARE OF THINGS AT HOME, OR GET ALONG WITH OTHER PEOPLE?: NOT DIFFICULT AT ALL
1. FEELING NERVOUS, ANXIOUS, OR ON EDGE: NOT AT ALL
3. WORRYING TOO MUCH ABOUT DIFFERENT THINGS: SEVERAL DAYS
2. NOT BEING ABLE TO STOP OR CONTROL WORRYING: SEVERAL DAYS
GAD7 TOTAL SCORE: 2
7. FEELING AFRAID AS IF SOMETHING AWFUL MIGHT HAPPEN: NOT AT ALL
4. TROUBLE RELAXING: NOT AT ALL

## 2025-03-25 ASSESSMENT — PATIENT HEALTH QUESTIONNAIRE - PHQ9
SUM OF ALL RESPONSES TO PHQ QUESTIONS 1-9: 0
SUM OF ALL RESPONSES TO PHQ QUESTIONS 1-9: 0

## 2025-03-25 ASSESSMENT — PAIN SCALES - GENERAL: PAINLEVEL_OUTOF10: NO PAIN (0)

## 2025-03-25 NOTE — PROGRESS NOTES
Preventive Care Visit  [facility]  Clarissa Melgar CNP, Nurse Practitioner Primary Care  Mar 25, 2025        Hank Valle is a 49 year old, presenting for the following:  Physical (Labs Fasting/)        3/25/2025     8:52 AM   Additional Questions   Roomed by ABDIRASHID SOUSA          Healthy Habits:     Getting at least 3 servings of Calcium per day:  Yes    Bi-annual eye exam:  NO (Annual)    Dental care twice a year:  Yes    Sleep apnea or symptoms of sleep apnea:  None    Diet:  Regular (no restrictions)    Frequency of exercise:  None    Duration of exercise:  N/A    Taking medications regularly:  Yes    Barriers to taking medications:  None    Medication side effects:  None    Additional concerns today:  Yes (Started dry skin, itchy, due to hormone since breast CA,)       Advance Care Planning  Patient does not have a Health Care Directive: Discussed advance care planning with patient; however, patient declined at this time.      3/24/2025   General Health   How would you rate your overall physical health? Good   Feel stress (tense, anxious, or unable to sleep) To some extent   (!) STRESS CONCERN      3/24/2025   Nutrition   Three or more servings of calcium each day? Yes   Diet: Regular (no restrictions)   How many servings of fruit and vegetables per day? (!) 2-3   How many sweetened beverages each day? 0-1         3/24/2025   Exercise   Days per week of moderate/strenous exercise 0 days   Average minutes spent exercising at this level 0 min   (!) EXERCISE CONCERN      3/24/2025   Social Factors   Frequency of gathering with friends or relatives Once a week   Worry food won't last until get money to buy more No   Food not last or not have enough money for food? No   Do you have housing? (Housing is defined as stable permanent housing and does not include staying ouside in a car, in a tent, in an abandoned building, in an overnight shelter, or couch-surfing.) Yes   Are you worried  about losing your housing? No   Lack of transportation? No   Unable to get utilities (heat,electricity)? No         3/24/2025   Dental   Dentist two times every year? Yes         Today's PHQ-9 Score:       3/25/2025     8:52 AM   PHQ-9 SCORE   PHQ-9 Total Score MyChart 0   PHQ-9 Total Score 0        Patient-reported         3/24/2025   Substance Use   Alcohol more than 3/day or more than 7/wk Not Applicable   Do you use any other substances recreationally? No     Social History     Tobacco Use    Smoking status: Never     Passive exposure: Never    Smokeless tobacco: Never   Vaping Use    Vaping status: Never Used   Substance Use Topics    Alcohol use: Not Currently     Comment: Occasionally 1-4 a month    Drug use: Never           5/31/2024   LAST FHS-7 RESULTS   1st degree relative breast or ovarian cancer No   Any relative bilateral breast cancer No   Any male have breast cancer No   Any ONE woman have BOTH breast AND ovarian cancer No   Any woman with breast cancer before 50yrs No   2 or more relatives with breast AND/OR ovarian cancer No   2 or more relatives with breast AND/OR bowel cancer No        Mammogram Screening - Annual screen due to history of breast cancer, carcinoma in situ, or hyperplasia          3/24/2025   One time HIV Screening   Previous HIV test? Yes         3/24/2025   STI Screening   New sexual partner(s) since last STI/HIV test? No     History of abnormal Pap smear: No - age 30- 64 PAP with HPV every 5 years recommended        Latest Ref Rng & Units 4/11/2024     2:37 PM   PAP / HPV   PAP  Negative for Intraepithelial Lesion or Malignancy (NILM)    HPV 16 DNA Negative Negative    HPV 18 DNA Negative Negative    Other HR HPV Negative Negative      ASCVD Risk   The 10-year ASCVD risk score (Yousif GARCIA, et al., 2019) is: 0.4%    Values used to calculate the score:      Age: 49 years      Sex: Female      Is Non- : Yes      Diabetic: No      Tobacco smoker: No    "   Systolic Blood Pressure: 110 mmHg      Is BP treated: No      HDL Cholesterol: 68 mg/dL      Total Cholesterol: 175 mg/dL       Reviewed and updated as needed this visit by Provider                    Past Medical History:   Diagnosis Date    Breast cancer (H) 01/2022    Menorrhagia with irregular cycle 04/25/2017 4/25/2017: start Kariva      Sinus tachycardia      Past Surgical History:   Procedure Laterality Date    BIOPSY      DILATION AND CURETTAGE      INSERT PORT VASCULAR ACCESS Left 02/18/2022    Procedure: INSERTION, VASCULAR ACCESS PORT;  Surgeon: Elliott Ramirez PA-C;  Location: [facility] OR    IR CHEST PORT PLACEMENT > 5 YRS OF AGE  02/18/2022    IR PORT REMOVAL LEFT  04/11/2023    LAPAROSCOPIC SALPINGO-OOPHORECTOMY Bilateral 6/17/2024    Procedure: SALPINGO-OOPHORECTOMY, LAPAROSCOPIC BILATERAL, LYSIS OF ADHESION;  Surgeon: Salina Del Castillo MD;  Location: [facility] OR    LUMPECTOMY BREAST Right 08/29/2022    Procedure: Re-excision of right lumpectomy site;  Surgeon: Gurpreet Layton MD;  Location: [facility] OR    LUMPECTOMY BREAST WITH SENTINEL NODE, COMBINED Right 08/08/2022    Procedure: RIGHT SEED LOCALIZED BREAST LUMPECTOMY  WITH SENTINEL LYMPH NODE BIOPSY;  Surgeon: Gurpreet Layton MD;  Location: [facility] OR    REMOVE PORT VASCULAR ACCESS Left 04/11/2023    Procedure: Remove port vascular access left;  Surgeon: Shilpi Andujar MD;  Location: [facility] OR     Lab work is in process  Labs reviewed in EPIC      Review of Systems  Constitutional, HEENT, cardiovascular, pulmonary, GI, , musculoskeletal, neuro, skin, endocrine and psych systems are negative, except as otherwise noted.  Skin dry and itchy.  Mouth and eyes dry.      Objective    Exam  /60 (BP Location: Left arm, Patient Position: Sitting, Cuff Size: Adult Regular)   Pulse 106   Temp 99  F (37.2  C) (Oral)   Resp 16   Ht 1.619 m (5' 3.75\")   Wt 68.3 kg (150 lb 8 oz)   SpO2 99%   Breastfeeding No   BMI 26.04 kg/m     Estimated body mass index " "is 26.04 kg/m  as calculated from the following:    Height as of this encounter: 1.619 m (5' 3.75\").    Weight as of this encounter: 68.3 kg (150 lb 8 oz).    Physical Exam  GENERAL: alert and no distress  EYES: Eyes grossly normal to inspection, PERRL and conjunctivae and sclerae normal  HENT: ear canals and TM's normal, nose and mouth without ulcers or lesions  NECK: no adenopathy, no asymmetry, masses, or scars  RESP: lungs clear to auscultation - no rales, rhonchi or wheezes  BREAST: normal without masses, tenderness or nipple discharge and no palpable axillary masses or adenopathy  CV: regular rate and rhythm, normal S1 S2, no S3 or S4, no murmur, click or rub, no peripheral edema  ABDOMEN: soft, nontender, no hepatosplenomegaly, no masses and bowel sounds normal  MS: no gross musculoskeletal defects noted, no edema  SKIN: no suspicious lesions or rashes, scattered dry patches  NEURO: Normal strength and tone, mentation intact and speech normal  PSYCH: mentation appears normal, affect normal/bright    A/P:  1. Routine general medical examination at a health care facility (Primary)    2. Dry skin  - TSH with free T4 reflex; Future  - SSA Ro ARCELIA Antibody IgG; Future  - SSB La ARCELIA Antibody IgG; Future  - TSH with free T4 reflex  - SSA Ro ARCELIA Antibody IgG  - SSB La ARCELIA Antibody IgG    3. Itching  - TSH with free T4 reflex; Future  - SSA Ro ARCELIA Antibody IgG; Future  - SSB La ARCELIA Antibody IgG; Future  - TSH with free T4 reflex  - SSA Ro ARCELIA Antibody IgG  - SSB La ARCELIA Antibody IgG    4. Iron deficiency  - CBC with platelets; Future  - CBC with platelets    5. Lipid screening  - Lipid panel reflex to direct LDL Fasting; Future  - Lipid panel reflex to direct LDL Fasting    6. Vitamin D deficiency  - Vitamin D Deficiency; Future  - Vitamin D Deficiency    7. Invasive ductal carcinoma of right breast (H)  Managed by oncology- sees oncologist every 3-6 months       Signed Electronically by: Clarissa Melgar CNP    Answers " submitted by the patient for this visit:  Patient Health Questionnaire (Submitted on 3/25/2025)  PHQ9 TOTAL SCORE: 0  Patient Health Questionnaire (G7) (Submitted on 3/25/2025)  EMMA 7 TOTAL SCORE: 2

## 2025-03-25 NOTE — PATIENT INSTRUCTIONS
Patient Education   Preventive Care Advice   This is general advice given by our system to help you stay healthy. However, your care team may have specific advice just for you. Please talk to your care team about your preventive care needs.  Nutrition  Eat 5 or more servings of fruits and vegetables each day.  Try wheat bread, brown rice and whole grain pasta (instead of white bread, rice, and pasta).  Get enough calcium and vitamin D. Check the label on foods and aim for 100% of the RDA (recommended daily allowance).  Lifestyle  Exercise at least 150 minutes each week  (30 minutes a day, 5 days a week).  Do muscle strengthening activities 2 days a week. These help control your weight and prevent disease.  No smoking.  Wear sunscreen to prevent skin cancer.  Have a dental exam and cleaning every 6 months.  Yearly exams  See your health care team every year to talk about:  Any changes in your health.  Any medicines your care team has prescribed.  Preventive care, family planning, and ways to prevent chronic diseases.  Shots (vaccines)   HPV shots (up to age 26), if you've never had them before.  Hepatitis B shots (up to age 59), if you've never had them before.  COVID-19 shot: Get this shot when it's due.  Flu shot: Get a flu shot every year.  Tetanus shot: Get a tetanus shot every 10 years.  Pneumococcal, hepatitis A, and RSV shots: Ask your care team if you need these based on your risk.  Shingles shot (for age 50 and up)  General health tests  Diabetes screening:  Starting at age 35, Get screened for diabetes at least every 3 years.  If you are younger than age 35, ask your care team if you should be screened for diabetes.  Cholesterol test: At age 39, start having a cholesterol test every 5 years, or more often if advised.  Bone density scan (DEXA): At age 50, ask your care team if you should have this scan for osteoporosis (brittle bones).  Hepatitis C: Get tested at least once in your life.  STIs (sexually  transmitted infections)  Before age 24: Ask your care team if you should be screened for STIs.  After age 24: Get screened for STIs if you're at risk. You are at risk for STIs (including HIV) if:  You are sexually active with more than one person.  You don't use condoms every time.  You or a partner was diagnosed with a sexually transmitted infection.  If you are at risk for HIV, ask about PrEP medicine to prevent HIV.  Get tested for HIV at least once in your life, whether you are at risk for HIV or not.  Cancer screening tests  Cervical cancer screening: If you have a cervix, begin getting regular cervical cancer screening tests starting at age 21.  Breast cancer scan (mammogram): If you've ever had breasts, begin having regular mammograms starting at age 40. This is a scan to check for breast cancer.  Colon cancer screening: It is important to start screening for colon cancer at age 45.  Have a colonoscopy test every 10 years (or more often if you're at risk) Or, ask your provider about stool tests like a FIT test every year or Cologuard test every 3 years.  To learn more about your testing options, visit:   .  For help making a decision, visit:   https://bit.ly/hk94904.  Prostate cancer screening test: If you have a prostate, ask your care team if a prostate cancer screening test (PSA) at age 55 is right for you.  Lung cancer screening: If you are a current or former smoker ages 50 to 80, ask your care team if ongoing lung cancer screenings are right for you.  For informational purposes only. Not to replace the advice of your health care provider. Copyright   2023 [employer]. All rights reserved. Clinically reviewed by the [facility] Transitions Program. [employer] 558176 - REV 01/24.

## 2025-03-26 LAB
ENA SS-A AB SER IA-ACNC: <0.5 U/ML
ENA SS-A AB SER IA-ACNC: NEGATIVE
ENA SS-B IGG SER IA-ACNC: <0.6 U/ML
ENA SS-B IGG SER IA-ACNC: NEGATIVE

## 2025-04-07 ENCOUNTER — ANCILLARY PROCEDURE (OUTPATIENT)
Dept: BONE DENSITY | Facility: CLINIC | Age: 50
End: 2025-04-07
Attending: PHYSICIAN ASSISTANT
Payer: OTHER GOVERNMENT

## 2025-04-07 DIAGNOSIS — Z79.811 LONG TERM CURRENT USE OF AROMATASE INHIBITOR: ICD-10-CM

## 2025-04-07 DIAGNOSIS — C50.911 INVASIVE DUCTAL CARCINOMA OF RIGHT BREAST (H): ICD-10-CM

## 2025-04-07 PROCEDURE — 77080 DXA BONE DENSITY AXIAL: CPT | Mod: TC | Performed by: PHYSICIAN ASSISTANT

## 2025-04-07 PROCEDURE — 77091 TBS TECHL CALCULATION ONLY: CPT | Performed by: PHYSICIAN ASSISTANT

## 2025-04-14 ENCOUNTER — ONCOLOGY VISIT (OUTPATIENT)
Dept: ONCOLOGY | Facility: CLINIC | Age: 50
End: 2025-04-14
Attending: PHYSICIAN ASSISTANT
Payer: OTHER GOVERNMENT

## 2025-04-14 VITALS
HEART RATE: 117 BPM | DIASTOLIC BLOOD PRESSURE: 75 MMHG | TEMPERATURE: 97.9 F | SYSTOLIC BLOOD PRESSURE: 116 MMHG | BODY MASS INDEX: 26.17 KG/M2 | OXYGEN SATURATION: 99 % | WEIGHT: 151.3 LBS

## 2025-04-14 DIAGNOSIS — N95.1 VAGINAL DRYNESS, MENOPAUSAL: Primary | ICD-10-CM

## 2025-04-14 PROCEDURE — 99214 OFFICE O/P EST MOD 30 MIN: CPT | Performed by: PHYSICIAN ASSISTANT

## 2025-04-14 PROCEDURE — G0463 HOSPITAL OUTPT CLINIC VISIT: HCPCS | Performed by: PHYSICIAN ASSISTANT

## 2025-04-14 RX ORDER — ESTRADIOL 10 UG/1
TABLET, FILM COATED VAGINAL
Qty: 24 TABLET | Refills: 0 | Status: SHIPPED | OUTPATIENT
Start: 2025-04-14 | End: 2025-05-12

## 2025-04-14 ASSESSMENT — PAIN SCALES - GENERAL: PAINLEVEL_OUTOF10: NO PAIN (0)

## 2025-04-14 NOTE — LETTER
4/14/2025      Tori Steele  [address] [address]      Dear Colleague,    Thank you for referring your patient, Tori Steele, to the [facility]. Please see a copy of my visit note below.      Apr 14, 2025    REASON FOR VISIT:  history of breast cancer    CANCER HISTORY AND TREATMENT:  Tori Steele is a 49 year old female with breat cancer. Right-sided 5.5 cm ER positive UT positive HER-2 negative breast cancer with associated DCIS.  Her MammaPrint came back as high risk.  She consented for the I-SPY clinical trial and has been randomized to the oral paclitaxel, Encequidar and dostarlimab arm.      2/21/22 started trial with oral paclitaxel, Encequidar and dostarlimab.    3/15/22  her IV immunotherapy dostarlimab was held due to diarrhea that recovered with Imodium and time  4/4/22   Carbo was added in weekly due in hopes for more significant reduction in cancer  5/17/22 AC start  7/8/22 AC End  8/8/2022 - R. Lumpectomy with SNLBX -               -INVASIVE BREAST CARCINOMA OF NO SPECIAL TYPE (INVASIVE DUCTAL CARCINOMA), with   apocrine features, SAMANTHA GRADE 2, size 45 x 32 mm, residual after neoadjuvant systemic therapy              -Ductal carcinoma in situ (DCIS), nuclear grade 3, cribriform and solid type(s), with focal necrosis              -DCIS is admixed with invasive carcinoma, and comprises 30% of the tumor cellularity              -Invasive carcinoma is estrogen receptor positive, progesterone receptor positive and HER2   negative (score 1+) by immunohistochemistry               -METASTATIC BREAST DUCTAL CARCINOMA in one of two lymph nodes (1/2), residual after neoadjuvant systemic therapy              -Extranodal extension present (size 2 mm)              -[facility] residual cancer burden is 3.284 (RCB Class III).   8/29/2022 - Resection of involved margins - now negative  9/19/22 started Zoladex  10/31/22 completed XRT 21 fractions      12/12/22: starting Zometa q6m and abemaciclib/letrozole   6/17/2024- BSO    INTERVAL HISTORY:     She notes that she has more hot flashes at night, vaginal dryness and sleep issues after the BSO.  Started fish oil as she was very stiff in the AM.  Using lubrication but having painful intercourse. She has better energy levels and thankful to be done with her GI issues with the abema.     Otherwise she is doing well.  She has no new medical complaints.  Her review of systems is otherwise unremarkable.      SOCIAL HISTORY: Her son continues with an active wrestling career and her daughter is a senior, this is a big year for all of them.  Emotionally she feels she is getting stronger.      Otherwise, ROS negative for: fevers, headaches, visual changes, new sites of pain, shortness of breath, cough, chest pain, abdominal pain, nausea, vomiting, abnormal vaginal bleeding, or leg swelling.      Current Outpatient Medications   Medication Sig Dispense Refill     cyclobenzaprine (FLEXERIL) 5 MG tablet Take 1-2 at bedtime as needed for muscle spasms 60 tablet 3     letrozole (FEMARA) 2.5 MG tablet Take 1 tablet (2.5 mg) by mouth daily. 90 tablet 3     multivitamin, therapeutic (THERA-VIT) TABS tablet Take 1 tablet by mouth daily          No Known Allergies    PHYSICAL EXAMINATION  /75 (BP Location: Right arm, Patient Position: Sitting, Cuff Size: Adult Regular)   Pulse 117   Temp 97.9  F (36.6  C) (Oral)   Wt 68.6 kg (151 lb 4.8 oz)   LMP  (LMP Unknown)   SpO2 99%   BMI 26.17 kg/m      Wt Readings from Last 4 Encounters:   04/14/25 68.6 kg (151 lb 4.8 oz)   03/25/25 68.3 kg (150 lb 8 oz)   12/06/24 67 kg (147 lb 11.2 oz)   09/20/24 67.9 kg (149 lb 12.8 oz)     Vital signs were reviewed.   Patient alert and oriented x3.   PERRLA. EOMI. No scleral icterus noted. OP without thrush/sores.  Neck exam: No palpable cervical, supraclavicular or axillary nodes bilaterally.   Heart: RRR no murmurs noted.   Breasts: R  breast with no scarring at the lumpectomy site.  No palpable masses in R or L breast  Lungs: clear to auscultation bilaterally.  No crackles or wheezing.   Abd: positive bowel sounds in all four quadrants.  No tenderness to palpation.  No hepatomegaly.   Extremities: No lower extremity edema.   Neuro: grossly intact.   Mood and affect is stable.     LABS:  Most Recent 3 CBC's:  Recent Labs   Lab Test 03/25/25  0934 12/06/24  1245 09/20/24  0950 06/28/24  1223   WBC 4.0 4.0 3.6* 3.5*   HGB 11.9 11.5* 10.8* 11.3*   MCV 95 105* 105* 103*    191 168 201   ANEUTAUTO  --  2.8 2.5 2.5     Most Recent 3 BMP's:  Recent Labs   Lab Test 12/06/24  1245 09/20/24  0950 05/31/24  1027    141 141   POTASSIUM 3.6 3.4 3.7   CHLORIDE 104 106 106   CO2 27 24 26   BUN 11.1 11.0 10.8   CR 0.90 0.86 0.85   ANIONGAP 8 11 9   JENNIE 9.2 9.3 9.3   GLC 95 78 101*   PROTTOTAL 7.5 7.2 7.5   ALBUMIN 4.1 3.9 4.2    Most Recent 3 LFT's:  Recent Labs   Lab Test 12/06/24  1245 09/20/24  0950 05/31/24  1027   AST 19 18 19   ALT 13 12 15   ALKPHOS 65 62 64   BILITOTAL 0.5 0.4 0.6    Most Recent 2 TSH and T4:  Recent Labs   Lab Test 03/25/25  0934 09/09/22  1122 07/29/22  1009 06/24/22  1057   TSH 2.84 1.52 1.39 1.66   T4  --   --  1.12 0.96      I reviewed the above labs today.       PROBLEMS:     1. Right sided ER+, WA+, HER2-negative, MammaPrint high risk T=5.5cm, N=0 breast cancer. Treated on I-SPY with assigned arm of dostarlimab/oral paclitaxel. Carbo added week 3. Received ACx4. Pathologic staging showed pT2a. PN1a. ER+, WA+, HER21+, RCB=3   2. Pre-menopausal, now on Zoladex monthly, letrozole, abemaciclib  3. Minimal elevation in glucose  4. Last zoladex shot was 5/31/24, now S/p BSO in June 2024        IMPRESSION: She is doing so well, physically and emotionally, with no new concerns.  She feels better after the BSO.     She completed her abema in Dec of 2024.  Her DEXA showed improved bone density.     PLAN:  1.  Continue  letrozole  2. Discussed vagifem for her dyspareunia   3. RTC in 4 month, can see Dr Lockett then  4. Zometa in June  (dose #6 out of 6)--last one!       Janette Mota PA-C    40 minutes spent on the date of the encounter doing chart review, review of test results, interpretation of tests, patient visit, and documentation     The longitudinal plan of care for the diagnosis(es)/condition(s) as documented were addressed during this visit. Due to the added complexity in care, I will continue to support Tori in the subsequent management and with ongoing continuity of care.       Again, thank you for allowing me to participate in the care of your patient.        Sincerely,        Milka Mota PA-C    Electronically signed

## 2025-04-14 NOTE — NURSING NOTE
"Oncology Rooming Note    April 14, 2025 10:02 AM   Tori Steele is a 49 year old female who presents for:    Chief Complaint   Patient presents with    Oncology Clinic Visit     Invasive ductal carcinoma of right breast     Initial Vitals: /75 (BP Location: Right arm, Patient Position: Sitting, Cuff Size: Adult Regular)   Pulse 117   Temp 97.9  F (36.6  C) (Oral)   Wt 68.6 kg (151 lb 4.8 oz)   LMP  (LMP Unknown)   SpO2 99%   BMI 26.17 kg/m   Estimated body mass index is 26.17 kg/m  as calculated from the following:    Height as of 3/25/25: 1.619 m (5' 3.75\").    Weight as of this encounter: 68.6 kg (151 lb 4.8 oz). Body surface area is 1.76 meters squared.  No Pain (0) Comment: Data Unavailable   No LMP recorded (lmp unknown). Patient is postmenopausal.  Allergies reviewed: Yes  Medications reviewed: Yes    Medications: MEDICATION REFILLS NEEDED TODAY. Provider was notified.  Pharmacy name entered into [employer]: [id number] [place], [place] - [address] AT [facility] [address]    Frailty Screening:   Is the patient here for a new oncology consult visit in cancer care? 2. No    PHQ9:  Did this patient require a PHQ9?: No      Clinical concerns: pt needs letrozole refilled       Gustavo Galvan              "

## 2025-04-14 NOTE — PROGRESS NOTES
Apr 14, 2025    REASON FOR VISIT:  history of breast cancer    CANCER HISTORY AND TREATMENT:  Tori Steele is a 49 year old female with breat cancer. Right-sided 5.5 cm ER positive [place] positive HER-2 negative breast cancer with associated DCIS.  Her MammaPrint came back as high risk.  She consented for the I-SPY clinical trial and has been randomized to the oral paclitaxel, Encequidar and dostarlimab arm.      2/21/22 started trial with oral paclitaxel, Encequidar and dostarlimab.    3/15/22  her IV immunotherapy dostarlimab was held due to diarrhea that recovered with Imodium and time  4/4/22   Carbo was added in weekly due in hopes for more significant reduction in cancer  5/17/22 AC start  7/8/22 AC End  8/8/2022 - R. Lumpectomy with SNLBX -               -INVASIVE BREAST CARCINOMA OF NO SPECIAL TYPE (INVASIVE DUCTAL CARCINOMA), with   apocrine features, SAMANTHA GRADE 2, size 45 x 32 mm, residual after neoadjuvant systemic therapy              -Ductal carcinoma in situ (DCIS), nuclear grade 3, cribriform and solid type(s), with focal necrosis              -DCIS is admixed with invasive carcinoma, and comprises 30% of the tumor cellularity              -Invasive carcinoma is estrogen receptor positive, progesterone receptor positive and HER2   negative (score 1+) by immunohistochemistry               -METASTATIC BREAST DUCTAL CARCINOMA in one of two lymph nodes (1/2), residual after neoadjuvant systemic therapy              -Extranodal extension present (size 2 mm)              -[facility] residual cancer burden is 3.284 (RCB Class III).   8/29/2022 - Resection of involved margins - now negative  9/19/22 started Zoladex  10/31/22 completed XRT 21 fractions     12/12/22: starting Zometa q6m and abemaciclib/letrozole   6/17/2024- BSO    INTERVAL HISTORY:     She notes that she has more hot flashes at night, vaginal dryness and sleep issues after the BSO.  Started fish oil as she was very stiff in the  AM.  Using lubrication but having painful intercourse. She has better energy levels and thankful to be done with her GI issues with the abema.     Otherwise she is doing well.  She has no new medical complaints.  Her review of systems is otherwise unremarkable.      SOCIAL HISTORY: Her son continues with an active wrestling career and her daughter is a senior, this is a big year for all of them.  Emotionally she feels she is getting stronger.      Otherwise, ROS negative for: fevers, headaches, visual changes, new sites of pain, shortness of breath, cough, chest pain, abdominal pain, nausea, vomiting, abnormal vaginal bleeding, or leg swelling.      Current Outpatient Medications   Medication Sig Dispense Refill    cyclobenzaprine (FLEXERIL) 5 MG tablet Take 1-2 at bedtime as needed for muscle spasms 60 tablet 3    letrozole (FEMARA) 2.5 MG tablet Take 1 tablet (2.5 mg) by mouth daily. 90 tablet 3    multivitamin, therapeutic (THERA-VIT) TABS tablet Take 1 tablet by mouth daily          No Known Allergies    PHYSICAL EXAMINATION  /75 (BP Location: Right arm, Patient Position: Sitting, Cuff Size: Adult Regular)   Pulse 117   Temp 97.9  F (36.6  C) (Oral)   Wt 68.6 kg (151 lb 4.8 oz)   LMP  (LMP Unknown)   SpO2 99%   BMI 26.17 kg/m      Wt Readings from Last 4 Encounters:   04/14/25 68.6 kg (151 lb 4.8 oz)   03/25/25 68.3 kg (150 lb 8 oz)   12/06/24 67 kg (147 lb 11.2 oz)   09/20/24 67.9 kg (149 lb 12.8 oz)     Vital signs were reviewed.   Patient alert and oriented x3.   PERRLA. EOMI. No scleral icterus noted. OP without thrush/sores.  Neck exam: No palpable cervical, supraclavicular or axillary nodes bilaterally.   Heart: RRR no murmurs noted.   Breasts: R breast with no scarring at the lumpectomy site.  No palpable masses in R or L breast  Lungs: clear to auscultation bilaterally.  No crackles or wheezing.   Abd: positive bowel sounds in all four quadrants.  No tenderness to palpation.  No hepatomegaly.    Extremities: No lower extremity edema.   Neuro: grossly intact.   Mood and affect is stable.     LABS:  Most Recent 3 CBC's:  Recent Labs   Lab Test 03/25/25  0934 12/06/24  1245 09/20/24  0950 06/28/24  1223   WBC 4.0 4.0 3.6* 3.5*   HGB 11.9 11.5* 10.8* 11.3*   MCV 95 105* 105* 103*    191 168 201   ANEUTAUTO  --  2.8 2.5 2.5     Most Recent 3 BMP's:  Recent Labs   Lab Test 12/06/24  1245 09/20/24  0950 05/31/24  1027    141 141   POTASSIUM 3.6 3.4 3.7   CHLORIDE 104 106 106   CO2 27 24 26   BUN 11.1 11.0 10.8   CR 0.90 0.86 0.85   ANIONGAP 8 11 9   JENNIE 9.2 9.3 9.3   GLC 95 78 101*   PROTTOTAL 7.5 7.2 7.5   ALBUMIN 4.1 3.9 4.2    Most Recent 3 LFT's:  Recent Labs   Lab Test 12/06/24  1245 09/20/24  0950 05/31/24  1027   AST 19 18 19   ALT 13 12 15   ALKPHOS 65 62 64   BILITOTAL 0.5 0.4 0.6    Most Recent 2 TSH and T4:  Recent Labs   Lab Test 03/25/25  0934 09/09/22  1122 07/29/22  1009 06/24/22  1057   TSH 2.84 1.52 1.39 1.66   T4  --   --  1.12 0.96      I reviewed the above labs today.       PROBLEMS:     1. Right sided ER+, [place]+, HER2-negative, MammaPrint high risk T=5.5cm, N=0 breast cancer. Treated on I-SPY with assigned arm of dostarlimab/oral paclitaxel. Carbo added week 3. Received ACx4. Pathologic staging showed pT2a. PN1a. ER+, OH+, HER21+, RCB=3   2. Pre-menopausal, now on Zoladex monthly, letrozole, abemaciclib  3. Minimal elevation in glucose  4. Last zoladex shot was 5/31/24, now S/p BSO in June 2024        IMPRESSION: She is doing so well, physically and emotionally, with no new concerns.  She feels better after the BSO.     She completed her abema in Dec of 2024.  Her DEXA showed improved bone density.     PLAN:  1.  Continue letrozole  2. Discussed vagifem for her dyspareunia   3. RTC in 4 month, can see Dr Lockett then  4. Zometa in June  (dose #6 out of 6)--last one!       Janette Mota PA-C    40 minutes spent on the date of the encounter doing chart review, review of test results,  interpretation of tests, patient visit, and documentation     The longitudinal plan of care for the diagnosis(es)/condition(s) as documented were addressed during this visit. Due to the added complexity in care, I will continue to support Tori in the subsequent management and with ongoing continuity of care.

## 2025-04-17 NOTE — LETTER
7/15/2022    RE: Tori Steele  [address] [facility] [address]    Dear Colleague,    Thank you for referring your patient, Tori Steele, to the [facility] BRE[facility]. Please see a copy of my visit note below.    VIDEO VISIT    Today I had a video appointment with Tori Steele to discuss management of her ER positive, HER2 negative breast cancer.  I saw her last about a month ago.  She initially had a tumor that was 5.5 cm.  On her last MRI, it was 3.4 cm.  She has completed Adriamycin, Cytoxan chemotherapy and has another breast MRI scheduled next week.  Today we talked about treatment options.  Her preference is to undergo breast conserving surgery.  I think that is reasonable.  We will tentatively schedule her for a seed localized lumpectomy and a sentinel lymph node biopsy.  However, if her MRI looks substantially worse next week, then we may change that plan.  I told her that is unlikely.  I talked to her about the risks and complications of surgery and the need for radiation therapy.  We will tentatively schedule her for surgery in about 3-4 weeks.      Start of video was 10:24.  End of video was 10:36.    Gurpreet Layton MD    cc:  Keegan Lockett MD  [address]  [place]  [address]     stated

## 2025-05-29 DIAGNOSIS — N89.8 VAGINAL DRYNESS: Primary | ICD-10-CM

## 2025-05-29 RX ORDER — ESTRADIOL 10 UG/1
10 TABLET, FILM COATED VAGINAL
Qty: 24 TABLET | Refills: 3 | Status: SHIPPED | OUTPATIENT
Start: 2025-05-29

## 2025-06-03 ENCOUNTER — INFUSION THERAPY VISIT (OUTPATIENT)
Dept: ONCOLOGY | Facility: CLINIC | Age: 50
End: 2025-06-03
Attending: PHYSICIAN ASSISTANT
Payer: OTHER GOVERNMENT

## 2025-06-03 ENCOUNTER — ANCILLARY PROCEDURE (OUTPATIENT)
Dept: MAMMOGRAPHY | Facility: CLINIC | Age: 50
End: 2025-06-03
Attending: PHYSICIAN ASSISTANT
Payer: OTHER GOVERNMENT

## 2025-06-03 VITALS
WEIGHT: 154.9 LBS | HEART RATE: 117 BPM | OXYGEN SATURATION: 99 % | BODY MASS INDEX: 26.8 KG/M2 | SYSTOLIC BLOOD PRESSURE: 123 MMHG | RESPIRATION RATE: 18 BRPM | TEMPERATURE: 98.3 F | DIASTOLIC BLOOD PRESSURE: 84 MMHG

## 2025-06-03 DIAGNOSIS — C50.111 MALIGNANT NEOPLASM OF CENTRAL PORTION OF RIGHT BREAST IN FEMALE, ESTROGEN RECEPTOR POSITIVE (H): Primary | ICD-10-CM

## 2025-06-03 DIAGNOSIS — Z12.31 VISIT FOR SCREENING MAMMOGRAM: ICD-10-CM

## 2025-06-03 DIAGNOSIS — Z17.0 MALIGNANT NEOPLASM OF CENTRAL PORTION OF RIGHT BREAST IN FEMALE, ESTROGEN RECEPTOR POSITIVE (H): Primary | ICD-10-CM

## 2025-06-03 LAB
ALBUMIN SERPL BCG-MCNC: 4.2 G/DL (ref 3.5–5.2)
CALCIUM SERPL-MCNC: 9.5 MG/DL (ref 8.8–10.4)
CREAT SERPL-MCNC: 0.58 MG/DL (ref 0.51–0.95)
EGFRCR SERPLBLD CKD-EPI 2021: >90 ML/MIN/1.73M2

## 2025-06-03 PROCEDURE — 250N000011 HC RX IP 250 OP 636: Mod: JZ | Performed by: PHYSICIAN ASSISTANT

## 2025-06-03 PROCEDURE — 82310 ASSAY OF CALCIUM: CPT | Performed by: PHYSICIAN ASSISTANT

## 2025-06-03 PROCEDURE — 82040 ASSAY OF SERUM ALBUMIN: CPT | Performed by: PHYSICIAN ASSISTANT

## 2025-06-03 PROCEDURE — 36415 COLL VENOUS BLD VENIPUNCTURE: CPT | Performed by: PHYSICIAN ASSISTANT

## 2025-06-03 PROCEDURE — 77063 BREAST TOMOSYNTHESIS BI: CPT | Performed by: STUDENT IN AN ORGANIZED HEALTH CARE EDUCATION/TRAINING PROGRAM

## 2025-06-03 PROCEDURE — 96365 THER/PROPH/DIAG IV INF INIT: CPT

## 2025-06-03 PROCEDURE — 258N000003 HC RX IP 258 OP 636: Performed by: PHYSICIAN ASSISTANT

## 2025-06-03 PROCEDURE — 77067 SCR MAMMO BI INCL CAD: CPT | Performed by: STUDENT IN AN ORGANIZED HEALTH CARE EDUCATION/TRAINING PROGRAM

## 2025-06-03 PROCEDURE — 82565 ASSAY OF CREATININE: CPT | Performed by: PHYSICIAN ASSISTANT

## 2025-06-03 RX ORDER — ZOLEDRONIC ACID 0.04 MG/ML
4 INJECTION, SOLUTION INTRAVENOUS ONCE
Status: COMPLETED | OUTPATIENT
Start: 2025-06-03 | End: 2025-06-03

## 2025-06-03 RX ADMIN — ZOLEDRONIC ACID 4 MG: 0.04 INJECTION, SOLUTION INTRAVENOUS at 09:21

## 2025-06-03 RX ADMIN — SODIUM CHLORIDE 250 ML: 0.9 INJECTION, SOLUTION INTRAVENOUS at 09:21

## 2025-06-03 ASSESSMENT — PAIN SCALES - GENERAL: PAINLEVEL_OUTOF10: NO PAIN (0)

## 2025-06-03 NOTE — NURSING NOTE
Chief Complaint   Patient presents with    Blood Draw     Labs obtained via PIV, saline flushed, VS taken, checked into next appt

## 2025-06-03 NOTE — PATIENT INSTRUCTIONS
[facility] Triage and after hours / weekends / holidays:  [phone]    Please call the triage or after hours line if you experience a temperature greater than or equal to 100.4, shaking chills, have uncontrolled nausea, vomiting and/or diarrhea, dizziness, shortness of breath, chest pain, bleeding, unexplained bruising, or if you have any other new/concerning symptoms, questions or concerns.      If you are having any concerning symptoms or wish to speak to a provider before your next infusion visit, please call triage to notify them so we can adequately serve you.     If you need a refill on a narcotic prescription or other medication, please call before your infusion appointment.           June 2025 Sunday Monday Tuesday Wednesday Thursday Friday Saturday   1     2     3    [facility]   8:15 AM   (15 min.)    [facility] LAB DRAW   [facility]    Infusion 60   8:30 AM   (60 min.)    ONC INFUSION NURSE   [facility]    MA SCREENING BILATERAL W/ KARSON   9:45 AM   (15 min.)   [id number]   [facility] [place] 4     5     6     7       8     9     10     11     12     13     14       15     16     17     18     19     20     21       22     23     24     25     26     27     28       29     30                                            July 2025 Sunday Monday Tuesday Wednesday Thursday Friday Saturday             1     2     3     4     5       6     7     8     9     10     11     12       13     14     15     16     17     18     19       20     21     22     23     24     25     26       27     28     29     30     31                                  Recent Results (from the past 24 hours)   Calcium    Collection Time: 06/03/25  8:38 AM   Result Value Ref Range    Calcium 9.5 8.8 - 10.4 mg/dL   Creatinine    Collection Time: 06/03/25  8:38 AM   Result Value Ref Range    Creatinine 0.58 0.51 - 0.95 mg/dL    GFR Estimate  >90 >60 mL/min/1.73m2   Albumin level    Collection Time: 06/03/25  8:38 AM   Result Value Ref Range    Albumin 4.2 3.5 - 5.2 g/dL

## 2025-06-03 NOTE — PROGRESS NOTES
Infusion Nursing Note:  Tori Steele presents today for zometa.    Patient seen by provider today: No   present during visit today: Not Applicable.    Note: Patient denies any new concerns today, states she is feeling well. Specifically denies any recent dental issues or jaw pain.       Intravenous Access:  Peripheral IV placed in lab    Treatment Conditions:  Lab Results   Component Value Date     12/06/2024    POTASSIUM 3.6 12/06/2024    MAG 1.7 (L) 02/07/2022    CR 0.58 06/03/2025    JENNIE 9.5 06/03/2025    BILITOTAL 0.5 12/06/2024    ALBUMIN 4.2 06/03/2025    ALT 13 12/06/2024    AST 19 12/06/2024       Results reviewed, labs MET treatment parameters, ok to proceed with treatment.      Post Infusion Assessment:  Patient tolerated infusion without incident.  Blood return noted pre and post infusion.  Site patent and intact, free from redness, edema or discomfort.  No evidence of extravasations.  Access discontinued per protocol.       Discharge Plan:   Patient declined prescription refills.  Discharge instructions reviewed with: Patient.  Patient and/or family verbalized understanding of discharge instructions and all questions answered.  AVS to patient via [facility].  Patient will return 8/15 for next MD appointment.   Patient discharged in stable condition accompanied by: self.  Departure Mode: Ambulatory.      Kat Padilla RN

## 2025-06-10 ENCOUNTER — ANCILLARY PROCEDURE (OUTPATIENT)
Dept: MAMMOGRAPHY | Facility: CLINIC | Age: 50
End: 2025-06-10
Attending: PHYSICIAN ASSISTANT
Payer: OTHER GOVERNMENT

## 2025-06-10 DIAGNOSIS — R92.8 ABNORMAL MAMMOGRAM OF RIGHT BREAST: ICD-10-CM

## 2025-06-10 PROCEDURE — 76642 ULTRASOUND BREAST LIMITED: CPT | Mod: RT | Performed by: STUDENT IN AN ORGANIZED HEALTH CARE EDUCATION/TRAINING PROGRAM

## 2025-06-10 PROCEDURE — 77065 DX MAMMO INCL CAD UNI: CPT | Mod: RT | Performed by: STUDENT IN AN ORGANIZED HEALTH CARE EDUCATION/TRAINING PROGRAM

## 2025-06-10 PROCEDURE — G0279 TOMOSYNTHESIS, MAMMO: HCPCS | Performed by: STUDENT IN AN ORGANIZED HEALTH CARE EDUCATION/TRAINING PROGRAM

## (undated) DEVICE — ESU ELEC BLADE 2.75" COATED/INSULATED E1455

## (undated) DEVICE — SU DERMABOND ADVANCED .7ML DNX12

## (undated) DEVICE — SOL WATER IRRIG 500ML BOTTLE 2F7113

## (undated) DEVICE — GLOVE PROTEXIS POWDER FREE SMT 8.0  2D72PT80X

## (undated) DEVICE — ENDO POUCH UNIV RETRIEVAL SYSTEM INZII 10MM CD001

## (undated) DEVICE — PACK GOWN 3/PK DISP XL SBA32GPFCB

## (undated) DEVICE — GLOVE PROTEXIS MICRO 5.5  2D73PM55

## (undated) DEVICE — BNDG COHESIVE 2"X5YDS UNSTERILE ASSORTED COLORS LF 8962

## (undated) DEVICE — SU MONOCRYL 4-0 P-3 18" UND Y494G

## (undated) DEVICE — NDL 27GA 1.25" 305136

## (undated) DEVICE — DRAPE SHEET MED 44X70" 9355

## (undated) DEVICE — SUCTION IRR STRYKERFLOW II W/TIP 250-070-520

## (undated) DEVICE — Device

## (undated) DEVICE — DECANTER BAG 2002S

## (undated) DEVICE — ADH SKIN CLOSURE PREMIERPRO EXOFIN 1.0ML 3470

## (undated) DEVICE — PREP PAD ALCOHOL 6818

## (undated) DEVICE — GOWN XLG DISP 9545

## (undated) DEVICE — LINEN GOWN XLG 5407

## (undated) DEVICE — CATH TRAY FOLEY 16FR W/URINE METER STATLOCK 303316A

## (undated) DEVICE — SU VICRYL+ 0 27 UR6 VLT VCP603H

## (undated) DEVICE — CLIP HORIZON SM RED WIDE SLOT 001201

## (undated) DEVICE — NDL INSUFFLATION 13GA 120MM C2201

## (undated) DEVICE — NDL 25GA 2"  8881200441

## (undated) DEVICE — PACK MINOR CUSTOM ASC

## (undated) DEVICE — LINEN TOWEL PACK X5 5464

## (undated) DEVICE — PACK CENTRAL LINE INSERTION SAN32CLFCG

## (undated) DEVICE — SUCTION MANIFOLD NEPTUNE 2 SYS 4 PORT 0702-020-000

## (undated) DEVICE — SU VICRYL 3-0 SH 27" J316H

## (undated) DEVICE — GLOVE PROTEXIS BLUE W/NEU-THERA 6.0  2D73EB60

## (undated) DEVICE — ESU GROUND PAD ADULT W/CORD E7507

## (undated) DEVICE — ESU LIGASURE MARYLAND LAPAROSCOPIC SLR/DVDR 5MMX37CM LF1937

## (undated) DEVICE — DRAPE IOBAN INCISE 23X17" 6650EZ

## (undated) DEVICE — PROTECTOR ARM ONE-STEP TRENDELENBURG 40418

## (undated) DEVICE — BASIN SET SINGLE STERILE 13752-624

## (undated) DEVICE — NDL BLUNT 18GA 1" W/O FILTER 305181

## (undated) DEVICE — SOL NACL 0.9% IRRIG 500ML BOTTLE 2F7123

## (undated) DEVICE — SYR 05ML LL W/O NDL

## (undated) DEVICE — MARKER MARGIN MARKER STD 6 COLOR SGL USE MMS6

## (undated) DEVICE — PREP CHLORAPREP 26ML TINTED ORANGE  260815

## (undated) DEVICE — COVER ULTRASOUND PROBE W/GEL FLEXI-FEEL 6"X58" LF  25-FF658

## (undated) DEVICE — SU PDS II 4-0 FS-2 27" Z422H

## (undated) DEVICE — GLOVE PROTEXIS POWDER FREE SMT 7.5  2D72PT75X

## (undated) DEVICE — ENDO TROCAR FIRST ENTRY KII FIOS Z-THRD 05X100MM CTF03

## (undated) DEVICE — SU MONOCRYL 4-0 PS-2 27" UND Y426H

## (undated) DEVICE — SU SILK 3-0 SH 30" K832H

## (undated) DEVICE — KNIFE HANDLE W/15 BLADE 371615

## (undated) DEVICE — CONNECTOR MALE TO MALE LL

## (undated) DEVICE — SUCTION MANIFOLD NEPTUNE 2 SYS 1 PORT 702-025-000

## (undated) DEVICE — ESU PENCIL W/COATED BLADE E2450H

## (undated) DEVICE — DRAPE LAP TRANSVERSE 29421

## (undated) DEVICE — ENDO TROCAR FIRST ENTRY KII FIOS Z-THRD 12X100MM CTF73

## (undated) DEVICE — ENDO TROCAR SLEEVE KII Z-THREADED 05X100MM CTS02

## (undated) DEVICE — PAD CHUX UNDERPAD 30X30"

## (undated) DEVICE — SU VICRYL 4-0 PS-2 18" UND J496H

## (undated) DEVICE — KIT PATIENT POSITIONING PIGAZZI LATEX FREE 40580

## (undated) DEVICE — CLIP HORIZON MED BLUE 002200

## (undated) DEVICE — SOL NACL 0.9% INJ 500ML BAG 2B1323Q

## (undated) DEVICE — KIT INTRODUCER FLUENT MICRO 5FRX10CM ECHO TIP KIT-038-04

## (undated) DEVICE — ESU ENDO SCISSORS 5MM CVD 5DCS

## (undated) DEVICE — DEVICE SUTURE PASSER 14GA WECK EFX EFXSP2

## (undated) RX ORDER — LIDOCAINE HYDROCHLORIDE 10 MG/ML
INJECTION, SOLUTION EPIDURAL; INFILTRATION; INTRACAUDAL; PERINEURAL
Status: DISPENSED
Start: 2022-02-18

## (undated) RX ORDER — METRONIDAZOLE 500 MG/100ML
INJECTION, SOLUTION INTRAVENOUS
Status: DISPENSED
Start: 2024-06-17

## (undated) RX ORDER — PROPOFOL 10 MG/ML
INJECTION, EMULSION INTRAVENOUS
Status: DISPENSED
Start: 2024-06-17

## (undated) RX ORDER — FENTANYL CITRATE 50 UG/ML
INJECTION, SOLUTION INTRAMUSCULAR; INTRAVENOUS
Status: DISPENSED
Start: 2022-08-08

## (undated) RX ORDER — HEPARIN SODIUM (PORCINE) LOCK FLUSH IV SOLN 100 UNIT/ML 100 UNIT/ML
SOLUTION INTRAVENOUS
Status: DISPENSED
Start: 2022-02-18

## (undated) RX ORDER — ACETAMINOPHEN 325 MG/1
TABLET ORAL
Status: DISPENSED
Start: 2024-06-17

## (undated) RX ORDER — ACETAMINOPHEN 325 MG/1
TABLET ORAL
Status: DISPENSED
Start: 2022-08-08

## (undated) RX ORDER — ACETAMINOPHEN 325 MG/1
TABLET ORAL
Status: DISPENSED
Start: 2022-02-18

## (undated) RX ORDER — SCOLOPAMINE TRANSDERMAL SYSTEM 1 MG/1
PATCH, EXTENDED RELEASE TRANSDERMAL
Status: DISPENSED
Start: 2022-08-29

## (undated) RX ORDER — FENTANYL CITRATE 50 UG/ML
INJECTION, SOLUTION INTRAMUSCULAR; INTRAVENOUS
Status: DISPENSED
Start: 2024-06-17

## (undated) RX ORDER — HEPARIN SODIUM 5000 [USP'U]/.5ML
INJECTION, SOLUTION INTRAVENOUS; SUBCUTANEOUS
Status: DISPENSED
Start: 2024-06-17

## (undated) RX ORDER — FENTANYL CITRATE 50 UG/ML
INJECTION, SOLUTION INTRAMUSCULAR; INTRAVENOUS
Status: DISPENSED
Start: 2022-08-29

## (undated) RX ORDER — PROPOFOL 10 MG/ML
INJECTION, EMULSION INTRAVENOUS
Status: DISPENSED
Start: 2022-08-08

## (undated) RX ORDER — EPINEPHRINE 1 MG/ML
INJECTION, SOLUTION INTRAMUSCULAR; SUBCUTANEOUS
Status: DISPENSED
Start: 2023-04-11

## (undated) RX ORDER — ACETAMINOPHEN 325 MG/1
TABLET ORAL
Status: DISPENSED
Start: 2022-08-29

## (undated) RX ORDER — BUPIVACAINE HYDROCHLORIDE 2.5 MG/ML
INJECTION, SOLUTION EPIDURAL; INFILTRATION; INTRACAUDAL
Status: DISPENSED
Start: 2022-08-29

## (undated) RX ORDER — FENTANYL CITRATE-0.9 % NACL/PF 10 MCG/ML
PLASTIC BAG, INJECTION (ML) INTRAVENOUS
Status: DISPENSED
Start: 2022-08-08

## (undated) RX ORDER — HEPARIN SODIUM (PORCINE) LOCK FLUSH IV SOLN 100 UNIT/ML 100 UNIT/ML
SOLUTION INTRAVENOUS
Status: DISPENSED
Start: 2022-08-08

## (undated) RX ORDER — ONDANSETRON 2 MG/ML
INJECTION INTRAMUSCULAR; INTRAVENOUS
Status: DISPENSED
Start: 2022-08-08

## (undated) RX ORDER — GABAPENTIN 300 MG/1
CAPSULE ORAL
Status: DISPENSED
Start: 2022-08-08

## (undated) RX ORDER — HYDROMORPHONE HYDROCHLORIDE 1 MG/ML
INJECTION, SOLUTION INTRAMUSCULAR; INTRAVENOUS; SUBCUTANEOUS
Status: DISPENSED
Start: 2024-06-17

## (undated) RX ORDER — CEFAZOLIN SODIUM 1 G/3ML
INJECTION, POWDER, FOR SOLUTION INTRAMUSCULAR; INTRAVENOUS
Status: DISPENSED
Start: 2022-08-08

## (undated) RX ORDER — DEXAMETHASONE SODIUM PHOSPHATE 4 MG/ML
INJECTION, SOLUTION INTRA-ARTICULAR; INTRALESIONAL; INTRAMUSCULAR; INTRAVENOUS; SOFT TISSUE
Status: DISPENSED
Start: 2022-08-08

## (undated) RX ORDER — BUPIVACAINE HYDROCHLORIDE 2.5 MG/ML
INJECTION, SOLUTION EPIDURAL; INFILTRATION; INTRACAUDAL
Status: DISPENSED
Start: 2024-06-17

## (undated) RX ORDER — CEFAZOLIN SODIUM 2 G/50ML
SOLUTION INTRAVENOUS
Status: DISPENSED
Start: 2022-02-18

## (undated) RX ORDER — CEFAZOLIN SODIUM/WATER 2 G/20 ML
SYRINGE (ML) INTRAVENOUS
Status: DISPENSED
Start: 2024-06-17

## (undated) RX ORDER — CEFAZOLIN SODIUM 1 G/3ML
INJECTION, POWDER, FOR SOLUTION INTRAMUSCULAR; INTRAVENOUS
Status: DISPENSED
Start: 2022-08-29

## (undated) RX ORDER — HEPARIN SODIUM,PORCINE 10 UNIT/ML
VIAL (ML) INTRAVENOUS
Status: DISPENSED
Start: 2022-02-18

## (undated) RX ORDER — LIDOCAINE HYDROCHLORIDE AND EPINEPHRINE 10; 10 MG/ML; UG/ML
INJECTION, SOLUTION INFILTRATION; PERINEURAL
Status: DISPENSED
Start: 2022-08-29